# Patient Record
Sex: FEMALE | Race: WHITE | NOT HISPANIC OR LATINO | Employment: OTHER | URBAN - METROPOLITAN AREA
[De-identification: names, ages, dates, MRNs, and addresses within clinical notes are randomized per-mention and may not be internally consistent; named-entity substitution may affect disease eponyms.]

---

## 2017-03-09 ENCOUNTER — APPOINTMENT (OUTPATIENT)
Dept: LAB | Facility: CLINIC | Age: 72
End: 2017-03-09
Payer: MEDICARE

## 2017-03-09 ENCOUNTER — TRANSCRIBE ORDERS (OUTPATIENT)
Dept: ADMINISTRATIVE | Facility: HOSPITAL | Age: 72
End: 2017-03-09

## 2017-03-09 DIAGNOSIS — G50.0 TRIGEMINAL NEURALGIA: ICD-10-CM

## 2017-03-09 DIAGNOSIS — N28.9 UNSPECIFIED DISORDER OF KIDNEY AND URETER: ICD-10-CM

## 2017-03-09 DIAGNOSIS — E61.2 MAGNESIUM DEFICIENCY: ICD-10-CM

## 2017-03-09 DIAGNOSIS — E55.9 UNSPECIFIED VITAMIN D DEFICIENCY: Primary | ICD-10-CM

## 2017-03-09 DIAGNOSIS — G35 MULTIPLE SCLEROSIS (HCC): ICD-10-CM

## 2017-03-09 DIAGNOSIS — E55.9 UNSPECIFIED VITAMIN D DEFICIENCY: ICD-10-CM

## 2017-03-09 PROCEDURE — 82306 VITAMIN D 25 HYDROXY: CPT

## 2017-03-09 PROCEDURE — 36415 COLL VENOUS BLD VENIPUNCTURE: CPT

## 2017-03-09 PROCEDURE — 80048 BASIC METABOLIC PNL TOTAL CA: CPT

## 2017-03-09 PROCEDURE — 83735 ASSAY OF MAGNESIUM: CPT

## 2017-03-10 LAB
25(OH)D3 SERPL-MCNC: 31.6 NG/ML (ref 30–100)
ANION GAP SERPL CALCULATED.3IONS-SCNC: 7 MMOL/L (ref 4–13)
BUN SERPL-MCNC: 16 MG/DL (ref 5–25)
CALCIUM SERPL-MCNC: 9 MG/DL (ref 8.3–10.1)
CHLORIDE SERPL-SCNC: 105 MMOL/L (ref 100–108)
CO2 SERPL-SCNC: 26 MMOL/L (ref 21–32)
CREAT SERPL-MCNC: 0.69 MG/DL (ref 0.6–1.3)
GFR SERPL CREATININE-BSD FRML MDRD: >60 ML/MIN/1.73SQ M
GLUCOSE SERPL-MCNC: 86 MG/DL (ref 65–140)
MAGNESIUM SERPL-MCNC: 2.3 MG/DL (ref 1.6–2.6)
POTASSIUM SERPL-SCNC: 4.1 MMOL/L (ref 3.5–5.3)
SODIUM SERPL-SCNC: 138 MMOL/L (ref 136–145)

## 2017-03-23 ENCOUNTER — HOSPITAL ENCOUNTER (OUTPATIENT)
Dept: MRI IMAGING | Facility: CLINIC | Age: 72
Discharge: HOME | End: 2017-03-23
Payer: MEDICARE

## 2017-03-23 DIAGNOSIS — G50.0 TRIGEMINAL NEURALGIA: ICD-10-CM

## 2017-03-23 DIAGNOSIS — G35 MULTIPLE SCLEROSIS (HCC): ICD-10-CM

## 2017-03-23 PROCEDURE — 70553 MRI BRAIN STEM W/O & W/DYE: CPT

## 2017-03-23 PROCEDURE — A9585 GADOBUTROL INJECTION: HCPCS | Performed by: DENTIST

## 2017-03-23 RX ADMIN — GADOBUTROL 6 ML: 604.72 INJECTION INTRAVENOUS at 09:58

## 2017-05-17 ENCOUNTER — GENERIC CONVERSION - ENCOUNTER (OUTPATIENT)
Dept: OTHER | Facility: OTHER | Age: 72
End: 2017-05-17

## 2017-06-01 ENCOUNTER — GENERIC CONVERSION - ENCOUNTER (OUTPATIENT)
Dept: OTHER | Facility: OTHER | Age: 72
End: 2017-06-01

## 2017-06-01 ENCOUNTER — APPOINTMENT (OUTPATIENT)
Dept: LAB | Facility: CLINIC | Age: 72
End: 2017-06-01
Payer: MEDICARE

## 2017-06-01 DIAGNOSIS — E03.9 HYPOTHYROIDISM: ICD-10-CM

## 2017-06-01 LAB — TSH SERPL DL<=0.05 MIU/L-ACNC: 1.83 UIU/ML (ref 0.36–3.74)

## 2017-06-01 PROCEDURE — 36415 COLL VENOUS BLD VENIPUNCTURE: CPT

## 2017-06-01 PROCEDURE — 84443 ASSAY THYROID STIM HORMONE: CPT

## 2017-06-06 ENCOUNTER — ALLSCRIPTS OFFICE VISIT (OUTPATIENT)
Dept: OTHER | Facility: OTHER | Age: 72
End: 2017-06-06

## 2017-08-07 ENCOUNTER — ALLSCRIPTS OFFICE VISIT (OUTPATIENT)
Dept: OTHER | Facility: OTHER | Age: 72
End: 2017-08-07

## 2017-10-17 ENCOUNTER — TRANSCRIBE ORDERS (OUTPATIENT)
Dept: ADMINISTRATIVE | Facility: HOSPITAL | Age: 72
End: 2017-10-17

## 2017-10-17 DIAGNOSIS — M51.16 NEURITIS OR RADICULITIS DUE TO RUPTURE OF LUMBAR INTERVERTEBRAL DISC: Primary | ICD-10-CM

## 2017-10-19 ENCOUNTER — HOSPITAL ENCOUNTER (OUTPATIENT)
Dept: CT IMAGING | Facility: CLINIC | Age: 72
Discharge: HOME | End: 2017-10-19
Payer: MEDICARE

## 2017-10-19 DIAGNOSIS — M51.16 NEURITIS OR RADICULITIS DUE TO RUPTURE OF LUMBAR INTERVERTEBRAL DISC: ICD-10-CM

## 2017-10-19 PROCEDURE — 72131 CT LUMBAR SPINE W/O DYE: CPT

## 2017-11-27 ENCOUNTER — TRANSCRIBE ORDERS (OUTPATIENT)
Dept: ADMINISTRATIVE | Facility: HOSPITAL | Age: 72
End: 2017-11-27

## 2017-11-27 ENCOUNTER — ALLSCRIPTS OFFICE VISIT (OUTPATIENT)
Dept: OTHER | Facility: OTHER | Age: 72
End: 2017-11-27

## 2017-11-27 DIAGNOSIS — G47.33 OBSTRUCTIVE SLEEP APNEA: ICD-10-CM

## 2017-11-27 DIAGNOSIS — R06.83 SNORES: ICD-10-CM

## 2017-11-27 DIAGNOSIS — R06.02 SOB (SHORTNESS OF BREATH): Primary | ICD-10-CM

## 2017-11-27 DIAGNOSIS — F17.210 CIGARETTE NICOTINE DEPENDENCE, UNCOMPLICATED: ICD-10-CM

## 2017-11-27 DIAGNOSIS — F17.200 SMOKER: ICD-10-CM

## 2017-12-06 ENCOUNTER — APPOINTMENT (OUTPATIENT)
Dept: LAB | Facility: CLINIC | Age: 72
End: 2017-12-06
Payer: MEDICARE

## 2017-12-06 DIAGNOSIS — Z12.31 ENCOUNTER FOR SCREENING MAMMOGRAM FOR MALIGNANT NEOPLASM OF BREAST: ICD-10-CM

## 2017-12-06 DIAGNOSIS — E03.9 HYPOTHYROIDISM: ICD-10-CM

## 2017-12-07 ENCOUNTER — APPOINTMENT (OUTPATIENT)
Dept: LAB | Facility: CLINIC | Age: 72
End: 2017-12-07
Payer: MEDICARE

## 2017-12-07 ENCOUNTER — HOSPITAL ENCOUNTER (OUTPATIENT)
Dept: PULMONOLOGY | Facility: HOSPITAL | Age: 72
Discharge: HOME | End: 2017-12-07
Attending: INTERNAL MEDICINE
Payer: MEDICARE

## 2017-12-07 ENCOUNTER — HOSPITAL ENCOUNTER (OUTPATIENT)
Dept: CT IMAGING | Facility: HOSPITAL | Age: 72
Discharge: HOME | End: 2017-12-07
Attending: INTERNAL MEDICINE
Payer: COMMERCIAL

## 2017-12-07 ENCOUNTER — GENERIC CONVERSION - ENCOUNTER (OUTPATIENT)
Dept: SLEEP CENTER | Facility: CLINIC | Age: 72
End: 2017-12-07

## 2017-12-07 DIAGNOSIS — F17.200 SMOKER: ICD-10-CM

## 2017-12-07 DIAGNOSIS — R06.02 SOB (SHORTNESS OF BREATH): ICD-10-CM

## 2017-12-07 DIAGNOSIS — F17.210 CIGARETTE NICOTINE DEPENDENCE, UNCOMPLICATED: ICD-10-CM

## 2017-12-07 DIAGNOSIS — G47.33 OBSTRUCTIVE SLEEP APNEA: ICD-10-CM

## 2017-12-07 LAB
ALBUMIN SERPL BCP-MCNC: 3.4 G/DL (ref 3.5–5)
ALP SERPL-CCNC: 89 U/L (ref 46–116)
ALT SERPL W P-5'-P-CCNC: 28 U/L (ref 12–78)
ANION GAP SERPL CALCULATED.3IONS-SCNC: 7 MMOL/L (ref 4–13)
AST SERPL W P-5'-P-CCNC: 22 U/L (ref 5–45)
BASOPHILS # BLD AUTO: 0.04 THOUSANDS/ΜL (ref 0–0.1)
BASOPHILS NFR BLD AUTO: 1 % (ref 0–1)
BILIRUB SERPL-MCNC: 0.44 MG/DL (ref 0.2–1)
BUN SERPL-MCNC: 18 MG/DL (ref 5–25)
CALCIUM SERPL-MCNC: 8.9 MG/DL (ref 8.3–10.1)
CHLORIDE SERPL-SCNC: 104 MMOL/L (ref 100–108)
CHOLEST SERPL-MCNC: 182 MG/DL (ref 50–200)
CO2 SERPL-SCNC: 27 MMOL/L (ref 21–32)
CREAT SERPL-MCNC: 0.67 MG/DL (ref 0.6–1.3)
EOSINOPHIL # BLD AUTO: 0.6 THOUSAND/ΜL (ref 0–0.61)
EOSINOPHIL NFR BLD AUTO: 9 % (ref 0–6)
ERYTHROCYTE [DISTWIDTH] IN BLOOD BY AUTOMATED COUNT: 14.4 % (ref 11.6–15.1)
GFR SERPL CREATININE-BSD FRML MDRD: 87 ML/MIN/1.73SQ M
GLUCOSE P FAST SERPL-MCNC: 84 MG/DL (ref 65–99)
HCT VFR BLD AUTO: 38.9 % (ref 34.8–46.1)
HDLC SERPL-MCNC: 90 MG/DL (ref 40–60)
HGB BLD-MCNC: 13 G/DL (ref 11.5–15.4)
LDLC SERPL CALC-MCNC: 79 MG/DL (ref 0–100)
LYMPHOCYTES # BLD AUTO: 2.46 THOUSANDS/ΜL (ref 0.6–4.47)
LYMPHOCYTES NFR BLD AUTO: 35 % (ref 14–44)
MCH RBC QN AUTO: 31.9 PG (ref 26.8–34.3)
MCHC RBC AUTO-ENTMCNC: 33.4 G/DL (ref 31.4–37.4)
MCV RBC AUTO: 96 FL (ref 82–98)
MONOCYTES # BLD AUTO: 0.72 THOUSAND/ΜL (ref 0.17–1.22)
MONOCYTES NFR BLD AUTO: 10 % (ref 4–12)
NEUTROPHILS # BLD AUTO: 3.14 THOUSANDS/ΜL (ref 1.85–7.62)
NEUTS SEG NFR BLD AUTO: 45 % (ref 43–75)
NRBC BLD AUTO-RTO: 0 /100 WBCS
PLATELET # BLD AUTO: 329 THOUSANDS/UL (ref 149–390)
PMV BLD AUTO: 9.9 FL (ref 8.9–12.7)
POTASSIUM SERPL-SCNC: 3.9 MMOL/L (ref 3.5–5.3)
PROT SERPL-MCNC: 7.3 G/DL (ref 6.4–8.2)
RBC # BLD AUTO: 4.07 MILLION/UL (ref 3.81–5.12)
SODIUM SERPL-SCNC: 138 MMOL/L (ref 136–145)
TRIGL SERPL-MCNC: 64 MG/DL
TSH SERPL DL<=0.05 MIU/L-ACNC: 2.94 UIU/ML (ref 0.36–3.74)
WBC # BLD AUTO: 6.97 THOUSAND/UL (ref 4.31–10.16)

## 2017-12-07 PROCEDURE — 94729 DIFFUSING CAPACITY: CPT

## 2017-12-07 PROCEDURE — 80061 LIPID PANEL: CPT

## 2017-12-07 PROCEDURE — 94760 N-INVAS EAR/PLS OXIMETRY 1: CPT

## 2017-12-07 PROCEDURE — 94060 EVALUATION OF WHEEZING: CPT

## 2017-12-07 PROCEDURE — 84443 ASSAY THYROID STIM HORMONE: CPT

## 2017-12-07 PROCEDURE — 85025 COMPLETE CBC W/AUTO DIFF WBC: CPT

## 2017-12-07 PROCEDURE — 36415 COLL VENOUS BLD VENIPUNCTURE: CPT

## 2017-12-07 PROCEDURE — 94727 GAS DIL/WSHOT DETER LNG VOL: CPT

## 2017-12-07 PROCEDURE — 80053 COMPREHEN METABOLIC PANEL: CPT

## 2017-12-07 RX ORDER — ALBUTEROL SULFATE 2.5 MG/3ML
2.5 SOLUTION RESPIRATORY (INHALATION) ONCE
Status: COMPLETED | OUTPATIENT
Start: 2017-12-07 | End: 2017-12-07

## 2017-12-07 RX ADMIN — ALBUTEROL SULFATE 2.5 MG: 2.5 SOLUTION RESPIRATORY (INHALATION) at 12:22

## 2017-12-08 ENCOUNTER — TRANSCRIBE ORDERS (OUTPATIENT)
Dept: ADMINISTRATIVE | Facility: HOSPITAL | Age: 72
End: 2017-12-08

## 2017-12-08 DIAGNOSIS — R91.8 MULTIPLE LUNG NODULES: Primary | ICD-10-CM

## 2017-12-12 ENCOUNTER — GENERIC CONVERSION - ENCOUNTER (OUTPATIENT)
Dept: OTHER | Facility: OTHER | Age: 72
End: 2017-12-12

## 2018-01-09 NOTE — RESULT NOTES
Verified Results  (1) TSH [id number] 10:00AM David Doss    Order Number: [id number]     Test Name Result Flag Reference   TSH 1 830 uIU/mL  0 358-3 740   - Patient Instructions: This bloodwork is non-fasting  Please drink two glasses of water morning of bloodwork  Patients undergoing fluorescein dye angiography may retain small amounts of fluorescein in the body for 48-72 hours post procedure  Samples containing fluorescein can produce falsely depressed TSH values  If the patient had this procedure,a specimen should be resubmitted post fluorescein clearance            The recommended reference ranges for TSH during pregnancy are as follows:  First trimester 0 1 to 2 5 uIU/mL  Second trimester  0 2 to 3 0 uIU/mL  Third trimester 0 3 to 3 0 uIU/m

## 2018-01-10 ENCOUNTER — HOSPITAL ENCOUNTER (OUTPATIENT)
Dept: SLEEP CENTER | Facility: CLINIC | Age: 73
Discharge: HOME | End: 2018-01-11
Payer: MEDICARE

## 2018-01-10 DIAGNOSIS — G47.33 OSA (OBSTRUCTIVE SLEEP APNEA): ICD-10-CM

## 2018-01-10 DIAGNOSIS — F17.200 SMOKER: ICD-10-CM

## 2018-01-10 DIAGNOSIS — R06.02 SOB (SHORTNESS OF BREATH): ICD-10-CM

## 2018-01-10 DIAGNOSIS — R06.83 SNORES: ICD-10-CM

## 2018-01-10 PROCEDURE — 95810 POLYSOM 6/> YRS 4/> PARAM: CPT

## 2018-01-13 VITALS
WEIGHT: 136.25 LBS | HEART RATE: 84 BPM | DIASTOLIC BLOOD PRESSURE: 64 MMHG | SYSTOLIC BLOOD PRESSURE: 132 MMHG | HEIGHT: 64 IN | BODY MASS INDEX: 23.26 KG/M2 | OXYGEN SATURATION: 97 %

## 2018-01-22 VITALS
WEIGHT: 134.5 LBS | HEIGHT: 64 IN | SYSTOLIC BLOOD PRESSURE: 106 MMHG | DIASTOLIC BLOOD PRESSURE: 80 MMHG | OXYGEN SATURATION: 97 % | BODY MASS INDEX: 22.96 KG/M2 | HEART RATE: 80 BPM

## 2018-01-24 VITALS
SYSTOLIC BLOOD PRESSURE: 124 MMHG | WEIGHT: 134.38 LBS | HEIGHT: 64 IN | HEART RATE: 83 BPM | OXYGEN SATURATION: 93 % | BODY MASS INDEX: 22.94 KG/M2 | DIASTOLIC BLOOD PRESSURE: 78 MMHG

## 2018-02-08 ENCOUNTER — HOSPITAL ENCOUNTER (OUTPATIENT)
Dept: MAMMOGRAPHY | Facility: CLINIC | Age: 73
Discharge: HOME | End: 2018-02-08
Payer: MEDICARE

## 2018-02-08 DIAGNOSIS — Z12.31 ENCOUNTER FOR SCREENING MAMMOGRAM FOR MALIGNANT NEOPLASM OF BREAST: ICD-10-CM

## 2018-02-08 PROCEDURE — 77067 SCR MAMMO BI INCL CAD: CPT

## 2018-02-09 ENCOUNTER — TELEPHONE (OUTPATIENT)
Dept: INTERNAL MEDICINE CLINIC | Facility: CLINIC | Age: 73
End: 2018-02-09

## 2018-02-09 NOTE — TELEPHONE ENCOUNTER
----- Message from Elisa Lowry DO sent at 2/8/2018 12:02 PM EST -----  Let patient know mammogram was normal

## 2018-02-12 ENCOUNTER — TELEPHONE (OUTPATIENT)
Dept: INTERNAL MEDICINE CLINIC | Facility: CLINIC | Age: 73
End: 2018-02-12

## 2018-02-12 NOTE — TELEPHONE ENCOUNTER
----- Message from Osmin Phelan DO sent at 2/12/2018  3:26 PM EST -----  Let patient know mammogram was normal

## 2018-02-16 ENCOUNTER — OFFICE VISIT (OUTPATIENT)
Dept: PULMONOLOGY | Facility: CLINIC | Age: 73
End: 2018-02-16
Payer: MEDICARE

## 2018-02-16 VITALS
WEIGHT: 137 LBS | BODY MASS INDEX: 23.39 KG/M2 | SYSTOLIC BLOOD PRESSURE: 104 MMHG | DIASTOLIC BLOOD PRESSURE: 74 MMHG | HEART RATE: 74 BPM | HEIGHT: 64 IN | OXYGEN SATURATION: 92 %

## 2018-02-16 DIAGNOSIS — Z72.0 TOBACCO ABUSE: ICD-10-CM

## 2018-02-16 DIAGNOSIS — R91.8 LUNG NODULES: ICD-10-CM

## 2018-02-16 DIAGNOSIS — G47.33 OSA (OBSTRUCTIVE SLEEP APNEA): ICD-10-CM

## 2018-02-16 DIAGNOSIS — J41.0 SIMPLE CHRONIC BRONCHITIS (HCC): Primary | ICD-10-CM

## 2018-02-16 PROCEDURE — 99215 OFFICE O/P EST HI 40 MIN: CPT | Performed by: INTERNAL MEDICINE

## 2018-02-16 RX ORDER — UREA 10 %
100 LOTION (ML) TOPICAL DAILY
COMMUNITY
End: 2021-08-18 | Stop reason: ALTCHOICE

## 2018-02-16 RX ORDER — SODIUM, POTASSIUM,MAG SULFATES 17.5-3.13G
SOLUTION, RECONSTITUTED, ORAL ORAL
Refills: 0 | COMMUNITY
Start: 2018-01-31 | End: 2018-10-23 | Stop reason: CLARIF

## 2018-02-16 RX ORDER — VIT A/VIT C/VIT E/ZINC/COPPER 4296-226
CAPSULE ORAL 2 TIMES DAILY
COMMUNITY
End: 2019-09-23 | Stop reason: CLARIF

## 2018-02-16 RX ORDER — THIAMINE HCL 100 MG
1 TABLET ORAL DAILY
COMMUNITY
End: 2019-12-12

## 2018-02-16 RX ORDER — CHOLECALCIFEROL (VITAMIN D3) 25 MCG
1000 CAPSULE ORAL DAILY
COMMUNITY
End: 2021-08-18 | Stop reason: ALTCHOICE

## 2018-02-16 RX ORDER — DIAZEPAM 2 MG/1
TABLET ORAL
Refills: 5 | COMMUNITY
Start: 2018-01-13 | End: 2019-08-14 | Stop reason: CLARIF

## 2018-02-16 RX ORDER — LEVOTHYROXINE SODIUM 0.1 MG/1
1 TABLET ORAL DAILY
COMMUNITY
Start: 2014-03-24 | End: 2018-02-23 | Stop reason: SDUPTHER

## 2018-02-16 RX ORDER — NAPROXEN 375 MG/1
500 TABLET ORAL 2 TIMES DAILY WITH MEALS
COMMUNITY
Start: 2018-02-08 | End: 2019-08-14 | Stop reason: CLARIF

## 2018-02-16 NOTE — PROGRESS NOTES
Assessment/Plan:   Diagnoses and all orders for this visit:    Simple chronic bronchitis (HCC)    CHOCO (obstructive sleep apnea)  -     Polysomnography 4 or more parameters with CPAP; Future    Lung nodules  -     CT chest wo contrast; Future    Tobacco abuse    Other orders  -     diazepam (VALIUM) 2 mg tablet; TAKE 1 TABLET BY MOUTH EVERY DAY AT BEDTIME FOR INSOMNIA  -     levothyroxine 100 mcg tablet; Take 1 tablet by mouth daily  -     SUPREP BOWEL PREP KIT 17 5-3 13-1 6 GM/180ML SOLN; Use as directed  -     naproxen (NAPROSYN) 375 mg tablet;   -     Multiple Vitamins-Minerals (PRESERVISION AREDS) CAPS; Take by mouth  -     thiamine (VITAMIN B1) 100 mg tablet; Take 1 tablet by mouth daily  -     vitamin B-12 (CYANOCOBALAMIN) 100 MCG tablet; Take by mouth  -     Cholecalciferol (VITAMIN D-3) 1000 units CAPS; Take by mouth    Chronic bronchitis long discussion with the patient with regards to smoking cessation  She states she does not want to quit  PFTs discussed with the patient with mild obstructive lung disease  Residual volume increased consistent with air trapping and severely decreased the DLCO    CT of the chest reviewed with the patient with evidence of bilateral emphysematous changes bilateral apical pleural scarring  3 small lung nodules around 4 mm  Given active smoking history would repeat CT of the chest in 6 months and follow-up  Obstructive sleep apnea discussed the polysomnogram results with the patient diagnostic with severe obstructive sleep apnea with an AHI of 31 8  Etiology pathogenesis of obstructive sleep apnea again discussed with the patient in detail  Treatment options discussed with mandibular advancement devices, uvulopharyngoplasty and CPAP titration  Patient would like to going for the CPAP titration study    Testing procedure discussed in detail  Consequences of untreated sleep apnea discussed  Need for compliance with the CPAP machine discussed  She will get the CPAP titration study and will get the CPAP and I will see her back in 3 months with the CPAP download compliance  Follow-up in 3 months or when necessary earlier as needed  Return in about 3 months (around 5/16/2018)  All questions are answered to the patient's satisfaction and understanding  She verbalizes understanding  She is encouraged to call with any further questions or concerns  Portions of the record may have been created with voice recognition software  Occasional wrong word or "sound a like" substitutions may have occurred due to the inherent limitations of voice recognition software  Read the chart carefully and recognize, using context, where substitutions have occurred  ______________________________________________________________________    Chief Complaint:   Chief Complaint   Patient presents with    Follow-up       Patient ID: Guilherme Romero is a 74 y o  y o  female has a past medical history of COPD (chronic obstructive pulmonary disease) ([facility] [address] ) and CHOCO (obstructive sleep apnea)  2/16/2018  Patient is a very pleasant 78-year-old lady, With greater than 45 pack is smoking history still actively smoking about half a pack a day, states she has significantly cut down from before  of cough for the past 3-4 months  Mucoid sputum no hemoptysis  not complain of any shortness of breath or dyspnea on exertion  The patient is being seen for  obstructive sleep apnea  Symptoms:  witnessed apnea during sleep,-- witnessed gasping during sleep,-- unrefreshing sleep,-- sleepy when sedentary-- and-- impaired concentration, but-- no excessive daytime sleepiness,-- no memory problems,-- no irritability-- and-- no restless legs--   The patient presents with complaints of gradual onset of nightly episodes of moderate snoring, described as loud  The patient is currently experiencing symptoms  Associated symptoms:  shortness of breath-- and-- dry throat, but-- no morning headaches   The patient is not currently being treated for this problem  Pertinent medical history:  no obesity,-- no asthma,-- no chronic obstructive pulmonary disease,-- no congestive heart failure-- and-- no environmental allergies  The patient is being seen for  chronic bronchitis  Symptoms:  cough-- and-- sputum production, but-- no purulent sputum,-- no shortness of breath,-- no dyspnea on exertion,-- no wheezing,-- no hemoptysis,-- no orthopnea,-- no chest burning-- and-- no lower extremity swelling  The patient is currently experiencing symptoms  Onset was gradual  Associated symptoms:  fatigue, but-- no headache,-- no myalgias,-- no fever,-- no sore throat-- and-- no nasal congestion  The patient is not currently being treated for this problem  Since diagnosis the disease has been worsening  Pertinent medical history:  no recurrent bronchitis,-- no bronchiectasis,-- no pneumonia,-- no antiprotease deficiency,-- no lung cancer,-- no pulmonary embolism-- and-- no diabetes  Risk factors:  tobacco abuse, but-- no alcohol abuse-- and-- no obesity  Review of Systems   Constitutional: Positive for fatigue  Negative for activity change, appetite change, chills, diaphoresis, fever and unexpected weight change  HENT: Negative for congestion, dental problem, drooling, nosebleeds, postnasal drip, rhinorrhea, sinus pressure, sore throat and voice change  Eyes: Negative for discharge, itching and visual disturbance  Respiratory: Positive for cough (clear sputum, no hemoptysis), shortness of breath and wheezing  Cardiovascular: Negative for chest pain, palpitations and leg swelling  Endocrine: Negative for cold intolerance and heat intolerance  Allergic/Immunologic: Negative for food allergies and immunocompromised state  Neurological: Negative for dizziness, facial asymmetry, speech difficulty and weakness  Hematological: Negative for adenopathy  Psychiatric/Behavioral: Negative for agitation, confusion and sleep disturbance   The patient is not nervous/anxious  Smoking history: She reports that she has been smoking  She started smoking about 60 years ago  She has been smoking about 0 50 packs per day  She has never used smokeless tobacco     The following portions of the patient's history were reviewed and updated as appropriate: allergies, current medications, past family history, past medical history, past social history, past surgical history and problem list     Immunization History   Administered Date(s) Administered    Influenza Split High Dose Preservative Free IM 1945    Pneumococcal Polysaccharide PPV23 1945     Current Outpatient Prescriptions   Medication Sig Dispense Refill    Cholecalciferol (VITAMIN D-3) 1000 units CAPS Take by mouth      diazepam (VALIUM) 2 mg tablet TAKE 1 TABLET BY MOUTH EVERY DAY AT BEDTIME FOR INSOMNIA  5    levothyroxine 100 mcg tablet Take 1 tablet by mouth daily      Multiple Vitamins-Minerals (PRESERVISION AREDS) CAPS Take by mouth      naproxen (NAPROSYN) 375 mg tablet       SUPREP BOWEL PREP KIT 17 5-3 13-1 6 GM/180ML SOLN Use as directed  0    thiamine (VITAMIN B1) 100 mg tablet Take 1 tablet by mouth daily      vitamin B-12 (CYANOCOBALAMIN) 100 MCG tablet Take by mouth       No current facility-administered medications for this visit  Allergies: Patient has no known allergies  Objective:  Vitals:    02/16/18 1026   BP: 104/74   BP Location: Left arm   Patient Position: Sitting   Pulse: 74   SpO2: 92%   Weight: 62 1 kg (137 lb)   Height: 5' 4" (1 626 m)   Oxygen Therapy  SpO2: 92 %    Wt Readings from Last 3 Encounters:   02/16/18 62 1 kg (137 lb)   12/12/17 61 kg (134 lb 6 1 oz)   11/27/17 61 kg (134 lb 8 oz)     Body mass index is 23 52 kg/m²  Physical Exam   Constitutional: She is oriented to person, place, and time  She appears well-developed and well-nourished  HENT:   Head: Normocephalic and atraumatic     Crowded oropharyngeal airways, Mallampati score of 3 Eyes: Conjunctivae are normal  Pupils are equal, round, and reactive to light  Neck: Normal range of motion  Neck supple  No JVD present  No thyromegaly present  Cardiovascular: Normal rate, regular rhythm and normal heart sounds  Exam reveals no gallop and no friction rub  No murmur heard  Pulmonary/Chest: Effort normal and breath sounds normal  No respiratory distress  She has no wheezes  She has no rales  She exhibits no tenderness  Abdominal: Soft  Bowel sounds are normal    Musculoskeletal: Normal range of motion  She exhibits no edema, tenderness or deformity  Lymphadenopathy:     She has no cervical adenopathy  Neurological: She is alert and oriented to person, place, and time  Skin: Skin is warm and dry  Psychiatric: She has a normal mood and affect  Nursing note and vitals reviewed  Lab Review:   not applicable    Diagnostics:  I have personally reviewed pertinent reports      none pertinent  Office Spirometry Results:

## 2018-02-22 ENCOUNTER — ANESTHESIA EVENT (OUTPATIENT)
Dept: PERIOP | Facility: HOSPITAL | Age: 73
End: 2018-02-22
Payer: MEDICARE

## 2018-02-23 ENCOUNTER — HOSPITAL ENCOUNTER (OUTPATIENT)
Facility: HOSPITAL | Age: 73
Setting detail: OUTPATIENT SURGERY
Discharge: HOME | End: 2018-02-23
Attending: COLON & RECTAL SURGERY | Admitting: COLON & RECTAL SURGERY
Payer: MEDICARE

## 2018-02-23 ENCOUNTER — ANESTHESIA (OUTPATIENT)
Dept: PERIOP | Facility: HOSPITAL | Age: 73
End: 2018-02-23
Payer: MEDICARE

## 2018-02-23 VITALS
SYSTOLIC BLOOD PRESSURE: 127 MMHG | BODY MASS INDEX: 23.57 KG/M2 | HEIGHT: 63 IN | DIASTOLIC BLOOD PRESSURE: 61 MMHG | TEMPERATURE: 97.4 F | RESPIRATION RATE: 18 BRPM | HEART RATE: 71 BPM | OXYGEN SATURATION: 100 % | WEIGHT: 133 LBS

## 2018-02-23 DIAGNOSIS — E03.9 HYPOTHYROIDISM, UNSPECIFIED TYPE: Primary | ICD-10-CM

## 2018-02-23 RX ORDER — SODIUM CHLORIDE, SODIUM LACTATE, POTASSIUM CHLORIDE, CALCIUM CHLORIDE 600; 310; 30; 20 MG/100ML; MG/100ML; MG/100ML; MG/100ML
125 INJECTION, SOLUTION INTRAVENOUS CONTINUOUS
Status: DISCONTINUED | OUTPATIENT
Start: 2018-02-23 | End: 2018-02-23 | Stop reason: HOSPADM

## 2018-02-23 RX ORDER — PROPOFOL 10 MG/ML
INJECTION, EMULSION INTRAVENOUS AS NEEDED
Status: DISCONTINUED | OUTPATIENT
Start: 2018-02-23 | End: 2018-02-23 | Stop reason: SURG

## 2018-02-23 RX ORDER — LEVOTHYROXINE SODIUM 0.1 MG/1
TABLET ORAL
Qty: 90 TABLET | Refills: 3 | Status: SHIPPED | OUTPATIENT
Start: 2018-02-23 | End: 2018-04-23 | Stop reason: SDUPTHER

## 2018-02-23 RX ADMIN — PROPOFOL 60 MG: 10 INJECTION, EMULSION INTRAVENOUS at 07:56

## 2018-02-23 RX ADMIN — PROPOFOL 20 MG: 10 INJECTION, EMULSION INTRAVENOUS at 08:12

## 2018-02-23 RX ADMIN — PROPOFOL 30 MG: 10 INJECTION, EMULSION INTRAVENOUS at 07:59

## 2018-02-23 RX ADMIN — SODIUM CHLORIDE, SODIUM LACTATE, POTASSIUM CHLORIDE, AND CALCIUM CHLORIDE: .6; .31; .03; .02 INJECTION, SOLUTION INTRAVENOUS at 07:38

## 2018-02-23 RX ADMIN — PROPOFOL 30 MG: 10 INJECTION, EMULSION INTRAVENOUS at 08:05

## 2018-02-23 NOTE — OP NOTE
**** GI/ENDOSCOPY REPORT ****     PATIENT NAME: Wali VIEYRA ------ VISIT ID:  Patient ID:   [id number] YOB: 1945     INTRODUCTION: Colonoscopy - A 74 female patient presents for an outpatient   Colonoscopy at [facility]  PREVIOUS COLONOSCOPY: Patient's last colonoscopy was 5 years ago  INDICATIONS: Surveillance  Increased-risk screening for colon cancer and   colonic polyps  CONSENT:  The benefits, risks, and alternatives to the procedure were   discussed and informed consent was obtained from the patient  PREPARATION: EKG, pulse, pulse oximetry and blood pressure were monitored   throughout the procedure  The patient was identified by myself both   verbally and by visual inspection of ID band  Airway Assessment   Classification: Airway class 2 - Visualization of the soft palate, fauces   and uvula  ASA Classification: Class 2 - Patient has mild to moderate   systemic disturbance that may or may not be related to the disorder   requiring surgery  The patient was identified by myself both verbally and   by visual inspection of ID band  EKG, pulse, pulse oximetry, and blood   pressure were monitored throughout the procedure  The patient received   electrolyte preparation in preparation for the procedure  An intravenous   line was inserted  MEDICATIONS: Anesthesia-check records MAC anesthesia  PROCEDURE:  The pediatric colonoscope was passed with difficulty through   the anus under direct visualization and advanced to the cecum, confirmed   by appendiceal orifice and ileocecal valve  The patient required   counterpressure to aid in the passage of the scope  The scope was   withdrawn and the mucosa was carefully examined  The quality of the   preparation was   Cecal Intubation Time: 11 minutes(s) Scope Withdrawal   Time: 6 minutes(s)     RECTAL EXAM: Normal rectal exam      FINDINGS:  A diverticulum was found in the transverse colon, sigmoid   colon, and descending colon  The ileocecal valve, cecum, ascending   colon, hepatic flexure, rectosigmoid junction, rectum, and anus appeared   to be normal        Otherwise, the colon appeared to be normal      COMPLICATIONS: There were no complications  IMPRESSIONS: Diverticulum found in the transverse colon, sigmoid colon,   and descending colon  Normal ileocecal valve, cecum, ascending colon,   hepatic flexure, rectosigmoid junction, rectum, and anus  RECOMMENDATIONS: Colonoscopy recommended in 5 years  Discharge home when   standard parameters are met  Start high fiber diet  Continue current   medications  The patient to be given standard materials for the current   diagnosis  ESTIMATED BLOOD LOSS: None  PATHOLOGY SPECIMENS: No     PROCEDURE CODES:  - colonoscopy for CA screen: High risk without   biopsy     ICD-9 Codes: V76 51 Special screening for malignant neoplasms of colon     ICD-10 Codes: Z12 11 Encounter for screening for malignant neoplasm of   colon K57 Diverticular disease of intestine     PERFORMED BY: JACK Galvan  on 02/23/2018  Version 1, electronically signed by JACK Ludwig  on   02/23/2018 at 08:21

## 2018-02-23 NOTE — DISCHARGE INSTRUCTIONS
Colonoscopy   WHAT YOU NEED TO KNOW:   A colonoscopy is a procedure to examine the inside of your colon (intestine) with a scope  Polyps or tissue growths may have been removed during your colonoscopy  It is normal to feel bloated and to have some abdominal discomfort  You should be passing gas  If you have hemorrhoids or you had polyps removed, you may have a small amount of bleeding  DISCHARGE INSTRUCTIONS:   Seek care immediately if:   · You have a large amount of bright red blood in your bowel movements  · Your abdomen is hard and firm and you have severe pain  · You have sudden trouble breathing  Contact your healthcare provider if:   · You develop a rash or hives  · You have a fever within 24 hours of your procedure       · You have not had a bowel movement for 3 days after your procedure  · You have questions or concerns about your condition or care  Activity:   · Do not lift, strain, or run  for 3 days after your procedure  · Rest after your procedure  You have been given medicine to relax you  Do not  drive or make important decisions until the day after your procedure  Return to your normal activity as directed  · Relieve gas and discomfort from bloating  by lying on your right side with a heating pad on your abdomen  You may need to take short walks to help the gas move out  Eat small meals until bloating is relieved  If you had polyps removed: For 7 days after your procedure:  · Do not  take aspirin  · Do not  go on long car rides  Follow up with your healthcare provider as directed:  Write down your questions so you remember to ask them during your visits  © 2017 [employer] is for End User's use only and may not be sold, redistributed or otherwise used for commercial purposes  All illustrations and images included in CareNotes® are the copyrighted property of [employer]  or [employer]    The above information is an  only  It is not intended as medical advice for individual conditions or treatments  Talk to your doctor, nurse or pharmacist before following any medical regimen to see if it is safe and effective for you

## 2018-02-23 NOTE — ANESTHESIA POSTPROCEDURE EVALUATION
Post-Op Assessment Note      CV Status:  Stable    Mental Status:  Alert and awake    Hydration Status:  Stable    PONV Controlled:  None    Airway Patency:  Patent and adequate    Post Op Vitals Reviewed: Yes          Staff: Anesthesiologist, CRNA           BP   102/60   Temp     Pulse  64   Resp   18   SpO2   100%

## 2018-02-23 NOTE — ANESTHESIA PREPROCEDURE EVALUATION
Review of Systems/Medical History  Patient summary reviewed  Chart reviewed  No history of anesthetic complications     Cardiovascular  Negative cardio ROS Exercise tolerance: good,     Pulmonary  Smoker cigarette smoker  , Tobacco cessation counseling given , COPD (No home O2, no inhaler use, mild emphysematous changes seen on CT chest) , Sleep apnea (Diagnosed last week, has not gotten CPAP yet) ,        GI/Hepatic    Bowel prep  Comment: Confirmed NPO appropriate     Negative  ROS        Endo/Other  History of thyroid disease , hypothyroidism,      GYN  Negative gynecology ROS          Hematology  Negative hematology ROS      Musculoskeletal  Negative musculoskeletal ROS        Neurology  Negative neurology ROS      Psychology   Negative psychology ROS              Physical Exam    Airway    Mallampati score: II  TM Distance: <3 FB  Neck ROM: full     Dental   Comment: caps, No notable dental hx     Cardiovascular  Comment: Negative ROS, Rhythm: regular, Rate: normal, Cardiovascular exam normal    Pulmonary  Decreased breath sounds, No wheezes,     Other Findings        Anesthesia Plan  ASA Score- 3     Anesthesia Type- IV sedation with anesthesia with ASA Monitors  Additional Monitors:   Airway Plan:     Comment: I discussed the risks and benefits of IV sedation anesthesia including the possibility of the need to convert to general anesthesia and the potential risk of awareness  The patient was given the opportunity to ask questions, which were answered        Plan Factors-    Induction- intravenous  Postoperative Plan-     Informed Consent- Anesthetic plan and risks discussed with patient and spouse  Chest CT 12/7/2017:  IMPRESSION:  Scattered bilateral pulmonary nodules measuring up to 4 mm  Mild diffuse emphysematous lung changes

## 2018-03-22 ENCOUNTER — HOSPITAL ENCOUNTER (OUTPATIENT)
Dept: SLEEP CENTER | Facility: CLINIC | Age: 73
Discharge: HOME | End: 2018-03-22
Payer: MEDICARE

## 2018-03-22 DIAGNOSIS — G47.33 OSA (OBSTRUCTIVE SLEEP APNEA): ICD-10-CM

## 2018-03-22 PROCEDURE — 95811 POLYSOM 6/>YRS CPAP 4/> PARM: CPT

## 2018-03-23 NOTE — PROGRESS NOTES
Sleep Study Documentation    Pre-Sleep Study       Sleep testing procedure explained to patient:YES    Patient napped prior to study:NO    Caffeine:Dayshift worker after 12PM   Caffeine use:NO    Alcohol:Dayshift workers after 5PM: Alcohol use:YES-Beer 1 serving    Typical day for patient:YES       Study Documentation  Treatment   Optimal PAP pressure: 7 CM  Leak:Small  Snore:Eliminated  REM Obtained:yes  Supplemental O2: no    Minimum SaO2 86%  Baseline SaO2 99%  PAP mask tried (list all) Rival IQ full face mask medium, Mapluck 2 nasal mask medium, ResMed Airfit nasal pillows medium  PAP mask choice (final)ResMed Airfit nasal pillows medium  PAP pressure at which snoring was eliminated 7 cm  Minimum SaO2 at final PAP pressure 93%  Mode of Therapy:CPAP  ETCO2:No  CPAP changed to BiPAP:No    Mode of Therapy:CPAP    EKG abnormalities: no     EEG abnormalities: no    Study Terminated:no    Patient classification: retired       Post-Sleep Study    Medication used at bedtime or during sleep study:YES prescription sleep aid    Patient reports time it took to fall asleep:20 to 30 minutes    Patient reports waking up during study:1 to 2 times  Patient reports returning to sleep in greater than 30 minutes  Patient reports sleeping 4 to 6 hours without dreaming  Patient reports sleep during study:typical    Patient rated sleepiness: Somewhat sleepy or tired    PAP treatment:yes: Post PAP treatment patient reports feeling unsure if a change is noted and  would wear PAP mask at home

## 2018-03-26 ENCOUNTER — TELEPHONE (OUTPATIENT)
Dept: PULMONOLOGY | Facility: CLINIC | Age: 73
End: 2018-03-26

## 2018-03-26 DIAGNOSIS — G47.33 OSA (OBSTRUCTIVE SLEEP APNEA): Primary | ICD-10-CM

## 2018-04-13 ENCOUNTER — OFFICE VISIT (OUTPATIENT)
Dept: PULMONOLOGY | Facility: CLINIC | Age: 73
End: 2018-04-13
Payer: MEDICARE

## 2018-04-13 VITALS
DIASTOLIC BLOOD PRESSURE: 84 MMHG | HEART RATE: 87 BPM | HEIGHT: 63 IN | SYSTOLIC BLOOD PRESSURE: 134 MMHG | WEIGHT: 141 LBS | OXYGEN SATURATION: 97 % | BODY MASS INDEX: 24.98 KG/M2

## 2018-04-13 DIAGNOSIS — G47.33 OBSTRUCTIVE SLEEP APNEA ON CPAP: ICD-10-CM

## 2018-04-13 DIAGNOSIS — Z72.0 TOBACCO ABUSE: ICD-10-CM

## 2018-04-13 DIAGNOSIS — J41.0 SIMPLE CHRONIC BRONCHITIS (HCC): Primary | ICD-10-CM

## 2018-04-13 DIAGNOSIS — Z99.89 OBSTRUCTIVE SLEEP APNEA ON CPAP: ICD-10-CM

## 2018-04-13 PROCEDURE — 99214 OFFICE O/P EST MOD 30 MIN: CPT | Performed by: INTERNAL MEDICINE

## 2018-04-14 NOTE — PROGRESS NOTES
Assessment/Plan:   Diagnoses and all orders for this visit:    Simple chronic bronchitis ([address] )    Obstructive sleep apnea on CPAP    Tobacco abuse      chronic bronchitis, long discussion with the patient with regards to smoking cessation  She states she is has significantly cut down from last visit and is waiting on her own  PFTs with mild obstructive lung disease with no appreciable response to the bronchodilator  And increased residual volume consistent with air trapping and severely decreased DLCO  Currently she is not on any inhalers, she has no limitation of her daily current activities  CT of the chest prior 1 demonstrated bilateral emphysematous changes with bilateral pedicle pleural scarring  Also with 3 mm small lung nodule given active smoking history she would need a repeat CT of the chest in 6 months which has already been ordered and will follow be followed up  Obstructive sleep apnea on CPAP, she has severe obstructive sleep apnea with an AHI of 31 8  Again etiology pathogenesis of obstructive sleep apnea discussed with the patient  she states that on the CPAP, she has decreased nocturnal awakenings and feels much much more refreshed when she wakes up in the morning  Needs for compliance with the CPAP discussed with the patient, understands and verbalizes  Change of CPAP supplies every 6 months discussed  Vaccinations up-to-date  She will follow up in with the CT of the chest     Return in about 4 months (around 8/13/2018)  All questions are answered to the patient's satisfaction and understanding  She verbalizes understanding  She is encouraged to call with any further questions or concerns  Portions of the record may have been created with voice recognition software  Occasional wrong word or "sound a like" substitutions may have occurred due to the inherent limitations of voice recognition software    Read the chart carefully and recognize, using context, where substitutions have occurred  ______________________________________________________________________    Chief Complaint:   Chief Complaint   Patient presents with    Sleep Apnea    Follow-up       Patient ID: Warren Aase is a 74 y o  y o  female has a past medical history of Anxiety; Cataract; COPD (chronic obstructive pulmonary disease) ([facility] ); Disease of thyroid gland; Multiple sclerosis ([facility] [facility] ); CHOCO (obstructive sleep apnea); and Peroneal muscle atrophy  4/13/2018  Patient is a very pleasant 78-year-old lady with greater than 45 pack year smoking history still actively smoking about half a pack a day, states she has significantly cut down from last visit, with history of chronic bronchitis, also was diagnosed with severe obstructive sleep apnea was placed on CPAP and she is here for a 3 month follow-up after CPAP use  Review of Systems   Constitutional: Positive for fatigue  Negative for activity change, appetite change, chills, diaphoresis, fever and unexpected weight change  HENT: Negative for congestion, dental problem, drooling, nosebleeds, postnasal drip, rhinorrhea, sinus pressure, sore throat and voice change  Eyes: Negative for discharge, itching and visual disturbance  Respiratory: Positive for cough (clear sputum, no hemoptysis) and shortness of breath  Negative for wheezing  Cardiovascular: Negative for chest pain, palpitations and leg swelling  Endocrine: Negative for cold intolerance and heat intolerance  Allergic/Immunologic: Negative for food allergies and immunocompromised state  Neurological: Negative for dizziness, facial asymmetry, speech difficulty and weakness  Hematological: Negative for adenopathy  Psychiatric/Behavioral: Negative for agitation, confusion and sleep disturbance  The patient is not nervous/anxious  Smoking history: She reports that she has been smoking Cigarettes  She started smoking about 60 years ago  She has been smoking about 0 50 packs per day   She has never used smokeless tobacco     The following portions of the patient's history were reviewed and updated as appropriate: allergies, current medications, past family history, past medical history, past social history, past surgical history and problem list     Immunization History   Administered Date(s) Administered    Influenza 09/14/2016    Influenza Split High Dose Preservative Free IM 1945    Pneumococcal Polysaccharide PPV23 1945, 05/22/2008    Tuberculin Skin Test-PPD Intradermal 01/13/2017     Current Outpatient Prescriptions   Medication Sig Dispense Refill    Cholecalciferol (VITAMIN D-3) 1000 units CAPS Take by mouth      diazepam (VALIUM) 2 mg tablet TAKE 1 TABLET BY MOUTH EVERY DAY AT BEDTIME FOR INSOMNIA  5    levothyroxine 100 mcg tablet TAKE 1 TABLET ONCE DAILY 90 tablet 3    naproxen (NAPROSYN) 375 mg tablet       thiamine (VITAMIN B1) 100 mg tablet Take 1 tablet by mouth daily      vitamin B-12 (CYANOCOBALAMIN) 100 MCG tablet Take by mouth      Multiple Vitamins-Minerals (PRESERVISION AREDS) CAPS Take by mouth      SUPREP BOWEL PREP KIT 17 5-3 13-1 6 GM/180ML SOLN Use as directed  0     No current facility-administered medications for this visit  Allergies: Patient has no known allergies  Objective:  Vitals:    04/13/18 0924   BP: 134/84   Pulse: 87   SpO2: 97%   Weight: 64 kg (141 lb)   Height: 5' 3" (1 6 m)   Oxygen Therapy  SpO2: 97 %    Wt Readings from Last 3 Encounters:   04/13/18 64 kg (141 lb)   02/23/18 60 3 kg (133 lb)   02/16/18 62 1 kg (137 lb)     Body mass index is 24 98 kg/m²  Physical Exam   Constitutional: She is oriented to person, place, and time  She appears well-developed and well-nourished  HENT:   Head: Normocephalic and atraumatic  Crowded oropharyngeal airways, Mallampati score of 3   Eyes: Conjunctivae are normal  Pupils are equal, round, and reactive to light  Neck: Normal range of motion  Neck supple  No JVD present   No thyromegaly present  Cardiovascular: Normal rate, regular rhythm and normal heart sounds  Exam reveals no gallop and no friction rub  No murmur heard  Pulmonary/Chest: Effort normal and breath sounds normal  No respiratory distress  She has no wheezes  She has no rales  She exhibits no tenderness  Abdominal: Soft  Bowel sounds are normal    Musculoskeletal: Normal range of motion  She exhibits no edema, tenderness or deformity  Lymphadenopathy:     She has no cervical adenopathy  Neurological: She is alert and oriented to person, place, and time  Skin: Skin is warm and dry  Psychiatric: She has a normal mood and affect  Nursing note and vitals reviewed

## 2018-04-15 DIAGNOSIS — J44.9 CHRONIC OBSTRUCTIVE PULMONARY DISEASE (HCC): ICD-10-CM

## 2018-04-15 DIAGNOSIS — G35 MULTIPLE SCLEROSIS (HCC): ICD-10-CM

## 2018-04-15 DIAGNOSIS — Z12.31 ENCOUNTER FOR SCREENING MAMMOGRAM FOR MALIGNANT NEOPLASM OF BREAST: ICD-10-CM

## 2018-04-15 DIAGNOSIS — R91.8 OTHER NONSPECIFIC ABNORMAL FINDING OF LUNG FIELD (CODE): ICD-10-CM

## 2018-04-15 DIAGNOSIS — E03.9 HYPOTHYROIDISM: ICD-10-CM

## 2018-04-17 ENCOUNTER — APPOINTMENT (OUTPATIENT)
Dept: LAB | Facility: CLINIC | Age: 73
End: 2018-04-17
Payer: MEDICARE

## 2018-04-17 DIAGNOSIS — R91.8 OTHER NONSPECIFIC ABNORMAL FINDING OF LUNG FIELD (CODE): ICD-10-CM

## 2018-04-17 DIAGNOSIS — J44.9 CHRONIC OBSTRUCTIVE PULMONARY DISEASE (HCC): ICD-10-CM

## 2018-04-17 DIAGNOSIS — E03.9 HYPOTHYROIDISM: ICD-10-CM

## 2018-04-17 DIAGNOSIS — G35 MULTIPLE SCLEROSIS (HCC): ICD-10-CM

## 2018-04-17 DIAGNOSIS — Z12.31 ENCOUNTER FOR SCREENING MAMMOGRAM FOR MALIGNANT NEOPLASM OF BREAST: ICD-10-CM

## 2018-04-17 LAB
ALBUMIN SERPL BCP-MCNC: 3.8 G/DL (ref 3.5–5)
ALP SERPL-CCNC: 109 U/L (ref 46–116)
ALT SERPL W P-5'-P-CCNC: 31 U/L (ref 12–78)
ANION GAP SERPL CALCULATED.3IONS-SCNC: 5 MMOL/L (ref 4–13)
AST SERPL W P-5'-P-CCNC: 24 U/L (ref 5–45)
BILIRUB SERPL-MCNC: 0.43 MG/DL (ref 0.2–1)
BUN SERPL-MCNC: 21 MG/DL (ref 5–25)
CALCIUM SERPL-MCNC: 9.3 MG/DL (ref 8.3–10.1)
CHLORIDE SERPL-SCNC: 103 MMOL/L (ref 100–108)
CHOLEST SERPL-MCNC: 183 MG/DL (ref 50–200)
CO2 SERPL-SCNC: 29 MMOL/L (ref 21–32)
CREAT SERPL-MCNC: 0.7 MG/DL (ref 0.6–1.3)
ERYTHROCYTE [DISTWIDTH] IN BLOOD BY AUTOMATED COUNT: 13.9 % (ref 11.6–15.1)
GFR SERPL CREATININE-BSD FRML MDRD: 85 ML/MIN/1.73SQ M
GLUCOSE P FAST SERPL-MCNC: 94 MG/DL (ref 65–99)
HCT VFR BLD AUTO: 40.2 % (ref 34.8–46.1)
HDLC SERPL-MCNC: 88 MG/DL (ref 40–60)
HGB BLD-MCNC: 12.9 G/DL (ref 11.5–15.4)
LDLC SERPL CALC-MCNC: 79 MG/DL (ref 0–100)
MCH RBC QN AUTO: 31.7 PG (ref 26.8–34.3)
MCHC RBC AUTO-ENTMCNC: 32.1 G/DL (ref 31.4–37.4)
MCV RBC AUTO: 99 FL (ref 82–98)
NONHDLC SERPL-MCNC: 95 MG/DL
PLATELET # BLD AUTO: 296 THOUSANDS/UL (ref 149–390)
PMV BLD AUTO: 10.2 FL (ref 8.9–12.7)
POTASSIUM SERPL-SCNC: 4.1 MMOL/L (ref 3.5–5.3)
PROT SERPL-MCNC: 7.5 G/DL (ref 6.4–8.2)
RBC # BLD AUTO: 4.07 MILLION/UL (ref 3.81–5.12)
SODIUM SERPL-SCNC: 137 MMOL/L (ref 136–145)
TRIGL SERPL-MCNC: 79 MG/DL
TSH SERPL DL<=0.05 MIU/L-ACNC: 1.94 UIU/ML (ref 0.36–3.74)
WBC # BLD AUTO: 7.25 THOUSAND/UL (ref 4.31–10.16)

## 2018-04-17 PROCEDURE — 84443 ASSAY THYROID STIM HORMONE: CPT

## 2018-04-17 PROCEDURE — 80053 COMPREHEN METABOLIC PANEL: CPT

## 2018-04-17 PROCEDURE — 36415 COLL VENOUS BLD VENIPUNCTURE: CPT

## 2018-04-17 PROCEDURE — 85027 COMPLETE CBC AUTOMATED: CPT

## 2018-04-17 PROCEDURE — 80061 LIPID PANEL: CPT

## 2018-04-22 PROBLEM — R91.8 LUNG NODULE, MULTIPLE: Status: ACTIVE | Noted: 2017-12-08

## 2018-04-22 PROBLEM — J44.89 COPD WITH CHRONIC BRONCHITIS: Status: ACTIVE | Noted: 2017-12-12

## 2018-04-22 PROBLEM — J44.9 COPD WITH CHRONIC BRONCHITIS (HCC): Status: ACTIVE | Noted: 2017-12-12

## 2018-04-22 PROBLEM — H35.30 MACULAR DEGENERATION: Status: ACTIVE | Noted: 2017-06-06

## 2018-04-23 ENCOUNTER — OFFICE VISIT (OUTPATIENT)
Dept: INTERNAL MEDICINE CLINIC | Facility: CLINIC | Age: 73
End: 2018-04-23
Payer: MEDICARE

## 2018-04-23 VITALS
HEART RATE: 84 BPM | OXYGEN SATURATION: 95 % | HEIGHT: 63 IN | SYSTOLIC BLOOD PRESSURE: 116 MMHG | WEIGHT: 140.4 LBS | DIASTOLIC BLOOD PRESSURE: 80 MMHG | BODY MASS INDEX: 24.88 KG/M2

## 2018-04-23 DIAGNOSIS — J44.9 COPD WITH CHRONIC BRONCHITIS (HCC): Chronic | ICD-10-CM

## 2018-04-23 DIAGNOSIS — G35 MULTIPLE SCLEROSIS (HCC): Chronic | ICD-10-CM

## 2018-04-23 DIAGNOSIS — E03.9 HYPOTHYROIDISM, UNSPECIFIED TYPE: Primary | ICD-10-CM

## 2018-04-23 DIAGNOSIS — G47.33 OSA ON CPAP: Chronic | ICD-10-CM

## 2018-04-23 DIAGNOSIS — Z99.89 OSA ON CPAP: Chronic | ICD-10-CM

## 2018-04-23 PROBLEM — H35.30 MACULAR DEGENERATION: Chronic | Status: ACTIVE | Noted: 2017-06-06

## 2018-04-23 PROBLEM — J44.89 COPD WITH CHRONIC BRONCHITIS: Chronic | Status: ACTIVE | Noted: 2017-12-12

## 2018-04-23 PROBLEM — R91.8 LUNG NODULE, MULTIPLE: Chronic | Status: ACTIVE | Noted: 2017-12-08

## 2018-04-23 PROCEDURE — 99214 OFFICE O/P EST MOD 30 MIN: CPT | Performed by: INTERNAL MEDICINE

## 2018-04-23 RX ORDER — LEVOTHYROXINE SODIUM 0.1 MG/1
100 TABLET ORAL DAILY
Qty: 90 TABLET | Refills: 3 | Status: SHIPPED | OUTPATIENT
Start: 2018-04-23 | End: 2019-03-12 | Stop reason: SDUPTHER

## 2018-04-23 NOTE — ASSESSMENT & PLAN NOTE
Patient is stable without requiring use of inhalers  Tobacco cessation counseling and education was provided  The patient is sincerely urged to quit smoking  The numerous direct health benefits are discussed  If she decides to quit, there are a number of helpful adjunctive aids, and she can see me to discuss nicotine replacement therapy, chantix, or bupropion anytime in the future

## 2018-04-23 NOTE — PATIENT INSTRUCTIONS

## 2018-04-23 NOTE — PROGRESS NOTES
INTERNAL MEDICINE FOLLOW-UP OFFICE VISIT  [facility] [address] [place] [address]    NAME: Maryruth Pallas  AGE: 74 y o  SEX: female  : 1945     DATE: 2018     Assessment and Plan:     Problem List Items Addressed This Visit        Endocrine    Hypothyroidism - Primary (Chronic)     Continue current dose of levothyroxine  TSH is within normal limits  Will repeat labs in 6 months  Relevant Medications    levothyroxine 100 mcg tablet    Other Relevant Orders    TSH, 3rd generation    Basic metabolic panel       Respiratory    COPD with chronic bronchitis ([address] ) (Chronic)     Patient is stable without requiring use of inhalers  Tobacco cessation counseling and education was provided  The patient is sincerely urged to quit smoking  The numerous direct health benefits are discussed  If she decides to quit, there are a number of helpful adjunctive aids, and she can see me to discuss nicotine replacement therapy, chantix, or bupropion anytime in the future  Relevant Orders    Basic metabolic panel    CHOCO on CPAP (Chronic)     Patient saw Dr Dallas Jacques and was found to have severe CHOCO  She has noticed a significant difference since starting CPAP  She is sleeping much better and waking up with more energy  Nervous and Auditory    Multiple sclerosis (HCC) (Chronic)     Stable without any recent neurological concerns  She follows with Dr Roz Cross on a regular basis  Return in about 6 months (around 10/23/2018) for Follow-up, AWV  Counseling:     · Medication Side Effects - Adverse side effects of medications were reviewed with the patient/guardian today: Yes  · Counseling was given regarding: Diagnostic results, Prognosis, Risks and benefits of tx options, Intructions for management, Patient and family education, Importance of tx compliance, Risk factor reductions and Impressions    · Barriers to treatment include: Lack of motivation to quit smoking      Chief Complaint:     Chief Complaint   Patient presents with    Follow-up     4 month and labs- 04/17/2018      History of Present Illness:     Patient presents for routine follow-up and to discuss labs  Labs were normal  She saw Dr Emmanuel Torre and was found to have severe CHOCO  She was started on CPAP at 9 mm Hg and she has been having best sleep that she has had in quite some time  She denies any worsening SOB, CP, palpitations  Down to 6 cigs/day  She still does not wish to ever quit smoking  She denies any recent falls  She had a colonoscopy and mammogram in February which were reviewed with her  The following portions of the patient's history were reviewed and updated as appropriate: allergies, current medications, past family history, past medical history, past social history, past surgical history and problem list      Review of Systems:     Review of Systems   Constitutional: Negative for activity change, appetite change and fatigue  Respiratory: Negative for apnea, cough, chest tightness, shortness of breath and wheezing  Cardiovascular: Negative for chest pain, palpitations and leg swelling  Gastrointestinal: Negative for abdominal distention, abdominal pain, blood in stool, constipation, diarrhea, nausea and vomiting  Musculoskeletal: Negative for arthralgias, back pain, gait problem, joint swelling and myalgias  Skin: Negative for rash and wound  Neurological: Negative for dizziness, tremors, seizures, syncope, facial asymmetry, speech difficulty, weakness, light-headedness, numbness and headaches  Psychiatric/Behavioral: Negative for behavioral problems, confusion, hallucinations, sleep disturbance and suicidal ideas  The patient is not nervous/anxious         Problem List:     Patient Active Problem List   Diagnosis    CHOCO on CPAP    COPD with chronic bronchitis (HCC)    Hypothyroidism    Insomnia    Lung nodule, multiple    Macular degeneration    [facility]    Rosacea      Objective:     /80 (BP Location: Left arm, Patient Position: Sitting, Cuff Size: Standard)   Pulse 84   Ht 5' 3 25" (1 607 m)   Wt 63 7 kg (140 lb 6 4 oz)   SpO2 95%   BMI 24 67 kg/m²     Physical Exam   Constitutional: She is oriented to person, place, and time  She appears well-developed and well-nourished  No distress  Eyes: Conjunctivae are normal  Right eye exhibits no discharge  Left eye exhibits no discharge  No scleral icterus  Neck: Neck supple  No JVD present  No thyromegaly present  Cardiovascular: Normal rate, regular rhythm, normal heart sounds and intact distal pulses  Exam reveals no gallop and no friction rub  No murmur heard  Pulmonary/Chest: Effort normal and breath sounds normal  No respiratory distress  She has no wheezes  She has no rales  She exhibits no tenderness  Abdominal: Soft  Bowel sounds are normal  She exhibits no distension and no mass  There is no tenderness  There is no rebound and no guarding  Musculoskeletal: Normal range of motion  She exhibits no edema  Lymphadenopathy:     She has no cervical adenopathy  Neurological: She is alert and oriented to person, place, and time  Skin: Skin is warm and dry  She is not diaphoretic  Psychiatric: She has a normal mood and affect  Her behavior is normal    Vitals reviewed  Pertinent Laboratory/Diagnostic Studies:    Laboratory Results: I have personally reviewed the pertinent laboratory results/reports   Radiology/Other Diagnostic Testing Results: I have personally reviewed pertinent reports         Current Medications:     Current Outpatient Prescriptions   Medication Sig Dispense Refill    Cholecalciferol (VITAMIN D-3) 1000 units CAPS Take by mouth      diazepam (VALIUM) 2 mg tablet TAKE 1 TABLET BY MOUTH EVERY DAY AT BEDTIME FOR INSOMNIA  5    levothyroxine 100 mcg tablet Take 1 tablet (100 mcg total) by mouth daily 90 tablet 3    Multiple Vitamins-Minerals (PRESERVISION AREDS) CAPS Take by mouth      naproxen (NAPROSYN) 375 mg tablet       thiamine (VITAMIN B1) 100 mg tablet Take 1 tablet by mouth daily      vitamin B-12 (CYANOCOBALAMIN) 100 MCG tablet Take by mouth      SUPREP BOWEL PREP KIT 17 5-3 13-1 6 GM/180ML SOLN Use as directed  0     No current facility-administered medications for this visit          Papi Lemon DO  [facility]

## 2018-04-23 NOTE — ASSESSMENT & PLAN NOTE
Patient saw Dr Cristina Stevenson and was found to have severe CHOCO  She has noticed a significant difference since starting CPAP  She is sleeping much better and waking up with more energy

## 2018-06-01 DIAGNOSIS — R91.8 OTHER NONSPECIFIC ABNORMAL FINDING OF LUNG FIELD (CODE): ICD-10-CM

## 2018-06-04 ENCOUNTER — TELEPHONE (OUTPATIENT)
Dept: PULMONOLOGY | Facility: CLINIC | Age: 73
End: 2018-06-04

## 2018-06-05 ENCOUNTER — TELEPHONE (OUTPATIENT)
Dept: PULMONOLOGY | Facility: CLINIC | Age: 73
End: 2018-06-05

## 2018-06-05 DIAGNOSIS — G47.33 OSA (OBSTRUCTIVE SLEEP APNEA): Primary | ICD-10-CM

## 2018-06-05 NOTE — TELEPHONE ENCOUNTER
Let her hold off the cpap for today  Will send a rx to [facility] to decrease the pressure    They will all  call her

## 2018-06-25 ENCOUNTER — OFFICE VISIT (OUTPATIENT)
Dept: INTERNAL MEDICINE CLINIC | Facility: CLINIC | Age: 73
End: 2018-06-25
Payer: MEDICARE

## 2018-06-25 VITALS
DIASTOLIC BLOOD PRESSURE: 72 MMHG | WEIGHT: 142.4 LBS | SYSTOLIC BLOOD PRESSURE: 118 MMHG | BODY MASS INDEX: 25.23 KG/M2 | HEART RATE: 78 BPM | OXYGEN SATURATION: 95 % | HEIGHT: 63 IN

## 2018-06-25 DIAGNOSIS — M62.830 LUMBAR PARASPINAL MUSCLE SPASM: ICD-10-CM

## 2018-06-25 DIAGNOSIS — M47.816 ARTHRITIS OF LUMBAR SPINE: Primary | ICD-10-CM

## 2018-06-25 DIAGNOSIS — Z13.31 DEPRESSION SCREENING NEGATIVE: ICD-10-CM

## 2018-06-25 PROCEDURE — 99213 OFFICE O/P EST LOW 20 MIN: CPT | Performed by: INTERNAL MEDICINE

## 2018-06-25 RX ORDER — CYCLOBENZAPRINE HCL 5 MG
5 TABLET ORAL 3 TIMES DAILY PRN
Qty: 45 TABLET | Refills: 0 | Status: SHIPPED | OUTPATIENT
Start: 2018-06-25 | End: 2018-10-23 | Stop reason: SDUPTHER

## 2018-06-25 NOTE — PROGRESS NOTES
INTERNAL MEDICINE FOLLOW-UP OFFICE VISIT  [facility] [address] [place] [address]    NAME: Komal Lopez  AGE: 74 y o  SEX: female  : 1945     DATE: 2018     Assessment and Plan:     1  Arthritis of lumbar spine ([facility] )  2  Lumbar paraspinal muscle spasm    Patient has non-specific musculoskeletal lower back pain  Discussed treatment options for this  Discussed non-medication modalities that can be beneficial  Take NSAIDs prn  Discussed side effects of NSAIDs  Will add on short-term course of muscle relaxer  Advise use of heat, massage, and low back exercises  She refuses physical therapy  - cyclobenzaprine (FLEXERIL) 5 mg tablet; Take 1 tablet (5 mg total) by mouth 3 (three) times a day as needed for muscle spasms  Dispense: 45 tablet; Refill: 0     Chief Complaint:     Chief Complaint   Patient presents with    Back Pain     lower back      History of Present Illness:     Patient has chronic intermittent low back pain  Also underlying MS  Back pain has been wearing her down for weeks  Has been trying naproxen without much relief  Did physical therapy in past which didn't help much  [place]ato best after she took a hot shower for 20 minutes  No radiation of the pain  Previous CT scan with evidence of arthritis  No numbness, tingling, weakness, falls  The following portions of the patient's history were reviewed and updated as appropriate: allergies, current medications, past family history, past medical history, past social history, past surgical history and problem list      Review of Systems:     Review of Systems   Constitutional: Positive for fatigue  Negative for diaphoresis and fever  Musculoskeletal: Positive for arthralgias and back pain  Negative for gait problem  Neurological: Negative for dizziness, weakness, numbness and headaches        Problem List:     Patient Active Problem List   Diagnosis    CHOCO on CPAP    [facility] with chronic [facility]    Hypothyroidism    Insomnia    Lung nodule, multiple    Macular degeneration    Multiple sclerosis (HCC)    Rosacea      Objective:     /72 (BP Location: Left arm, Patient Position: Sitting, Cuff Size: Standard)   Pulse 78   Ht 5' 3 25" (1 607 m) Comment: w/ shoe denied off  Wt 64 6 kg (142 lb 6 4 oz) Comment: w/ shoe denied off  SpO2 95%   BMI 25 03 kg/m²     Physical Exam   Constitutional: She appears well-developed and well-nourished  No distress  Musculoskeletal:        Lumbar back: She exhibits decreased range of motion, tenderness (right sided low back) and spasm  She exhibits no bony tenderness, no swelling and no edema  Negative straight leg raise   Neurological: She has normal strength  No sensory deficit  Reflex Scores:       Patellar reflexes are 2+ on the right side  Achilles reflexes are 2+ on the right side  Skin: She is not diaphoretic  PHQ-9  Negative for depression with PHQ2 score of 0  Fall Risk  The patient does not have a history of falls  A risk assessment for falls was completed  Current Medications:     Outpatient Medications Prior to Visit   Medication Sig Dispense Refill    Cholecalciferol (VITAMIN D-3) 1000 units CAPS Take by mouth      diazepam (VALIUM) 2 mg tablet TAKE 1 TABLET BY MOUTH EVERY DAY AT BEDTIME FOR INSOMNIA  5    levothyroxine 100 mcg tablet Take 1 tablet (100 mcg total) by mouth daily 90 tablet 3    Multiple Vitamins-Minerals (PRESERVISION AREDS) CAPS Take by mouth      naproxen (NAPROSYN) 375 mg tablet       thiamine (VITAMIN B1) 100 mg tablet Take 1 tablet by mouth daily      vitamin B-12 (CYANOCOBALAMIN) 100 MCG tablet Take by mouth      SUPREP BOWEL PREP KIT 17 5-3 13-1 6 GM/180ML SOLN Use as directed  0     No facility-administered medications prior to visit          Mallory Tong DO  [facility] [address] [place] [address]

## 2018-06-25 NOTE — PATIENT INSTRUCTIONS
Lower Back Exercises   AMBULATORY CARE:   Lower back exercises  help heal and strengthen your back muscles to prevent another injury  Ask your healthcare provider if you need to see a physical therapist for more advanced exercises  Seek care immediately if:   · You have severe pain that prevents you from moving  Contact your healthcare provider if:   · Your pain becomes worse  · You have new pain  · You have questions or concerns about your condition or care  Do lower back exercises safely:   · Do the exercises on a mat or firm surface  (not on a bed) to support your spine and prevent low back pain  · Move slowly and smoothly  Avoid fast or jerky motions  · Breathe normally  Do not hold your breath  · Stop if you feel pain  It is normal to feel some discomfort at first  Regular exercise will help decrease your discomfort over time  Lower back exercises: Your healthcare provider may recommend that you do back exercises 10 to 30 minutes each day  He may also recommend that you do exercises 1 to 3 times each day  Ask your healthcare provider which exercises are best for you and how often to do them  · Ankle pumps:  Lie on your back  Move your foot up (with your toes pointing toward your head)  Then, move your foot down (with your toes pointing away from you)  Repeat this exercise 10 times on each side  · Heel slides:  Lie on your back  Slowly bend one leg and then straighten it  Next, bend the other leg and then straighten it  Repeat 10 times on each side  · Pelvic tilt:  Lie on your back with your knees bent and feet flat on the floor  Place your arms in a relaxed position beside your body  Tighten the muscles of your abdomen and flatten your back against the floor  Hold for 5 seconds  Repeat 5 times  · Back stretch:  Lie on your back with your hands behind your head  Bend your knees and turn the lower half of your body to one side   Hold this position for 10 seconds  Repeat 3 times on each side  · Straight leg raises:  Lie on your back with one leg straight  Bend the other knee  Tighten your abdomen and then slowly lift the straight leg up about 6 to 12 inches off the floor  Hold for 1 to 5 seconds  Lower your leg slowly  Repeat 10 times on each leg  · Knee-to-chest:  Lie on your back with your knees bent and feet flat on the floor  Pull one of your knees toward your chest and hold it there for 5 seconds  Return your leg to the starting position  Lift the other knee toward your chest and hold for 5 seconds  Do this 5 times on each side  · Cat and camel:  Place your hands and knees on the floor  Arch your back upward toward the ceiling and lower your head  Round out your spine as much as you can  Hold for 5 seconds  Lift your head upward and push your chest downward toward the floor  Hold for 5 seconds  Do 3 sets or as directed  · Wall squats:  Stand with your back against a wall  Tighten the muscles of your abdomen  Slowly lower your body until your knees are bent at a 45 degree angle  Hold this position for 5 seconds  Slowly move back up to a standing position  Repeat 10 times  · Curl up:  Lie on your back with your knees bent and feet flat on the floor  Place your hands, palms down, underneath the curve in your lower back  Next, with your elbows on the floor, lift your shoulders and chest 2 to 3 inches  Keep your head in line with your shoulders  Hold this position for 5 seconds  When you can do this exercise without pain for 10 to 15 seconds, you may add a rotation  While your shoulders and chest are lifted off the ground, turn slightly to the left and hold  Repeat on the other side  · Bird dog:  Place your hands and knees on the floor  Keep your wrists directly below your shoulders and your knees directly below your hips  Pull your belly button in toward your spine  Do not flatten or arch your back   Tighten your abdominal muscles  Raise one arm straight out so that it is aligned with your head  Next, raise the leg opposite your arm  Hold this position for 15 seconds  Lower your arm and leg slowly and change sides  Do 5 sets  © 2017 [employer] Information is for End User's use only and may not be sold, redistributed or otherwise used for commercial purposes  All illustrations and images included in CareNotes® are the copyrighted property of [employer]  or [employer]  The above information is an  only  It is not intended as medical advice for individual conditions or treatments  Talk to your doctor, nurse or pharmacist before following any medical regimen to see if it is safe and effective for you

## 2018-08-13 ENCOUNTER — OFFICE VISIT (OUTPATIENT)
Dept: DERMATOLOGY | Facility: CLINIC | Age: 73
End: 2018-08-13
Payer: MEDICARE

## 2018-08-13 DIAGNOSIS — L82.1 SEBORRHEIC KERATOSIS: Primary | ICD-10-CM

## 2018-08-13 DIAGNOSIS — Z13.89 SCREENING FOR SKIN CONDITION: ICD-10-CM

## 2018-08-13 PROCEDURE — 99213 OFFICE O/P EST LOW 20 MIN: CPT | Performed by: DERMATOLOGY

## 2018-08-13 NOTE — PROGRESS NOTES
[facility] DERMATOLOGY  [facility]vä[facility] [address]  [facility] [address]  [phone]  [phone]     MRN: [id number] : 1945  Encounter: [id number]  Patient Information: Elvin Zarate  Chief complaint:Yearly checkup    History of present illness:  77-year-old female with history of lots of sun exposure presents for overall checkup no specific concerns noted  Past Medical History:   Diagnosis Date    Anxiety     Cataract     last assessed 14    COPD (chronic obstructive pulmonary disease) ([facility] [address] )     Disease of thyroid gland     [facility]     CHOCO (obstructive sleep apnea)     Peroneal muscle atrophy      Past Surgical History:   Procedure Laterality Date    APPENDECTOMY      CATARACT EXTRACTION       SECTION      CHOLECYSTECTOMY      GALLBLADDER SURGERY      OH COLONOSCOPY FLX DX W/COLLJ SPEC WHEN PFRMD N/A 2018    Procedure: COLONOSCOPY;  Surgeon: Gerber Perdomo MD;  Location: [facility]; Service: Colorectal     Social History   History   Alcohol Use    4 8 oz/week    7 Glasses of wine, 1 Cans of beer per week     History   Drug Use No     History   Smoking Status    Current Some Day Smoker    Packs/day: 0 50    Types: Cigarettes    Start date: 1958   Smokeless Tobacco    Never Used     Family History   Problem Relation Age of Onset    Skin cancer Mother     Hypertension Father         benign essential    Stroke Father     Lung cancer Brother      Meds/Allergies   No Known Allergies    Meds:  Prior to Admission medications    Medication Sig Start Date End Date Taking?  Authorizing Provider   Cholecalciferol (VITAMIN D-3) 1000 units CAPS Take by mouth   Yes Historical Provider, MD   diazepam (VALIUM) 2 mg tablet TAKE 1 TABLET BY MOUTH EVERY DAY AT BEDTIME FOR INSOMNIA 18  Yes Historical Provider, MD   levothyroxine 100 mcg tablet Take 1 tablet (100 mcg total) by mouth daily 18  Yes Garcia Watson,  Multiple Vitamins-Minerals (PRESERVISION AREDS) CAPS Take by mouth   Yes Historical Provider, MD   naproxen (NAPROSYN) 375 mg tablet  2/8/18  Yes Historical Provider, MD   thiamine (VITAMIN B1) 100 mg tablet Take 1 tablet by mouth daily   Yes Historical Provider, MD   vitamin B-12 (CYANOCOBALAMIN) 100 MCG tablet Take by mouth   Yes Historical Provider, MD   cyclobenzaprine (FLEXERIL) 5 mg tablet Take 1 tablet (5 mg total) by mouth 3 (three) times a day as needed for muscle spasms 6/25/18   Garcia [facility],    SUPREP BOWEL PREP KIT 17 5-3 13-1 6 GM/180ML SOLN Use as directed 1/31/18   Historical Provider, MD       Subjective:     Review of Systems:    General: negative for - chills, fatigue, fever,  weight gain or weight loss  Psychological: negative for - anxiety, behavioral disorder, concentration difficulties, decreased libido, depression, irritability, memory difficulties, mood swings, sleep disturbances or suicidal ideation  ENT: negative for - hearing difficulties , nasal congestion, nasal discharge, oral lesions, sinus pain, sneezing, sore throat  Allergy and Immunology: negative for - hives, insect bite sensitivity,  Hematological and Lymphatic: negative for - bleeding problems, blood clots,bruising, swollen lymph nodes  Endocrine: negative for - hair pattern changes, hot flashes, malaise/lethargy, mood swings, palpitations, polydipsia/polyuria, skin changes, temperature intolerance or unexpected weight change  Respiratory: negative for - cough, hemoptysis, orthopnea, shortness of breath, or wheezing  Cardiovascular: negative for - chest pain, dyspnea on exertion, edema,  Gastrointestinal: negative for - abdominal pain, nausea/vomiting  Genito-Urinary: negative for - dysuria, incontinence, irregular/heavy menses or urinary frequency/urgency  Musculoskeletal: negative for - gait disturbance, joint pain, joint stiffness, joint swelling, muscle pain, muscular weakness  Dermatological:  As in HPI  Neurological: negative for confusion, dizziness, headaches, impaired coordination/balance, memory loss, numbness/tingling, seizures, speech problems, tremors or weakness       Objective: There were no vitals taken for this visit  Physical Exam:    General Appearance:    Alert, cooperative, no distress   Head:    Normocephalic, without obvious abnormality, atraumatic           Skin:   A full skin exam was performed including scalp, head scalp, eyes, ears, nose, lips, neck, chest, axilla, abdomen, back, buttocks, bilateral upper extremities, bilateral lower extremities, hands, feet, fingers, toes, fingernails, and toenails  Normal keratotic papules with greasy stuck on  Nothing else of concern noted on complete exam      Assessment:     1  Seborrheic keratosis     2  Screening for skin condition           Plan:   Seborrheic Keratosis  Patient reasurred these are normal growths we acquire with age no treatment needed  Screening for Dermatologic Disorders: Nothing else of concern noted on complete exam follow up in 1 year       Omega Morin MD  8/13/2018,11:00 AM    Portions of the record may have been created with voice recognition software   Occasional wrong word or "sound a like" substitutions may have occurred due to the inherent limitations of voice recognition software   Read the chart carefully and recognize, using context, where substitutions have occurred

## 2018-08-13 NOTE — PATIENT INSTRUCTIONS
Seborrheic Keratosis  Patient reasurred these are normal growths we acquire with age no treatment needed    Screening for Dermatologic Disorders: Nothing else of concern noted on complete exam follow up in 1 year

## 2018-08-24 ENCOUNTER — HOSPITAL ENCOUNTER (OUTPATIENT)
Dept: CT IMAGING | Facility: HOSPITAL | Age: 73
Discharge: HOME | End: 2018-08-24
Attending: INTERNAL MEDICINE
Payer: MEDICARE

## 2018-08-24 DIAGNOSIS — R91.8 LUNG NODULES: ICD-10-CM

## 2018-08-24 PROCEDURE — 71250 CT THORAX DX C-: CPT

## 2018-09-13 ENCOUNTER — OFFICE VISIT (OUTPATIENT)
Dept: PULMONOLOGY | Facility: CLINIC | Age: 73
End: 2018-09-13
Payer: MEDICARE

## 2018-09-13 VITALS
SYSTOLIC BLOOD PRESSURE: 110 MMHG | OXYGEN SATURATION: 97 % | HEIGHT: 63 IN | WEIGHT: 146 LBS | HEART RATE: 73 BPM | DIASTOLIC BLOOD PRESSURE: 70 MMHG | BODY MASS INDEX: 25.87 KG/M2

## 2018-09-13 DIAGNOSIS — R91.1 LUNG NODULE: ICD-10-CM

## 2018-09-13 DIAGNOSIS — Z99.89 OBSTRUCTIVE SLEEP APNEA ON CPAP: ICD-10-CM

## 2018-09-13 DIAGNOSIS — G47.33 OBSTRUCTIVE SLEEP APNEA ON CPAP: ICD-10-CM

## 2018-09-13 DIAGNOSIS — F17.200 SMOKER: ICD-10-CM

## 2018-09-13 DIAGNOSIS — J41.0 SIMPLE CHRONIC BRONCHITIS (HCC): Primary | ICD-10-CM

## 2018-09-13 PROCEDURE — 99214 OFFICE O/P EST MOD 30 MIN: CPT | Performed by: INTERNAL MEDICINE

## 2018-09-13 NOTE — PROGRESS NOTES
Assessment/Plan:   Diagnoses and all orders for this visit:    Simple chronic bronchitis (HCC)    Lung nodule  -     CT chest wo contrast; Future    Obstructive sleep apnea on CPAP    Smoker      Chronic bronchitis, long discussion with the patient with regards to smoking cessation  She states she has significantly cut down from last visit and wants to cut down on her own and quit  Does not want to use any treatment options for smoking cessation discussed regarding nicotine replacement Wellbutrin as well as Chantix  PFTs with mild obstructive lung disease with no appreciable response to the bronchodilator and increased lung volumes consistent with air trapping and severely decreased DLCO  She's currently not on any inhalers has no limitation of her daily current activities  CT of the chest reviewed, with evidence of mild emphysematous changes, bilateral apical pleural scarring, also with a 3 mm lung nodule that was described in the previous CT of the chest, recent CT of the chest demonstrates 8 mm, as per the radiologist read went back to the previous CT in 2017 December also demonstrated around 8 mm lung nodule in the same place, It has been described as stable  We will repeat CT of the chest in 6 months and follow-up given her active smoking history  Obstructive sleep apnea on CPAP currently on 8 cm of water doing well  Cleaning of CPAP supplies as well as change of supplies discussed  Vaccinations up-to-date  Follow-up in 6 months or when necessary earlier as needed  Return in about 6 months (around 3/13/2019)  All questions are answered to the patient's satisfaction and understanding  She verbalizes understanding  She is encouraged to call with any further questions or concerns  Portions of the record may have been created with voice recognition software  Occasional wrong word or "sound a like" substitutions may have occurred due to the inherent limitations of voice recognition software    Read the chart carefully and recognize, using context, where substitutions have occurred  Electronically Signed by Erika Mendoza MD    ______________________________________________________________________    Chief Complaint:   Chief Complaint   Patient presents with    COPD     tests results    Sleep Apnea       Patient ID: Serenity Maldonado is a 74 y o  y o  female has a past medical history of Anxiety; Cataract; COPD (chronic obstructive pulmonary disease) ([facility] [address] ); Disease of thyroid gland; Multiple sclerosis ([facility] [address] ); CHOCO (obstructive sleep apnea); and Peroneal muscle atrophy  9/13/2018  Patient is a very pleasant 75-year-old lady with greater than 45 pack year smoking history still actively smoking about half a pack a day, states she has significantly cut down from last visit, with history of chronic bronchitis, also was diagnosed with severe obstructive sleep apnea was placed on CPAP and she Was titrated to 9 cm of water could not tolerate the had brought her down to 8 cm and she has been doing well with the current setting at 8 cm of water, with decreased nocturnal awakenings feels well rested when she wakes up in the morning  Here for follow-up with a repeat CT of the chest for follow-up of the lung nodules  Review of Systems   Constitutional: Positive for fatigue  Negative for activity change, appetite change, chills, diaphoresis, fever and unexpected weight change  HENT: Negative for congestion, dental problem, drooling, nosebleeds, postnasal drip, rhinorrhea, sinus pressure, sore throat and voice change  Eyes: Negative for discharge, itching and visual disturbance  Respiratory: Positive for cough (clear sputum, no hemoptysis)  Negative for shortness of breath and wheezing  Cardiovascular: Negative for chest pain, palpitations and leg swelling  Endocrine: Negative for cold intolerance and heat intolerance  Allergic/Immunologic: Negative for food allergies and immunocompromised state  Neurological: Negative for dizziness, facial asymmetry, speech difficulty and weakness  Hematological: Negative for adenopathy  Psychiatric/Behavioral: Negative for agitation, confusion and sleep disturbance  The patient is not nervous/anxious  Smoking history: She reports that she has been smoking Cigarettes  She started smoking about 60 years ago  She has been smoking about 0 50 packs per day  She has never used smokeless tobacco     The following portions of the patient's history were reviewed and updated as appropriate: allergies, current medications, past family history, past medical history, past social history, past surgical history and problem list     Immunization History   Administered Date(s) Administered    Influenza 09/14/2016    Pneumococcal Polysaccharide PPV23 05/22/2008    Tuberculin Skin Test-PPD Intradermal 01/13/2017     Current Outpatient Prescriptions   Medication Sig Dispense Refill    Cholecalciferol (VITAMIN D-3) 1000 units CAPS Take by mouth      cyclobenzaprine (FLEXERIL) 5 mg tablet Take 1 tablet (5 mg total) by mouth 3 (three) times a day as needed for muscle spasms 45 tablet 0    diazepam (VALIUM) 2 mg tablet TAKE 1 TABLET BY MOUTH EVERY DAY AT BEDTIME FOR INSOMNIA  5    levothyroxine 100 mcg tablet Take 1 tablet (100 mcg total) by mouth daily 90 tablet 3    Multiple Vitamins-Minerals (PRESERVISION AREDS) CAPS Take by mouth      naproxen (NAPROSYN) 375 mg tablet       thiamine (VITAMIN B1) 100 mg tablet Take 1 tablet by mouth daily      vitamin B-12 (CYANOCOBALAMIN) 100 MCG tablet Take by mouth      SUPREP BOWEL PREP KIT 17 5-3 13-1 6 GM/180ML SOLN Use as directed  0     No current facility-administered medications for this visit  Allergies: Patient has no known allergies  Objective:  Vitals:    09/13/18 1024   BP: 110/70   Pulse: 73   SpO2: 97%   Weight: 66 2 kg (146 lb)   Height: 5' 3 25" (1 607 m)   Oxygen Therapy  SpO2: 97 %      Wt Readings from Last 3 Encounters:   09/13/18 66 2 kg (146 lb)   06/25/18 64 6 kg (142 lb 6 4 oz)   04/23/18 63 7 kg (140 lb 6 4 oz)     Body mass index is 25 66 kg/m²  Physical Exam   Constitutional: She is oriented to person, place, and time  She appears well-developed and well-nourished  HENT:   Head: Normocephalic and atraumatic  Crowded oropharyngeal airways, Mallampati score 3   Eyes: Conjunctivae and EOM are normal  Pupils are equal, round, and reactive to light  Neck: Normal range of motion  Neck supple  No JVD present  No thyromegaly present  Short and wide neck   Cardiovascular: Normal rate, regular rhythm and normal heart sounds  Exam reveals no gallop and no friction rub  No murmur heard  Pulmonary/Chest: Effort normal and breath sounds normal  No respiratory distress  She has no wheezes  She has no rales  She exhibits no tenderness  Abdominal: Soft  Bowel sounds are normal    Musculoskeletal: Normal range of motion  She exhibits no edema, tenderness or deformity  Lymphadenopathy:     She has no cervical adenopathy  Neurological: She is alert and oriented to person, place, and time  Skin: Skin is warm and dry  Psychiatric: She has a normal mood and affect  Her behavior is normal    Nursing note and vitals reviewed  Ct Chest Wo Contrast    Result Date: 8/28/2018  Narrative: CT CHEST WITHOUT IV CONTRAST INDICATION:   R91 8: Other nonspecific abnormal finding of lung field  COMPARISON:  None  TECHNIQUE: CT examination of the chest was performed without intravenous contrast   Axial, sagittal, and coronal 2D reformatted images were created from the source data and submitted for interpretation  Radiation dose length product (DLP) for this visit:  108 mGy-cm   This examination, like all CT scans performed in the [facility], was performed utilizing techniques to minimize radiation dose exposure, including the use of iterative reconstruction and automated exposure control  FINDINGS: LUNGS:  There is a an elongated pulmonary nodule identified within the right middle lobe measuring 9 mm, unchanged from prior examination  Calcified granuloma in the left upper lobe inferiorly, image 64  Within the left lower lobe there is a pulmonary nodule measuring 5 mm, unchanged  Mild to moderate centrilobular emphysema, unchanged  PLEURA:  Unremarkable  HEART/GREAT VESSELS:  Left-sided aortic arch with mild atherosclerotic calcification of the arch and 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 towards the right  No aneurysmal dilatation  MEDIASTINUM AND SAHIL:  Unremarkable  CHEST WALL AND LOWER NECK:   Unremarkable  VISUALIZED STRUCTURES IN THE UPPER ABDOMEN:  Stable examination  Calcifications within a normal sized adrenal glands  OSSEOUS STRUCTURES:  Mild dextroscoliosis of the thoracic spine  No acute osseous abnormality  Impression: Stable pulmonary nodules and calcified granulomas  The largest pulmonary nodule is an elongated nodule on series 3 image 34 measuring 8 mm  These are unchanged dating back to 12/7/2017 lung screening  Based on current Fleischner Society 2017 Guidelines on incidental pulmonary nodules, follow-up is recommended 18-24 months from the initial examination   Workstation performed: [id number]

## 2018-10-02 ENCOUNTER — APPOINTMENT (OUTPATIENT)
Dept: LAB | Facility: CLINIC | Age: 73
End: 2018-10-02
Payer: MEDICARE

## 2018-10-02 DIAGNOSIS — E03.9 HYPOTHYROIDISM, UNSPECIFIED TYPE: ICD-10-CM

## 2018-10-02 DIAGNOSIS — J44.9 COPD WITH CHRONIC BRONCHITIS (HCC): Chronic | ICD-10-CM

## 2018-10-02 LAB
ANION GAP SERPL CALCULATED.3IONS-SCNC: 8 MMOL/L (ref 4–13)
BUN SERPL-MCNC: 16 MG/DL (ref 5–25)
CALCIUM SERPL-MCNC: 9.4 MG/DL (ref 8.3–10.1)
CHLORIDE SERPL-SCNC: 102 MMOL/L (ref 100–108)
CO2 SERPL-SCNC: 27 MMOL/L (ref 21–32)
CREAT SERPL-MCNC: 0.77 MG/DL (ref 0.6–1.3)
GFR SERPL CREATININE-BSD FRML MDRD: 76 ML/MIN/1.73SQ M
GLUCOSE SERPL-MCNC: 85 MG/DL (ref 65–140)
POTASSIUM SERPL-SCNC: 4.1 MMOL/L (ref 3.5–5.3)
SODIUM SERPL-SCNC: 137 MMOL/L (ref 136–145)
TSH SERPL DL<=0.05 MIU/L-ACNC: 2.08 UIU/ML (ref 0.36–3.74)

## 2018-10-02 PROCEDURE — 84443 ASSAY THYROID STIM HORMONE: CPT

## 2018-10-02 PROCEDURE — 80048 BASIC METABOLIC PNL TOTAL CA: CPT

## 2018-10-02 PROCEDURE — 36415 COLL VENOUS BLD VENIPUNCTURE: CPT

## 2018-10-23 ENCOUNTER — OFFICE VISIT (OUTPATIENT)
Dept: INTERNAL MEDICINE CLINIC | Facility: CLINIC | Age: 73
End: 2018-10-23
Payer: MEDICARE

## 2018-10-23 VITALS
BODY MASS INDEX: 25.94 KG/M2 | HEART RATE: 81 BPM | WEIGHT: 146.4 LBS | HEIGHT: 63 IN | OXYGEN SATURATION: 94 % | SYSTOLIC BLOOD PRESSURE: 112 MMHG | DIASTOLIC BLOOD PRESSURE: 70 MMHG

## 2018-10-23 DIAGNOSIS — Z71.6 TOBACCO ABUSE COUNSELING: ICD-10-CM

## 2018-10-23 DIAGNOSIS — E55.9 VITAMIN D DEFICIENCY: ICD-10-CM

## 2018-10-23 DIAGNOSIS — G35 MULTIPLE SCLEROSIS (HCC): Chronic | ICD-10-CM

## 2018-10-23 DIAGNOSIS — Z71.3 DIETARY COUNSELING AND SURVEILLANCE: ICD-10-CM

## 2018-10-23 DIAGNOSIS — Z00.00 MEDICARE ANNUAL WELLNESS VISIT, SUBSEQUENT: Primary | ICD-10-CM

## 2018-10-23 DIAGNOSIS — E03.9 ACQUIRED HYPOTHYROIDISM: Chronic | ICD-10-CM

## 2018-10-23 DIAGNOSIS — M62.830 LUMBAR PARASPINAL MUSCLE SPASM: ICD-10-CM

## 2018-10-23 PROCEDURE — G0439 PPPS, SUBSEQ VISIT: HCPCS | Performed by: INTERNAL MEDICINE

## 2018-10-23 PROCEDURE — 99214 OFFICE O/P EST MOD 30 MIN: CPT | Performed by: INTERNAL MEDICINE

## 2018-10-23 RX ORDER — CYCLOBENZAPRINE HCL 5 MG
5 TABLET ORAL 3 TIMES DAILY PRN
Qty: 90 TABLET | Refills: 1 | Status: SHIPPED | OUTPATIENT
Start: 2018-10-23 | End: 2018-11-01 | Stop reason: SDUPTHER

## 2018-10-23 NOTE — PATIENT INSTRUCTIONS
Multiple Sclerosis   AMBULATORY CARE:   Multiple sclerosis  is a disease that leads to inflammation and damage to parts of your central nervous system (CNS)  The CNS includes your brain, spinal cord, and nerves  In MS, your immune system attacks and destroys the coating (myelin) that covers your nerves  This may cause problems with how you feel, move, and see  Signs and symptoms of MS:  The signs and symptoms of MS depend on where the damage is in the CNS  They may vary from person to person and from time to time in the same person  The most common signs and symptoms of MS include:  · Extreme tiredness even with plenty of rest (fatigue)    · Difficulty controlling your bladder or bowels    · Blurred or double vision or dizziness    · Depression, mood swings, or difficulty controlling emotions    · Muscle weakness, cramps, or spasms    · Numbness or tingling usually felt in the arms and legs    · Problems with sexual function such as difficulty having or maintaining an erection  Call 911 for any of the following:   · You have trouble breathing  · You fall and hit your head  Seek care immediately if:   · You feel like hurting yourself or others  · Your abdomen is painful and larger than usual   Contact your healthcare provider if:   · You have a fever  · You feel you cannot cope with your illness  · You have new or worsening symptoms  · You have an open sore  · You have burning when you urinate  · You do not have a bowel movement for 3 days or more  · You choke or cough during eating or drinking  · You have questions or concerns about your condition or care  Treatment:  may include rehabilitation and alternative therapies  Steroid medicines may be needed during a relapse  You may  need any of the following:  · Disease modifying medicine  helps prevent MS from getting worse  The medicine may also help prevent attacks or relapses       · Other medicines  may be used to control and decrease MS symptoms  Medicines can be used to treat symptoms such as muscle spasms, depression, or pain  There are also medicines to help treat sexual dysfunction, fatigue, and bladder or bowel problems  · Take your medicine as directed  Contact your healthcare provider if you think your medicine is not helping or if you have side effects  Tell him or her if you are allergic to any medicine  Keep a list of the medicines, vitamins, and herbs you take  Include the amounts, and when and why you take them  Bring the list or the pill bottles to follow-up visits  Carry your medicine list with you in case of an emergency  Self-care:   · Manage your stress  to decrease relapses  Do activities that help you relax  Ask your healthcare provider about counseling or therapies to help you manage stress  · Exercise  may help decrease fatigue and depression  It may also improve bowel or bladder function, mobility, and stiffness  Ask your healthcare provider about an exercise program that is right for you  · Get vaccinated  to prevent illnesses that worsen MS symptoms  Talk to your healthcare provider about what vaccines you need  · Take your medicines as directed  This will help prevent complications of MS and may reduce your number of relapses  Know the side effects of your medicines and when to report them to your healthcare provider  Bathing:   · Take warm but not hot showers or baths  Water that is too hot may make your symptoms worse  · Put grab bars on the walls beside toilets and inside showers and bathtubs  These will help you get up after you use the toilet or bathe  Grab bars will also help to keep you from falling in the shower  You may want to put a shower chair inside the shower  Bladder care:   · Try to urinate every 3 hours during the day  Avoid drinking liquids before you go to bed  Urinate before you go to bed       · You may need to learn how to use a catheter if you cannot urinate on your own  A catheter is a soft rubber tube that you put into your urethra to drain urine  Ask your healthcare provider for more information on catheters  Skin care: You may have numb areas on your body  Check these areas often to be sure your skin is healthy  This will help prevent pressure sores  Keep your skin clean and dry to help prevent infections  Check your skin often if you use heat or ice so you do not damage the skin  Rehabilitation programs for MS:   · Physical therapy  helps you manage MS symptoms  A physical therapist teaches you exercises to help improve movement and strength, and to decrease pain  · Speech therapy  helps improve speech and swallowing  A speech therapist has special training to help people learn safer ways to swallow  He will also help you learn which foods and liquids are safe to eat and drink  · Occupational therapy  may help you with self-care activities  An occupational therapist teaches you skills to help with your daily activities  These skills may include bathing, dressing, feeding, grooming, or using the bathroom  An occupational therapist may also help you find equipment for your home or work  This equipment may make activities safer or easier for you to complete  For more information:   · Multiple Sclerosis [employer] [facility] [address]  [facility]  Phone: [address] [facility]  Phone: 1- [phone] - [phone]  Web Address: [employer] is  com  · [facility] Multiple Sclerosis Society  [address] [facility] , [address] [facility]   Phone: [phone] [phone]  Web Address: [employer]  Follow up with your healthcare provider or neurologist as directed: You will need to have regular visits with your healthcare provider or neurologist to help manage your symptoms  Write down your questions so you remember to ask them during your visits    © 2017 [address] [employer] Spivey Information is for End User's use only and may not be sold, redistributed or otherwise used for commercial purposes  All illustrations and images included in CareNotes® are the copyrighted property of A D A M , Inc  or Brayan Tai  The above information is an  only  It is not intended as medical advice for individual conditions or treatments  Talk to your doctor, nurse or pharmacist before following any medical regimen to see if it is safe and effective for you  Urinary Incontinence   WHAT YOU NEED TO KNOW:   What is urinary incontinence? Urinary incontinence (UI) is when you lose control of your bladder  What causes UI? UI occurs because your bladder cannot store or empty urine properly  The following are the most common types of UI:  · Stress incontinence  is when you leak urine due to increased bladder pressure  This may happen when you cough, sneeze, or exercise  · Urge incontinence  is when you feel the need to urinate right away and leak urine accidentally  · Mixed incontinence  is when you have both stress and urge UI  What are the signs and symptoms of UI?   · You feel like your bladder does not empty completely when you urinate  · You urinate often and need to urinate immediately  · You leak urine when you sleep, or you wake up with the urge to urinate  · You leak urine when you cough, sneeze, exercise, or laugh  How is UI diagnosed? Your healthcare provider will ask how often you leak urine and whether you have stress or urge symptoms  Tell him which medicines you take, how often you urinate, and how much liquid you drink each day  You may need any of the following tests:  · Urine tests  may show infection or kidney function  · A pelvic exam  may be done to check for blockages  A pelvic exam will also show if your bladder, uterus, or other organs have moved out of place  · An x-ray, ultrasound, or CT  may show problems with parts of your urinary system   You may be given contrast liquid to help your organs show up better in the pictures  Tell the healthcare provider if you have ever had an allergic reaction to contrast liquid  Do not enter the MRI room with anything metal  Metal can cause serious injury  Tell the healthcare provider if you have any metal in or on your body  · A bladder scan  will show how much urine is left in your bladder after you urinate  You will be asked to urinate and then healthcare providers will use a small ultrasound machine to check the urine left in your bladder  · Cystometry  is used to check the function of your urinary system  Your healthcare provider checks the pressure in your bladder while filling it with fluid  Your bladder pressure may also be tested when your bladder is full and while you urinate  How is UI treated? · Medicines  can help strengthen your bladder control  · Electrical stimulation  is used to send a small amount of electrical energy to your pelvic floor muscles  This helps control your bladder function  Electrodes may be placed outside your body or in your rectum  For women, the electrodes may be placed in the vagina  · A bulking agent  may be injected into the wall of your urethra to make it thicker  This helps keep your urethra closed and decreases urine leakage  · Devices  such as a clamp, pessary, or tampon may help stop urine leaks  Ask your healthcare provider for more information about these and other devices  · Surgery  may be needed if other treatments do not work  Several types of surgery can help improve your bladder control  Ask your healthcare provider for more information about the surgery you may need  How can I manage my symptoms? · Do pelvic muscle exercises often  Your pelvic muscles help you stop urinating  Squeeze these muscles tight for 5 seconds, then relax for 5 seconds  Gradually work up to squeezing for 10 seconds  Do 3 sets of 15 repetitions a day, or as directed   This will help strengthen your pelvic muscles and improve bladder control  · A catheter  may be used to help empty your bladder  A catheter is a tiny, plastic tube that is put into your bladder to drain your urine  Your healthcare provider may tell you to use a catheter to prevent your bladder from getting too full and leaking urine  · Keep a UI record  Write down how often you leak urine and how much you leak  Make a note of what you were doing when you leaked urine  · Train your bladder  Go to the bathroom at set times, such as every 2 hours, even if you do not feel the urge to go  You can also try to hold your urine when you feel the urge to go  For example, hold your urine for 5 minutes when you feel the urge to go  As that becomes easier, hold your urine for 10 minutes  · Drink liquids as directed  Ask your healthcare provider how much liquid to drink each day and which liquids are best for you  You may need to limit the amount of liquid you drink to help control your urine leakage  Limit or do not have drinks that contain caffeine or alcohol  Do not drink any liquid right before you go to bed  · Prevent constipation  Eat a variety of high-fiber foods  Good examples are high-fiber cereals, beans, vegetables, and whole-grain breads  Prune juice may help make your bowel movement softer  Walking is the best way to trigger your intestines to have a bowel movement  · Exercise regularly and maintain a healthy weight  Ask your healthcare provider how much you should weigh and about the best exercise plan for you  Weight loss and exercise will decrease pressure on your bladder and help you control your leakage  Ask him to help you create a weight loss plan if you are overweight  When should I seek immediate care? · You have severe pain  · You are confused or cannot think clearly  When should I contact my healthcare provider? · You have a fever  · You see blood in your urine  · You have pain when you urinate      · You have new or worse pain, even after treatment  · Your mouth feels dry or you have vision changes  · Your urine is cloudy or smells bad  · You have questions or concerns about your condition or care  CARE AGREEMENT:   You have the right to help plan your care  Learn about your health condition and how it may be treated  Discuss treatment options with your caregivers to decide what care you want to receive  You always have the right to refuse treatment  The above information is an  only  It is not intended as medical advice for individual conditions or treatments  Talk to your doctor, nurse or pharmacist before following any medical regimen to see if it is safe and effective for you  © 2017 [facility] Information is for End User's use only and may not be sold, redistributed or otherwise used for commercial purposes  All illustrations and images included in CareNotes® are the copyrighted property of A D A M  Inc  or Brayan Tai  Cigarette Smoking and Your Health   AMBULATORY CARE:   Risks to your health if you smoke:  Nicotine and other chemicals found in tobacco damage every cell in your body  Even if you are a light smoker, you have an increased risk for cancer, heart disease, and lung disease  If you are pregnant or have diabetes, smoking increases your risk for complications  Benefits to your health if you stop smoking:   · You decrease respiratory symptoms such as coughing, wheezing, and shortness of breath  · You reduce your risk for cancers of the lung, mouth, throat, kidney, bladder, pancreas, stomach, and cervix  If you already have cancer, you increase the benefits of chemotherapy  You also reduce your risk for cancer returning or a second cancer from developing  · You reduce your risk for heart disease, blood clots, heart attack, and stroke  · You reduce your risk for lung infections, and diseases such as pneumonia, asthma, chronic bronchitis, and emphysema  · Your circulation improves  More oxygen can be delivered to your body  If you have diabetes, you lower your risk for complications, such as kidney, artery, and eye diseases  You also lower your risk for nerve damage  Nerve damage can lead to amputations, poor vision, and blindness  · You improve your body's ability to heal and to fight infections  Benefits to the health of others if you stop smoking:  Tobacco is harmful to nonsmokers who breathe in your secondhand smoke  The following are ways the health of others around you may improve when you stop smoking:  · You lower the risks for lung cancer and heart disease in nonsmoking adults  · If you are pregnant, you lower the risk for miscarriage, early delivery, low birth weight, and stillbirth  You also lower your baby's risk for SIDS, obesity, developmental delay, and neurobehavioral problems, such as ADHD  · If you have children, you lower their risk for ear infections, colds, pneumonia, bronchitis, and asthma  For more information and support to stop smoking:   · [employer]  Phone: 9- 094 - [phone]  Web Address: www [employer]  Follow up with your healthcare provider as directed:  Write down your questions so you remember to ask them during your visits  © 2017 [address]  Information is for End User's use only and may not be sold, redistributed or otherwise used for commercial purposes  All illustrations and images included in CareNotes® are the copyrighted property of [employer]  or [address]  The above information is an  only  It is not intended as medical advice for individual conditions or treatments  Talk to your doctor, nurse or pharmacist before following any medical regimen to see if it is safe and effective for you  Fall Prevention   AMBULATORY CARE:   Fall prevention  includes ways to make your home and other areas safer   It also includes ways you can move more carefully to prevent a fall  Health conditions that cause changes in your blood pressure, vision, or muscle strength and coordination may increase your risk for falls  Medicines may also increase your risk for falls if they make you dizzy, weak, or sleepy  Call 911 or have someone else call if:   · You have fallen and are unconscious  · You have fallen and cannot move part of your body  Contact your healthcare provider if:   · You have fallen and have pain or a headache  · You have questions or concerns about your condition or care  Fall prevention tips:   · Stand or sit up slowly  This may help you keep your balance and prevent falls  · Use assistive devices as directed  Your healthcare provider may suggest that you use a cane or walker to help you keep your balance  You may need to have grab bars put in your bathroom near the toilet or in the shower  · Wear shoes that fit well and have soles that   Wear shoes both inside and outside  Use slippers with good   Do not wear shoes with high heels  · Wear a personal alarm  This is a device that allows you to call 911 if you fall and need help  Ask your healthcare provider for more information  · Stay active  Exercise can help strengthen your muscles and improve your balance  Your healthcare provider may recommend water aerobics or walking  He or she may also recommend physical therapy to improve your coordination  Never start an exercise program without talking to your healthcare provider first      · Manage your medical conditions  Keep all appointments with your healthcare providers  Visit your eye doctor as directed  Home safety tips:   · Add items to prevent falls in the bathroom  Put nonslip strips on your bath or shower floor to prevent you from slipping  Use a bath mat if you do not have carpet in the bathroom  This will prevent you from falling when you step out of the bath or shower   Use a shower seat so you do not need to stand while you shower  Sit on the toilet or a chair in your bathroom to dry yourself and put on clothing  This will prevent you from losing your balance from drying or dressing yourself while you are standing  · Keep paths clear  Remove books, shoes, and other objects from walkways and stairs  Place cords for telephones and lamps out of the way so that you do not need to walk over them  Tape them down if you cannot move them  Remove small rugs  If you cannot remove a rug, secure it with double-sided tape  This will prevent you from tripping  · Install bright lights in your home  Use night lights to help light paths to the bathroom or kitchen  Always turn on the light before you start walking  · Keep items you use often on shelves within reach  Do not use a step stool to help you reach an item  · Paint or place reflective tape on the edges of your stairs  This will help you see the stairs better  Follow up with your healthcare provider as directed:  Write down your questions so you remember to ask them during your visits  © 2017 [address] [facility] Information is for End User's use only and may not be sold, redistributed or otherwise used for commercial purposes  All illustrations and images included in CareNotes® are the copyrighted property of A D A M , Inc  or Brayan Tai  The above information is an  only  It is not intended as medical advice for individual conditions or treatments  Talk to your doctor, nurse or pharmacist before following any medical regimen to see if it is safe and effective for you  Advance Directives   WHAT YOU NEED TO KNOW:   What are advance directives? Advance directives are legal documents that state your wishes and plans for medical care  These plans are made ahead of time in case you lose your ability to make decisions for yourself   Advance directives can apply to any medical decision, such as the treatments you want, and if you want to donate organs  What are the types of advance directives? There are many types of advance directives, and each state has rules about how to use them  You may choose a combination of any of the following:  · Living will: This is a written record of the treatment you want  You can also choose which treatments you do not want, which to limit, and which to stop at a certain time  This includes surgery, medicine, IV fluid, and tube feedings  · Durable power of  for healthcare [facility]): This is a written record that states who you want to make healthcare choices for you when you are unable to make them for yourself  This person, called a proxy, is usually a family member or a friend  You may choose more than 1 proxy  · Do not resuscitate (DNR) order:  A DNR order is used in case your heart stops beating or you stop breathing  It is a request not to have certain forms of treatment, such as CPR  A DNR order may be included in other types of advance directives  · Medical directive: This covers the care that you want if you are in a coma, near death, or unable to make decisions for yourself  You can list the treatments you want for each condition  Treatment may include pain medicine, surgery, blood transfusions, dialysis, IV or tube feedings, and a ventilator (breathing machine)  · Values history: This document has questions about your views, beliefs, and how you feel and think about life  This information can help others choose the care that you would choose  Why are advance directives important? An advance directive helps you control your care  Although spoken wishes may be used, it is better to have your wishes written down  Spoken wishes can be misunderstood, or not followed  Treatments may be given even if you do not want them  An advance directive may make it easier for your family to make difficult choices about your care  How do I decide what to put in my advance directives?    · Make informed decisions:  Make sure you fully understand treatments or care you may receive  Think about the benefits and problems your decisions could cause for you or your family  Talk to healthcare providers if you have concerns or questions before you write down your wishes  You may also want to talk with your Scientologist or , or a   Check your state laws to make sure that what you put in your advance directive is legal      · Sign all forms:  Sign and date your advance directive when you have finished  You may also need 2 witnesses to sign the forms  Witnesses cannot be your doctor or his staff, your spouse, heirs or beneficiaries, people you owe money to, or your chosen proxy  Talk to your family, proxy, and healthcare providers about your advance directive  Give each person a copy, and keep one for yourself in a place you can get to easily  Do not keep it hidden or locked away  · Review and revise your plans: You can revise your advance directive at any time, as long as you are able to make decisions  Review your plan every year, and when there are changes in your life, or your health  When you make changes, let your family, proxy, and healthcare providers know  Give each a new copy  Where can I find more information? · [employer]  [address] [place] , [address]  Phone: [phone]  Phone: [phone]  Web Address: http://www  aafp org  · [address] [facility])  [address] [facility] [place] [address]  Phone: [phone]  Phone: [phone]  Web Address: Leonardo ashley  CARE AGREEMENT:   You have the right to help plan your care  To help with this plan, you must learn about your health condition and treatment options  You must also learn about advance directives and how they are used   Work with your healthcare providers to decide what care will be used to treat you  You always have the right to refuse treatment  The above information is an  only  It is not intended as medical advice for individual conditions or treatments  Talk to your doctor, nurse or pharmacist before following any medical regimen to see if it is safe and effective for you  © 2017 [address] Information is for End User's use only and may not be sold, redistributed or otherwise used for commercial purposes  All illustrations and images included in CareNotes® are the copyrighted property of A D A M , Inc  or [facility]

## 2018-10-23 NOTE — PROGRESS NOTES
INTERNAL MEDICINE FOLLOW-UP OFFICE VISIT  [facility] - [facility] [address] [place] [address]    NAME: Curtis Aguiar  AGE: 74 y o  SEX: female  : 1945     DATE: 10/23/2018     Assessment and Plan:     Assessment  1  Lumbar paraspinal muscle spasm    2  Acquired hypothyroidism    3  Multiple sclerosis ([address] )    4  Vitamin D deficiency      Plan    Patient should continue use of naproxen and will send in refill of muscle relaxer  Use of heat is also encouraged  If pain still continues to get worse, she should call our office  Continue other medications as prescribed  Follow-up with Dr Raj Romero in 2019  Will check labs before that visit  She can follow-up with me in 6 months  She was educated on the harms of smoking  Portion control and routine exercise was also discussed due her BMI being abnormal at 25 73  Orders Placed This Encounter   Procedures    Vitamin D 25 hydroxy    Lipid panel    Basic metabolic panel    TSH, 3rd generation     Return in about 6 months (around 2019) for Follow-up  Chief Complaint:     Chief Complaint   Patient presents with    Medicare Wellness Visit     wellness    Follow-up     6 month        History of Present Illness:     Patient presents for routine follow-up  She had labs drawn before today's appointment which were normal  She is taking her medications as prescribed  Believe she is having some increased pain from her MS in her right rib cage  She has been unable to sleep due the pain  Has been waking her up at night  Denies any recent falls or trauma  The following portions of the patient's history were reviewed and updated as appropriate: allergies, current medications, past family history, past medical history, past social history, past surgical history and problem list      Review of Systems:     Review of Systems   Constitutional: Negative for activity change, appetite change and fatigue     Respiratory: Negative for apnea, cough, chest tightness, shortness of breath and wheezing  Cardiovascular: Negative for chest pain, palpitations and leg swelling  Gastrointestinal: Negative for abdominal distention, abdominal pain, blood in stool, constipation, diarrhea, nausea and vomiting  Musculoskeletal: Positive for arthralgias and back pain  Negative for gait problem, joint swelling and myalgias  Skin: Negative for rash and wound  Neurological: Negative for dizziness, tremors, seizures, syncope, facial asymmetry, speech difficulty, weakness, light-headedness, numbness and headaches  Psychiatric/Behavioral: Negative for behavioral problems, confusion, hallucinations, sleep disturbance and suicidal ideas  The patient is not nervous/anxious  Problem List:     Patient Active Problem List   Diagnosis    CHOCO on CPAP    COPD with chronic bronchitis (HCC)    Hypothyroidism    Insomnia    Lung nodule, multiple    Macular degeneration    Multiple sclerosis ([facility] [address] [facility]    Rosacea    Seborrheic keratosis    Screening for skin condition      Objective:     /70 (BP Location: Left arm, Patient Position: Sitting, Cuff Size: Standard)   Pulse 81   Ht 5' 3 25" (1 607 m) Comment: w/ shoe denied off  Wt 66 4 kg (146 lb 6 4 oz) Comment: w/ shoe denied off  SpO2 94%   BMI 25 73 kg/m²     Physical Exam   Constitutional: She is oriented to person, place, and time  She appears well-developed and well-nourished  No distress  Neck: Neck supple  No JVD present  No thyromegaly present  Cardiovascular: Normal rate, regular rhythm, normal heart sounds and intact distal pulses  Exam reveals no gallop and no friction rub  No murmur heard  Pulmonary/Chest: Effort normal and breath sounds normal  No respiratory distress  She has no wheezes  She has no rales  She exhibits no tenderness  Abdominal: Soft  Bowel sounds are normal  She exhibits no distension and no mass  There is no tenderness  There is no rebound and no guarding  Musculoskeletal: Normal range of motion  She exhibits no edema, tenderness or deformity  Lymphadenopathy:     She has no cervical adenopathy  Neurological: She is alert and oriented to person, place, and time  Skin: Skin is warm and dry  She is not diaphoretic  Psychiatric: She has a normal mood and affect  Her behavior is normal    Vitals reviewed        Pertinent Laboratory/Diagnostic Studies:    Laboratory Results: I have personally reviewed the pertinent laboratory results/reports     Chemistry Profile:   Results from last 6 Months  Lab Units 10/02/18  1131   SODIUM mmol/L 137   POTASSIUM mmol/L 4 1   CHLORIDE mmol/L 102   CO2 mmol/L 27   BUN mg/dL 16   CREATININE mg/dL 0 77   GLUCOSE RANDOM mg/dL 85   CALCIUM mg/dL 9 4   EGFR ml/min/1 73sq m 76     Endocrine Studies:   Results from last 6 Months  Lab Units 10/02/18  1131   TSH 3RD GENERATON uIU/mL 2 080         Cody Flores DO  [facility] [address]

## 2018-10-23 NOTE — PROGRESS NOTES
Medicare Annual Wellness Visit  [facility] - [facility] [address] [place] [address]  NAME: Erika Olmedo SEX: female : 1945      ASSESSMENT/PLAN:  1  Medicare annual wellness visit, subsequent    Patient is up-to-date with preventative screenings  She refuses flu and pneumonia vaccines as she was told by her neurologist she cannot get due to underlying multiple sclerosis  She was educated on the harms of smoking  Tobacco cessation counseling and education was provided  The patient is sincerely urged to quit smoking  The numerous direct health benefits are discussed  If she decides to quit, there are a number of helpful adjunctive aids, and she can see me to discuss nicotine replacement therapy, chantix, or bupropion anytime in the future  HPI:  Erika Olmedo is a 74 y o  female who presents to clinic today for subsequent AWV  Last Medicare wellness visit information was reviewed, patient was interviewed, no changes since last AWV: yes    PATIENT CARE TEAM:  Patient Care Team:  Rain Bowie DO as PCP - General  MD Deandre Sawyer MD    PAST MEDICAL HISTORY:  Past Medical History:   Diagnosis Date    Anxiety     Cataract     last assessed 14    COPD (chronic obstructive pulmonary disease) [facility] [address] [address] [facility]     Disease of thyroid gland     Multiple sclerosis ([facility]     CHOCO (obstructive sleep apnea)     Peroneal muscle atrophy      PAST SURGICAL HISTORY:  Past Surgical History:   Procedure Laterality Date    APPENDECTOMY      CATARACT EXTRACTION       SECTION      CHOLECYSTECTOMY      GALLBLADDER SURGERY      HI COLONOSCOPY FLX DX W/COLLJ SPEC WHEN PFRMD N/A 2018    Procedure: COLONOSCOPY;  Surgeon: Hasmukh Osborne MD;  Location: [facility];   Service: Colorectal     PROBLEM LIST:  Patient Active Problem List   Diagnosis    CHOCO on CPAP    COPD with chronic bronchitis ([facility]    Hypothyroidism    Insomnia    Lung nodule, multiple    Macular degeneration    Multiple sclerosis (HCC)    Rosacea    Seborrheic keratosis    Screening for skin condition     FAMILY HISTORY:  Family History   Problem Relation Age of Onset    Skin cancer Mother     Hypertension Father         benign essential    Stroke Father     Lung cancer Brother      SOCIAL HISTORY:  Kia Rushing  reports that she has been smoking Cigarettes  She started smoking about 60 years ago  She has been smoking about 0 50 packs per day  She has never used smokeless tobacco  She reports that she drinks about 4 8 oz of alcohol per week   She reports that she does not use drugs  ALLERGIES:  No Known Allergies    CURRENT MEDICATIONS:  Current Outpatient Prescriptions   Medication Sig Dispense Refill    Cholecalciferol (VITAMIN D-3) 1000 units CAPS Take by mouth      cyclobenzaprine (FLEXERIL) 5 mg tablet Take 1 tablet (5 mg total) by mouth 3 (three) times a day as needed for muscle spasms 90 tablet 1    diazepam (VALIUM) 2 mg tablet TAKE 1 TABLET BY MOUTH EVERY DAY AT BEDTIME FOR INSOMNIA  5    levothyroxine 100 mcg tablet Take 1 tablet (100 mcg total) by mouth daily 90 tablet 3    Multiple Vitamins-Minerals (PRESERVISION AREDS) CAPS Take by mouth      naproxen (NAPROSYN) 375 mg tablet       thiamine (VITAMIN B1) 100 mg tablet Take 1 tablet by mouth daily      vitamin B-12 (CYANOCOBALAMIN) 100 MCG tablet Take by mouth       No current facility-administered medications for this visit        IMMUNIZATIONS:  Immunization History   Administered Date(s) Administered    Influenza 09/14/2016    Pneumococcal Polysaccharide PPV23 05/22/2008    Tuberculin Skin Test-PPD Intradermal 01/13/2017     HEALTH MAINTENANCE:  Health Maintenance   Topic Date Due    DTaP,Tdap,and Td Vaccines (1 - Tdap) 01/02/1964    Medicare Annual Wellness Visit (AWV)  06/06/2018    INFLUENZA VACCINE  07/01/2019 (Originally 7/1/2018)    Pneumococcal PPSV23/PCV13 65+ Years / High and Highest Risk (2 of 2 - PCV13) 10/23/2038 (Originally 5/22/2009)    Urinary Incontinence Screening  04/23/2019    DXA SCAN  06/15/2019    Fall Risk  06/25/2019    Depression Screening PHQ  06/25/2019    CRC Screening: Colonoscopy  02/23/2023     VITALS:  /70 (BP Location: Left arm, Patient Position: Sitting, Cuff Size: Standard)   Pulse 81   Ht 5' 3 25" (1 607 m) Comment: w/ shoe denied off  Wt 66 4 kg (146 lb 6 4 oz) Comment: w/ shoe denied off  SpO2 94%   BMI 25 73 kg/m²     MEDICARE SCREENING TESTS AND RISK ASSESSMENTS:  Frederick Kimball is here for her Subsequent Wellness visit  Last Medicare Wellness visit information reviewed, patient interviewed, no change since last AWV  Health Risk Assessment:  Patient rates overall health as very good  Patient feels that their physical health rating is Same  Eyesight was rated as Same  Hearing was rated as Slightly worse  Patient feels that their emotional and mental health rating is Same  Pain experienced by patient in the last 7 days has been A lot  Patient's pain rating has been 8/10  Emotional/Mental Health:  Patient has been feeling nervous/anxious  PHQ-9 Depression Screening:    Frequency of the following problems over the past two weeks:      1  Little interest or pleasure in doing things: 0 - not at all      2  Feeling down, depressed, or hopeless: 0 - not at all  PHQ-2 Score: 0          Broken Bones/Falls: Fall Risk Assessment:    In the past year, patient has experienced: History of falling in past year     Number of falls: 1          Bladder/Bowel:  Patient has not leaked urine accidently in the last six months  Patient reports no loss of bowel control  Immunizations:  Patient has not had a flu vaccination within the last year  Patient has not received a pneumonia shot  Patient has not received a shingles shot  Patient has received tetanus/diphtheria shot   (Additional Comments: Doesn't know per pt)    Home Safety:  Patient does not have trouble with stairs inside or outside of their home  Patient currently reports that there are no safety hazards present in home, working smoke alarms, no working carbon monoxide detectors  Preventative Screenings:   Breast cancer screening performed, colon cancer screen completed, cholesterol screen completed, glaucoma eye exam completed,     Nutrition:  Current diet: Regular and Limited junk food with servings of the following:    Medications:  Patient is currently taking over-the-counter supplements  Patient is able to manage medications  Lifestyle Choices:  Patient reports current tobacco use  Patient reports alcohol use  Patient drives a vehicle  Patient wears seat belt  Current level of exercise of physical activity described by patient as: moderate  Activities of Daily Living:  Can get out of bed by his or her self, able to dress self, able to make own meals, able to do own shopping, able to bathe self, can do own laundry/housekeeping,     Previous Hospitalizations:  No hospitalization or ED visit in past 12 months        Advanced Directives:  Patient has decided on a power of   Patient has spoken to designated power of   Patient has completed advanced directive          Preventative Screening/Counseling:      Cardiovascular:      General: Risks and Benefits Discussed and Screening Current      Counseling: Healthy Diet, Healthy Weight and Tobacco Cessation          Diabetes:      General: Risks and Benefits Discussed and Screening Current      Counseling: Healthy Weight and Healthy Diet          Colorectal Cancer:      General: Risks and Benefits Discussed and Screening Current          Breast Cancer:      General: Screening Not Indicated          Cervical Cancer:      General: Screening Not Indicated          Osteoporosis:      General: Risks and Benefits Discussed and Screening Current      Counseling: Regular Weightbearing Exercise and Calcium and Vitamin D Intake          AAA:      General: Screening Not Indicated          Glaucoma:      General: Screening Not Indicated          HIV:      General: Screening Not Indicated          Hepatitis C:      General: Screening Not Indicated        Advanced Directives:   Patient has living will for healthcare, has durable POA for healthcare, patient has an advanced directive  5 wishes given  Immunizations:      Influenza: Patient Declines and Risks & Benefits Discussed      Pneumococcal: Patient Declines and Risks & Benefits Discussed      Shingrix: Patient Declines and Risks & Benefits Discussed      Other Preventative Counseling (Non-Medicare):   Fall Prevention, Increase physical activity, Nutrition Counseling and Weight reduction discussed      Garcia Watson DO

## 2018-10-25 ENCOUNTER — APPOINTMENT (EMERGENCY)
Dept: RADIOLOGY | Facility: HOSPITAL | Age: 73
End: 2018-10-25
Payer: MEDICARE

## 2018-10-25 ENCOUNTER — APPOINTMENT (EMERGENCY)
Dept: CT IMAGING | Facility: HOSPITAL | Age: 73
End: 2018-10-25
Payer: MEDICARE

## 2018-10-25 ENCOUNTER — HOSPITAL ENCOUNTER (EMERGENCY)
Facility: HOSPITAL | Age: 73
Discharge: HOME | End: 2018-10-25
Attending: EMERGENCY MEDICINE | Admitting: EMERGENCY MEDICINE
Payer: MEDICARE

## 2018-10-25 VITALS
TEMPERATURE: 98.4 F | DIASTOLIC BLOOD PRESSURE: 65 MMHG | OXYGEN SATURATION: 98 % | BODY MASS INDEX: 25.48 KG/M2 | HEART RATE: 70 BPM | RESPIRATION RATE: 17 BRPM | SYSTOLIC BLOOD PRESSURE: 135 MMHG | WEIGHT: 145 LBS

## 2018-10-25 DIAGNOSIS — R10.9 RIGHT FLANK PAIN: ICD-10-CM

## 2018-10-25 DIAGNOSIS — N39.0 UTI (URINARY TRACT INFECTION): ICD-10-CM

## 2018-10-25 DIAGNOSIS — R10.11 RUQ PAIN: Primary | ICD-10-CM

## 2018-10-25 DIAGNOSIS — M62.830 SPASM OF THORACIC BACK MUSCLE: ICD-10-CM

## 2018-10-25 LAB
ALBUMIN SERPL BCP-MCNC: 3.6 G/DL (ref 3.5–5)
ALP SERPL-CCNC: 104 U/L (ref 46–116)
ALT SERPL W P-5'-P-CCNC: 32 U/L (ref 12–78)
ANION GAP SERPL CALCULATED.3IONS-SCNC: 10 MMOL/L (ref 4–13)
APTT PPP: 30 SECONDS (ref 24–36)
AST SERPL W P-5'-P-CCNC: 21 U/L (ref 5–45)
ATRIAL RATE: 75 BPM
BACTERIA UR QL AUTO: ABNORMAL /HPF
BASOPHILS # BLD AUTO: 0.05 THOUSANDS/ΜL (ref 0–0.1)
BASOPHILS NFR BLD AUTO: 1 % (ref 0–1)
BILIRUB DIRECT SERPL-MCNC: 0.11 MG/DL (ref 0–0.2)
BILIRUB SERPL-MCNC: 0.3 MG/DL (ref 0.2–1)
BILIRUB UR QL STRIP: NEGATIVE
BUN SERPL-MCNC: 20 MG/DL (ref 5–25)
CALCIUM SERPL-MCNC: 9.1 MG/DL (ref 8.3–10.1)
CHLORIDE SERPL-SCNC: 106 MMOL/L (ref 100–108)
CLARITY UR: CLEAR
CO2 SERPL-SCNC: 29 MMOL/L (ref 21–32)
COLOR UR: YELLOW
CREAT SERPL-MCNC: 0.72 MG/DL (ref 0.6–1.3)
EOSINOPHIL # BLD AUTO: 0.37 THOUSAND/ΜL (ref 0–0.61)
EOSINOPHIL NFR BLD AUTO: 6 % (ref 0–6)
ERYTHROCYTE [DISTWIDTH] IN BLOOD BY AUTOMATED COUNT: 13.9 % (ref 11.6–15.1)
GFR SERPL CREATININE-BSD FRML MDRD: 82 ML/MIN/1.73SQ M
GLUCOSE SERPL-MCNC: 90 MG/DL (ref 65–140)
GLUCOSE UR STRIP-MCNC: NEGATIVE MG/DL
HCT VFR BLD AUTO: 38.6 % (ref 34.8–46.1)
HGB BLD-MCNC: 12.8 G/DL (ref 11.5–15.4)
HGB UR QL STRIP.AUTO: ABNORMAL
IMM GRANULOCYTES # BLD AUTO: 0.02 THOUSAND/UL (ref 0–0.2)
IMM GRANULOCYTES NFR BLD AUTO: 0 % (ref 0–2)
INR PPP: 1.12 (ref 0.86–1.17)
KETONES UR STRIP-MCNC: NEGATIVE MG/DL
LACTATE SERPL-SCNC: 0.6 MMOL/L (ref 0.5–2)
LEUKOCYTE ESTERASE UR QL STRIP: ABNORMAL
LYMPHOCYTES # BLD AUTO: 2.06 THOUSANDS/ΜL (ref 0.6–4.47)
LYMPHOCYTES NFR BLD AUTO: 32 % (ref 14–44)
MCH RBC QN AUTO: 32.2 PG (ref 26.8–34.3)
MCHC RBC AUTO-ENTMCNC: 33.2 G/DL (ref 31.4–37.4)
MCV RBC AUTO: 97 FL (ref 82–98)
MONOCYTES # BLD AUTO: 0.68 THOUSAND/ΜL (ref 0.17–1.22)
MONOCYTES NFR BLD AUTO: 10 % (ref 4–12)
NEUTROPHILS # BLD AUTO: 3.33 THOUSANDS/ΜL (ref 1.85–7.62)
NEUTS SEG NFR BLD AUTO: 51 % (ref 43–75)
NITRITE UR QL STRIP: POSITIVE
NON-SQ EPI CELLS URNS QL MICRO: ABNORMAL /HPF
NRBC BLD AUTO-RTO: 0 /100 WBCS
P AXIS: 75 DEGREES
PH UR STRIP.AUTO: 7 [PH] (ref 4.5–8)
PLATELET # BLD AUTO: 259 THOUSANDS/UL (ref 149–390)
PMV BLD AUTO: 9.5 FL (ref 8.9–12.7)
POTASSIUM SERPL-SCNC: 4.1 MMOL/L (ref 3.5–5.3)
PR INTERVAL: 144 MS
PROT SERPL-MCNC: 7.1 G/DL (ref 6.4–8.2)
PROT UR STRIP-MCNC: NEGATIVE MG/DL
PROTHROMBIN TIME: 14.3 SECONDS (ref 11.8–14.2)
QRS AXIS: 72 DEGREES
QRSD INTERVAL: 80 MS
QT INTERVAL: 392 MS
QTC INTERVAL: 437 MS
RBC # BLD AUTO: 3.97 MILLION/UL (ref 3.81–5.12)
RBC #/AREA URNS AUTO: ABNORMAL /HPF
SODIUM SERPL-SCNC: 145 MMOL/L (ref 136–145)
SP GR UR STRIP.AUTO: <=1.005 (ref 1–1.03)
T WAVE AXIS: 63 DEGREES
TROPONIN I SERPL-MCNC: <0.02 NG/ML
UROBILINOGEN UR QL STRIP.AUTO: 0.2 E.U./DL
VENTRICULAR RATE: 75 BPM
WBC # BLD AUTO: 6.51 THOUSAND/UL (ref 4.31–10.16)
WBC #/AREA URNS AUTO: ABNORMAL /HPF

## 2018-10-25 PROCEDURE — 36415 COLL VENOUS BLD VENIPUNCTURE: CPT | Performed by: EMERGENCY MEDICINE

## 2018-10-25 PROCEDURE — 74177 CT ABD & PELVIS W/CONTRAST: CPT

## 2018-10-25 PROCEDURE — 93005 ELECTROCARDIOGRAM TRACING: CPT

## 2018-10-25 PROCEDURE — 85610 PROTHROMBIN TIME: CPT | Performed by: EMERGENCY MEDICINE

## 2018-10-25 PROCEDURE — 96374 THER/PROPH/DIAG INJ IV PUSH: CPT

## 2018-10-25 PROCEDURE — 82306 VITAMIN D 25 HYDROXY: CPT | Performed by: EMERGENCY MEDICINE

## 2018-10-25 PROCEDURE — 81001 URINALYSIS AUTO W/SCOPE: CPT | Performed by: EMERGENCY MEDICINE

## 2018-10-25 PROCEDURE — 85730 THROMBOPLASTIN TIME PARTIAL: CPT | Performed by: EMERGENCY MEDICINE

## 2018-10-25 PROCEDURE — 84484 ASSAY OF TROPONIN QUANT: CPT | Performed by: EMERGENCY MEDICINE

## 2018-10-25 PROCEDURE — 71046 X-RAY EXAM CHEST 2 VIEWS: CPT

## 2018-10-25 PROCEDURE — 80076 HEPATIC FUNCTION PANEL: CPT | Performed by: EMERGENCY MEDICINE

## 2018-10-25 PROCEDURE — 85025 COMPLETE CBC W/AUTO DIFF WBC: CPT | Performed by: EMERGENCY MEDICINE

## 2018-10-25 PROCEDURE — 83605 ASSAY OF LACTIC ACID: CPT | Performed by: EMERGENCY MEDICINE

## 2018-10-25 PROCEDURE — 93010 ELECTROCARDIOGRAM REPORT: CPT | Performed by: INTERNAL MEDICINE

## 2018-10-25 PROCEDURE — 99284 EMERGENCY DEPT VISIT MOD MDM: CPT

## 2018-10-25 PROCEDURE — 80048 BASIC METABOLIC PNL TOTAL CA: CPT | Performed by: EMERGENCY MEDICINE

## 2018-10-25 PROCEDURE — 96376 TX/PRO/DX INJ SAME DRUG ADON: CPT

## 2018-10-25 RX ORDER — CEPHALEXIN 250 MG/1
500 CAPSULE ORAL ONCE
Status: COMPLETED | OUTPATIENT
Start: 2018-10-25 | End: 2018-10-25

## 2018-10-25 RX ORDER — CEPHALEXIN 500 MG/1
500 CAPSULE ORAL 4 TIMES DAILY
Qty: 40 CAPSULE | Refills: 0 | Status: SHIPPED | OUTPATIENT
Start: 2018-10-25 | End: 2018-11-01 | Stop reason: CLARIF

## 2018-10-25 RX ORDER — KETOROLAC TROMETHAMINE 30 MG/ML
15 INJECTION, SOLUTION INTRAMUSCULAR; INTRAVENOUS ONCE
Status: COMPLETED | OUTPATIENT
Start: 2018-10-25 | End: 2018-10-25

## 2018-10-25 RX ORDER — CEPHALEXIN 500 MG/1
500 CAPSULE ORAL 4 TIMES DAILY
Qty: 40 CAPSULE | Refills: 0 | Status: SHIPPED | OUTPATIENT
Start: 2018-10-25 | End: 2018-11-04

## 2018-10-25 RX ADMIN — CEPHALEXIN 500 MG: 250 CAPSULE ORAL at 13:46

## 2018-10-25 RX ADMIN — IOHEXOL 100 ML: 350 INJECTION, SOLUTION INTRAVENOUS at 12:24

## 2018-10-25 RX ADMIN — KETOROLAC TROMETHAMINE 15 MG: 30 INJECTION, SOLUTION INTRAMUSCULAR at 11:35

## 2018-10-25 RX ADMIN — KETOROLAC TROMETHAMINE 15 MG: 30 INJECTION, SOLUTION INTRAMUSCULAR at 13:40

## 2018-10-25 NOTE — ED PROVIDER NOTES
History  Chief Complaint   Patient presents with    Back Pain     Pt presents to ER with c/o's upper right back pain that radiates across to (R) breast area, pt reports pain started last Thursday, pt also reports chronic "muscle spasms" & takes flexeril & naprosyn, however pt reports no relief  Healthy appearing elderly female presents in no distress  HPI   59-year-old white female with a chief complaint of right upper back pain that radiates under her right breast   Patient states the pain started almost a week ago and has been intermittent in nature  Last night patient states that the pain was strong  Patient's family doctor placed her on Flexeril and Naprosyn and she states that has been helping the pain  Patient denies any chest pain, pain down her arms  Up her neck, nausea, or vomiting  Patient has a history of multiple sclerosis and states that some of these pains can be from that  Patient denies any heavy lifting  Patient is a smoker and states that is her coping mechanism for stress  Prior to Admission Medications   Prescriptions Last Dose Informant Patient Reported? Taking?    Cholecalciferol (VITAMIN D-3) 1000 units CAPS  Self Yes No   Sig: Take by mouth   Multiple Vitamins-Minerals (PRESERVISION AREDS) CAPS  Self Yes No   Sig: Take by mouth   cyclobenzaprine (FLEXERIL) 5 mg tablet   No No   Sig: Take 1 tablet (5 mg total) by mouth 3 (three) times a day as needed for muscle spasms   diazepam (VALIUM) 2 mg tablet  Self Yes No   Sig: TAKE 1 TABLET BY MOUTH EVERY DAY AT BEDTIME FOR INSOMNIA   levothyroxine 100 mcg tablet  Self No No   Sig: Take 1 tablet (100 mcg total) by mouth daily   naproxen (NAPROSYN) 375 mg tablet  Self Yes No   thiamine (VITAMIN B1) 100 mg tablet  Self Yes No   Sig: Take 1 tablet by mouth daily   vitamin B-12 (CYANOCOBALAMIN) 100 MCG tablet  Self Yes No   Sig: Take by mouth      Facility-Administered Medications: None       Past Medical History:   Diagnosis Date    Anxiety     Cataract     last assessed 14    COPD (chronic obstructive pulmonary disease) (HCC)     Disease of thyroid gland     Multiple sclerosis (HCC)     CHOCO (obstructive sleep apnea)     Peroneal muscle atrophy        Past Surgical History:   Procedure Laterality Date    APPENDECTOMY      CATARACT EXTRACTION       SECTION      CHOLECYSTECTOMY      GALLBLADDER SURGERY      OH COLONOSCOPY FLX DX W/COLLJ SPEC WHEN PFRMD N/A 2018    Procedure: COLONOSCOPY;  Surgeon: Shelli Ovalles MD;  Location: [facility]; Service: Colorectal       Family History   Problem Relation Age of Onset    Skin cancer Mother     Hypertension Father         benign essential    Stroke Father     Lung cancer Brother      I have reviewed and agree with the history as documented  Social History   Substance Use Topics    Smoking status: Current Some Day Smoker     Packs/day: 0 50     Types: Cigarettes     Start date: 1958    Smokeless tobacco: Never Used      Comment: cut way down    Alcohol use 4 8 oz/week     7 Glasses of wine, 1 Cans of beer per week      Comment: daily/ socially        Review of Systems   Constitutional: Negative for chills and fever  HENT: Negative for congestion and rhinorrhea  Eyes: Negative for discharge and visual disturbance  Respiratory: Negative for shortness of breath and wheezing  Cardiovascular: Negative for chest pain and palpitations  Gastrointestinal: Negative for abdominal pain and vomiting  Endocrine: Negative for polydipsia and polyuria  Genitourinary: Negative for dysuria and hematuria  Musculoskeletal: Positive for back pain and myalgias  Negative for arthralgias, gait problem and neck stiffness  Skin: Negative for rash and wound  Neurological: Negative for dizziness and headaches  Psychiatric/Behavioral: Negative for confusion and suicidal ideas         Physical Exam  Physical Exam   Constitutional: She is oriented to person, place, and time  She appears well-developed and well-nourished  43-year-old white female lying on the stretcher in no acute distress  HENT:   Head: Normocephalic and atraumatic  Mouth/Throat: Oropharynx is clear and moist    Eyes: Pupils are equal, round, and reactive to light  EOM are normal    Neck: Normal range of motion  Neck supple  Cardiovascular: Normal rate, regular rhythm and normal heart sounds  Pulmonary/Chest: Effort normal and breath sounds normal  No respiratory distress  She has no wheezes  She exhibits no tenderness  Abdominal: Soft  Bowel sounds are normal  There is no tenderness  There is no rebound and no guarding  Musculoskeletal: Normal range of motion  + mild tenderness to R lateral thoracic region radiating to the R breast    There is no evidence of vesicles consistent with herpes zoster  Neurological: She is alert and oriented to person, place, and time  No cranial nerve deficit  She exhibits normal muscle tone  Coordination normal    Skin: Skin is warm and dry  Psychiatric: She has a normal mood and affect  Nursing note and vitals reviewed         Vital Signs  ED Triage Vitals [10/25/18 0902]   Temperature Pulse Respirations Blood Pressure SpO2   98 4 °F (36 9 °C) 90 20 112/58 100 %      Temp Source Heart Rate Source Patient Position - Orthostatic VS BP Location FiO2 (%)   Oral Monitor Sitting Left arm --      Pain Score       5           Vitals:    10/25/18 0902 10/25/18 1137 10/25/18 1341   BP: 112/58 138/78 135/65   Pulse: 90 68 70   Patient Position - Orthostatic VS: Sitting Lying Lying       Visual Acuity      ED Medications  Medications   ketorolac (TORADOL) injection 15 mg (15 mg Intravenous Given 10/25/18 1135)   iohexol (OMNIPAQUE) 350 MG/ML injection (MULTI-DOSE) 100 mL (100 mL Intravenous Given 10/25/18 1224)   ketorolac (TORADOL) injection 15 mg (15 mg Intravenous Given 10/25/18 1340)   cephalexin (KEFLEX) capsule 500 mg (500 mg Oral Given 10/25/18 1346) Diagnostic Studies  Results Reviewed     Procedure Component Value Units Date/Time    Urine Microscopic [[id number]]  (Abnormal) Collected:  10/25/18 1259    Lab Status:  Final result Specimen:  Urine from Urine, Clean Catch Updated:  10/25/18 1313     RBC, UA 0-1 (A) /hpf      WBC, UA 4-10 (A) /hpf      Epithelial Cells Occasional /hpf      Bacteria, UA Moderate (A) /hpf     UA w Reflex to Microscopic w Reflex to Culture [[id number]]  (Abnormal) Collected:  10/25/18 1259    Lab Status:  Final result Specimen:  Urine from Urine, Clean Catch Updated:  10/25/18 1311     Color, UA Yellow     Clarity, UA Clear     Specific Gravity, UA <=1 005     pH, UA 7 0     Leukocytes, UA Small (A)     Nitrite, UA Positive (A)     Protein, UA Negative mg/dl      Glucose, UA Negative mg/dl      Ketones, UA Negative mg/dl      Urobilinogen, UA 0 2 E U /dl      Bilirubin, UA Negative     Blood, UA Trace-Intact (A)    Lactic acid, plasma [[id number]]  (Normal) Collected:  10/25/18 1133    Lab Status:  Final result Specimen:  Blood from Arm, Right Updated:  10/25/18 1213     LACTIC ACID 0 6 mmol/L     Narrative:         Result may be elevated if tourniquet was used during collection      Troponin I [[id number]]  (Normal) Collected:  10/25/18 1133    Lab Status:  Final result Specimen:  Blood from Arm, Right Updated:  10/25/18 1212     Troponin I <0 02 ng/mL     Basic metabolic panel [[id number]] Collected:  10/25/18 1133    Lab Status:  Final result Specimen:  Blood from Arm, Right Updated:  10/25/18 1209     Sodium 145 mmol/L      Potassium 4 1 mmol/L      Chloride 106 mmol/L      CO2 29 mmol/L      ANION GAP 10 mmol/L      BUN 20 mg/dL      Creatinine 0 72 mg/dL      Glucose 90 mg/dL      Calcium 9 1 mg/dL      eGFR 82 ml/min/1 73sq m     Narrative:         National Kidney Disease Education Program recommendations are as follows:  GFR calculation is accurate only with a steady state creatinine  Chronic Kidney disease less than 60 ml/min/1 73 sq  meters  Kidney failure less than 15 ml/min/1 73 sq  meters  Hepatic function panel [[id number]]  (Normal) Collected:  10/25/18 1133    Lab Status:  Final result Specimen:  Blood from Arm, Right Updated:  10/25/18 1209     Total Bilirubin 0 30 mg/dL      Bilirubin, Direct 0 11 mg/dL      Alkaline Phosphatase 104 U/L      AST 21 U/L      ALT 32 U/L      Total Protein 7 1 g/dL      Albumin 3 6 g/dL     Protime-INR [[id number]]  (Abnormal) Collected:  10/25/18 1133    Lab Status:  Final result Specimen:  Blood from Arm, Right Updated:  10/25/18 1158     Protime 14 3 (H) seconds      INR 1 12    APTT [[id number]]  (Normal) Collected:  10/25/18 1133    Lab Status:  Final result Specimen:  Blood from Arm, Right Updated:  10/25/18 1158     PTT 30 seconds     CBC and differential [[id number]] Collected:  10/25/18 1133    Lab Status:  Final result Specimen:  Blood from Arm, Right Updated:  10/25/18 1147     WBC 6 51 Thousand/uL      RBC 3 97 Million/uL      Hemoglobin 12 8 g/dL      Hematocrit 38 6 %      MCV 97 fL      MCH 32 2 pg      MCHC 33 2 g/dL      RDW 13 9 %      MPV 9 5 fL      Platelets 635 Thousands/uL      nRBC 0 /100 WBCs      Neutrophils Relative 51 %      Immat GRANS % 0 %      Lymphocytes Relative 32 %      Monocytes Relative 10 %      Eosinophils Relative 6 %      Basophils Relative 1 %      Neutrophils Absolute 3 33 Thousands/µL      Immature Grans Absolute 0 02 Thousand/uL      Lymphocytes Absolute 2 06 Thousands/µL      Monocytes Absolute 0 68 Thousand/µL      Eosinophils Absolute 0 37 Thousand/µL      Basophils Absolute 0 05 Thousands/µL     Vitamin D Panel [[id number]] Collected:  10/25/18 1133    Lab Status: In process Specimen:  Arm, Right Updated:  10/25/18 1142                 XR chest 2 views   Final Result by Chris Jaffe MD (10/25 1313)      No acute cardiopulmonary disease              Workstation performed: [id number]         CT abdomen pelvis with contrast   Final Result by Gail Leo Barbra Man MD (10/25 5761)      No acute intra-abdominal or intrapelvic process  Colonic diverticulosis  There is intra and extrahepatic bile duct distention within normal limits for patient's age and postcholecystectomy reservoir distention  No significant interval change  4 mm noncalcified subpleural nodule left lung base unchanged  Workstation performed: [id number]                    Procedures  ECG 12 Lead Documentation  Date/Time: 10/25/2018 11:11 AM  Performed by: Paige Barthel  Authorized by: Paige Barthel     ECG reviewed by me, the ED Provider: yes    Patient location:  ED  Interpretation:     Interpretation: normal    Rate:     ECG rate assessment: normal    Rhythm:     Rhythm: sinus rhythm    ST segments:     ST segments:  Normal           Phone Contacts  ED Phone Contact    ED Course           Identification of Seniors at Risk      Most Recent Value   (ISAR) Identification of Seniors at Risk   Before the illness or injury that brought you to the Emergency, did you need someone to help you on a regular basis? 0 Filed at: 10/25/2018 0904   In the last 24 hours, have you needed more help than usual?  0 Filed at: 10/25/2018 7946   Have you been hospitalized for one or more nights during the past 6 months? 0 Filed at: 10/25/2018 0904   In general, do you see well?  0 Filed at: 10/25/2018 9522   In general, do you have serious problems with your memory? 0 Filed at: 10/25/2018 3009   Do you take more than three different medications every day? 1 Filed at: 10/25/2018 0904   ISAR Score  1 Filed at: 10/25/2018 5405           Patient felt much better after the IV Toradol  Patient states that she still has a little bit of pain  I will give her her 2nd dose of the Toradol and discharge her to continue her Flexeril and Naprosyn to try quit smoking  I reviewed patient's labs and test results with her  I gave her a copy of his CT scan report                  MDM  CritCare Time Differential diagnosis includes:  1  Back pain  2  Right upper quadrant pain  3  History of multiple sclerosis  4  Rule out renal calculus  5  Rule out cardiac disease  6  + Smoker     Disposition  Final diagnoses:   RUQ pain   Right flank pain   Spasm of thoracic back muscle   UTI (urinary tract infection)     Time reflects when diagnosis was documented in both MDM as applicable and the Disposition within this note     Time User Action Codes Description Comment    10/25/2018  1:40 PM Balinda Hollins L Add [R10 11] RUQ pain     10/25/2018  1:41 PM Balinda Hollins L Add [R10 9] Right flank pain     10/25/2018  1:41 PM Artem Huxford Add [R07 89] Musculoskeletal chest pain     10/25/2018  1:41 PM Artem Huxford Remove [R07 89] Musculoskeletal chest pain     10/25/2018  1:43 PM Artem Huxford Add [E07 646] Spasm of thoracic back muscle     10/25/2018  1:44 PM Balinda Hollins L Add [N39 0] UTI (urinary tract infection)       ED Disposition     ED Disposition Condition Comment    Discharge  [place] discharge to home/self care      Condition at discharge: Good        Follow-up Information     Follow up With Specialties Details Why [address], DO Internal Medicine In 1 week  [address]  [phone]            Discharge Medication List as of 10/25/2018  1:47 PM      START taking these medications    Details   cephalexin (KEFLEX) 500 mg capsule Take 1 capsule (500 mg total) by mouth 4 (four) times a day for 10 days, Starting Thu 10/25/2018, Until Sun 11/4/2018, Normal         CONTINUE these medications which have NOT CHANGED    Details   Cholecalciferol (VITAMIN D-3) 1000 units CAPS Take by mouth, Historical Med      cyclobenzaprine (FLEXERIL) 5 mg tablet Take 1 tablet (5 mg total) by mouth 3 (three) times a day as needed for muscle spasms, Starting Tue 10/23/2018, Normal      diazepam (VALIUM) 2 mg tablet TAKE 1 TABLET BY MOUTH EVERY DAY AT BEDTIME FOR INSOMNIA, Historical Med      levothyroxine 100 mcg tablet Take 1 tablet (100 mcg total) by mouth daily, Starting Mon 4/23/2018, Normal      Multiple Vitamins-Minerals (PRESERVISION AREDS) CAPS Take by mouth, Historical Med      naproxen (NAPROSYN) 375 mg tablet Starting Thu 2/8/2018, Historical Med      thiamine (VITAMIN B1) 100 mg tablet Take 1 tablet by mouth daily, Historical Med      vitamin B-12 (CYANOCOBALAMIN) 100 MCG tablet Take by mouth, Historical Med           No discharge procedures on file      ED Provider  Electronically Signed by           Kwame Castro DO  10/25/18 7614

## 2018-10-25 NOTE — DISCHARGE INSTRUCTIONS
1  Continue your Flexeril and your Naprosyn  2  Take Keflex 4 times a day for 7-10 days  3  Drink lots of fluids  4  Return if any problems        Back Pain   WHAT YOU NEED TO KNOW:   Back pain is common  It can be caused by many conditions, such as arthritis or the breakdown of spinal discs  Your risk for back pain is increased by injuries, lack of activity, or repeated bending and twisting  You may feel sore or stiff on one or both sides of your back  The pain may spread to your buttocks or thighs  DISCHARGE INSTRUCTIONS:   Medicines:   · NSAIDs  help decrease swelling and pain  This medicine is available with or without a doctor's order  NSAIDs can cause stomach bleeding or kidney problems in certain people  If you take blood thinner medicine, always ask your healthcare provider if NSAIDs are safe for you  Always read the medicine label and follow directions  · Acetaminophen  decreases pain  It is available without a doctor's order  Ask how much to take and how often to take it  Follow directions  Acetaminophen can cause liver damage if not taken correctly  · Prescription pain medicine  may be given  Ask your healthcare provider how to take this medicine safely  · Take your medicine as directed  Contact your healthcare provider if you think your medicine is not helping or if you have side effects  Tell him or her if you are allergic to any medicine  Keep a list of the medicines, vitamins, and herbs you take  Include the amounts, and when and why you take them  Bring the list or the pill bottles to follow-up visits  Carry your medicine list with you in case of an emergency  Follow up with your healthcare provider in 2 weeks, or as directed:  Write down your questions so you remember to ask them during your visits  How to manage your back pain:   · Apply ice  on your back or affected area for 15 to 20 minutes every hour or as directed  Use an ice pack, or put crushed ice in a plastic bag   Cover it with a towel  Ice helps prevent tissue damage and decreases pain  · Apply heat  on your back or affected area for 20 to 30 minutes every 2 hours for as many days as directed  Heat helps decrease pain and muscle spasms  · Stay active  as much as you can without causing more pain  Bed rest could make your back pain worse  Avoid heavy lifting until your pain is gone  Return to the emergency department if:   · You have pain, numbness, or weakness in one or both legs  · Your pain becomes so severe that you cannot walk  · You cannot control your urine or bowel movements  · You have severe back pain with chest pain  · You have severe back pain, nausea, and vomiting  · You have severe back pain that spreads to your side or genital area  Contact your healthcare provider if:   · You have back pain that does not get better with rest and pain medicine  · You have a fever  · You have pain that worsens when you are on your back or when you rest     · You have pain that worsens when you cough or sneeze  · You lose weight without trying  · You have questions or concerns about your condition or care  © 2017 [facility] Information is for End User's use only and may not be sold, redistributed or otherwise used for commercial purposes  All illustrations and images included in CareNotes® are the copyrighted property of A [employer] M [employer]  or [employer]  The above information is an  only  It is not intended as medical advice for individual conditions or treatments  Talk to your doctor, nurse or pharmacist before following any medical regimen to see if it is safe and effective for you  Urinary Traction Infection in Older Adults   WHAT YOU NEED TO KNOW:   A urinary tract infection (UTI) is caused by bacteria that get inside your urinary tract  Your urinary tract includes your kidneys, ureters, bladder, and urethra   Urine is made in your kidneys, and it flows from the ureters to the bladder  Urine leaves the bladder through the urethra  A UTI is more common in your lower urinary tract, which includes your bladder and urethra  DISCHARGE INSTRUCTIONS:   Return to the emergency department if:   · You are urinating very little or not at all  · You are vomiting  · You have a high fever with shaking chills  · You have side or back pain that gets worse  Contact your healthcare provider if:   · You have a fever  · You are a woman and you have increased white or yellow discharge from your vagina  · You do not feel better after 2 days of taking antibiotics  · You have questions or concerns about your condition or care  Medicines:   · Medicines  help treat the bacterial infection or decrease pain and burning when you urinate  You may also need medicines to decrease the urge to urinate often  Your healthcare provider may recommend cranberry juice or cranberry supplements to help decrease your symptoms  · Take your medicine as directed  Contact your healthcare provider if you think your medicine is not helping or if you have side effects  Tell him or her if you are allergic to any medicine  Keep a list of the medicines, vitamins, and herbs you take  Include the amounts, and when and why you take them  Bring the list or the pill bottles to follow-up visits  Carry your medicine list with you in case of an emergency  Self-care:   · Urinate when you feel the urge  Do not hold your urine because bacteria can grow in the bladder if urine stays in the bladder too long  It may be helpful to urinate at least every 3 to 4 hours  · Drink liquids as directed  Liquids can help flush bacteria from your urinary tract  Ask how much liquid to drink each day and which liquids are best for you  You may need to drink more liquids than usual to help flush out the bacteria  Do not drink alcohol, caffeine, and citrus juices   These can irritate your bladder and increase your symptoms  · Apply heat  on your abdomen for 20 to 30 minutes every 2 hours for as many days as directed  Heat helps decrease discomfort and pressure in your bladder  Prevent a UTI:   · Women should wipe front to back  after urinating or having a bowel movement  This may prevent germs from getting into the urinary tract  · Urinate after you have sex  to flush away bacteria that can enter your urinary tract during sex  · Wear cotton underwear and clothes that fit loose  Tight pants and nylon underwear can trap moisture and cause bacteria to grow  Follow up with your healthcare provider as directed:  Write down your questions so you remember to ask them during your visits  © 2017 [facility] Information is for End User's use only and may not be sold, redistributed or otherwise used for commercial purposes  All illustrations and images included in CareNotes® are the copyrighted property of A D A M , Inc  or [employer]  The above information is an  only  It is not intended as medical advice for individual conditions or treatments  Talk to your doctor, nurse or pharmacist before following any medical regimen to see if it is safe and effective for you

## 2018-10-29 ENCOUNTER — TRANSCRIBE ORDERS (OUTPATIENT)
Dept: ADMINISTRATIVE | Facility: HOSPITAL | Age: 73
End: 2018-10-29

## 2018-10-29 DIAGNOSIS — M48.12: Primary | ICD-10-CM

## 2018-10-29 DIAGNOSIS — M51.9 INTERVERTEBRAL DISC DISORDER: ICD-10-CM

## 2018-10-29 LAB
25(OH)D2 SERPL-MCNC: <1 NG/ML
25(OH)D3 SERPL-MCNC: 39 NG/ML
25(OH)D3+25(OH)D2 SERPL-MCNC: 39 NG/ML

## 2018-11-01 ENCOUNTER — OFFICE VISIT (OUTPATIENT)
Dept: INTERNAL MEDICINE CLINIC | Facility: CLINIC | Age: 73
End: 2018-11-01
Payer: MEDICARE

## 2018-11-01 VITALS
HEART RATE: 87 BPM | DIASTOLIC BLOOD PRESSURE: 88 MMHG | WEIGHT: 146.6 LBS | OXYGEN SATURATION: 97 % | HEIGHT: 63 IN | BODY MASS INDEX: 25.98 KG/M2 | SYSTOLIC BLOOD PRESSURE: 120 MMHG

## 2018-11-01 DIAGNOSIS — M62.830 SPASM OF THORACIC BACK MUSCLE: ICD-10-CM

## 2018-11-01 DIAGNOSIS — M62.830 LUMBAR PARASPINAL MUSCLE SPASM: Primary | ICD-10-CM

## 2018-11-01 DIAGNOSIS — G35 MULTIPLE SCLEROSIS (HCC): Chronic | ICD-10-CM

## 2018-11-01 PROBLEM — Z13.89 SCREENING FOR SKIN CONDITION: Status: RESOLVED | Noted: 2018-08-13 | Resolved: 2018-11-01

## 2018-11-01 PROCEDURE — 99213 OFFICE O/P EST LOW 20 MIN: CPT | Performed by: INTERNAL MEDICINE

## 2018-11-01 RX ORDER — GABAPENTIN 300 MG/1
CAPSULE ORAL
Refills: 1 | COMMUNITY
Start: 2018-10-29 | End: 2018-11-13 | Stop reason: SDUPTHER

## 2018-11-01 RX ORDER — GABAPENTIN 100 MG/1
CAPSULE ORAL
Refills: 1 | COMMUNITY
Start: 2018-10-29 | End: 2018-11-13 | Stop reason: CLARIF

## 2018-11-01 RX ORDER — CYCLOBENZAPRINE HCL 5 MG
5 TABLET ORAL 3 TIMES DAILY PRN
Qty: 90 TABLET | Refills: 1 | Status: SHIPPED | OUTPATIENT
Start: 2018-11-01 | End: 2018-11-13 | Stop reason: SDUPTHER

## 2018-11-01 NOTE — PATIENT INSTRUCTIONS
Muscle Spasm   WHAT YOU NEED TO KNOW:   A muscle spasm is a sudden contraction of any muscle or group of muscles  A muscle cramp is a painful muscle spasm  Muscle cramps commonly occur after intense exercise or during pregnancy  They may also be caused by certain medications, dehydration, low calcium or magnesium levels, or another medical condition  DISCHARGE INSTRUCTIONS:   Medicines: You may need the following:  · NSAIDs  help decrease swelling and pain or fever  This medicine is available with or without a doctor's order  NSAIDs can cause stomach bleeding or kidney problems in certain people  If you take blood thinner medicine, always ask your healthcare provider if NSAIDs are safe for you  Always read the medicine label and follow directions  · Take your medicine as directed  Contact your healthcare provider if you think your medicine is not helping or if you have side effects  Tell him of her if you are allergic to any medicine  Keep a list of the medicines, vitamins, and herbs you take  Include the amounts, and when and why you take them  Bring the list or the pill bottles to follow-up visits  Carry your medicine list with you in case of an emergency  Follow up with your healthcare provider as directed: You may need other tests or treatment  You may also be referred to a physical therapist or other specialist  Write down your questions so you remember to ask them during your visits  Self-care:   · Stretch  your muscle to help relieve the cramp  It may be helpful to keep your muscle in the stretched position until the cramp is gone  · Apply heat  to help decrease pain and muscle spasms  Apply heat on the area for 20 to 30 minutes every 2 hours for as many days as directed  · Apply ice  to help decrease swelling and pain  Ice may also help prevent tissue damage  Use an ice pack, or put crushed ice in a plastic bag   Cover it with a towel and place it on your muscle for 15 to 20 minutes every hour or as directed  · Drink more liquids  to help prevent muscle cramps caused by dehydration  Sports drinks may help replace electrolytes you lose through sweat during exercise  Ask your healthcare provider how much liquid to drink each day and which liquids are best for you  · Eat healthy foods , such as fruits, vegetables, whole grains, low-fat dairy products, and lean proteins (meat, beans, and fish)  If you are pregnant, ask your healthcare provider about foods that are high in magnesium and sodium  They may help to relieve cramps during pregnancy  · Massage your muscle  to help relieve the cramp  · Take frequent deep breaths  until the cramp feels better  Lie down while you take the deep breaths so you do not get dizzy or lightheaded  Contact your healthcare provider if:   · You have signs of dehydration, such as a headache, dark yellow urine, dry eyes or mouth, or a fast heartbeat  · You have questions or concerns about your condition or care  Return to the emergency department if:   · You have warmth, swelling, or redness in the cramping muscle  · You have frequent or unrelieved muscle cramps in several different muscles  · You have muscle cramps with numbness, tingling, and burning in your hands and feet  © 2017 [address] Information is for End User's use only and may not be sold, redistributed or otherwise used for commercial purposes  All illustrations and images included in CareNotes® are the copyrighted property of [employer] , [employer]  or [employer]  The above information is an  only  It is not intended as medical advice for individual conditions or treatments  Talk to your doctor, nurse or pharmacist before following any medical regimen to see if it is safe and effective for you

## 2018-11-01 NOTE — PROGRESS NOTES
INTERNAL MEDICINE FOLLOW-UP OFFICE VISIT  [facility] [address] [place] [address]    NAME: Hannah Austin  AGE: 74 y o  SEX: female  : 1945     DATE: 2018     Assessment and Plan:     1  Lumbar paraspinal muscle spasm  2  Spasm of thoracic back muscle  3  Multiple sclerosis (HCC)    Continue Flexeril as prescribed  Will rescind refill to pharmacy  Continue gabapentin and naproxen  Follow up with Neurology as scheduled  Get CT scan of the thoracic and cervical spine as ordered by Neurology  Call with any worsening pain  - cyclobenzaprine (FLEXERIL) 5 mg tablet; Take 1 tablet (5 mg total) by mouth 3 (three) times a day as needed for muscle spasms  Dispense: 90 tablet; Refill: 1        Return in about 4 months (around 3/12/2019) for Follow-up  Chief Complaint:     Chief Complaint   Patient presents with    Follow-up     F/u from  [facility]; 10/25/2018, pain in back      History of Present Illness:     Patient ended up going to the emergency department recently because of excruciating flank/thoracic pain  Patient was incidentally diagnosed with a urinary tract infection  Patient followed up with her neurologist who started her on some gabapentin and instructed her to take naproxen and a muscle relaxer  Patient states since starting the gabapentin her pain has decreased  Patient is scheduled to get a CT scan of her thoracic and cervical spine coming up  Patient has an underlying history of multiple sclerosis  Patient denies having any symptoms of urinary tract infection such as dysuria, frequency, urgency  She was given an Rx for Keflex          The following portions of the patient's history were reviewed and updated as appropriate: allergies, current medications, past family history, past medical history, past social history, past surgical history and problem list      Review of Systems:     Review of Systems   Musculoskeletal: Positive for arthralgias and back pain       Problem List:     Patient Active Problem List   Diagnosis    CHOCO on CPAP    COPD with chronic bronchitis (HCC)    Hypothyroidism    Insomnia    Lung nodule, multiple    Macular degeneration    Multiple sclerosis ([address] )    Rosacea    Seborrheic keratosis    Screening for skin condition      Objective:     /88 (BP Location: Left arm, Patient Position: Sitting, Cuff Size: Standard)   Pulse 87   Ht 5' 3 25" (1 607 m) Comment: w/ shoe denied off  Wt 66 5 kg (146 lb 9 6 oz) Comment: w/ shoe denied off  SpO2 97%   BMI 25 76 kg/m²     Physical Exam   Constitutional: She appears well-developed and well-nourished  No distress  Abdominal: Soft  Bowel sounds are normal  She exhibits no distension and no mass  There is no tenderness  There is no guarding  Genitourinary: Rectal exam shows guaiac negative stool  Musculoskeletal: She exhibits no edema, tenderness or deformity  Skin: She is not diaphoretic       Pertinent Laboratory/Diagnostic Studies:    Laboratory Results: I have personally reviewed the pertinent laboratory results/reports     Results for orders placed or performed during the hospital encounter of [birth year]   Basic metabolic panel   Result Value Ref Range    Sodium 145 136 - 145 mmol/L    Potassium 4 1 3 5 - 5 3 mmol/L    Chloride 106 100 - 108 mmol/L    CO2 29 21 - 32 mmol/L    ANION GAP 10 4 - 13 mmol/L    BUN 20 5 - 25 mg/dL    Creatinine 0 72 0 60 - 1 30 mg/dL    Glucose 90 65 - 140 mg/dL    Calcium 9 1 8 3 - 10 1 mg/dL    eGFR 82 ml/min/1 73sq m   Hepatic function panel   Result Value Ref Range    Total Bilirubin 0 30 0 20 - 1 00 mg/dL    Bilirubin, Direct 0 11 0 00 - 0 20 mg/dL    Alkaline Phosphatase 104 46 - 116 U/L    AST 21 5 - 45 U/L    ALT 32 12 - 78 U/L    Total Protein 7 1 6 4 - 8 2 g/dL    Albumin 3 6 3 5 - 5 0 g/dL   CBC and differential   Result Value Ref Range    WBC 6 51 4 31 - 10 16 Thousand/uL    RBC 3 97 3 81 - 5 12 Million/uL    Hemoglobin 12 8 11 5 - 15 4 g/dL    Hematocrit 38 6 34 8 - 46 1 %    MCV 97 82 - 98 fL    MCH 32 2 26 8 - 34 3 pg    MCHC 33 2 31 4 - 37 4 g/dL    RDW 13 9 11 6 - 15 1 %    MPV 9 5 8 9 - 12 7 fL    Platelets 492 203 - 159 Thousands/uL    nRBC 0 /100 WBCs    Neutrophils Relative 51 43 - 75 %    Immat GRANS % 0 0 - 2 %    Lymphocytes Relative 32 14 - 44 %    Monocytes Relative 10 4 - 12 %    Eosinophils Relative 6 0 - 6 %    Basophils Relative 1 0 - 1 %    Neutrophils Absolute 3 33 1 85 - 7 62 Thousands/µL    Immature Grans Absolute 0 02 0 00 - 0 20 Thousand/uL    Lymphocytes Absolute 2 06 0 60 - 4 47 Thousands/µL    Monocytes Absolute 0 68 0 17 - 1 22 Thousand/µL    Eosinophils Absolute 0 37 0 00 - 0 61 Thousand/µL    Basophils Absolute 0 05 0 00 - 0 10 Thousands/µL   Protime-INR   Result Value Ref Range    Protime 14 3 (H) 11 8 - 14 2 seconds    INR 1 12 0 86 - 1 17   APTT   Result Value Ref Range    PTT 30 24 - 36 seconds   Troponin I   Result Value Ref Range    Troponin I <0 02 <=0 04 ng/mL   Lactic acid, plasma   Result Value Ref Range    LACTIC ACID 0 6 0 5 - 2 0 mmol/L   UA w Reflex to Microscopic w Reflex to Culture   Result Value Ref Range    Color, UA Yellow     Clarity, UA Clear     Specific Gravity, UA <=1 005 1 003 - 1 030    pH, UA 7 0 4 5 - 8 0    Leukocytes, UA Small (A) Negative    Nitrite, UA Positive (A) Negative    Protein, UA Negative Negative mg/dl    Glucose, UA Negative Negative mg/dl    Ketones, UA Negative Negative mg/dl    Urobilinogen, UA 0 2 0 2, 1 0 E U /dl E U /dl    Bilirubin, UA Negative Negative    Blood, UA Trace-Intact (A) Negative   Vitamin D Panel   Result Value Ref Range    25-HYDROXY VIT D 39 ng/mL    25-Hydroxy D2 <1 0 ng/mL    25-HYDROXY VIT D3 39 ng/mL   Urine Microscopic   Result Value Ref Range    RBC, UA 0-1 (A) None Seen, 0-5 /hpf    WBC, UA 4-10 (A) None Seen, 0-5, 5-55, 5-65 /hpf    Epithelial Cells Occasional None Seen, Occasional /hpf    Bacteria, UA Moderate (A) None Seen, Occasional /hpf ECG 12 lead   Result Value Ref Range    Ventricular Rate 75 BPM    Atrial Rate 75 BPM    OR Interval 144 ms    QRSD Interval 80 ms    QT Interval 392 ms    QTC Interval 437 ms    P Axis 75 degrees    QRS Axis 72 degrees    T Wave Axis 63 degrees       Soo Roller,   MEDICAL [id number]

## 2018-11-04 ENCOUNTER — HOSPITAL ENCOUNTER (OUTPATIENT)
Dept: CT IMAGING | Facility: HOSPITAL | Age: 73
Discharge: HOME | End: 2018-11-04
Payer: MEDICARE

## 2018-11-04 DIAGNOSIS — M51.9 INTERVERTEBRAL DISC DISORDER: ICD-10-CM

## 2018-11-04 DIAGNOSIS — M48.12: ICD-10-CM

## 2018-11-04 PROCEDURE — 72125 CT NECK SPINE W/O DYE: CPT

## 2018-11-04 PROCEDURE — 72128 CT CHEST SPINE W/O DYE: CPT

## 2018-11-13 ENCOUNTER — OFFICE VISIT (OUTPATIENT)
Dept: INTERNAL MEDICINE CLINIC | Facility: CLINIC | Age: 73
End: 2018-11-13
Payer: MEDICARE

## 2018-11-13 VITALS
SYSTOLIC BLOOD PRESSURE: 118 MMHG | BODY MASS INDEX: 26.54 KG/M2 | HEART RATE: 84 BPM | HEIGHT: 63 IN | OXYGEN SATURATION: 95 % | WEIGHT: 149.8 LBS | DIASTOLIC BLOOD PRESSURE: 80 MMHG

## 2018-11-13 DIAGNOSIS — R10.9 FLANK PAIN: Primary | ICD-10-CM

## 2018-11-13 PROCEDURE — 99213 OFFICE O/P EST LOW 20 MIN: CPT | Performed by: INTERNAL MEDICINE

## 2018-11-13 RX ORDER — GABAPENTIN 300 MG/1
CAPSULE ORAL
Qty: 150 CAPSULE | Refills: 5 | Status: SHIPPED | OUTPATIENT
Start: 2018-11-13 | End: 2018-12-20

## 2018-11-13 RX ORDER — CYCLOBENZAPRINE HCL 5 MG
5 TABLET ORAL 3 TIMES DAILY PRN
Qty: 90 TABLET | Refills: 0 | Status: SHIPPED | OUTPATIENT
Start: 2018-11-13 | End: 2019-03-12 | Stop reason: CLARIF

## 2018-11-13 NOTE — PROGRESS NOTES
INTERNAL MEDICINE FOLLOW-UP OFFICE VISIT  [facility] [address] [place] [address]    NAME: Afia Aguilar  AGE: 74 y o  SEX: female  : 1945     DATE: 2018     Assessment and Plan:     1  Flank pain    No physical exam abnormalities  Recent imaging studies were reviewed with patient  Still feel this is musculoskeletal/nerve pain  Will increase dose of gabapentin  Patient does not want to try Percocet  Follow-up with neurologist     - cyclobenzaprine (FLEXERIL) 5 mg tablet; Take 1 tablet (5 mg total) by mouth 3 (three) times a day as needed for muscle spasms  Dispense: 90 tablet; Refill: 0  - gabapentin (NEURONTIN) 300 mg capsule; Take 1 capsule in morning and 2 capsules at lunch and dinner  Dispense: 150 capsule; Refill: 5    Return if symptoms worsen or fail to improve  Chief Complaint:     Chief Complaint   Patient presents with    Pain     acute pain- upper quad      History of Present Illness:     Patient has continued to get right upper quadrant/flank intermittent sharp stabbing pains  Patient states the pain was so bad last night that she was going to go to the ER but then it eventually subsided  Patient does not know any exacerbating or relieving factors other than heat which sometimes helps it  Patient has tried Percocet without much relief  Patient has been going up on the gabapentin and was just increased by her neurologist   Patient has had multiple CT scans performed within the past 1-2 months and there has been no significant abnormalities on these imaging studies  The following portions of the patient's history were reviewed and updated as appropriate: allergies, current medications, past family history, past medical history, past social history, past surgical history and problem list      Review of Systems:     Review of Systems   Gastrointestinal: Positive for abdominal pain  Musculoskeletal: Positive for arthralgias and back pain  Problem List:     Patient Active Problem List   Diagnosis    CHOCO on CPAP    COPD with chronic bronchitis (HCC)    Hypothyroidism    Insomnia    Lung nodule, multiple    Macular degeneration    Multiple sclerosis ([address] )    Rosacea    Seborrheic keratosis      Objective:     /80 (BP Location: Left arm, Patient Position: Sitting, Cuff Size: Standard)   Pulse 84   Ht 5' 3 25" (1 607 m) Comment: w/ shoe denied off  Wt 67 9 kg (149 lb 12 8 oz) Comment: w/ shoe denied off  SpO2 95%   BMI 26 33 kg/m²     Physical Exam   Constitutional: She appears well-developed and well-nourished  No distress  Abdominal: Soft  Bowel sounds are normal  She exhibits no distension and no mass  There is no tenderness  There is no rebound and no guarding  No hernia  Skin: She is not diaphoretic       Jefe Cordova DO  [facility]

## 2018-11-13 NOTE — PATIENT INSTRUCTIONS
Musculoskeletal Pain   WHAT YOU NEED TO KNOW:   Muscle pain can be dull, achy, or sharp  You may have pain and tenderness to the touch as well  It can occur anywhere on your body and is often brought on by exercise  Muscle pain may occur from an injury, such as a sprain, tendonitis, or bone fracture  Muscle pain can also be the result of medical conditions, such as polymyositis, fibromyalgia, and connective tissue disorders  DISCHARGE INSTRUCTIONS:   Self care:   · Rest  as directed and avoid activity that causes pain  You may be able to return to normal activity when you can move without pain  Follow directions for rest and activity  You are at risk for injury for 3 weeks after your symptoms go away  · Ice  your painful muscle area to decrease pain and swelling  Use an ice pack, or put ice in a plastic bag and cover it with a towel  Always  put a cloth between the ice and your skin  Apply the ice as often as directed for the first 24 to 48 hours  · Compression  with a splint, brace, or elastic bandage helps decrease pain and swelling  This may be needed for muscle pain in arms or legs  A splint, brace, or bandage will also help protect the painful area when you move around  · Elevate  a painful arm or leg to reduce swelling and pain  Elevate your limb while you are sitting or lying down  Prop a painful leg on pillows to keep it above the level of your heart  Medicines:   · NSAIDs  help decrease swelling and pain or fever  This medicine is available with or without a doctor's order  NSAIDs can cause stomach bleeding or kidney problems in certain people  If you take blood thinner medicine, always ask your healthcare provider if NSAIDs are safe for you  Always read the medicine label and follow directions  · Acetaminophen  is used to decrease pain  It is available without a doctor's order  Ask your healthcare provider how much to take and when to take it  Follow directions   Acetaminophen can cause liver damage if not taken correctly  Do not take more than one medicine that contains acetaminophen unless directed  · Muscle relaxers  help relax your muscles to decrease pain and muscle spasms  · Steroids  may be given to decrease redness, pain, and swelling  · Take your medicine as directed  Contact your healthcare provider if you think your medicine is not helping or if you have side effects  Tell him if you are allergic to any medicine  Keep a list of the medicines, vitamins, and herbs you take  Include the amounts, and when and why you take them  Bring the list or the pill bottles to follow-up visits  Carry your medicine list with you in case of an emergency  Follow up with your healthcare provider as directed: You may need more tests to help healthcare providers find the cause of your muscle pain  You may need physical therapy to learn muscle strengthening exercises  Write down your questions so you remember to ask them during your visits  Contact your healthcare provider if:   · You have a fever  · You have trouble sleeping because of your pain  · Your painful area becomes more tender, red, and warm to the touch  · You have decreased movement of the painful area  · You have questions or concerns about your condition or care  Return to the emergency department if:   · You have increased severe pain when you move the painful muscle area  · You lose feeling in your painful muscle area  · You have new or worse swelling in the painful area  Your skin may feel tight  · You have increased muscle pain or pain that does not improve with treatment  © 2017 [address] Information is for End User's use only and may not be sold, redistributed or otherwise used for commercial purposes  All illustrations and images included in CareNotes® are the copyrighted property of [employer]  or [employer]  The above information is an  only  It is not intended as medical advice for individual conditions or treatments  Talk to your doctor, nurse or pharmacist before following any medical regimen to see if it is safe and effective for you

## 2018-11-20 ENCOUNTER — TELEPHONE (OUTPATIENT)
Dept: INTERNAL MEDICINE CLINIC | Facility: CLINIC | Age: 73
End: 2018-11-20

## 2018-11-20 DIAGNOSIS — R52 MODERATE PAIN: Primary | ICD-10-CM

## 2018-11-20 RX ORDER — IBUPROFEN 800 MG/1
800 TABLET ORAL EVERY 8 HOURS PRN
Qty: 90 TABLET | Refills: 0 | Status: SHIPPED | OUTPATIENT
Start: 2018-11-20 | End: 2019-03-12 | Stop reason: CLARIF

## 2018-11-20 NOTE — TELEPHONE ENCOUNTER
Patient is running out of Ibuprofen 800 mg and is asking if Dr would prescribe for her ???  She said it does help considerably, with the pain

## 2018-12-05 ENCOUNTER — TRANSCRIBE ORDERS (OUTPATIENT)
Dept: ADMINISTRATIVE | Facility: HOSPITAL | Age: 73
End: 2018-12-05

## 2018-12-05 DIAGNOSIS — Q31.3: Primary | ICD-10-CM

## 2018-12-17 ENCOUNTER — HOSPITAL ENCOUNTER (OUTPATIENT)
Dept: CT IMAGING | Facility: HOSPITAL | Age: 73
Discharge: HOME | End: 2018-12-17
Payer: MEDICARE

## 2018-12-17 DIAGNOSIS — Q31.3: ICD-10-CM

## 2018-12-17 PROCEDURE — 70491 CT SOFT TISSUE NECK W/DYE: CPT

## 2018-12-17 RX ADMIN — IOHEXOL 85 ML: 350 INJECTION, SOLUTION INTRAVENOUS at 15:01

## 2018-12-19 ENCOUNTER — TELEPHONE (OUTPATIENT)
Dept: INTERNAL MEDICINE CLINIC | Facility: CLINIC | Age: 73
End: 2018-12-19

## 2018-12-19 DIAGNOSIS — R10.9 FLANK PAIN: ICD-10-CM

## 2018-12-19 NOTE — TELEPHONE ENCOUNTER
Patient called- Brian Montana she was in to see Dr Delma Baldwin several times over the past two months and she's has been taking up to 8 of the gabapentin a day and her pharmacy will not refill it because they say it's to soon  She's asking if we can adjust her dosage and contact the pharmacy so she can get a refill  She says she's in extreme pain  Pharmacy: [address]      Patient's MJ#[phone]

## 2018-12-20 RX ORDER — GABAPENTIN 800 MG/1
800 TABLET ORAL 3 TIMES DAILY
Qty: 90 TABLET | Refills: 3 | Status: SHIPPED | OUTPATIENT
Start: 2018-12-20 | End: 2019-03-12 | Stop reason: SDUPTHER

## 2018-12-20 NOTE — TELEPHONE ENCOUNTER
CHIEF COMPLAINT:  Derm Problem       HISTORY OF PRESENT ILLNESS:  Camila Hampton is a 47 year old female who presented for recheck post aldara.      PAST HISTORIES:  Dermatologic History: Negative.  Personal history of skin cancer:No  Family history of skin cancer: No  History of sunburns/tanning bed use:  sunburns as a child/teenager and sunburns occasionally as an adult   Sunscreen use: always    Review of systems:  No fevers or chills  No swollen glands  No recent weight changes  No cardiac or hematological abnormality that would prevent a biopsy or procedure    Physical Exam:  General:  Well nourished, well developed, in no acute distress.  Skin:  Gritty papule chest  Head:  Normocephalic-atraumatic.  Neurologic:  Oriented times 4.  No focal deficits.    Examination of the scalp/body hair, face, neck, ears, eyelids/conjunctivae, lips/oral mucosa, chest and right lower extremity was performed and findings were within normal limits unless specified below.      Assessment/plan:  1. Actinic keratoses    2. Basal cell carcinoma of anterior chest    rule out    Actinic Keratoses:  · Pink gritty papules noted on chest  Plan:    Follow  Call if change in growth    Actinic damage  · Thin actinically damaged skin  · Photo exposed areas  Plan:  Follow  Call if change in growth noted      On 8/16/2018, Richa SILVA scribed the services personally performed by Evangelina Shah MD       I, Dr Shah, attest that the documentation recorded by the scribe accurately and completely reflects the service(s) I personally performed and the decisions made by me.              Informed patient

## 2019-01-30 ENCOUNTER — TELEPHONE (OUTPATIENT)
Dept: INTERNAL MEDICINE CLINIC | Facility: CLINIC | Age: 74
End: 2019-01-30

## 2019-01-30 RX ORDER — DULOXETIN HYDROCHLORIDE 30 MG/1
CAPSULE, DELAYED RELEASE ORAL
Refills: 12 | COMMUNITY
Start: 2019-01-19 | End: 2019-08-14

## 2019-01-30 NOTE — TELEPHONE ENCOUNTER
Valium, Duloxetine, Gabapentin are not something she can just stop and I am unclear why she is being told by ENT to stop all these medications  I would continue taking these medications except for the naproxen or anything that can increase your bleeding risk

## 2019-01-30 NOTE — TELEPHONE ENCOUNTER
Pt is having sx 2/11/19 with  Dr Roberto hwang  For the growth they found on her neck  They wanted her to stop taking all her medicasiton 1 week prio to the sx she cant do that due to the pain she will be in the two medicaiton she is concern about gabapentin & napoxin  they had told  her to take opioid  pt would like to talk to the dr and see what he thinks  Stanton Alegria

## 2019-01-30 NOTE — TELEPHONE ENCOUNTER
I wouldn't recommend that she just stop taking gabapentin  The naproxen I would stop due to bleeding

## 2019-01-30 NOTE — TELEPHONE ENCOUNTER
Spoke with patient  I gave her the message  She said she will stop taking the naproxen  She said she has enough gabapentin until Monday  She would like to know how she will wean herself off the gabapentin and what can she take in it's place after she does  She would also like to know what she should do about her Valium and Duloxetine  She needs something for the pain and if she can't take anything what should she do

## 2019-01-31 ENCOUNTER — OFFICE VISIT (OUTPATIENT)
Dept: LAB | Facility: HOSPITAL | Age: 74
End: 2019-01-31
Payer: MEDICARE

## 2019-01-31 ENCOUNTER — TRANSCRIBE ORDERS (OUTPATIENT)
Dept: ADMINISTRATIVE | Facility: HOSPITAL | Age: 74
End: 2019-01-31

## 2019-01-31 ENCOUNTER — APPOINTMENT (OUTPATIENT)
Dept: LAB | Facility: HOSPITAL | Age: 74
End: 2019-01-31
Payer: MEDICARE

## 2019-01-31 DIAGNOSIS — Q31.3 LARYNGOCELE: Primary | ICD-10-CM

## 2019-01-31 DIAGNOSIS — Q31.3 LARYNGOCELE: ICD-10-CM

## 2019-01-31 LAB
ANION GAP SERPL CALCULATED.3IONS-SCNC: 9 MMOL/L (ref 4–13)
ATRIAL RATE: 74 BPM
BUN SERPL-MCNC: 20 MG/DL (ref 5–25)
CALCIUM SERPL-MCNC: 9.4 MG/DL (ref 8.3–10.1)
CHLORIDE SERPL-SCNC: 101 MMOL/L (ref 100–108)
CO2 SERPL-SCNC: 30 MMOL/L (ref 21–32)
CREAT SERPL-MCNC: 0.87 MG/DL (ref 0.6–1.3)
GFR SERPL CREATININE-BSD FRML MDRD: 65 ML/MIN/1.73SQ M
GLUCOSE SERPL-MCNC: 151 MG/DL (ref 65–140)
P AXIS: 72 DEGREES
POTASSIUM SERPL-SCNC: 3.8 MMOL/L (ref 3.5–5.3)
PR INTERVAL: 154 MS
QRS AXIS: 72 DEGREES
QRSD INTERVAL: 80 MS
QT INTERVAL: 386 MS
QTC INTERVAL: 428 MS
SODIUM SERPL-SCNC: 140 MMOL/L (ref 136–145)
T WAVE AXIS: 61 DEGREES
VENTRICULAR RATE: 74 BPM

## 2019-01-31 PROCEDURE — 93005 ELECTROCARDIOGRAM TRACING: CPT

## 2019-01-31 PROCEDURE — 93010 ELECTROCARDIOGRAM REPORT: CPT | Performed by: INTERNAL MEDICINE

## 2019-01-31 PROCEDURE — 80048 BASIC METABOLIC PNL TOTAL CA: CPT

## 2019-01-31 PROCEDURE — 36415 COLL VENOUS BLD VENIPUNCTURE: CPT

## 2019-02-18 ENCOUNTER — TELEPHONE (OUTPATIENT)
Dept: PULMONOLOGY | Facility: CLINIC | Age: 74
End: 2019-02-18

## 2019-02-26 ENCOUNTER — HOSPITAL ENCOUNTER (OUTPATIENT)
Dept: CT IMAGING | Facility: HOSPITAL | Age: 74
Discharge: HOME | End: 2019-02-26
Attending: INTERNAL MEDICINE
Payer: MEDICARE

## 2019-02-26 DIAGNOSIS — R91.1 LUNG NODULE: ICD-10-CM

## 2019-02-26 PROCEDURE — 71250 CT THORAX DX C-: CPT

## 2019-02-27 ENCOUNTER — APPOINTMENT (OUTPATIENT)
Dept: LAB | Facility: HOSPITAL | Age: 74
End: 2019-02-27
Attending: INTERNAL MEDICINE
Payer: MEDICARE

## 2019-02-27 DIAGNOSIS — E55.9 VITAMIN D DEFICIENCY: ICD-10-CM

## 2019-02-27 DIAGNOSIS — E03.9 ACQUIRED HYPOTHYROIDISM: Chronic | ICD-10-CM

## 2019-02-27 LAB
25(OH)D3 SERPL-MCNC: 50.1 NG/ML (ref 30–100)
ANION GAP SERPL CALCULATED.3IONS-SCNC: 8 MMOL/L (ref 4–13)
BUN SERPL-MCNC: 17 MG/DL (ref 5–25)
CALCIUM SERPL-MCNC: 9.4 MG/DL (ref 8.3–10.1)
CHLORIDE SERPL-SCNC: 104 MMOL/L (ref 100–108)
CHOLEST SERPL-MCNC: 178 MG/DL (ref 50–200)
CO2 SERPL-SCNC: 28 MMOL/L (ref 21–32)
CREAT SERPL-MCNC: 0.78 MG/DL (ref 0.6–1.3)
GFR SERPL CREATININE-BSD FRML MDRD: 74 ML/MIN/1.73SQ M
GLUCOSE P FAST SERPL-MCNC: 103 MG/DL (ref 65–99)
HDLC SERPL-MCNC: 82 MG/DL (ref 40–60)
LDLC SERPL CALC-MCNC: 81 MG/DL (ref 0–100)
NONHDLC SERPL-MCNC: 96 MG/DL
POTASSIUM SERPL-SCNC: 4.2 MMOL/L (ref 3.5–5.3)
SODIUM SERPL-SCNC: 140 MMOL/L (ref 136–145)
TRIGL SERPL-MCNC: 75 MG/DL
TSH SERPL DL<=0.05 MIU/L-ACNC: 0.23 UIU/ML (ref 0.36–3.74)

## 2019-02-27 PROCEDURE — 80061 LIPID PANEL: CPT

## 2019-02-27 PROCEDURE — 82306 VITAMIN D 25 HYDROXY: CPT

## 2019-02-27 PROCEDURE — 84443 ASSAY THYROID STIM HORMONE: CPT

## 2019-02-27 PROCEDURE — 36415 COLL VENOUS BLD VENIPUNCTURE: CPT

## 2019-02-27 PROCEDURE — 80048 BASIC METABOLIC PNL TOTAL CA: CPT

## 2019-03-12 ENCOUNTER — OFFICE VISIT (OUTPATIENT)
Dept: INTERNAL MEDICINE CLINIC | Facility: CLINIC | Age: 74
End: 2019-03-12
Payer: MEDICARE

## 2019-03-12 VITALS
HEART RATE: 90 BPM | WEIGHT: 152.4 LBS | DIASTOLIC BLOOD PRESSURE: 80 MMHG | SYSTOLIC BLOOD PRESSURE: 122 MMHG | OXYGEN SATURATION: 97 % | BODY MASS INDEX: 26.78 KG/M2

## 2019-03-12 DIAGNOSIS — G89.29 CHRONIC RIGHT FLANK PAIN: ICD-10-CM

## 2019-03-12 DIAGNOSIS — M79.2 CHRONIC NEUROPATHIC PAIN: Primary | ICD-10-CM

## 2019-03-12 DIAGNOSIS — R10.9 CHRONIC RIGHT FLANK PAIN: ICD-10-CM

## 2019-03-12 DIAGNOSIS — G35 MULTIPLE SCLEROSIS (HCC): Chronic | ICD-10-CM

## 2019-03-12 DIAGNOSIS — E03.9 ACQUIRED HYPOTHYROIDISM: ICD-10-CM

## 2019-03-12 DIAGNOSIS — G89.29 CHRONIC NEUROPATHIC PAIN: Primary | ICD-10-CM

## 2019-03-12 DIAGNOSIS — Z12.31 ENCOUNTER FOR SCREENING MAMMOGRAM FOR BREAST CANCER: ICD-10-CM

## 2019-03-12 DIAGNOSIS — J44.9 COPD WITH CHRONIC BRONCHITIS (HCC): Chronic | ICD-10-CM

## 2019-03-12 PROCEDURE — 99214 OFFICE O/P EST MOD 30 MIN: CPT | Performed by: INTERNAL MEDICINE

## 2019-03-12 RX ORDER — OXYCODONE HYDROCHLORIDE AND ACETAMINOPHEN 5; 325 MG/1; MG/1
1 TABLET ORAL EVERY 8 HOURS PRN
Qty: 90 TABLET | Refills: 0 | Status: SHIPPED | OUTPATIENT
Start: 2019-03-12 | End: 2019-04-23 | Stop reason: SDUPTHER

## 2019-03-12 RX ORDER — THIAMINE MONONITRATE (VIT B1) 100 MG
100 TABLET ORAL
COMMUNITY
End: 2019-03-12 | Stop reason: CLARIF

## 2019-03-12 RX ORDER — GABAPENTIN 800 MG/1
800 TABLET ORAL 3 TIMES DAILY
Qty: 90 TABLET | Refills: 3 | Status: SHIPPED | OUTPATIENT
Start: 2019-03-12 | End: 2019-08-14

## 2019-03-12 RX ORDER — LEVOTHYROXINE SODIUM 0.1 MG/1
100 TABLET ORAL DAILY
Qty: 90 TABLET | Refills: 3 | Status: SHIPPED | OUTPATIENT
Start: 2019-03-12 | End: 2020-06-01

## 2019-03-12 NOTE — PROGRESS NOTES
INTERNAL MEDICINE FOLLOW-UP OFFICE VISIT  [facility] - [facility]    NAME: Carolann Cui  AGE: 66 y o  SEX: female  : 1945     DATE: 3/12/2019     Assessment and Plan:     1  Chronic neuropathic pain  2  Chronic right flank pain    The true etiology of her pain is unclear at this time  Patient does have underlying multiple sclerosis  Would recommend MRI of the thoracic spine along with MRI of the right chest wall  With performed with and without contrast   She has been taking gabapentin and Cymbalta for months without significant relief  At this time will add on Percocet for severe pain  Pain management agreement was reviewed and signed by patient  Discussed side effects and risk of abuse/dependence  Patient is also on diazepam and she has been on this for 40 years at a stable dose  Discussed possible combination of affects of benzodiazepines, gabapentin, and opioids  - gabapentin (NEURONTIN) 800 mg tablet; Take 1 tablet (800 mg total) by mouth 3 (three) times a day  Dispense: 90 tablet; Refill: 3  - oxyCODONE-acetaminophen (PERCOCET) 5-325 mg per tablet; Take 1 tablet by mouth every 8 (eight) hours as needed for moderate painMax Daily Amount: 3 tablets  Dispense: 90 tablet; Refill: 0    3  Acquired hypothyroidism    Continue levothyroxine 100 mcg daily  - levothyroxine 100 mcg tablet; Take 1 tablet (100 mcg total) by mouth daily  Dispense: 90 tablet; Refill: 3    4  Multiple sclerosis ([facility] )    Continue to follow up with Dr Magdalene Littlejohn  5  Encounter for screening mammogram for breast cancer  - Mammo screening bilateral w 3d & cad; Future    6  COPD with chronic bronchitis ([facility] [address] )    Copd is stable at this time  Tobacco cessation was strongly advised and she has already started to cut down  Continue inhalers as prescribed  Follow-up with Pulmonary  BMI Counseling: Body mass index is 26 78 kg/m²  Discussed the patient's BMI with her   The BMI is above average  BMI counseling and education was provided to the patient  Nutrition recommendations include reducing portion sizes, decreasing overall calorie intake, 3-5 servings of fruits/vegetables daily, increasing intake of lean protein and reducing intake of saturated fat and trans fat  There has likely been some weight gain side effect from cymbalta and gabapentin  Return in about 4 months (around 7/12/2019) for Follow-up  Chief Complaint:     Chief Complaint   Patient presents with    Follow-up     6 month and labs- 02/27/2019        History of Present Illness:     Patient presents for routine follow-up  Patient continues to have right-sided flank/right upper quadrant pain that has been lasting since October  Patient feels like the pain is getting worse  Patient does not have a diagnosis as to why this is occurring  At this point is just thought to be a chronic neuropathic pain and she has been taking gabapentin and duloxetine  Some days she feels fine and other days she is in excruciating pain  She has not had any recent MRIs performed  She has been under a great deal of stress due to the pain  She had a recent CT of the chest which showed some small pulmonary nodules  She has a follow-up with Dr Michelle Nevarez coming up  She continues to smoke but she has cut way down on her tobacco consumption  The following portions of the patient's history were reviewed and updated as appropriate: allergies, current medications, past family history, past medical history, past social history, past surgical history and problem list      Review of Systems:     Review of Systems   Constitutional: Negative for activity change, appetite change and fatigue  Respiratory: Negative for apnea, cough, chest tightness, shortness of breath and wheezing  Cardiovascular: Negative for chest pain, palpitations and leg swelling     Gastrointestinal: Negative for abdominal distention, abdominal pain, blood in stool, constipation, diarrhea, nausea and vomiting  Musculoskeletal: Positive for arthralgias and back pain  Negative for gait problem, joint swelling and myalgias  Skin: Negative for rash and wound  Neurological: Positive for numbness  Negative for dizziness, weakness, light-headedness and headaches  Psychiatric/Behavioral: Negative for behavioral problems, confusion, hallucinations, sleep disturbance and suicidal ideas  The patient is not nervous/anxious  Problem List:     Patient Active Problem List   Diagnosis    CHOCO on CPAP    COPD with chronic bronchitis (HCC)    Hypothyroidism    Insomnia    Lung nodule, multiple    Macular degeneration    Multiple sclerosis ([address] )    Rosacea    Seborrheic keratosis      Objective:     /80 (BP Location: Left arm, Patient Position: Sitting, Cuff Size: Standard)   Pulse 90   Wt 69 1 kg (152 lb 6 4 oz)   SpO2 97%   BMI 26 78 kg/m²     Physical Exam   Constitutional: She is oriented to person, place, and time  She appears well-developed and well-nourished  No distress  Eyes: Conjunctivae are normal  Right eye exhibits no discharge  Left eye exhibits no discharge  No scleral icterus  Neck: Neck supple  No JVD present  No thyromegaly present  Cardiovascular: Normal rate, regular rhythm and normal heart sounds  No murmur heard  Pulmonary/Chest: Effort normal and breath sounds normal  No respiratory distress  She has no wheezes  She has no rales  She exhibits no tenderness  Abdominal: Soft  Bowel sounds are normal  She exhibits no distension and no mass  There is no tenderness  There is no rebound and no guarding  No hernia  Musculoskeletal: She exhibits tenderness (noted right rib cage/RUQ area)  She exhibits no edema  Lymphadenopathy:     She has no cervical adenopathy  Neurological: She is alert and oriented to person, place, and time  Skin: Skin is warm and dry  She is not diaphoretic  Psychiatric: She has a normal mood and affect  Her behavior is normal    Vitals reviewed      Cody Flores DO  [facility]

## 2019-03-12 NOTE — PATIENT INSTRUCTIONS
Chronic Pain   AMBULATORY CARE:   Chronic pain  is pain that does not get better for 3 months or longer  Chronic pain may hurt all the time, or come and go  Call 911 or have someone call 911 for any of the following:   · You are breathing slower than normal, or you have trouble breathing  · You cannot be awakened  · You have a seizure  Seek care immediately if:   · Your heart is beating slower than normal     · Your heart feels like it is jumping or fluttering  · You cannot think clearly  Contact your healthcare provider if:   · You have side effects from prescription pain medicine, such as itching, nausea, or vomiting  · You have trouble sleeping  · Your pain gets worse, even after you take medicine  · You don't think the medicine is working  · You have questions or concerns about your condition or care  Treatment for chronic pain  may need any of the following:  · Acetaminophen  decreases pain  It is available without a doctor's order  Ask how much to take and how often to take it  Follow directions  Read the labels of all other medicines you are using to see if they also contain acetaminophen, or ask your doctor or pharmacist  Acetaminophen can cause liver damage if not taken correctly  Do not use more than 4 grams (4,000 milligrams) total of acetaminophen in one day  · NSAIDs , such as ibuprofen, help decrease swelling, pain, and fever  This medicine is available with or without a doctor's order  NSAIDs can cause stomach bleeding or kidney problems in certain people  If you take blood thinner medicine, always ask your healthcare provider if NSAIDs are safe for you  Always read the medicine label and follow directions  · Prescription pain medicine  called narcotics or opioids may be given  Ask your healthcare provider how to take this medicine safely  · Anesthetics  can be rubbed on your skin or injected into a nerve or muscle to numb an area      · Other medicines  may reduce pain, anxiety, muscle tension, or swelling  Manage your chronic pain:   · Apply heat  on the area in pain for 20 to 30 minutes every 2 hours for as many days as directed  Heat helps decrease pain and muscle spasms  · Apply ice  on the part of your body that hurts for 15 to 20 minutes every hour or as directed  Use an ice pack, or put crushed ice in a plastic bag  Cover it with a towel  Ice decreases pain and swelling, and helps prevent tissue damage  · Go to physical therapy as directed  A physical therapist teaches you exercises to help improve movement and strength, and to decrease pain  · Exercise for 30 minutes, 3 times a week  Regular physical activity can help decrease pain and improve your quality of life  Ask your healthcare provider about the best exercise plan for your type of pain  · Get enough sleep  Create a relaxing bedtime routine  Go to sleep and wake up at the same time every day  Avoid caffeine in the afternoon  · Talk with a counselor or therapist   A type of counseling called cognitive behavioral therapy (CBT) can help your chronic pain by changing the way you think about it  CBT can also improve your mood, sleep, and ability to move  What you must know if you take narcotic pain medicine:   · You may need to take a bowel movement softener  The most common side effect of prescription pain medicine is constipation  Bowel movement softeners are available over the counter  · Do not mix prescription pain medicines  This can cause an overdose of medicine, which can become life-threatening  Read labels  Make sure you know the ingredients in all of your medicines  · Do not drink alcohol  when you take prescription pain medicine  It is not safe to mix narcotics or opioids with alcohol or illegal drugs  · Prescription pain medicine may impair your ability to drive or work safely  They may also cause dizziness and increase your risk for falling       · Store prescription pain medicine in a safe location at home  Keep your medicine away from children and other people  Never share your medicine with anyone  Follow up with your healthcare provider as directed: You may be referred to a pain specialist  Write down your questions so you remember to ask them during your visits  © 2017 [address] [employer] Information is for End User's use only and may not be sold, redistributed or otherwise used for commercial purposes  All illustrations and images included in CareNotes® are the copyrighted property of [employer]  or [employer]  The above information is an  only  It is not intended as medical advice for individual conditions or treatments  Talk to your doctor, nurse or pharmacist before following any medical regimen to see if it is safe and effective for you

## 2019-03-15 ENCOUNTER — OFFICE VISIT (OUTPATIENT)
Dept: PULMONOLOGY | Facility: CLINIC | Age: 74
End: 2019-03-15
Payer: MEDICARE

## 2019-03-15 VITALS
HEART RATE: 74 BPM | DIASTOLIC BLOOD PRESSURE: 84 MMHG | OXYGEN SATURATION: 92 % | SYSTOLIC BLOOD PRESSURE: 110 MMHG | BODY MASS INDEX: 26.93 KG/M2 | HEIGHT: 63 IN | WEIGHT: 152 LBS

## 2019-03-15 DIAGNOSIS — G47.33 OSA ON CPAP: Chronic | ICD-10-CM

## 2019-03-15 DIAGNOSIS — J44.9 COPD WITH CHRONIC BRONCHITIS (HCC): Primary | Chronic | ICD-10-CM

## 2019-03-15 DIAGNOSIS — Z72.0 TOBACCO ABUSE: ICD-10-CM

## 2019-03-15 DIAGNOSIS — Z99.89 OSA ON CPAP: Chronic | ICD-10-CM

## 2019-03-15 DIAGNOSIS — R91.8 LUNG NODULE, MULTIPLE: Chronic | ICD-10-CM

## 2019-03-15 PROCEDURE — 99214 OFFICE O/P EST MOD 30 MIN: CPT | Performed by: INTERNAL MEDICINE

## 2019-03-15 NOTE — PROGRESS NOTES
Assessment/Plan:   Diagnoses and all orders for this visit:    COPD with chronic bronchitis (HCC)    CHOCO on CPAP    Lung nodule, multiple  -     CT chest without contrast; Future    Tobacco abuse      Chronic bronchitis, long discussion with the patient with regards to smoking cessation  She states she has significantly cut down from last visit and wants to cut down on her own and quit  Does not want to use any treatment options for smoking cessation discussed regarding nicotine replacement Wellbutrin as well as Chantix  PFTs with mild obstructive lung disease with no appreciable response to the bronchodilator and increased lung volumes consistent with air trapping and severely decreased DLCO  She's currently not on any inhalers has no limitation of her daily current activities  CT of the chest reviewed, with evidence of mild emphysematous changes, bilateral apical pleural scarring, also Small subcentimeter lung nodules have been stable from the prior CT of the chest   Obstructive sleep apnea on CPAP currently on 8 cm of for today well  Cleaning up the supplies and changes supplies discussed  Vaccinations up-to-date  She is following up with the dermatologist for the back pain, upper thoracic back pain and she will be getting an MRI of the thoracic spine by her neurologist  She states  We'll see her back in 6 months or when necessary earlier as needed  Return in about 6 months (around 9/15/2019)  All questions are answered to the patient's satisfaction and understanding  She verbalizes understanding  She is encouraged to call with any further questions or concerns  Portions of the record may have been created with voice recognition software  Occasional wrong word or "sound a like" substitutions may have occurred due to the inherent limitations of voice recognition software  Read the chart carefully and recognize, using context, where substitutions have occurred      Electronically Signed by Marina Reis Roberto Hamilton MD    ______________________________________________________________________    Chief Complaint:   Chief Complaint   Patient presents with    Follow-up       Patient ID: Jono Barrientos is a 66 y o  y o  female has a past medical history of Anxiety, Cataract, COPD (chronic obstructive pulmonary disease) ([address] ), Disease of thyroid gland, Multiple sclerosis ([address] ), CHOCO (obstructive sleep apnea), and Peroneal muscle atrophy  3/15/2019  Patient presents today for follow-up visit  Patient is a very pleasant 23-year-old lady with greater than 45 pack year smoking history still actively smoking about half a pack a day, states she has significantly cut down from last visit, with history of chronic bronchitis, also was diagnosed with severe obstructive sleep apnea was placed on CPAP and she Was titrated to 9 cm of water could not tolerate the had brought her down to 8 cm and she has been doing well with the current setting at 8 cm of water, with decreased nocturnal awakenings feels well rested when she wakes up in the morning  Here for follow-up with a repeat CT of the chest for follow-up of the lung nodules          Review of Systems   Constitutional: Negative for appetite change, chills, diaphoresis, fatigue, fever and unexpected weight change  HENT: Negative for congestion, ear discharge, ear pain, nosebleeds, postnasal drip, rhinorrhea, sinus pain, sore throat and voice change  Eyes: Negative for pain, discharge and visual disturbance  Respiratory: Positive for cough and shortness of breath  Negative for apnea, choking, chest tightness, wheezing and stridor  Cardiovascular: Negative for chest pain, palpitations and leg swelling  Gastrointestinal: Negative for abdominal pain, blood in stool, constipation, diarrhea and vomiting  Endocrine: Negative for cold intolerance, heat intolerance, polydipsia, polyphagia and polyuria  Genitourinary: Negative for difficulty urinating and dysuria     Musculoskeletal: Positive for back pain (Pain in the back of the right chest)  Negative for arthralgias and neck pain  Skin: Negative for pallor and rash  Allergic/Immunologic: Negative for environmental allergies and food allergies  Neurological: Negative for dizziness, speech difficulty, weakness and light-headedness  Hematological: Negative for adenopathy  Does not bruise/bleed easily  Psychiatric/Behavioral: Negative for agitation, confusion and sleep disturbance  The patient is not nervous/anxious  Smoking history: She reports that she has been smoking cigarettes  She started smoking about 61 years ago  She has a 25 00 pack-year smoking history   She has never used smokeless tobacco     The following portions of the patient's history were reviewed and updated as appropriate: allergies, current medications, past family history, past medical history, past social history, past surgical history and problem list     Immunization History   Administered Date(s) Administered    INFLUENZA 09/14/2016    Pneumococcal Polysaccharide PPV23 05/22/2008    Tuberculin Skin Test-PPD Intradermal 01/13/2017     Current Outpatient Medications   Medication Sig Dispense Refill    Cholecalciferol (VITAMIN D-3) 1000 units CAPS Take by mouth      diazepam (VALIUM) 2 mg tablet TAKE 1 TABLET BY MOUTH EVERY DAY AT BEDTIME FOR INSOMNIA  5    DULoxetine (CYMBALTA) 30 mg delayed release capsule TAKE 1 CAPSULE BY MOUTH EVERY DAY AT NIGHT  12    gabapentin (NEURONTIN) 800 mg tablet Take 1 tablet (800 mg total) by mouth 3 (three) times a day 90 tablet 3    levothyroxine 100 mcg tablet Take 1 tablet (100 mcg total) by mouth daily 90 tablet 3    Multiple Vitamins-Minerals (PRESERVISION AREDS) CAPS Take by mouth      naproxen (NAPROSYN) 375 mg tablet Take 500 mg by mouth 2 (two) times a day with meals       oxyCODONE-acetaminophen (PERCOCET) 5-325 mg per tablet Take 1 tablet by mouth every 8 (eight) hours as needed for moderate painMax Daily Amount: 3 tablets 90 tablet 0    thiamine (VITAMIN B1) 100 mg tablet Take 1 tablet by mouth daily      vitamin B-12 (CYANOCOBALAMIN) 100 MCG tablet Take by mouth       No current facility-administered medications for this visit  Allergies: Patient has no known allergies  Objective:  Vitals:    03/15/19 1021   BP: 110/84   Pulse: 74   SpO2: 92%   Weight: 68 9 kg (152 lb)   Height: 5' 3 25" (1 607 m)   Oxygen Therapy  SpO2: 92 %    Wt Readings from Last 3 Encounters:   03/15/19 68 9 kg (152 lb)   03/12/19 69 1 kg (152 lb 6 4 oz)   11/13/18 67 9 kg (149 lb 12 8 oz)     Body mass index is 26 71 kg/m²  Physical Exam   Constitutional: She is oriented to person, place, and time  She appears well-developed and well-nourished  HENT:   Head: Normocephalic and atraumatic  Crowded oropharyngeal airways, Mallampati score 3   Eyes: Pupils are equal, round, and reactive to light  EOM are normal    Neck: Normal range of motion  Neck supple  Short and wide neck   Cardiovascular: Normal rate, regular rhythm and normal heart sounds  Pulmonary/Chest: Effort normal and breath sounds normal    Abdominal: Soft  Bowel sounds are normal    Musculoskeletal: Normal range of motion  Neurological: She is alert and oriented to person, place, and time  Skin: Skin is warm and dry  Psychiatric: She has a normal mood and affect  Her behavior is normal           ESS:    Ct Chest Wo Contrast    Result Date: 3/2/2019  Narrative: CT CHEST WITHOUT IV CONTRAST INDICATION:   R91 1: Solitary pulmonary nodule  History of smoking with pulmonary nodule  COMPARISON:  Chest CTs from 8/24/2018 and 12/7/2017  TECHNIQUE: CT examination of the chest was performed without intravenous contrast   Axial, sagittal, and coronal 2D reformatted images were created from the source data and submitted for interpretation  Radiation dose length product (DLP) for this visit:  94 mGy-cm     This examination, like all CT scans performed in the [facility], was performed utilizing techniques to minimize radiation dose exposure, including the use of iterative reconstruction and automated exposure control  FINDINGS: LUNGS: There is mild emphysema  4 There are multiple pulmonary nodules, which will be described on series 2: Image 33, along right minor fissure, this has a triangular shape, best appreciated on sagittal images series 601 image 55, 8 mm, unchanged  Based on morphology, this is an intraparenchymal lymph node rather than a true pulmonary nodule  This particular  finding is benign and does not specifically need follow-up  Image 32, left upper lobe, densely calcified, smooth bordered, 4 mm, unchanged  Image 41, right middle lobe, solid, smooth bordered, 2 mm, unchanged  Image 44, left lower lobe, solid, smooth bordered, 5 mm, unchanged  All of these nodules have been stable since 12/7/2017  There are no new pulmonary nodules  PLEURA:  Unremarkable  HEART/GREAT VESSELS:  Unremarkable for patient's age  MEDIASTINUM AND SAHIL:  Unremarkable  CHEST WALL AND LOWER NECK:   Unremarkable  VISUALIZED STRUCTURES IN THE UPPER ABDOMEN:  Calcifications in the left adrenal gland again seen, probably sequela of prior hemorrhage  OSSEOUS STRUCTURES:  No acute fracture or destructive osseous lesion  Impression: Small pulmonary nodules, all stable compared to 12/7/2017  At this point these are all very likely benign  Based on current Fleischner Society 2017 Guidelines on incidental pulmonary nodule, patients with a known malignancy are at increased risk of metastasis and should receive followup CT at intervals appropriate for the type of cancer and its risk of pulmonary metastases   Workstation performed: [id number]

## 2019-03-19 ENCOUNTER — TRANSCRIBE ORDERS (OUTPATIENT)
Dept: ADMINISTRATIVE | Facility: HOSPITAL | Age: 74
End: 2019-03-19

## 2019-03-19 DIAGNOSIS — G58.0 INTERCOSTAL NEUROPATHY: Primary | ICD-10-CM

## 2019-04-03 ENCOUNTER — HOSPITAL ENCOUNTER (OUTPATIENT)
Dept: MAMMOGRAPHY | Facility: CLINIC | Age: 74
Discharge: HOME | End: 2019-04-03
Payer: MEDICARE

## 2019-04-03 VITALS — WEIGHT: 152 LBS | BODY MASS INDEX: 26.93 KG/M2 | HEIGHT: 63 IN

## 2019-04-03 DIAGNOSIS — Z12.31 ENCOUNTER FOR SCREENING MAMMOGRAM FOR BREAST CANCER: ICD-10-CM

## 2019-04-03 PROCEDURE — 77067 SCR MAMMO BI INCL CAD: CPT

## 2019-04-03 PROCEDURE — 77063 BREAST TOMOSYNTHESIS BI: CPT

## 2019-04-04 ENCOUNTER — TELEPHONE (OUTPATIENT)
Dept: INTERNAL MEDICINE CLINIC | Facility: CLINIC | Age: 74
End: 2019-04-04

## 2019-04-12 ENCOUNTER — HOSPITAL ENCOUNTER (OUTPATIENT)
Dept: MRI IMAGING | Facility: CLINIC | Age: 74
Discharge: HOME | End: 2019-04-12
Payer: MEDICARE

## 2019-04-12 DIAGNOSIS — G58.0 INTERCOSTAL NEUROPATHY: ICD-10-CM

## 2019-04-12 PROCEDURE — 72157 MRI CHEST SPINE W/O & W/DYE: CPT

## 2019-04-12 PROCEDURE — A9585 GADOBUTROL INJECTION: HCPCS | Performed by: RADIOLOGY

## 2019-04-12 RX ADMIN — GADOBUTROL 7 ML: 604.72 INJECTION INTRAVENOUS at 11:56

## 2019-04-23 DIAGNOSIS — G89.29 CHRONIC RIGHT FLANK PAIN: ICD-10-CM

## 2019-04-23 DIAGNOSIS — M79.2 CHRONIC NEUROPATHIC PAIN: ICD-10-CM

## 2019-04-23 DIAGNOSIS — R10.9 CHRONIC RIGHT FLANK PAIN: ICD-10-CM

## 2019-04-23 DIAGNOSIS — G89.29 CHRONIC NEUROPATHIC PAIN: ICD-10-CM

## 2019-04-24 RX ORDER — OXYCODONE HYDROCHLORIDE AND ACETAMINOPHEN 5; 325 MG/1; MG/1
1 TABLET ORAL EVERY 8 HOURS PRN
Qty: 90 TABLET | Refills: 0 | Status: SHIPPED | OUTPATIENT
Start: 2019-04-24 | End: 2019-06-10 | Stop reason: SDUPTHER

## 2019-05-30 ENCOUNTER — OFFICE VISIT (OUTPATIENT)
Dept: INTERNAL MEDICINE CLINIC | Facility: CLINIC | Age: 74
End: 2019-05-30
Payer: MEDICARE

## 2019-05-30 VITALS
TEMPERATURE: 97.8 F | HEIGHT: 64 IN | DIASTOLIC BLOOD PRESSURE: 70 MMHG | HEART RATE: 80 BPM | WEIGHT: 157 LBS | BODY MASS INDEX: 26.8 KG/M2 | SYSTOLIC BLOOD PRESSURE: 122 MMHG | OXYGEN SATURATION: 95 %

## 2019-05-30 DIAGNOSIS — T14.8XXA BRUISE: ICD-10-CM

## 2019-05-30 DIAGNOSIS — R21 RASH: Primary | ICD-10-CM

## 2019-05-30 PROCEDURE — 99213 OFFICE O/P EST LOW 20 MIN: CPT | Performed by: PHYSICIAN ASSISTANT

## 2019-06-10 DIAGNOSIS — G89.29 CHRONIC RIGHT FLANK PAIN: ICD-10-CM

## 2019-06-10 DIAGNOSIS — G89.29 CHRONIC NEUROPATHIC PAIN: ICD-10-CM

## 2019-06-10 DIAGNOSIS — M79.2 CHRONIC NEUROPATHIC PAIN: ICD-10-CM

## 2019-06-10 DIAGNOSIS — R10.9 CHRONIC RIGHT FLANK PAIN: ICD-10-CM

## 2019-06-10 RX ORDER — OXYCODONE HYDROCHLORIDE AND ACETAMINOPHEN 5; 325 MG/1; MG/1
1 TABLET ORAL EVERY 8 HOURS PRN
Qty: 90 TABLET | Refills: 0 | Status: SHIPPED | OUTPATIENT
Start: 2019-06-10 | End: 2019-08-14

## 2019-06-27 ENCOUNTER — APPOINTMENT (OUTPATIENT)
Dept: LAB | Facility: HOSPITAL | Age: 74
End: 2019-06-27
Payer: MEDICARE

## 2019-06-27 DIAGNOSIS — R21 RASH: ICD-10-CM

## 2019-06-27 PROCEDURE — 36415 COLL VENOUS BLD VENIPUNCTURE: CPT

## 2019-06-27 PROCEDURE — 86618 LYME DISEASE ANTIBODY: CPT

## 2019-06-28 ENCOUNTER — TELEPHONE (OUTPATIENT)
Dept: INTERNAL MEDICINE CLINIC | Facility: CLINIC | Age: 74
End: 2019-06-28

## 2019-06-28 LAB
B BURGDOR IGG SER IA-ACNC: 0.11
B BURGDOR IGM SER IA-ACNC: 0.15

## 2019-08-14 ENCOUNTER — OFFICE VISIT (OUTPATIENT)
Dept: INTERNAL MEDICINE CLINIC | Facility: CLINIC | Age: 74
End: 2019-08-14
Payer: MEDICARE

## 2019-08-14 VITALS
WEIGHT: 153.2 LBS | HEART RATE: 88 BPM | DIASTOLIC BLOOD PRESSURE: 80 MMHG | SYSTOLIC BLOOD PRESSURE: 118 MMHG | BODY MASS INDEX: 26.38 KG/M2 | OXYGEN SATURATION: 99 %

## 2019-08-14 DIAGNOSIS — Z79.899 MEDICAL MARIJUANA USE: ICD-10-CM

## 2019-08-14 DIAGNOSIS — G35 MULTIPLE SCLEROSIS (HCC): Chronic | ICD-10-CM

## 2019-08-14 DIAGNOSIS — G89.4 CHRONIC PAIN DISORDER: Primary | ICD-10-CM

## 2019-08-14 DIAGNOSIS — M85.89 OSTEOPENIA OF MULTIPLE SITES: ICD-10-CM

## 2019-08-14 DIAGNOSIS — E03.9 ACQUIRED HYPOTHYROIDISM: Chronic | ICD-10-CM

## 2019-08-14 PROBLEM — G89.29 CHRONIC NEUROPATHIC PAIN: Status: ACTIVE | Noted: 2019-08-14

## 2019-08-14 PROBLEM — M79.2 CHRONIC NEUROPATHIC PAIN: Status: ACTIVE | Noted: 2019-08-14

## 2019-08-14 PROCEDURE — 99214 OFFICE O/P EST MOD 30 MIN: CPT | Performed by: INTERNAL MEDICINE

## 2019-08-14 NOTE — PROGRESS NOTES
INTERNAL MEDICINE FOLLOW-UP OFFICE VISIT  [facility] - [facility] OF [address] [place] [address]    NAME: Sophia Ward  AGE: 66 y o  SEX: female  : 1945     DATE: 2019     Assessment and Plan:     1  Chronic pain disorder  2  Medical marijuana use    Patient has gotten herself off gabapentin, cymbalta, and percocet all in a short amount of time which I think is quite remarkable  It seems she is on the uphill and many of her withdrawal symptoms are improving  She will continue medical marijuana  3  Acquired hypothyroidism    Check UTD TSH  Last TSH was slightly suppressed  - TSH, 3rd generation with Free T4 reflex; Future  - Basic metabolic panel; Future    4  Multiple sclerosis ([facility] [address] )    Continue routine follow-up with Dr Poppy Waldron who is managing this condition    5  Osteopenia of multiple sites  - DXA bone density spine hip and pelvis; Future     Chief Complaint:     Chief Complaint   Patient presents with    Follow-up     4 month     Extremity Weakness    Anorexia    Diarrhea     2 days ago    Generalized Body Aches    Pain      History of Present Illness:     Patient presents for follow-up  Has chronic right sided neuropathic pain  Over the past couple weeks she has taken herself off many of her medications including percocet, gabapentin, cymbalta, and diazepam  She has now switched over to medical marijuana  She went through a rough period getting all the drugs she was taken out of her system  She feels medical marijuana is helping her more than any of the other medications were ever helping  She saw Dr Silas Rangel in [place]  The following portions of the patient's history were reviewed and updated as appropriate: allergies, current medications, past family history, past medical history, past social history, past surgical history and problem list      Review of Systems:     Review of Systems   Constitutional: Positive for appetite change   Negative for activity change and fatigue  Respiratory: Negative for apnea, cough, chest tightness, shortness of breath and wheezing  Cardiovascular: Negative for chest pain, palpitations and leg swelling  Gastrointestinal: Positive for abdominal pain and diarrhea  Negative for abdominal distention, blood in stool, constipation, nausea and vomiting  Musculoskeletal: Positive for arthralgias  Negative for back pain, gait problem, joint swelling and myalgias  Neurological: Positive for weakness  Negative for dizziness, light-headedness, numbness and headaches  Psychiatric/Behavioral: Negative for behavioral problems, confusion, hallucinations, sleep disturbance and suicidal ideas  The patient is not nervous/anxious  Problem List:     Patient Active Problem List   Diagnosis    CHOCO on CPAP    COPD with chronic bronchitis (HCC)    Hypothyroidism    Insomnia    Lung nodule, multiple    Macular degeneration    Multiple sclerosis ([address] )    Rosacea    Seborrheic keratosis      Objective:     /80 (BP Location: Left arm, Patient Position: Sitting, Cuff Size: Standard)   Pulse 88   Wt 69 5 kg (153 lb 3 2 oz) Comment: w/ shoes denied off  SpO2 99%   BMI 26 38 kg/m²     Physical Exam   Constitutional: She appears well-developed and well-nourished  No distress  Eyes: Right eye exhibits no discharge  Left eye exhibits no discharge  Neck: Neck supple  No JVD present  Cardiovascular: Normal rate and regular rhythm  Pulmonary/Chest: Effort normal and breath sounds normal    Abdominal: Soft  Bowel sounds are normal  She exhibits no distension and no mass  There is no guarding  Musculoskeletal:   Right flank tenderness to palpation superficial musculature   Lymphadenopathy:     She has no cervical adenopathy  Skin: She is not diaphoretic  Psychiatric: She has a normal mood and affect   Her behavior is normal      Jes Rojas,   MEDICAL [address]

## 2019-08-14 NOTE — PATIENT INSTRUCTIONS
Medicinal Use of Cannabis   WHAT YOU NEED TO KNOW:   What is cannabis? Cannabis, also called marijuana, is a drug that comes from the cannabis sativa (hemp plant)  It may also be called pot, weed, or hash  Cannabis may be taken in the form of a pill, capsule, or mouth spray  Cannabis can also be smoked, baked into food and eaten, or made into a tea and drunk  The effects may start right away and last for 3 to 4 hours depending on whether you smoke or eat cannabis  What is medicinal use of cannabis? Cannabis can be used to control or relieve symptoms caused by medical conditions  The following are some of the common symptoms cannabis is used for:  · Nausea, vomiting, loss of appetite, or weight loss    · Pain, tingling, and numbness from nerve damage    · Mood and sleep problems    · Muscle spasms, tremors (shaking), or tics    · Fluid pressure in the eye from glaucoma  What are the risks of cannabis use? · Cannabis can vary in quality and strength  It may work well for some people, but not for others  The amount of cannabis needed, when to use it, or if it is working may not be clear  It may interfere with your ability to drive a car or operate machinery  If you are pregnant and use cannabis, it may prevent your unborn baby from growing normally  · Cannabis can make you feel tired, drunk, dizzy, or high  Cannabis can cause anxiety, confusion, decreased memory, or difficulty learning  Cannabis increases the risk of panic disorder, depression, or seeing or hearing things that are not real  If you use cannabis for a long time and then stop, you may have withdrawal symptoms  You may feel angry, anxious, nervous, or restless  You may lose your appetite, lose weight, or have problems sleeping  · Cannabis may contain harmful substances, such as metals, fungus, and germs  It may increase your risk of a lung infection, long-term bronchitis, asthma, or other lung diseases   Smoking cannabis may increase your risk of cancer of the head, neck, and lungs  Cannabis may also increase the risk of a heart attack or stroke  When taken with other medicine, cannabis increases the risk of side effects  What else should I know about cannabis use? · Learn and follow the laws about the use of medicinal cannabis in the area where you live  · Tell your healthcare providers about all of the drugs you take  If you use cannabis, tell them when and why you use it  · See your healthcare provider regularly  · Talk to your healthcare provider about the use of cannabis pills, capsules, sprays, or vaporizers, instead of cigarettes  · Do not use cannabis if you are pregnant or breastfeeding  · Do not drive or use heavy machinery when you use cannabis  · Do not use cannabis if you have a mental health illness  · Do not drink alcohol or use other drugs or medicines while you are using cannabis  When should I contact my healthcare provider? · Your symptoms do not improve  · You feel you are becoming dependent on cannabis  · You have stopped using cannabis, and feel that you cannot cope with your withdrawal symptoms  · You have questions or concerns about your condition or care  When should I seek immediate care or call 911? · The effects of cannabis have worn off, and you have shortness of breath, a fast heart rate, or chest pain  · You want to hurt or kill yourself or others  CARE AGREEMENT:   You have the right to help plan your care  Learn about your health condition and how it may be treated  Discuss treatment options with your caregivers to decide what care you want to receive  You always have the right to refuse treatment  The above information is an  only  It is not intended as medical advice for individual conditions or treatments  Talk to your doctor, nurse or pharmacist before following any medical regimen to see if it is safe and effective for you    © 2017 [employer] Information is for End User's use only and may not be sold, redistributed or otherwise used for commercial purposes  All illustrations and images included in CareNotes® are the copyrighted property of A D A M , Inc  or [employer]

## 2019-08-14 NOTE — LETTER
August 14, 2019     Patient: Mabel Ascencio   YOB: 1945   Date of Visit: 8/14/2019       To Whom it May Concern:    Brayanjorge a Finney is under my professional care  Patient has multiple medical problems including multiple sclerosis, COPD, chronic pain, weakness, and gait disturbance  Over the past several weeks she has had multiple medication adjustments and she is suffering the effects of finding the right medication regimen to treat her multiple symptoms  I have advised her not to travel at this time as she needs to continue to slowly rest and improve at home  She is too weak to be walking long distances or to be traveling  Her  is currently acting as her caregiver  If you have any questions or concerns, please don't hesitate to call           Sincerely,          Power Guerra DO        CC: No Recipients

## 2019-08-28 ENCOUNTER — OFFICE VISIT (OUTPATIENT)
Dept: DERMATOLOGY | Facility: CLINIC | Age: 74
End: 2019-08-28
Payer: MEDICARE

## 2019-08-28 DIAGNOSIS — L82.1 SEBORRHEIC KERATOSIS: Primary | ICD-10-CM

## 2019-08-28 DIAGNOSIS — Z13.89 SCREENING FOR SKIN CONDITION: ICD-10-CM

## 2019-08-28 PROCEDURE — 99213 OFFICE O/P EST LOW 20 MIN: CPT | Performed by: DERMATOLOGY

## 2019-08-28 NOTE — PROGRESS NOTES
[facility] DERMATOLOGY  [address] [place] [id number]  [phone]  [phone]     MRN: [id number] : 1945  Encounter: [id number]  Patient Information: [place]  Chief complaint:  Yearly skin check up    History of present illness:  73-year-old female presents for overall skin check no specific concerns except a chronic pain syndrome from presumed post herpetic neuralgia but no history of shingles as far as she knows clinically  Patient also with history of MS  Past Medical History:   Diagnosis Date    Anxiety     Cataract     last assessed 14    COPD (chronic obstructive pulmonary disease) (HCC)     Disease of thyroid gland     Multiple sclerosis (HCC)     CHOCO (obstructive sleep apnea)     Peroneal muscle atrophy      Past Surgical History:   Procedure Laterality Date    APPENDECTOMY      CATARACT EXTRACTION       SECTION      CHOLECYSTECTOMY      GALLBLADDER SURGERY      [place] COLONOSCOPY FLX DX W/COLLJ SPEC WHEN PFRMD N/A 2018    Procedure: COLONOSCOPY;  Surgeon: Celina Green MD;  Location: [facility];   Service: Colorectal    THROAT SURGERY       Social History   Social History     Substance and Sexual Activity   Alcohol Use Yes    Alcohol/week: 8 0 standard drinks    Types: 7 Glasses of wine, 1 Cans of beer per week    Frequency: 4 or more times a week    Drinks per session: 1 or 2    Binge frequency: Never    Comment: daily/ socially     Social History     Substance and Sexual Activity   Drug Use Yes    Types: Marijuana    Comment: medical     Social History     Tobacco Use   Smoking Status Current Every Day Smoker    Packs/day: 1 00    Years: 60 00    Pack years: 60 00    Types: Cigarettes    Start date: 1958   Smokeless Tobacco Never Used     Family History   Problem Relation Age of Onset    Skin cancer Mother     Hypertension Father         benign essential    Stroke Father     Lung cancer Brother      Meds/Allergies   No Known Allergies    Meds:  Prior to Admission medications    Medication Sig Start Date End Date Taking?  Authorizing Provider   Cholecalciferol (VITAMIN D-3) 1000 units CAPS Take by mouth daily    Yes Historical Provider, MD   levothyroxine 100 mcg tablet Take 1 tablet (100 mcg total) by mouth daily 3/12/19  Yes Kelton Mariano [facility], DO   Multiple Vitamins-Minerals (PRESERVISION AREDS 2 PO) Take by mouth   Yes Historical Provider, MD   Multiple Vitamins-Minerals (PRESERVISION AREDS) CAPS Take by mouth 2 (two) times a day    Yes Historical Provider, MD   thiamine (VITAMIN B1) 100 mg tablet Take 1 tablet by mouth daily   Yes Historical Provider, MD   vitamin B-12 (CYANOCOBALAMIN) 100 MCG tablet Take 100 mcg by mouth daily    Yes Historical Provider, MD       Subjective:     Review of Systems:    General: negative for - chills, fatigue, fever,  weight gain or weight loss  Psychological: negative for - anxiety, behavioral disorder, concentration difficulties, decreased libido, depression, irritability, memory difficulties, mood swings, sleep disturbances or suicidal ideation  ENT: negative for - hearing difficulties , nasal congestion, nasal discharge, oral lesions, sinus pain, sneezing, sore throat  Allergy and Immunology: negative for - hives, insect bite sensitivity,  Hematological and Lymphatic: negative for - bleeding problems, blood clots,bruising, swollen lymph nodes  Endocrine: negative for - hair pattern changes, hot flashes, malaise/lethargy, mood swings, palpitations, polydipsia/polyuria, skin changes, temperature intolerance or unexpected weight change  Respiratory: negative for - cough, hemoptysis, orthopnea, shortness of breath, or wheezing  Cardiovascular: negative for - chest pain, dyspnea on exertion, edema,  Gastrointestinal: negative for - abdominal pain, nausea/vomiting  Genito-Urinary: negative for - dysuria, incontinence, irregular/heavy menses or urinary frequency/urgency  Musculoskeletal: negative for - gait disturbance, joint pain, joint stiffness, joint swelling, muscle pain, muscular weakness  Dermatological:  As in HPI  Neurological: negative for confusion, dizziness, headaches, impaired coordination/balance, memory loss, numbness/tingling, seizures, speech problems, tremors or weakness       Objective: There were no vitals taken for this visit  Physical Exam:    General Appearance:    Alert, cooperative, no distress   Head:    Normocephalic, without obvious abnormality, atraumatic           Skin:   A full skin exam was performed including scalp, head scalp, eyes, ears, nose, lips, neck, chest, axilla, abdomen, back, buttocks, bilateral upper extremities, bilateral lower extremities, hands, feet, fingers, toes, fingernails, and toenails normal keratotic papules greasy stuck on appearance nothing else remarkable noted exam no sign of any rash on the back     Assessment:     1  Seborrheic keratosis     2  Screening for skin condition           Plan:   Seborrheic Keratosis  Patient reasurred these are normal growths we acquire with age no treatment needed  Screening for Dermatologic Disorders: Nothing else of concern noted on complete exam follow up in 1 year     Zuri Gaitan MD  8/28/2019,10:25 AM    Portions of the record may have been created with voice recognition software   Occasional wrong word or "sound a like" substitutions may have occurred due to the inherent limitations of voice recognition software   Read the chart carefully and recognize, using context, where substitutions have occurred

## 2019-08-31 ENCOUNTER — TELEPHONE (OUTPATIENT)
Dept: INTERNAL MEDICINE CLINIC | Facility: CLINIC | Age: 74
End: 2019-08-31

## 2019-08-31 DIAGNOSIS — G89.4 CHRONIC PAIN DISORDER: Primary | ICD-10-CM

## 2019-08-31 NOTE — TELEPHONE ENCOUNTER
Patient states she is transitioning to pain management and is currently using Medical Marijuana which she states is not helping her  She is unable to sleep because of pain  Would like to have a script for Percocet or something to help with the pain at night

## 2019-09-02 ENCOUNTER — HOSPITAL ENCOUNTER (EMERGENCY)
Facility: HOSPITAL | Age: 74
Discharge: HOME | End: 2019-09-02
Attending: EMERGENCY MEDICINE | Admitting: EMERGENCY MEDICINE
Payer: MEDICARE

## 2019-09-02 ENCOUNTER — APPOINTMENT (EMERGENCY)
Dept: RADIOLOGY | Facility: HOSPITAL | Age: 74
End: 2019-09-02
Payer: MEDICARE

## 2019-09-02 VITALS
WEIGHT: 158.51 LBS | HEIGHT: 63 IN | SYSTOLIC BLOOD PRESSURE: 110 MMHG | TEMPERATURE: 98.6 F | DIASTOLIC BLOOD PRESSURE: 64 MMHG | OXYGEN SATURATION: 98 % | RESPIRATION RATE: 16 BRPM | HEART RATE: 69 BPM | BODY MASS INDEX: 28.09 KG/M2

## 2019-09-02 DIAGNOSIS — M25.551 ACUTE RIGHT HIP PAIN: Primary | ICD-10-CM

## 2019-09-02 DIAGNOSIS — M79.18 RIGHT BUTTOCK PAIN: ICD-10-CM

## 2019-09-02 PROCEDURE — 99283 EMERGENCY DEPT VISIT LOW MDM: CPT | Performed by: EMERGENCY MEDICINE

## 2019-09-02 PROCEDURE — 73502 X-RAY EXAM HIP UNI 2-3 VIEWS: CPT

## 2019-09-02 PROCEDURE — 99283 EMERGENCY DEPT VISIT LOW MDM: CPT

## 2019-09-02 RX ORDER — LIDOCAINE 50 MG/G
1 PATCH TOPICAL ONCE
Status: DISCONTINUED | OUTPATIENT
Start: 2019-09-02 | End: 2019-09-02 | Stop reason: HOSPADM

## 2019-09-02 RX ORDER — NAPROXEN 250 MG/1
500 TABLET ORAL ONCE
Status: COMPLETED | OUTPATIENT
Start: 2019-09-02 | End: 2019-09-02

## 2019-09-02 RX ORDER — OXYCODONE AND ACETAMINOPHEN 7.5; 325 MG/1; MG/1
1 TABLET ORAL
Qty: 30 TABLET | Refills: 0 | OUTPATIENT
Start: 2019-09-02 | End: 2019-09-10

## 2019-09-02 RX ADMIN — NAPROXEN 500 MG: 250 TABLET ORAL at 12:10

## 2019-09-02 RX ADMIN — LIDOCAINE 1 PATCH: 50 PATCH TOPICAL at 12:11

## 2019-09-02 NOTE — DISCHARGE INSTRUCTIONS
Continue to take the naproxen twice daily as prescribed  Use ice or heat, as well as topical pain medications to the area to help  Do your normal daily activities, and stretching exercises of the hip to help with the pain, but avoid strenuous activities until you begin to feel better  Follow up with the orthopedist for further evaluation and management of your pain  Return for worsening or changing symptoms, or for any other concerns

## 2019-09-02 NOTE — ED PROVIDER NOTES
History  Chief Complaint   Patient presents with    Leg Pain     pt c/o right leg pain with no known BRICE  HPI    Prior to Admission Medications   Prescriptions Last Dose Informant Patient Reported? Taking? Cholecalciferol (VITAMIN D-3) 1000 units CAPS  Self Yes No   Sig: Take by mouth daily    Multiple Vitamins-Minerals (PRESERVISION AREDS 2 PO)  Self Yes No   Sig: Take by mouth   Multiple Vitamins-Minerals (PRESERVISION AREDS) CAPS  Self Yes No   Sig: Take by mouth 2 (two) times a day    levothyroxine 100 mcg tablet  Self No No   Sig: Take 1 tablet (100 mcg total) by mouth daily   thiamine (VITAMIN B1) 100 mg tablet  Self Yes No   Sig: Take 1 tablet by mouth daily   vitamin B-12 (CYANOCOBALAMIN) 100 MCG tablet  Self Yes No   Sig: Take 100 mcg by mouth daily       Facility-Administered Medications: None       Past Medical History:   Diagnosis Date    Anxiety     Cataract     last assessed 14    COPD (chronic obstructive pulmonary disease) (HCC)     Disease of thyroid gland     Multiple sclerosis (HCC)     CHOCO (obstructive sleep apnea)     Peroneal muscle atrophy        Past Surgical History:   Procedure Laterality Date    APPENDECTOMY      CATARACT EXTRACTION       SECTION      CHOLECYSTECTOMY      GALLBLADDER SURGERY      [place] COLONOSCOPY FLX DX W/COLLJ SPEC WHEN PFRMD N/A 2018    Procedure: COLONOSCOPY;  Surgeon: Jackie Dowling MD;  Location: [facility]; Service: Colorectal    THROAT SURGERY         Family History   Problem Relation Age of Onset    Skin cancer Mother     Hypertension Father         benign essential    Stroke Father     Lung cancer Brother      I have reviewed and agree with the history as documented      Social History     Tobacco Use    Smoking status: Current Every Day Smoker     Packs/day: 1 00     Years: 60 00     Pack years: 60 00     Types: Cigarettes     Start date: 1958    Smokeless tobacco: Never Used   Substance Use Topics    Alcohol use: Yes     Alcohol/week: 8 0 standard drinks     Types: 7 Glasses of wine, 1 Cans of beer per week     Frequency: 4 or more times a week     Drinks per session: 1 or 2     Binge frequency: Never     Comment: daily/ socially    Drug use: Yes     Types: Marijuana     Comment: medical        Review of Systems    Physical Exam  Physical Exam   Constitutional: She is oriented to person, place, and time  She appears well-developed and well-nourished  No distress  HENT:   Head: Normocephalic and atraumatic  Eyes: Pupils are equal, round, and reactive to light  Conjunctivae are normal    Neck: Normal range of motion  No tracheal deviation present  Cardiovascular: Normal rate, regular rhythm and intact distal pulses  Pulses:       Radial pulses are 2+ on the right side  Pulmonary/Chest: Effort normal  No respiratory distress  Abdominal: Soft  She exhibits no distension  There is no tenderness  Musculoskeletal:        Right hip: She exhibits tenderness  She exhibits normal range of motion (Mild worsening discomfort in the gluteal region with movement of the hip), no bony tenderness, no swelling, no crepitus and no deformity  Right knee: She exhibits normal range of motion  No tenderness found  Lumbar back: She exhibits normal range of motion, no tenderness and no bony tenderness  Right upper leg: She exhibits no tenderness and no bony tenderness  Legs:  Neurological: She is alert and oriented to person, place, and time  GCS eye subscore is 4  GCS verbal subscore is 5  GCS motor subscore is 6  Skin: Skin is warm and dry  Psychiatric: She has a normal mood and affect  Her behavior is normal    Nursing note and vitals reviewed        Vital Signs  ED Triage Vitals [09/02/19 1023]   Temperature Pulse Respirations Blood Pressure SpO2   98 6 °F (37 °C) 94 20 (!) 186/81 100 %      Temp Source Heart Rate Source Patient Position - Orthostatic VS BP Location FiO2 (%)   Oral Monitor Lying Right arm --      Pain Score       5           Vitals:    09/02/19 1023 09/02/19 1224   BP: (!) 186/81 110/64   Pulse: 94 69   Patient Position - Orthostatic VS: Lying Sitting         Visual Acuity      ED Medications  Medications   lidocaine (LIDODERM) 5 % patch 1 patch (1 patch Topical Medication Applied 9/2/19 1211)   naproxen (NAPROSYN) tablet 500 mg (500 mg Oral Given 9/2/19 1210)       Diagnostic Studies  Results Reviewed     None                 XR hip/pelv 2-3 vws right    (Results Pending)              Procedures  Procedures       ED Course                               MDM  Number of Diagnoses or Management Options  Acute right hip pain: new and requires workup  Right buttock pain: new and requires workup  Diagnosis management comments: This is a 23-year-old female who presents here today with right hip pain  She states she has had problems with pain in her right chest for several months which has been improving with use of medical marijuana, along her to titrate off all of her other pain medications  She began having mild pain a couple of days ago, and took a naproxen on the evening of 8/30 with significant improvement, however pain then recurred  She states last night she woke up from sleep, and was having pain in the right buttocks radiating around the groin and down the front of her right leg  She states she took a Percocet that she had left over from when she was previously having pain mostly a computer game for several hours could extract herself  The pain did improved and she was able to get several hours worth of sleep  However when she woke up this morning she had recurrence of significant pain  She states she did have some improvement with use of her morning dose of marijuana  She has not otherwise not taken or done any thing else for her pain  She denies any falls or injuries, weakness or numbness, back pain, prior injuries to the hip or prior problems with pain in the area    She denies any abdominal pain, dysuria, changes in her urine  She states the pain in her upper back has been doing much better since starting medical marijuana  ROS: Otherwise negative, unless stated as above  She is well-appearing, in no acute distress  She does have tenderness to the right lower buttocks reproducing her pain  She has no discomfort to the anterior or lateral hip, but does have some discomfort with movement at the hip  She has normal strength and sensation distally  She has no pain or tenderness in the lumbar back  Exam is otherwise unremarkable  This is most likely primary problem the hip, possible sprain or strain, bursitis, internal derangement of the hip  I do have low suspicion for acute fracture given lack of known injury, however we will get x-rays to evaluate for any contributing bony abnormalities  I do not feel this current pain is related to the other issue she has had with her upper back over the past year  She is not having any abdominal pain or tenderness to suggest radiation of pain from intraabdominal source  She is not having any back pain or tenderness to suggest primary spinal etiology  X-rays reviewed by myself, and show no acute bony abnormalities  She has had improvement of pain while here  She was noted to be walking around the department to the bathroom with a steady gait  I discussed with the patient findings, treatment at home, follow-up with Orthopedics, and indications for return, and she expresses understanding with this plan         Amount and/or Complexity of Data Reviewed  Tests in the radiology section of CPT®: ordered and reviewed  Independent visualization of images, tracings, or specimens: yes        Disposition  Final diagnoses:   Acute right hip pain   Right buttock pain     Time reflects when diagnosis was documented in both MDM as applicable and the Disposition within this note     Time User Action Codes Description Comment    9/2/2019  1:44 PM Mariela Dunne Add [N63 657] Acute right hip pain     9/2/2019  1:45 PM Mariela Dunne Add [O88 99] Right buttock pain       ED Disposition     ED Disposition Condition Date/Time Comment    Discharge Fair Mon Sep 2, 2019  1:44 PM Mario Mainor discharge to home/self care  Follow-up Information     Follow up With Specialties Details Why Contact Info Additional [address] [facility] Schedule an appointment as soon as possible for a visit in 3 days to follow up on your leg [address]  [address] [facility] [place], [id number] [place] [address], [phone]          Patient's Medications   Discharge Prescriptions    No medications on file     No discharge procedures on file      ED Provider  Electronically Signed by           Antwon Garcia MD  09/03/19 4026

## 2019-09-03 ENCOUNTER — TELEPHONE (OUTPATIENT)
Dept: INTERNAL MEDICINE CLINIC | Facility: CLINIC | Age: 74
End: 2019-09-03

## 2019-09-03 NOTE — TELEPHONE ENCOUNTER
Drop off the form to our office  Follow-up with orthopedics recommended  Bursitis can take some time to calm down as there is inflammation going on in the hip joint

## 2019-09-04 ENCOUNTER — HOSPITAL ENCOUNTER (OUTPATIENT)
Dept: CT IMAGING | Facility: HOSPITAL | Age: 74
Discharge: HOME | End: 2019-09-04
Attending: INTERNAL MEDICINE
Payer: MEDICARE

## 2019-09-04 ENCOUNTER — OFFICE VISIT (OUTPATIENT)
Dept: OBGYN CLINIC | Facility: CLINIC | Age: 74
End: 2019-09-04
Payer: MEDICARE

## 2019-09-04 ENCOUNTER — TELEPHONE (OUTPATIENT)
Dept: INTERNAL MEDICINE CLINIC | Facility: CLINIC | Age: 74
End: 2019-09-04

## 2019-09-04 VITALS
DIASTOLIC BLOOD PRESSURE: 82 MMHG | SYSTOLIC BLOOD PRESSURE: 133 MMHG | HEIGHT: 63 IN | WEIGHT: 156 LBS | BODY MASS INDEX: 27.64 KG/M2 | HEART RATE: 76 BPM

## 2019-09-04 DIAGNOSIS — R91.8 LUNG NODULE, MULTIPLE: Chronic | ICD-10-CM

## 2019-09-04 DIAGNOSIS — M70.61 GREATER TROCHANTERIC BURSITIS OF RIGHT HIP: Primary | ICD-10-CM

## 2019-09-04 DIAGNOSIS — M54.41 CHRONIC RIGHT-SIDED LOW BACK PAIN WITH RIGHT-SIDED SCIATICA: ICD-10-CM

## 2019-09-04 DIAGNOSIS — G89.29 CHRONIC RIGHT-SIDED LOW BACK PAIN WITH RIGHT-SIDED SCIATICA: ICD-10-CM

## 2019-09-04 PROCEDURE — 99203 OFFICE O/P NEW LOW 30 MIN: CPT | Performed by: PHYSICIAN ASSISTANT

## 2019-09-04 PROCEDURE — 20610 DRAIN/INJ JOINT/BURSA W/O US: CPT | Performed by: PHYSICIAN ASSISTANT

## 2019-09-04 PROCEDURE — 71250 CT THORAX DX C-: CPT

## 2019-09-04 RX ORDER — LIDOCAINE HYDROCHLORIDE 10 MG/ML
2 INJECTION, SOLUTION INFILTRATION; PERINEURAL
Status: COMPLETED | OUTPATIENT
Start: 2019-09-04 | End: 2019-09-04

## 2019-09-04 RX ORDER — TRIAMCINOLONE ACETONIDE 40 MG/ML
80 INJECTION, SUSPENSION INTRA-ARTICULAR; INTRAMUSCULAR
Status: COMPLETED | OUTPATIENT
Start: 2019-09-04 | End: 2019-09-04

## 2019-09-04 RX ORDER — PREDNISONE 20 MG/1
TABLET ORAL
Qty: 12 TABLET | Refills: 0 | Status: SHIPPED | OUTPATIENT
Start: 2019-09-04 | End: 2019-09-10

## 2019-09-04 RX ORDER — BUPIVACAINE HYDROCHLORIDE 5 MG/ML
2 INJECTION, SOLUTION EPIDURAL; INTRACAUDAL
Status: COMPLETED | OUTPATIENT
Start: 2019-09-04 | End: 2019-09-04

## 2019-09-04 RX ADMIN — TRIAMCINOLONE ACETONIDE 80 MG: 40 INJECTION, SUSPENSION INTRA-ARTICULAR; INTRAMUSCULAR at 13:12

## 2019-09-04 RX ADMIN — LIDOCAINE HYDROCHLORIDE 2 ML: 10 INJECTION, SOLUTION INFILTRATION; PERINEURAL at 13:12

## 2019-09-04 RX ADMIN — BUPIVACAINE HYDROCHLORIDE 2 ML: 5 INJECTION, SOLUTION EPIDURAL; INTRACAUDAL at 13:12

## 2019-09-04 NOTE — PROGRESS NOTES
_____________________________________________________  CHIEF COMPLAINT:  Chief Complaint   Patient presents with    Right Hip - Pain         SUBJECTIVE:  Marisa Novak is a 66y o  year old female who presents right hip pain  Patient states it has been going on for about 2 weeks  She states that she has slowly gotten off of all of her pain medication in she has noticed a increase in right hip pain  She states she has tried to take Tylenol and anti-inflammatories with minimal relief of her pain  She has also tried pain med patient with no relief of her pain  She states she will also no radiating symptoms down her leg  She only notes this on the right side  She denies any radiating symptoms on the left side  She states that the symptoms are constant at this point  They get worse with ambulation  There is a little relief rest   She states most of her hip pain is over the lateral aspect of her hip  She denies any constitutional signs or symptoms  PAST MEDICAL HISTORY:  Past Medical History:   Diagnosis Date    Anxiety     Cataract     last assessed 14    COPD (chronic obstructive pulmonary disease) (HCC)     Disease of thyroid gland     Multiple sclerosis (HCC)     CHOCO (obstructive sleep apnea)     Peroneal muscle atrophy        PAST SURGICAL HISTORY:  Past Surgical History:   Procedure Laterality Date    APPENDECTOMY      CATARACT EXTRACTION       SECTION      CHOLECYSTECTOMY      GALLBLADDER SURGERY      [place] COLONOSCOPY FLX DX W/COLLJ SPEC WHEN PFRMD N/A 2018    Procedure: COLONOSCOPY;  Surgeon: Kaila Garduno MD;  [facility];   Service: Colorectal    THROAT SURGERY         FAMILY HISTORY:  Family History   Problem Relation Age of Onset    Skin cancer Mother     Hypertension Father         benign essential    Stroke Father     Lung cancer Brother        SOCIAL HISTORY:  Social History     Tobacco Use    Smoking status: Current Every Day Smoker Packs/day: 1 00     Years: 60 00     Pack years: 60 00     Types: Cigarettes     Start date: 2/16/1958    Smokeless tobacco: Never Used   Substance Use Topics    Alcohol use:  Yes     Alcohol/week: 8 0 standard drinks     Types: 7 Glasses of wine, 1 Cans of beer per week     Frequency: 4 or more times a week     Drinks per session: 1 or 2     Binge frequency: Never     Comment: daily/ socially    Drug use: Yes     Types: Marijuana     Comment: medical       MEDICATIONS:    Current Outpatient Medications:     Cholecalciferol (VITAMIN D-3) 1000 units CAPS, Take by mouth daily , Disp: , Rfl:     levothyroxine 100 mcg tablet, Take 1 tablet (100 mcg total) by mouth daily, Disp: 90 tablet, Rfl: 3    Multiple Vitamins-Minerals (PRESERVISION AREDS 2 PO), Take by mouth, Disp: , Rfl:     Multiple Vitamins-Minerals (PRESERVISION AREDS) CAPS, Take by mouth 2 (two) times a day , Disp: , Rfl:     oxyCODONE-acetaminophen (PERCOCET) 7 5-325 MG per tablet, Take 1 tablet by mouth daily at bedtimeMax Daily Amount: 1 tablet, Disp: 30 tablet, Rfl: 0    thiamine (VITAMIN B1) 100 mg tablet, Take 1 tablet by mouth daily, Disp: , Rfl:     vitamin B-12 (CYANOCOBALAMIN) 100 MCG tablet, Take 100 mcg by mouth daily , Disp: , Rfl:     predniSONE 20 mg tablet, Take 3 tablets by mouth once a day for 2 days, Take 2 tablets by mouth once a day for 2 days, Take 1 tablet by mouth once a day for 2 days, Disp: 12 tablet, Rfl: 0    ALLERGIES:  No Known Allergies    REVIEW OF SYSTEMS:  General: no fever, no chills  HEENT:  No loss of hearing or eyesight problems  Eyes:  No red eyes  Respiratory:  No coughing, shortness of breath or wheezing  Cardiovascular:  No chest pain, no palpitations  GI:  Abdomen soft nontender, denies nausea  Endocrine:  No muscle weakness, no frequent urination, no excessive thirst  Urinary:  No dysuria, no incontinence  Musculoskeletal: see HPI and PE  SKIN:  No skin rash, no dry skin  Neurological:  No headaches, no confusion  Psychiatric:  No suicide thoughts, no anxiety, no depression  Review of all other systems is negative    LABS:  HgA1c: No results found for: HGBA1C  BMP:   Lab Results   Component Value Date    GLUCOSE 85 11/02/2015    CALCIUM 9 4 02/27/2019     11/02/2015    K 4 2 02/27/2019    CO2 28 02/27/2019     02/27/2019    BUN 17 02/27/2019    CREATININE 0 78 02/27/2019       _____________________________________________________  PHYSICAL EXAMINATION:  Vitals:    09/04/19 1106   BP: 133/82   Pulse: 76       General: well developed and well nourished, alert, oriented times 3 and appears comfortable  Psychiatric: Normal  HEENT: Trachea Midline, No torticollis  Cardiovascular: No discernable arrhythmia  Pulmonary: No wheezing or stridor  Skin: No masses, erthema, lacerations, fluctation, ulcerations  Neurovascular:  L1-S1 motor and sensory intact, pulses were compared bilaterally and were equal, capillary refill less than 3 sec    MUSCULOSKELETAL EXAMINATION:  Right hip  No erythema edema or ecchymosis noted, skin is warm to touch  Tenderness to palpation over the greater trochanteric region  Tenderness to palpation over the lumbar spine over the paraspinal muscles  Hip range of motion is within normal limits  Strength is 5/5 for flexion, extension, abduction, adduction, internal and external rotation  Negative log roll test, negative MAURI test, negative FADIR test  Patient is neurovascularly intact    _____________________________________________________  STUDIES REVIEWED:  I personally reviewed the x-rays of the right hip from 09/02/2019  X-rays demonstrate no acute fractures or dislocations  ASSESSMENT/PLAN:    Right greater trochanteric bursitis  - A cortisone injection was offered today and the patent wished to proceed  The patient may experience increased pain at the injection site for a few days  Topical ice is recommended today, followed by topical heat    NSAIDS and Tylenol may be used, as long as they are not contraindicated  - patient was advised to take Tylenol and anti-inflammatories as needed for pain  - she was given a prednisone taper to help with sciatica issues on right  - she was instructed to follow up with [facility] for lower back pain  - she will follow up in 6 weeks with Dr Beckie Smith for re-evaluation        Follow Up:  Six weeks    PROCEDURES PERFORMED:  Large joint arthrocentesis: R greater trochanteric bursa  Date/Time: 9/4/2019 1:12 PM  Consent given by: patient  Site marked: site marked  Timeout: Immediately prior to procedure a time out was called to verify the correct patient, procedure, equipment, support staff and site/side marked as required   Supporting Documentation  Indications: pain   Procedure Details  Location: hip - R greater trochanteric bursa  Preparation: Patient was prepped and draped in the usual sterile fashion  Needle size: 22 G  Approach: lateral  Medications administered: 2 mL bupivacaine (PF) 0 5 %; 2 mL lidocaine 1 %; 80 mg triamcinolone acetonide 40 mg/mL    Patient tolerance: patient tolerated the procedure well with no immediate complications  Dressing:  Sterile dressing applied

## 2019-09-04 NOTE — TELEPHONE ENCOUNTER
Pt dropping off insurance form for travel insurance, had to cancel trip, put in dr dacosta's office, please fax [phone]    And mail back form to pt upon completion    Pt also got a lidocaine shot in her hip for bursitis

## 2019-09-06 ENCOUNTER — TELEPHONE (OUTPATIENT)
Dept: OBGYN CLINIC | Facility: HOSPITAL | Age: 74
End: 2019-09-06

## 2019-09-06 NOTE — TELEPHONE ENCOUNTER
Spoke to [facility] stated she has been taking medical marijuana as prescribed by her marijuana doctor  She took 2 percocet prescribed by her internal med doctor  Also stated she took naproxen and prednisone this morning  Advised her not to take naproxen and prednisone together because there is an increased risk of bleeding when taking both medications together, patient verbalized understanding  Advised patient that the pain can be worse a couple of days following an injection and it can take up to 2 weeks to feel relief  Patient advised to try heat on her back and ice on her knee to help with this pain, as well as tylenol (no more than 3000 mg in 24 hrs including the 325 mg per percocet)  Patient is requesting another medication at this time because tylenol does nothing for her pain  Advised her we do not prescribe narcotic medications for management of this condition, she should try above measures  Patient requested that the doctor be asked any way because her pain is 6-7/10 at this time  She cannot get in to see Pain Management until October  Please advise

## 2019-09-06 NOTE — TELEPHONE ENCOUNTER
Please advise the patient can continue the pain regiment as you described  We do not give out narcotic pain medications for this condition  Thank you

## 2019-09-06 NOTE — TELEPHONE ENCOUNTER
Patient has been informed and is not satisfied with this but verbalized understanding  Mentioned maybe she could try another pain management doc in the practice since Dr Phi Murphy is booking out further than she wishes, she stated traveling is not easy for her so she will not see anyone else  Recommended calling to see if she can get in sooner with them regularly if there are cancellations  She verbalized understanding  Thank you Johnnie Forrester

## 2019-09-06 NOTE — TELEPHONE ENCOUNTER
Arnoldmarsha Orellana  [phone]    Dr Anthony Villalba    Patient was seen by Marge Sultana and had an injection in hip which provided temporary relief  She had to take 2 Oxycodone last night and is still at a pain level 7-8  She is asking what she can do now  Has not seen Dr Mari's yet and no open appts today  Please advise

## 2019-09-10 ENCOUNTER — TELEPHONE (OUTPATIENT)
Dept: INTERNAL MEDICINE CLINIC | Facility: CLINIC | Age: 74
End: 2019-09-10

## 2019-09-10 ENCOUNTER — APPOINTMENT (EMERGENCY)
Dept: CT IMAGING | Facility: HOSPITAL | Age: 74
End: 2019-09-10
Payer: MEDICARE

## 2019-09-10 ENCOUNTER — HOSPITAL ENCOUNTER (EMERGENCY)
Facility: HOSPITAL | Age: 74
Discharge: HOME | End: 2019-09-10
Attending: EMERGENCY MEDICINE | Admitting: EMERGENCY MEDICINE
Payer: MEDICARE

## 2019-09-10 VITALS
BODY MASS INDEX: 27.66 KG/M2 | DIASTOLIC BLOOD PRESSURE: 66 MMHG | SYSTOLIC BLOOD PRESSURE: 132 MMHG | RESPIRATION RATE: 14 BRPM | WEIGHT: 156.09 LBS | HEART RATE: 69 BPM | TEMPERATURE: 98 F | HEIGHT: 63 IN | OXYGEN SATURATION: 98 %

## 2019-09-10 DIAGNOSIS — M25.551 RIGHT HIP PAIN: Primary | ICD-10-CM

## 2019-09-10 DIAGNOSIS — M16.11 PRIMARY OSTEOARTHRITIS OF RIGHT HIP: ICD-10-CM

## 2019-09-10 LAB
ALBUMIN SERPL BCP-MCNC: 3.4 G/DL (ref 3.5–5)
ALP SERPL-CCNC: 128 U/L (ref 46–116)
ALT SERPL W P-5'-P-CCNC: 121 U/L (ref 12–78)
ANION GAP SERPL CALCULATED.3IONS-SCNC: 8 MMOL/L (ref 4–13)
AST SERPL W P-5'-P-CCNC: 35 U/L (ref 5–45)
BASOPHILS # BLD AUTO: 0.02 THOUSANDS/ΜL (ref 0–0.1)
BASOPHILS NFR BLD AUTO: 0 % (ref 0–1)
BILIRUB SERPL-MCNC: 0.2 MG/DL (ref 0.2–1)
BUN SERPL-MCNC: 24 MG/DL (ref 5–25)
CALCIUM SERPL-MCNC: 8.9 MG/DL (ref 8.3–10.1)
CHLORIDE SERPL-SCNC: 98 MMOL/L (ref 100–108)
CO2 SERPL-SCNC: 25 MMOL/L (ref 21–32)
CREAT SERPL-MCNC: 0.58 MG/DL (ref 0.6–1.3)
EOSINOPHIL # BLD AUTO: 0.23 THOUSAND/ΜL (ref 0–0.61)
EOSINOPHIL NFR BLD AUTO: 2 % (ref 0–6)
ERYTHROCYTE [DISTWIDTH] IN BLOOD BY AUTOMATED COUNT: 13.7 % (ref 11.6–15.1)
GFR SERPL CREATININE-BSD FRML MDRD: 90 ML/MIN/1.73SQ M
GLUCOSE SERPL-MCNC: 96 MG/DL (ref 65–140)
HCT VFR BLD AUTO: 36.6 % (ref 34.8–46.1)
HGB BLD-MCNC: 12.4 G/DL (ref 11.5–15.4)
IMM GRANULOCYTES # BLD AUTO: 0.05 THOUSAND/UL (ref 0–0.2)
IMM GRANULOCYTES NFR BLD AUTO: 1 % (ref 0–2)
LYMPHOCYTES # BLD AUTO: 2.83 THOUSANDS/ΜL (ref 0.6–4.47)
LYMPHOCYTES NFR BLD AUTO: 29 % (ref 14–44)
MCH RBC QN AUTO: 32 PG (ref 26.8–34.3)
MCHC RBC AUTO-ENTMCNC: 33.9 G/DL (ref 31.4–37.4)
MCV RBC AUTO: 94 FL (ref 82–98)
MONOCYTES # BLD AUTO: 1.01 THOUSAND/ΜL (ref 0.17–1.22)
MONOCYTES NFR BLD AUTO: 10 % (ref 4–12)
NEUTROPHILS # BLD AUTO: 5.78 THOUSANDS/ΜL (ref 1.85–7.62)
NEUTS SEG NFR BLD AUTO: 58 % (ref 43–75)
NRBC BLD AUTO-RTO: 0 /100 WBCS
PLATELET # BLD AUTO: 268 THOUSANDS/UL (ref 149–390)
PMV BLD AUTO: 9.5 FL (ref 8.9–12.7)
POTASSIUM SERPL-SCNC: 4 MMOL/L (ref 3.5–5.3)
PROT SERPL-MCNC: 6.8 G/DL (ref 6.4–8.2)
RBC # BLD AUTO: 3.88 MILLION/UL (ref 3.81–5.12)
SODIUM SERPL-SCNC: 131 MMOL/L (ref 136–145)
WBC # BLD AUTO: 9.92 THOUSAND/UL (ref 4.31–10.16)

## 2019-09-10 PROCEDURE — 36415 COLL VENOUS BLD VENIPUNCTURE: CPT | Performed by: EMERGENCY MEDICINE

## 2019-09-10 PROCEDURE — 72193 CT PELVIS W/DYE: CPT

## 2019-09-10 PROCEDURE — 80053 COMPREHEN METABOLIC PANEL: CPT | Performed by: EMERGENCY MEDICINE

## 2019-09-10 PROCEDURE — 96374 THER/PROPH/DIAG INJ IV PUSH: CPT

## 2019-09-10 PROCEDURE — 73701 CT LOWER EXTREMITY W/DYE: CPT

## 2019-09-10 PROCEDURE — 99285 EMERGENCY DEPT VISIT HI MDM: CPT | Performed by: EMERGENCY MEDICINE

## 2019-09-10 PROCEDURE — 99284 EMERGENCY DEPT VISIT MOD MDM: CPT

## 2019-09-10 PROCEDURE — 85025 COMPLETE CBC W/AUTO DIFF WBC: CPT | Performed by: EMERGENCY MEDICINE

## 2019-09-10 RX ORDER — ACETAMINOPHEN 325 MG/1
975 TABLET ORAL ONCE
Status: COMPLETED | OUTPATIENT
Start: 2019-09-10 | End: 2019-09-10

## 2019-09-10 RX ORDER — MORPHINE SULFATE 15 MG/1
15 TABLET ORAL EVERY 4 HOURS PRN
Qty: 20 TABLET | Refills: 0 | Status: SHIPPED | OUTPATIENT
Start: 2019-09-10 | End: 2019-09-15 | Stop reason: ALTCHOICE

## 2019-09-10 RX ORDER — FENTANYL CITRATE 50 UG/ML
25 INJECTION, SOLUTION INTRAMUSCULAR; INTRAVENOUS ONCE
Status: COMPLETED | OUTPATIENT
Start: 2019-09-10 | End: 2019-09-10

## 2019-09-10 RX ORDER — MORPHINE SULFATE 15 MG/1
15 TABLET ORAL ONCE
Status: COMPLETED | OUTPATIENT
Start: 2019-09-10 | End: 2019-09-10

## 2019-09-10 RX ADMIN — ACETAMINOPHEN 975 MG: 325 TABLET, FILM COATED ORAL at 06:37

## 2019-09-10 RX ADMIN — IOHEXOL 100 ML: 350 INJECTION, SOLUTION INTRAVENOUS at 06:47

## 2019-09-10 RX ADMIN — MORPHINE SULFATE 15 MG: 15 TABLET ORAL at 07:59

## 2019-09-10 RX ADMIN — FENTANYL CITRATE 25 MCG: 50 INJECTION INTRAMUSCULAR; INTRAVENOUS at 06:38

## 2019-09-10 NOTE — TELEPHONE ENCOUNTER
PATIENT JUST CAME BACK FROM THE ER  PATIENT IN A LOT OF PAIN , WAS TOLD TO STOP TAKING THE PERCOSET BECAUSE IT WAS NOT HELPING    HAS A SCRIPT FOR THE MORPHINE THAT THE HOSPITAL GAVE HER   WILL FILL AT THE PHARMACY, BUT HOSP DID GIVE HER SOME BEFORE SHE LEFT  [facility] ADVISED HER TO HAVE AND MRI BEFORE SEEING THE ORTHO DR       PLEASE CALL HER [phone]

## 2019-09-10 NOTE — ED NOTES
Patient transported to [address] [place] [address], [address] [place] [address]  09/10/19 7268

## 2019-09-10 NOTE — ED PROVIDER NOTES
History  Chief Complaint   Patient presents with    Back Pain     seen 9/02/19 here for the same back pain  radiates into right leg  was seen at ortho and given a steriod shot; pain not getting better     66-year-old female with a history of chronic pain presents with 2 weeks of progressive worsening right hip pain  Patient states a "starts on my right but and goes down my hip to the knee "  Patient has been evaluated in the emergency room and with Orthopedics but states that "it has been more extreme tonight than it has been any night "    Patient states movement worsens the pain and nothing has improved the pain  Patient is on medical marijuana for post herpetic neuralgia that started and February  Patient was recently transitioned from multiple pain medications to medical marijuana approximately 2 months ago  Patient was seen by Orthopedics on 09/04 and received a cortisone injection which she states has not improved her pain  Patient was suggested to follow with pain management and she has had difficulty scheduling this quickly enough for her to be seen  Patient took her remaining Percocets last night with no significant relief  Patient states no relief with the multiple interventions that she has had since her initial evaluation 2 weeks ago  Patient denies any loss of bowel or bladder  Patient denies any sensory loss  ROS: patient denies fever/chills, weight loss, night sweats, confusion, focal weakness, diaphoresis, visual changes, nausea/vomiting, weakness, numbness, gait disturbance, tingling/numbness, vertigo, lightheadedness  Objective:   PHYSICAL EXAM:   Constitutional: acute distress   Back: Normal inspection with mild hyperkyphosis  ROM of spine is decreased due to pain  No back pain or tenderness    Significantly delayed stand (S1) and sit (S3) requiring assistance  Reflexes: patellar (L4) and ankle (S1) normal  Passive RoM of the knee without pain   Normal resistance dorsiflex great toes bilaterally (L5)  Sensation normal on the legs including the anterior medial thigh (L2), medial epicondyle femur (L3), medial malleolus (L4), dorsal 3rd MTP (L5), lateral calcaneus (S1), popliteal fossa (S2)  Normal SLR and cross leg raise  Right hip: normal inspection  Tenderness in the sciatic notch of the right gluteal cleft  Tenderness also on the anterior aspect of the hip, which per the patient is new  No tenderness over the trochanteric bursa  Medical Decision Making   Patient presenting with persistent right hip pain  Negative x-ray imaging on prior evaluation  No history of trauma  No red flag signs, patient denies any signs or symptoms of cauda equina and unclear if patient's symptoms are related to hip pain or referred pain from the lumbar spine  Patient with recent orthopedic evaluation with cortisone injection with out significant relief  No tenderness over the trochanteric bursa on my evaluation  Possibly referred pain from the back versus pelvic etiology  Will evaluate with CT imaging of patient's pelvis and hip  Patient has history of chronic pain is on medical marijuana so it is unclear if patient's symptoms are acute or progression of chronic pain considering recent cessation of pain medication though no indication of inappropriate usage of opiate pain medication  Possibly increased pain sensitivity secondary to the mu receptor uptake  Will monitor and re-evaluate with likely admission for PT OT evaluation and possible referral to rehabilitation as patient has significant difficulty with ambulation            Hip Pain   Location:  Right sided  Quality:  "it hurts"  Severity:  Severe  Onset quality:  Gradual  Timing:  Constant  Progression:  Worsening  Relieved by:  Nothing  Worsened by:  Ambulation  Associated symptoms: no abdominal pain, no chest pain, no congestion, no cough, no diarrhea, no ear pain, no fatigue, no fever, no headaches, no loss of consciousness, no myalgias, no nausea, no rash, no rhinorrhea, no shortness of breath, no sore throat, no vomiting and no wheezing        Prior to Admission Medications   Prescriptions Last Dose Informant Patient Reported? Taking? Cholecalciferol (VITAMIN D-3) 1000 units CAPS  Self Yes No   Sig: Take by mouth daily    Multiple Vitamins-Minerals (PRESERVISION AREDS 2 PO)  Self Yes No   Sig: Take by mouth   Multiple Vitamins-Minerals (PRESERVISION AREDS) CAPS  Self Yes No   Sig: Take by mouth 2 (two) times a day    levothyroxine 100 mcg tablet  Self No No   Sig: Take 1 tablet (100 mcg total) by mouth daily   oxyCODONE-acetaminophen (PERCOCET) 7 5-325 MG per tablet  Self No No   Sig: Take 1 tablet by mouth daily at bedtimeMax Daily Amount: 1 tablet   thiamine (VITAMIN B1) 100 mg tablet  Self Yes No   Sig: Take 1 tablet by mouth daily   vitamin B-12 (CYANOCOBALAMIN) 100 MCG tablet  Self Yes No   Sig: Take 100 mcg by mouth daily       Facility-Administered Medications: None       Past Medical History:   Diagnosis Date    Anxiety     Cataract     last assessed 14    COPD (chronic obstructive pulmonary disease) (HCC)     Disease of thyroid gland     Multiple sclerosis (HCC)     CHOCO (obstructive sleep apnea)     Peroneal muscle atrophy        Past Surgical History:   Procedure Laterality Date    APPENDECTOMY      CATARACT EXTRACTION       SECTION      CHOLECYSTECTOMY      GALLBLADDER SURGERY      [place] COLONOSCOPY FLX DX W/COLLJ SPEC WHEN PFRMD N/A 2018    Procedure: COLONOSCOPY;  Surgeon: Juan Jose Winslow MD;  Location: [facility]; Service: Colorectal    THROAT SURGERY         Family History   Problem Relation Age of Onset    Skin cancer Mother     Hypertension Father         benign essential    Stroke Father     Lung cancer Brother      I have reviewed and agree with the history as documented      Social History     Tobacco Use    Smoking status: Current Every Day Smoker     Packs/day: 1 00     Years: 60 00     Pack years: 60 00     Types: Cigarettes     Start date: 2/16/1958    Smokeless tobacco: Never Used   Substance Use Topics    Alcohol use: Yes     Alcohol/week: 8 0 standard drinks     Types: 7 Glasses of wine, 1 Cans of beer per week     Frequency: 4 or more times a week     Drinks per session: 1 or 2     Binge frequency: Never     Comment: daily/ socially    Drug use: Yes     Types: Marijuana     Comment: medical        Review of Systems   Constitutional: Negative for fatigue and fever  HENT: Negative for congestion, ear pain, rhinorrhea and sore throat  Respiratory: Negative for cough, shortness of breath and wheezing  Cardiovascular: Negative for chest pain  Gastrointestinal: Negative for abdominal pain, diarrhea, nausea and vomiting  Musculoskeletal: Negative for myalgias  Skin: Negative for rash  Neurological: Negative for loss of consciousness and headaches  All other systems reviewed and are negative  Physical Exam  Physical Exam   Constitutional: She appears well-developed and well-nourished  She appears distressed  HENT:   Head: Normocephalic and atraumatic  Eyes: Pupils are equal, round, and reactive to light  Neck: Neck supple  Cardiovascular: Normal rate and regular rhythm  Pulmonary/Chest: Effort normal and breath sounds normal    Abdominal: Soft  Bowel sounds are normal  She exhibits no distension  There is no tenderness  There is no rebound and no guarding  Normal inspection of the abdomen  No discrete abdominal tenderness, tenderness in the region of the right lower pelvis, this is inferior and medial to McBurney's point  Musculoskeletal: She exhibits no tenderness or deformity  Neurological: She is alert  Skin: Skin is warm and dry  Psychiatric: She has a normal mood and affect  Vitals reviewed        Vital Signs  ED Triage Vitals   Temperature Pulse Respirations Blood Pressure SpO2   09/10/19 0511 09/10/19 0511 09/10/19 0511 09/10/19 7089 09/10/19 0511   98 °F (36 7 °C) 77 17 142/68 98 %      Temp src Heart Rate Source Patient Position - Orthostatic VS BP Location FiO2 (%)   -- 09/10/19 0725 09/10/19 0725 09/10/19 0725 --    Monitor Lying Left arm       Pain Score       09/10/19 0511       Worst Possible Pain           Vitals:    09/10/19 0511 09/10/19 0725   BP: 142/68 132/66   Pulse: 77 69   Patient Position - Orthostatic VS:  Lying         Visual Acuity      ED Medications  Medications   acetaminophen (TYLENOL) tablet 975 mg (975 mg Oral Given 9/10/19 0637)   fentanyl citrate (PF) 100 MCG/2ML 25 mcg (25 mcg Intravenous Given 9/10/19 0638)   iohexol (OMNIPAQUE) 350 MG/ML injection (MULTI-DOSE) 100 mL (100 mL Intravenous Given 9/10/19 0647)   morphine (MSIR) IR tablet 15 mg (15 mg Oral Given 9/10/19 0759)       Diagnostic Studies  Results Reviewed     Procedure Component Value Units Date/Time    Comprehensive metabolic panel [[id number]]  (Abnormal) Collected:  09/10/19 0607    Lab Status:  Final result Specimen:  Blood from Arm, Right Updated:  09/10/19 0636     Sodium 131 mmol/L      Potassium 4 0 mmol/L      Chloride 98 mmol/L      CO2 25 mmol/L      ANION GAP 8 mmol/L      BUN 24 mg/dL      Creatinine 0 58 mg/dL      Glucose 96 mg/dL      Calcium 8 9 mg/dL      AST 35 U/L       U/L      Alkaline Phosphatase 128 U/L      Total Protein 6 8 g/dL      Albumin 3 4 g/dL      Total Bilirubin 0 20 mg/dL      eGFR 90 ml/min/1 73sq m     Narrative:       Galdino guidelines for Chronic Kidney Disease (CKD):     Stage 1 with normal or high GFR (GFR > 90 mL/min/1 73 square meters)    Stage 2 Mild CKD (GFR = 60-89 mL/min/1 73 square meters)    Stage 3A Moderate CKD (GFR = 45-59 mL/min/1 73 square meters)    Stage 3B Moderate CKD (GFR = 30-44 mL/min/1 73 square meters)    Stage 4 Severe CKD (GFR = 15-29 mL/min/1 73 square meters)    Stage 5 End Stage CKD (GFR <15 mL/min/1 73 square meters)  Note: GFR calculation is accurate only with a steady state creatinine    CBC and differential [[id number]] Collected:  09/10/19 0607    Lab Status:  Final result Specimen:  Blood from Arm, Right Updated:  09/10/19 0620     WBC 9 92 Thousand/uL      RBC 3 88 Million/uL      Hemoglobin 12 4 g/dL      Hematocrit 36 6 %      MCV 94 fL      MCH 32 0 pg      MCHC 33 9 g/dL      RDW 13 7 %      MPV 9 5 fL      Platelets 861 Thousands/uL      nRBC 0 /100 WBCs      Neutrophils Relative 58 %      Immat GRANS % 1 %      Lymphocytes Relative 29 %      Monocytes Relative 10 %      Eosinophils Relative 2 %      Basophils Relative 0 %      Neutrophils Absolute 5 78 Thousands/µL      Immature Grans Absolute 0 05 Thousand/uL      Lymphocytes Absolute 2 83 Thousands/µL      Monocytes Absolute 1 01 Thousand/µL      Eosinophils Absolute 0 23 Thousand/µL      Basophils Absolute 0 02 Thousands/µL                  CT pelvis w contrast   Final Result by Delma Naik MD (09/10 0719)      No acute intrapelvic process or significant interval change  Mild osteoarthritis bilateral hips  Workstation performed: [id number]         CT hip right w contrast   Final Result by Delma Naik MD (09/10 0725)      Mild stable osteoarthritis  No acute osseous or soft tissue abnormality  Workstation performed: [id number]                    Procedures  Procedures       ED Course                               MDM    Disposition  Final diagnoses:   Right hip pain     Time reflects when diagnosis was documented in both MDM as applicable and the Disposition within this note     Time User Action Codes Description Comment    9/10/2019  7:36 AM Kevin Grant Add [M25 551] Right hip pain       ED Disposition     ED Disposition Condition Date/Time Comment    Discharge Stable Tue Sep 10, 2019  7:36 AM Mabel Ascencio discharge to home/self care              Follow-up Information     Follow up With Specialties Details Why Contact Info Additional Information Chente Triana, DO Internal Medicine  Please follow-up with your doctor for recheck of your liver enzymes in 1 month  [address] [facility] [address], MD Orthopedic Surgery  Please follow-up as soon as possible for further management of your right hip pain  [address] [facility]  [address]  [place] [address]  [phone]       [facility]  Please follow-up with pain management as soon as possible for further management of your pain  [address] [facility] [id number] [facility], [address], [address]          Discharge Medication List as of 9/10/2019  7:40 AM      START taking these medications    Details   morphine (MSIR) 15 mg tablet Take 1 tablet (15 mg total) by mouth every 4 (four) hours as needed for severe pain for up to 5 daysMax Daily Amount: 90 mg, Starting Tue 9/10/2019, Until Sun 9/15/2019, Print         CONTINUE these medications which have NOT CHANGED    Details   Cholecalciferol (VITAMIN D-3) 1000 units CAPS Take by mouth daily , Historical Med      levothyroxine 100 mcg tablet Take 1 tablet (100 mcg total) by mouth daily, Starting Tue 3/12/2019, Normal      !! Multiple Vitamins-Minerals (PRESERVISION AREDS 2 PO) Take by mouth, Historical Med      !! Multiple Vitamins-Minerals (PRESERVISION AREDS) CAPS Take by mouth 2 (two) times a day , Historical Med      thiamine (VITAMIN B1) 100 mg tablet Take 1 tablet by mouth daily, Historical Med      vitamin B-12 (CYANOCOBALAMIN) 100 MCG tablet Take 100 mcg by mouth daily , Historical Med      predniSONE 20 mg tablet Take 3 tablets by mouth once a day for 2 days, Take 2 tablets by mouth once a day for 2 days, Take 1 tablet by mouth once a day for 2 days, Normal       !! - Potential duplicate medications found  Please discuss with provider        STOP taking these medications       oxyCODONE-acetaminophen (PERCOCET) 7 5-325 MG per tablet Comments:   Reason for Stopping:             No discharge procedures on file      ED Provider  Electronically Signed by           Alfredo Iniguez MD  09/11/19 4548

## 2019-09-10 NOTE — ED PROVIDER NOTES
Care patient assumed from Dr Frank Doran  Please see his note for full history, physical exam, and medical decision making up to this point  In brief, patient is a 47-year-old female with history of chronic herpetic neuralgia to the thorax, who presents for evaluation of progressively worsening right hip pain  Patient tells me that the pain starts in her right buttocks region, radiates down the back of her right leg and the front of her right leg to her knee  She was evaluated in the emergency department, had x-rays which were unremarkable, and followed up with Orthopedic surgery  She was diagnosed with trochanteric bursitis, and received a cortisone injection  States that despite this, her pain is been worsening  She has been taking medical marijuana and took Percocet last night without significant relief  Patient denies any sensory loss, no bowel or bladder incontinence, she does not have weakness in her leg  Denies fevers, chills, or falls  CBC unremarkable without leukocytosis  CMP with elevated ALT to 121 but normal AST and T bili  Patient's abdominal exam is benign, and she denies abdominal pain  Currently awaiting results of CT scan of the pelvis and right hip  MDM continued:  CT scan of the hip and pelvis show no acute abnormalities but osteoarthritis  Patient was offered admission for PT OT, however declines, she would rather follow up with pain management as an outpatient  She was able to ambulate to and from the bathroom without assistance but with some discomfort  Will prescribe MSIR for pain relief until she is able to follow up with Orthopedics and Pain Management  She was also counseled that she may require an MRI for further evaluation  Return precautions discussed for weakness in her leg, numbness in her leg, or bowel or bladder incontinence  Patient discharged in good condition           Ana Barajas MD  09/10/19 2730

## 2019-09-19 ENCOUNTER — OFFICE VISIT (OUTPATIENT)
Dept: PULMONOLOGY | Facility: CLINIC | Age: 74
End: 2019-09-19
Payer: MEDICARE

## 2019-09-19 ENCOUNTER — HOSPITAL ENCOUNTER (OUTPATIENT)
Dept: MRI IMAGING | Facility: CLINIC | Age: 74
Discharge: HOME | End: 2019-09-19
Payer: MEDICARE

## 2019-09-19 VITALS
WEIGHT: 150 LBS | BODY MASS INDEX: 26.58 KG/M2 | HEIGHT: 63 IN | DIASTOLIC BLOOD PRESSURE: 80 MMHG | SYSTOLIC BLOOD PRESSURE: 120 MMHG | OXYGEN SATURATION: 92 % | HEART RATE: 83 BPM

## 2019-09-19 DIAGNOSIS — M25.551 RIGHT HIP PAIN: ICD-10-CM

## 2019-09-19 DIAGNOSIS — R91.8 LUNG NODULE, MULTIPLE: ICD-10-CM

## 2019-09-19 DIAGNOSIS — Z72.0 TOBACCO ABUSE: ICD-10-CM

## 2019-09-19 DIAGNOSIS — Z99.89 OSA ON CPAP: ICD-10-CM

## 2019-09-19 DIAGNOSIS — G47.33 OSA ON CPAP: ICD-10-CM

## 2019-09-19 DIAGNOSIS — J44.9 COPD WITH CHRONIC BRONCHITIS (HCC): Primary | ICD-10-CM

## 2019-09-19 DIAGNOSIS — M16.11 PRIMARY OSTEOARTHRITIS OF RIGHT HIP: ICD-10-CM

## 2019-09-19 PROCEDURE — 99214 OFFICE O/P EST MOD 30 MIN: CPT | Performed by: INTERNAL MEDICINE

## 2019-09-19 PROCEDURE — 73721 MRI JNT OF LWR EXTRE W/O DYE: CPT

## 2019-09-20 ENCOUNTER — TELEPHONE (OUTPATIENT)
Dept: INTERNAL MEDICINE CLINIC | Facility: CLINIC | Age: 74
End: 2019-09-20

## 2019-09-20 NOTE — TELEPHONE ENCOUNTER
----- Message from Anthony Naik DO sent at 9/20/2019  7:29 AM EDT -----  No significant findings on MRI of right hip  There were mild degenerative changes noted

## 2019-09-20 NOTE — TELEPHONE ENCOUNTER
----- Message from Juan F Miller DO sent at 9/20/2019  7:29 AM EDT -----  No significant findings on MRI of right hip  There were mild degenerative changes noted

## 2019-09-23 ENCOUNTER — OFFICE VISIT (OUTPATIENT)
Dept: INTERNAL MEDICINE CLINIC | Facility: CLINIC | Age: 74
End: 2019-09-23
Payer: MEDICARE

## 2019-09-23 VITALS
OXYGEN SATURATION: 98 % | SYSTOLIC BLOOD PRESSURE: 124 MMHG | BODY MASS INDEX: 27.46 KG/M2 | HEART RATE: 98 BPM | HEIGHT: 62 IN | WEIGHT: 149.2 LBS | DIASTOLIC BLOOD PRESSURE: 82 MMHG

## 2019-09-23 DIAGNOSIS — M79.2 CHRONIC NEUROPATHIC PAIN: ICD-10-CM

## 2019-09-23 DIAGNOSIS — M25.551 RIGHT HIP PAIN: Primary | ICD-10-CM

## 2019-09-23 DIAGNOSIS — G89.29 CHRONIC NEUROPATHIC PAIN: ICD-10-CM

## 2019-09-23 DIAGNOSIS — G35 MULTIPLE SCLEROSIS (HCC): Chronic | ICD-10-CM

## 2019-09-23 PROCEDURE — 99214 OFFICE O/P EST MOD 30 MIN: CPT | Performed by: INTERNAL MEDICINE

## 2019-09-23 RX ORDER — DIAZEPAM 2 MG/1
2 TABLET ORAL
Refills: 5 | COMMUNITY
Start: 2019-09-09 | End: 2020-04-22

## 2019-09-23 RX ORDER — MORPHINE SULFATE 15 MG/1
15 TABLET ORAL 2 TIMES DAILY PRN
Qty: 45 TABLET | Refills: 0 | Status: SHIPPED | OUTPATIENT
Start: 2019-09-23 | End: 2019-10-25 | Stop reason: SDUPTHER

## 2019-09-23 NOTE — PROGRESS NOTES
INTERNAL MEDICINE FOLLOW-UP OFFICE VISIT  [facility]    NAME: Judie Barker  AGE: 66 y o  SEX: female  : 1945     DATE: 2019     Assessment and Plan:     1  Right hip pain  2  Chronic neuropathic pain  3  Multiple sclerosis (HCC)    Her right hip MRI showed mild osteoarthritis  Her level of pain has never matched imaging  Also sounds like she gets some sciatica pain and old CT scan of lumbar spine would match up to those symptoms  She has had arthritic changes around L4-L5  Recommend she sees higher level center that specializes in [address]  Given contact information to [facility]  [place] PDMP was reviewed  Will refill Morphine IR 15mg to be used along with medical marijuana  Mostly, she is just taking this at night  New Medications Ordered This Visit   Medications    morphine (MSIR) 15 mg tablet     Sig: Take 1 tablet (15 mg total) by mouth 2 (two) times a day as needed for severe painMax Daily Amount: 30 mg     Dispense:  45 tablet     Refill:  0       Chief Complaint:     Chief Complaint   Patient presents with    Hip Pain     (R)    Generalized Body Aches     (R) side      History of Present Illness: Thought pain was improving with medical marijuana and then in July it started to worsen again  Has had right sided pain since last July  Was affecting mostly right flank/upper thoracic area and now is affecting her right hip region  It is affecting her quality of life  So stressed and tired of the pain  Wants answers and nobody can give her one  Her neurologist doesn't think it is from [address], but patient thinks it is  Wants to see higher tertiary center  Was prescribed morphine IR 15mg  She is also going up on medical marijuana dose  Morphine she just uses to sleep at night  Percocet did nothing for her pain  Review of Systems:     Review of Systems   Constitutional: Positive for fatigue     Musculoskeletal: Positive for arthralgias, back pain and myalgias  Psychiatric/Behavioral: Positive for sleep disturbance  Problem List:     Patient Active Problem List   Diagnosis    CHOCO on CPAP    COPD with chronic bronchitis (HCC)    Hypothyroidism    Insomnia    Lung nodule, multiple    Macular degeneration    Multiple sclerosis (HCC)    Rosacea    Seborrheic keratosis    Chronic neuropathic pain      Objective:     /82 (BP Location: Left arm, Patient Position: Sitting, Cuff Size: Standard)   Pulse 98   Ht 5' 2 25" (1 581 m)   Wt 67 7 kg (149 lb 3 2 oz)   SpO2 98%   BMI 27 07 kg/m²     Physical Exam   Constitutional: She appears well-developed and well-nourished  No distress  Neck: Neck supple  No JVD present  Cardiovascular: Normal rate and regular rhythm  Pulmonary/Chest: Effort normal and breath sounds normal    Abdominal: Soft  Bowel sounds are normal  She exhibits no distension  Musculoskeletal: She exhibits tenderness (right sided hip and flank region)  She exhibits no deformity  Lymphadenopathy:     She has no cervical adenopathy  Skin: She is not diaphoretic  Vitals reviewed        Jes Rojas DO  [facility]

## 2019-09-23 NOTE — PATIENT INSTRUCTIONS
Chronic Pain   AMBULATORY CARE:   Chronic pain  is pain that does not get better for 3 months or longer  Chronic pain may hurt all the time, or come and go  Call 911 or have someone call 911 for any of the following:   · You are breathing slower than normal, or you have trouble breathing  · You cannot be awakened  · You have a seizure  Seek care immediately if:   · Your heart is beating slower than normal     · Your heart feels like it is jumping or fluttering  · You cannot think clearly  Contact your healthcare provider if:   · You have side effects from prescription pain medicine, such as itching, nausea, or vomiting  · You have trouble sleeping  · Your pain gets worse, even after you take medicine  · You don't think the medicine is working  · You have questions or concerns about your condition or care  Treatment for chronic pain  may need any of the following:  · Acetaminophen  decreases pain  It is available without a doctor's order  Ask how much to take and how often to take it  Follow directions  Read the labels of all other medicines you are using to see if they also contain acetaminophen, or ask your doctor or pharmacist  Acetaminophen can cause liver damage if not taken correctly  Do not use more than 4 grams (4,000 milligrams) total of acetaminophen in one day  · NSAIDs , such as ibuprofen, help decrease swelling, pain, and fever  This medicine is available with or without a doctor's order  NSAIDs can cause stomach bleeding or kidney problems in certain people  If you take blood thinner medicine, always ask your healthcare provider if NSAIDs are safe for you  Always read the medicine label and follow directions  · Prescription pain medicine  called narcotics or opioids may be given  Ask your healthcare provider how to take this medicine safely  · Anesthetics  can be rubbed on your skin or injected into a nerve or muscle to numb an area      · Other medicines  may reduce pain, anxiety, muscle tension, or swelling  Manage your chronic pain:   · Apply heat  on the area in pain for 20 to 30 minutes every 2 hours for as many days as directed  Heat helps decrease pain and muscle spasms  · Apply ice  on the part of your body that hurts for 15 to 20 minutes every hour or as directed  Use an ice pack, or put crushed ice in a plastic bag  Cover it with a towel  Ice decreases pain and swelling, and helps prevent tissue damage  · Go to physical therapy as directed  A physical therapist teaches you exercises to help improve movement and strength, and to decrease pain  · Exercise for 30 minutes, 3 times a week  Regular physical activity can help decrease pain and improve your quality of life  Ask your healthcare provider about the best exercise plan for your type of pain  · Get enough sleep  Create a relaxing bedtime routine  Go to sleep and wake up at the same time every day  Avoid caffeine in the afternoon  · Talk with a counselor or therapist   A type of counseling called cognitive behavioral therapy (CBT) can help your chronic pain by changing the way you think about it  CBT can also improve your mood, sleep, and ability to move  What you must know if you take narcotic pain medicine:   · You may need to take a bowel movement softener  The most common side effect of prescription pain medicine is constipation  Bowel movement softeners are available over the counter  · Do not mix prescription pain medicines  This can cause an overdose of medicine, which can become life-threatening  Read labels  Make sure you know the ingredients in all of your medicines  · Do not drink alcohol  when you take prescription pain medicine  It is not safe to mix narcotics or opioids with alcohol or illegal drugs  · Prescription pain medicine may impair your ability to drive or work safely  They may also cause dizziness and increase your risk for falling       · Store prescription pain medicine in a safe location at home  Keep your medicine away from children and other people  Never share your medicine with anyone  Follow up with your healthcare provider as directed: You may be referred to a pain specialist  Write down your questions so you remember to ask them during your visits  © 2017 [employer] Information is for End User's use only and may not be sold, redistributed or otherwise used for commercial purposes  All illustrations and images included in CareNotes® are the copyrighted property of [employer]  or [employer]  The above information is an  only  It is not intended as medical advice for individual conditions or treatments  Talk to your doctor, nurse or pharmacist before following any medical regimen to see if it is safe and effective for you

## 2019-09-25 ENCOUNTER — TELEPHONE (OUTPATIENT)
Dept: INTERNAL MEDICINE CLINIC | Facility: CLINIC | Age: 74
End: 2019-09-25

## 2019-09-25 DIAGNOSIS — G35 MULTIPLE SCLEROSIS (HCC): Primary | ICD-10-CM

## 2019-09-25 NOTE — TELEPHONE ENCOUNTER
Patients daughter called and wants to take mother to [facility], they are requesting that Dr Watson orders an MRI of her brain    Please call Ava Miller when ordered  Call back # [phone]

## 2019-09-30 ENCOUNTER — TELEPHONE (OUTPATIENT)
Dept: INTERNAL MEDICINE CLINIC | Facility: CLINIC | Age: 74
End: 2019-09-30

## 2019-09-30 NOTE — TELEPHONE ENCOUNTER
Patient requested medical records be sent to [facility]    They were faxed & confirmed received

## 2019-10-03 ENCOUNTER — HOSPITAL ENCOUNTER (OUTPATIENT)
Dept: MRI IMAGING | Facility: HOSPITAL | Age: 74
Discharge: HOME | End: 2019-10-03
Attending: INTERNAL MEDICINE
Payer: MEDICARE

## 2019-10-03 DIAGNOSIS — G35 MULTIPLE SCLEROSIS (HCC): ICD-10-CM

## 2019-10-03 PROCEDURE — A9585 GADOBUTROL INJECTION: HCPCS | Performed by: INTERNAL MEDICINE

## 2019-10-03 PROCEDURE — 70553 MRI BRAIN STEM W/O & W/DYE: CPT

## 2019-10-03 RX ADMIN — GADOBUTROL 7 ML: 604.72 INJECTION INTRAVENOUS at 11:30

## 2019-10-04 ENCOUNTER — TELEPHONE (OUTPATIENT)
Dept: INTERNAL MEDICINE CLINIC | Facility: CLINIC | Age: 74
End: 2019-10-04

## 2019-10-04 NOTE — TELEPHONE ENCOUNTER
----- Message from Henry Mckeon DO sent at 10/4/2019 12:25 PM EDT -----  Call patient and let her know MRI continues to show signs of multiple sclerosis with plaques seen in the white matter of the brain  The size and number of lesions have not changed  There was some new enhancement of one of the lesions seen in the right frontal lobe of the brain  She will be seeing [facility]  We can fax this MRI to them, but they would probably also be able to see the report through [facility]

## 2019-10-16 ENCOUNTER — OFFICE VISIT (OUTPATIENT)
Dept: OBGYN CLINIC | Facility: CLINIC | Age: 74
End: 2019-10-16
Payer: MEDICARE

## 2019-10-16 VITALS
BODY MASS INDEX: 26.54 KG/M2 | DIASTOLIC BLOOD PRESSURE: 70 MMHG | WEIGHT: 144.2 LBS | SYSTOLIC BLOOD PRESSURE: 108 MMHG | RESPIRATION RATE: 18 BRPM | HEIGHT: 62 IN | HEART RATE: 87 BPM

## 2019-10-16 DIAGNOSIS — M79.2 NEUROGENIC PAIN OF LEFT LOWER EXTREMITY: Primary | ICD-10-CM

## 2019-10-16 PROCEDURE — 99213 OFFICE O/P EST LOW 20 MIN: CPT | Performed by: ORTHOPAEDIC SURGERY

## 2019-10-16 NOTE — PROGRESS NOTES
HPI:  Patient is a 66y o  year old  female with MS who presents with chief complaint of right sided back and LE  painwas previously seen by MARY Madden who provided her with right trochanteric bursa injection 9/4/19  Pt states that that did provide relief for her lateral hip pain  Pt was then seen in ED on 9/10/19 where she reported pain that started in her buttock region and radiated down the back of her right leg  CT of the right hip and pelvis  was performed in the ED showing mild OA  Pt was referred to spine and pain by Hazel Madden and the ED  Pt states that she did not follow through because her PCP told her that there was nothing that they could do for her since she is using medical mariajuana    Pt state that she has pain that starts on the right posterior upper quadrant that wraps around under her right breast  Pt states that she also has right posterior pain that wraps around to her right hip as well as the posterior or anterior aspects of her right thigh  Pt states that she has pain that sometimes goes to her knee and sometimes goes past her knee  Pt states that she has had pain that goes all the way to her foot that feels like it goes to the scar on her foot where she previously had neuroma  Pt denies groin pain  Pt states that her neurologist maxed out her dosage of gabapentin and it stopped working  Pt states that she weaned off of gabapentin and began using medical [place] that makes her night time pain an 8/10 rather than the previous 10/10  Pt states that she is trying to follow up with the head of neurology at [facility] and is hoping for an apt in the next month            ROS:   General: No fever, no chills, no weight loss, no weight gain  HEENT:  No loss of hearing, no nose bleeds, no sore throat  Eyes:  No eye pain, no red eyes, no visual disturbance  Respiratory:  No cough, no shortness of breath, no wheezing  Cardiovascular:  No chest pain, no palpitations, no edema  GI: No abdominal pain, no nausea, no vomiting  Endocrine: No frequent urination, no excessive thirst  Urinary:  No dysuria, no hematuria, no incontinence  Musculoskeletal: see HPI and PE  Skin:  No rash, no wounds  Neurological:  No dizziness, no headache, no numbness  Psychiatric:  No difficulty concentrating, no depression, no suicide thoughts, no anxiety  Review of all other systems is negative    PMH:  Past Medical History:   Diagnosis Date    Anxiety     Cataract     last assessed 14    COPD (chronic obstructive pulmonary disease) ([facility] [address] )     Disease of thyroid gland     Multiple sclerosis (HCC)     CHOCO (obstructive sleep apnea)     Peroneal muscle atrophy        PSH:  Past Surgical History:   Procedure Laterality Date    APPENDECTOMY      CATARACT EXTRACTION       SECTION      CHOLECYSTECTOMY      GALLBLADDER SURGERY      TN COLONOSCOPY FLX DX W/COLLJ SPEC WHEN PFRMD N/A 2018    Procedure: COLONOSCOPY;  Surgeon: Jie Blair MD;  Location: [facility]; Service: Colorectal    THROAT SURGERY         Medications:  Current Outpatient Medications   Medication Sig Dispense Refill    Cholecalciferol (VITAMIN D-3) 1000 units CAPS Take by mouth daily       diazepam (VALIUM) 2 mg tablet Take 2 mg by mouth daily at bedtime  5    levothyroxine 100 mcg tablet Take 1 tablet (100 mcg total) by mouth daily 90 tablet 3    morphine (MSIR) 15 mg tablet Take 1 tablet (15 mg total) by mouth 2 (two) times a day as needed for severe painMax Daily Amount: 30 mg 45 tablet 0    Multiple Vitamins-Minerals (PRESERVISION AREDS 2 PO) Take by mouth daily       NON FORMULARY       thiamine (VITAMIN B1) 100 mg tablet Take 1 tablet by mouth daily      vitamin B-12 (CYANOCOBALAMIN) 100 MCG tablet Take 100 mcg by mouth daily        No current facility-administered medications for this visit          Allergies:  No Known Allergies    Family History:  Family History   Problem Relation Age of Onset    Skin cancer Mother    [place] Hypertension Father         benign essential    Stroke Father     Lung cancer Brother        Social History:  Social History     Occupational History    Occupation: retired     Comment: part time as per [employer]   Tobacco Use    Smoking status: Current Every Day Smoker     Packs/day: 1 00     Years: 60 00     Pack years: 60 00     Types: Cigarettes     Start date: 2/16/1958    Smokeless tobacco: Never Used   Substance and Sexual Activity    Alcohol use: Yes     Alcohol/week: 8 0 standard drinks     Types: 7 Glasses of wine, 1 Cans of beer per week     Frequency: 4 or more times a week     Drinks per session: 1 or 2     Binge frequency: Never     Comment: daily/ socially    Drug use: Not on file     Comment: medical-RSO    Sexual activity: Yes     Partners: Male       Physical Exam:  General :  Alert, cooperative, no distress, appears stated age  Blood pressure 108/70, pulse 87, resp  rate 18, height 5' 2 25" (1 581 m), weight 65 4 kg (144 lb 3 2 oz)  Head:  Normocephalic, without obvious abnormality, atraumatic   Eyes:  Conjunctiva/corneas clear, EOM's intact,   Ears: Both ears normal appearance, no hearing deficits  Nose: Nares normal, septum midline, no drainage    Neck: Supple,  trachea midline, no adenopathy, no tenderness, no mass   Back:   Symmetric, no curvature, ROM normal, no tenderness   Lungs:   Respirations unlabored   Chest Wall:  No tenderness or deformity   Extremities: Extremities normal, atraumatic, no cyanosis or edema      Pulses: 2+ and symmetric   Skin: Skin color, texture, turgor normal, no rashes or lesions      Neurologic: Normal           Right Hip Exam     Tenderness   The patient is experiencing tenderness in the greater trochanter (thigh and knee)  Range of Motion   The patient has normal right hip ROM  Comments:  No groin pain with motion of the hip  Pt has weakness with active flexion of hip in a supine position                Imaging Studies:          The following imaging studies were reviewed in office today  My findings are noted  Xray of the righ hip performed 9/2/19 shows mild bilateral hip OA, no acute osseous abnormalities    CT of the right hip performed 9/10/19 shows mild osteoarthritis    MRI of the right hip performed 9/19/19 shows Mild right hip degenerative osteoarthritisas well as degenerative fraying involving the anterior/anterosuperior acetabular labrum    Assessment  Encounter Diagnosis   Name Primary?  Neurogenic pain of left lower extremity Yes         Plan:    Pt was advised that the sever left sided pain that she is having is not coming from her hip joint  It is most likely secondary to her MS  Pt was advised to call Dr Venus Montano of [facility] to discuss an apt with the staff  Pt was advised that their current plan to folow up at Neurology at [facility] was a good next step in her care        Scribe Attestation    I,:   Yesenia Pete am acting as a scribe while in the presence of the attending physician :        I,:   Sue Arango MD personally performed the services described in this documentation    as scribed in my presence :

## 2019-10-25 DIAGNOSIS — M79.2 CHRONIC NEUROPATHIC PAIN: ICD-10-CM

## 2019-10-25 DIAGNOSIS — G89.29 CHRONIC NEUROPATHIC PAIN: ICD-10-CM

## 2019-10-25 RX ORDER — MORPHINE SULFATE 15 MG/1
15 TABLET ORAL 2 TIMES DAILY PRN
Qty: 45 TABLET | Refills: 0 | Status: SHIPPED | OUTPATIENT
Start: 2019-10-25 | End: 2019-12-20 | Stop reason: SDUPTHER

## 2019-11-12 ENCOUNTER — TRANSCRIBE ORDERS (OUTPATIENT)
Dept: ADMINISTRATIVE | Facility: HOSPITAL | Age: 74
End: 2019-11-12

## 2019-11-12 DIAGNOSIS — M79.604 LOWER EXTREMITY PAIN, RIGHT: Primary | ICD-10-CM

## 2019-11-25 ENCOUNTER — HOSPITAL ENCOUNTER (OUTPATIENT)
Dept: MRI IMAGING | Facility: CLINIC | Age: 74
Discharge: HOME | End: 2019-11-25
Payer: MEDICARE

## 2019-11-25 ENCOUNTER — TRANSCRIBE ORDERS (OUTPATIENT)
Dept: ADMINISTRATIVE | Facility: HOSPITAL | Age: 74
End: 2019-11-25

## 2019-11-25 DIAGNOSIS — M79.604 LOWER EXTREMITY PAIN, RIGHT: ICD-10-CM

## 2019-11-25 PROCEDURE — 72148 MRI LUMBAR SPINE W/O DYE: CPT

## 2019-12-06 ENCOUNTER — TELEPHONE (OUTPATIENT)
Dept: INTERNAL MEDICINE CLINIC | Facility: CLINIC | Age: 74
End: 2019-12-06

## 2019-12-06 ENCOUNTER — TELEPHONE (OUTPATIENT)
Dept: NEUROLOGY | Facility: CLINIC | Age: 74
End: 2019-12-06

## 2019-12-06 DIAGNOSIS — G89.29 CHRONIC NEUROPATHIC PAIN: Primary | ICD-10-CM

## 2019-12-06 DIAGNOSIS — M79.2 CHRONIC NEUROPATHIC PAIN: Primary | ICD-10-CM

## 2019-12-06 NOTE — TELEPHONE ENCOUNTER
Best contact number for patient: [phone]    Emergency Contact name and number: Breann Montez [phone]    Referring provider: Libertad Lisa    Referring provider Telephone: [phone]    Primary Care Provider Name: Libertad Lisa    Reason for Appointment/Dx:     Neurology Location patient would like to be seen: [facility] received? Yes                                                Records Received? Yes    Have you ever seen another Neurologist? Dr Raquel Ingram [phone]      Insurance Information    Insurance Name: Medicare    ID/Policy #:    Secondary Insurance:    ID/Policy#: Workman's Comp/ Accident/ School  Information      Workman's Comp/Accident/School related? No    If yes name of Insurance company:    Date of Injury:    Open Claim:     Name and Telephone Number:    Notes:                   Appointment date: 3/25/20 with Dr Lyudmila Benjamin in [place]

## 2019-12-06 NOTE — TELEPHONE ENCOUNTER
She needs Dr to put a Dr's referral for Dr Chris Calvillo into her chart    Needed before she can get an appt     If possible today    She is in terrible pain

## 2019-12-07 ENCOUNTER — HOSPITAL ENCOUNTER (EMERGENCY)
Facility: HOSPITAL | Age: 74
Discharge: HOME | End: 2019-12-07
Attending: EMERGENCY MEDICINE
Payer: MEDICARE

## 2019-12-07 VITALS
WEIGHT: 133.82 LBS | RESPIRATION RATE: 16 BRPM | SYSTOLIC BLOOD PRESSURE: 135 MMHG | BODY MASS INDEX: 23.71 KG/M2 | HEIGHT: 63 IN | HEART RATE: 76 BPM | DIASTOLIC BLOOD PRESSURE: 72 MMHG | TEMPERATURE: 98.4 F | OXYGEN SATURATION: 97 %

## 2019-12-07 DIAGNOSIS — M54.14 THORACIC RADICULOPATHY: Primary | ICD-10-CM

## 2019-12-07 DIAGNOSIS — M54.17 LUMBOSACRAL RADICULOPATHY AT L4: ICD-10-CM

## 2019-12-07 DIAGNOSIS — R11.0 NAUSEA: ICD-10-CM

## 2019-12-07 PROCEDURE — 99285 EMERGENCY DEPT VISIT HI MDM: CPT | Performed by: NURSE PRACTITIONER

## 2019-12-07 PROCEDURE — 96372 THER/PROPH/DIAG INJ SC/IM: CPT

## 2019-12-07 PROCEDURE — 99284 EMERGENCY DEPT VISIT MOD MDM: CPT

## 2019-12-07 RX ORDER — METHYLPREDNISOLONE 4 MG/1
TABLET ORAL
Qty: 21 TABLET | Refills: 0 | Status: SHIPPED | OUTPATIENT
Start: 2019-12-07 | End: 2019-12-07 | Stop reason: SDUPTHER

## 2019-12-07 RX ORDER — MORPHINE SULFATE 4 MG/ML
4 INJECTION, SOLUTION INTRAMUSCULAR; INTRAVENOUS ONCE
Status: COMPLETED | OUTPATIENT
Start: 2019-12-07 | End: 2019-12-07

## 2019-12-07 RX ORDER — ONDANSETRON 4 MG/1
4 TABLET, ORALLY DISINTEGRATING ORAL EVERY 8 HOURS PRN
Qty: 9 TABLET | Refills: 0 | Status: SHIPPED | OUTPATIENT
Start: 2019-12-07 | End: 2019-12-07 | Stop reason: SDUPTHER

## 2019-12-07 RX ORDER — ONDANSETRON 4 MG/1
4 TABLET, ORALLY DISINTEGRATING ORAL ONCE
Status: COMPLETED | OUTPATIENT
Start: 2019-12-07 | End: 2019-12-07

## 2019-12-07 RX ORDER — METHYLPREDNISOLONE 4 MG/1
TABLET ORAL
Qty: 21 TABLET | Refills: 0 | Status: SHIPPED | OUTPATIENT
Start: 2019-12-07 | End: 2020-04-22

## 2019-12-07 RX ORDER — ONDANSETRON 4 MG/1
4 TABLET, ORALLY DISINTEGRATING ORAL EVERY 8 HOURS PRN
Qty: 9 TABLET | Refills: 0 | Status: SHIPPED | OUTPATIENT
Start: 2019-12-07 | End: 2020-04-22

## 2019-12-07 RX ADMIN — ONDANSETRON 4 MG: 4 TABLET, ORALLY DISINTEGRATING ORAL at 09:25

## 2019-12-07 RX ADMIN — MORPHINE SULFATE 4 MG: 4 INJECTION INTRAVENOUS at 09:26

## 2019-12-07 NOTE — ED NOTES
Pt requested to stay in room until her daughter arrives to pick her up, agreed as I recently administered morphine it will allow for observation     Jewels Barnes RN  12/07/19 5505

## 2019-12-07 NOTE — ED PROVIDER NOTES
History  Chief Complaint   Patient presents with    Abdominal Pain     pt states thats she has stomach promblems since July , pt states she vomitted last night and states she now has dull pain in her stomach      This is a pleasant 47-year-old female who presents here with a chief complaint of upset stomach 2nd to pain  She has had various pain elements for quite some time and reports that she can no longer function as result of the discomfort  She finds it difficult to continue with her ADLs and taking care of her house  She reports she has had a history of right-sided pain for quite some time  She was told it was post herpetic neuralgia although she never had a shingles or herpes outbreak in that area  Over time she has been placed on various different types of pain control medications including gabapentin  She reports that in the recent past in attempt to get off of all of medications she tried medical marijuana which just makes her feel unpleasant sort of intoxicated but did not alleviate her discomfort  She sought medical help at [facility] because she did not know where else to go and thought perhaps they would be helpful  She was sent to a neurologist that specialized in multiple sclerosis and told her it was not her MS that was causing her pain and subsequently ordered a MRI of her lumbar spine because of the right-sided leg pain that she has been experiencing of late  Lumbar spine reveals L4 root nerve involvement suggesting the source of her discomfort  She also has some scoliosis noted on previous CTs of the thoracic spine which would also or could also suggest a right-sided thoracic radiculopathy  She has been controlling her pain with immediate release morphine at home she tries to take this as minimally as possible but reports sometimes it is the only thing that will help her function    She reports that she wants to get off all these medications and is interested in a nerve stimulation unit or perhaps even nerve blocks  It seems that she has been bounced around from specialist to specialists not really knowing where to go  She admits that her stomach upset is related to pain  When she gets the pain she gets upset stomach as result of it  Prior to Admission Medications   Prescriptions Last Dose Informant Patient Reported? Taking? Cholecalciferol (VITAMIN D-3) 1000 units CAPS  Self Yes No   Sig: Take by mouth daily    Multiple Vitamins-Minerals (PRESERVISION AREDS 2 PO)  Self Yes No   Sig: Take by mouth daily    NON FORMULARY  Self Yes No   diazepam (VALIUM) 2 mg tablet  Self Yes No   Sig: Take 2 mg by mouth daily at bedtime   levothyroxine 100 mcg tablet  Self No No   Sig: Take 1 tablet (100 mcg total) by mouth daily   morphine (MSIR) 15 mg tablet   No No   Sig: Take 1 tablet (15 mg total) by mouth 2 (two) times a day as needed for severe painMax Daily Amount: 30 mg   thiamine (VITAMIN B1) 100 mg tablet  Self Yes No   Sig: Take 1 tablet by mouth daily   vitamin B-12 (CYANOCOBALAMIN) 100 MCG tablet  Self Yes No   Sig: Take 100 mcg by mouth daily       Facility-Administered Medications: None       Past Medical History:   Diagnosis Date    Anxiety     Cataract     last assessed 14    COPD (chronic obstructive pulmonary disease) (HCC)     Disease of thyroid gland     Multiple sclerosis (HCC)     CHOCO (obstructive sleep apnea)     Peroneal muscle atrophy        Past Surgical History:   Procedure Laterality Date    APPENDECTOMY      CATARACT EXTRACTION       SECTION      CHOLECYSTECTOMY      GALLBLADDER SURGERY      FL COLONOSCOPY FLX DX W/COLLJ SPEC WHEN PFRMD N/A 2018    Procedure: COLONOSCOPY;  Surgeon: Pedro Tenorio MD;  Location: [facility];   Service: Colorectal    THROAT SURGERY         Family History   Problem Relation Age of Onset    Skin cancer Mother     Hypertension Father         benign essential    Stroke Father     Lung cancer Brother I have reviewed and agree with the history as documented  Social History     Tobacco Use    Smoking status: Current Every Day Smoker     Packs/day: 1 00     Years: 60 00     Pack years: 60 00     Types: Cigarettes     Start date: 2/16/1958    Smokeless tobacco: Never Used   Substance Use Topics    Alcohol use: Yes     Alcohol/week: 8 0 standard drinks     Types: 7 Glasses of wine, 1 Cans of beer per week     Frequency: 4 or more times a week     Drinks per session: 1 or 2     Binge frequency: Never     Comment: daily/ socially    Drug use: Not on file     Comment: medical-RSO        Review of Systems   Constitutional: Negative for activity change, fatigue and fever  HENT: Negative for congestion, ear pain, rhinorrhea and sore throat  Eyes: Negative  Respiratory: Negative for cough, shortness of breath and wheezing  Gastrointestinal: Negative for abdominal pain, diarrhea, nausea and vomiting  Endocrine: Negative  Genitourinary: Negative for difficulty urinating, dyspareunia, dysuria, flank pain, frequency, menstrual problem, pelvic pain, urgency, vaginal bleeding, vaginal discharge and vaginal pain  Musculoskeletal: Positive for back pain  Negative for arthralgias and myalgias  Skin: Negative for color change and pallor  Neurological: Negative for dizziness, speech difficulty, weakness and headaches  Hematological: Negative for adenopathy  Psychiatric/Behavioral: Negative for confusion  Physical Exam  Physical Exam   Constitutional: She is oriented to person, place, and time  She appears well-developed and well-nourished  She is cooperative  Non-toxic appearance  She does not have a sickly appearance  She does not appear ill  No distress  HENT:   Head: Normocephalic and atraumatic  Right Ear: Tympanic membrane and external ear normal    Left Ear: Tympanic membrane and external ear normal    Nose: No rhinorrhea, sinus tenderness or nasal deformity  No epistaxis   Right sinus exhibits no maxillary sinus tenderness and no frontal sinus tenderness  Left sinus exhibits no maxillary sinus tenderness and no frontal sinus tenderness  Mouth/Throat: Oropharynx is clear and moist and mucous membranes are normal  Normal dentition  Eyes: Pupils are equal, round, and reactive to light  EOM are normal    Neck: Normal range of motion  Neck supple  Cardiovascular: Normal rate, regular rhythm and normal heart sounds  No murmur heard  Pulmonary/Chest: Effort normal and breath sounds normal  No accessory muscle usage  No respiratory distress  She has no wheezes  She has no rales  She exhibits no tenderness  Abdominal: Soft  She exhibits no distension  There is no guarding  Unremarkable physical exam of the abdomen no focal tenderness or distension or guarding or rebound  Negative Benitez's negative psoas negative obturator's  No tenderness over McBurney's point  No CVA tenderness  Musculoskeletal: Normal range of motion  She exhibits no edema or tenderness  Lumbar back: She exhibits pain  She is describing right-sided radiculopathy to the level of the mid shin but not to the foot which would fit her distribution of L4 root nerve irritation  Lymphadenopathy:     She has no cervical adenopathy  Neurological: She is alert and oriented to person, place, and time  She exhibits normal muscle tone  Skin: Skin is warm and dry  No rash noted  No erythema  Psychiatric: She has a normal mood and affect  Nursing note and vitals reviewed        Vital Signs  ED Triage Vitals [12/07/19 0817]   Temperature Pulse Respirations Blood Pressure SpO2   98 4 °F (36 9 °C) 89 16 142/75 99 %      Temp Source Heart Rate Source Patient Position - Orthostatic VS BP Location FiO2 (%)   Oral Monitor Sitting Right arm --      Pain Score       5           Vitals:    12/07/19 0817 12/07/19 0932   BP: 142/75 135/72   Pulse: 89 76   Patient Position - Orthostatic VS: Sitting          Visual Acuity      ED Medications  Medications   ondansetron (ZOFRAN-ODT) dispersible tablet 4 mg (4 mg Oral Given 12/7/19 0983)   morphine (PF) 4 mg/mL injection 4 mg (4 mg Intramuscular Given 12/7/19 5072)       Diagnostic Studies  Results Reviewed     None                 No orders to display              Procedures  Procedures         ED Course                               MDM  Number of Diagnoses or Management Options  Lumbosacral radiculopathy at L4: new and requires workup  Nausea: new and requires workup  Thoracic radiculopathy: new and requires workup  Diagnosis management comments: After having a long discussion with the patient it seems that she would like to try therapies aside from medications perhaps stimulation units or perhaps nerve blocks  Ambulatory referrals placed  To Orthopedic Spine and Neurosurgery  Patient Progress  Patient progress: stable        Disposition  Final diagnoses:   Thoracic radiculopathy   Lumbosacral radiculopathy at L4   Nausea     Time reflects when diagnosis was documented in both MDM as applicable and the Disposition within this note     Time User Action Codes Description Comment    12/7/2019  9:06 AM Guero Overall Add [M54 14] Thoracic radiculopathy     12/7/2019  9:06 AM Guero Overall Add [M54 17] Lumbosacral radiculopathy at L4     12/7/2019  9:10 AM Guero Overall Add [R11 0] Nausea       ED Disposition     ED Disposition Condition Date/Time Comment    Discharge Stable Sat Dec 7, 2019  9:06 AM Mani Cardenas discharge to home/self care              Follow-up Information     Follow up With Specialties Details Why Contact Info Additional MD [facility] Call  For Continued Evaluation [address] [facility] [address] [place] [address] [phone]       [facility] Schedule an appointment as soon as possible for a visit  For Continued Evaluation [phone]  [address] [place] [address] [place] [address], [id number] [phone]          Patient's Medications   Discharge Prescriptions    METHYLPREDNISOLONE (MEDROL) 4 MG TABLET THERAPY PACK    Use as directed on package       Start Date: 12/7/2019 End Date: --       Order Dose: --       Quantity: 21 tablet    Refills: 0    ONDANSETRON (ZOFRAN-ODT) 4 MG DISINTEGRATING TABLET    Take 1 tablet (4 mg total) by mouth every 8 (eight) hours as needed for nausea or vomiting for up to 3 days       Start Date: 12/7/2019 End Date: 12/10/2019       Order Dose: 4 mg       Quantity: 9 tablet    Refills: 0         ED Provider  Electronically Signed by           Magen Mcdaniel  12/07/19 8533

## 2019-12-09 ENCOUNTER — TELEPHONE (OUTPATIENT)
Dept: PAIN MEDICINE | Facility: CLINIC | Age: 74
End: 2019-12-09

## 2019-12-09 ENCOUNTER — OFFICE VISIT (OUTPATIENT)
Dept: OBGYN CLINIC | Facility: HOSPITAL | Age: 74
End: 2019-12-09
Payer: MEDICARE

## 2019-12-09 VITALS
BODY MASS INDEX: 23.57 KG/M2 | SYSTOLIC BLOOD PRESSURE: 115 MMHG | HEART RATE: 96 BPM | WEIGHT: 133 LBS | HEIGHT: 63 IN | DIASTOLIC BLOOD PRESSURE: 71 MMHG

## 2019-12-09 DIAGNOSIS — M54.17 LUMBOSACRAL RADICULOPATHY AT L4: ICD-10-CM

## 2019-12-09 DIAGNOSIS — M54.14 THORACIC RADICULOPATHY: ICD-10-CM

## 2019-12-09 PROCEDURE — 99213 OFFICE O/P EST LOW 20 MIN: CPT | Performed by: ORTHOPAEDIC SURGERY

## 2019-12-09 NOTE — TELEPHONE ENCOUNTER
Pt was scheduled for a consult with Dr Walter Santamaria in October and I'm not sure why she was cancelled, but she wants to be seen ASAP and Dr Walter Santamaria doesn't have any appts until Dec 30th  Does Dr Chris Bennett or Dr Kera Dunlap have anything sooner? Pls advise

## 2019-12-09 NOTE — PROGRESS NOTES
Assessment/Plan:    No problem-specific Assessment & Plan notes found for this encounter  Chronic right thoracic and lumbar pain with right leg pain  · Longstanding history of MS  · Patient made aware of incidental findings on lumbar MRI; left kidney cyst and bone marrow  · Referral to pain management   · Follow up as needed        Problem List Items Addressed This Visit     None      Visit Diagnoses     Thoracic radiculopathy        Relevant Orders    Ambulatory referral to Pain Management    Lumbosacral radiculopathy at L4        Relevant Orders    Ambulatory referral to Pain Management            Subjective:      Patient ID: Tang Moran is a 66 y o  female  HPI   The patient presents initial evaluation of right thoracic and right low back pain  She has history of MS  She has had thoracic pain for over one year and low back pain about 4 months ago  Today she complains of right thoracic pain with right wrapping rib pain and right low back pain with right anterior thigh and posterior calf pain  She rates her pain at 10/10  Medications alleviate  Most activity aggravates  She does use gabapentin, naproxen, morphine with some relief  She did have one trochanteric bursa steroid injection with one day relief  She denies past spine surgery  She does work with a neurologist       The following portions of the patient's history were reviewed and updated as appropriate: allergies, current medications, past family history, past medical history, past social history, past surgical history and problem list     Review of Systems   Constitutional: Negative for chills, fever and unexpected weight change  HENT: Negative for hearing loss, nosebleeds and sore throat  Eyes: Negative for pain, redness and visual disturbance  Respiratory: Negative for cough, shortness of breath and wheezing  Cardiovascular: Negative for chest pain, palpitations and leg swelling     Gastrointestinal: Negative for abdominal pain, nausea and vomiting  Genitourinary: Negative for dyspareunia, dysuria and frequency  Skin: Negative for rash and wound  Neurological: Negative for dizziness, numbness and headaches  Psychiatric/Behavioral: Negative for decreased concentration and suicidal ideas  The patient is not nervous/anxious  Objective:      /71   Pulse 96   Ht 5' 3" (1 6 m)   Wt 60 3 kg (133 lb)   BMI 23 56 kg/m²          Physical Exam   Constitutional: She is oriented to person, place, and time  She appears well-developed and well-nourished  HENT:   Head: Normocephalic  Eyes: Conjunctivae are normal    Neck: Normal range of motion  Cardiovascular: Normal rate  Pulmonary/Chest: Effort normal    Neurological: She is alert and oriented to person, place, and time  Skin: Skin is warm and dry  Patient ambulates without assistance   Tender to palpation over right SI joint   Modified straight leg raise negative bilaterally   Strength L2-S1 5/5 bilaterally   Sensation L2-S1 intact bilaterally   Homer's test equivocal right        Imaging:  Lumbar MRI 11/25/19:   L4-L5 stenosis  Lumbar DDD      Scribe Attestation    I,:   Hailey Reed am acting as a scribe while in the presence of the attending physician :        I,:   Latanya Guerra MD personally performed the services described in this documentation    as scribed in my presence :

## 2019-12-11 ENCOUNTER — TELEPHONE (OUTPATIENT)
Dept: INTERNAL MEDICINE CLINIC | Facility: CLINIC | Age: 74
End: 2019-12-11

## 2019-12-11 ENCOUNTER — OFFICE VISIT (OUTPATIENT)
Dept: PAIN MEDICINE | Facility: CLINIC | Age: 74
End: 2019-12-11
Payer: MEDICARE

## 2019-12-11 VITALS
BODY MASS INDEX: 23.57 KG/M2 | WEIGHT: 133 LBS | RESPIRATION RATE: 16 BRPM | DIASTOLIC BLOOD PRESSURE: 74 MMHG | HEIGHT: 63 IN | HEART RATE: 76 BPM | SYSTOLIC BLOOD PRESSURE: 124 MMHG

## 2019-12-11 DIAGNOSIS — G58.8 INTERCOSTAL NEURALGIA: ICD-10-CM

## 2019-12-11 DIAGNOSIS — M79.18 MYOFASCIAL PAIN SYNDROME: ICD-10-CM

## 2019-12-11 DIAGNOSIS — M51.16 LUMBAR DISC DISEASE WITH RADICULOPATHY: Primary | ICD-10-CM

## 2019-12-11 DIAGNOSIS — M47.816 LUMBAR SPONDYLOSIS: ICD-10-CM

## 2019-12-11 PROCEDURE — 99204 OFFICE O/P NEW MOD 45 MIN: CPT | Performed by: PHYSICAL MEDICINE & REHABILITATION

## 2019-12-11 NOTE — PATIENT INSTRUCTIONS
Lumbar Radiculopathy   WHAT YOU NEED TO KNOW:   Lumbar radiculopathy is a painful condition that happens when a nerve in your lumbar spine (lower back) is pinched or irritated  Nerves control feeling and movement in your body  You may have numbness or pain that shoots down from your lower back towards your foot  DISCHARGE INSTRUCTIONS:   Medicines:   · Medicines:     ¨ NSAIDs , such as ibuprofen, help decrease swelling, pain, and fever  This medicine is available with or without a doctor's order  NSAIDs can cause stomach bleeding or kidney problems in certain people  If you take blood thinner medicine, always ask your healthcare provider if NSAIDs are safe for you  Always read the medicine label and follow directions  ¨ Muscle relaxers  help decrease pain and muscle spasms  ¨ Opioids: This is a strong medicine given to reduce severe pain  It is also called narcotic pain medicine  Take this medicine exactly as directed by your healthcare provider  ¨ Oral steroids: Steroids may also be given to reduce pain and swelling  ¨ Take your medicine as directed  Contact your healthcare provider if you think your medicine is not helping or if you have side effects  Tell him of her if you are allergic to any medicine  Keep a list of the medicines, vitamins, and herbs you take  Include the amounts, and when and why you take them  Bring the list or the pill bottles to follow-up visits  Carry your medicine list with you in case of an emergency  Follow up with your healthcare provider or spine specialist within 1 to 3 weeks:  After your first follow-up appointment, return to your healthcare provider or spine specialist every 2 weeks until you have healed  Ask for information about physical therapy for your condition  Write down your questions so you remember to ask them during your visits  Physical therapy:  You may need physical therapy to improve your condition   Your physical therapist may teach you certain exercises to improve posture (the way you stand and sit), flexibility, and strength in your lower back  Self care:   · Stay active: It is best to be active when you have lumbar radiculopathy  Your physical therapist or healthcare provider may tell you to take walks to ease yourself back into your daily routine  Avoid long periods of bed rest  Bed rest could worsen your symptoms  Do not move in ways that increase your pain  Ask for more information about the best ways to stay active  · Use ice or heat packs:  Use ice or heat packs as directed on the sore area of your body to decrease the pain and swelling  Put ice in a plastic bag covered with a towel on your low back  Cover heated items with a towel to avoid burns  Use ice and heat as directed  · Avoid heavy lifting: Your condition may worsen if you lift heavy things  Avoid lifting if possible  · Maintain a healthy weight:  Excess body weight may strain your back  Talk with your healthcare provider about ways to lose excess weight if you are overweight  Contact your healthcare provider or spine specialist if:   · Your pain does not improve within 1 to 3 weeks after treatment  · Your pain and weakness keep you from your normal activities at work, home, or school  · You lose more than 10 pounds in 6 months without trying  · You become depressed or sad because of the pain  · You have questions or concerns about your condition or care  Return to the emergency department if:   · You have a fever greater than 100 4°F for longer than 2 days  · You have new, severe back or leg pain, or your pain spreads to both legs  · You have any new signs of numbness or weakness, especially in your lower back, legs, arms, or genital area  · You have new trouble controlling your urine and bowel movements  · You do not feel like your bladder empties when you urinate    © 2017 [employer] [employer] Information is for End User's use only and may not be sold, redistributed or otherwise used for commercial purposes  All illustrations and images included in CareNotes® are the copyrighted property of [employer]  or [employer]  The above information is an  only  It is not intended as medical advice for individual conditions or treatments  Talk to your doctor, nurse or pharmacist before following any medical regimen to see if it is safe and effective for you

## 2019-12-11 NOTE — PROGRESS NOTES
Assessment  1  Lumbar disc disease with radiculopathy    2  Myofascial pain syndrome    3  Lumbar spondylosis    4  Intercostal neuralgia        Plan  Ms Malloy Ours is a pleasant 68-year-old female with a significant past medical history of MS the patient reports relapsing remitting and she has had no acute flare-ups over the last several years presents with chronic right-sided low back radiating right leg pain as well as right-sided rib pain  Patient reports greater than 1 year ago she had questionable post herpetic neuralgia versus intercostal neuralgia, on my evaluation today patient appears to be having clinical evidence of right-sided intercostal neuralgia unrelieved with conservative measures including neuropathic agents  Additionally she is demonstrating clinical and diagnostic evidence of right-sided lumbar radiculopathy likely at the L4 dermatomal distribution  I believe interventional approaches to manage this pain may be warranted  At this time we will  1  Start in physical therapy program starting with aquatherapy and transition to land-based physical therapy  2  Ultrasound-guided intercostal nerve block right-sided ribs  3  Will plan for LESI under fluoro guidance likely at the L4-L5 interlaminar space right-sided paramedian approach  4  Complete risks and benefits including bleeding, infection, tissue reaction, nerve injury and allergic reaction were discussed  The approach was demonstrated using models and literature was provided  Verbal and written consent was obtained  My impressions and treatment recommendations were discussed in detail with the patient who verbalized understanding and had no further questions  Discharge instructions were provided  I personally saw and examined the patient and I agree with the above discussed plan of care  History of Present Illness    Annika Prieto is a 66 y o  female presents to [place] [address] and Pain associates with the above complaints    She has been dealing with the right-sided rib pain as well as right-sided low back pain for approximately 13 months without significant relief from conservative measures including neuropathic agents in therapies  Presents today in moderate to severe pain rated 5 to 10/10 and interfering with her daily activities  She states pain is constant 100% of the time  Pain is worse in the evening and night time and describes the pain as cramping, shooting, sharp, throbbing, dull and aching  She also complains of intermittent lower extremity weakness  Pain is worse with standing, walking, exercise, bowel movements  She has been using heat and ice which is providing relief and remains on Valium, MS Contin, gabapentin which is providing minimal relief in her pain  She has been seen by Dr Saira Fuentes and no surgical intervention was advised  Presents today for evaluation of worsening right-sided low back pain radiating into the right lower extremity as well as right-sided radiating rib pain  I have personally reviewed and/or updated the patient's past medical history, past surgical history, family history, social history, current medications, allergies, and vital signs today  Review of Systems   Constitutional: Positive for chills and unexpected weight change  Negative for fever  HENT: Negative for trouble swallowing  Eyes: Negative for visual disturbance  Respiratory: Negative for shortness of breath and wheezing  Cardiovascular: Negative for chest pain and palpitations  Gastrointestinal: Positive for abdominal pain, nausea and vomiting  Negative for constipation and diarrhea  Endocrine: Negative for cold intolerance, heat intolerance and polydipsia  Genitourinary: Negative for difficulty urinating and frequency  Musculoskeletal: Positive for myalgias  Negative for arthralgias, gait problem and joint swelling  Joint Pain   Skin: Negative for rash     Neurological: Negative for dizziness, seizures, syncope, weakness and headaches  Hematological: Does not bruise/bleed easily  Psychiatric/Behavioral: Positive for decreased concentration  Negative for dysphoric mood  All other systems reviewed and are negative  Patient Active Problem List   Diagnosis    CHOCO on CPAP    COPD with chronic bronchitis (HCC)    Hypothyroidism    Insomnia    Lung nodule, multiple    Macular degeneration    [facility] sclerosis ([facility] )    Rosacea    Seborrheic keratosis    Chronic neuropathic pain       Past Medical History:   Diagnosis Date    Anxiety     Cataract     last assessed 14    COPD (chronic obstructive pulmonary disease) ([facility] )     Disease of thyroid gland     Multiple sclerosis ([facility] )     CHOCO (obstructive sleep apnea)     Peroneal muscle atrophy        Past Surgical History:   Procedure Laterality Date    APPENDECTOMY      CATARACT EXTRACTION       SECTION      CHOLECYSTECTOMY      GALLBLADDER SURGERY      W[place] COLONOSCOPY FLX DX W/COLLJ SPEC WHEN PFRMD N/A 2018    Procedure: COLONOSCOPY;  Surgeon: Barbra Solano MD;  Location: [facility]; Service: Colorectal    THROAT SURGERY         Family History   Problem Relation Age of Onset    Skin cancer Mother     Hypertension Father         benign essential    Stroke Father     Lung cancer Brother        Social History     Occupational History    Occupation: retired     Comment: part time as per [employer]   Tobacco Use    Smoking status: Current Every Day Smoker     Packs/day: 1 00     Years: 60 00     Pack years: 60 00     Types: Cigarettes     Start date: 1958    Smokeless tobacco: Never Used   Substance and Sexual Activity    Alcohol use:  Yes     Alcohol/week: 8 0 standard drinks     Types: 7 Glasses of wine, 1 Cans of beer per week     Frequency: 4 or more times a week     Drinks per session: 1 or 2     Binge frequency: Never     Comment: daily/ socially    Drug use: Not on file     Comment: medical-RSO    Sexual activity: Yes     Partners: Male       Current Outpatient Medications on File Prior to Visit   Medication Sig    Cholecalciferol (VITAMIN D-3) 1000 units CAPS Take by mouth daily     diazepam (VALIUM) 2 mg tablet Take 2 mg by mouth daily at bedtime    levothyroxine 100 mcg tablet Take 1 tablet (100 mcg total) by mouth daily    methylPREDNISolone (MEDROL) 4 MG tablet therapy pack Use as directed on package    morphine (MSIR) 15 mg tablet Take 1 tablet (15 mg total) by mouth 2 (two) times a day as needed for severe painMax Daily Amount: 30 mg    NON FORMULARY     thiamine (VITAMIN B1) 100 mg tablet Take 1 tablet by mouth daily    vitamin B-12 (CYANOCOBALAMIN) 100 MCG tablet Take 100 mcg by mouth daily     Multiple Vitamins-Minerals (PRESERVISION AREDS 2 PO) Take by mouth daily     ondansetron (ZOFRAN-ODT) 4 mg disintegrating tablet Take 1 tablet (4 mg total) by mouth every 8 (eight) hours as needed for nausea or vomiting for up to 3 days     No current facility-administered medications on file prior to visit  No Known Allergies    Physical Exam    /74   Pulse 76   Resp 16   Ht 5' 3" (1 6 m)   Wt 60 3 kg (133 lb)   BMI 23 56 kg/m²   General: Well-developed, well-nourished individual in no acute distress  Mental: Appropriate mood and affect  Grossly oriented with coherent speech and thought processing  Neuro:  Cranial nerves: Cranial nerve function is grossly intact bilaterally  Strength: Bilateral lower extremity strength is normal and symmetric  No atrophy or tone abnormalities noted  Reflexes: Bilateral lower extremity muscle stretch reflexes are physiologic and symmetric  No ankle clonus is noted  Sensation:  Decreased sensation to light touch interim medial right thigh and anterior shin  SLR/Foraminal Compression Maneuvers: Straight leg raising in the   supine position is positive  for radicular pain right lower extremity      Gait:  Gait/gross motor: Gait is antalgic requires wheelchair  Station is forward flexed posture  Musculoskeletal:  Palpation: Inspection and palpation of the spine and extremities are remarkable for tenderness to palpation bilateral lumbar paraspinals  Exquisite tenderness to palpation right-sided rib margins inferolateral to the inferior border of scapula with referred pain radiating around lateral rib margin to mid axillary line right-sided    Spine:  Decreased active and passive range of motion lumbar spine with flexion and extension limited by pain  No gross axial skeletal deformities  Skin: Skin inspection grossly negative for erythema, breakdown, or concerning lesions in affected area  Lymph: No lymphadenopathy is appreciated in the involved extremity  Vessels: No lower extremity edema  Lungs: Breathing is comfortable and regular  No dyspnea noted during examination  Eyes: Visual field grossly intact to confrontation  No redness appreciated  ENT: No craniofacial deformities or asymmetry  No neck masses appreciated  Imaging 11/25/19 Lumbar MRI:L1-L2:  Minor facet arthrosis     L2-L3:  Facet     L3-L4:  Mild facet cysts     L4-L5:  Endplate changes, asymmetric to the right  Far right lateral marginal osteophytes, right greater than left facet arthrosis  Disc and osteophyte in contact with and possibly displacing the L4 root  Correlate for right L4 radiculitis  Minor   narrowing of the lateral recesses      L5-S1:  Bilateral facet arthrosis  Minor bulge      IMPRESSION:     Leftward translation of L4 and asymmetric degenerative changes of the facet, disc and endplate to the right with potential impact on post foraminal right L4 root    Correlate for right L4 radiculitis

## 2019-12-12 ENCOUNTER — APPOINTMENT (EMERGENCY)
Dept: CT IMAGING | Facility: HOSPITAL | Age: 74
DRG: 552 | End: 2019-12-12
Payer: MEDICARE

## 2019-12-12 ENCOUNTER — HOSPITAL ENCOUNTER (INPATIENT)
Facility: HOSPITAL | Age: 74
LOS: 4 days | Discharge: HOME HEALTH | DRG: 552 | End: 2019-12-16
Attending: EMERGENCY MEDICINE | Admitting: INTERNAL MEDICINE
Payer: MEDICARE

## 2019-12-12 ENCOUNTER — OFFICE VISIT (OUTPATIENT)
Dept: INTERNAL MEDICINE CLINIC | Facility: CLINIC | Age: 74
End: 2019-12-12
Payer: MEDICARE

## 2019-12-12 ENCOUNTER — TELEPHONE (OUTPATIENT)
Dept: INTERNAL MEDICINE CLINIC | Facility: CLINIC | Age: 74
End: 2019-12-12

## 2019-12-12 VITALS
DIASTOLIC BLOOD PRESSURE: 80 MMHG | OXYGEN SATURATION: 90 % | BODY MASS INDEX: 24.48 KG/M2 | HEART RATE: 119 BPM | WEIGHT: 138.2 LBS | SYSTOLIC BLOOD PRESSURE: 122 MMHG

## 2019-12-12 DIAGNOSIS — Z99.89 OSA ON CPAP: Chronic | ICD-10-CM

## 2019-12-12 DIAGNOSIS — R52 INTRACTABLE PAIN: ICD-10-CM

## 2019-12-12 DIAGNOSIS — Z72.0 TOBACCO ABUSE: ICD-10-CM

## 2019-12-12 DIAGNOSIS — M54.15 THORACOLUMBAR RADICULOPATHY DUE TO INTERVERTEBRAL DISC DISORDER: ICD-10-CM

## 2019-12-12 DIAGNOSIS — R53.1 GENERALIZED WEAKNESS: Primary | ICD-10-CM

## 2019-12-12 DIAGNOSIS — M54.10 RADICULAR NEUROPATHY: ICD-10-CM

## 2019-12-12 DIAGNOSIS — G47.33 OSA ON CPAP: Chronic | ICD-10-CM

## 2019-12-12 DIAGNOSIS — K59.00 CONSTIPATION, UNSPECIFIED CONSTIPATION TYPE: ICD-10-CM

## 2019-12-12 DIAGNOSIS — R15.9 INCONTINENCE OF FECES, UNSPECIFIED FECAL INCONTINENCE TYPE: ICD-10-CM

## 2019-12-12 DIAGNOSIS — R62.7 FAILURE TO THRIVE IN ADULT: ICD-10-CM

## 2019-12-12 DIAGNOSIS — E86.0 DEHYDRATION: ICD-10-CM

## 2019-12-12 DIAGNOSIS — R26.9 GAIT DISTURBANCE: ICD-10-CM

## 2019-12-12 DIAGNOSIS — R11.2 NAUSEA AND VOMITING, INTRACTABILITY OF VOMITING NOT SPECIFIED, UNSPECIFIED VOMITING TYPE: ICD-10-CM

## 2019-12-12 DIAGNOSIS — N39.0 UTI (URINARY TRACT INFECTION): ICD-10-CM

## 2019-12-12 DIAGNOSIS — R32 URINARY INCONTINENCE, UNSPECIFIED TYPE: ICD-10-CM

## 2019-12-12 LAB
ALBUMIN SERPL BCP-MCNC: 3.6 G/DL (ref 3.5–5)
ALP SERPL-CCNC: 91 U/L (ref 46–116)
ALT SERPL W P-5'-P-CCNC: 53 U/L (ref 12–78)
ANION GAP SERPL CALCULATED.3IONS-SCNC: 11 MMOL/L (ref 4–13)
APTT PPP: 29 SECONDS (ref 23–37)
AST SERPL W P-5'-P-CCNC: 17 U/L (ref 5–45)
BACTERIA UR QL AUTO: ABNORMAL /HPF
BASOPHILS # BLD AUTO: 0.04 THOUSANDS/ΜL (ref 0–0.1)
BASOPHILS NFR BLD AUTO: 0 % (ref 0–1)
BILIRUB SERPL-MCNC: 0.2 MG/DL (ref 0.2–1)
BILIRUB UR QL STRIP: NEGATIVE
BUN SERPL-MCNC: 10 MG/DL (ref 5–25)
CALCIUM SERPL-MCNC: 9 MG/DL (ref 8.3–10.1)
CHLORIDE SERPL-SCNC: 100 MMOL/L (ref 100–108)
CLARITY UR: CLEAR
CO2 SERPL-SCNC: 27 MMOL/L (ref 21–32)
COLOR UR: YELLOW
CREAT SERPL-MCNC: 0.58 MG/DL (ref 0.6–1.3)
EOSINOPHIL # BLD AUTO: 0.1 THOUSAND/ΜL (ref 0–0.61)
EOSINOPHIL NFR BLD AUTO: 1 % (ref 0–6)
ERYTHROCYTE [DISTWIDTH] IN BLOOD BY AUTOMATED COUNT: 13.2 % (ref 11.6–15.1)
GFR SERPL CREATININE-BSD FRML MDRD: 90 ML/MIN/1.73SQ M
GLUCOSE SERPL-MCNC: 85 MG/DL (ref 65–140)
GLUCOSE UR STRIP-MCNC: NEGATIVE MG/DL
HCT VFR BLD AUTO: 40.6 % (ref 34.8–46.1)
HGB BLD-MCNC: 13.5 G/DL (ref 11.5–15.4)
HGB UR QL STRIP.AUTO: ABNORMAL
IMM GRANULOCYTES # BLD AUTO: 0.03 THOUSAND/UL (ref 0–0.2)
IMM GRANULOCYTES NFR BLD AUTO: 0 % (ref 0–2)
INR PPP: 1.15 (ref 0.84–1.19)
KETONES UR STRIP-MCNC: NEGATIVE MG/DL
LACTATE SERPL-SCNC: 1 MMOL/L (ref 0.5–2)
LEUKOCYTE ESTERASE UR QL STRIP: ABNORMAL
LIPASE SERPL-CCNC: 293 U/L (ref 73–393)
LYMPHOCYTES # BLD AUTO: 2.61 THOUSANDS/ΜL (ref 0.6–4.47)
LYMPHOCYTES NFR BLD AUTO: 28 % (ref 14–44)
MCH RBC QN AUTO: 31.8 PG (ref 26.8–34.3)
MCHC RBC AUTO-ENTMCNC: 33.3 G/DL (ref 31.4–37.4)
MCV RBC AUTO: 96 FL (ref 82–98)
MONOCYTES # BLD AUTO: 0.93 THOUSAND/ΜL (ref 0.17–1.22)
MONOCYTES NFR BLD AUTO: 10 % (ref 4–12)
NEUTROPHILS # BLD AUTO: 5.57 THOUSANDS/ΜL (ref 1.85–7.62)
NEUTS SEG NFR BLD AUTO: 61 % (ref 43–75)
NITRITE UR QL STRIP: NEGATIVE
NON-SQ EPI CELLS URNS QL MICRO: ABNORMAL /HPF
NRBC BLD AUTO-RTO: 0 /100 WBCS
PH UR STRIP.AUTO: 7.5 [PH]
PLATELET # BLD AUTO: 283 THOUSANDS/UL (ref 149–390)
PMV BLD AUTO: 9.5 FL (ref 8.9–12.7)
POTASSIUM SERPL-SCNC: 3.5 MMOL/L (ref 3.5–5.3)
PROT SERPL-MCNC: 7.3 G/DL (ref 6.4–8.2)
PROT UR STRIP-MCNC: NEGATIVE MG/DL
PROTHROMBIN TIME: 14.8 SECONDS (ref 11.6–14.5)
RBC # BLD AUTO: 4.25 MILLION/UL (ref 3.81–5.12)
RBC #/AREA URNS AUTO: ABNORMAL /HPF
SODIUM SERPL-SCNC: 138 MMOL/L (ref 136–145)
SP GR UR STRIP.AUTO: <=1.005 (ref 1–1.03)
TROPONIN I SERPL-MCNC: <0.02 NG/ML
UROBILINOGEN UR QL STRIP.AUTO: 0.2 E.U./DL
WBC # BLD AUTO: 9.28 THOUSAND/UL (ref 4.31–10.16)
WBC #/AREA URNS AUTO: ABNORMAL /HPF

## 2019-12-12 PROCEDURE — 93005 ELECTROCARDIOGRAM TRACING: CPT

## 2019-12-12 PROCEDURE — 85610 PROTHROMBIN TIME: CPT | Performed by: PHYSICIAN ASSISTANT

## 2019-12-12 PROCEDURE — 83690 ASSAY OF LIPASE: CPT

## 2019-12-12 PROCEDURE — 99285 EMERGENCY DEPT VISIT HI MDM: CPT | Performed by: PHYSICIAN ASSISTANT

## 2019-12-12 PROCEDURE — 94660 CPAP INITIATION&MGMT: CPT

## 2019-12-12 PROCEDURE — 99223 1ST HOSP IP/OBS HIGH 75: CPT | Performed by: INTERNAL MEDICINE

## 2019-12-12 PROCEDURE — 36415 COLL VENOUS BLD VENIPUNCTURE: CPT

## 2019-12-12 PROCEDURE — 80053 COMPREHEN METABOLIC PANEL: CPT

## 2019-12-12 PROCEDURE — 94760 N-INVAS EAR/PLS OXIMETRY 1: CPT

## 2019-12-12 PROCEDURE — 99285 EMERGENCY DEPT VISIT HI MDM: CPT

## 2019-12-12 PROCEDURE — 74177 CT ABD & PELVIS W/CONTRAST: CPT

## 2019-12-12 PROCEDURE — 87086 URINE CULTURE/COLONY COUNT: CPT | Performed by: INTERNAL MEDICINE

## 2019-12-12 PROCEDURE — 81001 URINALYSIS AUTO W/SCOPE: CPT | Performed by: PHYSICIAN ASSISTANT

## 2019-12-12 PROCEDURE — 85730 THROMBOPLASTIN TIME PARTIAL: CPT | Performed by: PHYSICIAN ASSISTANT

## 2019-12-12 PROCEDURE — 1124F ACP DISCUSS-NO DSCNMKR DOCD: CPT | Performed by: INTERNAL MEDICINE

## 2019-12-12 PROCEDURE — 84484 ASSAY OF TROPONIN QUANT: CPT | Performed by: PHYSICIAN ASSISTANT

## 2019-12-12 PROCEDURE — 83605 ASSAY OF LACTIC ACID: CPT | Performed by: PHYSICIAN ASSISTANT

## 2019-12-12 PROCEDURE — 99214 OFFICE O/P EST MOD 30 MIN: CPT | Performed by: INTERNAL MEDICINE

## 2019-12-12 PROCEDURE — 85025 COMPLETE CBC W/AUTO DIFF WBC: CPT

## 2019-12-12 RX ORDER — LEVOTHYROXINE SODIUM 0.1 MG/1
100 TABLET ORAL
Status: DISCONTINUED | OUTPATIENT
Start: 2019-12-13 | End: 2019-12-16 | Stop reason: HOSPADM

## 2019-12-12 RX ORDER — DIAZEPAM 2 MG/1
2 TABLET ORAL
Status: DISCONTINUED | OUTPATIENT
Start: 2019-12-12 | End: 2019-12-16 | Stop reason: HOSPADM

## 2019-12-12 RX ORDER — MORPHINE SULFATE 4 MG/ML
4 INJECTION, SOLUTION INTRAMUSCULAR; INTRAVENOUS EVERY 4 HOURS PRN
Status: DISCONTINUED | OUTPATIENT
Start: 2019-12-12 | End: 2019-12-12

## 2019-12-12 RX ORDER — MELATONIN
1000 DAILY
Status: DISCONTINUED | OUTPATIENT
Start: 2019-12-13 | End: 2019-12-16 | Stop reason: HOSPADM

## 2019-12-12 RX ORDER — ACETAMINOPHEN 325 MG/1
650 TABLET ORAL EVERY 6 HOURS PRN
Status: DISCONTINUED | OUTPATIENT
Start: 2019-12-12 | End: 2019-12-16 | Stop reason: HOSPADM

## 2019-12-12 RX ORDER — MORPHINE SULFATE 4 MG/ML
4 INJECTION, SOLUTION INTRAMUSCULAR; INTRAVENOUS EVERY 4 HOURS PRN
Status: DISCONTINUED | OUTPATIENT
Start: 2019-12-12 | End: 2019-12-16 | Stop reason: HOSPADM

## 2019-12-12 RX ORDER — MORPHINE SULFATE 15 MG/1
15 TABLET ORAL 2 TIMES DAILY PRN
Status: DISCONTINUED | OUTPATIENT
Start: 2019-12-12 | End: 2019-12-16 | Stop reason: HOSPADM

## 2019-12-12 RX ORDER — METHYLPREDNISOLONE 4 MG/1
4 TABLET ORAL ONCE
Status: COMPLETED | OUTPATIENT
Start: 2019-12-13 | End: 2019-12-13

## 2019-12-12 RX ORDER — ONDANSETRON 2 MG/ML
4 INJECTION INTRAMUSCULAR; INTRAVENOUS EVERY 4 HOURS PRN
Status: DISCONTINUED | OUTPATIENT
Start: 2019-12-12 | End: 2019-12-16 | Stop reason: HOSPADM

## 2019-12-12 RX ORDER — HEPARIN SODIUM 5000 [USP'U]/ML
5000 INJECTION, SOLUTION INTRAVENOUS; SUBCUTANEOUS EVERY 8 HOURS SCHEDULED
Status: DISCONTINUED | OUTPATIENT
Start: 2019-12-12 | End: 2019-12-16 | Stop reason: HOSPADM

## 2019-12-12 RX ORDER — ONDANSETRON 2 MG/ML
4 INJECTION INTRAMUSCULAR; INTRAVENOUS ONCE
Status: COMPLETED | OUTPATIENT
Start: 2019-12-12 | End: 2019-12-12

## 2019-12-12 RX ORDER — SODIUM CHLORIDE 9 MG/ML
84 INJECTION, SOLUTION INTRAVENOUS CONTINUOUS
Status: DISPENSED | OUTPATIENT
Start: 2019-12-12 | End: 2019-12-13

## 2019-12-12 RX ORDER — SODIUM CHLORIDE 9 MG/ML
75 INJECTION, SOLUTION INTRAVENOUS CONTINUOUS
Status: DISCONTINUED | OUTPATIENT
Start: 2019-12-12 | End: 2019-12-16

## 2019-12-12 RX ADMIN — IOHEXOL 100 ML: 350 INJECTION, SOLUTION INTRAVENOUS at 13:43

## 2019-12-12 RX ADMIN — MORPHINE SULFATE 4 MG: 4 INJECTION INTRAVENOUS at 19:43

## 2019-12-12 RX ADMIN — ONDANSETRON 4 MG: 2 INJECTION INTRAMUSCULAR; INTRAVENOUS at 13:50

## 2019-12-12 RX ADMIN — ONDANSETRON 4 MG: 2 INJECTION INTRAMUSCULAR; INTRAVENOUS at 19:43

## 2019-12-12 RX ADMIN — SODIUM CHLORIDE 75 ML/HR: 0.9 INJECTION, SOLUTION INTRAVENOUS at 18:22

## 2019-12-12 RX ADMIN — CEFTRIAXONE SODIUM 1000 MG: 10 INJECTION, POWDER, FOR SOLUTION INTRAVENOUS at 15:49

## 2019-12-12 RX ADMIN — HEPARIN SODIUM 5000 UNITS: 5000 INJECTION INTRAVENOUS; SUBCUTANEOUS at 23:34

## 2019-12-12 RX ADMIN — DIAZEPAM 2 MG: 2 TABLET ORAL at 23:34

## 2019-12-12 RX ADMIN — SODIUM CHLORIDE 1000 ML: 0.9 INJECTION, SOLUTION INTRAVENOUS at 13:31

## 2019-12-12 NOTE — ED PROVIDER NOTES
History  Chief Complaint   Patient presents with    Weakness - Generalized     Patient states she right sided neuropathy pain, weakness, dizziness, 20lb weight loss in the last month   Dizziness     66year old female with past medical history significant for COPD, MS, lumbar radiculopathy presents to ED with chief complaint of weight loss, vomiting, and right sided leg and body pain  Onset of symptoms reported as over 3 months ago  Location of symptoms reported as the right side of lower back with radiation down right leg, and the abdomen  Quality is reported as sharp pain  Severity is reported as severe  Associated symptoms: positive for vomiting, denies diarrhea, denies headache, denies rash  Denies fevers  Positive for abdominal pain  Positive for 20 lb weight loss unintentional over the past 1 month  Denies urinary retention, denies dysuria or hematuria  Modifiers: eating exacerbates vomiting and abdominal pain  Context:  Patient reports a history of multiple sclerosis on lumbar radiculopathy  She has been on morphine for treatment for this for many months  She reports over the past few months she has had increasing low back and right leg pain  She reports over the past 1 month she has had a 20 lb weight loss unintentionally associated with nausea and vomiting  She reports every time she eats she gets nauseous and vomits  She reports she was seen by her PCP today in the office who referred to the emergency department for further evaluation of her symptoms due to concern for her weight loss and severe pain symptoms  Reviewed past visits via [facility], patient last seen in ed on 12/7/2019 for evaluation of thoracic radiculopathy  History provided by:  Patient   used: No        Prior to Admission Medications   Prescriptions Last Dose Informant Patient Reported? Taking?    Cholecalciferol (VITAMIN D-3) 1000 units CAPS 12/11/2019 at Unknown time Self Yes Yes   Sig: Take by mouth daily    Multiple Vitamins-Minerals (ICAPS AREDS 2 PO)   Yes Yes   Sig: Take 1 tablet by mouth 2 (two) times a day   NON FORMULARY 2019 at Unknown time Self Yes Yes   diazepam (VALIUM) 2 mg tablet 2019 at Unknown time Self Yes Yes   Sig: Take 2 mg by mouth daily at bedtime   levothyroxine 100 mcg tablet 2019 at Unknown time Self No Yes   Sig: Take 1 tablet (100 mcg total) by mouth daily   methylPREDNISolone (MEDROL) 4 MG tablet therapy pack 2019 at Unknown time Self No Yes   Sig: Use as directed on package   morphine (MSIR) 15 mg tablet 2019 at Unknown time Self No Yes   Sig: Take 1 tablet (15 mg total) by mouth 2 (two) times a day as needed for severe painMax Daily Amount: 30 mg   ondansetron (ZOFRAN-ODT) 4 mg disintegrating tablet 2019 at Unknown time Self No Yes   Sig: Take 1 tablet (4 mg total) by mouth every 8 (eight) hours as needed for nausea or vomiting for up to 3 days   vitamin B-12 (CYANOCOBALAMIN) 100 MCG tablet 2019 at Unknown time Self Yes Yes   Sig: Take 100 mcg by mouth daily       Facility-Administered Medications: None       Past Medical History:   Diagnosis Date    Anxiety     Cataract     last assessed 14    [facility] [address] [facility]     Hypothyroidism     Multiple sclerosis [facility] [address] [facility]     CHOCO (obstructive sleep apnea)     Peroneal muscle atrophy     Thoracolumbar radiculopathy due to intervertebral disc disorder        Past Surgical History:   Procedure Laterality Date    APPENDECTOMY      CATARACT EXTRACTION       SECTION      CHOLECYSTECTOMY      GALLBLADDER SURGERY      [facility] COLONOSCOPY FLX DX W/COLLJ SPEC WHEN PFRMD N/A 2018    Procedure: COLONOSCOPY;  Surgeon: Debbie Eldridge MD;  Location: [facility];   Service: Colorectal    THROAT SURGERY         Family History   Problem Relation Age of Onset    Skin cancer Mother     Hypertension Father         benign essential    Stroke Father    Bessyguillermo Foxel Lung cancer Brother      I have reviewed and agree with the history as documented  Social History     Tobacco Use    Smoking status: Current Every Day Smoker     Packs/day: 1 00     Years: 60 00     Pack years: 60 00     Types: Cigarettes     Start date: 2/16/1958    Smokeless tobacco: Never Used   Substance Use Topics    Alcohol use: Not Currently     Alcohol/week: 8 0 standard drinks     Types: 7 Glasses of wine, 1 Cans of beer per week     Frequency: 4 or more times a week     Drinks per session: 1 or 2     Binge frequency: Never     Comment: daily/ socially    Drug use: Not Currently     Types: Marijuana     Comment: medical-RSO        Review of Systems   Constitutional: Positive for appetite change, fatigue and unexpected weight change  Negative for activity change, chills, diaphoresis and fever  HENT: Negative for congestion, dental problem, drooling, ear discharge, ear pain, facial swelling, hearing loss, mouth sores, nosebleeds, postnasal drip, rhinorrhea, sinus pressure, sinus pain, sneezing, sore throat, tinnitus, trouble swallowing and voice change  Eyes: Negative for photophobia, pain, discharge, redness, itching and visual disturbance  Respiratory: Negative for apnea, cough, choking, chest tightness, shortness of breath, wheezing and stridor  Cardiovascular: Negative for chest pain, palpitations and leg swelling  Gastrointestinal: Positive for abdominal pain, nausea and vomiting  Negative for abdominal distention, anal bleeding, blood in stool, constipation, diarrhea and rectal pain  Endocrine: Negative for cold intolerance, heat intolerance, polydipsia, polyphagia and polyuria  Genitourinary: Negative for decreased urine volume, difficulty urinating, dysuria, flank pain, hematuria and urgency  Musculoskeletal: Positive for arthralgias, back pain, gait problem and myalgias  Negative for joint swelling, neck pain and neck stiffness     Skin: Negative for color change, pallor, rash and wound  Allergic/Immunologic: Negative for environmental allergies, food allergies and immunocompromised state  Neurological: Positive for weakness  Negative for dizziness, tremors, seizures, syncope, facial asymmetry, speech difficulty, light-headedness, numbness and headaches  Hematological: Negative for adenopathy  Does not bruise/bleed easily  Psychiatric/Behavioral: Negative for agitation, behavioral problems, confusion, sleep disturbance and suicidal ideas  The patient is not nervous/anxious  All other systems reviewed and are negative  Physical Exam  Physical Exam   Constitutional: She is oriented to person, place, and time  She appears well-developed and well-nourished  No distress  /82 (BP Location: Left arm)   Pulse 78   Temp 98 1 °F (36 7 °C) (Oral)   Resp 18   Ht 5' 3" (1 6 m)   Wt 60 3 kg (133 lb)   SpO2 97%   BMI 23 56 kg/m²      HENT:   Head: Normocephalic and atraumatic  Right Ear: External ear normal    Left Ear: External ear normal    Nose: Nose normal    Mouth/Throat: Oropharynx is clear and moist  No oropharyngeal exudate  Eyes: Conjunctivae and EOM are normal  Right eye exhibits no discharge  Left eye exhibits no discharge  No scleral icterus  Neck: Normal range of motion  Neck supple  No JVD present  No tracheal deviation present  Cardiovascular: Normal rate, regular rhythm and intact distal pulses  Pulmonary/Chest: Effort normal and breath sounds normal  No stridor  No respiratory distress  She has no wheezes  She has no rales  She exhibits no tenderness  Abdominal: Soft  Bowel sounds are normal  She exhibits no distension and no mass  There is tenderness  There is no rebound and no guarding  No hernia  Musculoskeletal: Normal range of motion  She exhibits tenderness  She exhibits no edema or deformity  Lumbar back: She exhibits tenderness  Back:         Legs:  Lymphadenopathy:     She has no cervical adenopathy     Neurological: She is alert and oriented to person, place, and time  She displays normal reflexes  No cranial nerve deficit or sensory deficit  She exhibits normal muscle tone  Coordination normal    Skin: Skin is warm and dry  Capillary refill takes less than 2 seconds  No rash noted  She is not diaphoretic  No erythema  No pallor  Psychiatric: She has a normal mood and affect  Her behavior is normal  Judgment and thought content normal    Nursing note and vitals reviewed        Vital Signs  ED Triage Vitals [12/12/19 1156]   Temperature Pulse Respirations Blood Pressure SpO2   98 1 °F (36 7 °C) 95 21 139/70 97 %      Temp Source Heart Rate Source Patient Position - Orthostatic VS BP Location FiO2 (%)   Oral Monitor Sitting Left arm --      Pain Score       6           Vitals:    12/12/19 1445 12/12/19 1630 12/12/19 1730 12/12/19 1830   BP: 121/82 121/81  125/85   Pulse: 78 82 74    Patient Position - Orthostatic VS: Lying Lying           Visual Acuity      ED Medications  Medications   morphine (PF) 4 mg/mL injection 4 mg (has no administration in time range)   sodium chloride 0 9 % infusion (75 mL/hr Intravenous New Bag 12/12/19 1822)   methylprednisolone (MEDROL) tablet 4 mg (has no administration in time range)   sodium chloride 0 9 % bolus 1,000 mL (0 mL Intravenous Stopped 12/12/19 1454)   ondansetron (ZOFRAN) injection 4 mg (4 mg Intravenous Given 12/12/19 1350)   iohexol (OMNIPAQUE) 350 MG/ML injection (MULTI-DOSE) 100 mL (100 mL Intravenous Given 12/12/19 1343)   ceftriaxone (ROCEPHIN) 1 g/50 mL in dextrose IVPB (0 mg Intravenous Stopped 12/12/19 1631)       Diagnostic Studies  Results Reviewed     Procedure Component Value Units Date/Time    Urine culture [[id number]]     Lab Status:  No result Specimen:  Urine     Urine Microscopic [[id number]]  (Abnormal) Collected:  12/12/19 1407    Lab Status:  Final result Specimen:  Urine, Clean Catch Updated:  12/12/19 1428     RBC, UA 0-1 /hpf      WBC, UA 4-10 /hpf Epithelial Cells Occasional /hpf      Bacteria, UA Occasional /hpf     UA w Reflex to Microscopic w Reflex to Culture [[id number]]  (Abnormal) Collected:  12/12/19 1407    Lab Status:  Final result Specimen:  Urine, Clean Catch Updated:  12/12/19 1415     Color, UA Yellow     Clarity, UA Clear     Specific Gravity, UA <=1 005     pH, UA 7 5     Leukocytes, UA Moderate     Nitrite, UA Negative     Protein, UA Negative mg/dl      Glucose, UA Negative mg/dl      Ketones, UA Negative mg/dl      Urobilinogen, UA 0 2 E U /dl      Bilirubin, UA Negative     Blood, UA Trace-Intact    Lactic acid, plasma x2 [[id number]]  (Normal) Collected:  12/12/19 1330    Lab Status:  Final result Specimen:  Blood from Arm, Right Updated:  12/12/19 1409     LACTIC ACID 1 0 mmol/L     Narrative:       Result may be elevated if tourniquet was used during collection      Troponin I [[id number]]  (Normal) Collected:  12/12/19 1330    Lab Status:  Final result Specimen:  Blood from Arm, Right Updated:  12/12/19 1405     Troponin I <0 02 ng/mL     APTT [[id number]]  (Normal) Collected:  12/12/19 1330    Lab Status:  Final result Specimen:  Blood from Arm, Right Updated:  12/12/19 1401     PTT 29 seconds     Protime-INR [[id number]]  (Abnormal) Collected:  12/12/19 1330    Lab Status:  Final result Specimen:  Blood from Arm, Right Updated:  12/12/19 1401     Protime 14 8 seconds      INR 1 15    Comprehensive metabolic panel [[id number]]  (Abnormal) Collected:  12/12/19 1253    Lab Status:  Final result Specimen:  Blood from Hand, Right Updated:  12/12/19 1316     Sodium 138 mmol/L      Potassium 3 5 mmol/L      Chloride 100 mmol/L      CO2 27 mmol/L      ANION GAP 11 mmol/L      BUN 10 mg/dL      Creatinine 0 58 mg/dL      Glucose 85 mg/dL      Calcium 9 0 mg/dL      AST 17 U/L      ALT 53 U/L      Alkaline Phosphatase 91 U/L      Total Protein 7 3 g/dL      Albumin 3 6 g/dL      Total Bilirubin 0 20 mg/dL      eGFR 90 ml/min/1 73sq m     Narrative: National Kidney Disease Foundation guidelines for Chronic Kidney Disease (CKD):     Stage 1 with normal or high GFR (GFR > 90 mL/min/1 73 square meters)    Stage 2 Mild CKD (GFR = 60-89 mL/min/1 73 square meters)    Stage 3A Moderate CKD (GFR = 45-59 mL/min/1 73 square meters)    Stage 3B Moderate CKD (GFR = 30-44 mL/min/1 73 square meters)    Stage 4 Severe CKD (GFR = 15-29 mL/min/1 73 square meters)    Stage 5 End Stage CKD (GFR <15 mL/min/1 73 square meters)  Note: GFR calculation is accurate only with a steady state creatinine    Lipase [[id number]]  (Normal) Collected:  12/12/19 1253    Lab Status:  Final result Specimen:  Blood from Hand, Right Updated:  12/12/19 1316     Lipase 293 u/L     Magnesium [[id number]]     Lab Status:  No result Specimen:  Blood     CBC and differential [[id number]] Collected:  12/12/19 1253    Lab Status:  Final result Specimen:  Blood from Hand, Right Updated:  12/12/19 1258     WBC 9 28 Thousand/uL      RBC 4 25 Million/uL      Hemoglobin 13 5 g/dL      Hematocrit 40 6 %      MCV 96 fL      MCH 31 8 pg      MCHC 33 3 g/dL      RDW 13 2 %      MPV 9 5 fL      Platelets 448 Thousands/uL      nRBC 0 /100 WBCs      Neutrophils Relative 61 %      Immat GRANS % 0 %      Lymphocytes Relative 28 %      Monocytes Relative 10 %      Eosinophils Relative 1 %      Basophils Relative 0 %      Neutrophils Absolute 5 57 Thousands/µL      Immature Grans Absolute 0 03 Thousand/uL      Lymphocytes Absolute 2 61 Thousands/µL      Monocytes Absolute 0 93 Thousand/µL      Eosinophils Absolute 0 10 Thousand/µL      Basophils Absolute 0 04 Thousands/µL                  CT abdomen pelvis with contrast   Final Result by Yakov Rosales MD (12/12 5)      1  No acute findings in the abdomen or pelvis  2   Intrahepatic and extra hepatic biliary ductal dilatation similar to the prior exam   This may be related to the postcholecystectomy state              Workstation performed: [id number] Procedures  ECG 12 Lead Documentation Only  Date/Time: 12/12/2019 1:18 PM  Performed by: Montrell Florence PA-C  Authorized by: Montrell Florence PA-C     Indications / Diagnosis:  Weakness  ECG reviewed by me, the ED Provider: yes    Patient location:  ED  Previous ECG:     Previous ECG:  Compared to current    Comparison ECG info:  Jan 31, 2019    Similarity:  No change    Comparison to cardiac monitor: Yes    Interpretation:     Interpretation: normal    Rate:     ECG rate:  74    ECG rate assessment: normal    Rhythm:     Rhythm: sinus rhythm    Ectopy:     Ectopy: none    QRS:     QRS axis:  Normal    QRS intervals:  Normal  Conduction:     Conduction: normal    ST segments:     ST segments:  Normal  T waves:     T waves: normal               ED Course  ED Course as of Dec 12 1832   Thu Dec 12, 2019   1453 Lab results reviewed  Urinalysis demonstrates moderate leukocytes and trace blood  Troponin normal less than 0 02  Lactic acid normal 1 0  CBC demonstrates normal white blood cell count of 9 2, hemoglobin of 13 5 and hematocrit of 40 6 are normal   No anemia  INR is normal 1 1  No coagulopathy  Lipase 293  No pancreatitis  Comprehensive metabolic panel was reviewed, BUN of 10 is normal, creatinine of 0 8 is low  No renal failure    LFTs are normal             HEART Risk Score      Most Recent Value   History  0 Filed at: 12/12/2019 1832   ECG  0 Filed at: 12/12/2019 1832   Age  2 Filed at: 12/12/2019 1832   Risk Factors  2 Filed at: 12/12/2019 1832   Troponin  0 Filed at: 12/12/2019 1832   Heart Score Risk Calculator   History  0 Filed at: 12/12/2019 1832   ECG  0 Filed at: 12/12/2019 1832   Age  2 Filed at: 12/12/2019 1832   Risk Factors  2 Filed at: 12/12/2019 1832   Troponin  0 Filed at: 12/12/2019 1832   HEART Score  4 Filed at: 12/12/2019 1832   HEART Score  4 Filed at: 12/12/2019 1832                    DILLON Risk Score      Most Recent Value   Age >= 65  1 Filed at: 12/12/2019 1831 Known CAD (stenosis >= 50%)  0 Filed at: 12/12/2019 1831   Recent (<=24 hrs) Service Angina  0 Filed at: 12/12/2019 1831   ST Deviation >= 0 5 mm  0 Filed at: 12/12/2019 1831   3+ CAD Risk Factors (FHx, HTN, HLP, DM, Smoker)  1 Filed at: 12/12/2019 1831   Aspirin Use Past 7 Days  1 Filed at: 12/12/2019 1831   Elevated Cardiac Markers  0 Filed at: 12/12/2019 1831   DILLON Risk Score (Calculated)  3 Filed at: 12/12/2019 1831              MDM  Number of Diagnoses or Management Options  Dehydration: new and requires workup  Failure to thrive in adult: new and requires workup  Generalized weakness: new and requires workup  UTI (urinary tract infection): new and requires workup  Diagnosis management comments: Differential diagnosis includes but is not limited to appendicitis, diverticulitis, gastroenteritis, gastritis, cholecystitis, pancreatitis, mesenteric adenitis, kidney stone, pyelonephritis, UTI  Plan workup including labs, ct scan abd/pelvis    Ct abd/pelvis images visualized by me  Radiology report reviewed: FINDINGS:    ABDOMEN    LOWER CHEST:  Stable 4 mm nodule left lower lobe laterally image 6 series 2  LIVER/BILIARY TREE:  Internal and extrahepatic biliary ductal dilatation is similar to the prior exam   CBD measures up to 1 2 cm diameter, stable   No findings for choledocholithiasis  GALLBLADDER: Juan Francisco Bending is surgically absent  SPLEEN:  Calcified granulomata are noted in the spleen   No suspicious splenic mass  PANCREAS:  Unremarkable  ADRENAL GLANDS:  Bilateral adrenal calcifications more extensive on the left, stable  KIDNEYS/URETERS:  One or more simple renal cyst(s) is noted   Otherwise unremarkable kidneys   No hydronephrosis  STOMACH AND BOWEL:  Limited evaluation without enteric contrast   Colonic diverticulosis   No acute findings  APPENDIX: Beverley Chad are expected postoperative changes of appendectomy  ABDOMINOPELVIC CAVITY:  No ascites or free intraperitoneal air   No lymphadenopathy  VESSELS:  Atherosclerotic changes are present   No evidence of aneurysm  PELVIS    REPRODUCTIVE ORGANS:  Unremarkable for patient's age  URINARY BLADDER:  Mildly distended otherwise unremarkable  ABDOMINAL WALL/INGUINAL REGIONS:  Evidence of prior laparotomy  OSSEOUS STRUCTURES:  No acute fracture or destructive osseous lesion   Curvature of the thoracolumbar spine to the right   Degenerative spurring of the spine  I reviewed all test results with the patient at bedside  the patient reports her primary care physician sent her in here today with the intention of being admitted to the hospital   Patient does demonstrate urinary tract infection  She has chronic radicular pain down the right side  This is not new  She denies any interval fall injury or trauma  She does have a history of multiple sclerosis  Her bladder is significantly distended on CT scan  She is able to urinate but does report occasional difficulties with incontinence of urine and urinary retention  These have been going on for many months  These are not new findings  She reports she is increasingly having difficulty caring for self at home  She reports generalized muscular weakness all over in inability to support herself and perform her activities of daily living  She reports more recently over the past week she has been experiencing nausea and vomiting  Over the past month she reports 20 lb unintentional weight loss  This is likely contributing to her current symptoms  Unclear at this time if her symptoms are related to her chronic pain, depression or other etiology  Will admit for further workup of symptom complex  Case discussed with Dr Keely Schaffer, [facility] regarding admission           Amount and/or Complexity of Data Reviewed  Clinical lab tests: ordered and reviewed  Tests in the radiology section of CPT®: ordered and reviewed  Discussion of test results with the performing providers: yes  Review and summarize past medical records: yes  Discuss the patient with other providers: yes  Independent visualization of images, tracings, or specimens: yes    Patient Progress  Patient progress: stable        Disposition  Final diagnoses:   Generalized weakness   Dehydration   UTI (urinary tract infection)   Failure to thrive in adult     Time reflects when diagnosis was documented in both MDM as applicable and the Disposition within this note     Time User Action Codes Description Comment    12/12/2019  3:25 PM Deitra Sharps Add [R53 1] Generalized weakness     12/12/2019  3:26 PM Deitra Sharps Add [E86 0] Dehydration     12/12/2019  3:26 PM Deitra Sharps Add [N39 0] UTI (urinary tract infection)     12/12/2019  3:26 PM Deitra Sharps Add [R62 7] Failure to thrive in adult       ED Disposition     ED Disposition Condition Date/Time Comment    Admit Stable Thu Dec 12, 2019  3:25 PM Case was discussed with Dr Fernando Booth and the patient's admission status was agreed to be Admission Status: inpatient status to the service of Dr Fernando Booth   Follow-up Information    None         Patient's Medications   Discharge Prescriptions    No medications on file     No discharge procedures on file      ED Provider  Electronically Signed by           Lorraine Bledsoe PA-C  12/12/19 2508

## 2019-12-12 NOTE — ASSESSMENT & PLAN NOTE
Thoracolumbar radiculopathy was seen for second opinion at [facility] recently which symptoms includes right-sided breast pain as well as leg pain  She has been having urinary and fecal incontinence which is known  Is being scheduled for epidural as well as corticosteroid injection of hip  Recently prescribed methylprednisolone taper and tomorrow will be last pill  Have PT evaluate for discharge needs

## 2019-12-12 NOTE — ASSESSMENT & PLAN NOTE
Probable urinary tract infection  Continue empiric ceftriaxone  Add urine culture to available sample      Results from last 7 days   Lab Units 12/12/19  1407   COLOR UA  Yellow   CLARITY UA  Clear   SPEC GRAV UA  <=1 005   PH UA  7 5   LEUKOCYTES UA  Moderate*   NITRITE UA  Negative   GLUCOSE UA mg/dl Negative   KETONES UA mg/dl Negative   BILIRUBIN UA  Negative   BLOOD UA  Trace-Intact*      Results from last 7 days   Lab Units 12/12/19  1407   RBC UA /hpf 0-1*   WBC UA /hpf 4-10*   EPITHELIAL CELLS WET PREP /hpf Occasional   BACTERIA UA /hpf Occasional

## 2019-12-12 NOTE — PROGRESS NOTES
INTERNAL MEDICINE FOLLOW-UP OFFICE VISIT  [facility] - [facility] OF [address] [place] [address]    NAME: Nora Khan  AGE: 66 y o  SEX: female  : 1945     DATE: 2019     Assessment and Plan:     1  Generalized weakness  2  Dehydration/Weight loss  3  Gait disturbance  4  Intractable pain  5  Radicular neuropathy  6  Nausea and vomiting  7  Incontinence of feces, unspecified fecal incontinence type  8  Urinary incontinence, unspecified type    Patient with a constellation of symptoms  She continues to struggle with pain and that is likely causing many of her other symptoms  She has been losing weight, getting poor nutrition, and getting weaker  She has had difficulty caring for herself  I advise ER evaluation with admission  Her  was called to come pick her up and he will drive her to the ER  Chief Complaint:     Chief Complaint   Patient presents with    GI Problem    Vomiting     since last october    Diarrhea     on and off        History of Present Illness:     Patient appears uncomfortable and had difficulty driving herself to our office this morning  She has been dealing with intractable pain right side of her body for some time now  Saw specialists down at [facility] and deemed it was radicular in nature  Not thought to be due to MS  She has not been eating well due to pain  Being in pain causes her a lot of stress and then she has nausea with intermittent episodes of vomiting  She has lost 20 lbs  She has been having urinary and fecal incontinence  She has gotten weaker  Her breathing is slightly labored  She denies blood in stool  She is tachycardic  She went to the ER on 2019 due to abdominal pain and states that they took great care of her  IV morphine gave her 12 hours of relief  Pain management wants to do injections and outpatient physical therapy  At this point she feels too weak for OP physical therapy and transportation is difficult  Review of Systems:     8 points ROS was completed  Please see above HPI for pertinent positives/negatives  Remaining ROS completely negative  Problem List:     Patient Active Problem List   Diagnosis    CHOCO on CPAP    COPD with chronic bronchitis (HCC)    Hypothyroidism    Insomnia    Lung nodule, multiple    Macular degeneration    Multiple sclerosis ([address] )    Rosacea    Seborrheic keratosis    Chronic neuropathic pain        Objective:     /80 (BP Location: Left arm, Patient Position: Sitting, Cuff Size: Standard)   Pulse (!) 119   Wt 62 7 kg (138 lb 3 2 oz) Comment: w/ shoes denied off  SpO2 90%   BMI 24 48 kg/m²     Physical Exam   Constitutional: She appears well-developed  Appears uncomfortable and in pain   Cardiovascular: Regular rhythm  Tachycardia present  No murmur heard  Pulmonary/Chest: Effort normal  No stridor  No respiratory distress  She exhibits tenderness (right sided)  Grunting and labored breathing at times likely due to pain   Abdominal: Soft  Bowel sounds are normal  She exhibits no distension  There is tenderness (epigastric)  There is no rebound and no guarding  Musculoskeletal: She exhibits no edema       Sushila Chol, DO  [facility]

## 2019-12-12 NOTE — H&P
H&P- Thania Robertson 1945, 66 y o  female MRN: [id number]  Unit/Bed#: ED 16 Encounter: [id number]  Primary Care Provider: Roverto Parrish DO   Date and time admitted to hospital: 12/12/2019 12:05 PM        Assessment and Plan  * Thoracolumbar radiculopathy due to intervertebral disc disorder  Assessment & Plan  Thoracolumbar radiculopathy was seen for second opinion at [facility] recently which symptoms includes right-sided breast pain as well as leg pain  She has been having urinary and fecal incontinence which is known  Is being scheduled for epidural as well as corticosteroid injection of hip  Recently prescribed methylprednisolone taper and tomorrow will be last pill  Have PT evaluate for discharge needs  UTI (urinary tract infection)  Assessment & Plan  Probable urinary tract infection  Continue empiric ceftriaxone  Add urine culture to available sample  Results from last 7 days   Lab Units 12/12/19  1407   COLOR UA  Yellow   CLARITY UA  Clear   SPEC GRAV UA  <=1 005   PH UA  7 5   LEUKOCYTES UA  Moderate*   NITRITE UA  Negative   GLUCOSE UA mg/dl Negative   KETONES UA mg/dl Negative   BILIRUBIN UA  Negative   BLOOD UA  Trace-Intact*      Results from last 7 days   Lab Units 12/12/19  1407   RBC UA /hpf 0-1*   WBC UA /hpf 4-10*   EPITHELIAL CELLS WET PREP /hpf Occasional   BACTERIA UA /hpf Occasional       Multiple sclerosis (HCC)  Assessment & Plan  Relapsing remitting MS currently without exacerbation  Insomnia  Assessment & Plan  Insomnia  Continue diazepam q h s  Reviewed PDMP  Hypothyroidism  Assessment & Plan  Hypothyroidism continue levothyroxine    VTE Prophylaxis: Heparin  Code Status:  Level one full code  Anticipated Length of Stay:  Patient will be admitted on an Inpatient basis with an anticipated length of stay of  greater than 2 midnights       Justification for Hospital Stay: Thoracolumbar radiculopathy due to intervertebral disc disorder  Total Time for Visit, including Counseling / Coordination of Care: NA mins  Greater than 50% of this total time spent on direct patient counseling and coordination of care  Chief Complaint:     Chief Complaint   Patient presents with    Weakness - Generalized     Patient states she right sided neuropathy pain, weakness, dizziness, 20lb weight loss in the last month   Dizziness     History of Present Illness:    Mason Freed is a 66 y o  female who presents with worsening ambulatory dysfunction with a setting of right-sided radiculopathy  The patient does have multiple sclerosis but has been benign since diagnosis in the 1980s  She recently saw Neurology as well as second opinion at [facility] neurology for her ongoing radicular pain involving area underneath right breast as well as down right leg  She reports that over last several weeks she has had decreased oral intake with episodes of vomiting and nonbloody diarrhea not provoked by any food  She reports 20 lb weight loss during this time and now is completely debilitated with inability/difficulty with ambulation  Currently 138 lb, PCP notes last year 149 lb  In the emergency department urinalysis was concerning for UTI and she was given ceftriaxone and admission was requested      Review of Systems:  History obtained from chart review and the patient  General ROS: positive for  - fatigue and malaise  Psychological ROS: negative for - disorientation or hallucinations  Ophthalmic ROS: negative for - loss of vision  Respiratory ROS: negative for - cough or shortness of breath  Cardiovascular ROS: negative for - chest pain  Gastrointestinal ROS: positive for - appetite loss and nausea/vomiting  Genito-Urinary ROS: positive for - urinary incontinence  Musculoskeletal ROS: positive for - muscle pain  Neurological ROS: positive for - weakness  Otherwise, all other 12 point review of systems normal     Past Medical and Surgical History:   Past Medical History:   Diagnosis Date    Anxiety     Cataract     last assessed 14    COPD (chronic obstructive pulmonary disease) (HCC)     Hypothyroidism     Multiple sclerosis (HCC)     CHOCO (obstructive sleep apnea)     Peroneal muscle atrophy     Thoracolumbar radiculopathy due to intervertebral disc disorder      Past Surgical History:   Procedure Laterality Date    APPENDECTOMY      CATARACT EXTRACTION       SECTION      CHOLECYSTECTOMY      GALLBLADDER SURGERY      CT COLONOSCOPY FLX DX W/COLLJ SPEC WHEN PFRMD N/A 2018    Procedure: COLONOSCOPY;  Surgeon: Mauro Silva MD;  Location: [facility]; Service: Colorectal    THROAT SURGERY       Meds/Allergies: Allergies: No Known Allergies  Prior to Admission Medications   Prescriptions Last Dose Informant Patient Reported? Taking?    Cholecalciferol (VITAMIN D-3) 1000 units CAPS 2019 at Unknown time Self Yes Yes   Sig: Take by mouth daily    Multiple Vitamins-Minerals (ICAPS AREDS 2 PO)   Yes Yes   Sig: Take 1 tablet by mouth 2 (two) times a day   NON FORMULARY 2019 at Unknown time Self Yes Yes   diazepam (VALIUM) 2 mg tablet 2019 at Unknown time Self Yes Yes   Sig: Take 2 mg by mouth daily at bedtime   levothyroxine 100 mcg tablet 2019 at Unknown time Self No Yes   Sig: Take 1 tablet (100 mcg total) by mouth daily   methylPREDNISolone (MEDROL) 4 MG tablet therapy pack 2019 at Unknown time Self No Yes   Sig: Use as directed on package   morphine (MSIR) 15 mg tablet 2019 at Unknown time Self No Yes   Sig: Take 1 tablet (15 mg total) by mouth 2 (two) times a day as needed for severe painMax Daily Amount: 30 mg   ondansetron (ZOFRAN-ODT) 4 mg disintegrating tablet 2019 at Unknown time Self No Yes   Sig: Take 1 tablet (4 mg total) by mouth every 8 (eight) hours as needed for nausea or vomiting for up to 3 days   vitamin B-12 (CYANOCOBALAMIN) 100 MCG tablet 2019 at Unknown time Self Yes Yes   Sig: Take 100 mcg by mouth daily       Facility-Administered Medications: None     Social History:     Social History     Socioeconomic History    Marital status: /Civil Union     Spouse name: Not on file    Number of children: 2    Years of education: Not on file    Highest education level: Not on file   Occupational History    Occupation: retired     Comment: part time as per [address] resource strain: Not on file    Food insecurity:     Worry: Not on file     Inability: Not on file   Telik needs:     Medical: Not on file     Non-medical: Not on file   Tobacco Use    Smoking status: Current Every Day Smoker     Packs/day: 1 00     Years: 60 00     Pack years: 60 00     Types: Cigarettes     Start date: 2/16/1958    Smokeless tobacco: Never Used   Substance and Sexual Activity    Alcohol use: Not Currently     Alcohol/week: 8 0 standard drinks     Types: 7 Glasses of wine, 1 Cans of beer per week     Frequency: 4 or more times a week     Drinks per session: 1 or 2     Binge frequency: Never     Comment: daily/ socially    Drug use: Not Currently     Types: Marijuana     Comment: medical-RSO    Sexual activity: Yes     Partners: Male   Lifestyle    Physical activity:     Days per week: 0 days     Minutes per session: 0 min    Stress: Very much   Relationships    Social connections:     Talks on phone: Not on file     Gets together: Not on file     Attends Mandaen service: Not on file     Active member of club or organization: Not on file     Attends meetings of clubs or organizations: Not on file     Relationship status: Not on file    Intimate partner violence:     Fear of current or ex partner: Not on file     Emotionally abused: Not on file     Physically abused: Not on file     Forced sexual activity: Not on file   Other Topics Concern    Not on file   Social History Narrative    Active advance directive     Patient Pre-hospital Living Situation:   Patient Pre-hospital Level of Mobility:   Patient Pre-hospital Diet Restrictions:     Family History:  Family History   Problem Relation Age of Onset    Skin cancer Mother     Hypertension Father         benign essential    Stroke Father     Lung cancer Brother      Physical Exam:   Vitals:   Blood Pressure: 121/81 (12/12/19 1630)  Pulse: 74 (12/12/19 1730)  Temperature: 98 1 °F (36 7 °C) (12/12/19 1156)  Temp Source: Oral (12/12/19 1156)  Respirations: (!) 29 (12/12/19 1730)  Height: 5' 3" (160 cm) (12/12/19 1156)  Weight - Scale: 60 3 kg (133 lb) (12/12/19 1156)  SpO2: 98 % (12/12/19 1730)    General appearance: alert, appears stated age and cooperative  Skin: Skin color, texture, turgor normal  No rashes or lesions  Head: Normocephalic, without obvious abnormality, atraumatic  Eyes: conjunctivae/corneas clear  PERRL, EOM's intact  Lungs: clear to auscultation bilaterally  Heart: regular rate and rhythm  Abdomen: soft, non-tender; bowel sounds normal; no masses,  no organomegaly  Back: range of motion normal  Extremities: extremities normal, atraumatic, no cyanosis or edema  Neurologic: Grossly normal symmetric  Psychiatric:  Appropriate mood    Lab Results: I have personally reviewed pertinent reports      Results from last 7 days   Lab Units 12/12/19  1253   WBC Thousand/uL 9 28   HEMOGLOBIN g/dL 13 5   HEMATOCRIT % 40 6   PLATELETS Thousands/uL 283   NEUTROS PCT % 61   LYMPHS PCT % 28   MONOS PCT % 10   EOS PCT % 1     Results from last 7 days   Lab Units 12/12/19  1253   SODIUM mmol/L 138   POTASSIUM mmol/L 3 5   CHLORIDE mmol/L 100   CO2 mmol/L 27   ANION GAP mmol/L 11   BUN mg/dL 10   CREATININE mg/dL 0 58*   CALCIUM mg/dL 9 0   ALBUMIN g/dL 3 6   TOTAL BILIRUBIN mg/dL 0 20   ALK PHOS U/L 91   ALT U/L 53   AST U/L 17   EGFR ml/min/1 73sq m 90   GLUCOSE RANDOM mg/dL 85     Results from last 7 days   Lab Units 12/12/19  1330   INR  1 15     Results from last 7 days   Lab Units 12/12/19  1330   TROPONIN I ng/mL <0 02     Results from last 7 days   Lab Units 12/12/19  1330   LACTIC ACID mmol/L 1 0              Results from last 7 days   Lab Units 12/12/19  1407   COLOR UA  Yellow   CLARITY UA  Clear   SPEC GRAV UA  <=1 005   PH UA  7 5   LEUKOCYTES UA  Moderate*   NITRITE UA  Negative   GLUCOSE UA mg/dl Negative   KETONES UA mg/dl Negative   BILIRUBIN UA  Negative   BLOOD UA  Trace-Intact*      Results from last 7 days   Lab Units 12/12/19  1407   RBC UA /hpf 0-1*   WBC UA /hpf 4-10*   EPITHELIAL CELLS WET PREP /hpf Occasional   BACTERIA UA /hpf Occasional      Imaging: I have personally reviewed pertinent films in PACS  Ct Abdomen Pelvis With Contrast  Result Date: 12/12/2019  Impression: 1  No acute findings in the abdomen or pelvis  2   Intrahepatic and extra hepatic biliary ductal dilatation similar to the prior exam   This may be related to the postcholecystectomy state  Workstation performed: [id number]       EKG, Pathology, and Other Studies Reviewed on Admission:   EKG  Result Date: 12/12/19  Personally reviewed strips with impression of:  Normal sinus rhythm 74 bpm    Allscripts/ Epic Records Reviewed: Yes    ** Please Note: This note has been constructed using a voice recognition system   **

## 2019-12-13 PROBLEM — N39.0 UTI (URINARY TRACT INFECTION): Status: RESOLVED | Noted: 2019-12-12 | Resolved: 2019-12-13

## 2019-12-13 LAB
ALBUMIN SERPL BCP-MCNC: 3 G/DL (ref 3.5–5)
ALP SERPL-CCNC: 73 U/L (ref 46–116)
ALT SERPL W P-5'-P-CCNC: 41 U/L (ref 12–78)
ANION GAP SERPL CALCULATED.3IONS-SCNC: 10 MMOL/L (ref 4–13)
AST SERPL W P-5'-P-CCNC: 16 U/L (ref 5–45)
BACTERIA UR CULT: NORMAL
BILIRUB SERPL-MCNC: 0.3 MG/DL (ref 0.2–1)
BUN SERPL-MCNC: 5 MG/DL (ref 5–25)
CALCIUM SERPL-MCNC: 8.4 MG/DL (ref 8.3–10.1)
CHLORIDE SERPL-SCNC: 105 MMOL/L (ref 100–108)
CO2 SERPL-SCNC: 24 MMOL/L (ref 21–32)
CREAT SERPL-MCNC: 0.58 MG/DL (ref 0.6–1.3)
ERYTHROCYTE [DISTWIDTH] IN BLOOD BY AUTOMATED COUNT: 13.2 % (ref 11.6–15.1)
GFR SERPL CREATININE-BSD FRML MDRD: 90 ML/MIN/1.73SQ M
GLUCOSE SERPL-MCNC: 88 MG/DL (ref 65–140)
HCT VFR BLD AUTO: 39.8 % (ref 34.8–46.1)
HGB BLD-MCNC: 13 G/DL (ref 11.5–15.4)
MCH RBC QN AUTO: 31.8 PG (ref 26.8–34.3)
MCHC RBC AUTO-ENTMCNC: 32.7 G/DL (ref 31.4–37.4)
MCV RBC AUTO: 97 FL (ref 82–98)
PLATELET # BLD AUTO: 256 THOUSANDS/UL (ref 149–390)
PMV BLD AUTO: 9.6 FL (ref 8.9–12.7)
POTASSIUM SERPL-SCNC: 3.5 MMOL/L (ref 3.5–5.3)
PROT SERPL-MCNC: 6.2 G/DL (ref 6.4–8.2)
RBC # BLD AUTO: 4.09 MILLION/UL (ref 3.81–5.12)
SODIUM SERPL-SCNC: 139 MMOL/L (ref 136–145)
WBC # BLD AUTO: 7.77 THOUSAND/UL (ref 4.31–10.16)

## 2019-12-13 PROCEDURE — 97167 OT EVAL HIGH COMPLEX 60 MIN: CPT

## 2019-12-13 PROCEDURE — 80053 COMPREHEN METABOLIC PANEL: CPT | Performed by: INTERNAL MEDICINE

## 2019-12-13 PROCEDURE — 94760 N-INVAS EAR/PLS OXIMETRY 1: CPT

## 2019-12-13 PROCEDURE — G8988 SELF CARE GOAL STATUS: HCPCS

## 2019-12-13 PROCEDURE — G8978 MOBILITY CURRENT STATUS: HCPCS

## 2019-12-13 PROCEDURE — 85027 COMPLETE CBC AUTOMATED: CPT | Performed by: INTERNAL MEDICINE

## 2019-12-13 PROCEDURE — 94660 CPAP INITIATION&MGMT: CPT

## 2019-12-13 PROCEDURE — 99233 SBSQ HOSP IP/OBS HIGH 50: CPT | Performed by: PHYSICIAN ASSISTANT

## 2019-12-13 PROCEDURE — 97163 PT EVAL HIGH COMPLEX 45 MIN: CPT

## 2019-12-13 PROCEDURE — G8987 SELF CARE CURRENT STATUS: HCPCS

## 2019-12-13 PROCEDURE — 36415 COLL VENOUS BLD VENIPUNCTURE: CPT | Performed by: INTERNAL MEDICINE

## 2019-12-13 PROCEDURE — G8979 MOBILITY GOAL STATUS: HCPCS

## 2019-12-13 RX ADMIN — HEPARIN SODIUM 5000 UNITS: 5000 INJECTION INTRAVENOUS; SUBCUTANEOUS at 05:53

## 2019-12-13 RX ADMIN — LEVOTHYROXINE SODIUM 100 MCG: 100 TABLET ORAL at 05:53

## 2019-12-13 RX ADMIN — MORPHINE SULFATE 4 MG: 4 INJECTION INTRAVENOUS at 19:41

## 2019-12-13 RX ADMIN — VITAM B12 100 MCG: 100 TAB at 09:11

## 2019-12-13 RX ADMIN — MORPHINE SULFATE 15 MG: 15 TABLET ORAL at 05:16

## 2019-12-13 RX ADMIN — METHYLPREDNISOLONE 4 MG: 4 TABLET ORAL at 09:11

## 2019-12-13 RX ADMIN — VITAMIN D, TAB 1000IU (100/BT) 1000 UNITS: 25 TAB at 09:11

## 2019-12-13 RX ADMIN — HEPARIN SODIUM 5000 UNITS: 5000 INJECTION INTRAVENOUS; SUBCUTANEOUS at 15:04

## 2019-12-13 RX ADMIN — HEPARIN SODIUM 5000 UNITS: 5000 INJECTION INTRAVENOUS; SUBCUTANEOUS at 21:46

## 2019-12-13 RX ADMIN — DIAZEPAM 2 MG: 2 TABLET ORAL at 21:46

## 2019-12-13 NOTE — SOCIAL WORK
CM name and role reviewed  Discharge Checklist reviewed and CM will continue to monitor for progress toward discharge goals in nursing and provider rounds  CM met with pt at bedside  Pt alert  Pt reported that she lives in a two story house with her   There are 8 steps to enter through the garage  There are two steps to enter through the patio  Pt reported to have a lot of weakness and mainly to the ride side  Pt reported that she does not use a walker and the cane but does use a wc at times  Pt stated that she can walk about 30 to 40 ft which is short distances for her due to the weakness and pain  Pt stated that she needs assistance with ADLs  She said that her  is unable to assist to the best of his ability because he requires care himself  She said that her daughter and friends help her  Pt stated that she saw Dr Mason Freed yesterday and he recommended pt to come to hospital  Pt stated that she also met with pain management, Dr Francisca Hodges a day before that  She stated that she met with RACHID Hyatt, orthopedics a few days ago  Pt has been managing her own meds  She uses [facility] on [address] [facility]  Pt reported that she has prescription coverage  Pt reported no dx, hx or tx of MH  Pt reported that she smokes a pack of cigarettes daily  Pt stated that April Starring is her POA and a  as well  Pt drives  Her  and daughter assists with transportation also  During this time, CM discussed STR rec  CM explained what acute rehab and SNF are  Pt stated that she wants to go home and that she does not want to go into an inpatient rehab  CM stated that CM can set her up with [facility]  Pt agreeable       CM reviewed discharge planning process including the following: identifying help at home, patient preference for discharge planning needs, pharmacy preference, and availability of treatment team to discuss questions or concerns patient and/or family may have regarding understanding medications and recognizing signs and symptoms once discharged  CM also encouraged patient to follow up with all recommended appointments after discharge  Patient advised of importance for patient and family to participate in managing patients medical well being

## 2019-12-13 NOTE — PLAN OF CARE
Problem: OCCUPATIONAL THERAPY ADULT  Goal: Performs self-care activities at highest level of function for planned discharge setting  See evaluation for individualized goals  Description  Treatment Interventions: ADL retraining, Functional transfer training, Endurance training, Patient/family training, Equipment evaluation/education, Compensatory technique education, Continued evaluation, Energy conservation, Activityengagement          See flowsheet documentation for full assessment, interventions and recommendations  Note:   Limitation: Decreased ADL status, Decreased endurance, Decreased self-care trans, Decreased high-level ADLs  Prognosis: Good  Assessment: Patient is a 66 y o  female seen for OT evaluation s/p admit to [id number] [place] [address] on 12/12/2019 w/Thoracolumbar radiculopathy due to intervertebral disc disorder  Commorbidities affecting patient's functional performance at time of assessment include: UTI,  Multiple Sclerosis, Insomnia, Hypothyroidism, Dizziness, Anxiety, COPD, Peroneal muscle atrophy  Patient presented to ED with right sided neuropathy pain, weakness, dizziness, 20lb weight loss in the last month  Orders placed for OT evaluation and treatment  Performed at least two patient identifiers during session including name and wristband  Prior to admission, Patient reported indepependent with ADLs/ required assistance with IADLs  Patient lives with spouse in a 2 story house with 8 [address] thru garage  patient ambulatory with no AD, ocassionally driving  Personal factors affecting patient at time of initial evaluation include: limited caregiver support, steps to enter, decreased initiation and engagement, difficulty performing ADLs and difficulty performing IADLs   Upon evaluation, patient requires moderate assist for UB ADLs, maximal assist for LB ADLs, Occupational performance is affected by the following deficits: decreased functional use of BUEs, degenerative arthritic joint changes, impaired gross motor coordination, dynamic sit/ stand balance deficit with poor standing tolerance time for self care and functional mobility, decreased activity tolerance, increased pain and postural control and postural alignment deficit, requiring external assistance to complete transitional movements  Therapist completed  extensive additional review of medical records and additional review of physical, cognitive or psychosocial history, clinical examination identifying 5 or more performance deficits, clinical decision making of a high complexity , consistent with a high complexity level evaluation  Patient to benefit from continued Occupational Therapy treatment while in the hospital to address deficits as defined above and maximize level of functional independence with ADLs and functional mobility  Occupational Performance areas to address include: grooming , bathing/ shower, dressing, toilet hygiene, transfer to all surfaces, functional ambulation, functional mobility, emergency response, Leisure Participation and Social participation  From OT standpoint, recommendation at time of d/c would be Short Term Rehab         OT Discharge Recommendation: Short Term Rehab

## 2019-12-13 NOTE — OCCUPATIONAL THERAPY NOTE
Occupational Therapy Evaluation        Patient Name: Nora Khan  DIFLN'Q Date: 12/13/2019 12/13/19 2127   Note Type   Note type Eval only   Restrictions/Precautions   Weight Bearing Precautions Per Order No   Braces or Orthoses Other (Comment)  (none at basline)   Other Precautions Bed Alarm;Multiple lines;Telemetry; Fall Risk;Pain  (started Pain Management recently; back safety)   Pain Assessment   Pain Assessment 0-10   Pain Score 4   Pain Type Chronic pain   Pain Location Abdomen;Back   Pain Orientation Right  (Flank pain)   Pain Radiating Towards right side   Pain Frequency Constant/continuous   Pain Onset Ongoing   Hospital Pain Intervention(s) Medication (See MAR); Repositioned; Ambulation/increased activity; Emotional support   Multiple Pain Sites No   Home Living   Type of Home House   Home Layout Two level;Bed/bath upstairs;Stairs to enter with rails  (2 ABDELRAHMAN thru back patio/ 8 ABDELRAHMAN thru garage )   Bathroom Shower/Tub Tub/shower unit   Bathroom Toilet Standard   Bathroom Equipment Grab bars in shower   [address] [facility]   Additional Comments no AD used at baseline   Prior Function   Level of [place] Independent with ADLs and functional mobility   Lives With [facility] Help From Family;Personal care attendant;Friend(s)  (once a week for housekeeping help)   ADL Assistance Independent   IADLs Needs assistance   Falls in the last 6 months 0   Vocational Retired   Comments usually drives   Lifestyle   Autonomy Patient reported indepependent with ADLs/ required assistance with IADLs  Patient lives with spouse in a 2 story house with 8 ABDELRAHMAN thru garage  patient ambulatory with no AD, ocassionally driving     Reciprocal Relationships Supportive friends and family   Service to Others Retired    Psychosocial   Psychosocial ([address]) [address]  6  Modified independent   [address] 5  Supervision/Setup   UB Bathing Assistance 4  Minimal Assistance   LB Bathing Assistance 2  Maximal Assistance   UB Dressing Assistance 4  Minimal Assistance   LB Dressing Assistance 2  Maximal [address]  2  Maximal Assistance   Functional Assistance 2  Maximal Assistance   Bed Mobility   Rolling L 5  Supervision   Additional items Assist x 1;HOB elevated; Bedrails; Increased time required;Verbal cues  (log rolling technique)   Supine to Sit 5  Supervision   Additional items Assist x 1;HOB elevated; Increased time required;Verbal cues;LE management   Sit to Supine 5  Supervision   Additional items Assist x 1;HOB elevated; Increased time required;Verbal cues;LE management   Additional Comments LOG ROLL TECHNIQUE   Transfers   Sit to Stand 4  Minimal assistance   Additional items Assist x 1; Increased time required;Verbal cues   Stand to Sit 4  Minimal assistance   Additional items Assist x 1; Increased time required;Verbal cues   Additional Comments able to take a couple of lateral steps,with assist of 2, significant retropulsion, increased pain and c/o of dizziness     Functional Mobility   Functional Mobility 3  Moderate assistance   Additional items Hand hold assistance   Balance   Static Sitting Good   Dynamic Sitting Fair +   Static Standing Fair -   Dynamic Standing Poor +   Activity Tolerance   Activity Tolerance Patient limited by fatigue;Patient limited by pain   Nurse Made Aware RN verbalized patient appropriate for therapy evaluation, made aware of therapy outcomes   RUE Assessment   RUE Assessment WFL   LUE Assessment   LUE Assessment WFL   Hand Function   Gross Motor Coordination Impaired   Fine Motor Coordination Functional   Sensation   Light Touch No apparent deficits  (BUEs)   Vision-Basic Assessment   Current Vision Wears glasses all the time   Patient Visual Report Other (Comment)  (No significant changes reported)   Perception   Inattention/Neglect Appears intact   Cognition   Overall Cognitive Status [facility]   Arousal/Participation Alert; Responsive; Cooperative   Attention Within functional limits   Orientation Level Oriented X4   Memory Within functional limits   Following Commands Follows all commands and directions without difficulty   Assessment   Limitation Decreased ADL status; Decreased endurance;Decreased self-care trans;Decreased high-level ADLs   Prognosis Good   Assessment Patient is a 66 y o  female seen for OT evaluation s/p admit to [address] [facility] on 12/12/2019 w/Thoracolumbar radiculopathy due to intervertebral disc disorder  Commorbidities affecting patient's functional performance at time of assessment include: UTI,  Multiple Sclerosis, Insomnia, Hypothyroidism, Dizziness, Anxiety, COPD, Peroneal muscle atrophy  Patient presented to ED with right sided neuropathy pain, weakness, dizziness, 20lb weight loss in the last month  Orders placed for OT evaluation and treatment  Performed at least two patient identifiers during session including name and wristband  Prior to admission, Patient reported indepependent with ADLs/ required assistance with IADLs  Patient lives with spouse in a 2 story house with 8 ABDELRAHMAN thru garage  patient ambulatory with no AD, ocassionally driving  Personal factors affecting patient at time of initial evaluation include: limited caregiver support, steps to enter, decreased initiation and engagement, difficulty performing ADLs and difficulty performing IADLs   Upon evaluation, patient requires moderate assist for UB ADLs, maximal assist for LB ADLs, Occupational performance is affected by the following deficits: decreased functional use of BUEs, degenerative arthritic joint changes, impaired gross motor coordination, dynamic sit/ stand balance deficit with poor standing tolerance time for self care and functional mobility, decreased activity tolerance, increased pain and postural control and postural alignment deficit, requiring external assistance to complete transitional movements  Therapist completed  extensive additional review of medical records and additional review of physical, cognitive or psychosocial history, clinical examination identifying 5 or more performance deficits, clinical decision making of a high complexity , consistent with a high complexity level evaluation  Patient to benefit from continued Occupational Therapy treatment while in the hospital to address deficits as defined above and maximize level of functional independence with ADLs and functional mobility  Occupational Performance areas to address include: grooming , bathing/ shower, dressing, toilet hygiene, transfer to all surfaces, functional ambulation, functional mobility, emergency response, Leisure Participation and Social participation  From OT standpoint, recommendation at time of d/c would be Short Term Rehab  Goals   Patient Goals "I would like to walk with my dog in the community"   Plan   Treatment Interventions ADL retraining;Functional transfer training; Endurance training;Patient/family training;Equipment evaluation/education; Compensatory technique education;Continued evaluation; Energy conservation; Activityengagement   Goal Expiration Date 12/27/19   OT Frequency 3-5x/wk   Recommendation   OT Discharge Recommendation Short Term Rehab   Barthel Index   Feeding 10   Bathing 0   Grooming Score 5   Dressing Score 5   Bladder Score 5   Bowels Score 5   Toilet Use Score 5   Transfers (Bed/Chair) Score 10   Mobility (Level Surface) Score 0   Stairs Score 0   Barthel Index Score 45   Modified Dalton Scale   Modified Dalton Scale 4         1 - Patient will verbalize and demonstrate use of energy conservation/ deep breathing technique and work simplification skills during functional activity with no verbal cues  2 - Patient will verbalize and demonstrate good body mechanics and joint protection techniques during  ADLs/ IADLs with no verbal cues      3 - Patient will increase OOB/ sitting tolerance to 2-4 hours per day for increased participation in self care and leisure tasks with no s/s of exertion  4 - Patient will increase standing tolerance time to 5  minutes with unilateral UE support to complete sink level ADLs@ mod I level  5 - Patient will increase sitting tolerance at edge of bed to 20 minutes to complete UB ADLs @ set up assist level  6 - Patient will transfer bed to Chair / toilet at Set up assist level with AD as indicated  7 - Patient will complete UB ADLs with set up assist      8 - Patient will complete LB ADLs with min assist with the use of adaptive equipment       9 - Patient will complete toileting hygiene with set up assist/ supervision for thoroughness    10 - Patient/ Family  will demonstrate competency with UE Home Exercise Program

## 2019-12-13 NOTE — SOCIAL WORK
ELAINE communicated with Sobia daugherty [facility] regarding pt not wanting to go to STR  She stated that pt did not understand what CM was explaining to her  CM met with pt again to discuss STR  CM explained what acute rehab and SNF entails and what the difference is  CM informed her of acute rehab options and SNF options  Pt agreeable for CM to put in referrals to both acute and SNF  CM explained to pt that she is medically cleared and would have to go into a SNF as MA pending  CM explained that she will go into a SNF with the understanding that she will apply for MA  However, if accepted into acute, she will not have to apply for MA, Medicare will cover it  CM sent referrals to SNFs in 20 mile radius and acute rehabs

## 2019-12-13 NOTE — PLAN OF CARE
Problem: PHYSICAL THERAPY ADULT  Goal: Performs mobility at highest level of function for planned discharge setting  See evaluation for individualized goals  Description  Treatment/Interventions: Functional transfer training, LE strengthening/ROM, Elevations, Therapeutic exercise, Endurance training, Patient/family training, Equipment eval/education, Bed mobility, Gait training, Spoke to nursing, OT  Equipment Recommended: (TBD)       See flowsheet documentation for full assessment, interventions and recommendations  Note:   Prognosis: Good  Problem List: Decreased strength, Decreased endurance, Impaired balance, Decreased mobility, Pain  Assessment: Pt is a 77-year-old female who presented to the ED at [facility] [address] on 19 with thoracolumbar radiculopathy due to intervertebral disc disorder  PT was consulted to assess functional mobility and determine d/c needs  PT evaluation and treatment as well as up w/A orders active  Pt's past medical history is significant for multiple sclerosis, chronic neuropathic pain, insomnia, UTI, CHOCO on CPAP, and COPD with chronic bronchitis  Prior to today, the pt was living in a two-story house with her  and was independent with ADLs and ambulation but required assistance with IADLs  Personal factors include living in a multi-story home with ABDELRAHMAN, decreased caregiver support, inability to navigate stairs, ambulate, and carry out basic needs without pain, and inability to ambulate household and community distances  Pt was verbalized as appropriate for PT evaluation today by KATHY Bowen, and pt was agreeable to the evaluation  Two patient identifiers were assessed at the beginning of the session, as the pt was able to verify her full name and   Pt reported 4/10 pain at beginning of the session  Educated pt about back safety precautions and using log roll technique for bed mobility  Pt able to perform all bed mobility with supervision   Pt reported mild dizziness with positional changes that improved quickly  Pt able to perform sit<>stand and stand<>sit transfers with min Ax1  Pt able to ambulate 2' at EOB with front, back, and lateral stepping with mod Ax2 with hand-held assist  Pt denied an increase in pain with standing but could not tolerate prolonged standing due to anticipated increase in pain  Upon assessment, pt's physical limitations include: increased pain, decreased strength, decreased endurance, impaired balance, and decreased mobility, resulting in an increased need for assistance with bed mobility, transfers, and ambulation, impacting her ability to perform functional activities safely and independently  Other objective outcome measures assessed upon initial evaluation: Barthel 45/100, Modified Cherelle 4  Pt's clinical assessment is currently unstable given ongoing medical assessment  At this time, pt is recommended for STR to address the above impairments and return to her prior level of function  Pt would benefit from continued PT throughout her hospital stay with an emphasis on bed mobility, transfers, and ambulation to improve overall physical functioning  Barriers to Discharge: Inaccessible home environment, Decreased caregiver support     Recommendation: Short-term skilled PT     PT - OK to Discharge: Yes(when medically cleared; if to STR)    See flowsheet documentation for full assessment

## 2019-12-13 NOTE — ED NOTES
1  CC- Generalized weakness, Dehydration, UTI, Failure to thrive, Chronic back pain     2  Orientation status- AOx3    3  Abnormal labs/ focused assessment/vitals- See labs    4  Medications/drips- See MAR     5  Narcotic time/ pain medications    6  IV lines/drains/etc  20G RAC, 20G Right Hand     7  Isolation status- None    8  Skin- WNL     9  Ambulation status- Assist x 2, Fall risk     10   ED phone number- [phone] RN  12/13/19 3781

## 2019-12-13 NOTE — PHYSICAL THERAPY NOTE
Physical Therapy Evaluation     Patient's Name: Tara Bingham    Admitting Diagnosis  Generalized weakness [R53 1]    Problem List  Patient Active Problem List   Diagnosis    CHOCO on CPAP    COPD with chronic bronchitis ([facility] )    Hypothyroidism    Insomnia    Lung nodule, multiple    Macular degeneration    Multiple sclerosis ([facility] )    Rosacea    Seborrheic keratosis    Chronic neuropathic pain    Thoracolumbar radiculopathy due to intervertebral disc disorder    UTI (urinary tract infection)       Past Medical History  Past Medical History:   Diagnosis Date    Anxiety     Cataract     last assessed 14    COPD (chronic obstructive pulmonary disease) (HCC)     Hypothyroidism     Multiple sclerosis (HCC)     CHOCO (obstructive sleep apnea)     Peroneal muscle atrophy     Thoracolumbar radiculopathy due to intervertebral disc disorder        Past Surgical History  Past Surgical History:   Procedure Laterality Date    APPENDECTOMY      CATARACT EXTRACTION       SECTION      CHOLECYSTECTOMY      GALLBLADDER SURGERY      AL COLONOSCOPY FLX DX W/COLLJ SPEC WHEN PFRMD N/A 2018    Procedure: COLONOSCOPY;  Surgeon: Brooks Velasquez MD;  Location: [facility]; Service: Colorectal    THROAT SURGERY            19 0989   Note Type   Note type Eval only   Pain Assessment   Pain Assessment 0-10   Pain Score 4   Pain Type Chronic pain   Pain Location Abdomen;Back  (flank)   Pain Orientation Right  (flank pain)   Pain Radiating Towards right side   Pain Frequency Constant/continuous   Pain Onset Ongoing   Hospital Pain Intervention(s) Ambulation/increased activity;Repositioned; Emotional support   Home Living   Type of [address] [place] [address] Two level;Bed/bath upstairs;Stairs to enter with rails  (2 ABDELRAHMAN thru back patio/8 ABDELRA[address]MAN thru garage )   Bathroom Shower/Tub Tub/shower unit   [address] bars in [address] [place] [address] Equipment Brooks Sergeant; Other (Comment)  (does not use AD at baseline per pt)   Prior Function   Level of [place] Independent with ADLs and functional mobility   Lives With Spouse   Receives Help From Family;Friend(s); Other (Comment)  (once a week for housekeeping help)   ADL Assistance Independent   IADLs Needs assistance   Falls in the last 6 months 0   Vocational Retired   Comments (+) driving   Restrictions/Precautions   Wells Demario Bearing Precautions Per Order No   Braces or Orthoses Other (Comment)  (none at baseline per pt)   Other Precautions Bed Alarm;WBS;Multiple lines;Telemetry; Fall Risk;Pain  (back safety precautions for joint protection)   General   Family/Caregiver Present No   Cognition   Overall Cognitive Status WFL   Arousal/Participation Alert   Orientation Level Oriented X4   Memory Within functional limits   Following Commands Follows all commands and directions without difficulty   Comments Pt agreeable to PT evaluation today  Pt able to verify full name and   RUE Assessment   RUE Assessment   (defer to OT eval for comment)   LUE Assessment   LUE Assessment   (defer to OT eval for comment)   RLE Assessment   RLE Assessment WFL  (upon functional assessment; grossly 3+/5)   LLE Assessment   LLE Assessment WFL  (upon functional assessment; grossly 3+/5)   Coordination   Movements are Fluid and Coordinated 1   Sensation WFL  (pt reports hx of numbness/tingling in BLE)   Bed Mobility   Rolling L 5  Supervision   Additional items Assist x 1;HOB elevated; Bedrails; Increased time required;Verbal cues  (log roll technique; VCs for sequencing)   Supine to Sit 5  Supervision   Additional items Assist x 1;HOB elevated; Bedrails; Increased time required  (log roll technique)   Sit to Supine 5  Supervision   Additional items Assist x 1;Bedrails; Increased time required  (log roll technique)   Additional Comments Pt received lying supine in bed upon arrival  Pt returned to bed at end of session with all needs in reach    Transfers   Sit to Stand 4  Minimal assistance   Additional items Assist x 1; Increased time required   Stand to Sit 4  Minimal assistance   Additional items Assist x 1; Increased time required   Ambulation/Elevation   Gait pattern Narrow NASH;Retropulsion;Decreased foot clearance;Decreased R stance;R Knee Conrad; Improper Weight shift; Step to;Excessively slow; Short stride   Gait Assistance 3  Moderate assist   Additional items Assist x 2   Assistive Device Other (Comment)  (hand-held assist)   Distance 2' at EOB with front, back, and lateral stepping   Balance   Static Sitting Good   Dynamic Sitting Good   Static Standing Fair -   Dynamic Standing Poor +   Ambulatory Poor   Endurance Deficit   Endurance Deficit Yes   Activity Tolerance   Activity Tolerance Patient limited by fatigue;Patient limited by pain   Medical Staff Made Aware OT Neli Tafoya   Nurse Made Aware Pt verbalized as appropriate for PT evaluation today by RN Jordyn Pitt, who was made aware of session outcomes/recs   Assessment   Prognosis Good   Problem List Decreased strength;Decreased endurance; Impaired balance;Decreased mobility;Pain   Assessment Pt is a 66-year-old female who presented to the ED at [address] [place] [address] on 12/12/19 with thoracolumbar radiculopathy due to intervertebral disc disorder  PT was consulted to assess functional mobility and determine d/c needs  PT evaluation and treatment as well as up w/A orders active  Pt's past medical history is significant for multiple sclerosis, chronic neuropathic pain, insomnia, UTI, CHOCO on CPAP, and COPD with chronic bronchitis  Prior to today, the pt was living in a two-story house with her  and was independent with ADLs and ambulation but required assistance with IADLs   Personal factors include living in a multi-story home with ABDELRAHMAN, decreased caregiver support, inability to navigate stairs, ambulate, and carry out basic needs without pain, and inability to ambulate household and community distances  Pt was verbalized as appropriate for PT evaluation today by KATHY Mathew, and pt was agreeable to the evaluation  Two patient identifiers were assessed at the beginning of the session, as the pt was able to verify her full name and   Pt reported 4/10 pain at beginning of the session  Educated pt about back safety precautions and using log roll technique for bed mobility  Pt able to perform all bed mobility with supervision  Pt reported mild dizziness with positional changes that improved quickly  Pt able to perform sit<>stand and stand<>sit transfers with min Ax1  Pt able to ambulate 2' at EOB with front, back, and lateral stepping with mod Ax2 with hand-held assist  Pt denied an increase in pain with standing but could not tolerate prolonged standing due to anticipated increase in pain  Upon assessment, pt's physical limitations include: increased pain, decreased strength, decreased endurance, impaired balance, and decreased mobility, resulting in an increased need for assistance with bed mobility, transfers, and ambulation, impacting her ability to perform functional activities safely and independently  Other objective outcome measures assessed upon initial evaluation: Barthel 45/100, Modified Cherelle 4  Pt's clinical assessment is currently unstable given ongoing medical assessment  At this time, pt is recommended for STR to address the above impairments and return to her prior level of function  Pt would benefit from continued PT throughout her hospital stay with an emphasis on bed mobility, transfers, and ambulation to improve overall physical functioning     Barriers to Discharge Inaccessible home environment;Decreased caregiver support   Goals   Patient Goals "I would like to walk with my dog in the community and go on our family trip in February"   STG Expiration Date 19   Short Term Goal #1 In 7-10 days, pt will 1) ambulate 100' with least restrictive device with supervision, 2) perform all bed mobility with modified independence to decrease caregiver burden, 3) perform all transfers with modified independence to improve overall mobility, 4) increase B LE strength by 1/2 grade to improve ambulation, 5) maintain static standing balance for > 5 minutes to improve performance of functional activities, 6) improve all balance by 1 grade to improve safety and independence with functional mobility, 7) PT to see to establish stair goal   PT Treatment Day 0   Plan   Treatment/Interventions Functional transfer training;LE strengthening/ROM; Elevations; Therapeutic exercise; Endurance training;Patient/family training;Equipment eval/education; Bed mobility;Gait training;Spoke to nursing;OT   PT Frequency Other (Comment)  (3-5x/wk)   Recommendation   Recommendation Short-term skilled PT   Equipment Recommended   (TBD)   PT - OK to Discharge Yes  (when medically cleared; if to STR)   Modified [place] Scale   Modified Cherelle Scale 4   Barthel Index   Feeding 10   Bathing 0   Grooming Score 5   Dressing Score 5   Bladder Score 5   Bowels Score 5   Toilet Use Score 5   Transfers (Bed/Chair) Score 10   Mobility (Level Surface) Score 0   Stairs Score 0   Barthel Index Score 45     [email or web address], SPT

## 2019-12-13 NOTE — ASSESSMENT & PLAN NOTE
Thoracolumbar radiculopathy was seen for second opinion at [facility] recently which symptoms includes right-sided breast pain as well as leg pain  She has been having urinary and fecal incontinence, which is known  Is scheduled for epidural as well as corticosteroid injection of hip, the 1st will be on 12/17  Recently prescribed methylprednisolone, completed course 12/13  Patient reports generalized weakness, does not appear to be acutely new, but worsening over time  She has no support at home, her  is not a caretaker and freely admitted that himself at the bedside  The patient really is not looking for rehab, but is looking for something to get a through to the injection" in hopes that that will help her feel better

## 2019-12-13 NOTE — PROGRESS NOTES
Progress Note - Mari Mediate 1945, 66 y o  female MRN: [id number]    Unit/Bed#: ED 16 Encounter: [id number]    Primary Care Provider: Esperanza Arellano DO   Date and time admitted to hospital: 12/12/2019 12:05 PM    * Thoracolumbar radiculopathy due to intervertebral disc disorder  Assessment & Plan  Thoracolumbar radiculopathy was seen for second opinion at [facility] recently which symptoms includes right-sided breast pain as well as leg pain  She has been having urinary and fecal incontinence, which is known  Is scheduled for epidural as well as corticosteroid injection of hip, the 1st will be on 12/17  Recently prescribed methylprednisolone, completed course 12/13  Patient reports generalized weakness, does not appear to be acutely new, but worsening over time  She has no support at home, her  is not a caretaker and freely admitted that himself at the bedside  The patient really is not looking for rehab, but is looking for something to get a through to the injection" in hopes that that will help her feel better  Multiple sclerosis ([facility] [address] )  Assessment & Plan  Relapsing remitting MS currently without exacerbation  UTI (urinary tract infection)resolved as of 12/13/2019  Assessment & Plan  Gout urinary tract infection, urine bland  No fevers, no leukocytosis  Discontinue ceftriaxone and continue to monitor  Results from last 7 days   Lab Units 12/12/19  1407   COLOR UA  Yellow   CLARITY UA  Clear   SPEC GRAV UA  <=1 005   PH UA  7 5   LEUKOCYTES UA  Moderate*   NITRITE UA  Negative   GLUCOSE UA mg/dl Negative   KETONES UA mg/dl Negative   BILIRUBIN UA  Negative   BLOOD UA  Trace-Intact*      Results from last 7 days   Lab Units 12/12/19  1407   RBC UA /hpf 0-1*   WBC UA /hpf 4-10*   EPITHELIAL CELLS WET PREP /hpf Occasional   BACTERIA UA /hpf Occasional       Hypothyroidism  Assessment & Plan  Hypothyroidism continue levothyroxine    Insomnia  Assessment & Plan  Insomnia  Continue diazepam q h s  Reviewed PDMP  VTE Pharmacologic Prophylaxis:   Pharmacologic: Heparin  Mechanical VTE Prophylaxis in Place: Yes    Patient Centered Rounds: I have performed bedside rounds with nursing staff today  Discussions with Specialists or Other Care Team Provider: extensively with CM and therapy    Education and Discussions with Family / Patient: patient,  at bedside     Time Spent for Care: 1 hour  More than 50% of total time spent on counseling and coordination of care as described above  Current Length of Stay: 1 day(s)    Current Patient Status: Inpatient   Certification Statement: The patient will continue to require additional inpatient hospital stay due to awaiting CM to further discuss options for rehab vs private pay in home care vs MA pending    Discharge Plan: pending CM discussion with patient - if she is willing to participate in rehab, plan to discharge to rehab possibly MA pending  Otherwise patient will consider home health care with private pay for 24/7 care  I really do not think that the patient is looking for acute rehab, she really seems to have presented to the hospital because she does have good support at  Code Status: Level 1 - Full Code      Subjective:   Patient seen and examined, reports she has had progressive weakness, abdominal pain, and bowel/urinary issues on off for while  She is just trying to get through to Tuesday, at which point she will be getting an injection and hoping to feel better after that  Denies any acute change overnight  She actually reports that she does not have much support at home, her daughter lives close but has her own family and thinks managed and the patient does not think comfortable asking her to come to the home and help in the interim  Her  also is not [place] caretaker" and the patient does not feel safe with managing herself at home with him alone      I discussed with the patient regarding rehab, she does not think she really wants rehab, but does not think that she can go home  When advised that she could consider acute inpatient rehab, she does not think that she would be able to meet those needs  We also discussed private pay with a home agency to have someone the house 24/7, but does not think that that is [address]  She freely admits that she is just looking for something "to get her through until Tuesday" and is looking to do more rehab after the injection  She anticipates pain, but did not have increased pain with therapy today  Objective:     Vitals:   No data recorded  HR:  [63-82] 67  Resp:  [16-29] 16  BP: (115-126)/(60-85) 126/60  SpO2:  [96 %-98 %] 96 %  Body mass index is 23 56 kg/m²  Input and Output Summary (last 24 hours): Intake/Output Summary (Last 24 hours) at 12/13/2019 1456  Last data filed at 12/13/2019 0601  Gross per 24 hour   Intake 70 ml   Output 650 ml   Net -580 ml       Physical Exam:     Physical Exam   Constitutional: She is oriented to person, place, and time  She appears well-developed and well-nourished  No distress  HENT:   Mouth/Throat: Oropharynx is clear and moist    Cardiovascular: Normal rate, regular rhythm, S1 normal, S2 normal, normal heart sounds and intact distal pulses  No murmur heard  Pulmonary/Chest: Effort normal and breath sounds normal  No respiratory distress  She has no wheezes  She has no rales  Abdominal: Soft  Bowel sounds are normal  She exhibits no distension  There is no tenderness  Musculoskeletal: She exhibits no edema  Neurological: She is alert and oriented to person, place, and time  She displays no atrophy and no tremor  Coordination normal  Abnormal gait: deferred  Patient reports she is ambulating at home WITHOUT assistive device, using the walls to brace herself  She has no loss of sensation grossly in the bilat feet but has 3/5 strength in the LEs   Nursing note and vitals reviewed        Additional Data: Labs:    Results from last 7 days   Lab Units 12/13/19  0558 12/12/19  1253   WBC Thousand/uL 7 77 9 28   HEMOGLOBIN g/dL 13 0 13 5   HEMATOCRIT % 39 8 40 6   PLATELETS Thousands/uL 256 283   NEUTROS PCT %  --  61   LYMPHS PCT %  --  28   MONOS PCT %  --  10   EOS PCT %  --  1     Results from last 7 days   Lab Units 12/13/19  0558   SODIUM mmol/L 139   POTASSIUM mmol/L 3 5   CHLORIDE mmol/L 105   CO2 mmol/L 24   BUN mg/dL 5   CREATININE mg/dL 0 58*   ANION GAP mmol/L 10   CALCIUM mg/dL 8 4   ALBUMIN g/dL 3 0*   TOTAL BILIRUBIN mg/dL 0 30   ALK PHOS U/L 73   ALT U/L 41   AST U/L 16   GLUCOSE RANDOM mg/dL 88     Results from last 7 days   Lab Units 12/12/19  1330   INR  1 15             Results from last 7 days   Lab Units 12/12/19  1330   LACTIC ACID mmol/L 1 0           * I Have Reviewed All Lab Data Listed Above  * Additional Pertinent Lab Tests Reviewed:  Floyd Jones Admission Reviewed    Imaging:    Imaging Reports Reviewed Today Include:  CT abdomen and pelvis showing no acute findings  Imaging Personally Reviewed by Myself Includes:      Recent Cultures (last 7 days):           Last 24 Hours Medication List:     Current Facility-Administered Medications:  acetaminophen 650 mg Oral Q6H PRN [place] Moras, DO    cholecalciferol 1,000 Units Oral Daily Gregor Chevy, DO    vitamin B-12 100 mcg Oral Daily Gregor Chevy, DO    diazepam 2 mg Oral HS Gregor Chevy, DO    heparin (porcine) 5,000 Units Subcutaneous [facility] Gregor Chevy, DO    levothyroxine 100 mcg Oral Early Morning Gregor Chevy, DO    magnesium hydroxide 30 mL Oral BID PRN Maik Moras, DO    morphine 15 mg Oral BID PRN Maik Moras, DO    morphine injection 4 mg Intravenous Q4H PRN [place] Moras, DO    nicotine 1 patch Transdermal Daily Gregor Chevy, DO    ondansetron 4 mg Intravenous Q4H PRN Maik Moras, DO    sodium chloride 75 mL/hr Intravenous Continuous Gregor Chevy, DO Last Rate: Stopped (12/13/19 0000)   sodium chloride 84 mL/hr Intravenous Continuous Rosita Wall,  Last Rate: 84 mL/hr (12/12/19 1481)        Today, Patient Was Seen By: Ricky Longoria PA-C    ** Please Note: Dictation voice to text software may have been used in the creation of this document   **

## 2019-12-14 PROCEDURE — 94760 N-INVAS EAR/PLS OXIMETRY 1: CPT

## 2019-12-14 PROCEDURE — 99232 SBSQ HOSP IP/OBS MODERATE 35: CPT | Performed by: PHYSICIAN ASSISTANT

## 2019-12-14 RX ORDER — ECHINACEA PURPUREA EXTRACT 125 MG
2 TABLET ORAL
Status: DISCONTINUED | OUTPATIENT
Start: 2019-12-14 | End: 2019-12-16 | Stop reason: HOSPADM

## 2019-12-14 RX ORDER — POLYVINYL ALCOHOL 14 MG/ML
1 SOLUTION/ DROPS OPHTHALMIC
Status: DISCONTINUED | OUTPATIENT
Start: 2019-12-14 | End: 2019-12-16 | Stop reason: HOSPADM

## 2019-12-14 RX ORDER — ECHINACEA PURPUREA EXTRACT 125 MG
2 TABLET ORAL AS NEEDED
Status: DISCONTINUED | OUTPATIENT
Start: 2019-12-14 | End: 2019-12-14

## 2019-12-14 RX ORDER — LORATADINE 10 MG/1
10 TABLET ORAL DAILY
Status: DISCONTINUED | OUTPATIENT
Start: 2019-12-14 | End: 2019-12-16 | Stop reason: HOSPADM

## 2019-12-14 RX ADMIN — SALINE NASAL SPRAY 2 SPRAY: 1.5 SOLUTION NASAL at 10:39

## 2019-12-14 RX ADMIN — VITAMIN D, TAB 1000IU (100/BT) 1000 UNITS: 25 TAB at 09:26

## 2019-12-14 RX ADMIN — MORPHINE SULFATE 15 MG: 15 TABLET ORAL at 00:01

## 2019-12-14 RX ADMIN — HEPARIN SODIUM 5000 UNITS: 5000 INJECTION INTRAVENOUS; SUBCUTANEOUS at 14:44

## 2019-12-14 RX ADMIN — LEVOTHYROXINE SODIUM 100 MCG: 100 TABLET ORAL at 06:08

## 2019-12-14 RX ADMIN — DIAZEPAM 2 MG: 2 TABLET ORAL at 19:34

## 2019-12-14 RX ADMIN — POLYVINYL ALCOHOL 1 DROP: 14 SOLUTION/ DROPS OPHTHALMIC at 15:30

## 2019-12-14 RX ADMIN — LORATADINE 10 MG: 10 TABLET ORAL at 10:20

## 2019-12-14 RX ADMIN — HEPARIN SODIUM 5000 UNITS: 5000 INJECTION INTRAVENOUS; SUBCUTANEOUS at 06:08

## 2019-12-14 RX ADMIN — MORPHINE SULFATE 4 MG: 4 INJECTION INTRAVENOUS at 04:17

## 2019-12-14 RX ADMIN — MORPHINE SULFATE 15 MG: 15 TABLET ORAL at 19:34

## 2019-12-14 RX ADMIN — POLYVINYL ALCOHOL 1 DROP: 14 SOLUTION/ DROPS OPHTHALMIC at 10:39

## 2019-12-14 RX ADMIN — VITAM B12 100 MCG: 100 TAB at 09:26

## 2019-12-14 NOTE — ASSESSMENT & PLAN NOTE
CPAP at night, patient is congested this morning and is having her family bring her home CPAP machine in, she utilizes a nasal pillow  Will start Claritin, nasal saline, eye drops for symptomatic relief

## 2019-12-14 NOTE — ASSESSMENT & PLAN NOTE
Thoracolumbar radiculopathy - was seen for second opinion at [facility] recently which symptoms includes right-sided breast pain as well as leg pain  She has been having urinary and fecal incontinence, which is known  Is scheduled for epidural as well as corticosteroid injection of hip, the 1st of which will be on 12/17  Recently prescribed methylprednisolone, completed course 12/13  Patient reports generalized weakness, does not appear to be acutely new, but worsening over time  She has no support at home, her  is not a caretaker and freely admitted that himself at the bedside  The patient really is not looking for rehab, but is looking for something to get a through to the injection" in hopes that that will help her feel better  At present time, she is agreeable to rehab referrals and case management is consulted for help

## 2019-12-14 NOTE — PROGRESS NOTES
Progress Note - Miguel Angel Delacruz 1945, 66 y o  female MRN: [id number]    Unit/Bed#: -01 Encounter: [id number]    Primary Care Provider: Sushila Leslie DO   Date and time admitted to hospital: 12/12/2019 12:05 PM    * Thoracolumbar radiculopathy due to intervertebral disc disorder  Assessment & Plan  Thoracolumbar radiculopathy - was seen for second opinion at [facility] recently which symptoms includes right-sided breast pain as well as leg pain  She has been having urinary and fecal incontinence, which is known  Is scheduled for epidural as well as corticosteroid injection of hip, the 1st of which will be on 12/17  Recently prescribed methylprednisolone, completed course 12/13  Patient reports generalized weakness, does not appear to be acutely new, but worsening over time  She has no support at home, her  is not a caretaker and freely admitted that himself at the bedside  The patient really is not looking for rehab, but is looking for something to get a through to the injection" in hopes that that will help her feel better  At present time, she is agreeable to rehab referrals and case management is consulted for help  Multiple sclerosis ([facility] [address] )  Assessment & Plan  Relapsing remitting MS currently without exacerbation  Hypothyroidism  Assessment & Plan  Hypothyroidism continue levothyroxine    Insomnia  Assessment & Plan  Insomnia  Continue diazepam q h s  Reviewed PDMP  CHOCO on CPAP  Assessment & Plan  CPAP at night, patient is congested this morning and is having her family bring her home CPAP machine in, she utilizes a nasal pillow  Will start Claritin, nasal saline, eye drops for symptomatic relief  VTE Pharmacologic Prophylaxis:   Pharmacologic: Heparin  Mechanical VTE Prophylaxis in Place: Yes    Patient Centered Rounds: I have performed bedside rounds with nursing staff today      Discussions with Specialists or Other Care Team Provider: ELAINE Education and Discussions with Family / Patient: patient     Time Spent for Care: 20 minutes  More than 50% of total time spent on counseling and coordination of care as described above  Current Length of Stay: 2 day(s)    Current Patient Status: Inpatient   Certification Statement: The patient will continue to require additional inpatient hospital stay due to rehab placement    Discharge Plan: pending rehab     Code Status: Level 1 - Full Code      Subjective:   Patient seen, is congested this am from the CPAP overnight  No fevers or chills  No aches  Otherwise, her baseline  Objective:     Vitals:   Temp (24hrs), Av 2 °F (36 8 °C), Min:98 1 °F (36 7 °C), Max:98 5 °F (36 9 °C)    Temp:  [98 1 °F (36 7 °C)-98 5 °F (36 9 °C)] 98 1 °F (36 7 °C)  HR:  [63-74] 63  Resp:  [17-18] 18  BP: (122-138)/(60-66) 127/62  SpO2:  [95 %-98 %] 98 %  Body mass index is 23 56 kg/m²  Input and Output Summary (last 24 hours): Intake/Output Summary (Last 24 hours) at 2019 1058  Last data filed at 2019 0900  Gross per 24 hour   Intake 240 ml   Output    Net 240 ml       Physical Exam:     Physical Exam   Constitutional: She is oriented to person, place, and time  She appears well-developed and well-nourished  No distress  Sounds like she has nasal congestion   HENT:   Mouth/Throat: Oropharynx is clear and moist    Eyes: Conjunctivae and EOM are normal  Right eye exhibits no discharge  Left eye exhibits no discharge  Cardiovascular: Normal rate, regular rhythm, S1 normal, S2 normal and normal heart sounds  Pulmonary/Chest: Effort normal and breath sounds normal  No respiratory distress  She has no wheezes  She has no rales  Abdominal: Soft  Bowel sounds are normal  She exhibits no distension  Musculoskeletal: She exhibits no edema  Neurological: She is alert and oriented to person, place, and time  Skin: Skin is warm and dry  No pallor  Psychiatric: She has a normal mood and affect  Nursing note and vitals reviewed  Additional Data:     Labs:    Results from last 7 days   Lab Units 12/13/19  0558 12/12/19  1253   WBC Thousand/uL 7 77 9 28   HEMOGLOBIN g/dL 13 0 13 5   HEMATOCRIT % 39 8 40 6   PLATELETS Thousands/uL 256 283   NEUTROS PCT %  --  61   LYMPHS PCT %  --  28   MONOS PCT %  --  10   EOS PCT %  --  1     Results from last 7 days   Lab Units 12/13/19  0558   SODIUM mmol/L 139   POTASSIUM mmol/L 3 5   CHLORIDE mmol/L 105   CO2 mmol/L 24   BUN mg/dL 5   CREATININE mg/dL 0 58*   ANION GAP mmol/L 10   CALCIUM mg/dL 8 4   ALBUMIN g/dL 3 0*   TOTAL BILIRUBIN mg/dL 0 30   ALK PHOS U/L 73   ALT U/L 41   AST U/L 16   GLUCOSE RANDOM mg/dL 88     Results from last 7 days   Lab Units 12/12/19  1330   INR  1 15             Results from last 7 days   Lab Units 12/12/19  1330   LACTIC ACID mmol/L 1 0           * I Have Reviewed All Lab Data Listed Above    * Additional Pertinent Lab Tests Reviewed: No New Labs Available For Today    Imaging:    Imaging Reports Reviewed Today Include:   Imaging Personally Reviewed by Myself Includes:      Recent Cultures (last 7 days):     Results from last 7 days   Lab Units 12/12/19  1836   URINE CULTURE  10,000-19,000 cfu/ml        Last 24 Hours Medication List:     Current Facility-Administered Medications:  acetaminophen 650 mg Oral Q6H PRN Boom Bilis, DO    cholecalciferol 1,000 Units Oral Daily Gregor Reece, DO    vitamin B-12 100 mcg Oral Daily Gregor Reece, DO    diazepam 2 mg Oral HS Gregor Reece, DO    heparin (porcine) 5,000 Units Subcutaneous [facility] Gregor Reece, DO    levothyroxine 100 mcg Oral Early Morning Gregor Reece, DO    loratadine 10 mg Oral Daily Zayra Robledo PA-C    magnesium hydroxide 30 mL Oral BID PRN Boom Bilis, DO    morphine 15 mg Oral BID PRN Boom Bilis, DO    morphine injection 4 mg Intravenous Q4H PRN Boom Bilis, DO    nicotine 1 patch Transdermal Daily Gregor Reece, DO    ondansetron 4 mg Intravenous Q4H PRN [place] Stalls, DO    polyvinyl alcohol 1 drop Both Eyes Q3H PRN Louise Massey PA-C    sodium chloride 2 spray Each Nare Q1H PRN Louise Massey PA-C    sodium chloride 75 mL/hr Intravenous Continuous Eli Stalls, DO Last Rate: Stopped (12/13/19 0000)        Today, Patient Was Seen By: Louise Massey PA-C    ** Please Note: Dictation voice to text software may have been used in the creation of this document   **

## 2019-12-14 NOTE — PLAN OF CARE
Problem: PAIN - ADULT  Goal: Verbalizes/displays adequate comfort level or baseline comfort level  Description  Interventions:  - Encourage patient to monitor pain and request assistance  - Assess pain using appropriate pain scale  - Administer analgesics based on type and severity of pain and evaluate response  - Implement non-pharmacological measures as appropriate and evaluate response  - Consider cultural and social influences on pain and pain management  - Notify physician/advanced practitioner if interventions unsuccessful or patient reports new pain  Outcome: Progressing     Problem: INFECTION - ADULT  Goal: Absence or prevention of progression during hospitalization  Description  INTERVENTIONS:  - Assess and monitor for signs and symptoms of infection  - Monitor lab/diagnostic results  - Monitor all insertion sites, i e  indwelling lines, tubes, and drains  - Monitor endotracheal if appropriate and nasal secretions for changes in amount and color  - [place] appropriate cooling/warming therapies per order  - Administer medications as ordered  - Instruct and encourage patient and family to use good hand hygiene technique  - Identify and instruct in appropriate isolation precautions for identified infection/condition  Outcome: Progressing  Goal: Absence of fever/infection during neutropenic period  Description  INTERVENTIONS:  - Monitor WBC    Outcome: Progressing     Problem: SAFETY ADULT  Goal: Patient will remain free of falls  Description  INTERVENTIONS:  - Assess patient frequently for physical needs  -  Identify cognitive and physical deficits and behaviors that affect risk of falls    -  [place] fall precautions as indicated by assessment   - Educate patient/family on patient safety including physical limitations  - Instruct patient to call for assistance with activity based on assessment  - Modify environment to reduce risk of injury  - Consider OT/PT consult to assist with strengthening/mobility  Outcome: Progressing  Goal: Maintain or return to baseline ADL function  Description  INTERVENTIONS:  -  Assess patient's ability to carry out ADLs; assess patient's baseline for ADL function and identify physical deficits which impact ability to perform ADLs (bathing, care of mouth/teeth, toileting, grooming, dressing, etc )  - Assess/evaluate cause of self-care deficits   - Assess range of motion  - Assess patient's mobility; develop plan if impaired  - Assess patient's need for assistive devices and provide as appropriate  - Encourage maximum independence but intervene and supervise when necessary  - Involve family in performance of ADLs  - Assess for home care needs following discharge   - Consider OT consult to assist with ADL evaluation and planning for discharge  - Provide patient education as appropriate  Outcome: Progressing  Goal: Maintain or return mobility status to optimal level  Description  INTERVENTIONS:  - Assess patient's baseline mobility status (ambulation, transfers, stairs, etc )    - Identify cognitive and physical deficits and behaviors that affect mobility  - Identify mobility aids required to assist with transfers and/or ambulation (gait belt, sit-to-stand, lift, walker, cane, etc )  - [place] fall precautions as indicated by assessment  - Record patient progress and toleration of activity level on Mobility SBAR; progress patient to next Phase/Stage  - Instruct patient to call for assistance with activity based on assessment  - Consider rehabilitation consult to assist with strengthening/weightbearing, etc   Outcome: Progressing     Problem: DISCHARGE PLANNING  Goal: Discharge to home or other facility with appropriate resources  Description  INTERVENTIONS:  - Identify barriers to discharge w/patient and caregiver  - Arrange for needed discharge resources and transportation as appropriate  - Identify discharge learning needs (meds, wound care, etc )  - Arrange for interpretive services to assist at discharge as needed  - Refer to Case Management Department for coordinating discharge planning if the patient needs post-hospital services based on physician/advanced practitioner order or complex needs related to functional status, cognitive ability, or social support system  Outcome: Progressing     Problem: Knowledge Deficit  Goal: Patient/family/caregiver demonstrates understanding of disease process, treatment plan, medications, and discharge instructions  Description  Complete learning assessment and assess knowledge base  Interventions:  - Provide teaching at level of understanding  - Provide teaching via preferred learning methods  Outcome: Progressing     Problem: Nutrition/Hydration-ADULT  Goal: Nutrient/Hydration intake appropriate for improving, restoring or maintaining nutritional needs  Description  Monitor and assess patient's nutrition/hydration status for malnutrition  Collaborate with interdisciplinary team and initiate plan and interventions as ordered  Monitor patient's weight and dietary intake as ordered or per policy  Utilize nutrition screening tool and intervene as necessary  Determine patient's food preferences and provide high-protein, high-caloric foods as appropriate       INTERVENTIONS:  - Monitor oral intake, urinary output, labs, and treatment plans  - Assess nutrition and hydration status and recommend course of action  - Evaluate amount of meals eaten  - Assist patient with eating if necessary   - Allow adequate time for meals  - Recommend/ encourage appropriate diets, oral nutritional supplements, and vitamin/mineral supplements  - Order, calculate, and assess calorie counts as needed  - Recommend, monitor, and adjust tube feedings and TPN/PPN based on assessed needs  - Assess need for intravenous fluids  - Provide specific nutrition/hydration education as appropriate  - Include patient/family/caregiver in decisions related to nutrition  Outcome: Progressing     Problem: Prexisting or High Potential for Compromised Skin Integrity  Goal: Skin integrity is maintained or improved  Description  INTERVENTIONS:  - Identify patients at risk for skin breakdown  - Assess and monitor skin integrity  - Assess and monitor nutrition and hydration status  - Monitor labs   - Assess for incontinence   - Turn and reposition patient  - Assist with mobility/ambulation  - Relieve pressure over bony prominences  - Avoid friction and shearing  - Provide appropriate hygiene as needed including keeping skin clean and dry  - Evaluate need for skin moisturizer/barrier cream  - Collaborate with interdisciplinary team   - Patient/family teaching  - Consider wound care consult   Outcome: Progressing     Problem: Potential for Falls  Goal: Patient will remain free of falls  Description  INTERVENTIONS:  - Assess patient frequently for physical needs  -  Identify cognitive and physical deficits and behaviors that affect risk of falls    -  [place] fall precautions as indicated by assessment   - Educate patient/family on patient safety including physical limitations  - Instruct patient to call for assistance with activity based on assessment  - Modify environment to reduce risk of injury  - Consider OT/PT consult to assist with strengthening/mobility  Outcome: Progressing

## 2019-12-14 NOTE — SOCIAL WORK
Anne at [facility] came to [facility] today to speak with CM and then she was able to talk to pt and her daughter whom was present in the room as well  After Leandra Juárez spoke with pt and daughter, she came to inform CM that pt is agreeable to going to [facility]  Anne informed CM that pt's daughter will transport to [facility], 12/15/19 and will also transport to corticosteroid injection on 12/17/19 as well  CM made Maricruz Elizondo with [employer] aware

## 2019-12-15 ENCOUNTER — APPOINTMENT (INPATIENT)
Dept: RADIOLOGY | Facility: HOSPITAL | Age: 74
DRG: 552 | End: 2019-12-15
Payer: MEDICARE

## 2019-12-15 PROCEDURE — 74018 RADEX ABDOMEN 1 VIEW: CPT

## 2019-12-15 PROCEDURE — 99232 SBSQ HOSP IP/OBS MODERATE 35: CPT | Performed by: PHYSICIAN ASSISTANT

## 2019-12-15 RX ORDER — ECHINACEA PURPUREA EXTRACT 125 MG
2 TABLET ORAL
Refills: 0
Start: 2019-12-15 | End: 2020-04-22

## 2019-12-15 RX ORDER — LORATADINE 10 MG/1
10 TABLET ORAL DAILY PRN
Refills: 0
Start: 2019-12-15 | End: 2020-04-22

## 2019-12-15 RX ORDER — MINERAL OIL 100 G/100G
1 OIL RECTAL ONCE
Status: COMPLETED | OUTPATIENT
Start: 2019-12-15 | End: 2019-12-15

## 2019-12-15 RX ORDER — BISACODYL 10 MG
10 SUPPOSITORY, RECTAL RECTAL ONCE
Status: COMPLETED | OUTPATIENT
Start: 2019-12-15 | End: 2019-12-15

## 2019-12-15 RX ORDER — MAGNESIUM CARB/ALUMINUM HYDROX 105-160MG
148 TABLET,CHEWABLE ORAL ONCE
Status: COMPLETED | OUTPATIENT
Start: 2019-12-15 | End: 2019-12-15

## 2019-12-15 RX ORDER — POLYVINYL ALCOHOL 14 MG/ML
1 SOLUTION/ DROPS OPHTHALMIC
Qty: 15 ML | Refills: 0
Start: 2019-12-15 | End: 2020-04-22

## 2019-12-15 RX ORDER — DOCUSATE SODIUM 100 MG/1
100 CAPSULE, LIQUID FILLED ORAL 2 TIMES DAILY
Qty: 10 CAPSULE | Refills: 0
Start: 2019-12-15 | End: 2019-12-16 | Stop reason: HOSPADM

## 2019-12-15 RX ADMIN — MORPHINE SULFATE 15 MG: 15 TABLET ORAL at 15:03

## 2019-12-15 RX ADMIN — ONDANSETRON 4 MG: 2 INJECTION INTRAMUSCULAR; INTRAVENOUS at 08:32

## 2019-12-15 RX ADMIN — HEPARIN SODIUM 5000 UNITS: 5000 INJECTION INTRAVENOUS; SUBCUTANEOUS at 13:15

## 2019-12-15 RX ADMIN — MAGNESIUM CITRATE 148 ML: 1.75 LIQUID ORAL at 12:49

## 2019-12-15 RX ADMIN — LEVOTHYROXINE SODIUM 100 MCG: 100 TABLET ORAL at 06:25

## 2019-12-15 RX ADMIN — VITAMIN D, TAB 1000IU (100/BT) 1000 UNITS: 25 TAB at 08:27

## 2019-12-15 RX ADMIN — LORATADINE 10 MG: 10 TABLET ORAL at 08:27

## 2019-12-15 RX ADMIN — MORPHINE SULFATE 15 MG: 15 TABLET ORAL at 02:43

## 2019-12-15 RX ADMIN — HEPARIN SODIUM 5000 UNITS: 5000 INJECTION INTRAVENOUS; SUBCUTANEOUS at 06:25

## 2019-12-15 RX ADMIN — MINERAL OIL 1 ENEMA: 100 ENEMA RECTAL at 18:15

## 2019-12-15 RX ADMIN — BISACODYL 10 MG: 10 SUPPOSITORY RECTAL at 10:13

## 2019-12-15 RX ADMIN — VITAM B12 100 MCG: 100 TAB at 08:27

## 2019-12-15 NOTE — ASSESSMENT & PLAN NOTE
CPAP at night, patient was congested yesterday morning and did better overnight with her home CPAP machine  Continue Claritin, nasal saline, eye drops for symptomatic relief as needed

## 2019-12-15 NOTE — SOCIAL WORK
Pt has no bm yet  Cm spoke w néstor maier and pt can come later today if she has bm  RN to call livier if pt has bm and can transfer   RN to fax d c info now

## 2019-12-15 NOTE — PLAN OF CARE
Problem: PAIN - ADULT  Goal: Verbalizes/displays adequate comfort level or baseline comfort level  Description  Interventions:  - Encourage patient to monitor pain and request assistance  - Assess pain using appropriate pain scale  - Administer analgesics based on type and severity of pain and evaluate response  - Implement non-pharmacological measures as appropriate and evaluate response  - Consider cultural and social influences on pain and pain management  - Notify physician/advanced practitioner if interventions unsuccessful or patient reports new pain  Outcome: Progressing     Problem: INFECTION - ADULT  Goal: Absence or prevention of progression during hospitalization  Description  INTERVENTIONS:  - Assess and monitor for signs and symptoms of infection  - Monitor lab/diagnostic results  - Monitor all insertion sites, i e  indwelling lines, tubes, and drains  - Monitor endotracheal if appropriate and nasal secretions for changes in amount and color  - [place] appropriate cooling/warming therapies per order  - Administer medications as ordered  - Instruct and encourage patient and family to use good hand hygiene technique  - Identify and instruct in appropriate isolation precautions for identified infection/condition  Outcome: Progressing  Goal: Absence of fever/infection during neutropenic period  Description  INTERVENTIONS:  - Monitor WBC    Outcome: Progressing     Problem: SAFETY ADULT  Goal: Patient will remain free of falls  Description  INTERVENTIONS:  - Assess patient frequently for physical needs  -  Identify cognitive and physical deficits and behaviors that affect risk of falls    -  [place] fall precautions as indicated by assessment   - Educate patient/family on patient safety including physical limitations  - Instruct patient to call for assistance with activity based on assessment  - Modify environment to reduce risk of injury  - Consider OT/PT consult to assist with strengthening/mobility  Outcome: Progressing  Goal: Maintain or return to baseline ADL function  Description  INTERVENTIONS:  -  Assess patient's ability to carry out ADLs; assess patient's baseline for ADL function and identify physical deficits which impact ability to perform ADLs (bathing, care of mouth/teeth, toileting, grooming, dressing, etc )  - Assess/evaluate cause of self-care deficits   - Assess range of motion  - Assess patient's mobility; develop plan if impaired  - Assess patient's need for assistive devices and provide as appropriate  - Encourage maximum independence but intervene and supervise when necessary  - Involve family in performance of ADLs  - Assess for home care needs following discharge   - Consider OT consult to assist with ADL evaluation and planning for discharge  - Provide patient education as appropriate  Outcome: Progressing  Goal: Maintain or return mobility status to optimal level  Description  INTERVENTIONS:  - Assess patient's baseline mobility status (ambulation, transfers, stairs, etc )    - Identify cognitive and physical deficits and behaviors that affect mobility  - Identify mobility aids required to assist with transfers and/or ambulation (gait belt, sit-to-stand, lift, walker, cane, etc )  - [place] fall precautions as indicated by assessment  - Record patient progress and toleration of activity level on Mobility SBAR; progress patient to next Phase/Stage  - Instruct patient to call for assistance with activity based on assessment  - Consider rehabilitation consult to assist with strengthening/weightbearing, etc   Outcome: Progressing     Problem: DISCHARGE PLANNING  Goal: Discharge to home or other facility with appropriate resources  Description  INTERVENTIONS:  - Identify barriers to discharge w/patient and caregiver  - Arrange for needed discharge resources and transportation as appropriate  - Identify discharge learning needs (meds, wound care, etc )  - Arrange for interpretive services to assist at discharge as needed  - Refer to Case Management Department for coordinating discharge planning if the patient needs post-hospital services based on physician/advanced practitioner order or complex needs related to functional status, cognitive ability, or social support system  Outcome: Progressing     Problem: Knowledge Deficit  Goal: Patient/family/caregiver demonstrates understanding of disease process, treatment plan, medications, and discharge instructions  Description  Complete learning assessment and assess knowledge base  Interventions:  - Provide teaching at level of understanding  - Provide teaching via preferred learning methods  Outcome: Progressing     Problem: Nutrition/Hydration-ADULT  Goal: Nutrient/Hydration intake appropriate for improving, restoring or maintaining nutritional needs  Description  Monitor and assess patient's nutrition/hydration status for malnutrition  Collaborate with interdisciplinary team and initiate plan and interventions as ordered  Monitor patient's weight and dietary intake as ordered or per policy  Determine patient's food preferences and provide high-protein, high-caloric foods as appropriate       INTERVENTIONS:  - Monitor oral intake, urinary output, labs, and treatment plans  - Assess nutrition and hydration status and recommend course of action  - Evaluate amount of meals eaten  - Assist patient with eating if necessary   - Allow adequate time for meals  - Recommend/ encourage appropriate diets, oral nutritional supplements, and vitamin/mineral supplements  - Order, calculate, and assess calorie counts as needed  - Recommend, monitor, and adjust tube feedings based on assessed needs  - Assess need for intravenous fluids  - Provide  nutrition/hydration education as appropriate  - Include patient/family/caregiver in decisions related to nutrition   Outcome: Progressing     Problem: Prexisting or High Potential for Compromised Skin Integrity  Goal: Skin integrity is maintained or improved  Description  INTERVENTIONS:  - Identify patients at risk for skin breakdown  - Assess and monitor skin integrity  - Assess and monitor nutrition and hydration status  - Monitor labs   - Assess for incontinence   - Turn and reposition patient  - Assist with mobility/ambulation  - Relieve pressure over bony prominences  - Avoid friction and shearing  - Provide appropriate hygiene as needed including keeping skin clean and dry  - Evaluate need for skin moisturizer/barrier cream  - Collaborate with interdisciplinary team   - Patient/family teaching  - Consider wound care consult   Outcome: Progressing     Problem: Potential for Falls  Goal: Patient will remain free of falls  Description  INTERVENTIONS:  - Assess patient frequently for physical needs  -  Identify cognitive and physical deficits and behaviors that affect risk of falls    -  [place] fall precautions as indicated by assessment   - Educate patient/family on patient safety including physical limitations  - Instruct patient to call for assistance with activity based on assessment  - Modify environment to reduce risk of injury  - Consider OT/PT consult to assist with strengthening/mobility  Outcome: Progressing

## 2019-12-15 NOTE — QUICK NOTE
Was notified by bedside RN that [facility] refusing [facility] the patient because she has not had a bowel movement yet    Patient has received Mag citrate and a Fleet enema hopeful for bowel movements tomorrow transfer tomorrow

## 2019-12-15 NOTE — ASSESSMENT & PLAN NOTE
Thoracolumbar radiculopathy - was seen for second opinion at [facility] recently which symptoms includes right-sided breast pain as well as leg pain  She has been having urinary and fecal incontinence, which is known  Is scheduled for epidural as well as corticosteroid injection of hip, the 1st of which will be on 12/17  Recently prescribed methylprednisolone, completed course 12/13  Patient reports generalized weakness, does not appear to be acutely new, but worsening over time  She has no support at home, her  is not a caretaker and freely admitted that himself at the bedside  The patient really is not looking for rehab, but is looking for something to get a through to the injection" in hopes that that will help her feel better  At present time, she is agreeable to trial rehab and her daughter is planning to transport to rehab as well as to injection on Tuesday  Patient reports pain is worse today, she feels that is because she has not had her medical marijuana since she has been in the hospital   Her main question is if she can have marijuana while she is at the rehab facility

## 2019-12-16 ENCOUNTER — TRANSITIONAL CARE MANAGEMENT (OUTPATIENT)
Dept: INTERNAL MEDICINE CLINIC | Facility: CLINIC | Age: 74
End: 2019-12-16

## 2019-12-16 VITALS
HEIGHT: 63 IN | DIASTOLIC BLOOD PRESSURE: 60 MMHG | BODY MASS INDEX: 23.57 KG/M2 | WEIGHT: 133 LBS | SYSTOLIC BLOOD PRESSURE: 113 MMHG | HEART RATE: 73 BPM | OXYGEN SATURATION: 97 % | TEMPERATURE: 97.9 F | RESPIRATION RATE: 18 BRPM

## 2019-12-16 PROBLEM — K59.09 OTHER CONSTIPATION: Status: ACTIVE | Noted: 2019-12-16

## 2019-12-16 LAB
ATRIAL RATE: 74 BPM
P AXIS: 74 DEGREES
PR INTERVAL: 142 MS
QRS AXIS: 80 DEGREES
QRSD INTERVAL: 80 MS
QT INTERVAL: 382 MS
QTC INTERVAL: 424 MS
T WAVE AXIS: 76 DEGREES
VENTRICULAR RATE: 74 BPM

## 2019-12-16 PROCEDURE — 93010 ELECTROCARDIOGRAM REPORT: CPT | Performed by: INTERNAL MEDICINE

## 2019-12-16 PROCEDURE — 99239 HOSP IP/OBS DSCHRG MGMT >30: CPT | Performed by: PHYSICIAN ASSISTANT

## 2019-12-16 RX ORDER — BISACODYL 10 MG
10 SUPPOSITORY, RECTAL RECTAL DAILY PRN
Qty: 12 SUPPOSITORY | Refills: 0
Start: 2019-12-16 | End: 2020-04-22

## 2019-12-16 RX ORDER — POLYETHYLENE GLYCOL 3350 17 G/17G
17 POWDER, FOR SOLUTION ORAL DAILY
Qty: 14 EACH | Refills: 0 | Status: SHIPPED | OUTPATIENT
Start: 2019-12-16 | End: 2020-12-15 | Stop reason: CLARIF

## 2019-12-16 RX ORDER — SENNA AND DOCUSATE SODIUM 50; 8.6 MG/1; MG/1
2 TABLET, FILM COATED ORAL 2 TIMES DAILY
Refills: 0
Start: 2019-12-16 | End: 2020-04-22

## 2019-12-16 RX ADMIN — HEPARIN SODIUM 5000 UNITS: 5000 INJECTION INTRAVENOUS; SUBCUTANEOUS at 05:45

## 2019-12-16 RX ADMIN — VITAMIN D, TAB 1000IU (100/BT) 1000 UNITS: 25 TAB at 08:11

## 2019-12-16 RX ADMIN — LEVOTHYROXINE SODIUM 100 MCG: 100 TABLET ORAL at 05:51

## 2019-12-16 RX ADMIN — LORATADINE 10 MG: 10 TABLET ORAL at 08:11

## 2019-12-16 RX ADMIN — ONDANSETRON 4 MG: 2 INJECTION INTRAMUSCULAR; INTRAVENOUS at 06:40

## 2019-12-16 RX ADMIN — VITAM B12 100 MCG: 100 TAB at 08:11

## 2019-12-16 RX ADMIN — MORPHINE SULFATE 4 MG: 4 INJECTION INTRAVENOUS at 01:21

## 2019-12-16 NOTE — ASSESSMENT & PLAN NOTE
Likely combination of medications, decreased intake, decreased mobility and thoracolumbar radicular issues  She had a KUB which showed stool burden in colon, underwent mag citrate and enema with some success  She will need aggressive bowel regimen at home and I explained this extensively to her on 12/16

## 2019-12-16 NOTE — DISCHARGE SUMMARY
Discharge- Mayra Tijerina 1945, 66 y o  female MRN: [id number]    Unit/Bed#: -01 Encounter: [id number]    Primary Care Provider: Rayo Muniz DO   Date and time admitted to hospital: 12/12/2019 12:05 PM      * Thoracolumbar radiculopathy due to intervertebral disc disorder  Assessment & Plan  Thoracolumbar radiculopathy - was seen for second opinion at [facility] recently which symptoms includes right-sided breast pain as well as leg pain  She has been having urinary and fecal incontinence, which is known - suspect these issues are at least partly from overflow due to constipation and likely urinary retention  Is scheduled for epidural as well as corticosteroid injection of hip, the 1st of which will be on 12/17  Recently prescribed methylprednisolone, completed course 12/13  Patient reports generalized weakness, does not appear to be acutely new, but worsening over time  She has no support at home, her  is not a caretaker and freely admitted that himself at the bedside  The patient reported that her daughter "has her own things to take care of" and can't be there to support her  The patient really was not looking for rehab and admitted this during the hospitalization, but was looking for respite and care - something to get a through to the injection" in hopes that that will help her feel better  Patient was however agreeable to trial rehab and was scheduled to d/c to [place] last night but had not had a BM and was denied until she moved her bowels  Multiple sclerosis ([facility] [address] )  Assessment & Plan  Relapsing remitting MS currently without exacerbation  Hypothyroidism  Assessment & Plan  Hypothyroidism continue levothyroxine  Insomnia  Assessment & Plan  Insomnia  Continue diazepam q h s  Reviewed PDMP  Other constipation  Assessment & Plan  Likely combination of medications, decreased intake, decreased mobility and thoracolumbar radicular issues    She had a KUB which showed stool burden in colon, underwent mag citrate and enema with some success  She will need aggressive bowel regimen at home and I explained this extensively to her on 12/16  CHOCO on CPAP  Assessment & Plan  CPAP at night  Discharging Physician / Practitioner: Eduardo Herbert PA-C  PCP: Jennifer Boyd DO  Admission Date:   Admission Orders (From admission, onward)     Ordered        12/12/19 1527  Inpatient Admission  Once                   Discharge Date: 12/16/19    Resolved Problems  Date Reviewed: 12/16/2019          Resolved    UTI (urinary tract infection) 12/13/2019     Resolved by  Eduardo Herbert PA-C          Consultations During Hospital Stay:  · PT/OT      Procedures Performed:   · CT a/p - no acute abnormalities   · KUB - constipation      Significant Findings / Test Results:   · Labs unremarkable      Incidental Findings:   ·       Test Results Pending at Discharge (will require follow up):   ·       Outpatient Tests Requested:  · PCP  · Neurosurgery  · Pain management      Complications:       Reason for Admission: acute on chronic pain      Hospital Course:      Garrett Ballard is a 66 y o  female patient who originally presented to the hospital on 12/12/2019 due to generalized weakness and pain  Patient has right-sided radicular symptoms such as been going on for quite a long time, she has even had a 2nd opinion at [facility]  Patient is scheduled for injections upcoming with the 1st on Tuesday  She presented to the hospital with acute on chronic symptoms, and decrease caregiver support at home  Patient initially was looking for some sort of respite until her injection on Tuesday, but after evaluation with physical therapy and occupational therapy was agreeable to rehab  Patient initially accepted to [address] [place] [address], however was unable to be transported on 12/15 due to lack of bowel movement    Patient has had a bowel movement overnight, and today has now changed her mind that she would like to go home with home services  Face-to-face completed to certify need for PT and nursing  This will be followed by primary care provider      Due to the constipation and findings on the KUB, I recommended to the patient that she pursue an aggressive bowel regimen  She has been known to have bowel and bladder issues, likely secondary to thoracolumbar issues, but also likely secondary to the constipation      There was initial concern that the patient had urinary tract infection at time of presentation, urinalysis bland and urine culture showed no growth      Please see above list of diagnoses and related plan for additional information  Condition at Discharge: stable     Discharge Day Visit / Exam:     Subjective:  Patient seen and examined this morning, now would like to go home  She reports her daughter is able to pick her up today  Patient expresses frustration over the bowel regimen that she received yesterday, it caused her a lot of gas GI distress but she does feel better after having a small bowel movement  Patient reports that no one has listen to her about her GI issues for some months now  Vitals: Blood Pressure: 113/60 (12/16/19 0647)  Pulse: 73 (12/16/19 0647)  Temperature: 97 9 °F (36 6 °C) (12/16/19 0647)  Temp Source: Oral (12/16/19 0647)  Respirations: 18 (12/16/19 0647)  Height: 5' 3" (160 cm) (12/12/19 1156)  Weight - Scale: 60 3 kg (133 lb) (12/12/19 1156)  SpO2: 97 % (12/16/19 0647)  Exam:   Physical Exam   Constitutional: She is oriented to person, place, and time  She appears well-developed and well-nourished  No distress  HENT:   Mouth/Throat: Oropharynx is clear and moist    Cardiovascular: Normal rate, regular rhythm, S1 normal, S2 normal and normal heart sounds  Pulmonary/Chest: Effort normal and breath sounds normal  No respiratory distress  She has no wheezes  She has no rales  Abdominal: Soft   Bowel sounds are normal  She exhibits no distension  There is no tenderness  There is no guarding  Musculoskeletal: She exhibits no edema  Neurological: She is alert and oriented to person, place, and time  Psychiatric: She has a normal mood and affect  Nursing note and vitals reviewed  Discussion with Family: none present     Discharge instructions/Information to patient and family:   See after visit summary for information provided to patient and family  Provisions for Follow-Up Care:  See after visit summary for information related to follow-up care and any pertinent home health orders  Disposition:     Home with VNA Services (Reminder: Complete face to face encounter)    Planned Readmission: none     Discharge Statement:  I spent approx 34 minutes discharging the patient  This time was spent on the day of discharge  I had direct contact with the patient on the day of discharge  Greater than 50% of the total time was spent examining patient, answering all patient questions, arranging and discussing plan of care with patient as well as directly providing post-discharge instructions  Additional time then spent on discharge activities  Discharge Medications:  See after visit summary for reconciled discharge medications provided to patient and family        ** Please Note: This note has been constructed using a voice recognition system **

## 2019-12-16 NOTE — ASSESSMENT & PLAN NOTE
Thoracolumbar radiculopathy - was seen for second opinion at [facility] recently which symptoms includes right-sided breast pain as well as leg pain  She has been having urinary and fecal incontinence, which is known - suspect these issues are at least partly from overflow due to constipation and likely urinary retention  Is scheduled for epidural as well as corticosteroid injection of hip, the 1st of which will be on 12/17  Recently prescribed methylprednisolone, completed course 12/13  Patient reports generalized weakness, does not appear to be acutely new, but worsening over time  She has no support at home, her  is not a caretaker and freely admitted that himself at the bedside  The patient reported that her daughter "has her own things to take care of" and can't be there to support her  The patient really was not looking for rehab and admitted this during the hospitalization, but was looking for respite and care - something to get a through to the injection" in hopes that that will help her feel better  Patient was however agreeable to trial rehab and was scheduled to d/c to [place] last night but had not had a BM and was denied until she moved her bowels

## 2019-12-16 NOTE — DISCHARGE INSTRUCTIONS
Constipation   WHAT YOU NEED TO KNOW:   Constipation is when you have hard, dry bowel movements, or you go longer than usual between bowel movements  DISCHARGE INSTRUCTIONS:   Seek care immediately if:   · You have blood in your bowel movements  · You have a fever and abdominal pain with the constipation  Contact your healthcare provider if:   · Your constipation gets worse  · You start to vomit  · You have questions or concerns about your condition or care  Medicines:   · Medicine or a fiber supplement  may help make your bowel movement softer  A laxative may help relax and loosen your intestines to help you have a bowel movement  You may also be given medicine to increase fluid in your intestines  The fluid may help move bowel movements through your intestines  · Take your medicine as directed  Contact your healthcare provider if you think your medicine is not helping or if you have side effects  Tell him of her if you are allergic to any medicine  Keep a list of the medicines, vitamins, and herbs you take  Include the amounts, and when and why you take them  Bring the list or the pill bottles to follow-up visits  Carry your medicine list with you in case of an emergency  Manage your constipation:   · Drink liquids as directed  You may need to drink extra liquids to help soften and move your bowels  Ask how much liquid to drink each day and which liquids are best for you  · Eat high-fiber foods  This may help decrease constipation by adding bulk to your bowel movements  High-fiber foods include fruit, vegetables, whole-grain breads and cereals, and beans  Your healthcare provider or dietitian can help you create a high-fiber meal plan  · Exercise regularly  Regular physical activity can help stimulate your intestines  Ask which exercises are best for you  · Schedule a time each day to have a bowel movement  This may help train your body to have regular bowel movements  Bend forward while you are on the toilet to help move the bowel movement out  Sit on the toilet for at least 10 minutes, even if you do not have a bowel movement  Follow up with your healthcare provider as directed:  Write down your questions so you remember to ask them during your visits  © 2017 [employer] Information is for End User's use only and may not be sold, redistributed or otherwise used for commercial purposes  All illustrations and images included in CareNotes® are the copyrighted property of [employer]  or [employer]  The above information is an  only  It is not intended as medical advice for individual conditions or treatments  Talk to your doctor, nurse or pharmacist before following any medical regimen to see if it is safe and effective for you  High Fiber Diet   WHAT YOU NEED TO KNOW:   A high-fiber diet includes foods that have a high amount of fiber  Fiber is the part of fruits, vegetables, and grains that is not broken down by your body  Fiber keeps your bowel movements regular  Fiber can also help lower your cholesterol level, control blood sugar in people with diabetes, and relieve constipation  Fiber can also help you control your weight because it helps you feel full faster  Most adults should eat 25 to 35 grams of fiber each day  Talk to your dietitian or healthcare provider about the amount of fiber you need    DISCHARGE INSTRUCTIONS:   Good sources of fiber:   · Foods with at least 4 grams of fiber per serving:      ¨ ? to ½ cup of high-fiber cereal (check the nutrition label on the box)    ¨ ½ cup of blackberries or raspberries    ¨ 4 dried prunes    ¨ 1 cooked artichoke    ¨ ½ cup of cooked legumes, such as lentils, or red, kidney, and bowie beans    · Foods with 1 to 3 grams of fiber per serving:      ¨ 1 slice of whole-wheat, pumpernickel, or rye bread    ¨ ½ cup of cooked brown rice    ¨ 4 whole-wheat crackers    ¨ 1 cup of oatmeal    ¨ ½ cup of cereal with 1 to 3 grams of fiber per serving (check the nutrition label on the box)    ¨ 1 small piece of fruit, such as an apple, banana, pear, kiwi, or orange    ¨ 3 dates    ¨ ½ cup of canned apricots, fruit cocktail, peaches, or pears    ¨ ½ cup of raw or cooked vegetables, such as carrots, cauliflower, cabbage, spinach, squash, or corn  Ways that you can increase fiber in your diet:   · Choose brown or wild rice instead of white rice  · Use whole wheat flour in recipes instead of white or all-purpose flour  · Add beans and peas to casseroles or soups  · Choose fresh fruit and vegetables with peels or skins on instead of juices  Other diet guidelines to follow:   · Add fiber to your diet slowly  You may have abdominal discomfort, bloating, and gas if you add fiber to your diet too quickly  · Drink plenty of liquids as you add fiber to your diet  You may have nausea or develop constipation if you do not drink enough water  Ask how much liquid to drink each day and which liquids are best for you  © 2017 [employer]  Information is for End User's use only and may not be sold, redistributed or otherwise used for commercial purposes  All illustrations and images included in CareNotes® are the copyrighted property of [employer] M [employer] Inc  or [employer]  The above information is an  only  It is not intended as medical advice for individual conditions or treatments  Talk to your doctor, nurse or pharmacist before following any medical regimen to see if it is safe and effective for you

## 2019-12-17 ENCOUNTER — PROCEDURE VISIT (OUTPATIENT)
Dept: PAIN MEDICINE | Facility: CLINIC | Age: 74
End: 2019-12-17
Payer: MEDICARE

## 2019-12-17 DIAGNOSIS — M54.17 LUMBOSACRAL RADICULOPATHY AT L4: ICD-10-CM

## 2019-12-17 DIAGNOSIS — G58.8 INTERCOSTAL NEURALGIA: Primary | ICD-10-CM

## 2019-12-17 DIAGNOSIS — M54.14 THORACIC RADICULOPATHY: ICD-10-CM

## 2019-12-17 PROCEDURE — 76942 ECHO GUIDE FOR BIOPSY: CPT | Performed by: PHYSICAL MEDICINE & REHABILITATION

## 2019-12-17 PROCEDURE — 64420 NJX AA&/STRD NTRCOST NRV 1: CPT | Performed by: PHYSICAL MEDICINE & REHABILITATION

## 2019-12-17 RX ORDER — METHYLPREDNISOLONE ACETATE 40 MG/ML
40 INJECTION, SUSPENSION INTRA-ARTICULAR; INTRALESIONAL; INTRAMUSCULAR; SOFT TISSUE ONCE
Status: COMPLETED | OUTPATIENT
Start: 2019-12-17 | End: 2019-12-17

## 2019-12-17 RX ORDER — LIDOCAINE HYDROCHLORIDE 10 MG/ML
5 INJECTION, SOLUTION EPIDURAL; INFILTRATION; INTRACAUDAL; PERINEURAL ONCE
Status: COMPLETED | OUTPATIENT
Start: 2019-12-17 | End: 2019-12-17

## 2019-12-17 RX ADMIN — LIDOCAINE HYDROCHLORIDE 5 ML: 10 INJECTION, SOLUTION EPIDURAL; INFILTRATION; INTRACAUDAL; PERINEURAL at 10:15

## 2019-12-17 RX ADMIN — METHYLPREDNISOLONE ACETATE 40 MG: 40 INJECTION, SUSPENSION INTRA-ARTICULAR; INTRALESIONAL; INTRAMUSCULAR; SOFT TISSUE at 10:15

## 2019-12-19 ENCOUNTER — TELEPHONE (OUTPATIENT)
Dept: PAIN MEDICINE | Facility: MEDICAL CENTER | Age: 74
End: 2019-12-19

## 2019-12-19 NOTE — TELEPHONE ENCOUNTER
SW patient, states she is having pain in her neck and right shoulder along with muscle spasm and intense pressure  Patient states she is having intense pain  Patient states pain is 8/10  Patient is unable to use ice, encouraged to use heat  Patient states she cannot take anything over the counter for her pain because she is opioid resistant  Advised patient to try using heat, 20 minutes on and 20 minutes off  Reviewed with patient that it can take up to 2 full weeks to see the full effects from the medication  Patient verbalizes understanding  Advised will c/b with any recommendations from [facility]

## 2019-12-19 NOTE — TELEPHONE ENCOUNTER
Spoke to patient on the phone at 4:30 p m  Performed right-sided intercostal nerve block under ultrasound guidance at this time patient is reporting significant relief at the area of the injection  She is complaining of pain traveling up in down her mid to low back that she is relating to muscle spasms that she started to experience before and after the procedure  She is continuing with heat 20 minutes on 20 minutes off and states that that is also improving over time    She states that she is optimistic and believes that this is going to continue to improve  Will continue to follow and see her in follow-up in the next 2-4 weeks

## 2019-12-19 NOTE — TELEPHONE ENCOUNTER
Pt left a voicemail today stating she is experiencing a lot of symptoms from the injection and wants to know if that is normal    Pt can be reached at [phone]

## 2019-12-20 ENCOUNTER — OFFICE VISIT (OUTPATIENT)
Dept: INTERNAL MEDICINE CLINIC | Facility: CLINIC | Age: 74
End: 2019-12-20
Payer: MEDICARE

## 2019-12-20 VITALS
WEIGHT: 133.8 LBS | SYSTOLIC BLOOD PRESSURE: 126 MMHG | DIASTOLIC BLOOD PRESSURE: 82 MMHG | BODY MASS INDEX: 23.7 KG/M2 | OXYGEN SATURATION: 97 % | HEART RATE: 108 BPM

## 2019-12-20 DIAGNOSIS — E03.9 ACQUIRED HYPOTHYROIDISM: Chronic | ICD-10-CM

## 2019-12-20 DIAGNOSIS — J44.9 COPD WITH CHRONIC BRONCHITIS (HCC): Chronic | ICD-10-CM

## 2019-12-20 DIAGNOSIS — G35 MULTIPLE SCLEROSIS (HCC): Chronic | ICD-10-CM

## 2019-12-20 DIAGNOSIS — G58.8 INTERCOSTAL NEURALGIA: ICD-10-CM

## 2019-12-20 DIAGNOSIS — M54.15 THORACOLUMBAR RADICULOPATHY DUE TO INTERVERTEBRAL DISC DISORDER: Primary | ICD-10-CM

## 2019-12-20 DIAGNOSIS — M79.2 CHRONIC NEUROPATHIC PAIN: ICD-10-CM

## 2019-12-20 DIAGNOSIS — G89.29 CHRONIC NEUROPATHIC PAIN: ICD-10-CM

## 2019-12-20 PROCEDURE — 99495 TRANSJ CARE MGMT MOD F2F 14D: CPT | Performed by: INTERNAL MEDICINE

## 2019-12-20 RX ORDER — MORPHINE SULFATE 15 MG/1
15 TABLET ORAL 2 TIMES DAILY PRN
Qty: 45 TABLET | Refills: 0 | Status: SHIPPED | OUTPATIENT
Start: 2019-12-20 | End: 2019-12-23 | Stop reason: SDUPTHER

## 2019-12-20 NOTE — PROGRESS NOTES
INTERNAL MEDICINE TRANSITION OF CARE OFFICE VISIT  [facility]    NAME: Osborn Boast  AGE: 66 y o  SEX: female  : 1945     DATE: 2019     Assessment and Plan:     1  Thoracolumbar radiculopathy due to intervertebral disc disorder  2  Intercostal neuralgia  3  COPD with chronic bronchitis ([facility] [address] )  4  Chronic neuropathic pain  5  Multiple sclerosis ([facility] [address] )  6  Acquired hypothyroidism    Patient's hospital records were reviewed  She was discharged with  [facility]NA services after declining rehab  She is feeling much better after supportive management in the hospital  She had her intercostal injection and is feeling hopeful and her energy level has improved  She will continue morphine as needed for severe pain for now  [place] PDMP was [place]  She will continue to follow with pain management  She also is going to look to transition her neurology care over to [place] [id number] [facility] (Dr Isamar Bennett)  New Medications Ordered This Visit   Medications    morphine (MSIR) 15 mg tablet     Sig: Take 1 tablet (15 mg total) by mouth 2 (two) times a day as needed for severe painMax Daily Amount: 30 mg     Dispense:  45 tablet     Refill:  0     [place]MP or [place]  reviewed: No red flags were identified; safe to proceed with prescription         Transitional Care Management Review:     Osborn Boast is a 66 y o  female here for TCM follow-up    During the TCM phone call patient stated:    TCM Call (since 2019)     Date and time call was made  2019 10:27 AM    Hospital care reviewed  Records reviewed    Patient was hospitialized at  [facility]    Date of Admission  19    Date of discharge  19    Diagnosis  Thoracolumbar radiculopathy due to intervertebral disc disorder    Disposition  Rehabilitation center    Were the patients medications reviewed and updated  No <img src='C:FILES (X86)    Current Symptoms  Leg pain - right side;  Constipation; Vomitting <img src='C:FILES (X86)    Right side leg pain severity  Severe    Leg pain, right side, onset  Ongoing    Vomitting severity  Mild      TCM Call (since 11/19/2019)     Post hospital issues  None    Scheduled for follow up? Yes    Patients specialists  Other (comment); Neurologist    Neurologist name  Tristen Salamanca MD    Neurologist contact #  [phone]    Other specialists names  [facility] Danica Overton MD    Other specialists contcat #  [phone]    Do you need help managing your prescriptions or medications  No    Is transportation to your appointment needed  No    I have advised the patient to call PCP with any new or worsening symptoms  Radha Parra LPN    Living Arrangements  Spouse or Significiant other    Are you recieving any outpatient services  Yes    What type of services   [address]    Interperter language line needed  No    Counseling  Family    Counseling topics  Importance of RX compliance           HPI:     Patient presents for hospital follow-up  She was admitted for 12/12-12/16  Recently hospitalized due to uncontrolled pain, abdominal pain/nausea/vomiting/constipation, and generalized weakness  She was treated supportively in hospital and is so thankful and feels much better  Allowed her to transition to get intercostal injection on 12/17/2019  Intercostal area is feeling better  She has a different type of pain now but she is hopeful and optimistic  She is very happy with Dr Sajan Mathews  The following portions of the patient's history were reviewed and updated as appropriate: allergies, current medications, past family history, past medical history, past social history, past surgical history and problem list      Review of Systems:     Review of Systems   Constitutional: Negative for activity change, appetite change and fatigue  Respiratory: Negative for apnea, cough, chest tightness, shortness of breath and wheezing      Cardiovascular: Negative for chest pain, palpitations and leg swelling  Gastrointestinal: Positive for constipation  Negative for abdominal distention, abdominal pain, blood in stool, diarrhea, nausea and vomiting  Musculoskeletal: Positive for arthralgias and back pain  Negative for gait problem  Skin: Negative for rash and wound  Neurological: Positive for weakness and numbness  Negative for dizziness, light-headedness and headaches  Psychiatric/Behavioral: Negative for behavioral problems, confusion, hallucinations, sleep disturbance and suicidal ideas  The patient is not nervous/anxious  Problem List:     Patient Active Problem List   Diagnosis    CHOCO on CPAP    COPD with chronic bronchitis (HCC)    Hypothyroidism    Insomnia    Lung nodule, multiple    Macular degeneration    [facility] sclerosis [facility]    Rosacea    Seborrheic keratosis    Chronic neuropathic pain    Thoracolumbar radiculopathy due to intervertebral disc disorder    Other constipation    Intercostal neuralgia      Objective:     /82 (BP Location: Left arm, Patient Position: Sitting, Cuff Size: Standard)   Pulse (!) 108   Wt 60 7 kg (133 lb 12 8 oz) Comment: w/ shoes denied off  SpO2 97%   BMI 23 70 kg/m²     Physical Exam   Constitutional: She appears well-developed and well-nourished  No distress  HENT:   Mouth/Throat: Oropharynx is clear and moist  No oropharyngeal exudate  Cardiovascular: Normal rate and regular rhythm  Pulmonary/Chest: Effort normal and breath sounds normal    Abdominal: Soft  Bowel sounds are normal    Lymphadenopathy:     She has no cervical adenopathy  Skin: Skin is warm and dry  She is not diaphoretic  No erythema  Psychiatric: She has a normal mood and affect  Her behavior is normal      Laboratory Results: I have personally reviewed the pertinent laboratory results/reports     Radiology/Other Diagnostic Testing Results: I have personally reviewed pertinent reports        Ct Abdomen Pelvis With Contrast    Result Date: 12/12/2019  CT ABDOMEN AND PELVIS WITH IV CONTRAST INDICATION:   Abdominal pain/vomiting  COMPARISON:  CT abdomen pelvis 10/25/2018 TECHNIQUE:  CT examination of the abdomen and pelvis was performed  Axial, sagittal, and coronal 2D reformatted images were created from the source data and submitted for interpretation  Radiation dose length product (DLP) for this visit:  418 mGy-cm   This examination, like all CT scans performed in the [facility], was performed utilizing techniques to minimize radiation dose exposure, including the use of iterative reconstruction and automated exposure control  IV Contrast:  100 mL of iohexol (OMNIPAQUE) Enteric Contrast:  Enteric contrast was not administered  FINDINGS: ABDOMEN LOWER CHEST:  Stable 4 mm nodule left lower lobe laterally image 6 series 2  LIVER/BILIARY TREE:  Internal and extrahepatic biliary ductal dilatation is similar to the prior exam   CBD measures up to 1 2 cm diameter, stable  No findings for choledocholithiasis  GALLBLADDER:  Gallbladder is surgically absent  SPLEEN:  Calcified granulomata are noted in the spleen  No suspicious splenic mass  PANCREAS:  Unremarkable  ADRENAL GLANDS:  Bilateral adrenal calcifications more extensive on the left, stable  KIDNEYS/URETERS:  One or more simple renal cyst(s) is noted  Otherwise unremarkable kidneys  No hydronephrosis  STOMACH AND BOWEL:  Limited evaluation without enteric contrast   Colonic diverticulosis  No acute findings  APPENDIX:  There are expected postoperative changes of appendectomy  ABDOMINOPELVIC CAVITY:  No ascites or free intraperitoneal air  No lymphadenopathy  VESSELS:  Atherosclerotic changes are present  No evidence of aneurysm  PELVIS REPRODUCTIVE ORGANS:  Unremarkable for patient's age  URINARY BLADDER:  Mildly distended otherwise unremarkable  ABDOMINAL WALL/INGUINAL REGIONS:  Evidence of prior laparotomy   OSSEOUS STRUCTURES:  No acute fracture or destructive osseous lesion  Curvature of the thoracolumbar spine to the right  Degenerative spurring of the spine  1   No acute findings in the abdomen or pelvis  2   Intrahepatic and extra hepatic biliary ductal dilatation similar to the prior exam   This may be related to the postcholecystectomy state   Workstation performed: [id number]        Current Medications:     Outpatient Medications Prior to Visit   Medication Sig Dispense Refill    Cholecalciferol (VITAMIN D-3) 1000 units CAPS Take by mouth daily       diazepam (VALIUM) 2 mg tablet Take 2 mg by mouth daily at bedtime  5    levothyroxine 100 mcg tablet Take 1 tablet (100 mcg total) by mouth daily 90 tablet 3    morphine (MSIR) 15 mg tablet Take 1 tablet (15 mg total) by mouth 2 (two) times a day as needed for severe painMax Daily Amount: 30 mg 45 tablet 0    NON FORMULARY 2 (two) times a day       polyethylene glycol (MIRALAX) 17 g packet Take 17 g by mouth daily 14 each 0    senna-docusate sodium (SENOKOT-S) 8 6-50 mg per tablet Take 2 tablets by mouth 2 (two) times a day (Patient taking differently: Take 2 tablets by mouth as needed )  0    vitamin B-12 (CYANOCOBALAMIN) 100 MCG tablet Take 100 mcg by mouth daily       bisacodyl (DULCOLAX) 10 mg suppository Insert 1 suppository (10 mg total) into the rectum daily as needed for constipation (Patient not taking: Reported on 12/20/2019) 12 suppository 0    loratadine (CLARITIN) 10 mg tablet Take 1 tablet (10 mg total) by mouth daily as needed for allergies (Patient not taking: Reported on 12/20/2019)  0    methylPREDNISolone (MEDROL) 4 MG tablet therapy pack Use as directed on package (Patient not taking: Reported on 12/20/2019) 21 tablet 0    Multiple Vitamins-Minerals (ICAPS AREDS 2 PO) Take 1 tablet by mouth 2 (two) times a day      nicotine (NICODERM CQ) 7 mg/24hr TD 24 hr patch Place 1 patch on the skin daily (Patient not taking: Reported on 12/20/2019) 28 patch 0    ondansetron (ZOFRAN-ODT) 4 mg disintegrating tablet Take 1 tablet (4 mg total) by mouth every 8 (eight) hours as needed for nausea or vomiting for up to 3 days (Patient not taking: Reported on 12/20/2019) 9 tablet 0    polyvinyl alcohol (LIQUIFILM TEARS) 1 4 % ophthalmic solution Administer 1 drop to both eyes every 3 (three) hours as needed for dry eyes (Patient not taking: Reported on 12/20/2019) 15 mL 0    sodium chloride (OCEAN) 0 65 % nasal spray 2 sprays into each nostril every hour as needed for congestion (Patient not taking: Reported on 12/20/2019)  0     No facility-administered medications prior to visit          Sushila Leslie DO  [facility]

## 2019-12-20 NOTE — PROGRESS NOTES
Assessment and Plan:     Problem List Items Addressed This Visit     None           Preventive health issues were discussed with patient, and age appropriate screening tests were ordered as noted in patient's After Visit Summary  Personalized health advice and appropriate referrals for health education or preventive services given if needed, as noted in patient's After Visit Summary  History of Present Illness:     Patient presents for Medicare Annual Wellness visit    Patient Care Team:  Jatin Rojas DO as PCP - MD Ligia Chavez MD Larraine Mechanic, MD as Endoscopist     Problem List:     Patient Active Problem List   Diagnosis    CHOCO on CPAP    [facility]    Hypothyroidism    Insomnia    Lung nodule, multiple    Macular degeneration    Multiple sclerosis ([facility] )    Rosacea    Seborrheic keratosis    Chronic neuropathic pain    Thoracolumbar radiculopathy due to intervertebral disc disorder    Other constipation    Intercostal neuralgia      Past Medical and Surgical History:     Past Medical History:   Diagnosis Date    Anxiety     Cataract     last assessed 14    COPD (chronic obstructive pulmonary disease) ([facility] )     Hypothyroidism     Multiple sclerosis ([facility] )     CHOCO (obstructive sleep apnea)     Peroneal muscle atrophy     Thoracolumbar radiculopathy due to intervertebral disc disorder      Past Surgical History:   Procedure Laterality Date    APPENDECTOMY      CATARACT EXTRACTION       SECTION      CHOLECYSTECTOMY      GALLBLADDER SURGERY      [place] COLONOSCOPY FLX DX W/COLLJ SPEC WHEN PFRMD N/A 2018    Procedure: COLONOSCOPY;  Surgeon: Tree Kaminski MD;  Location: [facility];   Service: Colorectal    THROAT SURGERY        Family History:     Family History   Problem Relation Age of Onset    Skin cancer Mother     Hypertension Father         benign essential    Stroke Father     Lung cancer Brother Social History:     Social History     Socioeconomic History    Marital status: /Civil Union     Spouse name: None    Number of children: 2    Years of education: None    Highest education level: None   Occupational History    Occupation: retired     Comment: part time as per [employer]   Social Needs    Financial resource strain: None    Food insecurity:     Worry: None     Inability: None    Transportation needs:     Medical: None     Non-medical: None   Tobacco Use    Smoking status: Current Every Day Smoker     Packs/day: 1 00     Years: 60 00     Pack years: 60 00     Types: Cigarettes     Start date: 2/16/1958    Smokeless tobacco: Never Used   Substance and Sexual Activity    Alcohol use: Not Currently     Alcohol/week: 8 0 standard drinks     Types: 7 Glasses of wine, 1 Cans of beer per week     Frequency: 4 or more times a week     Drinks per session: 1 or 2     Binge frequency: Never     Comment: daily/ socially    Drug use: Not Currently     Types: Marijuana     Comment: medical-RSO    Sexual activity: Yes     Partners: Male   Lifestyle    Physical activity:     Days per week: 0 days     Minutes per session: 0 min    Stress: Very much   Relationships    Social connections:     Talks on phone: None     Gets together: None     Attends [employer] service: None     Active member of club or organization: None     Attends meetings of clubs or organizations: None     Relationship status: None    Intimate partner violence:     Fear of current or ex partner: None     Emotionally abused: None     Physically abused: None     Forced sexual activity: None   Other Topics Concern    None   Social History Narrative    Active advance directive       Medications and Allergies:     Current Outpatient Medications   Medication Sig Dispense Refill    Cholecalciferol (VITAMIN D-3) 1000 units CAPS Take by mouth daily       diazepam (VALIUM) 2 mg tablet Take 2 mg by mouth daily at bedtime  5    levothyroxine 100 mcg tablet Take 1 tablet (100 mcg total) by mouth daily 90 tablet 3    morphine (MSIR) 15 mg tablet Take 1 tablet (15 mg total) by mouth 2 (two) times a day as needed for severe painMax Daily Amount: 30 mg 45 tablet 0    NON FORMULARY 2 (two) times a day       polyethylene glycol (MIRALAX) 17 g packet Take 17 g by mouth daily 14 each 0    senna-docusate sodium (SENOKOT-S) 8 6-50 mg per tablet Take 2 tablets by mouth 2 (two) times a day (Patient taking differently: Take 2 tablets by mouth as needed )  0    vitamin B-12 (CYANOCOBALAMIN) 100 MCG tablet Take 100 mcg by mouth daily       bisacodyl (DULCOLAX) 10 mg suppository Insert 1 suppository (10 mg total) into the rectum daily as needed for constipation (Patient not taking: Reported on 12/20/2019) 12 suppository 0    loratadine (CLARITIN) 10 mg tablet Take 1 tablet (10 mg total) by mouth daily as needed for allergies (Patient not taking: Reported on 12/20/2019)  0    methylPREDNISolone (MEDROL) 4 MG tablet therapy pack Use as directed on package (Patient not taking: Reported on 12/20/2019) 21 tablet 0    Multiple Vitamins-Minerals (ICAPS AREDS 2 PO) Take 1 tablet by mouth 2 (two) times a day      nicotine (NICODERM CQ) 7 mg/24hr TD 24 hr patch Place 1 patch on the skin daily (Patient not taking: Reported on 12/20/2019) 28 patch 0    ondansetron (ZOFRAN-ODT) 4 mg disintegrating tablet Take 1 tablet (4 mg total) by mouth every 8 (eight) hours as needed for nausea or vomiting for up to 3 days (Patient not taking: Reported on 12/20/2019) 9 tablet 0    polyvinyl alcohol (LIQUIFILM TEARS) 1 4 % ophthalmic solution Administer 1 drop to both eyes every 3 (three) hours as needed for dry eyes (Patient not taking: Reported on 12/20/2019) 15 mL 0    sodium chloride (OCEAN) 0 65 % nasal spray 2 sprays into each nostril every hour as needed for congestion (Patient not taking: Reported on 12/20/2019)  0     No current facility-administered medications for this visit  No Known Allergies   Immunizations:     Immunization History   Administered Date(s) Administered    INFLUENZA 1945, 09/14/2016    Pneumococcal Polysaccharide PPV23 1945, 05/22/2008    Tuberculin Skin Test-PPD Intradermal 01/13/2017      Health Maintenance:         Topic Date Due    DXA SCAN  06/15/2018    CRC Screening: Colonoscopy  02/23/2023         Topic Date Due    DTaP,Tdap,and Td Vaccines (1 - Tdap) 01/02/1956    Pneumococcal Vaccine: 65+ Years (2 of 2 - PCV13) 05/22/2009    Influenza Vaccine  07/01/2019      Medicare Health Risk Assessment:     /82 (BP Location: Left arm, Patient Position: Sitting, Cuff Size: Standard)   Pulse (!) 108   Wt 60 7 kg (133 lb 12 8 oz) Comment: w/ shoes denied off  SpO2 97%   BMI 23 70 kg/m²      Nicole Grant is here for her Subsequent Wellness visit  Health Risk Assessment:   Patient rates overall health as good  Patient feels that their physical health rating is much worse  Eyesight was rated as same  Hearing was rated as same  Patient feels that their emotional and mental health rating is same  Pain experienced in the last 7 days has been a lot  Patient's pain rating has been 5/10  Patient states that she has experienced weight loss or gain in last 6 months  Depression Screening:   PHQ-2 Score: 0      Fall Risk Screening: In the past year, patient has experienced: no history of falling in past year      Urinary Incontinence Screening:   Patient has leaked urine accidently in the last six months  Home Safety:  Patient does not have trouble with stairs inside or outside of their home  Patient has working smoke alarms and has no working carbon monoxide detector  Home safety hazards include: none  Nutrition:   Current diet is Other (please comment) and Limited junk food  Eat; small meals  -milk and banana  -milk and cake    Medications:   Patient is currently taking over-the-counter supplements   OTC medications include: see medication list  Patient is able to manage medications  Activities of Daily Living (ADLs)/Instrumental Activities of Daily Living (IADLs):   Walk and transfer into and out of bed and chair?: Yes  Dress and groom yourself?: Yes    Bathe or shower yourself?: Yes    Feed yourself? Yes  Do your laundry/housekeeping?: Yes  Manage your money, pay your bills and track your expenses?: Yes  Make your own meals?: No    Do your own shopping?: No    Durable Medical Equipment Suppliers  [employer]    Previous Hospitalizations:   Any hospitalizations or ED visits within the last 12 months?: Yes    How many hospitalizations have you had in the last year?: more than 4    Advance Care Planning:   Living will: Yes    Durable POA for healthcare:  Yes    Advanced directive: Yes    Five wishes given: Yes      PREVENTIVE SCREENINGS      Cardiovascular Screening:    General: Screening Current      Diabetes Screening:     General: Screening Current      Colorectal Cancer Screening:     General: Screening Current      Breast Cancer Screening:     General: Screening Current      Cervical Cancer Screening:    General: Screening Not Indicated      Lung Cancer Screening:     General: Screening Current      Garcia Watson DO

## 2019-12-20 NOTE — PATIENT INSTRUCTIONS
Multiple Sclerosis   AMBULATORY CARE:   Multiple sclerosis  is a disease that leads to inflammation and damage to parts of your central nervous system (CNS)  The CNS includes your brain, spinal cord, and nerves  In MS, your immune system attacks and destroys the coating (myelin) that covers your nerves  This may cause problems with how you feel, move, and see  Signs and symptoms of MS:  The signs and symptoms of MS depend on where the damage is in the CNS  They may vary from person to person and from time to time in the same person  The most common signs and symptoms of MS include:  · Extreme tiredness even with plenty of rest (fatigue)    · Difficulty controlling your bladder or bowels    · Blurred or double vision or dizziness    · Depression, mood swings, or difficulty controlling emotions    · Muscle weakness, cramps, or spasms    · Numbness or tingling usually felt in the arms and legs    · Problems with sexual function such as difficulty having or maintaining an erection  Call 911 for any of the following:   · You have trouble breathing  · You fall and hit your head  Seek care immediately if:   · You feel like hurting yourself or others  · Your abdomen is painful and larger than usual   Contact your healthcare provider if:   · You have a fever  · You feel you cannot cope with your illness  · You have new or worsening symptoms  · You have an open sore  · You have burning when you urinate  · You do not have a bowel movement for 3 days or more  · You choke or cough during eating or drinking  · You have questions or concerns about your condition or care  Treatment:  may include rehabilitation and alternative therapies  Steroid medicines may be needed during a relapse  You may  need any of the following:  · Disease modifying medicine  helps prevent MS from getting worse  The medicine may also help prevent attacks or relapses       · Other medicines  may be used to control and decrease MS symptoms  Medicines can be used to treat symptoms such as muscle spasms, depression, or pain  There are also medicines to help treat sexual dysfunction, fatigue, and bladder or bowel problems  · Take your medicine as directed  Contact your healthcare provider if you think your medicine is not helping or if you have side effects  Tell him or her if you are allergic to any medicine  Keep a list of the medicines, vitamins, and herbs you take  Include the amounts, and when and why you take them  Bring the list or the pill bottles to follow-up visits  Carry your medicine list with you in case of an emergency  Self-care:   · Manage your stress  to decrease relapses  Do activities that help you relax  Ask your healthcare provider about counseling or therapies to help you manage stress  · Exercise  may help decrease fatigue and depression  It may also improve bowel or bladder function, mobility, and stiffness  Ask your healthcare provider about an exercise program that is right for you  · Get vaccinated  to prevent illnesses that worsen MS symptoms  Talk to your healthcare provider about what vaccines you need  · Take your medicines as directed  This will help prevent complications of MS and may reduce your number of relapses  Know the side effects of your medicines and when to report them to your healthcare provider  Bathing:   · Take warm but not hot showers or baths  Water that is too hot may make your symptoms worse  · Put grab bars on the walls beside toilets and inside showers and bathtubs  These will help you get up after you use the toilet or bathe  Grab bars will also help to keep you from falling in the shower  You may want to put a shower chair inside the shower  Bladder care:   · Try to urinate every 3 hours during the day  Avoid drinking liquids before you go to bed  Urinate before you go to bed       · You may need to learn how to use a catheter if you cannot urinate on your own  A catheter is a soft rubber tube that you put into your urethra to drain urine  Ask your healthcare provider for more information on catheters  Skin care: You may have numb areas on your body  Check these areas often to be sure your skin is healthy  This will help prevent pressure sores  Keep your skin clean and dry to help prevent infections  Check your skin often if you use heat or ice so you do not damage the skin  Rehabilitation programs for MS:   · Physical therapy  helps you manage MS symptoms  A physical therapist teaches you exercises to help improve movement and strength, and to decrease pain  · Speech therapy  helps improve speech and swallowing  A speech therapist has special training to help people learn safer ways to swallow  He will also help you learn which foods and liquids are safe to eat and drink  · Occupational therapy  may help you with self-care activities  An occupational therapist teaches you skills to help with your daily activities  These skills may include bathing, dressing, feeding, grooming, or using the bathroom  An occupational therapist may also help you find equipment for your home or work  This equipment may make activities safer or easier for you to complete  For more information:   · [facility] [employer] [facility] [address]  [facility]  Phone: [address] [facility]  Phone: [phone]  Web Address: [employer] is  com  · [facility]  [address] [facility] , [address] [facility]   Phone: [phone]  Web Address: DEUS  Follow up with your healthcare provider or neurologist as directed: You will need to have regular visits with your healthcare provider or neurologist to help manage your symptoms  Write down your questions so you remember to ask them during your visits    © 2017 [employer]  Information is for End User's use only and may not be sold, redistributed or otherwise used for commercial purposes  All illustrations and images included in CareNotes® are the copyrighted property of A D A M , Inc  or [employer]  The above information is an  only  It is not intended as medical advice for individual conditions or treatments  Talk to your doctor, nurse or pharmacist before following any medical regimen to see if it is safe and effective for you

## 2019-12-23 DIAGNOSIS — M79.2 CHRONIC NEUROPATHIC PAIN: ICD-10-CM

## 2019-12-23 DIAGNOSIS — G89.29 CHRONIC NEUROPATHIC PAIN: ICD-10-CM

## 2019-12-23 RX ORDER — MORPHINE SULFATE 15 MG/1
15 TABLET ORAL 2 TIMES DAILY PRN
Qty: 45 TABLET | Refills: 0 | Status: SHIPPED | OUTPATIENT
Start: 2019-12-23 | End: 2020-01-28 | Stop reason: SDUPTHER

## 2019-12-23 NOTE — TELEPHONE ENCOUNTER
Patient called to say that [facility] does not have the morphine (MSIR) 15 mg tablet available  Will not be available until Friday  Patient would like to know if you can find a pharmacy that carries it because she will be out of the medication tonight      Please advise patient so she can have her daughter pick it up  [phone]

## 2019-12-23 NOTE — TELEPHONE ENCOUNTER
Pt scheduled for L4-L5 LESI on 1/15, no office visit scheduled at this time  Would you like to see her in the office as well?

## 2019-12-30 ENCOUNTER — OFFICE VISIT (OUTPATIENT)
Dept: NEUROSURGERY | Facility: CLINIC | Age: 74
End: 2019-12-30
Payer: MEDICARE

## 2019-12-30 VITALS
WEIGHT: 132 LBS | SYSTOLIC BLOOD PRESSURE: 135 MMHG | BODY MASS INDEX: 23.39 KG/M2 | DIASTOLIC BLOOD PRESSURE: 78 MMHG | HEART RATE: 95 BPM | TEMPERATURE: 97.2 F | RESPIRATION RATE: 16 BRPM | HEIGHT: 63 IN

## 2019-12-30 DIAGNOSIS — M54.14 THORACIC RADICULOPATHY: ICD-10-CM

## 2019-12-30 DIAGNOSIS — M54.17 LUMBOSACRAL RADICULOPATHY AT L4: ICD-10-CM

## 2019-12-30 PROCEDURE — 99203 OFFICE O/P NEW LOW 30 MIN: CPT | Performed by: NEUROLOGICAL SURGERY

## 2019-12-30 NOTE — PROGRESS NOTES
Assessment/Plan:    No problem-specific Assessment & Plan notes found for this encounter  Problem List Items Addressed This Visit     None      Visit Diagnoses     Thoracic radiculopathy        Lumbosacral radiculopathy at L4                Subjective:      Patient ID: Akin Tiwari is a 66 y o  female  HPI    The following portions of the patient's history were reviewed and updated as appropriate:   She  has a past medical history of Anxiety, Cataract, COPD (chronic obstructive pulmonary disease) ([facility] ), Hypothyroidism, Multiple sclerosis ([facility] ), CHOCO (obstructive sleep apnea), Peroneal muscle atrophy, and Thoracolumbar radiculopathy due to intervertebral disc disorder  She   Patient Active Problem List    Diagnosis Date Noted    Intercostal neuralgia     Other constipation 2019    Thoracolumbar radiculopathy due to intervertebral disc disorder     Chronic neuropathic pain 2019    Seborrheic keratosis 2018    CHOCO on CPAP     COPD with chronic bronchitis ([facility] ) 2017    Lung nodule, multiple 2017    Macular degeneration 2017    Insomnia 2015    Rosacea 2014    Multiple sclerosis ([facility] ) 2014    Hypothyroidism 2014     She  has a past surgical history that includes  section; Appendectomy; Cholecystectomy; Cataract extraction; pr colonoscopy flx dx w/collj spec when pfrmd (N/A, 2018); Gallbladder surgery; and Throat surgery  Her family history includes Hypertension in her father; Lung cancer in her brother; Skin cancer in her mother; Stroke in her father  She  reports that she has been smoking cigarettes  She started smoking about 61 years ago  She has a 60 00 pack-year smoking history  She has never used smokeless tobacco  She reports that she drank about 8 0 standard drinks of alcohol per week  She reports that she has current or past drug history  Drug: Marijuana    Current Outpatient Medications   Medication Sig Dispense Refill    Cholecalciferol (VITAMIN D-3) 1000 units CAPS Take by mouth daily       diazepam (VALIUM) 2 mg tablet Take 2 mg by mouth daily at bedtime  5    levothyroxine 100 mcg tablet Take 1 tablet (100 mcg total) by mouth daily 90 tablet 3    morphine (MSIR) 15 mg tablet Take 1 tablet (15 mg total) by mouth 2 (two) times a day as needed for severe painMax Daily Amount: 30 mg 45 tablet 0    Multiple Vitamins-Minerals (ICAPS AREDS 2 PO) Take 1 tablet by mouth 2 (two) times a day      NON FORMULARY 2 (two) times a day       polyethylene glycol (MIRALAX) 17 g packet Take 17 g by mouth daily 14 each 0    vitamin B-12 (CYANOCOBALAMIN) 100 MCG tablet Take 100 mcg by mouth daily       bisacodyl (DULCOLAX) 10 mg suppository Insert 1 suppository (10 mg total) into the rectum daily as needed for constipation (Patient not taking: Reported on 12/20/2019) 12 suppository 0    loratadine (CLARITIN) 10 mg tablet Take 1 tablet (10 mg total) by mouth daily as needed for allergies (Patient not taking: Reported on 12/20/2019)  0    methylPREDNISolone (MEDROL) 4 MG tablet therapy pack Use as directed on package (Patient not taking: Reported on 12/20/2019) 21 tablet 0    nicotine (NICODERM CQ) 7 mg/24hr TD 24 hr patch Place 1 patch on the skin daily (Patient not taking: Reported on 12/20/2019) 28 patch 0    ondansetron (ZOFRAN-ODT) 4 mg disintegrating tablet Take 1 tablet (4 mg total) by mouth every 8 (eight) hours as needed for nausea or vomiting for up to 3 days (Patient not taking: Reported on 12/20/2019) 9 tablet 0    polyvinyl alcohol (LIQUIFILM TEARS) 1 4 % ophthalmic solution Administer 1 drop to both eyes every 3 (three) hours as needed for dry eyes (Patient not taking: Reported on 12/20/2019) 15 mL 0    senna-docusate sodium (SENOKOT-S) 8 6-50 mg per tablet Take 2 tablets by mouth 2 (two) times a day (Patient not taking: Reported on 12/30/2019)  0    sodium chloride (OCEAN) 0 65 % nasal spray 2 sprays into each nostril every hour as needed for congestion (Patient not taking: Reported on 12/20/2019)  0     No current facility-administered medications for this visit        Current Outpatient Medications on File Prior to Visit   Medication Sig    Cholecalciferol (VITAMIN D-3) 1000 units CAPS Take by mouth daily     diazepam (VALIUM) 2 mg tablet Take 2 mg by mouth daily at bedtime    levothyroxine 100 mcg tablet Take 1 tablet (100 mcg total) by mouth daily    morphine (MSIR) 15 mg tablet Take 1 tablet (15 mg total) by mouth 2 (two) times a day as needed for severe painMax Daily Amount: 30 mg    Multiple Vitamins-Minerals (ICAPS AREDS 2 PO) Take 1 tablet by mouth 2 (two) times a day    NON FORMULARY 2 (two) times a day     polyethylene glycol (MIRALAX) 17 g packet Take 17 g by mouth daily    vitamin B-12 (CYANOCOBALAMIN) 100 MCG tablet Take 100 mcg by mouth daily     bisacodyl (DULCOLAX) 10 mg suppository Insert 1 suppository (10 mg total) into the rectum daily as needed for constipation (Patient not taking: Reported on 12/20/2019)    loratadine (CLARITIN) 10 mg tablet Take 1 tablet (10 mg total) by mouth daily as needed for allergies (Patient not taking: Reported on 12/20/2019)    methylPREDNISolone (MEDROL) 4 MG tablet therapy pack Use as directed on package (Patient not taking: Reported on 12/20/2019)    nicotine (NICODERM CQ) 7 mg/24hr TD 24 hr patch Place 1 patch on the skin daily (Patient not taking: Reported on 12/20/2019)    ondansetron (ZOFRAN-ODT) 4 mg disintegrating tablet Take 1 tablet (4 mg total) by mouth every 8 (eight) hours as needed for nausea or vomiting for up to 3 days (Patient not taking: Reported on 12/20/2019)    polyvinyl alcohol (LIQUIFILM TEARS) 1 4 % ophthalmic solution Administer 1 drop to both eyes every 3 (three) hours as needed for dry eyes (Patient not taking: Reported on 12/20/2019)    senna-docusate sodium (SENOKOT-S) 8 6-50 mg per tablet Take 2 tablets by mouth 2 (two) times a day (Patient not taking: Reported on 12/30/2019)    sodium chloride (OCEAN) 0 65 % nasal spray 2 sprays into each nostril every hour as needed for congestion (Patient not taking: Reported on 12/20/2019)     No current facility-administered medications on file prior to visit  She has No Known Allergies       Review of Systems   Constitutional: Positive for chills (hot flashes) and diaphoresis  Negative for fever  HENT: Negative  Eyes: Negative  Respiratory: Negative  Cardiovascular: Positive for chest pain (not heat related)  Gastrointestinal: Positive for constipation and nausea  Genitourinary: Positive for difficulty urinating  Musculoskeletal: Positive for arthralgias, back pain, gait problem (Right hip and groin down right leg) and myalgias  Skin: Negative  Allergic/Immunologic: Negative  Neurological: Positive for weakness (right leg)  Negative for numbness  Psychiatric/Behavioral: Positive for sleep disturbance (due to pain)  Objective:      /78 (BP Location: Left arm, Patient Position: Sitting, Cuff Size: Standard)   Pulse 95   Temp (!) 97 2 °F (36 2 °C) (Tympanic)   Resp 16   Ht 5' 3" (1 6 m)   Wt 59 9 kg (132 lb)   BMI 23 38 kg/m²          Physical Exam      I have personally obtain history and examined patient  I have personally reviewed case including all pertinent investigations/studies  Time spent 60 minutes  More than 50% of total time spent on counseling and coordination of care as described above including patient education, discussion of risks and rationale of conservative vs surgical treatment options  HPI    Chronic thoracic and lumbar back pain/discomfort stemming from severe spinal injury 60 yrs ago requiring body cast  GEOVANNA with good relief  On morphine and medical marijuana  Denies weakness, numbness, loss of bowel and bladder, gait disturbance      Exam    TL spine scoliosis with mild shoulder height discrepency  Moderate paraspinal spasm  Moderate restriction in ROM  Full strength bilateral LE  Intact sensation  Intact DTR  Intact fine motor and coordination  Able to toe and heel walk                    Neck:   Supple, symmetrical, trachea midline, no adenopathy;        thyroid:  No enlargement/tenderness/nodules; no carotid    bruit or JVD           Chest wall:    No tenderness, no deformity                   Extremities:   Extremities normal, atraumatic, no cyanosis or edema       Skin:   Skin color, texture, turgor normal, no rashes or lesions       Radiology    MRI    TL spine scoliosis mild-moderate  Multilevel lumbar spondylotic degeneration worse at L4/5 with severe disc space collapse, central canal widely patent    Summary and Plan    Ms Claudene Durham has chronic TL spine pain that is improving with GEOVANNA  Today we reviewed the conservative medical options including PT, GEOVANNA and medications  I encouraged her to fu with Dr Mary Lou Jasmine and to perform PT/ROM exercises as prescribed  I explained to her that surgery would offer her no guarantee of improvement with elevated risk of complication  I will see her on a PRN basis

## 2020-01-07 ENCOUNTER — OFFICE VISIT (OUTPATIENT)
Dept: PAIN MEDICINE | Facility: CLINIC | Age: 75
End: 2020-01-07
Payer: MEDICARE

## 2020-01-07 VITALS
RESPIRATION RATE: 16 BRPM | SYSTOLIC BLOOD PRESSURE: 132 MMHG | HEART RATE: 72 BPM | BODY MASS INDEX: 23.57 KG/M2 | DIASTOLIC BLOOD PRESSURE: 86 MMHG | HEIGHT: 63 IN | WEIGHT: 133 LBS

## 2020-01-07 DIAGNOSIS — K59.00 CONSTIPATION, UNSPECIFIED CONSTIPATION TYPE: ICD-10-CM

## 2020-01-07 DIAGNOSIS — R63.4 ABNORMAL WEIGHT LOSS: Primary | ICD-10-CM

## 2020-01-07 PROCEDURE — 99214 OFFICE O/P EST MOD 30 MIN: CPT | Performed by: PHYSICAL MEDICINE & REHABILITATION

## 2020-01-07 NOTE — PROGRESS NOTES
Assessment:  1  Abnormal weight loss    2  Constipation, unspecified constipation type        Plan:  Ms Fredy Jorge is a pleasant 57-year-old female who we are following for thoracic radiculopathy, intercostal neuralgia, lumbar radiculopathy who has had a ultrasound-guided intercostal nerve block right-sided with significant relief in her pain  She continues to report radiating low back pain into the right lower extremity and is already scheduled for a LESI next week January 15, 2020  She presents today as follow-up regarding the rib pain which she reports is significantly improved and she is able to participate in her ADLs much better  However her primary complaint today is significant weight loss, constipation, decreased appetite  She has lost approximately 30 lb in the last 3 months and would like to see gastroenterology regarding her concerns  At this time I have provided her with a referral to Dr Fadia Decker for evaluation of abnormal weight loss and decreased appetite  I am pleased to see that her pain is more controlled I will try to assist her further regarding the lumbar radiculopathy  For now she will reach out to gastroenterology to seek the expertise on this unexplained weight loss  We will see her next week for the procedure  I have spent >20 minutes with Patient and family today in which greater than 50% of this time was spent in counseling/coordination of care regarding Diagnostic results, Prognosis, Risks and benefits of tx options and Intructions for management  My impressions and treatment recommendations were discussed in detail with the patient who verbalized understanding and had no further questions  Discharge instructions were provided  I personally saw and examined the patient and I agree with the above discussed plan of care      Orders Placed This Encounter   Procedures    Ambulatory referral to Gastroenterology     Standing Status:   Future     Standing Expiration Date:   1/7/2021 Referral Priority:   Routine     Referral Type:   Consult - AMB     Referral Reason:   Specialty Services Required     Referred to Provider:   Meño Domingo MD     Requested Specialty:   Gastroenterology     Number of Visits Requested:   1     Expiration Date:   1/7/2021     No orders of the defined types were placed in this encounter  History of Present Illness:  Roma Yen is a 66 y o  female who presents for a follow up office visit in regards to Back Pain; Leg Pain; and Arm Pain (Right)  The patients current symptoms include right-sided rib pain, right-sided low back pain radiating into the right lower extremity  She reports the pain is 5/10 that has improved after the right-sided intercostal nerve block  Pain is currently primarily at night that is intermittent and described as a dull ache  Since the intercostal nerve block she has been able to participate in ADLs, improved sleep, and overall improved pain at the localized area  She continues to report pain in the right-sided low back radiating into her thigh and knee and is already scheduled for an LESI next week  Presents today for re-evaluation  I have personally reviewed and/or updated the patient's past medical history, past surgical history, family history, social history, current medications, allergies, and vital signs today  Review of Systems   Respiratory: Negative for shortness of breath  Cardiovascular: Negative for chest pain  Gastrointestinal: Positive for constipation and nausea  Negative for diarrhea and vomiting  Musculoskeletal: Positive for myalgias  Negative for arthralgias, gait problem and joint swelling  DROM  Joint Stiffness   Skin: Negative for rash  Neurological: Negative for dizziness, seizures and weakness  All other systems reviewed and are negative        Patient Active Problem List   Diagnosis    CHOCO on CPAP    COPD with chronic bronchitis ([facility] [address] )    Hypothyroidism    Insomnia  Lung nodule, multiple    Macular degeneration    Multiple sclerosis (HCC)    Rosacea    Seborrheic keratosis    Chronic neuropathic pain    Thoracolumbar radiculopathy due to intervertebral disc disorder    Other constipation    Intercostal neuralgia       Past Medical History:   Diagnosis Date    Anxiety     Cataract     last assessed 14    COPD (chronic obstructive pulmonary disease) (HCC)     Hypothyroidism     Multiple sclerosis (HCC)     CHOCO (obstructive sleep apnea)     Peroneal muscle atrophy     Thoracolumbar radiculopathy due to intervertebral disc disorder        Past Surgical History:   Procedure Laterality Date    APPENDECTOMY      CATARACT EXTRACTION       SECTION      CHOLECYSTECTOMY      GALLBLADDER SURGERY      IN COLONOSCOPY FLX DX W/COLLJ SPEC WHEN PFRMD N/A 2018    Procedure: COLONOSCOPY;  Surgeon: Ella Torres MD;  Location: [facility];   Service: Colorectal    THROAT SURGERY         Family History   Problem Relation Age of Onset    Skin cancer Mother     Hypertension Father         benign essential    Stroke Father     Lung cancer Brother        Social History     Occupational History    Occupation: retired     Comment: part time as per [employer]   Tobacco Use    Smoking status: Current Every Day Smoker     Packs/day: 1 00     Years: 60 00     Pack years: 60 00     Types: Cigarettes     Start date: 1958    Smokeless tobacco: Never Used   Substance and Sexual Activity    Alcohol use: Not Currently     Alcohol/week: 8 0 standard drinks     Types: 7 Glasses of wine, 1 Cans of beer per week     Frequency: 4 or more times a week     Drinks per session: 1 or 2     Binge frequency: Never     Comment: daily/ socially    Drug use: Not Currently     Types: Marijuana     Comment: medical-RSO    Sexual activity: Yes     Partners: Male       Current Outpatient Medications on File Prior to Visit   Medication Sig    Cholecalciferol (VITAMIN D-3) 1000 units CAPS Take by mouth daily     diazepam (VALIUM) 2 mg tablet Take 2 mg by mouth daily at bedtime    levothyroxine 100 mcg tablet Take 1 tablet (100 mcg total) by mouth daily    morphine (MSIR) 15 mg tablet Take 1 tablet (15 mg total) by mouth 2 (two) times a day as needed for severe painMax Daily Amount: 30 mg    Multiple Vitamins-Minerals (ICAPS AREDS 2 PO) Take 1 tablet by mouth 2 (two) times a day    NON FORMULARY 2 (two) times a day     vitamin B-12 (CYANOCOBALAMIN) 100 MCG tablet Take 100 mcg by mouth daily     bisacodyl (DULCOLAX) 10 mg suppository Insert 1 suppository (10 mg total) into the rectum daily as needed for constipation (Patient not taking: Reported on 12/20/2019)    loratadine (CLARITIN) 10 mg tablet Take 1 tablet (10 mg total) by mouth daily as needed for allergies (Patient not taking: Reported on 12/20/2019)    methylPREDNISolone (MEDROL) 4 MG tablet therapy pack Use as directed on package (Patient not taking: Reported on 12/20/2019)    nicotine (NICODERM CQ) 7 mg/24hr TD 24 hr patch Place 1 patch on the skin daily (Patient not taking: Reported on 12/20/2019)    ondansetron (ZOFRAN-ODT) 4 mg disintegrating tablet Take 1 tablet (4 mg total) by mouth every 8 (eight) hours as needed for nausea or vomiting for up to 3 days (Patient not taking: Reported on 12/20/2019)    polyethylene glycol (MIRALAX) 17 g packet Take 17 g by mouth daily    polyvinyl alcohol (LIQUIFILM TEARS) 1 4 % ophthalmic solution Administer 1 drop to both eyes every 3 (three) hours as needed for dry eyes (Patient not taking: Reported on 12/20/2019)    senna-docusate sodium (SENOKOT-S) 8 6-50 mg per tablet Take 2 tablets by mouth 2 (two) times a day (Patient not taking: Reported on 12/30/2019)    sodium chloride (OCEAN) 0 65 % nasal spray 2 sprays into each nostril every hour as needed for congestion (Patient not taking: Reported on 12/20/2019)     No current facility-administered medications on file prior to visit  No Known Allergies    Physical Exam:    /86   Pulse 72   Resp 16   Ht 5' 3" (1 6 m)   Wt 60 3 kg (133 lb)   BMI 23 56 kg/m²     Constitutional:normal, well developed, well nourished, alert, in no distress and non-toxic and no overt pain behavior    Eyes:anicteric  HEENT:grossly intact  Neck:supple, symmetric, trachea midline and no masses   Pulmonary:even and unlabored  Cardiovascular:No edema or pitting edema present  Skin:Normal without rashes or lesions and well hydrated  Psychiatric:Mood and affect appropriate  Neurologic:Cranial Nerves II-XII grossly intact  Musculoskeletal:in wheelchair, tenderness to palpation right-sided lumbar paraspinals, decreased active and passive range of motion right lower extremity limited by pain in the low back, decreased sensation in patchy distribution right anterolateral and posterolateral thigh, MMT remains unchanged from previous evaluation    Imaging

## 2020-01-08 ENCOUNTER — OFFICE VISIT (OUTPATIENT)
Dept: GASTROENTEROLOGY | Facility: CLINIC | Age: 75
End: 2020-01-08
Payer: MEDICARE

## 2020-01-08 VITALS
WEIGHT: 129 LBS | DIASTOLIC BLOOD PRESSURE: 82 MMHG | HEART RATE: 101 BPM | BODY MASS INDEX: 22.85 KG/M2 | SYSTOLIC BLOOD PRESSURE: 127 MMHG

## 2020-01-08 DIAGNOSIS — R63.4 ABNORMAL WEIGHT LOSS: ICD-10-CM

## 2020-01-08 DIAGNOSIS — R11.0 NAUSEA: Primary | ICD-10-CM

## 2020-01-08 DIAGNOSIS — K59.00 CONSTIPATION, UNSPECIFIED CONSTIPATION TYPE: ICD-10-CM

## 2020-01-08 PROCEDURE — 99213 OFFICE O/P EST LOW 20 MIN: CPT | Performed by: PHYSICIAN ASSISTANT

## 2020-01-08 RX ORDER — PANTOPRAZOLE SODIUM 40 MG/1
40 TABLET, DELAYED RELEASE ORAL DAILY
Qty: 30 TABLET | Refills: 1 | Status: SHIPPED | OUTPATIENT
Start: 2020-01-08 | End: 2020-01-29 | Stop reason: SDUPTHER

## 2020-01-08 NOTE — H&P (VIEW-ONLY)
[facility] - Outpatient Consultation  Tara Bingham 66 y o  female MRN: [id number]  Encounter: [id number]          ASSESSMENT AND PLAN:      1  Abnormal weight loss  2  Nausea  3  Constipation, unspecified constipation type    Patient presents for an evaluation of poor appetite, nausea, and constipation  She also reports a profound weight loss of over 30 lbs  She had a CT A/P with contrast which showed no acute intraabdominal process and stable biliary dilation likely on the basis of post-cholecystectomy and unchanged from prior exam   LFTs normal     EGD and colonoscopy to investigate  Will begin a PPI trial with Pantoprazole 40mg po daily  Will begin a Miralax trial - 17 grams po daily  Note: she is on Morphine at present for her radicular pain which could be contributing to her symptoms as it slows the emptying of the stomach/has side effects of nausea, constipation, etc     Follow up in 3 weeks  ___________________________________________________________    HPI:  Patient is a 13-year-old female who presents for an evaluation of gastrointestinal symptoms on weight loss  Patient has been struggling with chronic back pain for quite some time and has been diagnosed with a thoracic and lumbar radiculopathy and she is currently receiving nerve blocks with some improvement in her symptoms  She is presenting here due to complaints of poor appetite, nausea, abdominal pain, and constipation  She reports that her appetite has been quite terrible on and that she often has nausea but no vomiting  She also reports of right-sided abdominal discomfort and gas  She also reports she is struggling with constipation and can go several days without a bowel movement  She then will take multiple laxatives which then leads to diarrhea  She denies any melena or rectal bleeding    She does not think that she ever had a EGD before but reports she has had a colonoscopy within the last several years   Furthermore, she reports that she has lost over 30 lb unintentionally  She did have a CT scan of the abdomen and pelvis with contrast which showed no acute intra-abdominal abnormality  She is currently on morphine daily and she also has used medical marijuana for her pain  REVIEW OF SYSTEMS:    CONSTITUTIONAL: Denies any fever, chills, rigors, and weight loss  HEENT: No earache or tinnitus  Denies hearing loss or visual disturbances  CARDIOVASCULAR: No chest pain or palpitations  RESPIRATORY: Denies any cough, hemoptysis, shortness of breath or dyspnea on exertion  GASTROINTESTINAL: As noted in the History of Present Illness  GENITOURINARY: No problems with urination  Denies any hematuria or dysuria  NEUROLOGIC: No dizziness or vertigo, denies headaches  MUSCULOSKELETAL: Denies any muscle or joint pain  SKIN: Denies skin rashes or itching  ENDOCRINE: Denies excessive thirst  Denies intolerance to heat or cold  PSYCHOSOCIAL: Denies depression or anxiety  Denies any recent memory loss  Historical Information   Past Medical History:   Diagnosis Date    Anxiety     Cataract     last assessed 14    COPD (chronic obstructive pulmonary disease) (HCC)     Hypothyroidism     Multiple sclerosis (HCC)     CHOCO (obstructive sleep apnea)     Peroneal muscle atrophy     Thoracolumbar radiculopathy due to intervertebral disc disorder      Past Surgical History:   Procedure Laterality Date    APPENDECTOMY      CATARACT EXTRACTION       SECTION      CHOLECYSTECTOMY      GALLBLADDER SURGERY      CO COLONOSCOPY FLX DX W/COLLJ SPEC WHEN PFRMD N/A 2018    Procedure: COLONOSCOPY;  Surgeon: Patt Hernandez MD;  Location: [facility];   Service: Colorectal    THROAT SURGERY       Social History   Social History     Substance and Sexual Activity   Alcohol Use Not Currently    Alcohol/week: 8 0 standard drinks    Types: 7 Glasses of wine, 1 Cans of beer per week    Frequency: 4 or more times a week    Drinks per session: 1 or 2    Binge frequency: Never    Comment: daily/ socially     Social History     Substance and Sexual Activity   Drug Use Not Currently    Types: Marijuana    Comment: medical-RSO     Social History     Tobacco Use   Smoking Status Current Every Day Smoker    Packs/day: 1 00    Years: 60 00    Pack years: 60 00    Types: Cigarettes    Start date: 2/16/1960   Smokeless Tobacco Never Used     Family History   Problem Relation Age of Onset    Skin cancer Mother     Hypertension Father         benign essential    Stroke Father     Lung cancer Brother        Meds/Allergies       Current Outpatient Medications:     bisacodyl (DULCOLAX) 10 mg suppository    Cholecalciferol (VITAMIN D-3) 1000 units CAPS    diazepam (VALIUM) 2 mg tablet    levothyroxine 100 mcg tablet    loratadine (CLARITIN) 10 mg tablet    methylPREDNISolone (MEDROL) 4 MG tablet therapy pack    morphine (MSIR) 15 mg tablet    Multiple Vitamins-Minerals (ICAPS AREDS 2 PO)    nicotine (NICODERM CQ) 7 mg/24hr TD 24 hr patch    NON FORMULARY    polyethylene glycol (MIRALAX) 17 g packet    polyvinyl alcohol (LIQUIFILM TEARS) 1 4 % ophthalmic solution    senna-docusate sodium (SENOKOT-S) 8 6-50 mg per tablet    sodium chloride (OCEAN) 0 65 % nasal spray    vitamin B-12 (CYANOCOBALAMIN) 100 MCG tablet    ondansetron (ZOFRAN-ODT) 4 mg disintegrating tablet    pantoprazole (PROTONIX) 40 mg tablet    No Known Allergies        Objective     Blood pressure 127/82, pulse 101, weight 58 5 kg (129 lb)  Body mass index is 22 85 kg/m²  PHYSICAL EXAM:      General Appearance:   Alert, cooperative, no distress   HEENT:   Normocephalic, atraumatic, anicteric     Neck:  Supple, symmetrical, trachea midline   Lungs:   Clear to auscultation bilaterally; no rales, rhonchi or wheezing; respirations unlabored    Heart[de-identified]   Regular rate and rhythm; no murmur, rub, or gallop     Abdomen:   Soft, non-tender, non-distended; normal bowel sounds; no masses, no organomegaly    Genitalia:   Deferred    Rectal:   Deferred    Extremities:  No cyanosis, clubbing or edema    Pulses:  2+ and symmetric    Skin:  No jaundice, rashes, or lesions    Lymph nodes:  No palpable cervical lymphadenopathy        Lab Results:   No visits with results within 1 Day(s) from this visit     Latest known visit with results is:   Admission on 12/12/2019, Discharged on 12/16/2019   Component Date Value    WBC 12/12/2019 9 28     RBC 12/12/2019 4 25     Hemoglobin 12/12/2019 13 5     Hematocrit 12/12/2019 40 6     MCV 12/12/2019 96     MCH 12/12/2019 31 8     MCHC 12/12/2019 33 3     RDW 12/12/2019 13 2     MPV 12/12/2019 9 5     Platelets 75/32/5092 283     nRBC 12/12/2019 0     Neutrophils Relative 12/12/2019 61     Immat GRANS % 12/12/2019 0     Lymphocytes Relative 12/12/2019 28     Monocytes Relative 12/12/2019 10     Eosinophils Relative 12/12/2019 1     Basophils Relative 12/12/2019 0     Neutrophils Absolute 12/12/2019 5 57     Immature Grans Absolute 12/12/2019 0 03     Lymphocytes Absolute 12/12/2019 2 61     Monocytes Absolute 12/12/2019 0 93     Eosinophils Absolute 12/12/2019 0 10     Basophils Absolute 12/12/2019 0 04     Sodium 12/12/2019 138     Potassium 12/12/2019 3 5     Chloride 12/12/2019 100     CO2 12/12/2019 27     ANION GAP 12/12/2019 11     BUN 12/12/2019 10     Creatinine 12/12/2019 0 58*    Glucose 12/12/2019 85     Calcium 12/12/2019 9 0     AST 12/12/2019 17     ALT 12/12/2019 53     Alkaline Phosphatase 12/12/2019 91     Total Protein 12/12/2019 7 3     Albumin 12/12/2019 3 6     Total Bilirubin 12/12/2019 0 20     eGFR 12/12/2019 90     Lipase 12/12/2019 293     Protime 12/12/2019 14 8*    INR 12/12/2019 1 15     PTT 12/12/2019 29     LACTIC ACID 12/12/2019 1 0     Troponin I 12/12/2019 <0 02     Color, UA 12/12/2019 Yellow     Clarity, UA 12/12/2019 Clear     Specific Gravity, UA 12/12/2019 <=1 005     pH, UA 12/12/2019 7 5     Leukocytes, UA 12/12/2019 Moderate*    Nitrite, UA 12/12/2019 Negative     Protein, UA 12/12/2019 Negative     Glucose, UA 12/12/2019 Negative     Ketones, UA 12/12/2019 Negative     Urobilinogen, UA 12/12/2019 0 2     Bilirubin, UA 12/12/2019 Negative     Blood, UA 12/12/2019 Trace-Intact*    RBC, UA 12/12/2019 0-1*    WBC, UA 12/12/2019 4-10*    Epithelial Cells 12/12/2019 Occasional     Bacteria, UA 12/12/2019 Occasional     Urine Culture 12/12/2019 10,000-19,000 cfu/ml      Sodium 12/13/2019 139     Potassium 12/13/2019 3 5     Chloride 12/13/2019 105     CO2 12/13/2019 24     ANION GAP 12/13/2019 10     BUN 12/13/2019 5     Creatinine 12/13/2019 0 58*    Glucose 12/13/2019 88     Calcium 12/13/2019 8 4     AST 12/13/2019 16     ALT 12/13/2019 41     Alkaline Phosphatase 12/13/2019 73     Total Protein 12/13/2019 6 2*    Albumin 12/13/2019 3 0*    Total Bilirubin 12/13/2019 0 30     eGFR 12/13/2019 90     WBC 12/13/2019 7 77     RBC 12/13/2019 4 09     Hemoglobin 12/13/2019 13 0     Hematocrit 12/13/2019 39 8     MCV 12/13/2019 97     MCH 12/13/2019 31 8     MCHC 12/13/2019 32 7     RDW 12/13/2019 13 2     Platelets 12/29/4590 256     MPV 12/13/2019 9 6     Ventricular Rate 12/12/2019 74     Atrial Rate 12/12/2019 74     MN Interval 12/12/2019 142     QRSD Interval 12/12/2019 80     QT Interval 12/12/2019 382     QTC Interval 12/12/2019 424     P Axis 12/12/2019 74     QRS Axis 12/12/2019 80     T Wave Axis 12/12/2019 76          Radiology Results:   Xr Abdomen 1 View Kub    Result Date: 12/16/2019  Narrative: ABDOMEN INDICATION:   Consripation  COMPARISON:  None VIEWS:  AP supine Images: 1 FINDINGS: There is a large volume of stool in the colon suggesting constipation  Bowel gas pattern is otherwise unremarkable  No discernible free air on this supine study    Upright or left lateral decubitus imaging is more sensitive to detect subtle free air in the appropriate setting  No pathologic calcifications or soft tissue masses  Visualized lung bases are clear  Degenerative changes lumbar spine  Impression: Constipation  No bowel obstruction  Workstation performed: [id number]     Ct Abdomen Pelvis With Contrast    Result Date: 12/12/2019  Narrative: CT ABDOMEN AND PELVIS WITH IV CONTRAST INDICATION:   Abdominal pain/vomiting  COMPARISON:  CT abdomen pelvis 10/25/2018 TECHNIQUE:  CT examination of the abdomen and pelvis was performed  Axial, sagittal, and coronal 2D reformatted images were created from the source data and submitted for interpretation  Radiation dose length product (DLP) for this visit:  418 mGy-cm   This examination, like all CT scans performed in the [facility], was performed utilizing techniques to minimize radiation dose exposure, including the use of iterative reconstruction and automated exposure control  IV Contrast:  100 mL of iohexol (OMNIPAQUE) Enteric Contrast:  Enteric contrast was not administered  FINDINGS: ABDOMEN LOWER CHEST:  Stable 4 mm nodule left lower lobe laterally image 6 series 2  LIVER/BILIARY TREE:  Internal and extrahepatic biliary ductal dilatation is similar to the prior exam   CBD measures up to 1 2 cm diameter, stable  No findings for choledocholithiasis  GALLBLADDER:  Gallbladder is surgically absent  SPLEEN:  Calcified granulomata are noted in the spleen  No suspicious splenic mass  PANCREAS:  Unremarkable  ADRENAL GLANDS:  Bilateral adrenal calcifications more extensive on the left, stable  KIDNEYS/URETERS:  One or more simple renal cyst(s) is noted  Otherwise unremarkable kidneys  No hydronephrosis  STOMACH AND BOWEL:  Limited evaluation without enteric contrast   Colonic diverticulosis  No acute findings  APPENDIX:  There are expected postoperative changes of appendectomy   ABDOMINOPELVIC CAVITY:  No ascites or free intraperitoneal air  No lymphadenopathy  VESSELS:  Atherosclerotic changes are present  No evidence of aneurysm  PELVIS REPRODUCTIVE ORGANS:  Unremarkable for patient's age  URINARY BLADDER:  Mildly distended otherwise unremarkable  ABDOMINAL WALL/INGUINAL REGIONS:  Evidence of prior laparotomy  OSSEOUS STRUCTURES:  No acute fracture or destructive osseous lesion  Curvature of the thoracolumbar spine to the right  Degenerative spurring of the spine  Impression: 1  No acute findings in the abdomen or pelvis  2   Intrahepatic and extra hepatic biliary ductal dilatation similar to the prior exam   This may be related to the postcholecystectomy state   Workstation performed: [id number]

## 2020-01-08 NOTE — PROGRESS NOTES
[facility] - Outpatient Consultation  Mary Ybarra 66 y o  female MRN: [id number]  Encounter: [id number]          ASSESSMENT AND PLAN:      1  Abnormal weight loss  2  Nausea  3  Constipation, unspecified constipation type    Patient presents for an evaluation of poor appetite, nausea, and constipation  She also reports a profound weight loss of over 30 lbs  She had a CT A/P with contrast which showed no acute intraabdominal process and stable biliary dilation likely on the basis of post-cholecystectomy and unchanged from prior exam   LFTs normal     EGD and colonoscopy to investigate  Will begin a PPI trial with Pantoprazole 40mg po daily  Will begin a Miralax trial - 17 grams po daily  Note: she is on Morphine at present for her radicular pain which could be contributing to her symptoms as it slows the emptying of the stomach/has side effects of nausea, constipation, etc     Follow up in 3 weeks  ___________________________________________________________    HPI:  Patient is a 56-year-old female who presents for an evaluation of gastrointestinal symptoms on weight loss  Patient has been struggling with chronic back pain for quite some time and has been diagnosed with a thoracic and lumbar radiculopathy and she is currently receiving nerve blocks with some improvement in her symptoms  She is presenting here due to complaints of poor appetite, nausea, abdominal pain, and constipation  She reports that her appetite has been quite terrible on and that she often has nausea but no vomiting  She also reports of right-sided abdominal discomfort and gas  She also reports she is struggling with constipation and can go several days without a bowel movement  She then will take multiple laxatives which then leads to diarrhea  She denies any melena or rectal bleeding    She does not think that she ever had a EGD before but reports she has had a colonoscopy within the last several years   Furthermore, she reports that she has lost over 30 lb unintentionally  She did have a CT scan of the abdomen and pelvis with contrast which showed no acute intra-abdominal abnormality  She is currently on morphine daily and she also has used medical marijuana for her pain  REVIEW OF SYSTEMS:    CONSTITUTIONAL: Denies any fever, chills, rigors, and weight loss  HEENT: No earache or tinnitus  Denies hearing loss or visual disturbances  CARDIOVASCULAR: No chest pain or palpitations  RESPIRATORY: Denies any cough, hemoptysis, shortness of breath or dyspnea on exertion  GASTROINTESTINAL: As noted in the History of Present Illness  GENITOURINARY: No problems with urination  Denies any hematuria or dysuria  NEUROLOGIC: No dizziness or vertigo, denies headaches  MUSCULOSKELETAL: Denies any muscle or joint pain  SKIN: Denies skin rashes or itching  ENDOCRINE: Denies excessive thirst  Denies intolerance to heat or cold  PSYCHOSOCIAL: Denies depression or anxiety  Denies any recent memory loss  Historical Information   Past Medical History:   Diagnosis Date    Anxiety     Cataract     last assessed 14    COPD (chronic obstructive pulmonary disease) (HCC)     Hypothyroidism     Multiple sclerosis (HCC)     CHOCO (obstructive sleep apnea)     Peroneal muscle atrophy     Thoracolumbar radiculopathy due to intervertebral disc disorder      Past Surgical History:   Procedure Laterality Date    APPENDECTOMY      CATARACT EXTRACTION       SECTION      CHOLECYSTECTOMY      GALLBLADDER SURGERY      MT COLONOSCOPY FLX DX W/COLLJ SPEC WHEN PFRMD N/A 2018    Procedure: COLONOSCOPY;  Surgeon: Dick Brock MD;  Location: [facility];   Service: Colorectal    THROAT SURGERY       Social History   Social History     Substance and Sexual Activity   Alcohol Use Not Currently    Alcohol/week: 8 0 standard drinks    Types: 7 Glasses of wine, 1 Cans of beer per week    Frequency: 4 or more times a week    Drinks per session: 1 or 2    Binge frequency: Never    Comment: daily/ socially     Social History     Substance and Sexual Activity   Drug Use Not Currently    Types: Marijuana    Comment: medical-RSO     Social History     Tobacco Use   Smoking Status Current Every Day Smoker    Packs/day: 1 00    Years: 60 00    Pack years: 60 00    Types: Cigarettes    Start date: 2/16/1960   Smokeless Tobacco Never Used     Family History   Problem Relation Age of Onset    Skin cancer Mother     Hypertension Father         benign essential    Stroke Father     Lung cancer Brother        Meds/Allergies       Current Outpatient Medications:     bisacodyl (DULCOLAX) 10 mg suppository    Cholecalciferol (VITAMIN D-3) 1000 units CAPS    diazepam (VALIUM) 2 mg tablet    levothyroxine 100 mcg tablet    loratadine (CLARITIN) 10 mg tablet    methylPREDNISolone (MEDROL) 4 MG tablet therapy pack    morphine (MSIR) 15 mg tablet    Multiple Vitamins-Minerals (ICAPS AREDS 2 PO)    nicotine (NICODERM CQ) 7 mg/24hr TD 24 hr patch    NON FORMULARY    polyethylene glycol (MIRALAX) 17 g packet    polyvinyl alcohol (LIQUIFILM TEARS) 1 4 % ophthalmic solution    senna-docusate sodium (SENOKOT-S) 8 6-50 mg per tablet    sodium chloride (OCEAN) 0 65 % nasal spray    vitamin B-12 (CYANOCOBALAMIN) 100 MCG tablet    ondansetron (ZOFRAN-ODT) 4 mg disintegrating tablet    pantoprazole (PROTONIX) 40 mg tablet    No Known Allergies        Objective     Blood pressure 127/82, pulse 101, weight 58 5 kg (129 lb)  Body mass index is 22 85 kg/m²  PHYSICAL EXAM:      General Appearance:   Alert, cooperative, no distress   HEENT:   Normocephalic, atraumatic, anicteric     Neck:  Supple, symmetrical, trachea midline   Lungs:   Clear to auscultation bilaterally; no rales, rhonchi or wheezing; respirations unlabored    Heart[de-identified]   Regular rate and rhythm; no murmur, rub, or gallop     Abdomen:   Soft, non-tender, non-distended; normal bowel sounds; no masses, no organomegaly    Genitalia:   Deferred    Rectal:   Deferred    Extremities:  No cyanosis, clubbing or edema    Pulses:  2+ and symmetric    Skin:  No jaundice, rashes, or lesions    Lymph nodes:  No palpable cervical lymphadenopathy        Lab Results:   No visits with results within 1 Day(s) from this visit     Latest known visit with results is:   Admission on 12/12/2019, Discharged on 12/16/2019   Component Date Value    WBC 12/12/2019 9 28     RBC 12/12/2019 4 25     Hemoglobin 12/12/2019 13 5     Hematocrit 12/12/2019 40 6     MCV 12/12/2019 96     MCH 12/12/2019 31 8     MCHC 12/12/2019 33 3     RDW 12/12/2019 13 2     MPV 12/12/2019 9 5     Platelets 52/25/9661 283     nRBC 12/12/2019 0     Neutrophils Relative 12/12/2019 61     Immat GRANS % 12/12/2019 0     Lymphocytes Relative 12/12/2019 28     Monocytes Relative 12/12/2019 10     Eosinophils Relative 12/12/2019 1     Basophils Relative 12/12/2019 0     Neutrophils Absolute 12/12/2019 5 57     Immature Grans Absolute 12/12/2019 0 03     Lymphocytes Absolute 12/12/2019 2 61     Monocytes Absolute 12/12/2019 0 93     Eosinophils Absolute 12/12/2019 0 10     Basophils Absolute 12/12/2019 0 04     Sodium 12/12/2019 138     Potassium 12/12/2019 3 5     Chloride 12/12/2019 100     CO2 12/12/2019 27     ANION GAP 12/12/2019 11     BUN 12/12/2019 10     Creatinine 12/12/2019 0 58*    Glucose 12/12/2019 85     Calcium 12/12/2019 9 0     AST 12/12/2019 17     ALT 12/12/2019 53     Alkaline Phosphatase 12/12/2019 91     Total Protein 12/12/2019 7 3     Albumin 12/12/2019 3 6     Total Bilirubin 12/12/2019 0 20     eGFR 12/12/2019 90     Lipase 12/12/2019 293     Protime 12/12/2019 14 8*    INR 12/12/2019 1 15     PTT 12/12/2019 29     LACTIC ACID 12/12/2019 1 0     Troponin I 12/12/2019 <0 02     Color, UA 12/12/2019 Yellow     Clarity, UA 12/12/2019 Clear     Specific Gravity, UA 12/12/2019 <=1 005     pH, UA 12/12/2019 7 5     Leukocytes, UA 12/12/2019 Moderate*    Nitrite, UA 12/12/2019 Negative     Protein, UA 12/12/2019 Negative     Glucose, UA 12/12/2019 Negative     Ketones, UA 12/12/2019 Negative     Urobilinogen, UA 12/12/2019 0 2     Bilirubin, UA 12/12/2019 Negative     Blood, UA 12/12/2019 Trace-Intact*    RBC, UA 12/12/2019 0-1*    WBC, UA 12/12/2019 4-10*    Epithelial Cells 12/12/2019 Occasional     Bacteria, UA 12/12/2019 Occasional     Urine Culture 12/12/2019 10,000-19,000 cfu/ml      Sodium 12/13/2019 139     Potassium 12/13/2019 3 5     Chloride 12/13/2019 105     CO2 12/13/2019 24     ANION GAP 12/13/2019 10     BUN 12/13/2019 5     Creatinine 12/13/2019 0 58*    Glucose 12/13/2019 88     Calcium 12/13/2019 8 4     AST 12/13/2019 16     ALT 12/13/2019 41     Alkaline Phosphatase 12/13/2019 73     Total Protein 12/13/2019 6 2*    Albumin 12/13/2019 3 0*    Total Bilirubin 12/13/2019 0 30     eGFR 12/13/2019 90     WBC 12/13/2019 7 77     RBC 12/13/2019 4 09     Hemoglobin 12/13/2019 13 0     Hematocrit 12/13/2019 39 8     MCV 12/13/2019 97     MCH 12/13/2019 31 8     MCHC 12/13/2019 32 7     RDW 12/13/2019 13 2     Platelets 62/93/6308 256     MPV 12/13/2019 9 6     Ventricular Rate 12/12/2019 74     Atrial Rate 12/12/2019 74     IN Interval 12/12/2019 142     QRSD Interval 12/12/2019 80     QT Interval 12/12/2019 382     QTC Interval 12/12/2019 424     P Axis 12/12/2019 74     QRS Axis 12/12/2019 80     T Wave Axis 12/12/2019 76          Radiology Results:   Xr Abdomen 1 View Kub    Result Date: 12/16/2019  Narrative: ABDOMEN INDICATION:   Consripation  COMPARISON:  None VIEWS:  AP supine Images: 1 FINDINGS: There is a large volume of stool in the colon suggesting constipation  Bowel gas pattern is otherwise unremarkable  No discernible free air on this supine study    Upright or left lateral decubitus imaging is more sensitive to detect subtle free air in the appropriate setting  No pathologic calcifications or soft tissue masses  Visualized lung bases are clear  Degenerative changes lumbar spine  Impression: Constipation  No bowel obstruction  Workstation performed: [id number]     Ct Abdomen Pelvis With Contrast    Result Date: 12/12/2019  Narrative: CT ABDOMEN AND PELVIS WITH IV CONTRAST INDICATION:   Abdominal pain/vomiting  COMPARISON:  CT abdomen pelvis 10/25/2018 TECHNIQUE:  CT examination of the abdomen and pelvis was performed  Axial, sagittal, and coronal 2D reformatted images were created from the source data and submitted for interpretation  Radiation dose length product (DLP) for this visit:  418 mGy-cm   This examination, like all CT scans performed in the [facility], was performed utilizing techniques to minimize radiation dose exposure, including the use of iterative reconstruction and automated exposure control  IV Contrast:  100 mL of iohexol (OMNIPAQUE) Enteric Contrast:  Enteric contrast was not administered  FINDINGS: ABDOMEN LOWER CHEST:  Stable 4 mm nodule left lower lobe laterally image 6 series 2  LIVER/BILIARY TREE:  Internal and extrahepatic biliary ductal dilatation is similar to the prior exam   CBD measures up to 1 2 cm diameter, stable  No findings for choledocholithiasis  GALLBLADDER:  Gallbladder is surgically absent  SPLEEN:  Calcified granulomata are noted in the spleen  No suspicious splenic mass  PANCREAS:  Unremarkable  ADRENAL GLANDS:  Bilateral adrenal calcifications more extensive on the left, stable  KIDNEYS/URETERS:  One or more simple renal cyst(s) is noted  Otherwise unremarkable kidneys  No hydronephrosis  STOMACH AND BOWEL:  Limited evaluation without enteric contrast   Colonic diverticulosis  No acute findings  APPENDIX:  There are expected postoperative changes of appendectomy   ABDOMINOPELVIC CAVITY:  No ascites or free intraperitoneal air  No lymphadenopathy  VESSELS:  Atherosclerotic changes are present  No evidence of aneurysm  PELVIS REPRODUCTIVE ORGANS:  Unremarkable for patient's age  URINARY BLADDER:  Mildly distended otherwise unremarkable  ABDOMINAL WALL/INGUINAL REGIONS:  Evidence of prior laparotomy  OSSEOUS STRUCTURES:  No acute fracture or destructive osseous lesion  Curvature of the thoracolumbar spine to the right  Degenerative spurring of the spine  Impression: 1  No acute findings in the abdomen or pelvis  2   Intrahepatic and extra hepatic biliary ductal dilatation similar to the prior exam   This may be related to the postcholecystectomy state   Workstation performed: [id number]

## 2020-01-15 ENCOUNTER — HOSPITAL ENCOUNTER (OUTPATIENT)
Dept: RADIOLOGY | Facility: CLINIC | Age: 75
Discharge: HOME | End: 2020-01-15
Attending: PHYSICAL MEDICINE & REHABILITATION
Payer: MEDICARE

## 2020-01-15 VITALS
HEART RATE: 97 BPM | DIASTOLIC BLOOD PRESSURE: 72 MMHG | RESPIRATION RATE: 18 BRPM | TEMPERATURE: 97.7 F | SYSTOLIC BLOOD PRESSURE: 113 MMHG | OXYGEN SATURATION: 97 %

## 2020-01-15 DIAGNOSIS — M51.16 LUMBAR DISC DISEASE WITH RADICULOPATHY: ICD-10-CM

## 2020-01-15 PROCEDURE — 62323 NJX INTERLAMINAR LMBR/SAC: CPT | Performed by: PHYSICAL MEDICINE & REHABILITATION

## 2020-01-15 RX ORDER — LIDOCAINE HYDROCHLORIDE 10 MG/ML
5 INJECTION, SOLUTION EPIDURAL; INFILTRATION; INTRACAUDAL; PERINEURAL ONCE
Status: COMPLETED | OUTPATIENT
Start: 2020-01-15 | End: 2020-01-15

## 2020-01-15 RX ORDER — 0.9 % SODIUM CHLORIDE 0.9 %
10 VIAL (ML) INJECTION ONCE
Status: COMPLETED | OUTPATIENT
Start: 2020-01-15 | End: 2020-01-15

## 2020-01-15 RX ORDER — PAPAVERINE HCL 150 MG
20 CAPSULE, EXTENDED RELEASE ORAL ONCE
Status: COMPLETED | OUTPATIENT
Start: 2020-01-15 | End: 2020-01-15

## 2020-01-15 RX ADMIN — LIDOCAINE HYDROCHLORIDE 4 ML: 10 INJECTION, SOLUTION EPIDURAL; INFILTRATION; INTRACAUDAL; PERINEURAL at 14:09

## 2020-01-15 RX ADMIN — DEXAMETHASONE SODIUM PHOSPHATE 20 MG: 10 INJECTION, SOLUTION INTRAMUSCULAR; INTRAVENOUS at 14:14

## 2020-01-15 RX ADMIN — IOHEXOL 1 ML: 300 INJECTION, SOLUTION INTRAVENOUS at 14:14

## 2020-01-15 RX ADMIN — SODIUM CHLORIDE 1 ML: 9 INJECTION, SOLUTION INTRAMUSCULAR; INTRAVENOUS; SUBCUTANEOUS at 14:14

## 2020-01-15 NOTE — H&P
History of Present Illness: The patient is a 66 y o  female who presents with complaints of low back pain    Patient Active Problem List   Diagnosis    CHOCO on CPAP    COPD with chronic bronchitis ([facility] [address] [facility]    Hypothyroidism    Insomnia    Lung nodule, multiple    Macular degeneration    Multiple sclerosis ([facility] [address] [facility]    Rosacea    Seborrheic keratosis    Chronic neuropathic pain    Thoracolumbar radiculopathy due to intervertebral disc disorder    Other constipation    Intercostal neuralgia       Past Medical History:   Diagnosis Date    Anxiety     Cataract     last assessed 14    COPD (chronic obstructive pulmonary disease) (HCC)     Hypothyroidism     Multiple sclerosis (HCC)     CHOCO (obstructive sleep apnea)     Peroneal muscle atrophy     Thoracolumbar radiculopathy due to intervertebral disc disorder        Past Surgical History:   Procedure Laterality Date    APPENDECTOMY      CATARACT EXTRACTION       SECTION      CHOLECYSTECTOMY      GALLBLADDER SURGERY      CO COLONOSCOPY FLX DX W/COLLJ SPEC WHEN PFRMD N/A 2018    Procedure: COLONOSCOPY;  Surgeon: Davis Duane, MD;  Location: [facility];   Service: Colorectal    THROAT SURGERY           Current Outpatient Medications:     bisacodyl (DULCOLAX) 10 mg suppository, Insert 1 suppository (10 mg total) into the rectum daily as needed for constipation, Disp: 12 suppository, Rfl: 0    Cholecalciferol (VITAMIN D-3) 1000 units CAPS, Take by mouth daily , Disp: , Rfl:     diazepam (VALIUM) 2 mg tablet, Take 2 mg by mouth daily at bedtime, Disp: , Rfl: 5    levothyroxine 100 mcg tablet, Take 1 tablet (100 mcg total) by mouth daily, Disp: 90 tablet, Rfl: 3    loratadine (CLARITIN) 10 mg tablet, Take 1 tablet (10 mg total) by mouth daily as needed for allergies, Disp: , Rfl: 0    methylPREDNISolone (MEDROL) 4 MG tablet therapy pack, Use as directed on package, Disp: 21 tablet, Rfl: 0    morphine (MSIR) 15 mg tablet, Take 1 tablet (15 mg total) by mouth 2 (two) times a day as needed for severe painMax Daily Amount: 30 mg, Disp: 45 tablet, Rfl: 0    Multiple Vitamins-Minerals (ICAPS AREDS 2 PO), Take 1 tablet by mouth 2 (two) times a day, Disp: , Rfl:     nicotine (NICODERM CQ) 7 mg/24hr TD 24 hr patch, Place 1 patch on the skin daily, Disp: 28 patch, Rfl: 0    NON FORMULARY, 2 (two) times a day , Disp: , Rfl:     ondansetron (ZOFRAN-ODT) 4 mg disintegrating tablet, Take 1 tablet (4 mg total) by mouth every 8 (eight) hours as needed for nausea or vomiting for up to 3 days (Patient not taking: Reported on 12/20/2019), Disp: 9 tablet, Rfl: 0    pantoprazole (PROTONIX) 40 mg tablet, Take 1 tablet (40 mg total) by mouth daily, Disp: 30 tablet, Rfl: 1    polyethylene glycol (MIRALAX) 17 g packet, Take 17 g by mouth daily, Disp: 14 each, Rfl: 0    polyvinyl alcohol (LIQUIFILM TEARS) 1 4 % ophthalmic solution, Administer 1 drop to both eyes every 3 (three) hours as needed for dry eyes, Disp: 15 mL, Rfl: 0    senna-docusate sodium (SENOKOT-S) 8 6-50 mg per tablet, Take 2 tablets by mouth 2 (two) times a day, Disp: , Rfl: 0    sodium chloride (OCEAN) 0 65 % nasal spray, 2 sprays into each nostril every hour as needed for congestion, Disp: , Rfl: 0    vitamin B-12 (CYANOCOBALAMIN) 100 MCG tablet, Take 100 mcg by mouth daily , Disp: , Rfl:     Current Facility-Administered Medications:     dexamethasone (PF) (DECADRON) injection 20 mg, 20 mg, Epidural, Once, Nicole Clemente DO    iohexol (OMNIPAQUE) 300 mg/mL injection 50 mL, 50 mL, Epidural, Once, Davion Trevino DO    lidocaine (PF) (XYLOCAINE-MPF) 1 % injection 5 mL, 5 mL, Infiltration, Once, Davion Trevino,     sodium chloride (PF) 0 9 % injection 10 mL, 10 mL, Epidural, Once, Davion Trevino, DO    No Known Allergies    Physical Exam:   Vitals:    01/15/20 1345   BP: 113/73   Pulse: 104   Resp: 18   Temp: 97 7 °F (36 5 °C)   SpO2: 98%     General: Awake, Alert, Oriented x 3, Mood and affect appropriate  Respiratory: Respirations even and unlabored  Cardiovascular: Peripheral pulses intact; no edema  Musculoskeletal Exam:  Bilateral lumbar paraspinal tenderness palpation    ASA Score: 2    Patient/Chart Verification  Patient ID Verified: Verbal  ID Band Applied: No  Consents Confirmed: Procedural, To be obtained in the Pre-Procedure area  H&P( within 30 days) Verified: To be obtained in the Pre-Procedure area  Allergies Reviewed: Yes  Anticoag/NSAID held?: No  Currently on antibiotics?: No    Assessment:   1   Lumbar disc disease with radiculopathy        Plan: LESI likely L4-L5 right-sided paramedian approach

## 2020-01-15 NOTE — DISCHARGE INSTR - LAB
Epidural Steroid Injection   WHAT YOU NEED TO KNOW:   An epidural steroid injection (GEOVANNA) is a procedure to inject steroid medicine into the epidural space  The epidural space is between your spinal cord and vertebrae  Steroids reduce inflammation and fluid buildup in your spine that may be causing pain  You may be given pain medicine along with the steroids  ACTIVITY  · Do not drive or operate machinery today  · No strenuous activity today - bending, lifting, etc   · You may resume normal activites starting tomorrow - start slowly and as tolerated  · You may shower today, but no tub baths or hot tubs  · You may have numbness for several hours from the local anesthetic  Please use caution and common sense, especially with weight-bearing activities  CARE OF THE INJECTION SITE  · If you have soreness or pain, apply ice to the area today (20 minutes on/20 minutes off)  · Starting tomorrow, you may use warm, moist heat or ice if needed  · You may have an increase or change in your discomfort for 36-48 hours after your treatment  · Apply ice and continue with any pain medication you have been prescribed  · Notify the [facility] if you have any of the following: redness, drainage, swelling, headache, stiff neck or fever above 100°F     SPECIAL INSTRUCTIONS  · Our office will contact you in approximately 7 days for a progress report  MEDICATIONS  · Continue to take all routine medications  · Our office may have instructed you to hold some medications  If you have a problem specifically related to your procedure, please call our office at [phone]  Problems not related to your procedure should be directed to your primary care physician

## 2020-01-20 ENCOUNTER — ANESTHESIA EVENT (OUTPATIENT)
Dept: GASTROENTEROLOGY | Facility: HOSPITAL | Age: 75
End: 2020-01-20

## 2020-01-21 ENCOUNTER — ANESTHESIA (OUTPATIENT)
Dept: GASTROENTEROLOGY | Facility: HOSPITAL | Age: 75
End: 2020-01-21

## 2020-01-21 ENCOUNTER — HOSPITAL ENCOUNTER (OUTPATIENT)
Dept: GASTROENTEROLOGY | Facility: HOSPITAL | Age: 75
Setting detail: OUTPATIENT SURGERY
Discharge: HOME | End: 2020-01-21
Attending: INTERNAL MEDICINE | Admitting: INTERNAL MEDICINE
Payer: MEDICARE

## 2020-01-21 VITALS
HEART RATE: 83 BPM | WEIGHT: 124.78 LBS | TEMPERATURE: 96.5 F | HEIGHT: 63 IN | SYSTOLIC BLOOD PRESSURE: 92 MMHG | RESPIRATION RATE: 21 BRPM | DIASTOLIC BLOOD PRESSURE: 60 MMHG | BODY MASS INDEX: 22.11 KG/M2 | OXYGEN SATURATION: 97 %

## 2020-01-21 DIAGNOSIS — R63.4 ABNORMAL WEIGHT LOSS: ICD-10-CM

## 2020-01-21 DIAGNOSIS — R11.0 NAUSEA: ICD-10-CM

## 2020-01-21 DIAGNOSIS — K59.00 CONSTIPATION, UNSPECIFIED CONSTIPATION TYPE: ICD-10-CM

## 2020-01-21 PROCEDURE — 43239 EGD BIOPSY SINGLE/MULTIPLE: CPT | Performed by: INTERNAL MEDICINE

## 2020-01-21 PROCEDURE — 88305 TISSUE EXAM BY PATHOLOGIST: CPT | Performed by: PATHOLOGY

## 2020-01-21 PROCEDURE — 45385 COLONOSCOPY W/LESION REMOVAL: CPT | Performed by: INTERNAL MEDICINE

## 2020-01-21 PROCEDURE — NC001 PR NO CHARGE: Performed by: INTERNAL MEDICINE

## 2020-01-21 RX ORDER — SODIUM CHLORIDE, SODIUM LACTATE, POTASSIUM CHLORIDE, CALCIUM CHLORIDE 600; 310; 30; 20 MG/100ML; MG/100ML; MG/100ML; MG/100ML
125 INJECTION, SOLUTION INTRAVENOUS CONTINUOUS
Status: DISCONTINUED | OUTPATIENT
Start: 2020-01-21 | End: 2020-01-25 | Stop reason: HOSPADM

## 2020-01-21 RX ORDER — LIDOCAINE HYDROCHLORIDE 10 MG/ML
INJECTION, SOLUTION EPIDURAL; INFILTRATION; INTRACAUDAL; PERINEURAL AS NEEDED
Status: DISCONTINUED | OUTPATIENT
Start: 2020-01-21 | End: 2020-01-21 | Stop reason: SURG

## 2020-01-21 RX ORDER — LIDOCAINE HYDROCHLORIDE 10 MG/ML
0.5 INJECTION, SOLUTION EPIDURAL; INFILTRATION; INTRACAUDAL; PERINEURAL ONCE AS NEEDED
Status: DISCONTINUED | OUTPATIENT
Start: 2020-01-21 | End: 2020-01-25 | Stop reason: HOSPADM

## 2020-01-21 RX ORDER — PROPOFOL 10 MG/ML
INJECTION, EMULSION INTRAVENOUS AS NEEDED
Status: DISCONTINUED | OUTPATIENT
Start: 2020-01-21 | End: 2020-01-21 | Stop reason: SURG

## 2020-01-21 RX ORDER — SODIUM CHLORIDE, SODIUM LACTATE, POTASSIUM CHLORIDE, CALCIUM CHLORIDE 600; 310; 30; 20 MG/100ML; MG/100ML; MG/100ML; MG/100ML
INJECTION, SOLUTION INTRAVENOUS CONTINUOUS PRN
Status: DISCONTINUED | OUTPATIENT
Start: 2020-01-21 | End: 2020-01-21 | Stop reason: SURG

## 2020-01-21 RX ADMIN — PROPOFOL 100 MG: 10 INJECTION, EMULSION INTRAVENOUS at 09:05

## 2020-01-21 RX ADMIN — PROPOFOL 50 MG: 10 INJECTION, EMULSION INTRAVENOUS at 09:10

## 2020-01-21 RX ADMIN — SODIUM CHLORIDE, SODIUM LACTATE, POTASSIUM CHLORIDE, AND CALCIUM CHLORIDE: .6; .31; .03; .02 INJECTION, SOLUTION INTRAVENOUS at 08:56

## 2020-01-21 RX ADMIN — LIDOCAINE HYDROCHLORIDE 50 MG: 10 INJECTION, SOLUTION EPIDURAL; INFILTRATION; INTRACAUDAL; PERINEURAL at 09:04

## 2020-01-21 RX ADMIN — PHENYLEPHRINE HYDROCHLORIDE 100 MCG: 10 INJECTION INTRAVENOUS at 09:12

## 2020-01-21 RX ADMIN — PHENYLEPHRINE HYDROCHLORIDE 100 MCG: 10 INJECTION INTRAVENOUS at 09:08

## 2020-01-21 RX ADMIN — PROPOFOL 50 MG: 10 INJECTION, EMULSION INTRAVENOUS at 09:25

## 2020-01-21 RX ADMIN — PROPOFOL 50 MG: 10 INJECTION, EMULSION INTRAVENOUS at 09:18

## 2020-01-21 NOTE — ANESTHESIA POSTPROCEDURE EVALUATION
Post-Op Assessment Note    CV Status:  Stable    Pain management: adequate     Mental Status:  Alert   Hydration Status:  Stable   PONV Controlled:  None   Airway Patency:  Patent   Post Op Vitals Reviewed: Yes      Staff: Anesthesiologist           BP      Temp     Pulse     Resp      SpO2

## 2020-01-21 NOTE — INTERVAL H&P NOTE
H&P reviewed  After examining the patient I find no changes in the patients condition since the H&P had been written      Vitals:    01/21/20 0820   BP: 132/74   Pulse: 102   Resp: 19   Temp: (!) 97 4 °F (36 3 °C)   SpO2: 97%

## 2020-01-21 NOTE — ANESTHESIA PREPROCEDURE EVALUATION
Review of Systems/Medical History          Cardiovascular   Pulmonary  Smoker cigarette smoker  , COPD , Sleep apnea ,        GI/Hepatic    Bowel prep            Endo/Other  History of thyroid disease ,      GYN       Hematology   Musculoskeletal       Neurology   Psychology   Anxiety,              Physical Exam    Airway    Mallampati score: II         Dental   No notable dental hx     Cardiovascular      Pulmonary      Other Findings        Anesthesia Plan  ASA Score- 3     Anesthesia Type- IV sedation with anesthesia with ASA Monitors  Additional Monitors:   Airway Plan:     Comment: I, Dr Benito Franco, the attending physician, have personally seen and evaluated the patient prior to anesthetic care  I have reviewed the pre-anesthetic record, and other medical records if appropriate to the anesthetic care  If a CRNA is involved in the case, I have reviewed the CRNA assessment, if present, and agree  The patient is in a suitable condition to proceed with my formulated anesthetic plan        Plan Factors-    Induction- intravenous  Postoperative Plan-     Informed Consent- Anesthetic plan and risks discussed with patient  I personally reviewed this patient with the CRNA  Discussed and agreed on the Anesthesia Plan with the CRNA  Mishel Yang

## 2020-01-21 NOTE — DISCHARGE INSTRUCTIONS

## 2020-01-22 ENCOUNTER — TELEPHONE (OUTPATIENT)
Dept: PAIN MEDICINE | Facility: CLINIC | Age: 75
End: 2020-01-22

## 2020-01-28 DIAGNOSIS — G89.29 CHRONIC NEUROPATHIC PAIN: ICD-10-CM

## 2020-01-28 DIAGNOSIS — M79.2 CHRONIC NEUROPATHIC PAIN: ICD-10-CM

## 2020-01-28 RX ORDER — MORPHINE SULFATE 15 MG/1
15 TABLET ORAL 2 TIMES DAILY PRN
Qty: 60 TABLET | Refills: 0 | Status: SHIPPED | OUTPATIENT
Start: 2020-01-28 | End: 2020-03-04 | Stop reason: SDUPTHER

## 2020-01-28 NOTE — TELEPHONE ENCOUNTER
Refill request     Morphine 15mg   1 tablet 2 times daily PRN     Send to [facility]

## 2020-01-29 ENCOUNTER — OFFICE VISIT (OUTPATIENT)
Dept: GASTROENTEROLOGY | Facility: CLINIC | Age: 75
End: 2020-01-29
Payer: MEDICARE

## 2020-01-29 ENCOUNTER — TELEPHONE (OUTPATIENT)
Dept: INTERNAL MEDICINE CLINIC | Facility: CLINIC | Age: 75
End: 2020-01-29

## 2020-01-29 VITALS
SYSTOLIC BLOOD PRESSURE: 120 MMHG | HEART RATE: 101 BPM | WEIGHT: 129 LBS | BODY MASS INDEX: 22.85 KG/M2 | DIASTOLIC BLOOD PRESSURE: 72 MMHG

## 2020-01-29 DIAGNOSIS — Z86.010 HISTORY OF COLON POLYPS: ICD-10-CM

## 2020-01-29 DIAGNOSIS — K59.00 CONSTIPATION, UNSPECIFIED CONSTIPATION TYPE: Primary | ICD-10-CM

## 2020-01-29 DIAGNOSIS — R11.0 NAUSEA: ICD-10-CM

## 2020-01-29 DIAGNOSIS — K29.70 GASTRITIS WITHOUT BLEEDING, UNSPECIFIED CHRONICITY, UNSPECIFIED GASTRITIS TYPE: ICD-10-CM

## 2020-01-29 PROCEDURE — 99213 OFFICE O/P EST LOW 20 MIN: CPT | Performed by: PHYSICIAN ASSISTANT

## 2020-01-29 RX ORDER — PANTOPRAZOLE SODIUM 40 MG/1
40 TABLET, DELAYED RELEASE ORAL DAILY
Qty: 60 TABLET | Refills: 1 | Status: SHIPPED | OUTPATIENT
Start: 2020-01-29 | End: 2020-01-31 | Stop reason: SDUPTHER

## 2020-01-29 NOTE — PROGRESS NOTES
[facility] - Outpatient Follow-up Note  Alis Lomax 66 y o  female MRN: [id number]  Encounter: [id number]          ASSESSMENT AND PLAN:      1  Gastritis without bleeding  2  Nausea  3  Constipation  4  History of colon polyps    Patient presents for follow up  She is improving - her weight has stabilized! EGD showed gastritis - path is pending to rule out h pylori  Colonoscopy showed a cecal polyp that was removed and diverticulosis - path is pending  She also previously had a CT A/P with contrast which showed no acute intraabdominal process and stable biliary dilation likely on the basis of post-cholecystectomy and unchanged from prior exam   LFTs normal     She is reporting improvement on the Pantoprazole 40mg po daily but reports nighttime nausea and abdominal discomfort still at times  Will increase Pantoprazole to BID for the next month  Follow up path to rule out h pylori  She is eating small, frequent meals  She is avoiding NSAIDs  She reports improvement in her constipation with the Miralax - will continue this regimen  Note: she is on Morphine for her radicular pain which contributes to her constipation and nausea (slows the emptying of the stomach)  She also reports a significant improvement in her back pain with the 2 nerve blocks she has had thus far  Follow up in 1 month     ______________________________________________________________________    SUBJECTIVE:  Patient is a 58-year-old female who presents to the office for follow-up  She has overall seen a great improvement in her gastrointestinal symptoms and her chronic back pain/radicular symptoms  She reports she feels as though she is getting her life back and is able to spend time with friends and family again  She had an EGD which showed gastritis and a colonoscopy revealed a polyp and diverticulosis  Pathology is currently pending    She is taking the pantoprazole with benefit in her nausea and discomfort - however still reports some nighttime symptoms  She reports her weight has stabilized and she has not lost any further weight since her last office visit  She is eating small amounts more frequently throughout the day  She also is taking the MiraLax with benefit for her constipation  She is still maintained on morphine for her back pain and has received to nerve blocks thus far with great improvement  She is excited to travel on vacation next week to [facility] for her 's 78th birthday and her daughter's 50th birthday  She reports she was on significant amounts of NSAIDs at one point for her back pain which she stopped and reports she has not taken any additional NSAIDs the discovery of the gastritis  REVIEW OF SYSTEMS IS OTHERWISE NEGATIVE  Historical Information   Past Medical History:   Diagnosis Date    Anxiety     Cataract     last assessed 14    Chronic pain disorder     COPD (chronic obstructive pulmonary disease) (HCC)     Hypothyroidism     Multiple sclerosis (HCC)     CHOCO (obstructive sleep apnea)     uses cpap    Peroneal muscle atrophy     Thoracolumbar radiculopathy due to intervertebral disc disorder      Past Surgical History:   Procedure Laterality Date    APPENDECTOMY      CATARACT EXTRACTION       SECTION      CHOLECYSTECTOMY      GALLBLADDER SURGERY      OR COLONOSCOPY FLX DX W/COLLJ SPEC WHEN PFRMD N/A 2018    Procedure: COLONOSCOPY;  Surgeon: Jie Blair MD;  Location: [facility];   Service: Colorectal    THROAT SURGERY       Social History   Social History     Substance and Sexual Activity   Alcohol Use Not Currently    Alcohol/week: 8 0 standard drinks    Types: 7 Glasses of wine, 1 Cans of beer per week    Frequency: 4 or more times a week    Drinks per session: 1 or 2    Binge frequency: Never    Comment: daily/ socially     Social History     Substance and Sexual Activity   Drug Use Not Currently    Types: Morphine Comment: medical-RSO  last dose 11 days ago     Social History     Tobacco Use   Smoking Status Current Every Day Smoker    Packs/day: 1 00    Years: 60 00    Pack years: 60 00    Types: Cigarettes    Start date: 2/16/1958   Smokeless Tobacco Never Used     Family History   Problem Relation Age of Onset    Skin cancer Mother     Hypertension Father         benign essential    Stroke Father     Lung cancer Brother        Meds/Allergies       Current Outpatient Medications:     bisacodyl (DULCOLAX) 10 mg suppository    Cholecalciferol (VITAMIN D-3) 1000 units CAPS    diazepam (VALIUM) 2 mg tablet    levothyroxine 100 mcg tablet    loratadine (CLARITIN) 10 mg tablet    morphine (MSIR) 15 mg tablet    Multiple Vitamins-Minerals (ICAPS AREDS 2 PO)    NON FORMULARY    pantoprazole (PROTONIX) 40 mg tablet    polyethylene glycol (MIRALAX) 17 g packet    polyvinyl alcohol (LIQUIFILM TEARS) 1 4 % ophthalmic solution    senna-docusate sodium (SENOKOT-S) 8 6-50 mg per tablet    vitamin B-12 (CYANOCOBALAMIN) 100 MCG tablet    methylPREDNISolone (MEDROL) 4 MG tablet therapy pack    nicotine (NICODERM CQ) 7 mg/24hr TD 24 hr patch    ondansetron (ZOFRAN-ODT) 4 mg disintegrating tablet    sodium chloride (OCEAN) 0 65 % nasal spray    No Known Allergies        Objective     Blood pressure 120/72, pulse 101, weight 58 5 kg (129 lb)  Body mass index is 22 85 kg/m²  PHYSICAL EXAM:      General Appearance:   Alert, cooperative, no distress   HEENT:   Normocephalic, atraumatic, anicteric     Neck:  Supple, symmetrical, trachea midline   Lungs:   Clear to auscultation bilaterally; no rales, rhonchi or wheezing; respirations unlabored    Heart[de-identified]   Regular rate and rhythm; no murmur, rub, or gallop     Abdomen:   Soft, non-tender, non-distended; normal bowel sounds; no masses, no organomegaly    Genitalia:   Deferred    Rectal:   Deferred    Extremities:  No cyanosis, clubbing or edema    Pulses:  2+ and symmetric    Skin:  No jaundice, rashes, or lesions    Lymph nodes:  No palpable cervical lymphadenopathy        Lab Results:   No visits with results within 1 Day(s) from this visit     Latest known visit with results is:   Admission on 12/12/2019, Discharged on 12/16/2019   Component Date Value    WBC 12/12/2019 9 28     RBC 12/12/2019 4 25     Hemoglobin 12/12/2019 13 5     Hematocrit 12/12/2019 40 6     MCV 12/12/2019 96     MCH 12/12/2019 31 8     MCHC 12/12/2019 33 3     RDW 12/12/2019 13 2     MPV 12/12/2019 9 5     Platelets 18/09/9175 283     nRBC 12/12/2019 0     Neutrophils Relative 12/12/2019 61     Immat GRANS % 12/12/2019 0     Lymphocytes Relative 12/12/2019 28     Monocytes Relative 12/12/2019 10     Eosinophils Relative 12/12/2019 1     Basophils Relative 12/12/2019 0     Neutrophils Absolute 12/12/2019 5 57     Immature Grans Absolute 12/12/2019 0 03     Lymphocytes Absolute 12/12/2019 2 61     Monocytes Absolute 12/12/2019 0 93     Eosinophils Absolute 12/12/2019 0 10     Basophils Absolute 12/12/2019 0 04     Sodium 12/12/2019 138     Potassium 12/12/2019 3 5     Chloride 12/12/2019 100     CO2 12/12/2019 27     ANION GAP 12/12/2019 11     BUN 12/12/2019 10     Creatinine 12/12/2019 0 58*    Glucose 12/12/2019 85     Calcium 12/12/2019 9 0     AST 12/12/2019 17     ALT 12/12/2019 53     Alkaline Phosphatase 12/12/2019 91     Total Protein 12/12/2019 7 3     Albumin 12/12/2019 3 6     Total Bilirubin 12/12/2019 0 20     eGFR 12/12/2019 90     Lipase 12/12/2019 293     Protime 12/12/2019 14 8*    INR 12/12/2019 1 15     PTT 12/12/2019 29     LACTIC ACID 12/12/2019 1 0     Troponin I 12/12/2019 <0 02     Color, UA 12/12/2019 Yellow     Clarity, UA 12/12/2019 Clear     Specific Gravity, UA 12/12/2019 <=1 005     pH, UA 12/12/2019 7 5     Leukocytes, UA 12/12/2019 Moderate*    Nitrite, UA 12/12/2019 Negative     Protein, UA 12/12/2019 Negative     Glucose, UA 12/12/2019 Negative     Ketones, UA 12/12/2019 Negative     Urobilinogen, UA 12/12/2019 0 2     Bilirubin, UA 12/12/2019 Negative     Blood, UA 12/12/2019 Trace-Intact*    RBC, UA 12/12/2019 0-1*    WBC, UA 12/12/2019 4-10*    Epithelial Cells 12/12/2019 Occasional     Bacteria, UA 12/12/2019 Occasional     Urine Culture 12/12/2019 10,000-19,000 cfu/ml      Sodium 12/13/2019 139     Potassium 12/13/2019 3 5     Chloride 12/13/2019 105     CO2 12/13/2019 24     ANION GAP 12/13/2019 10     BUN 12/13/2019 5     Creatinine 12/13/2019 0 58*    Glucose 12/13/2019 88     Calcium 12/13/2019 8 4     AST 12/13/2019 16     ALT 12/13/2019 41     Alkaline Phosphatase 12/13/2019 73     Total Protein 12/13/2019 6 2*    Albumin 12/13/2019 3 0*    Total Bilirubin 12/13/2019 0 30     eGFR 12/13/2019 90     WBC 12/13/2019 7 77     RBC 12/13/2019 4 09     Hemoglobin 12/13/2019 13 0     Hematocrit 12/13/2019 39 8     MCV 12/13/2019 97     MCH 12/13/2019 31 8     MCHC 12/13/2019 32 7     RDW 12/13/2019 13 2     Platelets 63/27/0922 256     MPV 12/13/2019 9 6     Ventricular Rate 12/12/2019 74     Atrial Rate 12/12/2019 74     NC Interval 12/12/2019 142     QRSD Interval 12/12/2019 80     QT Interval 12/12/2019 382     QTC Interval 12/12/2019 424     P Axis 12/12/2019 74     QRS Axis 12/12/2019 80     T Wave Axis 12/12/2019 76          Radiology Results:   [facility]    Result Date: 1/15/2020  Narrative: Indication:  Back and radiating leg pain Preoperative diagnosis:  Lumbar radiculitis Postoperative diagnosis:  Lumbar radiculitis Procedure: Fluoroscopically-guided right paramedian approach at L5-S1 interlaminar epidural steroid injection under fluoroscopy EBL:  none Specimens:  not applicable After discussing the risks, benefits, and alternatives to the procedure, the patient expressed understanding and wished to proceed    The patient was brought to the fluoroscopy suite and placed in the prone position  A procedural pause was conducted to verify:  correct patient identity, procedure to be performed and as applicable, correct side and site, correct patient position, and availability of implants, special equipment and special requirements  After identifying the L5-S1 space fluoroscopically, the skin was sterilely prepped and draped in the usual fashion using Chloraprep skin prep  The skin and subcutaneous tissues were anesthetized with 0 5% lidocaine  Utilizing a loss of resistance technique and intermittent fluoroscopic guidance, a 3 5 inch 20 gauge Tuohy needle was advanced into the epidural space  Proper needle positioning was confirmed using multiple fluoroscopic views  After negative aspiration, Omnipaque 300 contrast was injected confirming epidural spread without evidence of intravascular or intrathecal spread  A 4 ml solution consisting of 20 milligrams of dexamethasone in sterile saline was injected slowly and incrementally into the epidural space  Following the injection the needle was withdrawn slightly and flushed with 0 5% buffered lidocaine as it was fully extracted  The patient tolerated the procedure well and there were no apparent complications  After appropriate observation, the patient was dismissed from the clinic in good condition under their own power

## 2020-01-30 NOTE — TELEPHONE ENCOUNTER
PT  IS  GOING  OUT  OF  TOWN  FOR  2 WKS  WOULD  LIKE   HER  HANDICAP  FORM   MAIL TO  HER  HOME  IF  POSSIBLE

## 2020-01-31 DIAGNOSIS — R11.0 NAUSEA: ICD-10-CM

## 2020-01-31 RX ORDER — PANTOPRAZOLE SODIUM 40 MG/1
TABLET, DELAYED RELEASE ORAL
Qty: 30 TABLET | Refills: 0 | Status: SHIPPED | OUTPATIENT
Start: 2020-01-31 | End: 2020-02-18 | Stop reason: SDUPTHER

## 2020-02-05 NOTE — TELEPHONE ENCOUNTER
Spoke with Dr Yakov Clifton  He said the form was filled out  Do not see a copy of it in the patient's chart

## 2020-02-11 ENCOUNTER — TELEPHONE (OUTPATIENT)
Dept: PAIN MEDICINE | Facility: MEDICAL CENTER | Age: 75
End: 2020-02-11

## 2020-02-11 DIAGNOSIS — M51.16 LUMBAR DISC DISEASE WITH RADICULOPATHY: ICD-10-CM

## 2020-02-11 DIAGNOSIS — G58.8 INTERCOSTAL NEURALGIA: Primary | ICD-10-CM

## 2020-02-11 DIAGNOSIS — M54.15 THORACOLUMBAR RADICULOPATHY DUE TO INTERVERTEBRAL DISC DISORDER: ICD-10-CM

## 2020-02-11 RX ORDER — DULOXETIN HYDROCHLORIDE 30 MG/1
30 CAPSULE, DELAYED RELEASE ORAL DAILY
Qty: 30 CAPSULE | Refills: 0 | Status: SHIPPED | OUTPATIENT
Start: 2020-02-11 | End: 2020-02-25 | Stop reason: SDUPTHER

## 2020-02-11 NOTE — TELEPHONE ENCOUNTER
Patient called returning nurses call;   please call back at [phone] she is home now waiting for your call  Thank you
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
## 2020-02-11 NOTE — TELEPHONE ENCOUNTER
Try to call the patient today at 12:00 p m   And left voice mail  Please call patient back regarding suggestions  We can repeat the intercostal nerve block as that provided some relief in her rib pain before  Can also start the patient on Cymbalta 30 mg daily for neuropathic pain  If she is interested in either of the suggestions I can place the orders  Please advise  Thank you

## 2020-02-11 NOTE — TELEPHONE ENCOUNTER
RN s/w pt  Per pt she is willing to try KW's recs of Cymbalta and the Intercostal nerve block      Please advise-

## 2020-02-11 NOTE — TELEPHONE ENCOUNTER
Please schedule ultrasound-guided right-sided intercostal nerve block  Order placed today at 4:30 p m    Thank you

## 2020-02-11 NOTE — TELEPHONE ENCOUNTER
Pt called stating that she is in so much pain the morphine is not helping with her pain     Pt can be reached at [phone]

## 2020-02-13 ENCOUNTER — TELEPHONE (OUTPATIENT)
Dept: RADIOLOGY | Facility: CLINIC | Age: 75
End: 2020-02-13

## 2020-02-13 NOTE — TELEPHONE ENCOUNTER
We just started Cymbalta the February 11th 2020  Not sure if patient picked up medication yet, however, she needs to give the medication time to take effect  Thank You Kira Del Castillo

## 2020-02-13 NOTE — TELEPHONE ENCOUNTER
Spoke with patient yesterday and she is scheduled for 2/25 for an Intercostal Nerve Block under [facility], but she wanted to let you know that her morphine is not even touching the pain any longer and is there something that he can do for her until her injection  Please advise   Thank you

## 2020-02-18 ENCOUNTER — TELEPHONE (OUTPATIENT)
Dept: GASTROENTEROLOGY | Facility: CLINIC | Age: 75
End: 2020-02-18

## 2020-02-18 DIAGNOSIS — R11.0 NAUSEA: ICD-10-CM

## 2020-02-18 RX ORDER — PANTOPRAZOLE SODIUM 40 MG/1
40 TABLET, DELAYED RELEASE ORAL 2 TIMES DAILY
Qty: 60 TABLET | Refills: 1 | Status: SHIPPED | OUTPATIENT
Start: 2020-02-18 | End: 2020-02-24 | Stop reason: SDUPTHER

## 2020-02-18 NOTE — TELEPHONE ENCOUNTER
Spoke to patient  She complains of nausea and gas pains  She is taking Pantoprazole daily and Miralax daily  She is having better BMs now  She also took a Gas-x with benefit and wants to know if she can continue it  She can utilize Gas-X up to QID prn  Will increase Pantoprazole to BID for the next month  Continue Miralax  Continue the probiotic  She has a follow up on Monday

## 2020-02-24 ENCOUNTER — OFFICE VISIT (OUTPATIENT)
Dept: GASTROENTEROLOGY | Facility: CLINIC | Age: 75
End: 2020-02-24
Payer: MEDICARE

## 2020-02-24 VITALS
DIASTOLIC BLOOD PRESSURE: 68 MMHG | BODY MASS INDEX: 22.14 KG/M2 | WEIGHT: 125 LBS | SYSTOLIC BLOOD PRESSURE: 110 MMHG | HEART RATE: 96 BPM

## 2020-02-24 DIAGNOSIS — R11.0 NAUSEA: ICD-10-CM

## 2020-02-24 DIAGNOSIS — K29.70 GASTRITIS WITHOUT BLEEDING, UNSPECIFIED CHRONICITY, UNSPECIFIED GASTRITIS TYPE: Primary | ICD-10-CM

## 2020-02-24 DIAGNOSIS — Z86.010 HISTORY OF COLON POLYPS: ICD-10-CM

## 2020-02-24 DIAGNOSIS — K59.00 CONSTIPATION, UNSPECIFIED CONSTIPATION TYPE: ICD-10-CM

## 2020-02-24 PROCEDURE — 99213 OFFICE O/P EST LOW 20 MIN: CPT | Performed by: PHYSICIAN ASSISTANT

## 2020-02-24 PROCEDURE — 4040F PNEUMOC VAC/ADMIN/RCVD: CPT | Performed by: PHYSICIAN ASSISTANT

## 2020-02-24 PROCEDURE — 1160F RVW MEDS BY RX/DR IN RCRD: CPT | Performed by: PHYSICIAN ASSISTANT

## 2020-02-24 PROCEDURE — 4004F PT TOBACCO SCREEN RCVD TLK: CPT | Performed by: PHYSICIAN ASSISTANT

## 2020-02-24 RX ORDER — PANTOPRAZOLE SODIUM 40 MG/1
40 TABLET, DELAYED RELEASE ORAL 2 TIMES DAILY
Qty: 60 TABLET | Refills: 1 | Status: SHIPPED | OUTPATIENT
Start: 2020-02-24 | End: 2020-12-15 | Stop reason: CLARIF

## 2020-02-24 NOTE — PROGRESS NOTES
[facility] - Outpatient Follow-up Note  Raisa Adorno 66 y o  female MRN: [id number]  Encounter: [id number]          ASSESSMENT AND PLAN:      1  Gastritis without bleeding  2  Constipation  3  Nausea  4  History of colon polyps    Patient presents for follow up  She is improving but is still struggling with some symptoms  EGD showed gastritis - bx negative for h pylori  Colonoscopy showed a cecal polyp that was removed and diverticulosis - polyp consistent with an adenoma  She also previously had a CT A/P with contrast which showed no acute intraabdominal process and stable biliary dilation likely on the basis of post-cholecystectomy and unchanged from prior exam   LFTs normal      She reported improvement on the Pantoprazole 40mg po daily but reports some nausea  Will increase Pantoprazole to BID for the next month  She is eating small, frequent meals  She is avoiding NSAIDs  She reports some improvement in her constipation with the Miralax but can still intermittent go a few days without a BM- will increase Miralax to BID  She is also on a probiotic and utilizing Gas-X prn      Note: she is on Morphine for her radicular pain which contributes to her constipation and nausea (slows the emptying of the stomach)  She also reports a significant improvement in her back pain with the 2 nerve blocks she has had thus far  She is having another one tomorrow  If insufficient relief on Miralax BID, will consider Linzess versus Relistor, or Movantik if she is requiring to be maintained on Morphine      Follow up in 1 month     ______________________________________________________________________    SUBJECTIVE:  Patient is a 75-year-old female who presents to the office for follow-up  She has overall seen partial improvement in her gastrointestinal symptoms and her chronic back pain/radicular symptoms    She had an EGD which showed gastritis and a colonoscopy revealed a polyp and diverticulosis  Pathology was negative for h pylori and the polyp was adenomatous  She is still having some issues with bloating and constipation  She reports on the Miralax daily, some days she will have a daily BM but then can go several days without a BM and feel very bloated  She will still have right sided abdominal pain on and off but it is less  She also has some nausea  She has not started the Pantoprazole BID yet  She has had 2 nerve blocks so far with some improvement in her radicular symptoms and is scheduled for another one tomorrow  She is still using Morphine on a daily basis  REVIEW OF SYSTEMS IS OTHERWISE NEGATIVE  Historical Information   Past Medical History:   Diagnosis Date    Anxiety     Cataract     last assessed 14    Chronic pain disorder     COPD (chronic obstructive pulmonary disease) (HCC)     Hypothyroidism     Multiple sclerosis (HCC)     CHOCO (obstructive sleep apnea)     uses cpap    Peroneal muscle atrophy     Thoracolumbar radiculopathy due to intervertebral disc disorder      Past Surgical History:   Procedure Laterality Date    APPENDECTOMY      CATARACT EXTRACTION       SECTION      CHOLECYSTECTOMY      GALLBLADDER SURGERY      FL COLONOSCOPY FLX DX W/COLLJ SPEC WHEN PFRMD N/A 2018    Procedure: COLONOSCOPY;  Surgeon: Varghese Smith MD;  Location: [facility];   Service: Colorectal    THROAT SURGERY       Social History   Social History     Substance and Sexual Activity   Alcohol Use Not Currently    Alcohol/week: 8 0 standard drinks    Types: 7 Glasses of wine, 1 Cans of beer per week    Frequency: 4 or more times a week    Drinks per session: 1 or 2    Binge frequency: Never    Comment: daily/ socially     Social History     Substance and Sexual Activity   Drug Use Not Currently    Types: Morphine    Comment: medical-RSO  last dose 11 days ago     Social History     Tobacco Use   Smoking Status Current Every Day Smoker  Packs/day: 1 00    Years: 60 00    Pack years: 60 00    Types: Cigarettes    Start date: 2/16/1958   Smokeless Tobacco Never Used     Family History   Problem Relation Age of Onset    Skin cancer Mother     Hypertension Father         benign essential    Stroke Father     Lung cancer Brother        Meds/Allergies       Current Outpatient Medications:     bisacodyl (DULCOLAX) 10 mg suppository    Cholecalciferol (VITAMIN D-3) 1000 units CAPS    diazepam (VALIUM) 2 mg tablet    DULoxetine (CYMBALTA) 30 mg delayed release capsule    levothyroxine 100 mcg tablet    loratadine (CLARITIN) 10 mg tablet    methylPREDNISolone (MEDROL) 4 MG tablet therapy pack    morphine (MSIR) 15 mg tablet    Multiple Vitamins-Minerals (ICAPS AREDS 2 PO)    nicotine (NICODERM CQ) 7 mg/24hr TD 24 hr patch    NON FORMULARY    pantoprazole (PROTONIX) 40 mg tablet    polyethylene glycol (MIRALAX) 17 g packet    polyvinyl alcohol (LIQUIFILM TEARS) 1 4 % ophthalmic solution    senna-docusate sodium (SENOKOT-S) 8 6-50 mg per tablet    sodium chloride (OCEAN) 0 65 % nasal spray    vitamin B-12 (CYANOCOBALAMIN) 100 MCG tablet    ondansetron (ZOFRAN-ODT) 4 mg disintegrating tablet    No Known Allergies        Objective     Blood pressure 110/68, pulse 96, weight 56 7 kg (125 lb)  Body mass index is 22 14 kg/m²  PHYSICAL EXAM:      General Appearance:   Alert, cooperative, no distress   HEENT:   Normocephalic, atraumatic, anicteric    Neck:  Supple, symmetrical, trachea midline   Lungs:   Clear to auscultation bilaterally; no rales, rhonchi or wheezing; respirations unlabored    Heart[de-identified]   Regular rate and rhythm; no murmur, rub, or gallop     Abdomen:   Soft, non-tender, non-distended; normal bowel sounds; no masses, no organomegaly    Genitalia:   Deferred    Rectal:   Deferred    Extremities:  No cyanosis, clubbing or edema    Pulses:  2+ and symmetric    Skin:  No jaundice, rashes, or lesions    Lymph nodes:  No palpable cervical lymphadenopathy        Lab Results:   No visits with results within 1 Day(s) from this visit  Latest known visit with results is:   Hospital Outpatient Visit on 01/21/2020   Component Date Value    Case Report 01/21/2020                      Value:Surgical Pathology Report                         Case: [id number]                                   Authorizing Provider:  Abhilash Bennett MD      Collected:           01/21/2020 0908              Ordering Location:      [facility]       Received:            01/21/2020 [address] [place] [address] Endoscopy                                                             Pathologist:           Quan Martinez MD                                                          Specimens:   A) - Stomach, antrum                                                                                B) - Stomach, body                                                                                  C) - Stomach, fundus                                                                                D) - Polyp, Colorectal, cecum                                                              Final Diagnosis 01/21/2020                      Value: This result contains rich text formatting which cannot be displayed here   Additional Information 01/21/2020                      Value: This result contains rich text formatting which cannot be displayed here   Synoptic Checklist 01/21/2020                      Value:  (COLON/RECTUM POLYP FORM-GI - All Specimens)                                                                                                                 : Adenoma(s)     Steveviv Carter Description 01/21/2020                      Value: This result contains rich text formatting which cannot be displayed here   Clinical Information 01/21/2020                      Value:Cold bx r/o h pylori         Radiology Results:   No results found

## 2020-02-25 ENCOUNTER — PROCEDURE VISIT (OUTPATIENT)
Dept: PAIN MEDICINE | Facility: CLINIC | Age: 75
End: 2020-02-25
Payer: MEDICARE

## 2020-02-25 DIAGNOSIS — M51.16 LUMBAR DISC DISEASE WITH RADICULOPATHY: ICD-10-CM

## 2020-02-25 DIAGNOSIS — G58.8 INTERCOSTAL NEURALGIA: Primary | ICD-10-CM

## 2020-02-25 DIAGNOSIS — M54.15 THORACOLUMBAR RADICULOPATHY DUE TO INTERVERTEBRAL DISC DISORDER: ICD-10-CM

## 2020-02-25 PROCEDURE — 64420 NJX AA&/STRD NTRCOST NRV 1: CPT | Performed by: PHYSICAL MEDICINE & REHABILITATION

## 2020-02-25 PROCEDURE — 76942 ECHO GUIDE FOR BIOPSY: CPT | Performed by: PHYSICAL MEDICINE & REHABILITATION

## 2020-02-25 RX ORDER — DULOXETIN HYDROCHLORIDE 30 MG/1
30 CAPSULE, DELAYED RELEASE ORAL 2 TIMES DAILY
Qty: 60 CAPSULE | Refills: 0 | Status: SHIPPED | OUTPATIENT
Start: 2020-02-25 | End: 2020-04-22 | Stop reason: SDUPTHER

## 2020-02-25 RX ORDER — BUPIVACAINE HYDROCHLORIDE 2.5 MG/ML
10 INJECTION, SOLUTION EPIDURAL; INFILTRATION; INTRACAUDAL ONCE
Status: COMPLETED | OUTPATIENT
Start: 2020-02-25 | End: 2020-02-25

## 2020-02-25 RX ORDER — METHYLPREDNISOLONE ACETATE 40 MG/ML
40 INJECTION, SUSPENSION INTRA-ARTICULAR; INTRALESIONAL; INTRAMUSCULAR; SOFT TISSUE ONCE
Status: COMPLETED | OUTPATIENT
Start: 2020-02-25 | End: 2020-02-25

## 2020-02-25 RX ADMIN — METHYLPREDNISOLONE ACETATE 40 MG: 40 INJECTION, SUSPENSION INTRA-ARTICULAR; INTRALESIONAL; INTRAMUSCULAR; SOFT TISSUE at 10:41

## 2020-02-25 RX ADMIN — BUPIVACAINE HYDROCHLORIDE 10 ML: 2.5 INJECTION, SOLUTION EPIDURAL; INFILTRATION; INTRACAUDAL at 10:40

## 2020-02-25 NOTE — PROGRESS NOTES
Indication:  Right rib pain    Preoperative diagnosis: Intercostal nerualgia    Posteoperative diagnosis: Intercostal neuralgia    Procedure: Ultrasound guided right intercostal nerve blocks      After discussing the risks, benefits, and alternatives to the procedure, the patient expressed understanding and wished to proceed  The patient was brought to the procedure suite and placed in the prone position  A procedural pausewas conducted to verify: Correct patient identity, procedure to be performed and as applicable, correct side and site, correct patient position, and availability of implants, special equipment or special requirements  At approximately 2 cm lateral to the costovertebral junction, the ribs were identified and marked  A 12-4MHz linear ultrasound probe was used to identify the ribs and pleura  A 2 5 inch 25-gauge needle was advanced down to each riband then walked inferiorly  After negative aspiration, 40 mg of Depo-Medrol in 3 mL of 0 25% bupivacaine was injected in the region of the intercostal nerves  The needle was then extracted  Postprocedure, the patient denied any abrupt worsening of any cough,pleuritic pain, or shortness of breath and respiratory status was unchanged pre-/post procedure  The patient tolerated the procedure well and there were no apparent complications  The patient was dismissed from the recovery area in stable condition  The patientwas advised not to travel in an airplane within the next 24 hours  COMMENTS: The patient received a total steroid dose of 40 Depo-Merol

## 2020-03-03 ENCOUNTER — TELEPHONE (OUTPATIENT)
Dept: PAIN MEDICINE | Facility: CLINIC | Age: 75
End: 2020-03-03

## 2020-03-04 DIAGNOSIS — M79.2 CHRONIC NEUROPATHIC PAIN: ICD-10-CM

## 2020-03-04 DIAGNOSIS — G89.29 CHRONIC NEUROPATHIC PAIN: ICD-10-CM

## 2020-03-04 RX ORDER — MORPHINE SULFATE 15 MG/1
15 TABLET ORAL 2 TIMES DAILY PRN
Qty: 60 TABLET | Refills: 0 | Status: SHIPPED | OUTPATIENT
Start: 2020-03-04 | End: 2020-04-13 | Stop reason: SDUPTHER

## 2020-03-04 NOTE — TELEPHONE ENCOUNTER
Patient switched her pharmacy to [facility] in [facility]   Phone# [phone]    morphine (MSIR) 15 mg tablet

## 2020-03-04 NOTE — TELEPHONE ENCOUNTER
Controlled Substance Review    [place] PDMP or [place]  reviewed: No red flags were identified; safe to proceed with prescription      PDMP Review       Value Time User    PDMP Reviewed  Yes 3/4/2020  9:56 AM Valdo Amaral DO

## 2020-03-10 ENCOUNTER — HOSPITAL ENCOUNTER (OUTPATIENT)
Dept: CT IMAGING | Facility: HOSPITAL | Age: 75
Discharge: HOME | End: 2020-03-10
Attending: INTERNAL MEDICINE
Payer: MEDICARE

## 2020-03-10 DIAGNOSIS — R91.8 LUNG NODULE, MULTIPLE: ICD-10-CM

## 2020-03-10 PROCEDURE — 71250 CT THORAX DX C-: CPT

## 2020-03-17 ENCOUNTER — CONSULT (OUTPATIENT)
Dept: NEUROLOGY | Facility: CLINIC | Age: 75
End: 2020-03-17
Payer: MEDICARE

## 2020-03-17 VITALS
BODY MASS INDEX: 21.83 KG/M2 | DIASTOLIC BLOOD PRESSURE: 62 MMHG | SYSTOLIC BLOOD PRESSURE: 136 MMHG | WEIGHT: 123.2 LBS | HEART RATE: 100 BPM | HEIGHT: 63 IN

## 2020-03-17 DIAGNOSIS — G35 MULTIPLE SCLEROSIS (HCC): Chronic | ICD-10-CM

## 2020-03-17 DIAGNOSIS — M79.604 RIGHT LEG PAIN: ICD-10-CM

## 2020-03-17 DIAGNOSIS — M54.14 THORACIC RADICULITIS: Primary | ICD-10-CM

## 2020-03-17 PROCEDURE — 99214 OFFICE O/P EST MOD 30 MIN: CPT | Performed by: PSYCHIATRY & NEUROLOGY
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
## 2020-03-19 ENCOUNTER — TELEPHONE (OUTPATIENT)
Dept: PULMONOLOGY | Facility: CLINIC | Age: 75
End: 2020-03-19

## 2020-03-19 ENCOUNTER — TELEMEDICINE (OUTPATIENT)
Dept: PULMONOLOGY | Facility: CLINIC | Age: 75
End: 2020-03-19
Payer: MEDICARE

## 2020-03-19 DIAGNOSIS — R91.8 LUNG NODULES: Primary | ICD-10-CM

## 2020-03-19 DIAGNOSIS — Z72.0 TOBACCO ABUSE: ICD-10-CM

## 2020-03-19 DIAGNOSIS — R91.8 LUNG NODULES: ICD-10-CM

## 2020-03-19 DIAGNOSIS — J44.9 COPD WITH CHRONIC BRONCHITIS (HCC): Primary | ICD-10-CM

## 2020-03-19 DIAGNOSIS — G47.33 OSA (OBSTRUCTIVE SLEEP APNEA): ICD-10-CM

## 2020-03-19 PROCEDURE — 99442 PR PHYS/QHP TELEPHONE EVALUATION 11-20 MIN: CPT | Performed by: INTERNAL MEDICINE

## 2020-03-19 NOTE — PROGRESS NOTES
Virtual Brief Visit    Reason for visit is followup    This virtual check-in was done via telephone  Encounter provider Erika Page MD    Provider located at [place] [address]      Recent Visits  No visits were found meeting these conditions  Showing recent visits within past 7 days and meeting all other requirements     Future Appointments  No visits were found meeting these conditions  Showing future appointments within next 150 days and meeting all other requirements        Patient agrees to participate in a virtual check in via telephone or video visit instead of presenting to the office to address urgent/immediate medical needs  Patient is aware this is a billable service  After connecting through telephone, the patient was identified by name and date of birth  Maryjo Bhardwaj was informed that this was a telemedicine visit and that the exam was being conducted confidentially over secure lines  My office door was closed  No one else was in the room  She acknowledged consent and understanding of privacy and security of the virtual check-in visit  I informed the patient that I have reviewed her record in Epic and presented the opportunity for her to ask any questions regarding the visit today  The patient initiated communication and agreed to participate      Beverly Cortes is a 66 y o  female  with greater than 45 pack year smoking history still actively smoking about half a pack a day, states she has significantly cut down from last visit, with history of chronic bronchitis, also was diagnosed with severe obstructive sleep apnea was placed on CPAP and she Was titrated to 9 cm of water could not tolerate the had brought her down to 8 cm and she has been doing well with the current setting at 8 cm of water, with decreased nocturnal awakenings feels well rested when she wakes up in the morning  Here for follow-up with a repeat CT of the chest for follow-up of the lung nodules      Past Medical History:   Diagnosis Date    Anxiety     Cataract     last assessed 14    Chronic pain disorder     COPD (chronic obstructive pulmonary disease) (HCC)     Hypothyroidism     Multiple sclerosis (HCC)     CHOCO (obstructive sleep apnea)     uses cpap    Peroneal muscle atrophy     Thoracolumbar radiculopathy due to intervertebral disc disorder        Past Surgical History:   Procedure Laterality Date    APPENDECTOMY      CATARACT EXTRACTION       SECTION      CHOLECYSTECTOMY      GALLBLADDER SURGERY      OK COLONOSCOPY FLX DX W/COLLJ SPEC WHEN PFRMD N/A 2018    Procedure: COLONOSCOPY;  Surgeon: Lori Nash MD;  Location: [facility];   Service: Colorectal    THROAT SURGERY         Current Outpatient Medications   Medication Sig Dispense Refill    bisacodyl (DULCOLAX) 10 mg suppository Insert 1 suppository (10 mg total) into the rectum daily as needed for constipation (Patient not taking: Reported on 3/17/2020) 12 suppository 0    Cholecalciferol (VITAMIN D-3) 1000 units CAPS Take 1,000 Units by mouth daily       diazepam (VALIUM) 2 mg tablet Take 2 mg by mouth daily at bedtime  5    DULoxetine (CYMBALTA) 30 mg delayed release capsule Take 1 capsule (30 mg total) by mouth 2 (two) times a day 60 capsule 0    levothyroxine 100 mcg tablet Take 1 tablet (100 mcg total) by mouth daily 90 tablet 3    loratadine (CLARITIN) 10 mg tablet Take 1 tablet (10 mg total) by mouth daily as needed for allergies (Patient not taking: Reported on 3/17/2020)  0    methylPREDNISolone (MEDROL) 4 MG tablet therapy pack Use as directed on package (Patient not taking: Reported on 3/17/2020) 21 tablet 0    morphine (MSIR) 15 mg tablet Take 1 tablet (15 mg total) by mouth 2 (two) times a day as needed for severe painMax Daily Amount: 30 mg 60 tablet 0    Multiple Vitamins-Minerals (ICAPS AREDS 2 PO) Take 1 tablet by mouth 2 (two) times a day      nicotine (NICODERM CQ) 7 mg/24hr TD 24 hr patch Place 1 patch on the skin daily (Patient not taking: Reported on 3/17/2020) 28 patch 0    NON FORMULARY 2 (two) times a day       ondansetron (ZOFRAN-ODT) 4 mg disintegrating tablet Take 1 tablet (4 mg total) by mouth every 8 (eight) hours as needed for nausea or vomiting for up to 3 days (Patient not taking: Reported on 12/20/2019) 9 tablet 0    pantoprazole (PROTONIX) 40 mg tablet Take 1 tablet (40 mg total) by mouth 2 (two) times a day (Patient taking differently: Take 40 mg by mouth daily ) 60 tablet 1    polyethylene glycol (MIRALAX) 17 g packet Take 17 g by mouth daily (Patient taking differently: Take 17 g by mouth daily as needed ) 14 each 0    polyvinyl alcohol (LIQUIFILM TEARS) 1 4 % ophthalmic solution Administer 1 drop to both eyes every 3 (three) hours as needed for dry eyes (Patient not taking: Reported on 3/17/2020) 15 mL 0    senna-docusate sodium (SENOKOT-S) 8 6-50 mg per tablet Take 2 tablets by mouth 2 (two) times a day (Patient not taking: Reported on 3/17/2020)  0    sodium chloride (OCEAN) 0 65 % nasal spray 2 sprays into each nostril every hour as needed for congestion (Patient not taking: Reported on 3/17/2020)  0    vitamin B-12 (CYANOCOBALAMIN) 100 MCG tablet Take 100 mcg by mouth daily        No current facility-administered medications for this visit  No Known Allergies    Assessment    Crissy telephone assessment is Stable pulmonary standpoint   Chronic bronchitis she will cut down the smoking on her own and trying to quit on her own    PFTs with mild obstructive lung disease no appreciable response to the bronchodilator and severely decreased DLCO  CT of the chest stable lung nodules will repeat CT of the chest in 1 year  Obstructive sleep apnea continue with current settings  Recommend weight loss    Disposition:    Follow-up 1 year    I spent  15  minutes with the patient during this virtual check-in visit

## 2020-03-24 ENCOUNTER — TELEMEDICINE (OUTPATIENT)
Dept: PAIN MEDICINE | Facility: CLINIC | Age: 75
End: 2020-03-24
Payer: MEDICARE

## 2020-03-24 DIAGNOSIS — M51.16 LUMBAR DISC DISEASE WITH RADICULOPATHY: ICD-10-CM

## 2020-03-24 DIAGNOSIS — M62.830 MUSCLE SPASM OF BACK: ICD-10-CM

## 2020-03-24 DIAGNOSIS — G89.29 CHRONIC NEUROPATHIC PAIN: Primary | ICD-10-CM

## 2020-03-24 DIAGNOSIS — M79.2 CHRONIC NEUROPATHIC PAIN: Primary | ICD-10-CM

## 2020-03-24 PROCEDURE — 99442 PR PHYS/QHP TELEPHONE EVALUATION 11-20 MIN: CPT | Performed by: PHYSICAL MEDICINE & REHABILITATION

## 2020-03-24 RX ORDER — TIZANIDINE HYDROCHLORIDE 4 MG/1
4 CAPSULE, GELATIN COATED ORAL 3 TIMES DAILY PRN
Qty: 90 CAPSULE | Refills: 0 | Status: SHIPPED | OUTPATIENT
Start: 2020-03-24 | End: 2020-03-25 | Stop reason: DRUGHIGH

## 2020-03-24 NOTE — PATIENT INSTRUCTIONS
Telephone assessment is:  Ms Claudene Durham is a pleasant 41-year-old female whom we have performed to right-sided intercostal nerve blocks under ultrasound guidance as well as a right-sided LESI L4-L5 right paramedian approach  She reports this is the best she has felt and last year, however, still continues to have significant periods of pain, spasms, cramps in the right-sided ribs and the right low back radiating pain into lower extremity  While I do believe she would benefit from a repeat lumbar epidural steroid injection under fluoro guidance we are limited at this time due to the COVID 19 Pandemic and steroids would further expose her risk  For now we will add tizanidine 4 mg t i d  As needed to assist with muscle spasms and pain control risks, benefits and side effects of medication were discussed in detail with patient and she wishes to proceed  Patient is agreeable with plan  All questions have been answered  We will follow up in 4 weeks or sooner if needed    Hopefully at which time there will be improvements in the Pandemic with minimal risk and exposure and we can re-evaluate in office for possible interventional approaches to manage her

## 2020-03-24 NOTE — PROGRESS NOTES
Virtual Regular Visit    Problem List Items Addressed This Visit        Nervous and Auditory    Lumbar disc disease with radiculopathy    Relevant Medications    TiZANidine (ZANAFLEX) 4 MG capsule       Other    Chronic neuropathic pain - Primary    Relevant Medications    TiZANidine (ZANAFLEX) 4 MG capsule      Other Visit Diagnoses     Muscle spasm of back        Relevant Medications    TiZANidine (ZANAFLEX) 4 MG capsule               Reason for visit is right-sided rib pain and low back pain radiating to the road lower extremity    Encounter provider Ryanne Pan DO    Provider located at [address] [place] [id number]      Recent Visits  No visits were found meeting these conditions  Showing recent visits within past 7 days and meeting all other requirements     Future Appointments  No visits were found meeting these conditions  Showing future appointments within next 150 days and meeting all other requirements        After connecting through Wallit, the patient was identified by name and date of birth  Osborn Boast was informed that this is a telemedicine visit and that the visit is being conducted through telephone which may not be secure and therefore, might not be HIPAA-compliant  My office door was closed  No one else was in the room  She acknowledged consent and understanding of privacy and security of the video platform  The patient has agreed to participate and understands they can discontinue the visit at any time  Subjective  Osborn Boast is a 66 y o  female presents to [facility] via telephone visit due to the current crisis and concerns with COVID 19 exposure and risk to both patient and staff    During today's follow-up visit patient reports worsening pain in the right-sided ribs radiating around to her chest as well as the right-sided low back pain radiating down the right lower extremity  We have previously performed a ultrasound-guided intercostal nerve block #2 with some improvements in her pain  Overall she reports "better pain control now then in the last year" and reports being able to sometimes prepare dinner, shower, perform activities of daily living she was not able to do over the last year  However, she is still concerned with the amount of pain she has progressively worse at night unrelieved with current Cymbalta and long-acting morphine  Currently pain is 8/10, worse with bending, standing and has had mild relief with current medication management  Presents today via telephone visit for follow-up      Past Medical History:   Diagnosis Date    Anxiety     Cataract     last assessed 14    Chronic pain disorder     [facility] [address] [facility]     Hypothyroidism     Multiple sclerosis ([facility] [address] [facility]     CHOCO (obstructive sleep apnea)     uses cpap    Peroneal muscle atrophy     Thoracolumbar radiculopathy due to intervertebral disc disorder        Past Surgical History:   Procedure Laterality Date    APPENDECTOMY      CATARACT EXTRACTION       SECTION      CHOLECYSTECTOMY      GALLBLADDER SURGERY      ME COLONOSCOPY FLX DX W/COLLJ SPEC WHEN PFRMD N/A 2018    Procedure: COLONOSCOPY;  Surgeon: Love Davis MD;  Location: [facility];   Service: Colorectal    THROAT SURGERY         Current Outpatient Medications   Medication Sig Dispense Refill    bisacodyl (DULCOLAX) 10 mg suppository Insert 1 suppository (10 mg total) into the rectum daily as needed for constipation (Patient not taking: Reported on 3/17/2020) 12 suppository 0    Cholecalciferol (VITAMIN D-3) 1000 units CAPS Take 1,000 Units by mouth daily       diazepam (VALIUM) 2 mg tablet Take 2 mg by mouth daily at bedtime  5    DULoxetine (CYMBALTA) 30 mg delayed release capsule Take 1 capsule (30 mg total) by mouth 2 (two) times a day 60 capsule 0    levothyroxine 100 mcg tablet Take 1 tablet (100 mcg total) by mouth daily 90 tablet 3    loratadine (CLARITIN) 10 mg tablet Take 1 tablet (10 mg total) by mouth daily as needed for allergies (Patient not taking: Reported on 3/17/2020)  0    methylPREDNISolone (MEDROL) 4 MG tablet therapy pack Use as directed on package (Patient not taking: Reported on 3/17/2020) 21 tablet 0    morphine (MSIR) 15 mg tablet Take 1 tablet (15 mg total) by mouth 2 (two) times a day as needed for severe painMax Daily Amount: 30 mg 60 tablet 0    Multiple Vitamins-Minerals (ICAPS AREDS 2 PO) Take 1 tablet by mouth 2 (two) times a day      nicotine (NICODERM CQ) 7 mg/24hr TD 24 hr patch Place 1 patch on the skin daily (Patient not taking: Reported on 3/17/2020) 28 patch 0    NON FORMULARY 2 (two) times a day       ondansetron (ZOFRAN-ODT) 4 mg disintegrating tablet Take 1 tablet (4 mg total) by mouth every 8 (eight) hours as needed for nausea or vomiting for up to 3 days (Patient not taking: Reported on 12/20/2019) 9 tablet 0    pantoprazole (PROTONIX) 40 mg tablet Take 1 tablet (40 mg total) by mouth 2 (two) times a day (Patient taking differently: Take 40 mg by mouth daily ) 60 tablet 1    polyethylene glycol (MIRALAX) 17 g packet Take 17 g by mouth daily (Patient taking differently: Take 17 g by mouth daily as needed ) 14 each 0    polyvinyl alcohol (LIQUIFILM TEARS) 1 4 % ophthalmic solution Administer 1 drop to both eyes every 3 (three) hours as needed for dry eyes (Patient not taking: Reported on 3/17/2020) 15 mL 0    senna-docusate sodium (SENOKOT-S) 8 6-50 mg per tablet Take 2 tablets by mouth 2 (two) times a day (Patient not taking: Reported on 3/17/2020)  0    sodium chloride (OCEAN) 0 65 % nasal spray 2 sprays into each nostril every hour as needed for congestion (Patient not taking: Reported on 3/17/2020)  0    TiZANidine (ZANAFLEX) 4 MG capsule Take 1 capsule (4 mg total) by mouth 3 (three) times a day as needed for muscle spasms 90 capsule 0    vitamin B-12 (CYANOCOBALAMIN) 100 MCG tablet Take 100 mcg by mouth daily        No current facility-administered medications for this visit  No Known Allergies    Review of Systems   Denies chest pain, shortness of breath, nausea, vomiting, fever chills, bowel or bladder incontinence, blurry vision, double vision, rashes  Admits to weakness, significant pain right-sided ribs, right-sided low back pain radiating down right lower extremity    Telephone assessment is:  Ms Suma Oliver is a pleasant 26-year-old female whom we have performed to right-sided intercostal nerve blocks under ultrasound guidance as well as a right-sided LESI L4-L5 right paramedian approach  She reports this is the best she has felt and last year, however, still continues to have significant periods of pain, spasms, cramps in the right-sided ribs and the right low back radiating pain into lower extremity  While I do believe she would benefit from a repeat lumbar epidural steroid injection under fluoro guidance we are limited at this time due to the COVID 19 Pandemic and steroids would further expose her risk  For now we will add tizanidine 4 mg t i d  As needed to assist with muscle spasms and pain control risks, benefits and side effects of medication were discussed in detail with patient and she wishes to proceed  Patient is agreeable with plan  All questions have been answered  We will follow up in 4 weeks or sooner if needed  Hopefully at which time there will be improvements in the Pandemic with minimal risk and exposure and we can re-evaluate in office for possible interventional approaches to manage her      I spent >20 minutes with the patient during this visit

## 2020-03-25 ENCOUNTER — TELEPHONE (OUTPATIENT)
Dept: PAIN MEDICINE | Facility: CLINIC | Age: 75
End: 2020-03-25

## 2020-03-25 DIAGNOSIS — M62.838 MUSCLE SPASM: ICD-10-CM

## 2020-03-25 DIAGNOSIS — M51.16 LUMBAR DISC DISEASE WITH RADICULOPATHY: Primary | ICD-10-CM

## 2020-03-25 DIAGNOSIS — M54.15 THORACOLUMBAR RADICULOPATHY DUE TO INTERVERTEBRAL DISC DISORDER: ICD-10-CM

## 2020-03-25 DIAGNOSIS — G58.8 INTERCOSTAL NEURALGIA: ICD-10-CM

## 2020-03-25 RX ORDER — TIZANIDINE 4 MG/1
2 TABLET ORAL 3 TIMES DAILY
Qty: 90 TABLET | Refills: 1 | Status: SHIPPED | OUTPATIENT
Start: 2020-03-25 | End: 2021-01-12

## 2020-03-25 NOTE — TELEPHONE ENCOUNTER
[facility] called stating the patients insurance will not cover the tizanidine capsules, they will cover the tablets  [facility] wants to know if you can switch the medication to tablets  Please advise  Thank you

## 2020-04-13 ENCOUNTER — TELEPHONE (OUTPATIENT)
Dept: INTERNAL MEDICINE CLINIC | Facility: CLINIC | Age: 75
End: 2020-04-13

## 2020-04-13 DIAGNOSIS — M79.2 CHRONIC NEUROPATHIC PAIN: ICD-10-CM

## 2020-04-13 DIAGNOSIS — G89.29 CHRONIC NEUROPATHIC PAIN: ICD-10-CM

## 2020-04-13 RX ORDER — MORPHINE SULFATE 15 MG/1
15 TABLET ORAL 2 TIMES DAILY PRN
Qty: 60 TABLET | Refills: 0 | Status: SHIPPED | OUTPATIENT
Start: 2020-04-13 | End: 2020-05-20 | Stop reason: SDUPTHER

## 2020-04-14 ENCOUNTER — PROCEDURE VISIT (OUTPATIENT)
Dept: NEUROLOGY | Facility: CLINIC | Age: 75
End: 2020-04-14
Payer: MEDICARE

## 2020-04-14 DIAGNOSIS — M79.604 RIGHT LEG PAIN: ICD-10-CM

## 2020-04-14 PROCEDURE — 95909 NRV CNDJ TST 5-6 STUDIES: CPT | Performed by: PSYCHIATRY & NEUROLOGY

## 2020-04-14 PROCEDURE — 95886 MUSC TEST DONE W/N TEST COMP: CPT | Performed by: PSYCHIATRY & NEUROLOGY

## 2020-04-22 ENCOUNTER — TELEPHONE (OUTPATIENT)
Dept: INTERNAL MEDICINE CLINIC | Facility: CLINIC | Age: 75
End: 2020-04-22

## 2020-04-22 ENCOUNTER — OFFICE VISIT (OUTPATIENT)
Dept: PAIN MEDICINE | Facility: CLINIC | Age: 75
End: 2020-04-22
Payer: MEDICARE

## 2020-04-22 VITALS
BODY MASS INDEX: 21.79 KG/M2 | WEIGHT: 123 LBS | DIASTOLIC BLOOD PRESSURE: 63 MMHG | SYSTOLIC BLOOD PRESSURE: 102 MMHG | HEART RATE: 87 BPM | TEMPERATURE: 98.8 F

## 2020-04-22 DIAGNOSIS — M54.15 THORACOLUMBAR RADICULOPATHY DUE TO INTERVERTEBRAL DISC DISORDER: ICD-10-CM

## 2020-04-22 DIAGNOSIS — G58.8 INTERCOSTAL NEURALGIA: ICD-10-CM

## 2020-04-22 DIAGNOSIS — M51.16 LUMBAR DISC DISEASE WITH RADICULOPATHY: Primary | ICD-10-CM

## 2020-04-22 PROCEDURE — 4040F PNEUMOC VAC/ADMIN/RCVD: CPT | Performed by: PHYSICAL MEDICINE & REHABILITATION

## 2020-04-22 PROCEDURE — 4004F PT TOBACCO SCREEN RCVD TLK: CPT | Performed by: PHYSICAL MEDICINE & REHABILITATION

## 2020-04-22 PROCEDURE — 1160F RVW MEDS BY RX/DR IN RCRD: CPT | Performed by: PHYSICAL MEDICINE & REHABILITATION

## 2020-04-22 PROCEDURE — 99214 OFFICE O/P EST MOD 30 MIN: CPT | Performed by: PHYSICAL MEDICINE & REHABILITATION

## 2020-04-22 RX ORDER — DULOXETIN HYDROCHLORIDE 30 MG/1
30 CAPSULE, DELAYED RELEASE ORAL 2 TIMES DAILY
Qty: 60 CAPSULE | Refills: 0 | Status: SHIPPED | OUTPATIENT
Start: 2020-04-22 | End: 2020-05-20 | Stop reason: SDUPTHER

## 2020-04-23 ENCOUNTER — TELEMEDICINE (OUTPATIENT)
Dept: INTERNAL MEDICINE CLINIC | Facility: CLINIC | Age: 75
End: 2020-04-23
Payer: MEDICARE

## 2020-04-23 DIAGNOSIS — F11.20 CONTINUOUS OPIOID DEPENDENCE (HCC): ICD-10-CM

## 2020-04-23 DIAGNOSIS — G35 MULTIPLE SCLEROSIS (HCC): Primary | Chronic | ICD-10-CM

## 2020-04-23 DIAGNOSIS — M51.16 LUMBAR DISC DISEASE WITH RADICULOPATHY: ICD-10-CM

## 2020-04-23 DIAGNOSIS — M54.15 THORACOLUMBAR RADICULOPATHY DUE TO INTERVERTEBRAL DISC DISORDER: ICD-10-CM

## 2020-04-23 PROCEDURE — 99441 PR PHYS/QHP TELEPHONE EVALUATION 5-10 MIN: CPT | Performed by: INTERNAL MEDICINE

## 2020-05-20 ENCOUNTER — TELEPHONE (OUTPATIENT)
Dept: PAIN MEDICINE | Facility: MEDICAL CENTER | Age: 75
End: 2020-05-20

## 2020-05-20 DIAGNOSIS — M79.2 CHRONIC NEUROPATHIC PAIN: ICD-10-CM

## 2020-05-20 DIAGNOSIS — M54.15 THORACOLUMBAR RADICULOPATHY DUE TO INTERVERTEBRAL DISC DISORDER: ICD-10-CM

## 2020-05-20 DIAGNOSIS — G58.8 INTERCOSTAL NEURALGIA: ICD-10-CM

## 2020-05-20 DIAGNOSIS — M51.16 LUMBAR DISC DISEASE WITH RADICULOPATHY: ICD-10-CM

## 2020-05-20 DIAGNOSIS — G89.29 CHRONIC NEUROPATHIC PAIN: ICD-10-CM

## 2020-05-20 RX ORDER — MORPHINE SULFATE 15 MG/1
15 TABLET ORAL 2 TIMES DAILY PRN
Qty: 60 TABLET | Refills: 0 | Status: SHIPPED | OUTPATIENT
Start: 2020-05-20 | End: 2020-05-21 | Stop reason: SDUPTHER

## 2020-05-20 RX ORDER — DULOXETIN HYDROCHLORIDE 30 MG/1
30 CAPSULE, DELAYED RELEASE ORAL 2 TIMES DAILY
Qty: 60 CAPSULE | Refills: 1 | Status: SHIPPED | OUTPATIENT
Start: 2020-05-20 | End: 2020-07-01 | Stop reason: SDUPTHER

## 2020-05-21 DIAGNOSIS — G89.29 CHRONIC NEUROPATHIC PAIN: ICD-10-CM

## 2020-05-21 DIAGNOSIS — M79.2 CHRONIC NEUROPATHIC PAIN: ICD-10-CM

## 2020-05-21 RX ORDER — MORPHINE SULFATE 15 MG/1
15 TABLET ORAL 2 TIMES DAILY PRN
Qty: 60 TABLET | Refills: 0 | Status: SHIPPED | OUTPATIENT
Start: 2020-05-21 | End: 2020-05-21

## 2020-05-21 RX ORDER — MORPHINE SULFATE 15 MG/1
15 TABLET, FILM COATED, EXTENDED RELEASE ORAL 2 TIMES DAILY
Qty: 60 TABLET | Refills: 0 | Status: SHIPPED | OUTPATIENT
Start: 2020-05-21 | End: 2020-05-28 | Stop reason: ALTCHOICE

## 2020-05-28 RX ORDER — MORPHINE SULFATE 15 MG/1
15 TABLET ORAL 2 TIMES DAILY PRN
Qty: 30 TABLET | Refills: 0 | Status: SHIPPED | OUTPATIENT
Start: 2020-05-28 | End: 2020-06-16 | Stop reason: SDUPTHER

## 2020-06-01 DIAGNOSIS — E03.9 ACQUIRED HYPOTHYROIDISM: ICD-10-CM

## 2020-06-01 RX ORDER — LEVOTHYROXINE SODIUM 0.1 MG/1
TABLET ORAL
Qty: 90 TABLET | Refills: 3 | Status: SHIPPED | OUTPATIENT
Start: 2020-06-01 | End: 2020-08-25 | Stop reason: SDUPTHER

## 2020-06-03 ENCOUNTER — HOSPITAL ENCOUNTER (OUTPATIENT)
Dept: RADIOLOGY | Facility: CLINIC | Age: 75
Discharge: HOME | End: 2020-06-03
Attending: PHYSICAL MEDICINE & REHABILITATION | Admitting: PHYSICAL MEDICINE & REHABILITATION
Payer: MEDICARE

## 2020-06-03 VITALS
TEMPERATURE: 98.5 F | RESPIRATION RATE: 18 BRPM | HEART RATE: 81 BPM | OXYGEN SATURATION: 100 % | SYSTOLIC BLOOD PRESSURE: 140 MMHG | DIASTOLIC BLOOD PRESSURE: 80 MMHG

## 2020-06-03 DIAGNOSIS — M51.16 LUMBAR DISC DISEASE WITH RADICULOPATHY: ICD-10-CM

## 2020-06-03 PROCEDURE — 64484 NJX AA&/STRD TFRM EPI L/S EA: CPT | Performed by: PHYSICAL MEDICINE & REHABILITATION

## 2020-06-03 PROCEDURE — 64483 NJX AA&/STRD TFRM EPI L/S 1: CPT | Performed by: PHYSICAL MEDICINE & REHABILITATION

## 2020-06-03 RX ORDER — PAPAVERINE HCL 150 MG
20 CAPSULE, EXTENDED RELEASE ORAL ONCE
Status: COMPLETED | OUTPATIENT
Start: 2020-06-03 | End: 2020-06-03

## 2020-06-03 RX ORDER — BUPIVACAINE HCL/PF 2.5 MG/ML
10 VIAL (ML) INJECTION ONCE
Status: COMPLETED | OUTPATIENT
Start: 2020-06-03 | End: 2020-06-03

## 2020-06-03 RX ORDER — 0.9 % SODIUM CHLORIDE 0.9 %
10 VIAL (ML) INJECTION ONCE
Status: COMPLETED | OUTPATIENT
Start: 2020-06-03 | End: 2020-06-03

## 2020-06-03 RX ADMIN — DEXAMETHASONE SODIUM PHOSPHATE 20 MG: 10 INJECTION, SOLUTION INTRAMUSCULAR; INTRAVENOUS at 10:55

## 2020-06-03 RX ADMIN — IOHEXOL 2 ML: 300 INJECTION, SOLUTION INTRAVENOUS at 10:54

## 2020-06-03 RX ADMIN — BUPIVACAINE HYDROCHLORIDE 2 ML: 2.5 INJECTION, SOLUTION EPIDURAL; INFILTRATION; INTRACAUDAL at 10:56

## 2020-06-03 RX ADMIN — Medication 2 ML: at 10:55

## 2020-06-03 RX ADMIN — SODIUM CHLORIDE 2 ML: 9 INJECTION, SOLUTION INTRAMUSCULAR; INTRAVENOUS; SUBCUTANEOUS at 10:56

## 2020-06-10 ENCOUNTER — TELEPHONE (OUTPATIENT)
Dept: RHEUMATOLOGY | Facility: CLINIC | Age: 75
End: 2020-06-10

## 2020-06-10 ENCOUNTER — TELEPHONE (OUTPATIENT)
Dept: PAIN MEDICINE | Facility: CLINIC | Age: 75
End: 2020-06-10

## 2020-06-10 NOTE — TELEPHONE ENCOUNTER
Pt reports 75% improvement post inj  Pain level 2/10  She said she is walking with almost no pain   She said at the end of the day the pain starts to get bad but the pain is a lot better   Patient aware it takes up to 2wks for full effect

## 2020-06-16 ENCOUNTER — TELEPHONE (OUTPATIENT)
Dept: INTERNAL MEDICINE CLINIC | Facility: CLINIC | Age: 75
End: 2020-06-16

## 2020-06-16 DIAGNOSIS — M79.2 CHRONIC NEUROPATHIC PAIN: ICD-10-CM

## 2020-06-16 DIAGNOSIS — G89.29 CHRONIC NEUROPATHIC PAIN: ICD-10-CM

## 2020-06-16 RX ORDER — MORPHINE SULFATE 15 MG/1
15 TABLET ORAL 2 TIMES DAILY PRN
Qty: 60 TABLET | Refills: 0 | Status: SHIPPED | OUTPATIENT
Start: 2020-06-16 | End: 2020-08-10 | Stop reason: SDUPTHER

## 2020-06-16 NOTE — TELEPHONE ENCOUNTER
Requesting refill for morphine; 15 mg immediate release    60 for a month    Don't see this on her med list    [facility] [address] in [place]    Do NOT send to: [facility] [address]

## 2020-06-16 NOTE — TELEPHONE ENCOUNTER
Controlled Sub[place] Review    [place] or [place]  reviewed: No red flags were identified; safe to proceed with prescription      PDMP Review       Value Time User    PDMP Reviewed  Yes 6/16/2020  4:37 PM Yuliet Mckinney DO

## 2020-06-18 ENCOUNTER — TELEPHONE (OUTPATIENT)
Dept: INTERNAL MEDICINE CLINIC | Facility: CLINIC | Age: 75
End: 2020-06-18

## 2020-07-01 ENCOUNTER — OFFICE VISIT (OUTPATIENT)
Dept: PAIN MEDICINE | Facility: CLINIC | Age: 75
End: 2020-07-01
Payer: MEDICARE

## 2020-07-01 VITALS
BODY MASS INDEX: 23.56 KG/M2 | HEART RATE: 90 BPM | TEMPERATURE: 98.1 F | SYSTOLIC BLOOD PRESSURE: 136 MMHG | DIASTOLIC BLOOD PRESSURE: 80 MMHG | WEIGHT: 133 LBS

## 2020-07-01 DIAGNOSIS — G58.8 INTERCOSTAL NEURALGIA: Primary | ICD-10-CM

## 2020-07-01 DIAGNOSIS — M51.16 LUMBAR DISC DISEASE WITH RADICULOPATHY: ICD-10-CM

## 2020-07-01 DIAGNOSIS — K59.00 CONSTIPATION, UNSPECIFIED CONSTIPATION TYPE: ICD-10-CM

## 2020-07-01 DIAGNOSIS — M54.15 THORACOLUMBAR RADICULOPATHY DUE TO INTERVERTEBRAL DISC DISORDER: ICD-10-CM

## 2020-07-01 PROCEDURE — 99214 OFFICE O/P EST MOD 30 MIN: CPT | Performed by: PHYSICAL MEDICINE & REHABILITATION

## 2020-07-01 PROCEDURE — 4040F PNEUMOC VAC/ADMIN/RCVD: CPT | Performed by: PHYSICAL MEDICINE & REHABILITATION

## 2020-07-01 PROCEDURE — 1160F RVW MEDS BY RX/DR IN RCRD: CPT | Performed by: PHYSICAL MEDICINE & REHABILITATION

## 2020-07-01 PROCEDURE — 4004F PT TOBACCO SCREEN RCVD TLK: CPT | Performed by: PHYSICAL MEDICINE & REHABILITATION

## 2020-07-01 RX ORDER — SENNOSIDES 8.6 MG
1 TABLET ORAL 2 TIMES DAILY
Qty: 60 EACH | Refills: 0 | Status: SHIPPED | OUTPATIENT
Start: 2020-07-01 | End: 2020-08-25 | Stop reason: CLARIF

## 2020-07-01 RX ORDER — DULOXETIN HYDROCHLORIDE 30 MG/1
30 CAPSULE, DELAYED RELEASE ORAL 2 TIMES DAILY
Qty: 60 CAPSULE | Refills: 1 | Status: SHIPPED | OUTPATIENT
Start: 2020-07-01 | End: 2020-07-28

## 2020-07-01 RX ORDER — DOCUSATE SODIUM 100 MG/1
100 CAPSULE, LIQUID FILLED ORAL 2 TIMES DAILY
Qty: 60 CAPSULE | Refills: 0 | Status: SHIPPED | OUTPATIENT
Start: 2020-07-01 | End: 2020-11-12

## 2020-07-01 RX ORDER — CAPSAICIN 0.025 %
1 CREAM (GRAM) TOPICAL 2 TIMES DAILY
Qty: 60 G | Refills: 0 | Status: SHIPPED | OUTPATIENT
Start: 2020-07-01 | End: 2020-08-25 | Stop reason: CLARIF

## 2020-07-01 NOTE — PROGRESS NOTES
Assessment:  1  Intercostal neuralgia    2  Thoracolumbar radiculopathy due to intervertebral disc disorder    3  Lumbar disc disease with radiculopathy    4  Constipation, unspecified constipation type        Plan:  Ms Antonio Blanchard is a pleasant 78-year-old female who presents for follow-up and re-evaluation regarding right-sided rib and low back pain with radiating symptoms into the right lower extremity  We previously performed a right-sided L4-L5 and L5-S1 TFESI on Charlette 3, 2020 which she reports significant 90% relief in her pain  She is extremely relieved in appreciative of the current interventional and medication management plan  She states she is performing aquatherapy 3 times per week in going on walks daily  States she is more active now than she has been in years  Main complaint at this time is intermittent constipation that she states aggravates her right-sided low back and rib pain  We previously performed right-sided intercostal nerve blocks on December 17 and repeat on February 25th 2020 which she is now reporting provided significant greater than 50% relief for several months and the pain is progressively returning on the right-sided rib margin  At this time she reports that pain to be tolerable but is starting to impact her activities of daily living  For now we will  1  Provide capsaicin 0 025% twice a day to affected area  2  Will continue with Cymbalta 30 mg twice a day  3  Can consider repeat intercostal nerve block in the future if the pain gets progressively worse  4  Will start on Colace 100 mg twice a day and senna twice a day to improve constipation    My impressions and treatment recommendations were discussed in detail with the patient who verbalized understanding and had no further questions  Discharge instructions were provided  I personally saw and examined the patient and I agree with the above discussed plan of care      No orders of the defined types were placed in this encounter  New Medications Ordered This Visit   Medications    capsaicin (ZOSTRIX) 0 025 % cream     Sig: Apply 1 application topically 2 (two) times a day     Dispense:  60 g     Refill:  0    DULoxetine (CYMBALTA) 30 mg delayed release capsule     Sig: Take 1 capsule (30 mg total) by mouth 2 (two) times a day     Dispense:  60 capsule     Refill:  1    senna (SENOKOT) 8 6 mg     Sig: Take 1 tablet (8 6 mg total) by mouth 2 (two) times a day     Dispense:  60 each     Refill:  0    docusate sodium (COLACE) 100 mg capsule     Sig: Take 1 capsule (100 mg total) by mouth 2 (two) times a day     Dispense:  60 capsule     Refill:  0       History of Present Illness:  Toya Martinez is a 66 y o  female who presents for a follow up office visit in regards to Back Pain (re-eval)  The patients current symptoms include right-sided low back and rib pain  Patient was last seen Charlette 3, 2020 for a right-sided L4-L5 and L5-S1 TFESI  Since that time patient reports nearly 90% relief in low back and radiating right leg pain  Her current complaint is constipation and right-sided rib pain rated 4 to 6/10  He describes the pain as throbbing, radiating and intermittent shooting  Presents for follow-up    I have personally reviewed and/or updated the patient's past medical history, past surgical history, family history, social history, current medications, allergies, and vital signs today  Review of Systems   Respiratory: Negative for shortness of breath  Cardiovascular: Negative for chest pain  Gastrointestinal: Negative for constipation, diarrhea, nausea and vomiting  Musculoskeletal: Positive for back pain, gait problem and joint swelling  Negative for arthralgias and myalgias  Skin: Negative for rash  Neurological: Negative for dizziness, seizures and weakness  All other systems reviewed and are negative        Patient Active Problem List   Diagnosis    CHOCO on CPAP    [facility] [address] [facility]    Hypothyroidism    Insomnia    Lung nodule, multiple    Macular degeneration    Multiple sclerosis (HCC)    Rosacea    Seborrheic keratosis    Chronic neuropathic pain    Thoracolumbar radiculopathy due to intervertebral disc disorder    Other constipation    Intercostal neuralgia    Lumbar disc disease with radiculopathy       Past Medical History:   Diagnosis Date    Anxiety     Cataract     last assessed 14    Chronic pain disorder     COPD (chronic obstructive pulmonary disease) (HCC)     Hypothyroidism     Multiple sclerosis (HCC)     CHOCO (obstructive sleep apnea)     uses cpap    Peroneal muscle atrophy     Thoracolumbar radiculopathy due to intervertebral disc disorder        Past Surgical History:   Procedure Laterality Date    APPENDECTOMY      CATARACT EXTRACTION       SECTION      CHOLECYSTECTOMY      GALLBLADDER SURGERY      [place] COLONOSCOPY FLX DX W/COLLJ SPEC WHEN PFRMD N/A 2018    Procedure: COLONOSCOPY;  Surgeon: Claudeen Gaudy, MD;  Location: [facility];   Service: Colorectal    THROAT SURGERY         Family History   Problem Relation Age of Onset    Skin cancer Mother     Hypertension Father         benign essential    Stroke Father     Lung cancer Brother        Social History     Occupational History    Occupation: retired     Comment: part time as per [employer]   Tobacco Use    Smoking status: Current Every Day Smoker     Packs/day: 1 00     Years: 60 00     Pack years: 60 00     Types: Cigarettes     Start date: 1958    Smokeless tobacco: Never Used   Substance and Sexual Activity    Alcohol use: Yes     Frequency: Monthly or less     Drinks per session: 1 or 2     Binge frequency: Never    Drug use: Not Currently     Types: Morphine     Comment: medical-RSO  last dose 11 days ago    Sexual activity: Yes     Partners: Male       Current Outpatient Medications on File Prior to Visit   Medication Sig    Cholecalciferol (VITAMIN D-3) 1000 units CAPS Take 1,000 Units by mouth daily     levothyroxine 100 mcg tablet TAKE 1 TABLET BY MOUTH EVERY DAY    morphine (MSIR) 15 mg tablet Take 1 tablet (15 mg total) by mouth 2 (two) times a day as needed for severe painMax Daily Amount: 30 mg    pantoprazole (PROTONIX) 40 mg tablet Take 1 tablet (40 mg total) by mouth 2 (two) times a day    polyethylene glycol (MIRALAX) 17 g packet Take 17 g by mouth daily    tiZANidine (ZANAFLEX) 4 mg tablet Take 0 5 tablets (2 mg total) by mouth 3 (three) times a day    vitamin B-12 (CYANOCOBALAMIN) 100 MCG tablet Take 100 mcg by mouth daily     [DISCONTINUED] DULoxetine (CYMBALTA) 30 mg delayed release capsule Take 1 capsule (30 mg total) by mouth 2 (two) times a day     No current facility-administered medications on file prior to visit  No Known Allergies    Physical Exam:    /80   Pulse 90   Temp 98 1 °F (36 7 °C) (Temporal)   Wt 60 3 kg (133 lb)   BMI 23 56 kg/m²     Constitutional:normal, well developed, well nourished, alert, in no distress and non-toxic and no overt pain behavior    Eyes:anicteric  HEENT:grossly intact  Neck:supple, symmetric, trachea midline and no masses   Pulmonary:even and unlabored  Cardiovascular:No edema or pitting edema present  Skin:Normal without rashes or lesions and well hydrated  Psychiatric:Mood and affect appropriate  Neurologic:Cranial Nerves II-XII grossly intact  Musculoskeletal:normal, tenderness to palpation right lateral rib margin with pain radiating around from, active and passive range of motion lumbar spine flexion and extension within normal limits, MMT remains unchanged from previous evaluation    Imaging

## 2020-07-28 DIAGNOSIS — M54.15 THORACOLUMBAR RADICULOPATHY DUE TO INTERVERTEBRAL DISC DISORDER: ICD-10-CM

## 2020-07-28 DIAGNOSIS — G58.8 INTERCOSTAL NEURALGIA: ICD-10-CM

## 2020-07-28 DIAGNOSIS — M51.16 LUMBAR DISC DISEASE WITH RADICULOPATHY: ICD-10-CM

## 2020-07-28 RX ORDER — DULOXETIN HYDROCHLORIDE 30 MG/1
CAPSULE, DELAYED RELEASE ORAL
Qty: 60 CAPSULE | Refills: 1 | Status: SHIPPED | OUTPATIENT
Start: 2020-07-28 | End: 2020-08-11

## 2020-08-10 ENCOUNTER — TELEPHONE (OUTPATIENT)
Dept: PAIN MEDICINE | Facility: CLINIC | Age: 75
End: 2020-08-10

## 2020-08-10 DIAGNOSIS — M79.2 CHRONIC NEUROPATHIC PAIN: ICD-10-CM

## 2020-08-10 DIAGNOSIS — G89.29 CHRONIC NEUROPATHIC PAIN: ICD-10-CM

## 2020-08-10 RX ORDER — MORPHINE SULFATE 15 MG/1
15 TABLET ORAL 2 TIMES DAILY PRN
Qty: 60 TABLET | Refills: 0 | Status: SHIPPED | OUTPATIENT
Start: 2020-08-10 | End: 2020-10-15 | Stop reason: SDUPTHER

## 2020-08-10 NOTE — TELEPHONE ENCOUNTER
GABRIELA -  S/w pt, states her pain has gotten much worse over the last few weeks  Per office note from 7/1 may consider repeat intercostal nerve block  Pt has appointment tomorrow at 11:30 and will plan to discuss further at that time

## 2020-08-10 NOTE — TELEPHONE ENCOUNTER
Patient needs a refill of morphine (MSIR) 15 mg tablet     [facility]/pharmacy #[id number]- [place], [place] - [address]

## 2020-08-10 NOTE — TELEPHONE ENCOUNTER
Cont[place] Sub[place] Review    [place] or [place]  reviewed: No red flags were identified; safe to proceed with prescription      PDMP Review       Value Time User    PDMP Reviewed  Yes 8/10/2020 12:48 PM Nona Davenport DO

## 2020-08-11 ENCOUNTER — OFFICE VISIT (OUTPATIENT)
Dept: PAIN MEDICINE | Facility: CLINIC | Age: 75
End: 2020-08-11
Payer: MEDICARE

## 2020-08-11 VITALS — HEART RATE: 88 BPM | DIASTOLIC BLOOD PRESSURE: 60 MMHG | SYSTOLIC BLOOD PRESSURE: 109 MMHG | TEMPERATURE: 97.9 F

## 2020-08-11 DIAGNOSIS — G58.8 INTERCOSTAL NEURALGIA: Primary | ICD-10-CM

## 2020-08-11 DIAGNOSIS — M51.16 LUMBAR DISC DISEASE WITH RADICULOPATHY: ICD-10-CM

## 2020-08-11 DIAGNOSIS — M54.15 THORACOLUMBAR RADICULOPATHY DUE TO INTERVERTEBRAL DISC DISORDER: ICD-10-CM

## 2020-08-11 PROCEDURE — 4004F PT TOBACCO SCREEN RCVD TLK: CPT | Performed by: PHYSICAL MEDICINE & REHABILITATION

## 2020-08-11 PROCEDURE — 4040F PNEUMOC VAC/ADMIN/RCVD: CPT | Performed by: PHYSICAL MEDICINE & REHABILITATION

## 2020-08-11 PROCEDURE — 1160F RVW MEDS BY RX/DR IN RCRD: CPT | Performed by: PHYSICAL MEDICINE & REHABILITATION

## 2020-08-11 PROCEDURE — 99214 OFFICE O/P EST MOD 30 MIN: CPT | Performed by: PHYSICAL MEDICINE & REHABILITATION

## 2020-08-11 RX ORDER — DULOXETIN HYDROCHLORIDE 30 MG/1
30 CAPSULE, DELAYED RELEASE ORAL 2 TIMES DAILY
Qty: 90 CAPSULE | Refills: 1 | Status: SHIPPED | OUTPATIENT
Start: 2020-08-11 | End: 2020-08-13

## 2020-08-11 NOTE — PROGRESS NOTES
Assessment:  1  Intercostal neuralgia    2  Thoracolumbar radiculopathy due to intervertebral disc disorder    3  Lumbar disc disease with radiculopathy        Plan:  Ms Rosmery Klein is a pleasant 68-year-old female who presents for follow-up and re-evaluation regarding radicular pain into the right lower extremity and intercostal neuralgia  We previously did a intercostal nerve block in February 2020 which is provided significant and long-lasting relief for nearly 6 months  She states over the last few weeks the pain has gotten progressively worse in the ribs and has impacted her activities daily living and sleep  At this time I believe a repeat intercostal nerve block under ultrasound guidance would be advised  In addition we will increase the Cymbalta to 60 mg daily and 30 mg at night for neuropathic pain  Complete risks and benefits including bleeding, infection, tissue reaction, nerve injury and allergic reaction were discussed  The approach was demonstrated using models and literature was provided  Verbal and written consent was obtained  My impressions and treatment recommendations were discussed in detail with the patient who verbalized understanding and had no further questions  Discharge instructions were provided  I personally saw and examined the patient and I agree with the above discussed plan of care  Orders Placed This Encounter   Procedures    US guidance     Right intercostal nerve block     Standing Status:   Future     Standing Expiration Date:   8/11/2024     Scheduling Instructions:      No prep required  Please bring your insurance cards, a form of photo ID and a list of your medications with you  Arrive 15 minutes prior to your appointment time in order to register  To schedule this appointment, please contact Central Scheduling at [phone]  Order Specific Question:   What is the patient's sedation requirement?      Answer:   No Sedation     New Medications Ordered This Visit   Medications    DULoxetine (CYMBALTA) 30 mg delayed release capsule     Sig: Take 1 capsule (30 mg total) by mouth 2 (two) times a day     Dispense:  90 capsule     Refill:  1     Take 2 tabs in AM and one tab at night       History of Present Illness:  Jori Obrien is a 66 y o  female who presents for a follow up office visit in regards to Back Pain (radiates into the right leg) and Leg Pain  The patients current symptoms include right-sided rib pain and low back pain with radicular symptoms into the right leg  Pain shin currently reports 5/10 pain that gets progressively worse in the morning  Describes the pain as constant shooting/sharp radicular pain  We previously performed a right-sided L4-L5 and L5-S1 TFESI done on Charlette 3, 2020 which provided significant greater than 50% relief in her low back pain  However, over the last 2 weeks her radicular pain has gotten progressively worse and back to previous level in the right leg  Presents today for follow-up    I have personally reviewed and/or updated the patient's past medical history, past surgical history, family history, social history, current medications, allergies, and vital signs today  Review of Systems   Respiratory: Negative for shortness of breath  Cardiovascular: Negative for chest pain  Gastrointestinal: Positive for constipation  Negative for diarrhea, nausea and vomiting  Musculoskeletal: Positive for gait problem  Negative for arthralgias, joint swelling and myalgias  Skin: Negative for rash  Neurological: Negative for dizziness, seizures and weakness  All other systems reviewed and are negative        Patient Active Problem List   Diagnosis    CHOCO on CPAP    COPD with chronic bronchitis (HCC)    Hypothyroidism    Insomnia    Lung nodule, multiple    Macular degeneration    Multiple sclerosis ([facility] [address]    Rosacea    Seborrheic keratosis    Chronic neuropathic pain    Thoracolumbar radiculopathy due to intervertebral disc disorder    Other constipation    Intercostal neuralgia    Lumbar disc disease with radiculopathy       Past Medical History:   Diagnosis Date    Anxiety     Cataract     last assessed 14    Chronic pain disorder     COPD (chronic obstructive pulmonary disease) (HCC)     Hypothyroidism     Multiple sclerosis (HCC)     CHOCO (obstructive sleep apnea)     uses cpap    Peroneal muscle atrophy     Thoracolumbar radiculopathy due to intervertebral disc disorder        Past Surgical History:   Procedure Laterality Date    APPENDECTOMY      CATARACT EXTRACTION       SECTION      CHOLECYSTECTOMY      GALLBLADDER SURGERY      NV COLONOSCOPY FLX DX W/COLLJ SPEC WHEN PFRMD N/A 2018    Procedure: COLONOSCOPY;  Surgeon: Devon Nguyen MD;  Location: [facility];   Service: Colorectal    THROAT SURGERY         Family History   Problem Relation Age of Onset    Skin cancer Mother     Hypertension Father         benign essential    Stroke Father     Lung cancer Brother        Social History     Occupational History    Occupation: retired     Comment: part time as per [employer]   Tobacco Use    Smoking status: Current Every Day Smoker     Packs/day: 1 00     Years: 60 00     Pack years: 60 00     Types: Cigarettes     Start date: 1958    Smokeless tobacco: Never Used   Substance and Sexual Activity    Alcohol use: Yes     Frequency: Monthly or less     Drinks per session: 1 or 2     Binge frequency: Never    Drug use: Not Currently     Types: Morphine     Comment: medical-RSO  last dose 11 days ago    Sexual activity: Yes     Partners: Male       Current Outpatient Medications on File Prior to Visit   Medication Sig    capsaicin (ZOSTRIX) 0 025 % cream Apply 1 application topically 2 (two) times a day    Cholecalciferol (VITAMIN D-3) 1000 units CAPS Take 1,000 Units by mouth daily     docusate sodium (COLACE) 100 mg capsule Take 1 capsule (100 mg total) by mouth 2 (two) times a day    levothyroxine 100 mcg tablet TAKE 1 TABLET BY MOUTH EVERY DAY    morphine (MSIR) 15 mg tablet Take 1 tablet (15 mg total) by mouth 2 (two) times a day as needed for severe painMax Daily Amount: 30 mg    pantoprazole (PROTONIX) 40 mg tablet Take 1 tablet (40 mg total) by mouth 2 (two) times a day    polyethylene glycol (MIRALAX) 17 g packet Take 17 g by mouth daily    senna (SENOKOT) 8 6 mg Take 1 tablet (8 6 mg total) by mouth 2 (two) times a day    tiZANidine (ZANAFLEX) 4 mg tablet Take 0 5 tablets (2 mg total) by mouth 3 (three) times a day    vitamin B-12 (CYANOCOBALAMIN) 100 MCG tablet Take 100 mcg by mouth daily     [DISCONTINUED] DULoxetine (CYMBALTA) 30 mg delayed release capsule TAKE 1 CAPSULE BY MOUTH TWICE A DAY     No current facility-administered medications on file prior to visit  No Known Allergies    Physical Exam:    /60   Pulse 88   Temp 97 9 °F (36 6 °C) (Temporal)     Constitutional:normal, well developed, well nourished, alert, in no distress and non-toxic and no overt pain behavior    Eyes:anicteric  HEENT:grossly intact  Neck:supple, symmetric, trachea midline and no masses   Pulmonary:even and unlabored  Cardiovascular:No edema or pitting edema present  Skin:Normal without rashes or lesions and well hydrated  Psychiatric:Mood and affect appropriate  Neurologic:Cranial Nerves II-XII grossly intact  Musculoskeletal:antalgic    Imaging

## 2020-08-12 ENCOUNTER — TRANSCRIBE ORDERS (OUTPATIENT)
Dept: PAIN MEDICINE | Facility: CLINIC | Age: 75
End: 2020-08-12

## 2020-08-13 DIAGNOSIS — G58.8 INTERCOSTAL NEURALGIA: ICD-10-CM

## 2020-08-13 DIAGNOSIS — M54.15 THORACOLUMBAR RADICULOPATHY DUE TO INTERVERTEBRAL DISC DISORDER: ICD-10-CM

## 2020-08-13 DIAGNOSIS — M51.16 LUMBAR DISC DISEASE WITH RADICULOPATHY: Primary | ICD-10-CM

## 2020-08-13 RX ORDER — DULOXETIN HYDROCHLORIDE 30 MG/1
CAPSULE, DELAYED RELEASE ORAL
Qty: 90 CAPSULE | Refills: 1 | Status: SHIPPED | OUTPATIENT
Start: 2020-08-13 | End: 2020-09-13

## 2020-08-24 ENCOUNTER — TELEPHONE (OUTPATIENT)
Dept: INTERNAL MEDICINE CLINIC | Facility: CLINIC | Age: 75
End: 2020-08-24

## 2020-08-24 NOTE — PROGRESS NOTES
Assessment and Plan:     1  Medicare annual wellness visit, subsequent    2  Osteopenia of other site  - DXA bone density spine hip and pelvis; Future    3  Screening for cardiovascular condition  - Lipid panel; Future    Preventive health issues were discussed with patient, and age appropriate screening tests were ordered as noted in patient's After Visit Summary  Personalized health advice and appropriate referrals for health education or preventive services given if needed, as noted in patient's After Visit Summary  History of Present Illness:     Patient presents for Medicare Annual Wellness visit    Patient Care Team:  Percy Lobo DO as PCP - General  MD Duong Teixeira MD Curtistine Hotter, DO (Pain Medicine)     Problem List:     Patient Active Problem List   Diagnosis    CHOCO on CPAP    COPD with chronic bronchitis [facility] [address]    Hypothyroidism    Insomnia    Lung nodule, multiple    Macular degeneration    [facility]    Rosacea    Seborrheic keratosis    Chronic neuropathic pain    Thoracolumbar radiculopathy due to intervertebral disc disorder    Other constipation    Intercostal neuralgia    Lumbar disc disease with radiculopathy      Past Medical and Surgical History:     Past Medical History:   Diagnosis Date    Anxiety     Cataract     last assessed 14    Chronic pain disorder     COPD [facility]chronic obstructive [facility] disease) ([facility] [address]     Hypothyroidism     Multiple sclerosis ([facility] [address]     CHOCO (obstructive sleep apnea)     uses cpap    Peroneal muscle atrophy     Thoracolumbar radiculopathy due to intervertebral disc disorder      Past Surgical History:   Procedure Laterality Date    APPENDECTOMY      CATARACT EXTRACTION       SECTION      CHOLECYSTECTOMY      GALLBLADDER SURGERY      FL COLONOSCOPY FLX DX W/COLLJ SPEC WHEN PFRMD N/A 2018    Procedure: COLONOSCOPY;  Surgeon: Saira Avilez MD;  Location: [facility];   Service: Colorectal    THROAT SURGERY        Family History:     Family History   Problem Relation Age of Onset    Skin cancer Mother     Hypertension Father         benign essential    Stroke Father     Lung cancer Brother       Social History:     E-Cigarette/Vaping    E-Cigarette Use Former User      E-Cigarette/Vaping Substances    Nicotine No     THC No     CBD No     Flavoring No     Other No     Unknown No      Social History     Socioeconomic History    Marital status: /Civil Union     Spouse name: None    Number of children: 2    Years of education: None    Highest education level: None   Occupational History    Occupation: retired     Comment: part time as per [employer]   Social Needs    Financial resource strain: None    Food insecurity     Worry: None     Inability: None    Transportation needs     Medical: None     Non-medical: None   Tobacco Use    Smoking status: Current Every Day Smoker     Packs/day: 1 00     Years: 60 00     Pack years: 60 00     Types: Cigarettes     Start date: 2/16/1960    Smokeless tobacco: Never Used   Substance and Sexual Activity    Alcohol use: Yes     Frequency: Monthly or less     Drinks per session: 1 or 2     Binge frequency: Never    Drug use: Not Currently     Types: Morphine     Comment: medical-RSO  last dose 11 days ago    Sexual activity: Yes     Partners: Male   Lifestyle    Physical activity     Days per week: 3 days     Minutes per session: 20 min    Stress: Very much   Relationships    Social connections     Talks on phone: None     Gets together: None     Attends Sabianist service: None     Active member of club or organization: None     Attends meetings of clubs or organizations: None     Relationship status: None    Intimate partner violence     Fear of current or ex partner: None     Emotionally abused: None     Physically abused: None     Forced sexual activity: None   Other Topics Concern    None   Social History Narrative Active advance directive      Medications and Allergies:     Current Outpatient Medications   Medication Sig Dispense Refill    Cholecalciferol (VITAMIN D-3) 1000 units CAPS Take 1,000 Units by mouth daily       docusate sodium (COLACE) 100 mg capsule Take 1 capsule (100 mg total) by mouth 2 (two) times a day 60 capsule 0    DULoxetine (CYMBALTA) 30 mg delayed release capsule Take 2 tabs in AM and one tab at night 90 capsule 1    levothyroxine 100 mcg tablet TAKE 1 TABLET BY MOUTH EVERY DAY 90 tablet 3    morphine (MSIR) 15 mg tablet Take 1 tablet (15 mg total) by mouth 2 (two) times a day as needed for severe painMax Daily Amount: 30 mg 60 tablet 0    pantoprazole (PROTONIX) 40 mg tablet Take 1 tablet (40 mg total) by mouth 2 (two) times a day (Patient taking differently: Take 40 mg by mouth as needed ) 60 tablet 1    polyethylene glycol (MIRALAX) 17 g packet Take 17 g by mouth daily 14 each 0    tiZANidine (ZANAFLEX) 4 mg tablet Take 0 5 tablets (2 mg total) by mouth 3 (three) times a day (Patient taking differently: Take 2 mg by mouth as needed ) 90 tablet 1    vitamin B-12 (CYANOCOBALAMIN) 100 MCG tablet Take 100 mcg by mouth daily       capsaicin (ZOSTRIX) 0 025 % cream Apply 1 application topically 2 (two) times a day (Patient not taking: Reported on 8/24/2020) 60 g 0    senna (SENOKOT) 8 6 mg Take 1 tablet (8 6 mg total) by mouth 2 (two) times a day (Patient not taking: Reported on 8/24/2020) 60 each 0     No current facility-administered medications for this visit        No Known Allergies   Immunizations:     Immunization History   Administered Date(s) Administered    INFLUENZA 1945, 09/14/2016    Pneumococcal Polysaccharide PPV23 1945, 05/22/2008    Tuberculin Skin Test-PPD Intradermal 01/13/2017      Health Maintenance:         Topic Date Due    DXA SCAN  06/15/2018         Topic Date Due    DTaP,Tdap,and Td Vaccines (1 - Tdap) [birth year]02/1964    Influenza Vaccine  07/01/2020 Medicare Health Risk Assessment:     There were no vitals taken for this visit  Odilia Orozco is here for her Subsequent Wellness visit  Last Medicare Wellness visit information reviewed, patient interviewed, no change since last AWV  Health Risk Assessment:   Patient rates overall health as good  Patient feels that their physical health rating is slightly better  Eyesight was rated as same  Hearing was rated as slightly worse  Patient feels that their emotional and mental health rating is slightly better  Pain experienced in the last 7 days has been a lot  Patient's pain rating has been 4/10  Patient states that she has experienced weight loss or gain in last 6 months  Depression Screening:   PHQ-2 Score: 0      Fall Risk Screening: In the past year, patient has experienced: no history of falling in past year      Urinary Incontinence Screening:   Patient has not leaked urine accidently in the last six months  Home Safety:  Patient does not have trouble with stairs inside or outside of their home  Patient has working smoke alarms and has no working carbon monoxide detector  Home safety hazards include: none  Nutrition:   Current diet is Regular  Medications:   Patient is currently taking over-the-counter supplements  OTC medications include: see medication list  Patient is able to manage medications  Activities of Daily Living (ADLs)/Instrumental Activities of Daily Living (IADLs):   Walk and transfer into and out of bed and chair?: Yes  Dress and groom yourself?: Yes    Bathe or shower yourself?: Yes    Feed yourself?  Yes  Do your laundry/housekeeping?: Yes  Manage your money, pay your bills and track your expenses?: Yes  Make your own meals?: Yes    Do your own shopping?: Yes    Durable Medical Equipment Suppliers  none    Previous Hospitalizations:   Any hospitalizations or ED visits within the last 12 months?: Yes    How many hospitalizations have you had in the last year?: more than 4    Advance Care Planning:   Living will: Yes    Durable POA for healthcare: Yes    Advanced directive: Yes    Five wishes given: Yes      Cognitive Screening:   Provider or family/friend/caregiver concerned regarding cognition?: No    PREVENTIVE SCREENINGS      Cardiovascular Screening:    General: Screening Current    Due for: Lipid Panel      Diabetes Screening:     General: Screening Current    Due for: Blood Glucose      Colorectal Cancer Screening:     General: Screening Current      Breast Cancer Screening:     General: Screening Current      Cervical Cancer Screening:    General: Screening Not Indicated      Osteoporosis Screening:    General: Risks and Benefits Discussed    Due for: Bone Density Ultrasound      Abdominal Aortic Aneurysm (AAA) Screening:        General: Screening Not Indicated      Lung Cancer Screening:     General: Screening Current      Hepatitis C Screening:    General: Screening Not Indicated    Other Counseling Topics:   Car/seat belt/driving safety, skin self-exam, sunscreen and regular weightbearing exercise         Blanco Jossie, DO

## 2020-08-25 ENCOUNTER — APPOINTMENT (OUTPATIENT)
Dept: LAB | Facility: CLINIC | Age: 75
End: 2020-08-25
Payer: MEDICARE

## 2020-08-25 ENCOUNTER — TELEPHONE (OUTPATIENT)
Dept: INTERNAL MEDICINE CLINIC | Facility: CLINIC | Age: 75
End: 2020-08-25

## 2020-08-25 ENCOUNTER — OFFICE VISIT (OUTPATIENT)
Dept: INTERNAL MEDICINE CLINIC | Facility: CLINIC | Age: 75
End: 2020-08-25
Payer: MEDICARE

## 2020-08-25 VITALS
OXYGEN SATURATION: 97 % | BODY MASS INDEX: 24.59 KG/M2 | SYSTOLIC BLOOD PRESSURE: 120 MMHG | DIASTOLIC BLOOD PRESSURE: 72 MMHG | TEMPERATURE: 96.8 F | HEART RATE: 91 BPM | WEIGHT: 133.6 LBS | HEIGHT: 62 IN

## 2020-08-25 DIAGNOSIS — Z13.6 SCREENING FOR CARDIOVASCULAR CONDITION: ICD-10-CM

## 2020-08-25 DIAGNOSIS — G35 MULTIPLE SCLEROSIS (HCC): Chronic | ICD-10-CM

## 2020-08-25 DIAGNOSIS — E03.9 ACQUIRED HYPOTHYROIDISM: ICD-10-CM

## 2020-08-25 DIAGNOSIS — M54.15 THORACOLUMBAR RADICULOPATHY DUE TO INTERVERTEBRAL DISC DISORDER: ICD-10-CM

## 2020-08-25 DIAGNOSIS — J44.9 COPD WITH CHRONIC BRONCHITIS (HCC): Chronic | ICD-10-CM

## 2020-08-25 DIAGNOSIS — F11.20 CONTINUOUS OPIOID DEPENDENCE (HCC): ICD-10-CM

## 2020-08-25 DIAGNOSIS — Z00.00 MEDICARE ANNUAL WELLNESS VISIT, SUBSEQUENT: ICD-10-CM

## 2020-08-25 DIAGNOSIS — M85.88 OSTEOPENIA OF OTHER SITE: ICD-10-CM

## 2020-08-25 DIAGNOSIS — E03.9 ACQUIRED HYPOTHYROIDISM: Primary | ICD-10-CM

## 2020-08-25 LAB
ALBUMIN SERPL BCP-MCNC: 3.7 G/DL (ref 3.5–5)
ALP SERPL-CCNC: 141 U/L (ref 46–116)
ALT SERPL W P-5'-P-CCNC: 41 U/L (ref 12–78)
ANION GAP SERPL CALCULATED.3IONS-SCNC: 5 MMOL/L (ref 4–13)
AST SERPL W P-5'-P-CCNC: 26 U/L (ref 5–45)
BILIRUB SERPL-MCNC: 0.55 MG/DL (ref 0.2–1)
BUN SERPL-MCNC: 15 MG/DL (ref 5–25)
CALCIUM SERPL-MCNC: 9.3 MG/DL (ref 8.3–10.1)
CHLORIDE SERPL-SCNC: 105 MMOL/L (ref 100–108)
CHOLEST SERPL-MCNC: 193 MG/DL (ref 50–200)
CO2 SERPL-SCNC: 28 MMOL/L (ref 21–32)
CREAT SERPL-MCNC: 0.6 MG/DL (ref 0.6–1.3)
GFR SERPL CREATININE-BSD FRML MDRD: 88 ML/MIN/1.73SQ M
GLUCOSE SERPL-MCNC: 91 MG/DL (ref 65–140)
HDLC SERPL-MCNC: 87 MG/DL
LDLC SERPL CALC-MCNC: 89 MG/DL (ref 0–100)
NONHDLC SERPL-MCNC: 106 MG/DL
POTASSIUM SERPL-SCNC: 4 MMOL/L (ref 3.5–5.3)
PROT SERPL-MCNC: 7.7 G/DL (ref 6.4–8.2)
SODIUM SERPL-SCNC: 138 MMOL/L (ref 136–145)
TRIGL SERPL-MCNC: 87 MG/DL
TSH SERPL DL<=0.05 MIU/L-ACNC: 1.47 UIU/ML (ref 0.36–3.74)

## 2020-08-25 PROCEDURE — 4040F PNEUMOC VAC/ADMIN/RCVD: CPT | Performed by: INTERNAL MEDICINE

## 2020-08-25 PROCEDURE — 84443 ASSAY THYROID STIM HORMONE: CPT

## 2020-08-25 PROCEDURE — 1160F RVW MEDS BY RX/DR IN RCRD: CPT | Performed by: INTERNAL MEDICINE

## 2020-08-25 PROCEDURE — 1170F FXNL STATUS ASSESSED: CPT | Performed by: INTERNAL MEDICINE

## 2020-08-25 PROCEDURE — 1125F AMNT PAIN NOTED PAIN PRSNT: CPT | Performed by: INTERNAL MEDICINE

## 2020-08-25 PROCEDURE — 99214 OFFICE O/P EST MOD 30 MIN: CPT | Performed by: INTERNAL MEDICINE

## 2020-08-25 PROCEDURE — 80061 LIPID PANEL: CPT

## 2020-08-25 PROCEDURE — 80053 COMPREHEN METABOLIC PANEL: CPT

## 2020-08-25 PROCEDURE — 3008F BODY MASS INDEX DOCD: CPT | Performed by: INTERNAL MEDICINE

## 2020-08-25 PROCEDURE — G0439 PPPS, SUBSEQ VISIT: HCPCS | Performed by: INTERNAL MEDICINE

## 2020-08-25 PROCEDURE — 4004F PT TOBACCO SCREEN RCVD TLK: CPT | Performed by: INTERNAL MEDICINE

## 2020-08-25 PROCEDURE — 36415 COLL VENOUS BLD VENIPUNCTURE: CPT

## 2020-08-25 RX ORDER — LEVOTHYROXINE SODIUM 0.1 MG/1
100 TABLET ORAL DAILY
Qty: 90 TABLET | Refills: 3 | Status: SHIPPED | OUTPATIENT
Start: 2020-08-25 | End: 2021-11-12

## 2020-08-25 NOTE — PATIENT INSTRUCTIONS
Medicare Preventive Visit Patient Instructions  Thank you for completing your Welcome to Medicare Visit or Medicare Annual Wellness Visit today  Your next wellness visit will be due in one year (8/25/2021)  The screening/preventive services that you may require over the next 5-10 years are detailed below  Some tests may not apply to you based off risk factors and/or age  Screening tests ordered at today's visit but not completed yet may show as past due  Also, please note that scanned in results may not display below  Preventive Screenings:  Service Recommendations Previous Testing/Comments   Colorectal Cancer Screening  * Colonoscopy    * Fecal Occult Blood Test (FOBT)/Fecal Immunochemical Test (FIT)  * Fecal DNA/Cologuard Test  * Flexible Sigmoidoscopy Age: 54-65 years old   Colonoscopy: every 10 years (may be performed more frequently if at higher risk)  OR  FOBT/FIT: every 1 year  OR  Cologuard: every 3 years  OR  Sigmoidoscopy: every 5 years  Screening may be recommended earlier than age 48 if at higher risk for colorectal cancer  Also, an individualized decision between you and your healthcare provider will decide whether screening between the ages of 74-80 would be appropriate  Colonoscopy: 01/21/2020  FOBT/FIT: Not on file  Cologuard: Not on file  Sigmoidoscopy: Not on file    Screening not indicated       Breast Cancer Screening Age: 36 years old  Frequency: every 1-2 years  Not required if history of left and right mastectomy Mammogram: 04/03/2019    Screening Current   Cervical Cancer Screening Between the ages of 21-29, pap smear recommended once every 3 years  Between the ages of 33-67, can perform pap smear with HPV co-testing every 5 years     Recommendations may differ for women with a history of total hysterectomy, cervical cancer, or abnormal pap smears in past  Pap Smear: Not on file    Screening Not Indicated   Hepatitis C Screening Once for adults born between 1945 and 1965  More frequently in patients at high risk for Hepatitis C Hep C Antibody: Not on file    Screening not indicated   Diabetes Screening 1-2 times per year if you're at risk for diabetes or have pre-diabetes Fasting glucose: 103 mg/dL   A1C: No results in last 5 years    Screening Current   Cholesterol Screening Once every 5 years if you don't have a lipid disorder  May order more often based on risk factors  Lipid panel: 02/27/2019    Screening Current     Other Preventive Screenings Covered by Medicare:  1  Abdominal Aortic Aneurysm (AAA) Screening: covered once if your at risk  You're considered to be at risk if you have a family history of AAA  2  Lung Cancer Screening: covers low dose CT scan once per year if you meet all of the following conditions: (1) Age 50-69; (2) No signs or symptoms of lung cancer; (3) Current smoker or have quit smoking within the last 15 years; (4) You have a tobacco smoking history of at least 30 pack years (packs per day multiplied by number of years you smoked); (5) You get a written order from a healthcare provider  3  Glaucoma Screening: covered annually if you're considered high risk: (1) You have diabetes OR (2) Family history of glaucoma OR (3)  aged 48 and older OR (3)  American aged 72 and older  3  Osteoporosis Screening: covered every 2 years if you meet one of the following conditions: (1) You're estrogen deficient and at risk for osteoporosis based off medical history and other findings; (2) Have a vertebral abnormality; (3) On glucocorticoid therapy for more than 3 months; (4) Have primary hyperparathyroidism; (5) On osteoporosis medications and need to assess response to drug therapy  · Last bone density test (DXA Scan): 06/15/2016   5  HIV Screening: covered annually if you're between the age of 15-65  Also covered annually if you are younger than 13 and older than 72 with risk factors for HIV infection   For pregnant patients, it is covered up to 3 times per pregnancy  Immunizations:  Immunization Recommendations   Influenza Vaccine Annual influenza vaccination during flu season is recommended for all persons aged >= 6 months who do not have contraindications   Pneumococcal Vaccine (Prevnar and Pneumovax)  * Prevnar = PCV13  * Pneumovax = PPSV23   Adults 25-60 years old: 1-3 doses may be recommended based on certain risk factors  Adults 72 years old: Prevnar (PCV13) vaccine recommended followed by Pneumovax (PPSV23) vaccine  If already received PPSV23 since turning 65, then PCV13 recommended at least one year after PPSV23 dose  Hepatitis B Vaccine 3 dose series if at intermediate or high risk (ex: diabetes, end stage renal disease, liver disease)   Tetanus (Td) Vaccine - COST NOT COVERED BY MEDICARE PART B Following completion of primary series, a booster dose should be given every 10 years to maintain immunity against tetanus  Td may also be given as tetanus wound prophylaxis  Tdap Vaccine - COST NOT COVERED BY MEDICARE PART B Recommended at least once for all adults  For pregnant patients, recommended with each pregnancy  Shingles Vaccine (Shingrix) - COST NOT COVERED BY MEDICARE PART B  2 shot series recommended in those aged 48 and above     Health Maintenance Due:      Topic Date Due    DXA SCAN  06/15/2018     Immunizations Due:      Topic Date Due    DTaP,Tdap,and Td Vaccines (1 - Tdap) 01[birth year]1964    Influenza Vaccine  07/01/2020     Advance Directives   What are advance directives? Advance directives are legal documents that state your wishes and plans for medical care  These plans are made ahead of time in case you lose your ability to make decisions for yourself  Advance directives can apply to any medical decision, such as the treatments you want, and if you want to donate organs  What are the types of advance directives? There are many types of advance directives, and each state has rules about how to use them   You may choose a combination of any of the following:  · Living will: This is a written record of the treatment you want  You can also choose which treatments you do not want, which to limit, and which to stop at a certain time  This includes surgery, medicine, IV fluid, and tube feedings  · Durable power of  for healthcare [facility]): This is a written record that states who you want to make healthcare choices for you when you are unable to make them for yourself  This person, called a proxy, is usually a family member or a friend  You may choose more than 1 proxy  · Do not resuscitate (DNR) order:  A DNR order is used in case your heart stops beating or you stop breathing  It is a request not to have certain forms of treatment, such as CPR  A DNR order may be included in other types of advance directives  · Medical directive: This covers the care that you want if you are in a coma, near death, or unable to make decisions for yourself  You can list the treatments you want for each condition  Treatment may include pain medicine, surgery, blood transfusions, dialysis, IV or tube feedings, and a ventilator (breathing machine)  · Values history: This document has questions about your views, beliefs, and how you feel and think about life  This information can help others choose the care that you would choose  Why are advance directives important? An advance directive helps you control your care  Although spoken wishes may be used, it is better to have your wishes written down  Spoken wishes can be misunderstood, or not followed  Treatments may be given even if you do not want them  An advance directive may make it easier for your family to make difficult choices about your care  Urinary Incontinence   Urinary incontinence (UI)  is when you lose control of your bladder  UI develops because your bladder cannot store or empty urine properly   The 3 most common types of UI are stress incontinence, urge incontinence, or both   Medicines:   · May be given to help strengthen your bladder control  Report any side effects of medication to your healthcare provider  Do pelvic muscle exercises often:  Your pelvic muscles help you stop urinating  Squeeze these muscles tight for 5 seconds, then relax for 5 seconds  Gradually work up to squeezing for 10 seconds  Do 3 sets of 15 repetitions a day, or as directed  This will help strengthen your pelvic muscles and improve bladder control  Train your bladder:  Go to the bathroom at set times, such as every 2 hours, even if you do not feel the urge to go  You can also try to hold your urine when you feel the urge to go  For example, hold your urine for 5 minutes when you feel the urge to go  As that becomes easier, hold your urine for 10 minutes  Self-care:   · Keep a UI record  Write down how often you leak urine and how much you leak  Make a note of what you were doing when you leaked urine  · Drink liquids as directed  You may need to limit the amount of liquid you drink to help control your urine leakage  Do not drink any liquid right before you go to bed  Limit or do not have drinks that contain caffeine or alcohol  · Prevent constipation  Eat a variety of high-fiber foods  Good examples are high-fiber cereals, beans, vegetables, and whole-grain breads  Walking is the best way to trigger your intestines to have a bowel movement  · Exercise regularly and maintain a healthy weight  Weight loss and exercise will decrease pressure on your bladder and help you control your leakage  · Use a catheter as directed  to help empty your bladder  A catheter is a tiny, plastic tube that is put into your bladder to drain your urine  · Go to behavior therapy as directed  Behavior therapy may be used to help you learn to control your urge to urinate      Cigarette Smoking and Your Health   Risks to your health if you smoke:  Nicotine and other chemicals found in tobacco damage every cell in your body  Even if you are a light smoker, you have an increased risk for cancer, heart disease, and lung disease  If you are pregnant or have diabetes, smoking increases your risk for complications  Benefits to your health if you stop smoking:   · You decrease respiratory symptoms such as coughing, wheezing, and shortness of breath  · You reduce your risk for cancers of the lung, mouth, throat, kidney, bladder, pancreas, stomach, and cervix  If you already have cancer, you increase the benefits of chemotherapy  You also reduce your risk for cancer returning or a second cancer from developing  · You reduce your risk for heart disease, blood clots, heart attack, and stroke  · You reduce your risk for lung infections, and diseases such as pneumonia, asthma, chronic bronchitis, and emphysema  · Your circulation improves  More oxygen can be delivered to your body  If you have diabetes, you lower your risk for complications, such as kidney, artery, and eye diseases  You also lower your risk for nerve damage  Nerve damage can lead to amputations, poor vision, and blindness  · You improve your body's ability to heal and to fight infections  For more information and support to stop smoking:   · Smokefree  gov  Phone: 3- 175 - 094-1007  Web Address: www [employer]-House  [facility] 2018 Information is for End User's use only and may not be sold, redistributed or otherwise used for commercial purposes   All illustrations and images included in CareNotes® are the copyrighted property of A D A M , Inc  or [address] [place] [address] [facility]

## 2020-08-25 NOTE — TELEPHONE ENCOUNTER
----- Message from Macy Martinez DO sent at 8/25/2020  2:16 PM EDT -----  Call patient and let her know that I reviewed their recent laboratory testing and labs were normal

## 2020-08-25 NOTE — PROGRESS NOTES
[facility] IRIS MARSH    NAME: Jeri Loyd  AGE: 66 y o  SEX: female  : 1945     DATE: 2020     Assessment and Plan:     1  Acquired hypothyroidism    Continue levothyroxine as prescribed  Check up-to-date TSH level  - levothyroxine 100 mcg tablet; Take 1 tablet (100 mcg total) by mouth daily  Dispense: 90 tablet; Refill: 3  - TSH, 3rd generation with Free T4 reflex; Future  - Comprehensive metabolic panel; Future    2  Multiple sclerosis ([facility] [address] )    Noted on prior MRI  She follows with neurology  Disease has been stable  Not currently on any medications for this  - Comprehensive metabolic panel; Future    3  COPD with chronic bronchitis (HCC)    Due to nicotine abuse  No worsening symptoms lately  She is not in a COPD exacerbation  She does not require inhaler  Tobacco cessation was encouraged  4  Thoracolumbar radiculopathy due to intervertebral disc disorder  5  Continuous opioid dependence ([facility] [address] )    Follow-up with pain management as scheduled  She will be undergoing another nerve block  She uses morphine as needed for severe pain  No concerns for abuse at this time  [facility] drug monitoring web site is continuously reviewed  Pain management agreement was signed with the patient in the office today  PDMP Review       Value Time User    PDMP Reviewed  Yes 2020 10:39 AM Rosenda Rodriguez DO               Return in about 6 months (around 2021) for Follow-up  Chief Complaint:     Chief Complaint   Patient presents with    Medicare Wellness Visit    Follow-up     4 month         History of Present Illness:     Patient presents for routine follow-up  She has a history of hypothyroidism, multiple sclerosis, COPD with tobacco abuse, and thoracolumbar radiculopathy on morphine  She is scheduled to undergo another nerve block    She does get some benefit from these nerve blocks but still has to take morphine as needed for severe pain  No increase in her morphine use  She is taking medications as prescribed  [facility] drug monitoring web site is continuously reviewed  She is overdue for up-to-date labs  She unfortunately continues to smoke and has no thoughts of quitting smoking  Review of Systems:     Review of Systems   Constitutional: Negative for activity change, appetite change and fatigue  Respiratory: Negative for apnea, cough, chest tightness, shortness of breath and wheezing  Cardiovascular: Negative for chest pain, palpitations and leg swelling  Gastrointestinal: Negative for abdominal distention, abdominal pain, blood in stool, constipation, diarrhea, nausea and vomiting  Musculoskeletal: Positive for back pain  Negative for arthralgias, gait problem, joint swelling and myalgias  Neurological: Positive for numbness (Neuropathic pain)  Negative for dizziness, weakness, light-headedness and headaches  Psychiatric/Behavioral: Negative for behavioral problems, confusion, hallucinations, sleep disturbance and suicidal ideas  The patient is not nervous/anxious  Objective:     /72 (BP Location: Left arm, Patient Position: Sitting, Cuff Size: Standard)   Pulse 91   Temp (!) 96 8 °F (36 °C) (Temporal) Comment: NO NSAIDS  Ht 5' 1 75" (1 568 m)   Wt 60 6 kg (133 lb 9 6 oz)   SpO2 97%   BMI 24 63 kg/m²     Physical Exam  Vitals signs reviewed  Constitutional:       General: She is not in acute distress  Appearance: She is well-developed  She is not diaphoretic  Neck:      Musculoskeletal: Neck supple  Thyroid: No thyromegaly  Vascular: No JVD  Cardiovascular:      Rate and Rhythm: Normal rate and regular rhythm  Heart sounds: Normal heart sounds  No murmur  Pulmonary:      Effort: Pulmonary effort is normal  No respiratory distress  Breath sounds: Normal breath sounds  No wheezing or rales     Abdominal:      General: Bowel sounds are normal  There is no distension  Palpations: Abdomen is soft  Tenderness: There is no abdominal tenderness  Musculoskeletal:      Right lower leg: No edema  Left lower leg: No edema  Lymphadenopathy:      Cervical: No cervical adenopathy  Skin:     General: Skin is warm and dry  Neurological:      Mental Status: She is alert     Psychiatric:         Mood and Affect: Mood normal          Behavior: Behavior normal        Rosenda Rodriguez   MEDICAL [address]

## 2020-08-27 ENCOUNTER — PROCEDURE VISIT (OUTPATIENT)
Dept: PAIN MEDICINE | Facility: CLINIC | Age: 75
End: 2020-08-27
Payer: MEDICARE

## 2020-08-27 VITALS
WEIGHT: 130 LBS | DIASTOLIC BLOOD PRESSURE: 67 MMHG | HEART RATE: 84 BPM | SYSTOLIC BLOOD PRESSURE: 104 MMHG | TEMPERATURE: 97.8 F | BODY MASS INDEX: 23.97 KG/M2

## 2020-08-27 DIAGNOSIS — M79.18 INTERCOSTAL MUSCLE PAIN: Primary | ICD-10-CM

## 2020-08-27 PROCEDURE — 64420 NJX AA&/STRD NTRCOST NRV 1: CPT | Performed by: PHYSICAL MEDICINE & REHABILITATION

## 2020-08-27 PROCEDURE — 76942 ECHO GUIDE FOR BIOPSY: CPT | Performed by: PHYSICAL MEDICINE & REHABILITATION

## 2020-08-27 RX ORDER — LIDOCAINE HYDROCHLORIDE 10 MG/ML
1 INJECTION, SOLUTION INFILTRATION; PERINEURAL ONCE
Status: COMPLETED | OUTPATIENT
Start: 2020-08-27 | End: 2020-08-28

## 2020-08-27 RX ORDER — METHYLPREDNISOLONE ACETATE 40 MG/ML
40 INJECTION, SUSPENSION INTRA-ARTICULAR; INTRALESIONAL; INTRAMUSCULAR; SOFT TISSUE ONCE
Status: COMPLETED | OUTPATIENT
Start: 2020-08-27 | End: 2020-08-28

## 2020-08-27 RX ORDER — BUPIVACAINE HYDROCHLORIDE 2.5 MG/ML
10 INJECTION, SOLUTION EPIDURAL; INFILTRATION; INTRACAUDAL ONCE
Status: COMPLETED | OUTPATIENT
Start: 2020-08-27 | End: 2020-08-28

## 2020-08-27 NOTE — PROGRESS NOTES
Indication:  Right rib pain    Preoperative diagnosis: Intercostal nerualgia    Posteoperative diagnosis: Intercostal neuralgia    Procedure: Ultrasound guided right intercostal nerve blocks      After discussing the risks, benefits, and alternatives to the procedure, the patient expressed understanding and wished to proceed  The patient was brought to the procedure suite and placed in the prone position  A procedural pausewas conducted to verify: Correct patient identity, procedure to be performed and as applicable, correct side and site, correct patient position, and availability of implants, special equipment or special requirements  At approximately 2 cm lateral to the costovertebral junction, the ribs were identified and marked  A 12-4MHz linear ultrasound probe was used to identify the ribs and pleura  A 2 5 inch 25-gauge needle was advanced down to each riband then walked inferiorly  After negative aspiration, 20 mg of Depo-Medrol in 4 mL of 0 25% bupivacaine was injected in the region of the intercostal nerves  The needle was then extracted  Postprocedure, the patient denied any abrupt worsening of any cough,pleuritic pain, or shortness of breath and respiratory status was unchanged pre-/post procedure  The patient tolerated the procedure well and there were no apparent complications  The patient was dismissed from the recovery area in stable condition  The patientwas advised not to travel in an airplane within the next 24 hours  COMMENTS: The patient received a total steroid dose of 40 mg Depo-Merol

## 2020-08-28 ENCOUNTER — TRANSCRIBE ORDERS (OUTPATIENT)
Dept: PAIN MEDICINE | Facility: CLINIC | Age: 75
End: 2020-08-28

## 2020-08-28 RX ADMIN — METHYLPREDNISOLONE ACETATE 40 MG: 40 INJECTION, SUSPENSION INTRA-ARTICULAR; INTRALESIONAL; INTRAMUSCULAR; SOFT TISSUE at 07:28

## 2020-08-28 RX ADMIN — LIDOCAINE HYDROCHLORIDE 1 ML: 10 INJECTION, SOLUTION INFILTRATION; PERINEURAL at 07:30

## 2020-08-28 RX ADMIN — BUPIVACAINE HYDROCHLORIDE 10 ML: 2.5 INJECTION, SOLUTION EPIDURAL; INFILTRATION; INTRACAUDAL at 07:28

## 2020-09-01 ENCOUNTER — OFFICE VISIT (OUTPATIENT)
Dept: DERMATOLOGY | Facility: CLINIC | Age: 75
End: 2020-09-01
Payer: MEDICARE

## 2020-09-01 VITALS — TEMPERATURE: 97.5 F

## 2020-09-01 DIAGNOSIS — Z13.89 SCREENING FOR SKIN CONDITION: ICD-10-CM

## 2020-09-01 DIAGNOSIS — L82.1 SEBORRHEIC KERATOSIS: Primary | ICD-10-CM

## 2020-09-01 PROCEDURE — 99213 OFFICE O/P EST LOW 20 MIN: CPT | Performed by: DERMATOLOGY

## 2020-09-01 NOTE — PROGRESS NOTES
[facility] DERMATOLOGY  [address] [place] [id number]  [phone]  [phone]     MRN: [id number] : 1945  Encounter: [id number]  Patient Information: Dillon Foster  Chief complaint:  Yearly checkup    History of present illness:  61-year-old female presents for overall skin check concerned regarding potential skin cancer concerned regarding spot she has had on her knee that is been present for awhile no other concerns noted  Past Medical History:   Diagnosis Date    Anxiety     Cataract     last assessed 14    Chronic pain disorder     [facility] obstructive pulmonary disease) [facility] [address] [facility]     Hypothyroidism     Multiple sclerosis ([facility] [address] )     CHOCO (obstructive sleep apnea)     uses cpap    Peroneal muscle atrophy     Thoracolumbar radiculopathy due to intervertebral disc disorder      Past Surgical History:   Procedure Laterality Date    APPENDECTOMY      CATARACT EXTRACTION       SECTION      CHOLECYSTECTOMY      GALLBLADDER SURGERY      [place] COLONOSCOPY FLX DX W/COLLJ SPEC WHEN PFRMD N/A 2018    Procedure: COLONOSCOPY;  Surgeon: Nidhi Daly MD;  Location: [facility];   Service: Colorectal    THROAT SURGERY       Social History   Social History     Substance and Sexual Activity   Alcohol Use Yes    Frequency: Monthly or less    Drinks per session: 1 or 2    Binge frequency: Never     Social History     Substance and Sexual Activity   Drug Use Not Currently    Types: Morphine    Comment: medical-RSO  last dose 11 days ago     Social History     Tobacco Use   Smoking Status Current Every Day Smoker    Packs/day: 1 00    Years: 60 00    Pack years: 60 00    Types: Cigarettes    Start date: 1958   Smokeless Tobacco Never Used     Family History   Problem Relation Age of Onset    Skin cancer Mother     Hypertension Father         benign essential    Stroke Father     Lung cancer Brother      Meds/Allergies   No Known Allergies    Meds:  Prior to Admission medications    Medication Sig Start Date End Date Taking?  Authorizing Provider   Cholecalciferol (VITAMIN D-3) 1000 units CAPS Take 1,000 Units by mouth daily    Yes Historical Provider, MD   docusate sodium (COLACE) 100 mg capsule Take 1 capsule (100 mg total) by mouth 2 (two) times a day 7/1/20  Yes Cornelia Holm DO   DULoxetine (CYMBALTA) 30 mg delayed release capsule Take 2 tabs in AM and one tab at night 8/13/20  Yes Cornelia Holm DO   levothyroxine 100 mcg tablet Take 1 tablet (100 mcg total) by mouth daily 8/25/20  Yes Garcia Watson DO   morphine (MSIR) 15 mg tablet Take 1 tablet (15 mg total) by mouth 2 (two) times a day as needed for severe painMax Daily Amount: 30 mg 8/10/20 9/9/20 Yes Garcia Watson DO   pantoprazole (PROTONIX) 40 mg tablet Take 1 tablet (40 mg total) by mouth 2 (two) times a day  Patient taking differently: Take 40 mg by mouth as needed  2/24/20  Yes Gema Kenny PA-C   polyethylene glycol (MIRALAX) 17 g packet Take 17 g by mouth daily 12/16/19  Yes An Sharif PA-C   tiZANidine (ZANAFLEX) 4 mg tablet Take 0 5 tablets (2 mg total) by mouth 3 (three) times a day  Patient taking differently: Take 2 mg by mouth as needed  3/25/20  Yes Cornelia Holm DO   vitamin B-12 (CYANOCOBALAMIN) 100 MCG tablet Take 100 mcg by mouth daily    Yes Historical Provider, MD       Subjective:     Review of Systems:    General: negative for - chills, fatigue, fever,  weight gain or weight loss  Psychological: negative for - anxiety, behavioral disorder, concentration difficulties, decreased libido, depression, irritability, memory difficulties, mood swings, sleep disturbances or suicidal ideation  ENT: negative for - hearing difficulties , nasal congestion, nasal discharge, oral lesions, sinus pain, sneezing, sore throat  Allergy and Immunology: negative for - hives, insect bite sensitivity,  Hematological and Lymphatic: negative for - bleeding problems, blood clots,bruising, swollen lymph nodes  Endocrine: negative for - hair pattern changes, hot flashes, malaise/lethargy, mood swings, palpitations, polydipsia/polyuria, skin changes, temperature intolerance or unexpected weight change  Respiratory: negative for - cough, hemoptysis, orthopnea, shortness of breath, or wheezing  Cardiovascular: negative for - chest pain, dyspnea on exertion, edema,  Gastrointestinal: negative for - abdominal pain, nausea/vomiting  Genito-Urinary: negative for - dysuria, incontinence, irregular/heavy menses or urinary frequency/urgency  Musculoskeletal: negative for - gait disturbance, joint pain, joint stiffness, joint swelling, muscle pain, muscular weakness  Dermatological:  As in HPI  Neurological: negative for confusion, dizziness, headaches, impaired coordination/balance, memory loss, numbness/tingling, seizures, speech problems, tremors or weakness       Objective:   Temp 97 5 °F (36 4 °C)     Physical Exam:    General Appearance:    Alert, cooperative, no distress   Head:    Normocephalic, without obvious abnormality, atraumatic           Skin:   A full skin exam was performed including scalp, head scalp, eyes, ears, nose, lips, neck, chest, axilla, abdomen, back, buttocks, bilateral upper extremities, bilateral lower extremities, hands, feet, fingers, toes, fingernails, and toenails dome-shaped 3 mm papule noted on the right knee normal keratotic papules greasy stuck normal pigmented lesions regular shape and color nothing markedly atypical noted exam     Assessment:     1  Seborrheic keratosis     2  Screening for skin condition           Plan:   Seborrheic Keratosis  Patient reasurred these are normal growths we acquire with age no treatment needed    Lesion on the knee appears to be probably a benign adnexal growth no treatment needed  Screening for Dermatologic Disorders: Nothing else of concern noted on complete exam follow up in 1 year     Kristy Colorado MD  9/9/7221,2:62 AM    Portions of the record may have been created with voice recognition software   Occasional wrong word or "sound a like" substitutions may have occurred due to the inherent limitations of voice recognition software   Read the chart carefully and recognize, using context, where substitutions have occurred

## 2020-09-01 NOTE — PATIENT INSTRUCTIONS
Seborrheic Keratosis  Patient reasurred these are normal growths we acquire with age no treatment needed    Lesion on the knee appears to be probably a benign adnexal growth no treatment needed  Screening for Dermatologic Disorders: Nothing else of concern noted on complete exam follow up in 1 year

## 2020-09-03 ENCOUNTER — TELEPHONE (OUTPATIENT)
Dept: PAIN MEDICINE | Facility: CLINIC | Age: 75
End: 2020-09-03

## 2020-09-03 NOTE — TELEPHONE ENCOUNTER
S/w pt, she reports 70-80% relief, very pleased, current pain level 2-3/10  Pt asked to set up a follow up appt, scheduled for 9/29

## 2020-09-05 ENCOUNTER — HOSPITAL ENCOUNTER (OUTPATIENT)
Dept: CT IMAGING | Facility: HOSPITAL | Age: 75
Discharge: HOME | End: 2020-09-05
Attending: INTERNAL MEDICINE
Payer: MEDICARE

## 2020-09-05 DIAGNOSIS — R91.8 LUNG NODULES: ICD-10-CM

## 2020-09-05 PROCEDURE — 71250 CT THORAX DX C-: CPT

## 2020-09-06 ENCOUNTER — TELEPHONE (OUTPATIENT)
Dept: PAIN MEDICINE | Facility: CLINIC | Age: 75
End: 2020-09-06

## 2020-09-06 DIAGNOSIS — G58.8 INTERCOSTAL NEURALGIA: ICD-10-CM

## 2020-09-06 DIAGNOSIS — M54.15 THORACOLUMBAR RADICULOPATHY DUE TO INTERVERTEBRAL DISC DISORDER: ICD-10-CM

## 2020-09-06 DIAGNOSIS — M51.16 LUMBAR DISC DISEASE WITH RADICULOPATHY: ICD-10-CM

## 2020-09-09 ENCOUNTER — TELEPHONE (OUTPATIENT)
Dept: PULMONOLOGY | Facility: CLINIC | Age: 75
End: 2020-09-09

## 2020-09-10 ENCOUNTER — OFFICE VISIT (OUTPATIENT)
Dept: PULMONOLOGY | Facility: CLINIC | Age: 75
End: 2020-09-10
Payer: MEDICARE

## 2020-09-10 VITALS
WEIGHT: 135 LBS | HEIGHT: 62 IN | TEMPERATURE: 97 F | BODY MASS INDEX: 24.84 KG/M2 | SYSTOLIC BLOOD PRESSURE: 104 MMHG | HEART RATE: 87 BPM | DIASTOLIC BLOOD PRESSURE: 80 MMHG | OXYGEN SATURATION: 93 %

## 2020-09-10 DIAGNOSIS — G47.33 OSA (OBSTRUCTIVE SLEEP APNEA): ICD-10-CM

## 2020-09-10 DIAGNOSIS — J44.9 COPD WITH CHRONIC BRONCHITIS (HCC): Primary | ICD-10-CM

## 2020-09-10 DIAGNOSIS — Z72.0 TOBACCO ABUSE: ICD-10-CM

## 2020-09-10 DIAGNOSIS — R91.8 LUNG NODULES: ICD-10-CM

## 2020-09-10 PROCEDURE — 99214 OFFICE O/P EST MOD 30 MIN: CPT | Performed by: INTERNAL MEDICINE

## 2020-09-10 NOTE — PROGRESS NOTES
Assessment/Plan:   Diagnoses and all orders for this visit:    COPD with chronic bronchitis (HCC)    Lung nodules  -     CT chest without contrast; Future    Tobacco abuse    CHOCO (obstructive sleep apnea)        Chronic bronchitis likely secondary to extensive smoking history still actively smoking a few cigarettes a day  Again long discussion with the patient patient regarding smoking cessation, she wants to do it on her own  PFTs with mild obstructive lung disease with no appreciable response to the bronchodilator and severely decreased DLCO  She is currently not on any bronchodilators  Recent CT of the chest report and images reviewed with emphysematous changes bilaterally and pleural calcifications in the episodes  The lung nodules have remained stable  Given active smoking history Will repeat CT of the chest in 1 year from now and follow-up  Recommend flu shot fall 2020  Will follow-up in 6 months or p r n  Earlier as needed  Return in about 6 months (around 3/10/2021)  All questions are answered to the patient's satisfaction and understanding  She verbalizes understanding  She is encouraged to call with any further questions or concerns  Portions of the record may have been created with voice recognition software  Occasional wrong word or "sound a like" substitutions may have occurred due to the inherent limitations of voice recognition software  Read the chart carefully and recognize, using context, where substitutions have occurred      Electronically Signed by Silvano Laguerre MD    ______________________________________________________________________    Chief Complaint:   Chief Complaint   Patient presents with    Follow-up       Patient ID: Paula Ross is a 66 y o  y o  female has a past medical history of Anxiety, Cataract, Chronic pain disorder, COPD (chronic obstructive pulmonary disease) ([address] ), Hypothyroidism, Multiple sclerosis ([address] ), CHOCO (obstructive sleep apnea), Peroneal muscle atrophy, and Thoracolumbar radiculopathy due to intervertebral disc disorder  9/10/2020  Patient presents today for follow-up visit  Donna Cadena is a 66 y o  female  with greater than 45 pack year smoking history still actively smoking about half a pack a day, states she has significantly cut down from last visit, with history of chronic bronchitis, also was diagnosed with severe obstructive sleep apnea was placed on CPAP and she Was titrated to 9 cm of water could not tolerate the had brought her down to 8 cm and she has been doing well with the current setting at 8 cm of water, with decreased nocturnal awakenings feels well rested when she wakes up in the morning  Here for follow-up with a repeat CT of the chest for follow-up of the lung nodules      Review of Systems   Constitutional: Positive for fatigue  HENT: Positive for postnasal drip  Eyes: Negative  Respiratory: Positive for cough and wheezing ( occasional)  Cardiovascular: Negative  Gastrointestinal: Negative  Endocrine: Negative  Genitourinary: Negative  Musculoskeletal: Negative  Allergic/Immunologic: Negative  Neurological: Negative  Psychiatric/Behavioral: Positive for sleep disturbance  The patient is nervous/anxious  Smoking history: She reports that she has been smoking cigarettes  She started smoking about 62 years ago  She has a 60 00 pack-year smoking history   She has never used smokeless tobacco     The following portions of the patient's history were reviewed and updated as appropriate: allergies, current medications, past family history, past medical history, past social history, past surgical history and problem list     Immunization History   Administered Date(s) Administered    INFLUENZA 1945, 09/14/2016    Pneumococcal Polysaccharide PPV23 1945, 05/22/2008    Tuberculin Skin Test-PPD Intradermal 01/13/2017     Current Outpatient Medications   Medication Sig Dispense Refill    Cholecalciferol (VITAMIN D-3) 1000 units CAPS Take 1,000 Units by mouth daily       docusate sodium (COLACE) 100 mg capsule Take 1 capsule (100 mg total) by mouth 2 (two) times a day 60 capsule 0    levothyroxine 100 mcg tablet Take 1 tablet (100 mcg total) by mouth daily 90 tablet 3    pantoprazole (PROTONIX) 40 mg tablet Take 1 tablet (40 mg total) by mouth 2 (two) times a day (Patient taking differently: Take 40 mg by mouth as needed ) 60 tablet 1    polyethylene glycol (MIRALAX) 17 g packet Take 17 g by mouth daily 14 each 0    tiZANidine (ZANAFLEX) 4 mg tablet Take 0 5 tablets (2 mg total) by mouth 3 (three) times a day (Patient taking differently: Take 2 mg by mouth as needed ) 90 tablet 1    vitamin B-12 (CYANOCOBALAMIN) 100 MCG tablet Take 100 mcg by mouth daily       DULoxetine (CYMBALTA) 30 mg delayed release capsule TAKE 2 TABS IN AM AND ONE TAB AT NIGHT 90 capsule 1    morphine (MSIR) 15 mg tablet Take 1 tablet (15 mg total) by mouth 2 (two) times a day as needed for severe painMax Daily Amount: 30 mg 60 tablet 0     No current facility-administered medications for this visit  Allergies: Patient has no known allergies  Objective:  Vitals:    09/10/20 1112   BP: 104/80   Pulse: 87   Temp: (!) 97 °F (36 1 °C)   SpO2: 93%   Weight: 61 2 kg (135 lb)   Height: 5' 1 75" (1 568 m)   Oxygen Therapy  SpO2: 93 %    Wt Readings from Last 3 Encounters:   09/10/20 61 2 kg (135 lb)   08/27/20 59 kg (130 lb)   08/25/20 60 6 kg (133 lb 9 6 oz)     Body mass index is 24 89 kg/m²  Physical Exam  Vitals signs and nursing note reviewed  Constitutional:       Appearance: She is well-developed  HENT:      Head: Normocephalic and atraumatic  Eyes:      Conjunctiva/sclera: Conjunctivae normal       Pupils: Pupils are equal, round, and reactive to light  Neck:      Musculoskeletal: Normal range of motion and neck supple  Thyroid: No thyromegaly  Vascular: No JVD     Cardiovascular:      Rate and Rhythm: Normal rate and regular rhythm  Heart sounds: Normal heart sounds  No murmur  No friction rub  No gallop  Pulmonary:      Effort: Pulmonary effort is normal  No respiratory distress  Breath sounds: Normal breath sounds  No wheezing or rales  Chest:      Chest wall: No tenderness  Musculoskeletal: Normal range of motion  General: No tenderness or deformity  Lymphadenopathy:      Cervical: No cervical adenopathy  Skin:     General: Skin is warm and dry  Neurological:      Mental Status: She is alert and oriented to person, place, and time  Diagnostics:  I have personally reviewed pertinent films in PACS  Ct Chest Without Contrast    Result Date: 9/10/2020  Narrative: CT CHEST WITHOUT IV CONTRAST INDICATION:   R91 8: Other nonspecific abnormal finding of lung field  COMPARISON:  Multiple prior studies, most recently 3/10/2020 TECHNIQUE: CT examination of the chest was performed without intravenous contrast   Axial, sagittal, and coronal 2D reformatted images were created from the source data and submitted for interpretation  Radiation dose length product (DLP) for this visit:  95 mGy-cm   This examination, like all CT scans performed in the [facility], was performed utilizing techniques to minimize radiation dose exposure, including the use of iterative reconstruction and automated exposure control  FINDINGS: LUNGS:  Fibrobullous changes with pleural calcifications are noted at the lung apices  Edematous changes are noted diffusely throughout the lungs  There is a calcified granuloma in the lingula  Again seen is an ovoid 0 7 cm smoothly marginated nodule in the right middle lobe, adjacent to the minor fissure (series 3, image 56), unchanged, favored to represent an intrapulmonary lymph node  0 3 cm solid nodule in the right middle lobe (series 3, image 72), unchanged   0 3 cm nodular densities in the right lower lobe (series 603, images 36 and 43) only seen on MIP images are unchanged  PLEURA:  There is mild pleural nodularity  There is no pleural effusion or pneumothorax  HEART/GREAT VESSELS:  Atherosclerotic changes are noted in thoracic aorta and coronary arteries  MEDIASTINUM AND SAHIL:  Unremarkable  CHEST WALL AND LOWER NECK:   Unremarkable  VISUALIZED STRUCTURES IN THE UPPER ABDOMEN:  Mild biliary ductal dilation is again noted  There is calcification of the adrenal glands bilaterally  OSSEOUS STRUCTURES:  Spinal degenerative changes are noted  No acute fracture or destructive osseous lesion  There is a thoracic dextroscoliosis  Impression: Small pulmonary nodules, unchanged since study of 12/7/2017, compatible with a benign etiology  No new pulmonary nodule identified   Workstation performed: [id number]

## 2020-09-13 RX ORDER — DULOXETIN HYDROCHLORIDE 30 MG/1
CAPSULE, DELAYED RELEASE ORAL
Qty: 90 CAPSULE | Refills: 1 | Status: SHIPPED | OUTPATIENT
Start: 2020-09-13 | End: 2021-01-12

## 2020-09-16 NOTE — TELEPHONE ENCOUNTER
Pt contacted Call Center requested refill of their medication  Medication Name:  Duloxetine    Dosage of Med:  30 mg    Frequency of Med:  2 in am 1 at night    Remaining Medication:  1 day supply    Pharmacy and Location:    [address] [place] [address]    Pt  Preferred Callback Phone Number:    [phone]  Thank you

## 2020-09-29 ENCOUNTER — TRANSCRIBE ORDERS (OUTPATIENT)
Dept: PAIN MEDICINE | Facility: CLINIC | Age: 75
End: 2020-09-29

## 2020-09-29 ENCOUNTER — OFFICE VISIT (OUTPATIENT)
Dept: PAIN MEDICINE | Facility: CLINIC | Age: 75
End: 2020-09-29
Payer: MEDICARE

## 2020-09-29 VITALS
SYSTOLIC BLOOD PRESSURE: 132 MMHG | TEMPERATURE: 97.9 F | WEIGHT: 135 LBS | DIASTOLIC BLOOD PRESSURE: 76 MMHG | HEART RATE: 94 BPM | BODY MASS INDEX: 24.89 KG/M2

## 2020-09-29 DIAGNOSIS — G89.4 CHRONIC PAIN SYNDROME: Primary | ICD-10-CM

## 2020-09-29 DIAGNOSIS — G58.8 INTERCOSTAL NEURALGIA: ICD-10-CM

## 2020-09-29 DIAGNOSIS — M54.16 CHRONIC LUMBAR RADICULOPATHY: ICD-10-CM

## 2020-09-29 DIAGNOSIS — Z79.891 LONG-TERM CURRENT USE OF OPIATE ANALGESIC: ICD-10-CM

## 2020-09-29 PROCEDURE — 99214 OFFICE O/P EST MOD 30 MIN: CPT | Performed by: PHYSICAL MEDICINE & REHABILITATION

## 2020-09-29 NOTE — PATIENT INSTRUCTIONS
Chronic Pain   WHAT YOU NEED TO KNOW:   Chronic pain is pain that does not get better for 3 months or longer  Chronic pain may hurt all the time, or come and go  DISCHARGE INSTRUCTIONS:   Call 911 or have someone call 911 for any of the following:   · You are breathing slower than normal, or you have trouble breathing  · You cannot be awakened  · You have a seizure  Return to the emergency department if:   · Your heart is beating slower than normal     · Your heart feels like it is jumping or fluttering  · You cannot think clearly  Contact your healthcare provider if:   · You have side effects from prescription pain medicine, such as itching, nausea, or vomiting  · You have trouble sleeping  · Your pain gets worse, even after you take medicine  · You don't think the medicine is working  · You have questions or concerns about your condition or care  Medicines: You may need any of the following:  · Acetaminophen  decreases pain  It is available without a doctor's order  Ask how much to take and how often to take it  Follow directions  Read the labels of all other medicines you are using to see if they also contain acetaminophen, or ask your doctor or pharmacist  Acetaminophen can cause liver damage if not taken correctly  Do not use more than 4 grams (4,000 milligrams) total of acetaminophen in one day  · NSAIDs , such as ibuprofen, help decrease swelling, pain, and fever  This medicine is available with or without a doctor's order  NSAIDs can cause stomach bleeding or kidney problems in certain people  If you take blood thinner medicine, always ask your healthcare provider if NSAIDs are safe for you  Always read the medicine label and follow directions  · Prescription pain medicine  called narcotics or opioids may be given  Ask your healthcare provider how to take this medicine safely       · Anesthetics  can be rubbed on your skin or injected into a nerve or muscle to numb an area     · Other medicines  may reduce pain, anxiety, muscle tension, or swelling  · Take your medicine as directed  Contact your healthcare provider if you think your medicine is not helping or if you have side effects  Tell him of her if you are allergic to any medicine  Keep a list of the medicines, vitamins, and herbs you take  Include the amounts, and when and why you take them  Bring the list or the pill bottles to follow-up visits  Carry your medicine list with you in case of an emergency  Manage your chronic pain:   · Apply heat  on the area in pain for 20 to 30 minutes every 2 hours for as many days as directed  Heat helps decrease pain and muscle spasms  · Apply ice  on the part of your body that hurts for 15 to 20 minutes every hour or as directed  Use an ice pack, or put crushed ice in a plastic bag  Cover it with a towel  Ice decreases pain and swelling, and helps prevent tissue damage  · Go to physical therapy as directed  A physical therapist teaches you exercises to help improve movement and strength, and to decrease pain  · Exercise for 30 minutes, 3 times a week  Regular physical activity can help decrease pain and improve your quality of life  Ask your healthcare provider about the best exercise plan for your type of pain  · Get enough sleep  Create a relaxing bedtime routine  Go to sleep and wake up at the same time every day  Avoid caffeine in the afternoon  · Talk with a counselor or therapist   A type of counseling called cognitive behavioral therapy (CBT) can help your chronic pain by changing the way you think about it  CBT can also improve your mood, sleep, and ability to move  What you must know if you take narcotic pain medicine:   · You may need to take a bowel movement softener  The most common side effect of prescription pain medicine is constipation  Bowel movement softeners are available over the counter  · Do not mix prescription pain medicines    This can cause an overdose of medicine, which can become life-threatening  Read labels  Make sure you know the ingredients in all of your medicines  · Do not drink alcohol  when you take prescription pain medicine  It is not safe to mix narcotics or opioids with alcohol or illegal drugs  · Prescription pain medicine may impair your ability to drive or work safely  They may also cause dizziness and increase your risk for falling  · Store prescription pain medicine in a safe location at home  Keep your medicine away from children and other people  Never share your medicine with anyone  Follow up with your healthcare provider as directed: You may be referred to a pain specialist  Write down your questions so you remember to ask them during your visits  © 2017 [facility] Information is for End User's use only and may not be sold, redistributed or otherwise used for commercial purposes  All illustrations and images included in CareNotes® are the copyrighted property of [employer]  or [employer]  The above information is an  only  It is not intended as medical advice for individual conditions or treatments  Talk to your doctor, nurse or pharmacist before following any medical regimen to see if it is safe and effective for you

## 2020-09-29 NOTE — PROGRESS NOTES
Assessment:  1  Chronic pain syndrome    2  Long-term current use of opiate analgesic    3  Chronic lumbar radiculopathy    4  Intercostal neuralgia        Plan:  Ms Kaiser Knox is a pleasant 66-year-old female who presents for follow-up and re-evaluation regarding chronic lumbar radiculopathy, intercostal neuralgia, chronic pain syndrome and long-term current use of opiate medications whom we have performed multiple interventional approaches to manage her pain including a right-sided L4-L5 TFESI, right-sided paramedian approach LESI as well as to intercostal nerve blocks under ultrasound guidance all of which have provided short-term relief in her chronic pain  During today's evaluation extensive conversation was had regarding long-term prognosis and other approaches to manage her chronic pain  At this time I believe a spinal cord stimulator would be advised and recommended to treat the previously aforementioned pathology  She was offered this while in [facility] in [facility] but stated the travel was too much for her at the time  She is very interested in amenable with this approach  As such we will  1  Order psych eval prior to spinal cord stim trial  2  We already have MRI thoracic spine which does not demonstrate any cord compromise or hindrance to spinal cord stimulator trial  3  Will provide patient with [employer] information  4  I will have [employer] representative personally reach out to patient to help answer any further questions patient may have  5  Complete risks and benefits including bleeding, infection, tissue reaction, nerve injury and allergic reaction were discussed  The approach was demonstrated using models and literature was provided  Verbal and written consent was obtained  My impressions and treatment recommendations were discussed in detail with the patient who verbalized understanding and had no further questions  Discharge instructions were provided   I personally saw and examined the patient and I agree with the above discussed plan of care  Orders Placed This Encounter   Procedures    Ambulatory referral to Psychiatry     Standing Status:   Future     Standing Expiration Date:   9/29/2021     Referral Priority:   Routine     Referral Type:   Consult - AMB     Referral Reason:   Specialty Services Required     Requested Specialty:   Psychiatry     Number of Visits Requested:   1     Expiration Date:   9/29/2021     No orders of the defined types were placed in this encounter  History of Present Illness:  Dillon Foster is a 66 y o  female who presents for a follow up office visit in regards to Back Pain and Hip Pain  The patients current symptoms include right-sided low back and hip pain as well as right-sided intercostal and midthoracic pain  We previously performed a right-sided intercostal nerve block on August 27, 2020  After the injection patient reported approximately 70-80% relief in her pain  However, patient is reporting worsening right-sided low back and radicular leg pain currently reporting 7/10 pain unrelieved with current medication regimen  Describes it as a constant throbbing, aching, shooting pain that is worse in the morning in the evening  Presents for follow-up    I have personally reviewed and/or updated the patient's past medical history, past surgical history, family history, social history, current medications, allergies, and vital signs today  Review of Systems   Respiratory: Negative for shortness of breath  Cardiovascular: Negative for chest pain  Gastrointestinal: Negative for constipation, diarrhea, nausea and vomiting  Musculoskeletal: Positive for back pain, gait problem and joint swelling  Negative for arthralgias and myalgias  Skin: Negative for rash  Neurological: Negative for dizziness, seizures and weakness  All other systems reviewed and are negative        Patient Active Problem List   Diagnosis    CHOCO on CPAP  COPD with chronic bronchitis (HCC)    Hypothyroidism    Insomnia    Lung nodule, multiple    Macular degeneration    Multiple sclerosis (HCC)    Rosacea    Seborrheic keratosis    Chronic neuropathic pain    Thoracolumbar radiculopathy due to intervertebral disc disorder    Other constipation    Intercostal neuralgia    Lumbar disc disease with radiculopathy    Intercostal muscle pain       Past Medical History:   Diagnosis Date    Anxiety     Cataract     last assessed 14    Chronic pain disorder     COPD (chronic obstructive pulmonary disease) (HCC)     Hypothyroidism     Multiple sclerosis (HCC)     CHOCO (obstructive sleep apnea)     uses cpap    Peroneal muscle atrophy     Thoracolumbar radiculopathy due to intervertebral disc disorder        Past Surgical History:   Procedure Laterality Date    APPENDECTOMY      CATARACT EXTRACTION       SECTION      CHOLECYSTECTOMY      GALLBLADDER SURGERY      [place] COLONOSCOPY FLX DX W/COLLJ SPEC WHEN PFRMD N/A 2018    Procedure: COLONOSCOPY;  Surgeon: Nikky Valenzuela MD;  Location: [facility];   Service: Colorectal    THROAT SURGERY         Family History   Problem Relation Age of Onset    Skin cancer Mother     Hypertension Father         benign essential    Stroke Father     Lung cancer Brother        Social History     Occupational History    Occupation: retired     Comment: part time as per [employer]   Tobacco Use    Smoking status: Current Every Day Smoker     Packs/day: 1 00     Years: 60 00     Pack years: 60 00     Types: Cigarettes     Start date: 1958    Smokeless tobacco: Never Used   Substance and Sexual Activity    Alcohol use: Yes     Frequency: Monthly or less     Drinks per session: 1 or 2     Binge frequency: Never    Drug use: Not Currently     Types: Morphine     Comment: medical-RSO  last dose 11 days ago    Sexual activity: Yes     Partners: Male       Current Outpatient Medications on File Prior to Visit   Medication Sig    Cholecalciferol (VITAMIN D-3) 1000 units CAPS Take 1,000 Units by mouth daily     docusate sodium (COLACE) 100 mg capsule Take 1 capsule (100 mg total) by mouth 2 (two) times a day    DULoxetine (CYMBALTA) 30 mg delayed release capsule TAKE 2 TABS IN AM AND ONE TAB AT NIGHT    levothyroxine 100 mcg tablet Take 1 tablet (100 mcg total) by mouth daily    pantoprazole (PROTONIX) 40 mg tablet Take 1 tablet (40 mg total) by mouth 2 (two) times a day (Patient taking differently: Take 40 mg by mouth as needed )    polyethylene glycol (MIRALAX) 17 g packet Take 17 g by mouth daily    tiZANidine (ZANAFLEX) 4 mg tablet Take 0 5 tablets (2 mg total) by mouth 3 (three) times a day (Patient taking differently: Take 2 mg by mouth as needed )    vitamin B-12 (CYANOCOBALAMIN) 100 MCG tablet Take 100 mcg by mouth daily     morphine (MSIR) 15 mg tablet Take 1 tablet (15 mg total) by mouth 2 (two) times a day as needed for severe painMax Daily Amount: 30 mg     No current facility-administered medications on file prior to visit  No Known Allergies    Physical Exam:    /76   Pulse 94   Temp 97 9 °F (36 6 °C) (Oral)   Wt 61 2 kg (135 lb)   BMI 24 89 kg/m²     Constitutional:normal, well developed, well nourished, alert, in no distress and non-toxic and no overt pain behavior    Eyes:anicteric  HEENT:grossly intact  Neck:supple, symmetric, trachea midline and no masses   Pulmonary:even and unlabored  Cardiovascular:No edema or pitting edema present  Skin:Normal without rashes or lesions and well hydrated  Psychiatric:Mood and affect appropriate  Neurologic:Cranial Nerves II-XII grossly intact  Musculoskeletal:antalgic    Imaging

## 2020-10-01 ENCOUNTER — TELEPHONE (OUTPATIENT)
Dept: RADIOLOGY | Facility: CLINIC | Age: 75
End: 2020-10-01

## 2020-10-07 ENCOUNTER — HOSPITAL ENCOUNTER (OUTPATIENT)
Dept: MAMMOGRAPHY | Facility: CLINIC | Age: 75
Discharge: HOME | End: 2020-10-07
Payer: MEDICARE

## 2020-10-07 DIAGNOSIS — M85.88 OSTEOPENIA OF OTHER SITE: ICD-10-CM

## 2020-10-07 PROCEDURE — 77080 DXA BONE DENSITY AXIAL: CPT

## 2020-10-08 ENCOUNTER — TELEPHONE (OUTPATIENT)
Dept: INTERNAL MEDICINE CLINIC | Facility: CLINIC | Age: 75
End: 2020-10-08

## 2020-10-09 ENCOUNTER — TELEPHONE (OUTPATIENT)
Dept: INTERNAL MEDICINE CLINIC | Facility: CLINIC | Age: 75
End: 2020-10-09

## 2020-10-15 DIAGNOSIS — G89.29 CHRONIC NEUROPATHIC PAIN: ICD-10-CM

## 2020-10-15 DIAGNOSIS — M79.2 CHRONIC NEUROPATHIC PAIN: ICD-10-CM

## 2020-10-15 RX ORDER — MORPHINE SULFATE 15 MG/1
15 TABLET ORAL 2 TIMES DAILY PRN
Qty: 60 TABLET | Refills: 0 | Status: SHIPPED | OUTPATIENT
Start: 2020-10-15 | End: 2020-12-11 | Stop reason: SDUPTHER

## 2020-10-20 ENCOUNTER — TELEPHONE (OUTPATIENT)
Dept: PAIN MEDICINE | Facility: CLINIC | Age: 75
End: 2020-10-20

## 2020-11-11 DIAGNOSIS — K59.00 CONSTIPATION, UNSPECIFIED CONSTIPATION TYPE: ICD-10-CM

## 2020-11-12 RX ORDER — DOCUSATE SODIUM 100 MG/1
CAPSULE, LIQUID FILLED ORAL
Qty: 60 CAPSULE | Refills: 0 | Status: SHIPPED | OUTPATIENT
Start: 2020-11-12 | End: 2021-01-12

## 2020-12-11 DIAGNOSIS — M79.2 CHRONIC NEUROPATHIC PAIN: ICD-10-CM

## 2020-12-11 DIAGNOSIS — G89.29 CHRONIC NEUROPATHIC PAIN: ICD-10-CM

## 2020-12-11 RX ORDER — MORPHINE SULFATE 15 MG/1
15 TABLET ORAL 2 TIMES DAILY PRN
Qty: 60 TABLET | Refills: 0 | Status: SHIPPED | OUTPATIENT
Start: 2020-12-11 | End: 2021-02-15 | Stop reason: SDUPTHER

## 2020-12-15 ENCOUNTER — OFFICE VISIT (OUTPATIENT)
Dept: INTERNAL MEDICINE CLINIC | Facility: CLINIC | Age: 75
End: 2020-12-15
Payer: MEDICARE

## 2020-12-15 VITALS
SYSTOLIC BLOOD PRESSURE: 120 MMHG | HEART RATE: 73 BPM | DIASTOLIC BLOOD PRESSURE: 82 MMHG | TEMPERATURE: 97.5 F | BODY MASS INDEX: 26.11 KG/M2 | WEIGHT: 141.6 LBS | OXYGEN SATURATION: 98 %

## 2020-12-15 DIAGNOSIS — M54.15 THORACOLUMBAR RADICULOPATHY DUE TO INTERVERTEBRAL DISC DISORDER: Primary | ICD-10-CM

## 2020-12-15 DIAGNOSIS — J44.9 COPD WITH CHRONIC BRONCHITIS (HCC): Chronic | ICD-10-CM

## 2020-12-15 DIAGNOSIS — E03.9 ACQUIRED HYPOTHYROIDISM: Chronic | ICD-10-CM

## 2020-12-15 PROCEDURE — 99214 OFFICE O/P EST MOD 30 MIN: CPT | Performed by: INTERNAL MEDICINE

## 2021-01-12 ENCOUNTER — TRANSCRIBE ORDERS (OUTPATIENT)
Dept: PAIN MEDICINE | Facility: CLINIC | Age: 76
End: 2021-01-12

## 2021-01-12 ENCOUNTER — OFFICE VISIT (OUTPATIENT)
Dept: PAIN MEDICINE | Facility: CLINIC | Age: 76
End: 2021-01-12
Payer: MEDICARE

## 2021-01-12 ENCOUNTER — LAB (OUTPATIENT)
Dept: LAB | Facility: HOSPITAL | Age: 76
End: 2021-01-12
Attending: PHYSICAL MEDICINE & REHABILITATION
Payer: MEDICARE

## 2021-01-12 VITALS — WEIGHT: 141 LBS | BODY MASS INDEX: 26 KG/M2

## 2021-01-12 DIAGNOSIS — M54.16 LUMBAR RADICULOPATHY: ICD-10-CM

## 2021-01-12 DIAGNOSIS — G58.8 INTERCOSTAL NEURALGIA: ICD-10-CM

## 2021-01-12 DIAGNOSIS — M51.16 LUMBAR DISC DISEASE WITH RADICULOPATHY: Primary | ICD-10-CM

## 2021-01-12 DIAGNOSIS — Z79.01 CHRONIC ANTICOAGULATION: ICD-10-CM

## 2021-01-12 DIAGNOSIS — G89.4 CHRONIC PAIN SYNDROME: ICD-10-CM

## 2021-01-12 DIAGNOSIS — Z79.899 ENCOUNTER FOR LONG-TERM (CURRENT) USE OF OTHER MEDICATIONS: ICD-10-CM

## 2021-01-12 DIAGNOSIS — M79.18 INTERCOSTAL MUSCLE PAIN: ICD-10-CM

## 2021-01-12 DIAGNOSIS — G89.4 CHRONIC PAIN SYNDROME: Primary | ICD-10-CM

## 2021-01-12 DIAGNOSIS — G35 MULTIPLE SCLEROSIS (HCC): Chronic | ICD-10-CM

## 2021-01-12 DIAGNOSIS — M54.15 THORACOLUMBAR RADICULOPATHY DUE TO INTERVERTEBRAL DISC DISORDER: ICD-10-CM

## 2021-01-12 DIAGNOSIS — M54.16 CHRONIC LUMBAR RADICULOPATHY: ICD-10-CM

## 2021-01-12 LAB
ALBUMIN SERPL BCP-MCNC: 3.8 G/DL (ref 3.5–5)
ALP SERPL-CCNC: 141 U/L (ref 46–116)
ALT SERPL W P-5'-P-CCNC: 40 U/L (ref 12–78)
ANION GAP SERPL CALCULATED.3IONS-SCNC: 8 MMOL/L (ref 4–13)
APTT PPP: 35 SECONDS (ref 23–37)
AST SERPL W P-5'-P-CCNC: 22 U/L (ref 5–45)
BILIRUB SERPL-MCNC: 0.4 MG/DL (ref 0.2–1)
BUN SERPL-MCNC: 17 MG/DL (ref 5–25)
CALCIUM SERPL-MCNC: 9.6 MG/DL (ref 8.3–10.1)
CHLORIDE SERPL-SCNC: 102 MMOL/L (ref 100–108)
CO2 SERPL-SCNC: 29 MMOL/L (ref 21–32)
CREAT SERPL-MCNC: 0.81 MG/DL (ref 0.6–1.3)
ERYTHROCYTE [DISTWIDTH] IN BLOOD BY AUTOMATED COUNT: 14 % (ref 11.6–15.1)
EST. AVERAGE GLUCOSE BLD GHB EST-MCNC: 111 MG/DL
GFR SERPL CREATININE-BSD FRML MDRD: 70 ML/MIN/1.73SQ M
GLUCOSE SERPL-MCNC: 104 MG/DL (ref 65–140)
HBA1C MFR BLD: 5.5 %
HCT VFR BLD AUTO: 45 % (ref 34.8–46.1)
HGB BLD-MCNC: 14.6 G/DL (ref 11.5–15.4)
INR PPP: 1.07 (ref 0.84–1.19)
MCH RBC QN AUTO: 31.4 PG (ref 26.8–34.3)
MCHC RBC AUTO-ENTMCNC: 32.4 G/DL (ref 31.4–37.4)
MCV RBC AUTO: 97 FL (ref 82–98)
PLATELET # BLD AUTO: 278 THOUSANDS/UL (ref 149–390)
PMV BLD AUTO: 9.6 FL (ref 8.9–12.7)
POTASSIUM SERPL-SCNC: 4.6 MMOL/L (ref 3.5–5.3)
PROT SERPL-MCNC: 8.2 G/DL (ref 6.4–8.2)
PROTHROMBIN TIME: 13.4 SECONDS (ref 11.6–14.5)
RBC # BLD AUTO: 4.65 MILLION/UL (ref 3.81–5.12)
SODIUM SERPL-SCNC: 139 MMOL/L (ref 136–145)
WBC # BLD AUTO: 8.15 THOUSAND/UL (ref 4.31–10.16)

## 2021-01-12 PROCEDURE — 85027 COMPLETE CBC AUTOMATED: CPT

## 2021-01-12 PROCEDURE — 36415 COLL VENOUS BLD VENIPUNCTURE: CPT

## 2021-01-12 PROCEDURE — 80053 COMPREHEN METABOLIC PANEL: CPT

## 2021-01-12 PROCEDURE — 85730 THROMBOPLASTIN TIME PARTIAL: CPT

## 2021-01-12 PROCEDURE — 85610 PROTHROMBIN TIME: CPT

## 2021-01-12 PROCEDURE — 99214 OFFICE O/P EST MOD 30 MIN: CPT | Performed by: PHYSICAL MEDICINE & REHABILITATION

## 2021-01-12 PROCEDURE — 83036 HEMOGLOBIN GLYCOSYLATED A1C: CPT

## 2021-01-12 RX ORDER — CEFAZOLIN SODIUM 1 G/50ML
1000 SOLUTION INTRAVENOUS ONCE
Status: CANCELLED | OUTPATIENT
Start: 2021-01-12 | End: 2021-01-12

## 2021-01-12 RX ORDER — NALOXEGOL OXALATE 12.5 MG/1
12.5 TABLET, FILM COATED ORAL DAILY
COMMUNITY
Start: 2020-12-18 | End: 2021-01-20 | Stop reason: ALTCHOICE

## 2021-01-12 NOTE — PATIENT INSTRUCTIONS
Chronic Pain   WHAT YOU NEED TO KNOW:   Chronic pain is pain that does not get better for 3 months or longer  Chronic pain may hurt all the time, or come and go  DISCHARGE INSTRUCTIONS:   Call your local emergency number or have someone else call (911 in the [address]) if:   · You are breathing slower than normal, or you have trouble breathing  · You cannot be awakened  · You have a seizure  Call your doctor if:   · Your heart feels like it is jumping or fluttering  · You cannot think clearly  · You have side effects from prescription pain medicine, such as itching, nausea, or vomiting  · You have trouble sleeping  · Your pain gets worse, even after you take medicine  · You don't think the medicine is working  · You have questions or concerns about your condition or care  Medicines: You may need any of the following:  · Acetaminophen  decreases pain and fever  It is available without a doctor's order  Ask how much to take and how often to take it  Follow directions  Read the labels of all other medicines you are using to see if they also contain acetaminophen, or ask your doctor or pharmacist  Acetaminophen can cause liver damage if not taken correctly  Do not use more than 4 grams (4,000 milligrams) total of acetaminophen in one day  · NSAIDs , such as ibuprofen, help decrease swelling, pain, and fever  This medicine is available with or without a doctor's order  NSAIDs can cause stomach bleeding or kidney problems in certain people  If you take blood thinner medicine, always ask your healthcare provider if NSAIDs are safe for you  Always read the medicine label and follow directions  · Prescription pain medicine  called narcotics or opioids may be given for certain types of chronic pain  Ask your healthcare provider how to take this medicine safely  · Anesthetics  can be rubbed on your skin or injected into a nerve or muscle to numb an area      · Other medicines  may reduce pain, anxiety, muscle tension, or swelling  · Take your medicine as directed  Contact your healthcare provider if you think your medicine is not helping or if you have side effects  Tell him of her if you are allergic to any medicine  Keep a list of the medicines, vitamins, and herbs you take  Include the amounts, and when and why you take them  Bring the list or the pill bottles to follow-up visits  Carry your medicine list with you in case of an emergency  Manage your chronic pain:   · Apply heat  on the area in pain for 20 to 30 minutes every 2 hours for as many days as directed  Heat helps decrease pain and muscle spasms  · Apply ice  on the part of your body that hurts for 15 to 20 minutes every hour or as directed  Use an ice pack, or put crushed ice in a plastic bag  Cover it with a towel  Ice decreases pain and swelling, and helps prevent tissue damage  · Go to physical therapy as directed  A physical therapist teaches you exercises to help improve movement and strength, and to decrease pain  · Exercise for 30 minutes, 3 times a week  Regular physical activity can help decrease pain and improve your quality of life  Ask your healthcare provider about the best exercise plan for your type of pain  · Get enough sleep  Create a relaxing bedtime routine  Go to sleep and wake up at the same time every day  Avoid caffeine in the afternoon  · Talk with a counselor or therapist   A type of counseling called cognitive behavioral therapy (CBT) can help your chronic pain by changing the way you think about it  CBT can also improve your mood, sleep, and ability to move  What you must know if you take narcotic pain medicine:   · You may need to take a bowel movement softener  The most common side effect of prescription pain medicine is constipation  Bowel movement softeners are available over the counter  · Do not mix prescription pain medicines    This can cause an overdose of medicine, which can become life-threatening  Read labels  Make sure you know the ingredients in all of your medicines  · Do not drink alcohol  when you take prescription pain medicine  It is not safe to mix narcotics or opioids with alcohol or illegal drugs  · Prescription pain medicine may impair your ability to drive or work safely  They may also cause dizziness and increase your risk for falling  · Store prescription pain medicine in a safe location at home  Keep your medicine away from children and other people  Never share your medicine with anyone  Follow up with your healthcare provider as directed: You may be referred to a pain specialist  Write down your questions so you remember to ask them during your visits  © Copyright [employer] Information is for End User's use only and may not be sold, redistributed or otherwise used for commercial purposes  All illustrations and images included in CareNotes® are the copyrighted property of [employer]  or [facility] Delfina Childress   The above information is an  only  It is not intended as medical advice for individual conditions or treatments  Talk to your doctor, nurse or pharmacist before following any medical regimen to see if it is safe and effective for you

## 2021-01-12 NOTE — PROGRESS NOTES
Assessment    1  Lumbar disc disease with radiculopathy     2  Multiple sclerosis ([address] )     3  Thoracolumbar radiculopathy due to intervertebral disc disorder     4  Intercostal neuralgia     5  Intercostal muscle pain     6  Chronic pain syndrome     7  Chronic lumbar radiculopathy         Plan  The spinal cord stimulator trial was discussed in depth with the patient  The patient was advised that a stimulator lead will be placed percutaneously through an epidural needle, with intra-op stimulation done to confirm appropriate lead placement  The lead will then be connected to an external generator and secured to the skin  The patient was advised that an industry clinical specialist will be present for the trial, program the stimulator and explain how to use it  During the trial, the patient was instructed to perform their activities of daily living, but limit twisting and bending motions, and no lifting greater than 10 pounds  The patient was advised to refrain from tub baths, showers, swimming, and hot tubs during the trial  The patient will return to the office for trial evaluation and lead removal  At that time, if the trial is successful, the patient will be referred to Dr Lexi Boyd for permanent spinal cord stimulator placement  Pre-procedure instructions were reviewed with the patient  The patient was given a prescription for blood work to be done  Complete risks and benefits including bleeding, infection, headache, tissue reaction, nerve injury and allergic reaction were discussed  The approach was demonstrated using models and literature was provided  The patient was advised that they would receive pre-procedure antibiotics and a prescription to take during the week of the trial       No orders of the defined types were placed in this encounter  History of Present Illness  The patient is a 64 y o  female scheduled for an Medtronic spinal cord stimulator trial to treat chronic pain    The current pain pattern includes low back pain with radiating symptoms into the right lower extremity and intercostal nerve pain with radiating symptoms right-sided ribs  The patient's goals for the trial include improvement in pain, performance in activities of daily living, increased quality of life and improvements with sleep  I have personally reviewed and/or updated the patient's past medical history, past surgical history, family history, social history, current medications, allergies, and vital signs today  Review of Systems   Respiratory: Negative for shortness of breath  Cardiovascular: Negative for chest pain  Gastrointestinal: Negative for constipation, diarrhea, nausea and vomiting  Musculoskeletal: Positive for back pain and joint swelling  Negative for arthralgias, gait problem and myalgias  Skin: Negative for rash  Neurological: Negative for dizziness, seizures and weakness  All other systems reviewed and are negative  Past Medical History:   Diagnosis Date    Anxiety     Cataract     last assessed 14    Chronic pain disorder     COPD (chronic obstructive pulmonary disease) (HCC)     Hypothyroidism     Multiple sclerosis (HCC)     CHOCO (obstructive sleep apnea)     uses cpap    Peroneal muscle atrophy     Thoracolumbar radiculopathy due to intervertebral disc disorder        Past Surgical History:   Procedure Laterality Date    APPENDECTOMY      CATARACT EXTRACTION       SECTION      CHOLECYSTECTOMY      GALLBLADDER SURGERY      IN COLONOSCOPY FLX DX W/COLLJ SPEC WHEN PFRMD N/A 2018    Procedure: COLONOSCOPY;  Surgeon: Shu Connelly MD;  Location: [facility];   Service: Colorectal    THROAT SURGERY         Family History   Problem Relation Age of Onset    Skin cancer Mother     Hypertension Father         benign essential    Stroke Father     Lung cancer Brother        Social History     Occupational History    Occupation: retired Comment: part time as per [employer]   Tobacco Use    Smoking status: Current Every Day Smoker     Packs/day: 1 00     Years: 60 00     Pack years: 60 00     Types: Cigarettes     Start date: 2/16/1958    Smokeless tobacco: Never Used   Substance and Sexual Activity    Alcohol use: Yes     Frequency: Monthly or less     Drinks per session: 1 or 2     Binge frequency: Never    Drug use: Not Currently     Types: Morphine     Comment: medical-RSO  last dose 11 days ago    Sexual activity: Yes     Partners: Male         Current Outpatient Medications:     Cholecalciferol (VITAMIN D-3) 1000 units CAPS, Take 1,000 Units by mouth daily , Disp: , Rfl:     levothyroxine 100 mcg tablet, Take 1 tablet (100 mcg total) by mouth daily, Disp: 90 tablet, Rfl: 3    Movantik 12 5 MG TABS, Take 12 5 mg by mouth daily, Disp: , Rfl:     vitamin B-12 (CYANOCOBALAMIN) 100 MCG tablet, Take 100 mcg by mouth daily , Disp: , Rfl:     morphine (MSIR) 15 mg tablet, Take 1 tablet (15 mg total) by mouth 2 (two) times a day as needed for severe painMax Daily Amount: 30 mg, Disp: 60 tablet, Rfl: 0    No Known Allergies    Physical Exam    Wt 64 kg (141 lb)   BMI 26 00 kg/m²     Constitutional:normal, well developed, well nourished, alert, in no distress and non-toxic and no overt pain behavior    Eyes:anicteric  HEENT:grossly intact  Neck:supple, symmetric, trachea midline and no masses   Pulmonary:even and unlabored  Cardiovascular:No edema or pitting edema present  Skin:Normal without rashes or lesions and well hydrated  Psychiatric:Mood and affect appropriate  Neurologic:Cranial Nerves II-XII grossly intact  Musculoskeletal:antalgic    Imaging

## 2021-01-20 ENCOUNTER — HOSPITAL ENCOUNTER (OUTPATIENT)
Dept: RADIOLOGY | Facility: CLINIC | Age: 76
Discharge: HOME | End: 2021-01-20
Payer: MEDICARE

## 2021-01-20 ENCOUNTER — HOSPITAL ENCOUNTER (OUTPATIENT)
Dept: RADIOLOGY | Facility: HOSPITAL | Age: 76
Discharge: HOME | End: 2021-01-20
Attending: PHYSICAL MEDICINE & REHABILITATION
Payer: MEDICARE

## 2021-01-20 ENCOUNTER — TELEPHONE (OUTPATIENT)
Dept: RADIOLOGY | Facility: CLINIC | Age: 76
End: 2021-01-20

## 2021-01-20 VITALS
RESPIRATION RATE: 18 BRPM | TEMPERATURE: 98.3 F | HEART RATE: 79 BPM | OXYGEN SATURATION: 98 % | DIASTOLIC BLOOD PRESSURE: 75 MMHG | SYSTOLIC BLOOD PRESSURE: 114 MMHG

## 2021-01-20 DIAGNOSIS — Z96.89 S/P INSERTION OF SPINAL CORD STIMULATOR: ICD-10-CM

## 2021-01-20 DIAGNOSIS — G58.8 INTERCOSTAL NEURALGIA: ICD-10-CM

## 2021-01-20 DIAGNOSIS — G89.4 CHRONIC PAIN SYNDROME: ICD-10-CM

## 2021-01-20 DIAGNOSIS — Z96.89 S/P INSERTION OF SPINAL CORD STIMULATOR: Primary | ICD-10-CM

## 2021-01-20 DIAGNOSIS — M54.16 LUMBAR RADICULOPATHY: ICD-10-CM

## 2021-01-20 PROCEDURE — 72070 X-RAY EXAM THORAC SPINE 2VWS: CPT

## 2021-01-20 PROCEDURE — C1897 LEAD, NEUROSTIM TEST KIT: HCPCS

## 2021-01-20 PROCEDURE — C1787 PATIENT PROGR, NEUROSTIM: HCPCS

## 2021-01-20 PROCEDURE — 95972 ALYS CPLX SP/PN NPGT W/PRGRM: CPT | Performed by: PHYSICAL MEDICINE & REHABILITATION

## 2021-01-20 PROCEDURE — 63650 IMPLANT NEUROELECTRODES: CPT | Performed by: PHYSICAL MEDICINE & REHABILITATION

## 2021-01-20 RX ORDER — CEFAZOLIN SODIUM 1 G/50ML
1000 SOLUTION INTRAVENOUS ONCE
Status: COMPLETED | OUTPATIENT
Start: 2021-01-20 | End: 2021-01-20

## 2021-01-20 RX ORDER — METHYLNALTREXONE BROMIDE 150 MG/1
TABLET ORAL AS NEEDED
COMMUNITY
End: 2022-01-21 | Stop reason: CLARIF

## 2021-01-20 RX ORDER — CEPHALEXIN 500 MG/1
500 CAPSULE ORAL EVERY 12 HOURS SCHEDULED
Qty: 14 CAPSULE | Refills: 0 | Status: SHIPPED | OUTPATIENT
Start: 2021-01-20 | End: 2021-01-27

## 2021-01-20 RX ADMIN — CEFAZOLIN SODIUM 1000 MG: 1 SOLUTION INTRAVENOUS at 10:01

## 2021-01-20 NOTE — H&P
History of Present Illness: The patient is a 64 y o  female who presents with complaints of low back pain    Patient Active Problem List   Diagnosis    CHOCO on CPAP    COPD with chronic [facility]itis [address] [address]    Hypothyroidism    Insomnia    Lung nodule, multiple    Macular degeneration    Multiple sclerosis ([facility]    Rosacea    Seborrheic keratosis    Chronic neuropathic pain    Thoracolumbar radiculopathy due to intervertebral disc disorder    Other constipation    Intercostal neuralgia    Lumbar disc disease with radiculopathy    Intercostal muscle pain       Past Medical History:   Diagnosis Date    Anxiety     Cataract     last assessed 14    Chronic pain disorder     COPD (chronic obstructive pulmonary disease) (HCC)     Hypothyroidism     Multiple sclerosis (HCC)     CHOCO (obstructive sleep apnea)     uses cpap    Peroneal muscle atrophy     Thoracolumbar radiculopathy due to intervertebral disc disorder        Past Surgical History:   Procedure Laterality Date    APPENDECTOMY      CATARACT EXTRACTION       SECTION      CHOLECYSTECTOMY      GALLBLADDER SURGERY      CA COLONOSCOPY FLX DX W/COLLJ SPEC WHEN PFRMD N/A 2018    Procedure: COLONOSCOPY;  Surgeon: Shereen Abdullahi MD;  Location: [facility];   Service: Colorectal    THROAT SURGERY           Current Outpatient Medications:     Methylnaltrexone Bromide (Relistor) 150 MG TABS, Take by mouth daily, Disp: , Rfl:     Multiple Vitamins-Minerals (ICAPS AREDS 2 PO), Take by mouth 2 (two) times a day, Disp: , Rfl:     Cholecalciferol (VITAMIN D-3) 1000 units CAPS, Take 1,000 Units by mouth daily , Disp: , Rfl:     levothyroxine 100 mcg tablet, Take 1 tablet (100 mcg total) by mouth daily, Disp: 90 tablet, Rfl: 3    morphine (MSIR) 15 mg tablet, Take 1 tablet (15 mg total) by mouth 2 (two) times a day as needed for severe painMax Daily Amount: 30 mg, Disp: 60 tablet, Rfl: 0    vitamin B-12 (CYANOCOBALAMIN) 100 MCG tablet, Take 100 mcg by mouth daily , Disp: , Rfl:     Current Facility-Administered Medications:     ceFAZolin (ANCEF) IVPB (premix in dextrose) 1,000 mg 50 mL, 1,000 mg, Intravenous, Once, Lee Moran DO    lidocaine-epinephrine (XYLOCAINE-MPF/EPINEPHRINE) 1%-1:200,000 injection 5 mL, 5 mL, Infiltration, Once, Lee Moran DO    Allergies   Allergen Reactions    Latex Rash       Physical Exam:   Vitals:    01/20/21 0946   BP: 114/68   Pulse: 90   Resp: 20   Temp: 98 3 °F (36 8 °C)   SpO2: 98%     General: Awake, Alert, Oriented x 3, Mood and affect appropriate  Respiratory: Respirations even and unlabored  Cardiovascular: Peripheral pulses intact; no edema  Musculoskeletal Exam:  Tenderness to palpation bilateral lumbar paraspinals    ASA Score: 2      Patient/Chart Verification  Patient ID Verified: Verbal  Consents Confirmed: To be obtained in the Pre-Procedure area  H&P( within 30 days) Verified: To be obtained in the Pre-Procedure area  Allergies Reviewed: Yes  Anticoag/NSAID held?: NA  Currently on antibiotics?: No    Assessment:   1  Chronic pain syndrome    2  Lumbar radiculopathy    3   Intercostal neuralgia        Plan: [employer] SCS TRIAL

## 2021-01-20 NOTE — DISCHARGE INSTR - LAB
SPINE AND PAIN: SPINAL CORD STIMULATION/PERIPHERAL NERVE STIMULATION TRIAL/ PERMANENT IMPLANT DISCHARGE INSTRUCTIONS    ACTIVITY RESTRICTIONS DURING TRIAL PERIOD  · Do not drive with the SCS "on"  · Do not bend or twist at the waist   · Do not lift anything that weighs over 5 pounds  · When you lie down, "log roll" (keep spine aligned) to change positions  Do not sleep on your stomach  · Limit stair climbing and strenuous activity  CARE OF THE INJECTION SITE  · CHECK THE DRESSING DAILY  It should intact  · Notify [facility] if you have any of the following: redness, drainage, swelling, chills or fever over 100°F   · Do not change dressing, only reinforce edges if necessary  · Keep the dressing dry  Do not shower, (sponge baths only) until evaluated in office  SPECIAL INSTRUCTIONS  · Take your temperature daily  · Follow-up for lead removal scheduled for ***  · The  box is expensive  DO NOT drop or get wet  · Do not pull on the cable or leads  Do not disconnect the cable and lead  · Do activities that normally cause you to have pain and try different  settings  · Obtain post procedure x-ray shortly after procedure if ordered by physician  MEDICATIONS  · Continue to take all routine medications  · Our office may have instructed you to hold some medications  · You may resume _______________________________________________  · Take antiobiotic as prescribed:_____________________________________  If you have any problems specifically related to your procedure, please call our office at [phone]  Problems not related to your procedure should be directed at your primary care physician  Contact the company representative with any questions regarding settings or operation of the external  box

## 2021-01-20 NOTE — TELEPHONE ENCOUNTER
Call 1/21 s/p SCS trial with KW at [facility]  Lead removal 1/26 at 11:30 with Romelia Arnold

## 2021-01-21 NOTE — TELEPHONE ENCOUNTER
--GABRIELA--    RN s/w pt regarding previous  Per pt she had a very rough night  Pt unable to sleep more than an hour from sheer exhaustion  Pain described as all over and her back hurt like "never before "  This AM pain runs along entire left side of spinal column to buttock  Per pt  The pain she came in with is all right sided thoracic and runs around rib cage and low back to leg  Per pt she already has been in touch with Whit Potter the rep from [employer] and he is helping her with reprogramming  Pt did try her MSO4 without relief and stated that tylenol does nothing for her  Per pt she is going to try her flexeril to see if it helps with the muscle spasms  RN also suggested that for this discomfort, skin,and from positioning that the tylenol may take the edge off especially with her flexeril  Pt aware that the first 24-48 hours may be hard until the rep helps her get the right program and she feels better from the insertional pain  Pt will call if any drainage noted on dressing will avoid getting the site wet and will reinforce dressing  Will avoid bending lifting twisting  Will take temp daily and call if elevated and is taking the antibiotic until finished  Aware to not take any NSAIDS while lead indwelling  Confirmed lead removal date

## 2021-01-26 ENCOUNTER — TRANSCRIBE ORDERS (OUTPATIENT)
Dept: PAIN MEDICINE | Facility: CLINIC | Age: 76
End: 2021-01-26

## 2021-01-26 ENCOUNTER — OFFICE VISIT (OUTPATIENT)
Dept: PAIN MEDICINE | Facility: CLINIC | Age: 76
End: 2021-01-26

## 2021-01-26 VITALS
BODY MASS INDEX: 26 KG/M2 | HEART RATE: 88 BPM | WEIGHT: 141 LBS | SYSTOLIC BLOOD PRESSURE: 118 MMHG | DIASTOLIC BLOOD PRESSURE: 67 MMHG

## 2021-01-26 DIAGNOSIS — G35 MULTIPLE SCLEROSIS (HCC): Chronic | ICD-10-CM

## 2021-01-26 DIAGNOSIS — M62.838 MUSCLE SPASM: Primary | ICD-10-CM

## 2021-01-26 DIAGNOSIS — Z96.89 SPINAL CORD STIMULATOR STATUS: ICD-10-CM

## 2021-01-26 DIAGNOSIS — M54.16 LUMBAR RADICULOPATHY: ICD-10-CM

## 2021-01-26 DIAGNOSIS — G89.4 CHRONIC PAIN SYNDROME: ICD-10-CM

## 2021-01-26 DIAGNOSIS — M54.15 THORACOLUMBAR RADICULOPATHY DUE TO INTERVERTEBRAL DISC DISORDER: ICD-10-CM

## 2021-01-26 PROCEDURE — 99024 POSTOP FOLLOW-UP VISIT: CPT | Performed by: PHYSICAL MEDICINE & REHABILITATION

## 2021-01-26 RX ORDER — CYCLOBENZAPRINE HCL 5 MG
10 TABLET ORAL 3 TIMES DAILY PRN
Qty: 120 TABLET | Refills: 1 | Status: SHIPPED | OUTPATIENT
Start: 2021-01-26 | End: 2021-03-11

## 2021-01-26 NOTE — PROGRESS NOTES
Assessment  1  Muscle spasm  cyclobenzaprine (FLEXERIL) 5 mg tablet    Ambulatory referral to Neurosurgery   2  Lumbar radiculopathy  cyclobenzaprine (FLEXERIL) 5 mg tablet    Ambulatory referral to Neurosurgery   3  Thoracolumbar radiculopathy due to intervertebral disc disorder  cyclobenzaprine (FLEXERIL) 5 mg tablet    Ambulatory referral to Neurosurgery   4  Multiple sclerosis (HCC)  cyclobenzaprine (FLEXERIL) 5 mg tablet    Ambulatory referral to Neurosurgery   5  Spinal cord stimulator status  Ambulatory referral to Neurosurgery   6  Chronic pain syndrome  Ambulatory referral to Neurosurgery       Plan  The patient had a successful Medtronic spinal cord stimulator trial   The patient will be referred to Dr Ayesha Vega for permanent implantation  The patient will then follow back in our office 6 weeks after permanent implantation for re-evaluation  Orders Placed This Encounter   Procedures    Ambulatory referral to Neurosurgery     Standing Status:   Future     Standing Expiration Date:   1/26/2022     Referral Priority:   Routine     Referral Type:   Consult - AMB     Referral Reason:   Specialty Services Required     Referred to Provider:   Casandra Bynum MD     Requested Specialty:   Neurosurgery     Number of Visits Requested:   1     Expiration Date:   1/26/2022         History of Present Illness    The patient is a 64 y o  female status post Medtronic spinal cord stimulator percutaneous lead placement  The patient's reported an overall reduction in pain of >70% during the trial     Pain Assessment Measures   Numeric Rating Scale 3   Oswestry Disability Index Score/Neck Disability Index Score 10   PROMIS-29   - Physical Function 14   - Anxiety 11   - Depression 7   - Fatigue 12   - Sleep Disturbance 10   - Ability to Participate in Social Roles And Activities 9   - Pain Interference 9     Review of Systems   Respiratory: Negative for shortness of breath  Cardiovascular: Negative for chest pain  Gastrointestinal: Negative for constipation, diarrhea, nausea and vomiting  Musculoskeletal: Negative for arthralgias, gait problem, joint swelling and myalgias  Skin: Negative for rash  Neurological: Negative for dizziness, seizures and weakness  All other systems reviewed and are negative  Patient Active Problem List   Diagnosis    CHOCO on CPAP    COPD with chronic bronchitis (HCC)    Hypothyroidism    Insomnia    Lung nodule, multiple    Macular degeneration    Multiple sclerosis ([facility] )    Rosacea    Seborrheic keratosis    Chronic neuropathic pain    Thoracolumbar radiculopathy due to intervertebral disc disorder    Other constipation    Intercostal neuralgia    Lumbar radiculopathy    Intercostal muscle pain    Chronic pain syndrome        Past Medical History:   Diagnosis Date    Anxiety     Cataract     last assessed 14    Chronic pain disorder     COPD (chronic obstructive pulmonary disease) ([facility] )     Hypothyroidism     Multiple sclerosis (HCC)     CHOCO (obstructive sleep apnea)     uses cpap    Peroneal muscle atrophy     Thoracolumbar radiculopathy due to intervertebral disc disorder        Past Surgical History:   Procedure Laterality Date    APPENDECTOMY      CATARACT EXTRACTION       SECTION      CHOLECYSTECTOMY      GALLBLADDER SURGERY      RI COLONOSCOPY FLX DX W/COLLJ SPEC WHEN PFRMD N/A 2018    Procedure: COLONOSCOPY;  Surgeon: Varghese Smith MD;  Location: [facility];   Service: Colorectal    THROAT SURGERY         Family History   Problem Relation Age of Onset    Skin cancer Mother     Hypertension Father         benign essential    Stroke Father     Lung cancer Brother        Social History     Occupational History    Occupation: retired     Comment: part time as per [employer]   Tobacco Use    Smoking status: Current Every Day Smoker     Packs/day: 1 00     Years: 60 00     Pack years: 60 00     Types: Cigarettes     Start date: 2/16/1958    Smokeless tobacco: Never Used   Substance and Sexual Activity    Alcohol use: Yes     Frequency: Monthly or less     Drinks per session: 1 or 2     Binge frequency: Never    Drug use: Not Currently     Types: Morphine     Comment: medical-RSO  last dose 11 days ago    Sexual activity: Yes     Partners: Male         Current Outpatient Medications:     cephalexin (KEFLEX) 500 mg capsule, Take 1 capsule (500 mg total) by mouth every 12 (twelve) hours for 7 days, Disp: 14 capsule, Rfl: 0    Cholecalciferol (VITAMIN D-3) 1000 units CAPS, Take 1,000 Units by mouth daily , Disp: , Rfl:     cyclobenzaprine (FLEXERIL) 5 mg tablet, Take 2 tablets (10 mg total) by mouth 3 (three) times a day as needed for muscle spasms, Disp: 120 tablet, Rfl: 1    levothyroxine 100 mcg tablet, Take 1 tablet (100 mcg total) by mouth daily, Disp: 90 tablet, Rfl: 3    Methylnaltrexone Bromide (Relistor) 150 MG TABS, Take by mouth daily, Disp: , Rfl:     morphine (MSIR) 15 mg tablet, Take 1 tablet (15 mg total) by mouth 2 (two) times a day as needed for severe painMax Daily Amount: 30 mg, Disp: 60 tablet, Rfl: 0    Multiple Vitamins-Minerals (ICAPS AREDS 2 PO), Take by mouth 2 (two) times a day, Disp: , Rfl:     vitamin B-12 (CYANOCOBALAMIN) 100 MCG tablet, Take 100 mcg by mouth daily , Disp: , Rfl:     Allergies   Allergen Reactions    Latex Rash         Physical Exam    /67   Pulse 88   Wt 64 kg (141 lb)   BMI 26 00 kg/m²     Thoracic Spine:  Spinal cord stimulator lead removed with tip intact; no erythema, tenderness or signs of infection; area cleansed with alcohol and bandage placed

## 2021-01-26 NOTE — PATIENT INSTRUCTIONS
Spinal Cord Stimulator Placement   WHAT YOU NEED TO KNOW:   A spinal cord stimulator (SCS) is a device used to control pain after other treatments have not worked  The SCS delivers a small amount of electrical current to your spinal cord to block pain  SCS placement is surgery that is done in 2 stages  In the first stage, a temporary SCS is placed and left in for about a week  In the second stage, a permanent SCS is placed if the temporary device reduced your pain  You will get a remote control to turn the pulse generator on and off and adjust the pulses  DISCHARGE INSTRUCTIONS:   Medicines: You may  need any of the following:  · Pain medicine  takes away or decreases your pain  Do not wait until the pain is severe before you take your medicine  · Antibiotics  are given to prevent or treat an infection caused by bacteria  · Take your medicine as directed  Contact your healthcare provider if you think your medicine is not helping or if you have side effects  Tell him or her if you are allergic to any medicine  Keep a list of the medicines, vitamins, and herbs you take  Include the amounts, and when and why you take them  Bring the list or the pill bottles to follow-up visits  Carry your medicine list with you in case of an emergency  Follow up with your healthcare provider or surgeon as directed: You will need to have the temporary SCS removed or replaced with a permanent SCS  After the permanent SCS is placed, you will need to return to have your stitches removed and the device checked  Write down your questions so you remember to ask them during your visits  Safety:  Follow these safety instructions for 6 to 8 weeks  or as directed by your healthcare provider or surgeon:  · Do not bend at the waist, twist, stretch  · Do not sleep on your stomach  · Do not lift anything heavier than 5 pounds  · Do not drive until your healthcare provider says it is okay      What you need to know about your SCS:   · Your healthcare provider will give you an identification card with information about your device  Keep this with you at all times  Show the card to all your healthcare providers and tell them you have an implanted SCS  Some tests and procedures may damage the SNS  · Turn off the SCS before you walk through a security system, including in stores  The SCS can set off security systems  Metal detectors and security systems can also increase the stimulation from the SCS  Show your identification card and ask to get through security points without going through security systems  · Turn off the SCS before you drive or operate machinery or power tools  Microwave ovens are safe to use  · The device can damage or erase credit cards and computer disks  Keep them at least 2 inches away from your SNS  · Ask your healthcare provider for instructions on bathing and swimming once your wound has healed  · Ask your healthcare provider before you scuba dive or enter a hyperbaric chamber  These activities can cause the leads to move and increase pain  Wound care:  Ask your healthcare provider or surgeon how to care for your surgery wound  Look for signs of infection each day, such as swelling, redness, pain, or pus  You will need to know how to clean the wound and when to change your bandages  Ask if you need to keep the wound covered when you bathe  Ice:  Ice helps decrease swelling and pain  Ice may also help prevent tissue damage  Use an ice pack, or put crushed ice in a plastic bag  Cover it with a towel and place it on your wound for 15 to 20 minutes every hour or as directed  Exercise:  Ask your healthcare provider or surgeon about exercises you can do safely while you have the SCS  Light exercise can help you get stronger and may help decrease pain  Contact your healthcare provider or surgeon if:   · Your incisions become swollen, red, more painful, or have pus coming from them  · You have questions or concerns about your condition or care  Seek care immediately or call 911 if:   · You have a fever  · Blood soaks through your bandage  · You have back pain, numbness or tingling in your arms or legs, or muscle weakness  · You have difficulty urinating or having a bowel movement  · You have a stiff or sore neck  · You have nausea or are vomiting  · You have a severe headache or a seizure  · You become confused or feel faint  · You are unable to move part of your body  © Copyright [employer] 2018 Information is for End User's use only and may not be sold, redistributed or otherwise used for commercial purposes  All illustrations and images included in CareNotes® are the copyrighted property of [employer]  or Carlos Spivey  The above information is an  only  It is not intended as medical advice for individual conditions or treatments  Talk to your doctor, nurse or pharmacist before following any medical regimen to see if it is safe and effective for you

## 2021-02-15 ENCOUNTER — TELEPHONE (OUTPATIENT)
Dept: INTERNAL MEDICINE CLINIC | Facility: CLINIC | Age: 76
End: 2021-02-15

## 2021-02-15 DIAGNOSIS — M79.2 CHRONIC NEUROPATHIC PAIN: ICD-10-CM

## 2021-02-15 DIAGNOSIS — G89.29 CHRONIC NEUROPATHIC PAIN: ICD-10-CM

## 2021-02-15 RX ORDER — MORPHINE SULFATE 15 MG/1
15 TABLET ORAL 2 TIMES DAILY PRN
Qty: 60 TABLET | Refills: 0 | Status: SHIPPED | OUTPATIENT
Start: 2021-02-15 | End: 2021-04-06 | Stop reason: SDUPTHER

## 2021-02-15 NOTE — TELEPHONE ENCOUNTER
RX REFILL  morphine (MSIR) 15 mg tablet   Hopefully she will be getting off of the morphine in the next couple months  The trial was a huge success  She is having the implants the spinal cord implants  Meeting the neuro  Surgeon on Thursday

## 2021-02-15 NOTE — TELEPHONE ENCOUNTER
Controlled Substance Review    [place] PDMP or [place]  reviewed: No red flags were identified; safe to proceed with prescription      PDMP Review       Value Time User    PDMP Reviewed  Yes 2/15/2021 10:58 AM Valdo Amaral DO

## 2021-02-18 ENCOUNTER — CONSULT (OUTPATIENT)
Dept: NEUROSURGERY | Facility: CLINIC | Age: 76
End: 2021-02-18
Payer: MEDICARE

## 2021-02-18 VITALS
DIASTOLIC BLOOD PRESSURE: 72 MMHG | BODY MASS INDEX: 25.95 KG/M2 | SYSTOLIC BLOOD PRESSURE: 140 MMHG | WEIGHT: 141 LBS | HEIGHT: 62 IN | TEMPERATURE: 98.5 F | RESPIRATION RATE: 16 BRPM | HEART RATE: 90 BPM

## 2021-02-18 DIAGNOSIS — Z96.89 SPINAL CORD STIMULATOR STATUS: ICD-10-CM

## 2021-02-18 DIAGNOSIS — M62.838 MUSCLE SPASM: ICD-10-CM

## 2021-02-18 DIAGNOSIS — G89.4 CHRONIC PAIN SYNDROME: ICD-10-CM

## 2021-02-18 DIAGNOSIS — G35 MULTIPLE SCLEROSIS (HCC): Chronic | ICD-10-CM

## 2021-02-18 DIAGNOSIS — M54.15 THORACOLUMBAR RADICULOPATHY DUE TO INTERVERTEBRAL DISC DISORDER: ICD-10-CM

## 2021-02-18 DIAGNOSIS — M54.16 LUMBAR RADICULOPATHY: Primary | ICD-10-CM

## 2021-02-18 PROCEDURE — 99215 OFFICE O/P EST HI 40 MIN: CPT | Performed by: NEUROLOGICAL SURGERY

## 2021-02-18 RX ORDER — CHLORHEXIDINE GLUCONATE 0.12 MG/ML
15 RINSE ORAL ONCE
Status: CANCELLED | OUTPATIENT
Start: 2021-02-18 | End: 2021-02-18

## 2021-02-18 RX ORDER — CEFAZOLIN SODIUM 2 G/50ML
2000 SOLUTION INTRAVENOUS ONCE
Status: CANCELLED | OUTPATIENT
Start: 2021-02-18 | End: 2021-02-18

## 2021-02-18 NOTE — PROGRESS NOTES
Assessment/Plan:    No problem-specific Assessment & Plan notes found for this encounter  Patient is gradually worsening  Symptoms, as detailed in HPI, continue to significantly impact of patient's quality of life in daily activities  After carefully considering presentation, investigations, functional status and co-morbidities, the risk/benefit profile of surgical intervention is favorable  History, physical examination and diagnostic tests were reviewed and questions answered  Diagnosis, care plan and treatment options were discussed  The patient understand instructions and will follow up as directed  Patient with right low back and right leg pain with an L4-5 far lateral disc herniation  She had a good stimulator trial with >90% efficacy with [employer]  I did discuss my concern is that long term success might be hindered if her disc herniation worsens  I would address the far lateral disc herniation surgically and if she has neuropathic pain afterwards we can proceed with the spinal cord stimulator but I feel that the benefit of decompression of the L4 root does outweigh the risk  I discussed this extensively with the patient and she agreed to proceed with decompression first as this might be a permanent solution without having a device implantation  Expected postoperative course, including activity restrictions, expected pain and postoperative medication were reviewed  Patient provided verbal consent to surgical procedure and signed consent form: Yes    We also discussed the risks and benefits of the procedure the risks being including but not limited too: infection (~2%), neurologic injury (<1%), new pain, revisions surgery, failure to relieve pain, CSF leak, numbness  The benefits including relief of pain  The patient stated understanding of the risks and benefits and agreed to proceed           IMAGING REVIEWED: Mri lumbar shows far lateral disc herniation at L4-5 on the right       Diagnoses and all orders for this visit:    Lumbar radiculopathy  -     Ambulatory referral to Neurosurgery  -     Case request operating room: MINIMALLY INVASIVE LUMBAR FAR LATERAL DISCECTOMY L4-5, RIGHT; Standing  -     Case request operating room: [address] [place] [address] [place] DISCECTOMY L4-5, RIGHT    Muscle spasm  -     Ambulatory referral to Neurosurgery    Thoracolumbar radiculopathy due to intervertebral disc disorder  -     Ambulatory referral to Neurosurgery    Multiple sclerosis [facility])  -     Ambulatory referral to Neurosurgery    Spinal cord stimulator status  -     Ambulatory referral to Neurosurgery    Chronic pain syndrome  -     Ambulatory referral to Neurosurgery    Other orders  -     Diet NPO; Sips with meds; Standing  -     Nursing Communication [facility] Interventions Implemented; Standing  -     Nursing Communication CHG bath, have staff wash entire body (neck down) per pre-op bathing protocol  Routine, evening prior to, and day of surgery ; Standing  -     Nursing Communication Swab both nares with Povidone-Iodine solution, EXCLUDE if patient has shellfish/Iodine allergy  Routine, day of surgery, on call to OR; Standing  -     chlorhexidine (PERIDEX) 0 12 % oral rinse 15 mL  -     Void on call to OR; Standing  -     Insert peripheral IV; Standing  -     ceFAZolin (ANCEF) IVPB (premix in dextrose) 2,000 mg 50 mL        I have spent 40 minutes with Patient  today in which greater than 50% of this time was spent in counseling/coordination of care regarding Diagnostic results, Prognosis, Risks and benefits of tx options, Intructions for management, Patient and family education, Importance of tx compliance, Risk factor reductions and Impressions  Subjective:      Patient ID: Miguel Angel Delacruz is a 64 y o  female  Patient is a 64year old female with symptoms of right low back and leg pain  Pain is severe and radiating in quality   Pain distribution is right low back wraping around and down the lateral thigh and medial ankle  Pain is worse with activity  Pain is improved by rest  The pain has been present for a few years mostly as low back pain  Pain has associated with distress  The patient underwent the following conservative treatments injections PT and a stimulator trial  The patient reported symptoms worsened significantly when performing a twisting motion a several months back  Of note she did have a successful stimulator trial with almost complete relief of symptoms  The following portions of the patient's history were reviewed and updated as appropriate:   She  has a past medical history of Anxiety, Cataract, Chronic pain disorder, COPD (chronic obstructive pulmonary disease) ([facility] ), Hypothyroidism, Multiple sclerosis ([facility] ), CHOCO (obstructive sleep apnea), Peroneal muscle atrophy, and Thoracolumbar radiculopathy due to intervertebral disc disorder  She   Patient Active Problem List    Diagnosis Date Noted    Chronic pain syndrome     Intercostal muscle pain     Lumbar radiculopathy     Intercostal neuralgia     Other constipation 2019    Thoracolumbar radiculopathy due to intervertebral disc disorder     Chronic neuropathic pain 2019    Seborrheic keratosis 2018    CHOCO on CPAP     COPD with chronic bronchitis ([facility] ) 2017    Lung nodule, multiple 2017    Macular degeneration 2017    Insomnia 2015    Rosacea 2014    Multiple sclerosis ([place] [address] ) 2014    Hypothyroidism 2014     She  has a past surgical history that includes  section; Appendectomy; Cholecystectomy; Cataract extraction; pr colonoscopy flx dx w/collj spec when pfrmd (N/A, 2018); Gallbladder surgery; and Throat surgery  Her family history includes Hypertension in her father; Lung cancer in her brother; Skin cancer in her mother; Stroke in her father  She  reports that she has been smoking cigarettes  She started smoking about 63 years ago   She has a 60 00 pack-year smoking history  She has never used smokeless tobacco  She reports current alcohol use  She reports previous drug use  Drug: Morphine  Current Outpatient Medications   Medication Sig Dispense Refill    Cholecalciferol (VITAMIN D-3) 1000 units CAPS Take 1,000 Units by mouth daily       levothyroxine 100 mcg tablet Take 1 tablet (100 mcg total) by mouth daily 90 tablet 3    Methylnaltrexone Bromide (Relistor) 150 MG TABS Take by mouth daily      morphine (MSIR) 15 mg tablet Take 1 tablet (15 mg total) by mouth 2 (two) times a day as needed for severe painMax Daily Amount: 30 mg 60 tablet 0    Multiple Vitamins-Minerals (ICAPS AREDS 2 PO) Take by mouth 2 (two) times a day      vitamin B-12 (CYANOCOBALAMIN) 100 MCG tablet Take 100 mcg by mouth daily       cyclobenzaprine (FLEXERIL) 5 mg tablet Take 2 tablets (10 mg total) by mouth 3 (three) times a day as needed for muscle spasms (Patient not taking: Reported on 2/18/2021) 120 tablet 1     No current facility-administered medications for this visit  Current Outpatient Medications on File Prior to Visit   Medication Sig    Cholecalciferol (VITAMIN D-3) 1000 units CAPS Take 1,000 Units by mouth daily     levothyroxine 100 mcg tablet Take 1 tablet (100 mcg total) by mouth daily    Methylnaltrexone Bromide (Relistor) 150 MG TABS Take by mouth daily    morphine (MSIR) 15 mg tablet Take 1 tablet (15 mg total) by mouth 2 (two) times a day as needed for severe painMax Daily Amount: 30 mg    Multiple Vitamins-Minerals (ICAPS AREDS 2 PO) Take by mouth 2 (two) times a day    vitamin B-12 (CYANOCOBALAMIN) 100 MCG tablet Take 100 mcg by mouth daily     cyclobenzaprine (FLEXERIL) 5 mg tablet Take 2 tablets (10 mg total) by mouth 3 (three) times a day as needed for muscle spasms (Patient not taking: Reported on 2/18/2021)     No current facility-administered medications on file prior to visit        She is allergic to adhesive [medical tape] and latex     Review of Systems   Constitutional: Negative  HENT: Negative  Eyes: Negative  Respiratory: Positive for apnea (CPAP)  COPD   Cardiovascular: Negative  Gastrointestinal:        GI issues   Endocrine: Negative  Musculoskeletal: Positive for back pain (right thoracic pain wrapping to front,, right lbp radiatng into front of leg down to knee at times to foot)  Skin: Negative  Allergic/Immunologic: Negative  Neurological: Positive for weakness (right leg)  Psychiatric/Behavioral: Negative  I have personally reviewed all aspects of the review of systems as documented    Objective:      /72 (BP Location: Left arm)   Pulse 90   Temp 98 5 °F (36 9 °C) (Tympanic)   Resp 16   Ht 5' 1 75" (1 568 m)   Wt 64 kg (141 lb)   BMI 26 00 kg/m²          Physical Exam  Constitutional:       Appearance: Normal appearance  She is normal weight  HENT:      Head: Normocephalic and atraumatic  Eyes:      Extraocular Movements: Extraocular movements intact  Conjunctiva/sclera: Conjunctivae normal       Pupils: Pupils are equal, round, and reactive to light  Cardiovascular:      Rate and Rhythm: Normal rate and regular rhythm  Pulmonary:      Effort: Pulmonary effort is normal  No respiratory distress  Skin:     General: Skin is warm and dry  Neurological:      General: No focal deficit present  Mental Status: She is alert and oriented to person, place, and time  Mental status is at baseline  Cranial Nerves: Cranial nerves are intact  Sensory: Sensation is intact  Motor: Motor function is intact  Psychiatric:         Mood and Affect: Mood normal          Thought Content:  Thought content normal

## 2021-02-26 ENCOUNTER — OFFICE VISIT (OUTPATIENT)
Dept: LAB | Facility: HOSPITAL | Age: 76
End: 2021-02-26
Attending: NEUROLOGICAL SURGERY
Payer: MEDICARE

## 2021-02-26 DIAGNOSIS — M54.16 LUMBAR RADICULOPATHY: ICD-10-CM

## 2021-02-26 DIAGNOSIS — M62.838 MUSCLE SPASM: ICD-10-CM

## 2021-02-26 DIAGNOSIS — M54.15 THORACOLUMBAR RADICULOPATHY DUE TO INTERVERTEBRAL DISC DISORDER: ICD-10-CM

## 2021-02-26 DIAGNOSIS — G89.4 CHRONIC PAIN SYNDROME: ICD-10-CM

## 2021-02-26 DIAGNOSIS — Z96.89 SPINAL CORD STIMULATOR STATUS: ICD-10-CM

## 2021-02-26 DIAGNOSIS — G35 MULTIPLE SCLEROSIS (HCC): Chronic | ICD-10-CM

## 2021-02-26 LAB
ALBUMIN SERPL BCP-MCNC: 3.5 G/DL (ref 3.5–5)
ALP SERPL-CCNC: 128 U/L (ref 46–116)
ALT SERPL W P-5'-P-CCNC: 46 U/L (ref 12–78)
ANION GAP SERPL CALCULATED.3IONS-SCNC: 12 MMOL/L (ref 4–13)
APTT PPP: 31 SECONDS (ref 23–37)
AST SERPL W P-5'-P-CCNC: 32 U/L (ref 5–45)
BASOPHILS # BLD AUTO: 0.04 THOUSANDS/ΜL (ref 0–0.1)
BASOPHILS NFR BLD AUTO: 1 % (ref 0–1)
BILIRUB SERPL-MCNC: 0.4 MG/DL (ref 0.2–1)
BILIRUB UR QL STRIP: NEGATIVE
BUN SERPL-MCNC: 17 MG/DL (ref 5–25)
CALCIUM SERPL-MCNC: 9.2 MG/DL (ref 8.3–10.1)
CHLORIDE SERPL-SCNC: 104 MMOL/L (ref 100–108)
CLARITY UR: CLEAR
CO2 SERPL-SCNC: 26 MMOL/L (ref 21–32)
COLOR UR: YELLOW
CREAT SERPL-MCNC: 0.76 MG/DL (ref 0.6–1.3)
EOSINOPHIL # BLD AUTO: 0.27 THOUSAND/ΜL (ref 0–0.61)
EOSINOPHIL NFR BLD AUTO: 3 % (ref 0–6)
ERYTHROCYTE [DISTWIDTH] IN BLOOD BY AUTOMATED COUNT: 14.2 % (ref 11.6–15.1)
EST. AVERAGE GLUCOSE BLD GHB EST-MCNC: 114 MG/DL
GFR SERPL CREATININE-BSD FRML MDRD: 75 ML/MIN/1.73SQ M
GLUCOSE P FAST SERPL-MCNC: 90 MG/DL (ref 65–99)
GLUCOSE UR STRIP-MCNC: NEGATIVE MG/DL
HBA1C MFR BLD: 5.6 %
HCT VFR BLD AUTO: 41.9 % (ref 34.8–46.1)
HGB BLD-MCNC: 13.4 G/DL (ref 11.5–15.4)
HGB UR QL STRIP.AUTO: NEGATIVE
IMM GRANULOCYTES # BLD AUTO: 0.02 THOUSAND/UL (ref 0–0.2)
IMM GRANULOCYTES NFR BLD AUTO: 0 % (ref 0–2)
INR PPP: 1.16 (ref 0.84–1.19)
KETONES UR STRIP-MCNC: NEGATIVE MG/DL
LEUKOCYTE ESTERASE UR QL STRIP: NEGATIVE
LYMPHOCYTES # BLD AUTO: 1.76 THOUSANDS/ΜL (ref 0.6–4.47)
LYMPHOCYTES NFR BLD AUTO: 22 % (ref 14–44)
MCH RBC QN AUTO: 31.2 PG (ref 26.8–34.3)
MCHC RBC AUTO-ENTMCNC: 32 G/DL (ref 31.4–37.4)
MCV RBC AUTO: 97 FL (ref 82–98)
MONOCYTES # BLD AUTO: 0.7 THOUSAND/ΜL (ref 0.17–1.22)
MONOCYTES NFR BLD AUTO: 9 % (ref 4–12)
NEUTROPHILS # BLD AUTO: 5.21 THOUSANDS/ΜL (ref 1.85–7.62)
NEUTS SEG NFR BLD AUTO: 65 % (ref 43–75)
NITRITE UR QL STRIP: NEGATIVE
NRBC BLD AUTO-RTO: 0 /100 WBCS
PH UR STRIP.AUTO: 6 [PH]
PLATELET # BLD AUTO: 272 THOUSANDS/UL (ref 149–390)
PMV BLD AUTO: 10.3 FL (ref 8.9–12.7)
POTASSIUM SERPL-SCNC: 4.1 MMOL/L (ref 3.5–5.3)
PROT SERPL-MCNC: 7.6 G/DL (ref 6.4–8.2)
PROT UR STRIP-MCNC: NEGATIVE MG/DL
PROTHROMBIN TIME: 14.3 SECONDS (ref 11.6–14.5)
RBC # BLD AUTO: 4.3 MILLION/UL (ref 3.81–5.12)
SODIUM SERPL-SCNC: 142 MMOL/L (ref 136–145)
SP GR UR STRIP.AUTO: <=1.005 (ref 1–1.03)
UROBILINOGEN UR QL STRIP.AUTO: 0.2 E.U./DL
WBC # BLD AUTO: 8 THOUSAND/UL (ref 4.31–10.16)

## 2021-02-26 PROCEDURE — 85025 COMPLETE CBC W/AUTO DIFF WBC: CPT

## 2021-02-26 PROCEDURE — 80053 COMPREHEN METABOLIC PANEL: CPT

## 2021-02-26 PROCEDURE — 93005 ELECTROCARDIOGRAM TRACING: CPT

## 2021-02-26 PROCEDURE — 85730 THROMBOPLASTIN TIME PARTIAL: CPT

## 2021-02-26 PROCEDURE — 83036 HEMOGLOBIN GLYCOSYLATED A1C: CPT

## 2021-02-26 PROCEDURE — 36415 COLL VENOUS BLD VENIPUNCTURE: CPT

## 2021-02-26 PROCEDURE — 81003 URINALYSIS AUTO W/O SCOPE: CPT | Performed by: NEUROLOGICAL SURGERY

## 2021-02-26 PROCEDURE — 85610 PROTHROMBIN TIME: CPT

## 2021-02-28 LAB
ATRIAL RATE: 75 BPM
P AXIS: 80 DEGREES
PR INTERVAL: 142 MS
QRS AXIS: 78 DEGREES
QRSD INTERVAL: 80 MS
QT INTERVAL: 390 MS
QTC INTERVAL: 435 MS
T WAVE AXIS: 64 DEGREES
VENTRICULAR RATE: 75 BPM

## 2021-02-28 PROCEDURE — 93010 ELECTROCARDIOGRAM REPORT: CPT | Performed by: INTERNAL MEDICINE

## 2021-03-11 ENCOUNTER — CONSULT (OUTPATIENT)
Dept: INTERNAL MEDICINE CLINIC | Facility: CLINIC | Age: 76
End: 2021-03-11
Payer: MEDICARE

## 2021-03-11 VITALS
TEMPERATURE: 98.5 F | HEIGHT: 62 IN | OXYGEN SATURATION: 98 % | HEART RATE: 91 BPM | DIASTOLIC BLOOD PRESSURE: 62 MMHG | SYSTOLIC BLOOD PRESSURE: 118 MMHG | WEIGHT: 143.8 LBS | BODY MASS INDEX: 26.46 KG/M2

## 2021-03-11 DIAGNOSIS — E03.9 ACQUIRED HYPOTHYROIDISM: Chronic | ICD-10-CM

## 2021-03-11 DIAGNOSIS — M54.16 LUMBAR RADICULOPATHY: ICD-10-CM

## 2021-03-11 DIAGNOSIS — M79.2 CHRONIC NEUROPATHIC PAIN: ICD-10-CM

## 2021-03-11 DIAGNOSIS — Z23 ENCOUNTER FOR IMMUNIZATION: Primary | ICD-10-CM

## 2021-03-11 DIAGNOSIS — G89.29 CHRONIC NEUROPATHIC PAIN: ICD-10-CM

## 2021-03-11 DIAGNOSIS — G89.4 CHRONIC PAIN SYNDROME: ICD-10-CM

## 2021-03-11 DIAGNOSIS — Z12.2 ENCOUNTER FOR SCREENING FOR LUNG CANCER: ICD-10-CM

## 2021-03-11 DIAGNOSIS — J44.9 COPD WITH CHRONIC BRONCHITIS (HCC): Chronic | ICD-10-CM

## 2021-03-11 DIAGNOSIS — Z01.818 PREOP EXAMINATION: ICD-10-CM

## 2021-03-11 PROCEDURE — 99214 OFFICE O/P EST MOD 30 MIN: CPT | Performed by: PHYSICIAN ASSISTANT

## 2021-03-11 NOTE — PROGRESS NOTES
Assessment/Plan:  I reviewed her preop labs and EKG  Physical exam unremarkable she is clear for her upcoming spinal surgery  Diagnoses and all orders for this visit:    Encounter for immunization  -     TDAP VACCINE GREATER THAN OR EQUAL TO 8YO IM    Encounter for screening for lung cancer  -     CT lung screening program; Future    Chronic pain syndrome    Chronic neuropathic pain    Lumbar radiculopathy    COPD with chronic bronchitis ([facility] )    Acquired hypothyroidism    Preop examination        No problem-specific Assessment & Plan notes found for this encounter  Subjective:      Patient ID: Roma Yen is a 64 y o  female  For surgical clearance  Longstanding lower back pain and radicular pain affecting her right leg  She has had injections spinal cord stimulator trial on chronic narcotics now for definitive surgery lumbar diskectomy  Besides her back and leg pain she feels fine  She is ambulatory no shortness of breath chest pain palpitations dizziness nausea vomiting fever or chills  Appetite good weight stable  History of COPD stable on medication  Hypothyroid stable on replacement  History of multiple sclerosis followed at [facility] previously treated years ago with ACTH and some other medication has not required any specific treatment in the past 10 years or so      The following portions of the patient's history were reviewed and updated as appropriate:   She has a past medical history of Anxiety, Cataract, Chronic pain disorder, COPD (chronic obstructive pulmonary disease) ([facility] ), Hypothyroidism, Multiple sclerosis ([facility] ), CHOCO (obstructive sleep apnea), Peroneal muscle atrophy, and Thoracolumbar radiculopathy due to intervertebral disc disorder  ,  does not have any pertinent problems on file  ,   has a past surgical history that includes  section; Appendectomy; Cholecystectomy;  Cataract extraction; pr colonoscopy flx dx w/collj spec when pfrmd (N/A, 2018); Gallbladder surgery; and Throat surgery  ,  family history includes Hypertension in her father; Lung cancer in her brother; Skin cancer in her mother; Stroke in her father  ,   reports that she has been smoking cigarettes  She started smoking about 63 years ago  She has a 60 00 pack-year smoking history  She has never used smokeless tobacco  She reports current alcohol use  She reports previous drug use  Drug: Morphine  ,  is allergic to adhesive [medical tape] and latex     Current Outpatient Medications   Medication Sig Dispense Refill    levothyroxine 100 mcg tablet Take 1 tablet (100 mcg total) by mouth daily 90 tablet 3    morphine (MSIR) 15 mg tablet Take 1 tablet (15 mg total) by mouth 2 (two) times a day as needed for severe painMax Daily Amount: 30 mg 60 tablet 0    Cholecalciferol (VITAMIN D-3) 1000 units CAPS Take 1,000 Units by mouth daily       Methylnaltrexone Bromide (Relistor) 150 MG TABS Take by mouth daily      Multiple Vitamins-Minerals (ICAPS AREDS 2 PO) Take by mouth 2 (two) times a day      vitamin B-12 (CYANOCOBALAMIN) 100 MCG tablet Take 100 mcg by mouth daily        No current facility-administered medications for this visit  Review of Systems   Constitutional: Negative for chills and fever  HENT: Negative for ear pain and sore throat  Eyes: Negative for pain and visual disturbance  Respiratory: Negative for cough and shortness of breath  Cardiovascular: Negative for chest pain and palpitations  Gastrointestinal: Negative for abdominal pain and vomiting  Genitourinary: Negative for dysuria and hematuria  Musculoskeletal: Positive for arthralgias, back pain and gait problem  Skin: Negative for color change and rash  Neurological: Negative for seizures and syncope  All other systems reviewed and are negative          Objective:  Vitals:    03/11/21 0959   BP: 118/62   BP Location: Left arm   Patient Position: Sitting   Cuff Size: Standard   Pulse: 91   Temp: 98 5 °F (36 9 °C)   TempSrc: Temporal   SpO2: 98%   Weight: 65 2 kg (143 lb 12 8 oz)   Height: 5' 1 75" (1 568 m)     Body mass index is 26 51 kg/m²  Physical Exam  Vitals signs reviewed  Constitutional:       Appearance: She is well-developed  HENT:      Head: Normocephalic  Right Ear: Tympanic membrane and external ear normal       Left Ear: Tympanic membrane and external ear normal       Nose: Nose normal       Mouth/Throat:      Mouth: Mucous membranes are moist    Eyes:      Extraocular Movements: Extraocular movements intact  Conjunctiva/sclera: Conjunctivae normal       Pupils: Pupils are equal, round, and reactive to light  Neck:      Musculoskeletal: Normal range of motion and neck supple  Thyroid: No thyromegaly  Cardiovascular:      Rate and Rhythm: Normal rate and regular rhythm  Pulses: Normal pulses  Heart sounds: Normal heart sounds  No murmur  Pulmonary:      Effort: Pulmonary effort is normal  No respiratory distress  Breath sounds: Normal breath sounds  No wheezing, rhonchi or rales  Abdominal:      General: Abdomen is flat  Bowel sounds are normal       Palpations: Abdomen is soft  Musculoskeletal: Normal range of motion  General: No swelling  Skin:     General: Skin is warm and dry  Neurological:      General: No focal deficit present  Mental Status: She is alert and oriented to person, place, and time  Gait: Gait abnormal ( slight limp)  Deep Tendon Reflexes: Reflexes abnormal ( symmetrically diminished in the lower extremities)  Psychiatric:         Thought Content:  Thought content normal          Judgment: Judgment normal

## 2021-03-15 ENCOUNTER — OFFICE VISIT (OUTPATIENT)
Dept: PULMONOLOGY | Facility: CLINIC | Age: 76
End: 2021-03-15
Payer: MEDICARE

## 2021-03-15 VITALS
TEMPERATURE: 97.5 F | WEIGHT: 145 LBS | OXYGEN SATURATION: 98 % | DIASTOLIC BLOOD PRESSURE: 84 MMHG | SYSTOLIC BLOOD PRESSURE: 124 MMHG | HEART RATE: 88 BPM | HEIGHT: 61 IN | BODY MASS INDEX: 27.38 KG/M2

## 2021-03-15 DIAGNOSIS — J44.9 COPD WITH CHRONIC BRONCHITIS (HCC): Primary | Chronic | ICD-10-CM

## 2021-03-15 DIAGNOSIS — M54.16 LUMBAR RADICULOPATHY: ICD-10-CM

## 2021-03-15 DIAGNOSIS — Z99.89 OSA ON CPAP: Chronic | ICD-10-CM

## 2021-03-15 DIAGNOSIS — G47.33 OSA ON CPAP: Chronic | ICD-10-CM

## 2021-03-15 PROCEDURE — 99213 OFFICE O/P EST LOW 20 MIN: CPT | Performed by: PHYSICIAN ASSISTANT

## 2021-03-15 NOTE — PROGRESS NOTES
Assessment/Plan:   Diagnoses and all orders for this visit:    COPD with chronic bronchitis ([address] )    CHOCO on CPAP    Lumbar radiculopathy      Patient is here today for surgical clearance for upcoming spine surgery  She is currently doing well from a respiratory standpoint, not on any bronchodilators  No recent exacerbations of her COPD or episodes of bronchitis  Her last PFT done  In 2017 showed mild obstructive limitation  She had a recent CT scan of the chest done in September 2020 which showed stable pulmonary nodules unchanged since 12/20/2017  She will be due for a screening CT scan September 2021  She continues with her CPAP for CHOCO, residual AHI is 0 7  Doing well with the CPAP, sleeps well and feels well rested during the day  She will bring the CPAP with her to the hospital      She is cleared from a pulmonary standpoint for upcoming spinal surgery without any further testing  She is low-moderate risk for pulmonary complications  Discussed early mobilization, use of IS  She can follow up with us in 1 year or sooner if necessary  Return in about 1 year (around 3/15/2022)  All questions are answered to the patient's satisfaction and understanding  She verbalizes understanding  She is encouraged to call with any further questions or concerns  Portions of the record may have been created with voice recognition software  Occasional wrong word or "sound a like" substitutions may have occurred due to the inherent limitations of voice recognition software  Read the chart carefully and recognize, using context, where substitutions have occurred      Electronically Signed by Tu Obando PA-C    ______________________________________________________________________    Chief Complaint:   Chief Complaint   Patient presents with    Pre-op Exam       Patient ID: Meena Fontenot is a 64 y o  y o  female has a past medical history of Anxiety, Cataract, Chronic pain disorder, COPD (chronic obstructive pulmonary disease) ([facility] [address] ), Hypothyroidism, Multiple sclerosis ([facility] [address] ), CHCOO (obstructive sleep apnea), Peroneal muscle atrophy, and Thoracolumbar radiculopathy due to intervertebral disc disorder  3/15/2021  Patient presents today for follow-up visit  Patient is a 58-year-old female with greater than 45 pack year smoking history still smoking few cigarettes per day with past medical history of COPD/chronic bronchitis, CHOCO on CPAP, MS, hypothyroid, radiculopathy  She is here today for surgical clearance for spinal surgery  She is doing well with her breathing, not requiring any bronchodilators  She does have a mild chronic cough at baseline without change  Denies any shortness of breath  She continues with her CPAP at night, she is sleeping well and feels well rested during the day  Review of Systems   Constitutional: Negative  HENT: Negative  Respiratory: Negative  Cardiovascular: Negative  Gastrointestinal: Negative  Genitourinary: Negative  Musculoskeletal: Positive for back pain  Skin: Negative  Allergic/Immunologic: Negative  Neurological: Negative  Psychiatric/Behavioral: Negative  Smoking history: She reports that she has been smoking cigarettes  She started smoking about 63 years ago  She has a 60 00 pack-year smoking history   She has never used smokeless tobacco     The following portions of the patient's history were reviewed and updated as appropriate: allergies, current medications, past family history, past medical history, past social history, past surgical history and problem list     Immunization History   Administered Date(s) Administered    INFLUENZA 1945, 09/14/2016    Pneumococcal Polysaccharide PPV23 1943, 05/22/2008    SARS-CoV-2 / COVID-19 mRNA IM (Moderna) 01/13/2021, 02/11/2021    Tuberculin Skin Test-PPD Intradermal 01/13/2017     Current Outpatient Medications   Medication Sig Dispense Refill    Cholecalciferol (VITAMIN D-3) 1000 units CAPS Take 1,000 Units by mouth daily       levothyroxine 100 mcg tablet Take 1 tablet (100 mcg total) by mouth daily 90 tablet 3    Methylnaltrexone Bromide (Relistor) 150 MG TABS Take by mouth daily      morphine (MSIR) 15 mg tablet Take 1 tablet (15 mg total) by mouth 2 (two) times a day as needed for severe painMax Daily Amount: 30 mg 60 tablet 0    Multiple Vitamins-Minerals (ICAPS AREDS 2 PO) Take by mouth 2 (two) times a day      vitamin B-12 (CYANOCOBALAMIN) 100 MCG tablet Take 100 mcg by mouth daily        No current facility-administered medications for this visit  Allergies: Adhesive [medical tape] and Latex    Objective:  Vitals:    03/15/21 1324   BP: 124/84   Pulse: 88   Temp: 97 5 °F (36 4 °C)   SpO2: 98%   Weight: 65 8 kg (145 lb)   Height: 5' 1" (1 549 m)   Oxygen Therapy  SpO2: 98 %    Wt Readings from Last 3 Encounters:   03/15/21 65 8 kg (145 lb)   03/11/21 65 2 kg (143 lb 12 8 oz)   02/18/21 64 kg (141 lb)     Body mass index is 27 4 kg/m²  Physical Exam  Constitutional:       General: She is not in acute distress  Appearance: Normal appearance  She is well-developed  HENT:      Head: Normocephalic and atraumatic  Eyes:      Pupils: Pupils are equal, round, and reactive to light  Neck:      Musculoskeletal: Normal range of motion  Cardiovascular:      Rate and Rhythm: Normal rate and regular rhythm  Heart sounds: Normal heart sounds  No murmur  Pulmonary:      Effort: Pulmonary effort is normal  No accessory muscle usage or respiratory distress  Breath sounds: Normal breath sounds  No decreased breath sounds, wheezing, rhonchi or rales  Abdominal:      Palpations: Abdomen is soft  Tenderness: There is no abdominal tenderness  Musculoskeletal: Normal range of motion  Right lower leg: No edema  Left lower leg: No edema  Skin:     General: Skin is warm and dry  Findings: No rash     Neurological:      Mental Status: She is alert and oriented to person, place, and time  Psychiatric:         Mood and Affect: Mood normal          Behavior: Behavior normal          Lab Review:   Lab Results   Component Value Date     11/02/2015    K 4 1 02/26/2021    K 4 1 11/02/2015     02/26/2021     11/02/2015    CO2 26 02/26/2021    CO2 27 11/02/2015    BUN 17 02/26/2021    BUN 21 11/02/2015    CREATININE 0 76 02/26/2021    CREATININE 0 77 11/02/2015    GLUCOSE 85 11/02/2015    CALCIUM 9 2 02/26/2021    CALCIUM 9 0 11/02/2015     Lab Results   Component Value Date    WBC 8 00 02/26/2021    WBC 6 95 11/02/2015    HGB 13 4 02/26/2021    HGB 13 6 11/02/2015    HCT 41 9 02/26/2021    HCT 40 2 11/02/2015    MCV 97 02/26/2021    MCV 95 11/02/2015     02/26/2021     11/02/2015       Diagnostics:  I have personally reviewed pertinent reports  Reviewed CT scan and prior PFT  Office Spirometry Results:     ESS:    No results found

## 2021-03-15 NOTE — H&P (VIEW-ONLY)
Assessment/Plan:   Diagnoses and all orders for this visit:    COPD with chronic bronchitis ([address] )    CHOCO on CPAP    Lumbar radiculopathy      Patient is here today for surgical clearance for upcoming spine surgery  She is currently doing well from a respiratory standpoint, not on any bronchodilators  No recent exacerbations of her COPD or episodes of bronchitis  Her last PFT done  In 2017 showed mild obstructive limitation  She had a recent CT scan of the chest done in September 2020 which showed stable pulmonary nodules unchanged since 12/20/2017  She will be due for a screening CT scan September 2021  She continues with her CPAP for CHOCO, residual AHI is 0 7  Doing well with the CPAP, sleeps well and feels well rested during the day  She will bring the CPAP with her to the hospital      She is cleared from a pulmonary standpoint for upcoming spinal surgery without any further testing  She is low-moderate risk for pulmonary complications  Discussed early mobilization, use of IS  She can follow up with us in 1 year or sooner if necessary  Return in about 1 year (around 3/15/2022)  All questions are answered to the patient's satisfaction and understanding  She verbalizes understanding  She is encouraged to call with any further questions or concerns  Portions of the record may have been created with voice recognition software  Occasional wrong word or "sound a like" substitutions may have occurred due to the inherent limitations of voice recognition software  Read the chart carefully and recognize, using context, where substitutions have occurred      Electronically Signed by Porsha Barros PA-C    ______________________________________________________________________    Chief Complaint:   Chief Complaint   Patient presents with    Pre-op Exam       Patient ID: João Sampson is a 64 y o  y o  female has a past medical history of Anxiety, Cataract, Chronic pain disorder, COPD (chronic obstructive pulmonary disease) ([facility] [address] ), Hypothyroidism, Multiple sclerosis ([facility] [address] ), CHOCO (obstructive sleep apnea), Peroneal muscle atrophy, and Thoracolumbar radiculopathy due to intervertebral disc disorder  3/15/2021  Patient presents today for follow-up visit  Patient is a 70-year-old female with greater than 45 pack year smoking history still smoking few cigarettes per day with past medical history of COPD/chronic bronchitis, CHOCO on CPAP, MS, hypothyroid, radiculopathy  She is here today for surgical clearance for spinal surgery  She is doing well with her breathing, not requiring any bronchodilators  She does have a mild chronic cough at baseline without change  Denies any shortness of breath  She continues with her CPAP at night, she is sleeping well and feels well rested during the day  Review of Systems   Constitutional: Negative  HENT: Negative  Respiratory: Negative  Cardiovascular: Negative  Gastrointestinal: Negative  Genitourinary: Negative  Musculoskeletal: Positive for back pain  Skin: Negative  Allergic/Immunologic: Negative  Neurological: Negative  Psychiatric/Behavioral: Negative  Smoking history: She reports that she has been smoking cigarettes  She started smoking about 63 years ago  She has a 60 00 pack-year smoking history   She has never used smokeless tobacco     The following portions of the patient's history were reviewed and updated as appropriate: allergies, current medications, past family history, past medical history, past social history, past surgical history and problem list     Immunization History   Administered Date(s) Administered    INFLUENZA 1945, 09/14/2016    Pneumococcal Polysaccharide PPV23 1943, 05/22/2008    SARS-CoV-2 / COVID-19 mRNA IM (Moderna) 01/13/2021, 02/11/2021    Tuberculin Skin Test-PPD Intradermal 01/13/2017     Current Outpatient Medications   Medication Sig Dispense Refill    Cholecalciferol (VITAMIN D-3) 1000 units CAPS Take 1,000 Units by mouth daily       levothyroxine 100 mcg tablet Take 1 tablet (100 mcg total) by mouth daily 90 tablet 3    Methylnaltrexone Bromide (Relistor) 150 MG TABS Take by mouth daily      morphine (MSIR) 15 mg tablet Take 1 tablet (15 mg total) by mouth 2 (two) times a day as needed for severe painMax Daily Amount: 30 mg 60 tablet 0    Multiple Vitamins-Minerals (ICAPS AREDS 2 PO) Take by mouth 2 (two) times a day      vitamin B-12 (CYANOCOBALAMIN) 100 MCG tablet Take 100 mcg by mouth daily        No current facility-administered medications for this visit  Allergies: Adhesive [medical tape] and Latex    Objective:  Vitals:    03/15/21 1324   BP: 124/84   Pulse: 88   Temp: 97 5 °F (36 4 °C)   SpO2: 98%   Weight: 65 8 kg (145 lb)   Height: 5' 1" (1 549 m)   Oxygen Therapy  SpO2: 98 %    Wt Readings from Last 3 Encounters:   03/15/21 65 8 kg (145 lb)   03/11/21 65 2 kg (143 lb 12 8 oz)   02/18/21 64 kg (141 lb)     Body mass index is 27 4 kg/m²  Physical Exam  Constitutional:       General: She is not in acute distress  Appearance: Normal appearance  She is well-developed  HENT:      Head: Normocephalic and atraumatic  Eyes:      Pupils: Pupils are equal, round, and reactive to light  Neck:      Musculoskeletal: Normal range of motion  Cardiovascular:      Rate and Rhythm: Normal rate and regular rhythm  Heart sounds: Normal heart sounds  No murmur  Pulmonary:      Effort: Pulmonary effort is normal  No accessory muscle usage or respiratory distress  Breath sounds: Normal breath sounds  No decreased breath sounds, wheezing, rhonchi or rales  Abdominal:      Palpations: Abdomen is soft  Tenderness: There is no abdominal tenderness  Musculoskeletal: Normal range of motion  Right lower leg: No edema  Left lower leg: No edema  Skin:     General: Skin is warm and dry  Findings: No rash     Neurological:      Mental Status: She is alert and oriented to person, place, and time  Psychiatric:         Mood and Affect: Mood normal          Behavior: Behavior normal          Lab Review:   Lab Results   Component Value Date     11/02/2015    K 4 1 02/26/2021    K 4 1 11/02/2015     02/26/2021     11/02/2015    CO2 26 02/26/2021    CO2 27 11/02/2015    BUN 17 02/26/2021    BUN 21 11/02/2015    CREATININE 0 76 02/26/2021    CREATININE 0 77 11/02/2015    GLUCOSE 85 11/02/2015    CALCIUM 9 2 02/26/2021    CALCIUM 9 0 11/02/2015     Lab Results   Component Value Date    WBC 8 00 02/26/2021    WBC 6 95 11/02/2015    HGB 13 4 02/26/2021    HGB 13 6 11/02/2015    HCT 41 9 02/26/2021    HCT 40 2 11/02/2015    MCV 97 02/26/2021    MCV 95 11/02/2015     02/26/2021     11/02/2015       Diagnostics:  I have personally reviewed pertinent reports  Reviewed CT scan and prior PFT  Office Spirometry Results:     ESS:    No results found

## 2021-03-19 NOTE — PRE-PROCEDURE INSTRUCTIONS
Pre-Surgery Instructions:   Medication Instructions    Cholecalciferol (VITAMIN D-3) 1000 units CAPS Per pt - stopped this medication 2/19/21, independent decision-yet -discussed with Dr Dwyer   levothyroxine 100 mcg tablet Take DOS with sip of water    Methylnaltrexone Bromide (Relistor) 150 MG TABS Pt stopped this 2/25/21 independent decision-yet -discussed with Dr Maddy Gregory   morphine (MSIR) 15 mg tablet Take night before surgery    Multiple Vitamins-Minerals (ICAPS AREDS 2 PO) HOLD 7days prior to surgery    vitamin B-12 (CYANOCOBALAMIN) 100 MCG tablet Pt stopped this vitamin 2/19/21 independent decision-yet discussed with Dr Huynh Person  Reviewed all medications and instructions with pt for day of surgery  Reviewed all instructions specific from surgeons office for DOS and COVID visitation policy  Instructed to use only Tylenol within 7 days prior to surgery, NO NSAID products 7 days prior to surgery  Pt is aware that INPT status will be after surgery, instructed from office  Pt informed that pre op nurse will be calling from [facility] 3/22/21 to inform of specific DOS instructions  Pt instructed to bring photo ID and insurance cards DOS- remove all jewelry and valuables for DOS  Pt also informed to bring minimal bag of belongings for INPT stay  Pt verbalized understanding of all linstructions given  My Surgical Experience    The following information was developed to assist you to prepare for your operation  What do I need to do before coming to the hospital?   Arrange for a responsible person to drive you to and from the hospital    Arrange care for your children at home  Children are not allowed in the recovery areas of the hospital   Plan to wear clothing that is easy to put on and take off  If you are having shoulder surgery, wear a shirt that buttons or zippers in the front  Bathing  o Shower the evening before and the morning of your surgery with an antibacterial soap   Please refer to the Pre Op Showering Instructions for Surgery Patients Sheet   o Remove nail polish and all body piercing jewelry  o Do not shave any body part for at least 24 hours before surgery-this includes face, arms, legs and upper body  Food  o Nothing to eat or drink after midnight the night before your surgery  This includes candy and chewing gum  o Exception: If your surgery is after 12:00pm (noon), you may have clear liquids such as 7-Up®, ginger ale, apple or cranberry juice, Jell-O®, water, or clear broth until 8:00 am  o Do not drink milk or juice with pulp on the morning before surgery  o Do not drink alcohol 24 hours before surgery  Medicine  o Follow instructions you received from your surgeon about which medicines you may take on the day of surgery  o If instructed to take medicine on the morning of surgery, take pills with just a small sip of water  Call your prescribing doctor for specific infroamtion on what to do if you take insulin    What should I bring to the hospital?    Bring:  Aurelio Cardoza or a walker, if you have them, for foot or knee surgery   A list of the daily medicines, vitamins, minerals, herbals and nutritional supplements you take  Include the dosages of medicines and the time you take them each day   Glasses, dentures or hearing aids   Minimal clothing; you will be wearing hospital sleepwear   Photo ID; required to verify your identity   If you have a Living Will or Power of , bring a copy of the documents   If you have an ostomy, bring an extra pouch and any supplies you use    Do not bring   Medicines or inhalers   Money, valuables or jewelry    What other information should I know about the day of surgery?  Notify your surgeons if you develop a cold, sore throat, cough, fever, rash or any other illness     Report to the Ambulatory Surgical/Same Day Surgery Unit   You will be instructed to stop at Registration only if you have not been pre-registered   Inform your  fi they do not stay that they will be asked by the staff to leave a phone number where they can be reached   Be available to be reached before surgery  In the event the operating room schedule changes, you may be asked to come in earlier or later than expected    *It is important to tell your doctor and others involved in your health care if you are taking or have been taking any non-prescription drugs, vitamins, minerals, herbals or other nutritional supplements   Any of these may interact with some food or medicines and cause a reaction

## 2021-03-21 ENCOUNTER — ANESTHESIA EVENT (OUTPATIENT)
Dept: PERIOP | Facility: HOSPITAL | Age: 76
End: 2021-03-21
Payer: MEDICARE

## 2021-03-22 ENCOUNTER — TELEPHONE (OUTPATIENT)
Dept: NEUROSURGERY | Facility: CLINIC | Age: 76
End: 2021-03-22

## 2021-03-22 ENCOUNTER — DOCUMENTATION (OUTPATIENT)
Dept: NEUROSURGERY | Facility: CLINIC | Age: 76
End: 2021-03-22

## 2021-03-22 DIAGNOSIS — Z98.890 POSTOPERATIVE STATE: ICD-10-CM

## 2021-03-22 DIAGNOSIS — Z79.2 PROPHYLACTIC ANTIBIOTIC: Primary | ICD-10-CM

## 2021-03-22 RX ORDER — CEPHALEXIN 500 MG/1
500 CAPSULE ORAL EVERY 6 HOURS SCHEDULED
Qty: 12 CAPSULE | Refills: 0 | Status: SHIPPED | OUTPATIENT
Start: 2021-03-22 | End: 2021-03-25

## 2021-03-22 NOTE — TELEPHONE ENCOUNTER
Pre operative call day prior surgery scheduled in the AM w/ Dr Lizette Fontenot DISCECTOMY L4-5, RIGHT (Right Spine Lumbar)--    Respirtory --will bring CPAP     Thought she was gping to be hospitalized 1-2 days after sx  Discussion/Review    Allergies ---Reviewed   Hold medications --- Reviewed reviewed   NPO after MN, night prior surgery ---Reviewed as instructed by ASU nurse  Medication (s) instructed by healthcare provider to take the morning of surgery w/ sip of water 4 OZ discussed: as instructed by ASU nurse  Post operative antibiotic electronic transmission to pharmacy: cephalexin     PDMP site reviewed accessed and reviewed scheduled drug list printed and scanned into record    Pain management script:MSIR prescribed by PCP , has contract   ---- reviewed PDMP has supply 15 tablets remaining  will use current supply for postoperative pain management one tab 15 mg po q 6 hours add acetaminophen 500 mg po every 4- 6 hours not to exceed 6 tablets per day  She will rudi on Thursday with update on postoperative pain  For likely refill of MSIR     Pre- operative shower protocol reviewed; Clarify instructions as per protocol, third chlorhexidine shower tonight before surgery, then use VIDYA wipes as per packaging instructions, Use a clean towel and wash cloth starting tonight and continue nightly until seen 2 weeks post operative visit for incision check removal  Change bed linens tonight and continue at least 1-2 times weekly  --reinforced     Informed will receive a telephone call tonight from a hospital representative with time to report on surgery day: pending call    Informed will receive a f/u call within in 24 -48 hours post-op to assess recovery reinforce instructions, and to answer any questions     Wednesday       Follow-up appointments reviewed: documented in discharge paper work 2 week      6 week    4/6/2021 10:00 AM (Arrive by 9:45 AM) Yany Sanabria, [address] [facility]     5/6/2021 10:00 AM (Arrive by 9:45 AM) MD Dayanna Mccullough [address]           Patient verbalized understanding information provided /discussed

## 2021-03-23 ENCOUNTER — APPOINTMENT (OUTPATIENT)
Dept: RADIOLOGY | Facility: HOSPITAL | Age: 76
End: 2021-03-23
Payer: MEDICARE

## 2021-03-23 ENCOUNTER — HOSPITAL ENCOUNTER (OUTPATIENT)
Facility: HOSPITAL | Age: 76
Setting detail: OUTPATIENT SURGERY
Discharge: HOME | End: 2021-03-23
Attending: NEUROLOGICAL SURGERY | Admitting: NEUROLOGICAL SURGERY
Payer: MEDICARE

## 2021-03-23 ENCOUNTER — ANESTHESIA (OUTPATIENT)
Dept: PERIOP | Facility: HOSPITAL | Age: 76
End: 2021-03-23
Payer: MEDICARE

## 2021-03-23 ENCOUNTER — TELEPHONE (OUTPATIENT)
Dept: NEUROSURGERY | Facility: CLINIC | Age: 76
End: 2021-03-23

## 2021-03-23 VITALS
OXYGEN SATURATION: 98 % | DIASTOLIC BLOOD PRESSURE: 62 MMHG | TEMPERATURE: 97.8 F | RESPIRATION RATE: 18 BRPM | SYSTOLIC BLOOD PRESSURE: 129 MMHG | HEIGHT: 62 IN | BODY MASS INDEX: 26.2 KG/M2 | WEIGHT: 142.38 LBS | HEART RATE: 77 BPM

## 2021-03-23 DIAGNOSIS — Z98.890 POSTOPERATIVE STATE: Primary | ICD-10-CM

## 2021-03-23 DIAGNOSIS — J02.9 SORE THROAT: ICD-10-CM

## 2021-03-23 DIAGNOSIS — M62.830 SPASM OF MUSCLE OF LOWER BACK: ICD-10-CM

## 2021-03-23 DIAGNOSIS — M62.838 MUSCLE SPASM: ICD-10-CM

## 2021-03-23 PROCEDURE — 72100 X-RAY EXAM L-S SPINE 2/3 VWS: CPT

## 2021-03-23 PROCEDURE — 63030 LAMOT DCMPRN NRV RT 1 LMBR: CPT | Performed by: PHYSICIAN ASSISTANT

## 2021-03-23 PROCEDURE — 63030 LAMOT DCMPRN NRV RT 1 LMBR: CPT | Performed by: NEUROLOGICAL SURGERY

## 2021-03-23 RX ORDER — PROPOFOL 10 MG/ML
INJECTION, EMULSION INTRAVENOUS AS NEEDED
Status: DISCONTINUED | OUTPATIENT
Start: 2021-03-23 | End: 2021-03-23

## 2021-03-23 RX ORDER — DEXAMETHASONE SODIUM PHOSPHATE 10 MG/ML
INJECTION, SOLUTION INTRAMUSCULAR; INTRAVENOUS AS NEEDED
Status: DISCONTINUED | OUTPATIENT
Start: 2021-03-23 | End: 2021-03-23

## 2021-03-23 RX ORDER — OXYCODONE HYDROCHLORIDE 5 MG/1
5 TABLET ORAL EVERY 4 HOURS PRN
Status: DISCONTINUED | OUTPATIENT
Start: 2021-03-23 | End: 2021-03-23 | Stop reason: HOSPADM

## 2021-03-23 RX ORDER — BUPIVACAINE HYDROCHLORIDE 2.5 MG/ML
INJECTION, SOLUTION EPIDURAL; INFILTRATION; INTRACAUDAL AS NEEDED
Status: DISCONTINUED | OUTPATIENT
Start: 2021-03-23 | End: 2021-03-23 | Stop reason: HOSPADM

## 2021-03-23 RX ORDER — ONDANSETRON 2 MG/ML
4 INJECTION INTRAMUSCULAR; INTRAVENOUS ONCE
Status: DISCONTINUED | OUTPATIENT
Start: 2021-03-23 | End: 2021-03-23 | Stop reason: HOSPADM

## 2021-03-23 RX ORDER — CYCLOBENZAPRINE HCL 5 MG
5 TABLET ORAL ONCE
Status: COMPLETED | OUTPATIENT
Start: 2021-03-23 | End: 2021-03-23

## 2021-03-23 RX ORDER — SODIUM CHLORIDE, SODIUM LACTATE, POTASSIUM CHLORIDE, CALCIUM CHLORIDE 600; 310; 30; 20 MG/100ML; MG/100ML; MG/100ML; MG/100ML
INJECTION, SOLUTION INTRAVENOUS CONTINUOUS PRN
Status: DISCONTINUED | OUTPATIENT
Start: 2021-03-23 | End: 2021-03-23

## 2021-03-23 RX ORDER — ONDANSETRON 2 MG/ML
INJECTION INTRAMUSCULAR; INTRAVENOUS AS NEEDED
Status: DISCONTINUED | OUTPATIENT
Start: 2021-03-23 | End: 2021-03-23

## 2021-03-23 RX ORDER — GLYCOPYRROLATE 0.2 MG/ML
INJECTION INTRAMUSCULAR; INTRAVENOUS AS NEEDED
Status: DISCONTINUED | OUTPATIENT
Start: 2021-03-23 | End: 2021-03-23

## 2021-03-23 RX ORDER — FENTANYL CITRATE 50 UG/ML
INJECTION, SOLUTION INTRAMUSCULAR; INTRAVENOUS AS NEEDED
Status: DISCONTINUED | OUTPATIENT
Start: 2021-03-23 | End: 2021-03-23

## 2021-03-23 RX ORDER — NEOSTIGMINE METHYLSULFATE 1 MG/ML
INJECTION INTRAVENOUS AS NEEDED
Status: DISCONTINUED | OUTPATIENT
Start: 2021-03-23 | End: 2021-03-23

## 2021-03-23 RX ORDER — CEFAZOLIN SODIUM 2 G/50ML
2000 SOLUTION INTRAVENOUS ONCE
Status: COMPLETED | OUTPATIENT
Start: 2021-03-23 | End: 2021-03-23

## 2021-03-23 RX ORDER — CYCLOBENZAPRINE HCL 5 MG
5 TABLET ORAL EVERY 8 HOURS PRN
Qty: 40 TABLET | Refills: 1 | Status: SHIPPED | OUTPATIENT
Start: 2021-03-23 | End: 2021-04-15 | Stop reason: SDUPTHER

## 2021-03-23 RX ORDER — HYDROMORPHONE HCL/PF 1 MG/ML
0.5 SYRINGE (ML) INJECTION
Status: DISCONTINUED | OUTPATIENT
Start: 2021-03-23 | End: 2021-03-23 | Stop reason: HOSPADM

## 2021-03-23 RX ORDER — ROCURONIUM BROMIDE 10 MG/ML
INJECTION, SOLUTION INTRAVENOUS AS NEEDED
Status: DISCONTINUED | OUTPATIENT
Start: 2021-03-23 | End: 2021-03-23

## 2021-03-23 RX ORDER — CHLORHEXIDINE GLUCONATE 0.12 MG/ML
15 RINSE ORAL ONCE
Status: COMPLETED | OUTPATIENT
Start: 2021-03-23 | End: 2021-03-23

## 2021-03-23 RX ORDER — METHYLPREDNISOLONE ACETATE 40 MG/ML
INJECTION, SUSPENSION INTRA-ARTICULAR; INTRALESIONAL; INTRAMUSCULAR; SOFT TISSUE AS NEEDED
Status: DISCONTINUED | OUTPATIENT
Start: 2021-03-23 | End: 2021-03-23 | Stop reason: HOSPADM

## 2021-03-23 RX ORDER — ALBUTEROL SULFATE 2.5 MG/3ML
2.5 SOLUTION RESPIRATORY (INHALATION) ONCE
Status: DISCONTINUED | OUTPATIENT
Start: 2021-03-23 | End: 2021-03-23 | Stop reason: HOSPADM

## 2021-03-23 RX ORDER — LIDOCAINE HYDROCHLORIDE 10 MG/ML
INJECTION, SOLUTION EPIDURAL; INFILTRATION; INTRACAUDAL; PERINEURAL AS NEEDED
Status: DISCONTINUED | OUTPATIENT
Start: 2021-03-23 | End: 2021-03-23

## 2021-03-23 RX ORDER — FENTANYL CITRATE/PF 50 MCG/ML
25 SYRINGE (ML) INJECTION
Status: DISCONTINUED | OUTPATIENT
Start: 2021-03-23 | End: 2021-03-23 | Stop reason: HOSPADM

## 2021-03-23 RX ADMIN — CYCLOBENZAPRINE HYDROCHLORIDE 5 MG: 5 TABLET, FILM COATED ORAL at 13:39

## 2021-03-23 RX ADMIN — GLYCOPYRROLATE 0.15 MG: 0.2 INJECTION, SOLUTION INTRAMUSCULAR; INTRAVENOUS at 08:32

## 2021-03-23 RX ADMIN — FENTANYL CITRATE 50 MCG: 50 INJECTION, SOLUTION INTRAMUSCULAR; INTRAVENOUS at 09:38

## 2021-03-23 RX ADMIN — PROPOFOL 50 MG: 10 INJECTION, EMULSION INTRAVENOUS at 09:01

## 2021-03-23 RX ADMIN — FENTANYL CITRATE 50 MCG: 50 INJECTION, SOLUTION INTRAMUSCULAR; INTRAVENOUS at 08:50

## 2021-03-23 RX ADMIN — ROCURONIUM BROMIDE 30 MG: 10 INJECTION, SOLUTION INTRAVENOUS at 08:35

## 2021-03-23 RX ADMIN — ONDANSETRON 4 MG: 2 INJECTION INTRAMUSCULAR; INTRAVENOUS at 09:51

## 2021-03-23 RX ADMIN — ROCURONIUM BROMIDE 10 MG: 10 INJECTION, SOLUTION INTRAVENOUS at 09:15

## 2021-03-23 RX ADMIN — FENTANYL CITRATE 25 MCG: 50 INJECTION, SOLUTION INTRAMUSCULAR; INTRAVENOUS at 10:05

## 2021-03-23 RX ADMIN — FENTANYL CITRATE 25 MCG: 50 INJECTION, SOLUTION INTRAMUSCULAR; INTRAVENOUS at 08:30

## 2021-03-23 RX ADMIN — LIDOCAINE HYDROCHLORIDE 25 MG: 10 INJECTION, SOLUTION EPIDURAL; INFILTRATION; INTRACAUDAL; PERINEURAL at 08:33

## 2021-03-23 RX ADMIN — SODIUM CHLORIDE, SODIUM LACTATE, POTASSIUM CHLORIDE, AND CALCIUM CHLORIDE: .6; .31; .03; .02 INJECTION, SOLUTION INTRAVENOUS at 08:22

## 2021-03-23 RX ADMIN — CEFAZOLIN SODIUM 2000 MG: 2 SOLUTION INTRAVENOUS at 08:28

## 2021-03-23 RX ADMIN — DEXAMETHASONE SODIUM PHOSPHATE 4 MG: 10 INJECTION, SOLUTION INTRAMUSCULAR; INTRAVENOUS at 08:43

## 2021-03-23 RX ADMIN — DEXAMETHASONE SODIUM PHOSPHATE 4 MG: 10 INJECTION, SOLUTION INTRAMUSCULAR; INTRAVENOUS at 09:51

## 2021-03-23 RX ADMIN — FENTANYL CITRATE 25 MCG: 50 INJECTION, SOLUTION INTRAMUSCULAR; INTRAVENOUS at 10:00

## 2021-03-23 RX ADMIN — NEOSTIGMINE METHYLSULFATE 3 MG: 1 INJECTION, SOLUTION INTRAVENOUS at 09:59

## 2021-03-23 RX ADMIN — CHLORHEXIDINE GLUCONATE 0.12% ORAL RINSE 15 ML: 1.2 LIQUID ORAL at 08:06

## 2021-03-23 RX ADMIN — PROPOFOL 150 MG: 10 INJECTION, EMULSION INTRAVENOUS at 08:34

## 2021-03-23 RX ADMIN — FENTANYL CITRATE 25 MCG: 50 INJECTION, SOLUTION INTRAMUSCULAR; INTRAVENOUS at 09:51

## 2021-03-23 RX ADMIN — GLYCOPYRROLATE 0.5 MG: 0.2 INJECTION, SOLUTION INTRAMUSCULAR; INTRAVENOUS at 09:59

## 2021-03-23 NOTE — DISCHARGE INSTRUCTIONS
Discharge Instructions  Lumbar decompression  (laminectomy/laminotomy/foraminotomy/discectomy)      Surgical incisional care:   Maintain dressing in place for 3 days  On postoperative day 3, dressing may be removed and incision may be left open to air   Keep incision clean and dry  Avoid applying creams, lotion or antiseptic to incision area   Check the wound daily  If the incision becomes red, swollen, tender, warm, or has increased drainage please notify physician immediately   If steri-strips are in place, allow them to fall off  If still in place at two week postoperative visit, we will remove them   May shower 3 days after surgery, but do not soak in a tub and no swimming   o Incision may be cleaned with water and a mild antimicrobial soap using a clean washcloth  Incision is to be gently patted dry with a clean towel   Continue to change bed linens and pajamas more frequently  Wear clean clothes daily  Activity Restrictions:   No heavy lifting greater than 10lbs and no strenuous activities until cleared   No bending or twisting  No BLTs (bending, lifting, twisting)  Avoid pushing/pulling movements   May walk as tolerated  Encourage at least 4 short walks per day   No driving for at least 2 weeks or until cleared by physician  Postoperative medication:   Take pain medications to relieve incision pain, and muscle relaxants to prevent spasms as directed  Please see after visit summary (AVS) for details   Do not operate heavy machinery or vehicles while taking sedating medications   Use a bowel regimen while on opioids as they induce constipation  Ie  Senokot-S, Miralax, Colace, etc  Increase fiber and water intake   Do not take ibuprofen, Naproxen/Aleve or any NSAID until cleared by surgeon  May take Tylenol instead   If taking Coumadin, Aspirin, or Plavix, you may resume these medications when cleared by Neurosurgery  Follow-up as scheduled for a 2 week incision check  Follow-up as scheduled for 6 week postoperative visit       **Please notify MD if you experience a fever 101F, chills or have increased pain, numbness, tingling, or weakness in your legs and/or bowel/bladder

## 2021-03-23 NOTE — OP NOTE
OPERATIVE REPORT  PATIENT NAME: Michael Green    :  1945  MRN: [id number]  Pt Location: [facility] OR ROOM 03    SURGERY DATE: 3/23/2021    Surgeon(s) and Role:     * Brooke Tellez MD - Primary     * Socorro Cabezas PA-C - Assisting    Preop Diagnosis:  Lumbar radiculopathy [M54 16]    Post-Op Diagnosis Codes:     * Lumbar radiculopathy [M54 16]    Procedure(s) (LRB):  MINIMALLY INVASIVE LUMBAR FAR LATERAL DISCECTOMY L4-5, RIGHT (Right)    Specimen(s):  * No specimens in log *    Estimated Blood Loss:   50 mL    Drains:  * No LDAs found *    Anesthesia Type:   General    Operative Indications:  Lumbar radiculopathy [M54 16]    Operative Findings:  See dictated note    Complications:   None    Procedure and Technique:  The patient was taken out the operative theatre and induced under general anesthesia intubated she was positioned prone on rosi frame   We used fluoroscopy to localize the midline lumbar incision over the L4-5 disc space on the right  She has prepped and draped in sterile fashion  We made an incision with a 10  Blade, dissected down using the sequential dilators so that were were lateral just above the disc space at L4-5 on the right  This was confirmed on fluoro   We brought the microscope in at that point  We then performed a laminotomy using the drill and Kerrisons a the lateral edge of the lamina just superior to the disc space on the right  I was able to skeletonize the L4 nerve root and I palpated out medially and laterally until I felt that the nerve root was well decompressed  I did remove a small amount of disc first by performing a small annulotomy at L4-5 on the right  I felt that the nerve was decompressed well at that point I noted that it was erythematous but there was no ongoing compression       I was satisfied with decompression we obtained hemostasis and left some Depo-Medrol were the nerve root was We then proceeded to close in layers with 0 Vicryl for the fascia   2-0 Vicryl for the subcutaneous tissues and 3-0 Monocryl for the skin   A dressing was applied   The Patient was repositioned supine on a hospital bed and taken to the postop recovery area stable condition     All needle and sponge counts were correct at the end of the procedure     I was present for the entire procedure, A qualified resident physician was not available and A physician assistant was required during the procedure for retraction tissue handling,dissection and suturing    Patient Disposition:  PACU     SIGNATURE: Tiera Yin MD  DATE: March 23, 2021  TIME: 10:02 AM

## 2021-03-23 NOTE — ANESTHESIA POSTPROCEDURE EVALUATION
Post-Op Assessment Note    CV Status:  Stable  Pain Score: 3    Pain management: adequate     Mental Status:  Sleepy   Hydration Status:  Euvolemic   PONV Controlled:  Controlled   Airway Patency:  Patent      Post Op Vitals Reviewed: Yes      Staff: Anesthesiologist, CRNA         No complications documented      BP (P) 151/81 (03/23/21 1010)    Temp 97 8 °F (36 6 °C) (03/23/21 1010)    Pulse (P) 76 (03/23/21 1010)   Resp (P) 12 (03/23/21 1010)    SpO2 (P) 100 % (03/23/21 1010)

## 2021-03-23 NOTE — TELEPHONE ENCOUNTER
Received tT form [facility] nurse  Karen Valenzuela ---patient with postoperative muscle spasm and she wanted  To verify postoperative MSIR ---referred to my preop note for dosing postop  I spoke with patient she reports right sided low back muscle spasm with distribution into right buttock and right  thigh  Has treated with cyclobenzaprine in the past was efficacious , and tizanidine which was not efficacious  She also complains of sore throat     Order entered for cyclobenzaprine and cepacol nelida  Patient verbalized and understanding      Nurse will discuss w/ Keaton Began to order antispasmodic now while in [facility]

## 2021-03-23 NOTE — ANESTHESIA PREPROCEDURE EVALUATION
Procedure:  MINIMALLY INVASIVE LUMBAR FAR LATERAL DISCECTOMY L4-5, RIGHT (Right Spine Lumbar)    Relevant Problems   CARDIO   (+) Intercostal muscle pain   (+) Intercostal neuralgia      ENDO   (+) Hypothyroidism      NEURO/PSYCH   (+) Anxiety   (+) Chronic neuropathic pain   (+) Chronic pain syndrome      PULMONARY   (+) COPD (chronic obstructive pulmonary disease) (HCC)   (+) CHOCO on CPAP   (+) Smoker      Other   (+) Multiple sclerosis (HCC)        Physical Exam    Airway    Mallampati score: I  TM Distance: >3 FB  Neck ROM: full     Dental   No notable dental hx     Cardiovascular  Cardiovascular exam normal    Pulmonary  Pulmonary exam normal     Other Findings        Anesthesia Plan  ASA Score- 3     Anesthesia Type- general with ASA Monitors  Additional Monitors:   Airway Plan: ETT  Plan Factors-    Chart reviewed  EKG reviewed  Patient summary reviewed  Patient is a current smoker  Patient smoked on day of surgery  Induction-     Postoperative Plan- Plan for postoperative opioid use  Informed Consent- Anesthetic plan and risks discussed with patient  I personally reviewed this patient with the CRNA  Discussed and agreed on the Anesthesia Plan with the CRNA  Bob Prince

## 2021-03-24 ENCOUNTER — TELEMEDICINE (OUTPATIENT)
Dept: NEUROSURGERY | Facility: CLINIC | Age: 76
End: 2021-03-24

## 2021-03-24 DIAGNOSIS — Z98.890 POST-OPERATIVE STATE: Primary | ICD-10-CM

## 2021-03-24 PROCEDURE — 99024 POSTOP FOLLOW-UP VISIT: CPT

## 2021-03-24 PROCEDURE — 1124F ACP DISCUSS-NO DSCNMKR DOCD: CPT

## 2021-03-24 NOTE — PROGRESS NOTES
Virtual Brief Visit    Assessment/Plan:    Problem List Items Addressed This Visit     None                Reason for visit is postop call  Chief Complaint   Patient presents with    Post-op    Virtual Brief Visit        Encounter provider Neurosurgery Nurse [facility]    Provider located at [address] [place]  [address] [facility] [address] [phone]    Recent Visits  Date Type Provider Dept   03/23/21 Telephone [facility]   03/22/21 Telephone [facility]   Showing recent visits within past 7 days and meeting all other requirements     Today's Visits  Date Type Provider Dept   03/24/21 Telemedicine NEUROS[facility]GERY NURSE [address] today's visits and meeting all other requirements     Future Appointments  No visits were found meeting these conditions  Showing future appointments within next 150 days and meeting all other requirements        After connecting through telephone, the patient was identified by name and date of birth  Leola Dean was informed that this is a telemedicine visit and that the visit is being conducted through telephone  My office door was closed  No one else was in the room  She acknowledged consent and understanding of privacy and security of the platform  The patient has agreed to participate and understands she can discontinue the visit at any time  Subjective    Leola Dean is a 64 y o  female [address] [facility] DISCECTOMY L4-5, RIGHT (Right Spine Lumbar)        Past Medical History:   Diagnosis Date    Anxiety     Cataract     last assessed 4/25/14    Chronic bronchitis ([facility] [address] )     Sees pulmonary specialist twice a year    Chronic pain disorder     COPD (chronic obstructive pulmonary disease) (HCC)     Hypothyroidism     Multiple sclerosis ([facility] [address] )     CHOCO (obstructive sleep apnea) uses cpap, 3/19/21 -pt states will bring CPAP for DOS 3/23/21    Peroneal muscle atrophy     Shortness of breath     Smoker     Thoracolumbar radiculopathy due to intervertebral disc disorder        Past Surgical History:   Procedure Laterality Date    APPENDECTOMY      CATARACT EXTRACTION       SECTION      CHOLECYSTECTOMY      GALLBLADDER SURGERY      AL COLONOSCOPY FLX DX W/COLLJ SPEC WHEN PFRMD N/A 2018    Procedure: COLONOSCOPY;  Surgeon: Davis Duane, MD;  [facility]: [facility];   Service: Colorectal    AL LAMNOTMY INCL W/DCMPRSN NRV ROOT 1 INTRSPC LUMBR Right 3/23/2021    Procedure: MINIMALLY INVASIVE LUMBAR FAR LATERAL DISCECTOMY L4-5, RIGHT;  Surgeon: Alex Irene MD;  Location:  MAIN OR;  Service: Neurosurgery    THROAT SURGERY         Current Outpatient Medications   Medication Sig Dispense Refill    cephalexin (KEFLEX) 500 mg capsule Take 1 capsule (500 mg total) by mouth every 6 (six) hours for 3 days Start 3/23/21 after surgery 12 capsule 0    Cholecalciferol (VITAMIN D-3) 1000 units CAPS Take 1,000 Units by mouth daily       cyclobenzaprine (FLEXERIL) 5 mg tablet Take 1 tablet (5 mg total) by mouth every 8 (eight) hours as needed for muscle spasms 40 tablet 1    levothyroxine 100 mcg tablet Take 1 tablet (100 mcg total) by mouth daily (Patient taking differently: Take 100 mcg by mouth daily Takes in the am) 90 tablet 3    menthol-cetylpyridinium (CEPACOL) 3 MG lozenge Take 1 lozenge (3 mg total) by mouth as needed for sore throat 30 lozenge 2    Methylnaltrexone Bromide (Relistor) 150 MG TABS Take by mouth daily      morphine (MSIR) 15 mg tablet Take 1 tablet (15 mg total) by mouth 2 (two) times a day as needed for severe painMax Daily Amount: 30 mg 60 tablet 0    Multiple Vitamins-Minerals (ICAPS AREDS 2 PO) Take by mouth      vitamin B-12 (CYANOCOBALAMIN) 100 MCG tablet Take 100 mcg by mouth daily        No current facility-administered medications for this visit  Allergies   Allergen Reactions    Adhesive [Medical Tape] Rash    Latex Rash       Spoke with patient to see how she is doing after surgery  Patient reports she is doing well overall and denies any incisional issues or fevers  Patient is able to ambulate around the house and complete ADLs  Educated the patient about the importance of preventing blood clots and provided measures how to prevent them  Patient has not moved her bowels since the surgery  Patient states she has been on morphine for over a year and is following with a physician who prescribes her bowel regimen medication  Encouraged fiber intake and fluids  Reviewed incision care with the patient  Advised that after three days she may take a shower and gently wash the surgical site with soap and water  Use clean wash cloth, towels, and clothing  Do not submerge in water until cleared by the surgeon  Do not apply any creams, ointments, or lotions to the site  Patient is aware to call the office if any redness, swelling, drainage, dehiscence of incision, or fever >100 F occurs  Patient is aware to call the office if any concerns or questions may arise  Reminded patient of her upcoming appointments with the date/time/location  Patient was appreciative for the call  VIRTUAL VISIT DISCLAIMER    Mary Ybarra acknowledges that she has consented to an online visit or consultation  She understands that the online visit is based solely on information provided by her, and that, in the absence of a face-to-face physical evaluation by the physician, the diagnosis she receives is both limited and provisional in terms of accuracy and completeness  This is not intended to replace a full medical face-to-face evaluation by the physician  Mary Ybarra understands and accepts these terms

## 2021-03-25 ENCOUNTER — TELEPHONE (OUTPATIENT)
Dept: NEUROSURGERY | Facility: CLINIC | Age: 76
End: 2021-03-25

## 2021-03-25 NOTE — TELEPHONE ENCOUNTER
Telephoned patient she is very difficult to explain care to   She perseverates on her bowel  She is not amendable to complying with bowel hygiene protocol   Reports she has taken senna and colace in the past does not help  Takes Miralax daily and relistor, this med is hard on her stomach  Took relistor last night had a BM today   MSIR is the only medication that helps her pain but it affects her stomach, causes bowel problems, avoiding taking MSIR today until stomach is better  Muscle spasm new onset since surgery right sided low back muscle spasm with distribution into right buttock and right  thigh  --addressed with patient yesterday while in [facility] ordered cyclobenzaprine --patient reports taken in the past and helped  She has not taken med as prescribed 5 mg po q 8 hours , finally today took one pil after calling office and is now starting to help  Advised to take cyclobenzaprine as ordered, as we discussed   Acetaminophin  She request a script ---she was to obtain medication on Tuesday for postoperative use , did not  Informed is OTC does not require a script  Advised to take acetaminophen 500 mg po  2 tabs every 8 hours  Advised to call with additional questions or concerns

## 2021-03-25 NOTE — TELEPHONE ENCOUNTER
Patient called nurseline stating she is having a lot of pain compared to yesterday  Returned call to patient who states she is experiencing a lot of muscle pain in her R buttock down her leg  Denies incisional issues, numbness/tingling, weakness or bowel/bladder incontinence  Patient states [place] CRNP suggested she take tylenol, but states she only has ibuprofen at home  Patient requesting a tylenol prescription be sent to her [facility] pharmacy in [facility]  Patient states she has not taken her flexeril since last night  Advised that medication is used for muscle pain/spasms and suggested she take that medication  Will route to [place] CRNP for further recommendation and tylenol script

## 2021-04-06 ENCOUNTER — OFFICE VISIT (OUTPATIENT)
Dept: NEUROSURGERY | Facility: CLINIC | Age: 76
End: 2021-04-06

## 2021-04-06 VITALS
SYSTOLIC BLOOD PRESSURE: 119 MMHG | RESPIRATION RATE: 16 BRPM | HEART RATE: 79 BPM | DIASTOLIC BLOOD PRESSURE: 75 MMHG | WEIGHT: 144 LBS | HEIGHT: 62 IN | BODY MASS INDEX: 26.5 KG/M2 | TEMPERATURE: 98.1 F

## 2021-04-06 DIAGNOSIS — G89.18 POSTOPERATIVE PAIN AFTER SPINAL SURGERY: ICD-10-CM

## 2021-04-06 DIAGNOSIS — F11.90 OPIATE USE: ICD-10-CM

## 2021-04-06 DIAGNOSIS — G89.29 CHRONIC NEUROPATHIC PAIN: ICD-10-CM

## 2021-04-06 DIAGNOSIS — M79.2 CHRONIC NEUROPATHIC PAIN: ICD-10-CM

## 2021-04-06 DIAGNOSIS — M54.10 NERVE ROOT INFLAMMATION: ICD-10-CM

## 2021-04-06 DIAGNOSIS — Z98.890 STATUS POST LUMBAR DISCECTOMY: ICD-10-CM

## 2021-04-06 DIAGNOSIS — G89.4 CHRONIC PAIN SYNDROME: Primary | ICD-10-CM

## 2021-04-06 PROCEDURE — 99024 POSTOP FOLLOW-UP VISIT: CPT | Performed by: NURSE PRACTITIONER

## 2021-04-06 RX ORDER — MORPHINE SULFATE 15 MG/1
15 TABLET ORAL EVERY 8 HOURS PRN
Qty: 21 TABLET | Refills: 0 | Status: SHIPPED | OUTPATIENT
Start: 2021-04-06 | End: 2021-04-16 | Stop reason: SDUPTHER

## 2021-04-06 RX ORDER — DEXAMETHASONE 2 MG/1
TABLET ORAL
Qty: 28 TABLET | Refills: 0 | Status: SHIPPED | OUTPATIENT
Start: 2021-04-06 | End: 2021-08-18 | Stop reason: ALTCHOICE

## 2021-04-06 RX ORDER — NALOXONE HYDROCHLORIDE 4 MG/.1ML
SPRAY NASAL
Qty: 1 EACH | Refills: 1 | Status: SHIPPED | OUTPATIENT
Start: 2021-04-06

## 2021-04-06 NOTE — PROGRESS NOTES
Assessment/Plan:    Chronic pain syndrome  · As addressed in HPI  · As addressed in status post lumbar diskectomy        PLAN  · As addressed in lumbar laminectomy    Status post lumbar discectomy  · As addressed in hPI  · 2 weeks post op presents for after care  · Lumbar incision  incision is clean, dry and intact, without erythema, dehiscence, drainage or s/s of infection, healing well approximated wound edges  Surgical glue is adherent to incision line covering scabbing and surrounding skin  Refer to photo attachment,  · Intensity of pain improved since surgery , no longer experiencing pain across mis  abd low back , can wear a bra now  · Pain severity now 4/10 on numeric scale  · Reports new pain complaints right lower buttock ischial  , right lateral and medial lower leg into calf tight muscle cramping, intermittent sharp sharp pain, aggravated with weight bearing to right leg  Hd trouble climbing stairs last evening  Pain can worsen to 8-9/10   · Pain characterization changes last week, onset day after a shopping trip to grocery store walking shopping and carrying groceries  · Muscle cramping disturbing sleep  · Increased Morphine dosing took on 15 mg tablet then about 1 hour later took another  Taking Morphine nearly every 6 hours but not consistently, need refill  · Taking cyclobenzaprine as ordered with minimal thrapeutic effect , helps with sleep at night , supply nearly depleted , has refills  · Of note received call from [facility] on surgery day patient with new onset right sided muscle spasm since surgery , cyclobenzaprine added   She alsos c/o sore throat Cepacol lozenges ordered  PLAN;  · Add scheduled acetaminophen 1000 mg po every 8 hours  · Prescribed dexamathasone taper, at completion call office with condition update  · Narcan triggered secondary to opiate dosing  · Strict adherence to activity restriction thru 6 weeks postop     · Instructions fo rcontinued care documented in AVS reviewed in detail  ,         Subjective: 2 week postop visit     Patient ID: Wanda Cee is a 64 y o  female     HPI   Longstanding history of chronic pain affecting low back bilateral abdomen both lower extremities , right greater than left   Left low back with distribution into buttocks,  Bilateral Groin, then anterior left thigh, right low back with distribution into right buttock , thigh and down right lower leg, at times has into foot and right foot  neuropathic pain , status post neuroma removal right foot  Failed conservative treatment injections and therapy  Consulted with Dr Pricilla Rachel  Pain symptoms correlated with dermatoidal distribution L4 L5 pathology on imagining  MRI lumbar spine with disc herniation L4- L5 although she has a successful [employer] spinal cord stimulator trial with 90 5 efficacy, the anatomical cause of her pain needed to be addressed   SCS success may be hindered by disc herniation  Recommendation too proceed with surgery for disc decompression,  if neuropathic pain persist can proceed with SCS implant  Patient agreed to proceed with surgery   Surgery was scheduled  With Dr Pricilla Rachel for [address] DISCECTOMY L4-5, RIGHT (Right Spine Lumbar)       Impressions and treatment recommendations were discussed in detail with the patient who verbalized understanding, all questions were answered and contact information was given in the event future questions arise  REVIEW OF SYSTEMS  Review of Systems   Constitutional: Positive for fatigue (2/2 pain only)  HENT: Negative  Eyes: Negative  Respiratory: Positive for apnea (CPAP)  COPD   Cardiovascular: Negative  Gastrointestinal: Positive for constipation  GI issues   Endocrine: Negative  Genitourinary: Positive for difficulty urinating     Musculoskeletal: Positive for back pain (right buttock radiating down outside of leg and wrapping around to front at about the knee and can go to top of foot), gait problem and myalgias (RLE)  Skin: Positive for wound (surgical incision)  Allergic/Immunologic: Negative  Neurological: Negative for weakness and numbness  Hematological: Bruises/bleeds easily  Psychiatric/Behavioral: Positive for sleep disturbance  Meds/Allergies     Current Outpatient Medications   Medication Sig Dispense Refill    Acetaminophen (TYLENOL EXTRA STRENGTH PO) Take 2 tablets by mouth as needed      Cholecalciferol (VITAMIN D-3) 1000 units CAPS Take 1,000 Units by mouth daily       cyclobenzaprine (FLEXERIL) 5 mg tablet Take 1 tablet (5 mg total) by mouth every 8 (eight) hours as needed for muscle spasms 40 tablet 1    levothyroxine 100 mcg tablet Take 1 tablet (100 mcg total) by mouth daily (Patient taking differently: Take 100 mcg by mouth daily Takes in the am) 90 tablet 3    morphine (MSIR) 15 mg tablet Take 1 tablet (15 mg total) by mouth every 8 (eight) hours as needed for severe pain for up to 7 daysMax Daily Amount: 45 mg 21 tablet 0    Multiple Vitamins-Minerals (ICAPS AREDS 2 PO) Take by mouth daily       vitamin B-12 (CYANOCOBALAMIN) 100 MCG tablet Take 100 mcg by mouth daily       dexamethasone (DECADRON) 2 mg tablet 2 mg tab by mouth, 4 tabs 2 x per day for 2 days,4 tabs daily for 2 days, 1 tab  2 x per day for 1 day, one tab daily for 2 days 28 tablet 0    menthol-cetylpyridinium (CEPACOL) 3 MG lozenge Take 1 lozenge (3 mg total) by mouth as needed for sore throat (Patient not taking: Reported on 4/6/2021) 30 lozenge 2    Methylnaltrexone Bromide (Relistor) 150 MG TABS Take by mouth daily      naloxone (NARCAN) 4 mg/0 1 mL nasal spray Administer 1 spray into a nostril  If no response after 2-3 minutes, give another dose in the other nostril using a new spray  1 each 1     No current facility-administered medications for this visit          Allergies   Allergen Reactions    Adhesive [Medical Tape] Rash    Latex - Food Allergy Rash PAST HISTORY    Past Medical History:   Diagnosis Date    Anxiety     Cataract     last assessed 14    Chronic bronchitis ([facility] [address] )     Sees pulmonary specialist twice a year    Chronic pain disorder     [facility] disease) ([facility] )     Hypothyroidism     Multiple sclerosis ([facility] )     CHOCO (obstructive sleep apnea)     uses cpap, 3/19/21 -pt states will bring CPAP for DOS 3/23/21    Peroneal muscle atrophy     Shortness of breath     Smoker     Thoracolumbar radiculopathy due to intervertebral disc disorder        Past Surgical History:   Procedure Laterality Date    APPENDECTOMY      CATARACT EXTRACTION       SECTION      CHOLECYSTECTOMY      GALLBLADDER SURGERY      NM COLONOSCOPY FLX DX W/COLLJ SPEC WHEN PFRMD N/A 2018    Procedure: COLONOSCOPY;  Surgeon: Dashawn Ross MD;  Location: [facility]; Service: Colorectal    NM LAMNOTMY INCL W/DCMPRSN NRV ROOT 1 INTRSPC LUMBR Right 3/23/2021    Procedure: MINIMALLY INVASIVE LUMBAR FAR LATERAL DISCECTOMY L4-5, RIGHT;  Surgeon: Khushbu Triplett MD;  Location:  MAIN OR;  Service: Neurosurgery    THROAT SURGERY         Social History     Tobacco Use    Smoking status: Current Every Day Smoker     Packs/day: 1 00     Years: 60 00     Pack years: 60 00     Types: Cigarettes     Start date: 1958    Smokeless tobacco: Never Used   Substance Use Topics    Alcohol use:  Yes     Alcohol/week: 1 0 - 2 0 standard drinks     Types: 1 - 2 Glasses of wine per week     Frequency: 4 or more times a week     Drinks per session: 1 or 2     Binge frequency: Never     Comment: 1-2 drinks of wine daily     Drug use: Not Currently     Types: Morphine, Marijuana     Comment: medical-RSO  last dose 11 days ago, past hx o f medical marijuana use - NOT CURRENT       Family History   Problem Relation Age of Onset    Skin cancer Mother     Hypertension Father         benign essential    Stroke Father     Lung cancer Brother The following portions of the patient's history were reviewed and updated as appropriate: allergies, current medications, past family history, past medical history, past social history, past surgical history and problem list       EXAM    Vitals:Blood pressure 119/75, pulse 79, temperature 98 1 °F (36 7 °C), temperature source Tympanic, resp  rate 16, height 5' 2" (1 575 m), weight 65 3 kg (144 lb)  ,Body mass index is 26 34 kg/m²  Physical Exam  Vitals signs and nursing note reviewed  Exam conducted with a chaperone present (daughter)  Constitutional:       General: She is not in acute distress  Appearance: She is normal weight  She is not ill-appearing, toxic-appearing or diaphoretic  Comments: Well groomed plesant   HENT:      Mouth/Throat:      Mouth: Mucous membranes are moist    Eyes:      General: No scleral icterus  Right eye: No discharge  Left eye: No discharge  Extraocular Movements: Extraocular movements intact  Conjunctiva/sclera: Conjunctivae normal    Cardiovascular:      Pulses: Normal pulses  Pulmonary:      Effort: Pulmonary effort is normal  No respiratory distress  Musculoskeletal:      Right lower leg: Edema (trace ankle and pedal ) present  Left lower leg: Edema (trace ankle and pedal ) present  Skin:     General: Skin is warm and dry  Neurological:      General: No focal deficit present  Mental Status: She is oriented to person, place, and time  Psychiatric:         Mood and Affect: Mood normal          Behavior: Behavior normal          Neurologic Exam     Mental Status   Oriented to person, place, and time     Level of consciousness: alert    Motor Exam     Strength   Right quadriceps: 5/5  Left quadriceps: 5/5  Right glutei: 5/5  Right anterior tibial: 5/5  Left anterior tibial: 5/5  Right gastroc: 5/5  Left gastroc: 5/5    Gait, Coordination, and Reflexes     Gait  Gait: (antalgic avoiding weight bearing right leg)      Imaging Studies  Xr Spine Lumbar 2 Or 3 Views Injury    Result Date: 3/23/2021  Narrative: C-[facility] - LUMBAR SPINE INDICATION: Spinal surgery  Fluoroscopic procedure guidance  Fluoroscopic spinal level verification  COMPARISON:  November 25, 2019 TECHNIQUE: FLUOROSCOPY TIME:   10 8 seconds 3 FLUOROSCOPIC IMAGES     Impression: FINDINGS AND IMPRESSION: Images depict fluoroscopic guidance for spinal surgery  Fluoroscopy confirmed spinal level posterior L4-L5 on initial image Please refer to the separate procedure notes for additional details  Workstation performed: [id number]       I have personally reviewed pertinent reports     and I have personally reviewed pertinent films in PACS

## 2021-04-06 NOTE — ASSESSMENT & PLAN NOTE
· As addressed in hPI  · 2 weeks post op presents for after care  · Lumbar incision  incision is clean, dry and intact, without erythema, dehiscence, drainage or s/s of infection, healing well approximated wound edges  Surgical glue is adherent to incision line covering scabbing and surrounding skin  Refer to photo attachment,  · Intensity of pain improved since surgery , no longer experiencing pain across mis  abd low back , can wear a bra now  · Pain severity now 4/10 on numeric scale  · Reports new pain complaints right lower buttock ischial  , right lateral and medial lower leg into calf tight muscle cramping, intermittent sharp sharp pain, aggravated with weight bearing to right leg  Hd trouble climbing stairs last evening  Pain can worsen to 8-9/10   · Pain characterization changes last week, onset day after a shopping trip to grocery store walking shopping and carrying groceries  · Muscle cramping disturbing sleep  · Increased Morphine dosing took on 15 mg tablet then about 1 hour later took another  Taking Morphine nearly every 6 hours but not consistently, need refill  · Taking cyclobenzaprine as ordered with minimal thrapeutic effect , helps with sleep at night , supply nearly depleted , has refills  · Of note received call from ASU on surgery day patient with new onset right sided muscle spasm since surgery , cyclobenzaprine added   She alsos c/o sore throat Cepacol lozenges ordered  PLAN;  · Add scheduled acetaminophen 1000 mg po every 8 hours  · Prescribed dexamathasone taper, at completion call office with condition update  · Narcan triggered secondary to opiate dosing  · Strict adherence to activity restriction thru 6 weeks postop     · Instructions fo rcontinued care documented in AVS reviewed in detail  ,

## 2021-04-06 NOTE — ASSESSMENT & PLAN NOTE
· As addressed in HPI  · As addressed in status post lumbar diskectomy        PLAN  · As addressed in lumbar laminectomy

## 2021-04-06 NOTE — PATIENT INSTRUCTIONS
Instructions for continued postoperative care thru 6 week postoperatively    · At 2 weeks postop can resume restricted medications such as ASA, products containing ASA, NSAID, fish oils, and OTC products or as previously directed  · Resume driving 2 week postoperatively  · Continue to observe incisions for redness, drainage, swelling, dehiscence, increased pain, fever >/= 101, warmth to touch incision or skin surrounding, if occur call or report to office immediately for reassessment  · Explained monocryl absorbable sutures can take up to 90 days to dissolve    · Do not remove glue from incisions; adherent to incision line covering scabs will eventually disappear  Allow water to briefly run over incisions, do not rub incisions  · Continue showers using clean towel and wash cloth with OTC antibacterial/liquid body wash, pat dry after showering continue protocol for an additional 2 weeks  · Do not apply lotions, creams, powder, or ointments to surgical incisions  · Activity as tolerated, no bending, lifting greater than 10 lbs, turning, stretching, no pulling or , pushing repetitive movements of upper extremities or lower trunk, ambulation as tolerated  · Refrain from strenuous activity, bending, and twisting back  · No Immersion in water such as swimming, hot tub, or tub bath  · Informed of follow-up appointment at 4 weeks postoperatively  · If there is any significant change in condition , call and/or return to the office immediately for reassessment   · Will discuss during 6 week postoperative  visit physical therapy need and consult as indicated  · Call at completion Dexamethasone therapy

## 2021-04-12 ENCOUNTER — TELEPHONE (OUTPATIENT)
Dept: NEUROSURGERY | Facility: CLINIC | Age: 76
End: 2021-04-12

## 2021-04-12 DIAGNOSIS — G89.18 POSTOPERATIVE PAIN AFTER SPINAL SURGERY: ICD-10-CM

## 2021-04-12 DIAGNOSIS — Z98.890 STATUS POST LUMBAR DISCECTOMY: ICD-10-CM

## 2021-04-12 NOTE — TELEPHONE ENCOUNTER
Telephoned patient reports the dexamethasone for efficacious for first 2 days or so  After which pain was sharp , severe muscle cramping in right calf,  Pain in tail bone , right hip down posterior aspect right leg into calf intermittently down to right foot  Remarked she has one dexamethasone remaining and is holding    Reports current medications provide marginal relief , cyclobenzaprine , acetaminophen   Reports stopped taking morphine was not helping at all then remarked she took 2 tabs (15mg each ordered = 30 mg)  She reported tapering herself off gabapentin about 1 year ago  I explained she should complete dexamethasone as ordered, explaining rational,   she angrily replies I have ha on steroids a  lot I know how to take them  I cautioned her about taking 2 morphine 15 mg  she angrily replied, there is a 30 mg tablet,  "I am not trying to kill myself , if I wanted to I could"  She was yelling through the phone my, I removed the phone form my ear, a  co-worker across the room could hear her  She tearfully explained I just want to get some relief I have not had a good nights sleep in days  She angrily informed me 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  with dementia, and there is no one to take care of her, she has the manage the household and is  the head of the house     She remarked I do not go to the emergency room they do not know me or how to treat me  I informed her I would not reorder another steroid taper dexamethasone or any other additional medications ,as she is not reliable to take as prescribed,    I notified daughter who attended 2 week postop visit, daughter offered the following:  She visited mother (patient) yesterday she was up and about the house cooking and performing household tasks didinot appear to be having a problem , is concerned about patient's morphine use  Questions if there is a point where pain medications no longer help     Daughter reports tried to calll patient today several times, she does not answer,    I questioned if she discussed this with PCP , she remarked not for years , he feeds her addition  Her mother goes in and manipulates him, he orders  her meds  Informed daughter I will not order additional medication at this time and discussed case with Dr Robe Augustine he recommend scheduling office appointment with Robe Augustine and I   Daughter in agreement suggest I not call her mother,  she will call and try to convince her to attend appointment on Thursday  Informed daughter I discussed case w/ Robe Augustine , he suggest explaining to patient symptoms will improve , he suggested ordering diazepam I will not patient is a risk, he suggest office appointment, scheduled appointment for Thursday 4/15/2021

## 2021-04-12 NOTE — TELEPHONE ENCOUNTER
Call transferred to me directly  Patient reporting that she has been having significant pain since the surgery  Noted multiple calls to the office regarding pain as well as a change to her medications at her 2 week post op visit with MACHO ISSA  She describes the pain as "crampy" in nature and originating in her right buttock and leg radiating down her leg  She feels presurgical symptoms have resolved  She said this was present since the surgery and has had no exacerbating incidents  She feels the pain is worse when laying  Has discontinued MSIR due to it not being affective  The muscle relaxer also does not seem to be helping  Previously was on a high dose of gabapentin but she felt this was not working and so weaned herself off  Does not get relief with any pain regimen currently and states the only thing that has helped is the steroid which she will take the last dose of today  Denies weakness, bowel or bladder incontinence  Advised patient that her pain may be related to nerve irritation and this can take time to calm down as nerve regeneration is a slow process  Explained that she may not have complete relief of pain in this post op time period and unfortunately may have to give it time  She was nervous about the possibility of the pain worsening after finishing the steroid  Will route to MACHO ISSA for direction

## 2021-04-14 ENCOUNTER — TELEPHONE (OUTPATIENT)
Dept: OTHER | Facility: OTHER | Age: 76
End: 2021-04-14

## 2021-04-14 ENCOUNTER — TELEPHONE (OUTPATIENT)
Dept: NEUROSURGERY | Facility: CLINIC | Age: 76
End: 2021-04-14

## 2021-04-14 NOTE — TELEPHONE ENCOUNTER
3 weeks post op, c/o muscle spasms traveling down right leg which incapacitates her due to the pain

## 2021-04-14 NOTE — TELEPHONE ENCOUNTER
Patient calls tonight due to uncontrolled post op pain  S/p MINIMALLY INVASIVE LUMBAR FAR LATERAL DISCECTOMY L4-5, RIGHT (Right) with Dr Abimael Cueva on 3/23/2021  She has called several times since her surgery regarding uncontrolled post operative pain  States she woke up from surgery with a severe cramping pain in her right leg mostly in the buttock and thigh that was different from her preoperative pain  She states currently that over the past week she has had worsening pain in the mornings, settling out during the day and then acting up again at nighttime  She had a 2 hour window this morning after a shower where she was pain free and felt she was finally coming around and then around noon she began with the worst pain she has ever been in  She was given a decadron taper which she just finished yesterday and felt she was doing okay with this  She has taken morphine 15mg which at first was very helpful for her pain but now is not effective and she no longer takes this  She has taken tylenol and cyclobenzaprine without relief, most recently at 5pm today  She states she has tried topical agents in the past and they haven't worked  She has used heat and that made pain worse  She states she cannot take the pain anymore and wonders if there is anything else she can do  She reports no red flag signs such as bowel / bladder issues or saddle anesthesia  She admits to having a long history of pain and having trouble with pain control with opioids, the morphine was "godsent" but now does not work for her  Per documentation there have been some issues with pain control, trouble with med compliance, etc   I informed the patient that sometimes nerves can become irritated after surgery, each patient is different and it can vary person to person on when they will start to feel better, especially with someone who has struggled with pain herself  She is only 3 weeks out and there is room to improve    I offered patient a new muscle relaxer however given the time of day she called it is unlikely that the pharmacy will be open  She states regardless she is in no state to drive and would likely not make it to a pharmacy and does not want to bother her daughter  I offer for her to go to the ED to be evaluated for post operative pain and she states she does not want to go to the ED to wait around for hours and would rather stay home  I encourage her to use the medications she has as directed for pain relief including attempting the morphine again, and taking the muscle relaxer and tylenol again when it is time  She is encouraged to use heat and ice as she sees fit  Told her to try another shower to help with muscle relaxation from the heat / steam as this may be why she had pain relief earlier  She is to go to the ED if she has red flag signs  She is to follow up as scheduled tomorrow with [facility] in the office - I will send a staff message to alert her of our conversation  Patient seems appreciative of the call and knows to call back if something changes

## 2021-04-15 ENCOUNTER — OFFICE VISIT (OUTPATIENT)
Dept: NEUROSURGERY | Facility: CLINIC | Age: 76
End: 2021-04-15

## 2021-04-15 VITALS
DIASTOLIC BLOOD PRESSURE: 71 MMHG | SYSTOLIC BLOOD PRESSURE: 125 MMHG | HEART RATE: 75 BPM | HEIGHT: 62 IN | RESPIRATION RATE: 16 BRPM | TEMPERATURE: 98.3 F | WEIGHT: 143 LBS | BODY MASS INDEX: 26.31 KG/M2

## 2021-04-15 DIAGNOSIS — G89.4 CHRONIC PAIN SYNDROME: ICD-10-CM

## 2021-04-15 DIAGNOSIS — M62.830 SPASM OF MUSCLE OF LOWER BACK: ICD-10-CM

## 2021-04-15 DIAGNOSIS — M62.838 MUSCLE SPASM: ICD-10-CM

## 2021-04-15 DIAGNOSIS — G89.18 POSTOPERATIVE PAIN AFTER SPINAL SURGERY: ICD-10-CM

## 2021-04-15 DIAGNOSIS — Z98.890 STATUS POST LUMBAR DISCECTOMY: Primary | ICD-10-CM

## 2021-04-15 DIAGNOSIS — Z98.890 POSTOPERATIVE STATE: ICD-10-CM

## 2021-04-15 DIAGNOSIS — M79.2 NEUROPATHIC PAIN OF LOWER EXTREMITY, RIGHT: ICD-10-CM

## 2021-04-15 PROCEDURE — 99024 POSTOP FOLLOW-UP VISIT: CPT | Performed by: NURSE PRACTITIONER

## 2021-04-15 RX ORDER — DULOXETIN HYDROCHLORIDE 30 MG/1
30 CAPSULE, DELAYED RELEASE ORAL DAILY
Qty: 15 CAPSULE | Refills: 0 | Status: SHIPPED | OUTPATIENT
Start: 2021-04-15 | End: 2021-04-26 | Stop reason: SDUPTHER

## 2021-04-15 RX ORDER — CYCLOBENZAPRINE HCL 5 MG
10 TABLET ORAL EVERY 8 HOURS PRN
Qty: 40 TABLET | Refills: 1 | Status: SHIPPED | OUTPATIENT
Start: 2021-04-15 | End: 2021-05-03 | Stop reason: SDUPTHER

## 2021-04-15 RX ORDER — GABAPENTIN 300 MG/1
300 CAPSULE ORAL DAILY
Qty: 15 CAPSULE | Refills: 0 | Status: SHIPPED | OUTPATIENT
Start: 2021-04-15 | End: 2021-04-27

## 2021-04-15 NOTE — PROGRESS NOTES
Assessment/Plan:    Neuropathic pain of lower extremity, right  · As addressed in hPI  · Neuropathic pain since surgery   · Completed course of Dexamethasone taper with initial improvement in neuropathic pain in right leg first 2-3 days after start then declined with return to baseline after completion  · Pain located right buttock (ischeal area)  down into thigh and right lower leg to calf  Pain is pulsating, gripping , cramping type pain that progresses to sharp shooting pain  · Pain occurs mostly at night interrupts sleep , is sleep deprived  · Opiates not efficacious --reported taking 30 mg of opiate without relief  · Cyclobenzaprine for cramping muscular type pain 5 mg po q hs , reports 5 mg does nothing for her she increased dose to 10 mg 3 x per day, will adjust script to reflect and send to pharmacy  · Daughter questioned about ordering Ambien as was prescribed in the past       Plan  · Dr Shaila Palm  Met with patient discussed the path-pohysiology of her pain  -nerve pain that starts at night, worsens when cold  He revisited prior discussion of spinal cord stimulator  · Recommend heating blanked around legs at night   · Explained Opiates do not relieve neuropathic pain , and dangers of overdosing with opiate  · Reinforced need to comply with medication dosing  · Explained phenomena of hyperalgesia      · Gabapentin prescribed 300 mg po in the afternoon   · Cymbalta prescribed 30 mg po Q hs   · We nayeli not prescribe hypnotics  · Complete MRI lumbar spin assess for new herniation since surgery in light of new neuropathic pain   · RTO after MRI scheduled for 4/15/2021  · 6 week PO visit w/ Shaila Palm 5/6/2021         Chronic pain syndrome  · As discussed in HPI  · As discussed in neuropathic pain       Plan  As discussed in neuropathic pain        Subjective:  Interim office visit with daughter present     Patient ID: Joy Sosa is a 64 y o  female     HPI   Longstanding history of chronic pain affecting low back bilateral abdomen both lower extremities , right greater than left   Left low back with distribution into buttocks,  Bilateral Groin, then anterior left thigh, right low back with distribution into right buttock , thigh and down right lower leg, at times has into foot and right foot  neuropathic pain , status post neuroma removal right foot  Failed conservative treatment injections and therapy  She has a successful [employer] SCS trial 90% efficacy but MRI with surgical pathology need to address anatomical  Cause fr pain  MRI lumbar spine with disc herniation L4- L5     Underwent surgery with Dr Purnima Pittman 3/23/3021MINIMALLY INVASIVE LUMBAR FAR LATERAL DISCECTOMY L4-5, RIGHT (Right Spine Lumbar)      On surgery day ASU called office patient with new right lower extremity muscular pain , cyclobenzaprine prescribed  At 2 week postop visit continued with complaint of right lower extremity muscular pain and new complaints of right lower extremity pain characteristic of neuropathic pain right lower buttock ischial  , right lateral and medial lower leg into calf tight muscle cramping, intermittent sharp pain, aggravated with weight bearing to right leg  Pain and muscular cramping mostly occurs at night , keeps her awake  Interim office visit about 3 weeks after surgery  Secondary to  Uncontrolled pain complaints   Current medication regimen not efficacious for neuropathic pain management , during telephone conversation reports of medication usage indicative of misuse  REVIEW OF SYSTEMS  Review of Systems   Constitutional: Positive for fatigue (2/2 pain only)  HENT: Negative  Eyes: Negative  Respiratory: Positive for apnea (CPAP)  COPD   Cardiovascular: Negative  Gastrointestinal: Negative for constipation  GI issues   Endocrine: Negative  Genitourinary: Negative for difficulty urinating     Musculoskeletal: Positive for back pain (Right buttock, right leg to calf), gait problem and myalgias (RLE)  Skin: Negative  Negative for wound  Allergic/Immunologic: Negative  Neurological: Negative for weakness and numbness  Hematological: Bruises/bleeds easily  Psychiatric/Behavioral: Positive for sleep disturbance  Meds/Allergies     Current Outpatient Medications   Medication Sig Dispense Refill    Acetaminophen (TYLENOL EXTRA STRENGTH PO) Take 2 tablets by mouth as needed      Cholecalciferol (VITAMIN D-3) 1000 units CAPS Take 1,000 Units by mouth daily       cyclobenzaprine (FLEXERIL) 5 mg tablet Take 2 tablets (10 mg total) by mouth every 8 (eight) hours as needed for muscle spasms 40 tablet 1    levothyroxine 100 mcg tablet Take 1 tablet (100 mcg total) by mouth daily (Patient taking differently: Take 100 mcg by mouth daily Takes in the am) 90 tablet 3    Multiple Vitamins-Minerals (ICAPS AREDS 2 PO) Take by mouth daily       vitamin B-12 (CYANOCOBALAMIN) 100 MCG tablet Take 100 mcg by mouth daily       dexamethasone (DECADRON) 2 mg tablet 2 mg tab by mouth, 4 tabs 2 x per day for 2 days,4 tabs daily for 2 days, 1 tab  2 x per day for 1 day, one tab daily for 2 days (Patient not taking: Reported on 4/15/2021) 28 tablet 0    DULoxetine (CYMBALTA) 30 mg delayed release capsule Take 1 capsule (30 mg total) by mouth daily Take daily at bedtime before sleep 15 capsule 0    gabapentin (NEURONTIN) 300 mg capsule Take 1 capsule (300 mg total) by mouth daily In the afternoon 15 capsule 0    menthol-cetylpyridinium (CEPACOL) 3 MG lozenge Take 1 lozenge (3 mg total) by mouth as needed for sore throat (Patient not taking: Reported on 4/6/2021) 30 lozenge 2    Methylnaltrexone Bromide (Relistor) 150 MG TABS Take by mouth daily      morphine (MSIR) 15 mg tablet Take 1 tablet (15 mg total) by mouth every 8 (eight) hours as needed for severe painMax Daily Amount: 45 mg 90 tablet 0    naloxone (NARCAN) 4 mg/0 1 mL nasal spray Administer 1 spray into a nostril   If no response after 2-3 minutes, give another dose in the other nostril using a new spray  (Patient not taking: Reported on 4/15/2021) 1 each 1     No current facility-administered medications for this visit  Allergies   Allergen Reactions    Adhesive [Medical Tape] Rash    Latex Rash       PAST HISTORY    Past Medical History:   Diagnosis Date    Anxiety     Cataract     last assessed 14    Chronic bronchitis ([facility] [address] )     Sees pulmonary specialist twice a year    Chronic pain disorder     [facility]     Hypothyroidism     Multiple sclerosis ([facility] )     CHOCO (obstructive sleep apnea)     uses cpap, 3/19/21 -pt states will bring CPAP for DOS 3/23/21    Peroneal muscle atrophy     Shortness of breath     Smoker     Thoracolumbar radiculopathy due to intervertebral disc disorder        Past Surgical History:   Procedure Laterality Date    APPENDECTOMY      CATARACT EXTRACTION       SECTION      CHOLECYSTECTOMY      GALLBLADDER SURGERY      VA COLONOSCOPY FLX DX W/COLLJ SPEC WHEN PFRMD N/A 2018    Procedure: COLONOSCOPY;  Surgeon: Aurelia Hodges MD;  Location: [facility]; Service: Colorectal    VA LAMNOTMY INCL W/DCMPRSN NRV ROOT 1 INTRSPC LUMBR Right 3/23/2021    Procedure: MINIMALLY INVASIVE LUMBAR FAR LATERAL DISCECTOMY L4-5, RIGHT;  Surgeon: Yael Traylor MD;  Location:  MAIN OR;  Service: Neurosurgery    THROAT SURGERY         Social History     Tobacco Use    Smoking status: Current Every Day Smoker     Packs/day: 1 00     Years: 60 00     Pack years: 60 00     Types: Cigarettes     Start date: 1958    Smokeless tobacco: Never Used   Substance Use Topics    Alcohol use:  Yes     Alcohol/week: 1 0 - 2 0 standard drinks     Types: 1 - 2 Glasses of wine per week     Frequency: 4 or more times a week     Drinks per session: 1 or 2     Binge frequency: Never     Comment: 1-2 drinks of wine daily     Drug use: Not Currently     Types: Morphine, Marijuana     Comment: medical-RSO  last dose 11 days ago, past hx o f medical marijuana use - NOT CURRENT       Family History   Problem Relation Age of Onset    Skin cancer Mother     Hypertension Father         benign essential    Stroke Father     Lung cancer Brother        The following portions of the patient's history were reviewed and updated as appropriate: allergies, current medications, past family history, past medical history, past social history, past surgical history and problem list       EXAM    Vitals:Blood pressure 125/71, pulse 75, temperature 98 3 °F (36 8 °C), temperature source Tympanic, resp  rate 16, height 5' 2" (1 575 m), weight 64 9 kg (143 lb)  ,Body mass index is 26 16 kg/m²  Physical Exam  Vitals signs and nursing note reviewed  Exam conducted with a chaperone present (daughter)  Constitutional:       Appearance: Normal appearance  HENT:      Head: Normocephalic and atraumatic  Eyes:      General: No scleral icterus  Right eye: No discharge  Left eye: No discharge  Extraocular Movements: Extraocular movements intact  Conjunctiva/sclera: Conjunctivae normal    Cardiovascular:      Rate and Rhythm: Normal rate  Pulmonary:      Effort: Pulmonary effort is normal  No respiratory distress  Musculoskeletal:      Right lower leg: Edema (pedal and ankle non pitting ) present  Left lower leg: Edema (pedal and ankle non pitting ) present  Skin:     General: Skin is warm and dry  Neurological:      General: No focal deficit present  Mental Status: She is alert and oriented to person, place, and time  Gait: Gait is intact  Psychiatric:         Behavior: Behavior normal       Comments: Angry under tones , loud voice, frustrated         Neurologic Exam     Mental Status   Oriented to person, place, and time     Level of consciousness: alert    Motor Exam     Strength   Right quadriceps: 5/5  Right hamstrin/5  Left hamstring: 5/5  Right anterior tibial: 5/5  Left anterior tibial: 5/5  Right gastroc: 5/5  Left gastroc: 5/5    Gait, Coordination, and Reflexes     Gait  Gait: normal      Imaging StudiesI have personally reviewed pertinent reports      NONE

## 2021-04-16 ENCOUNTER — OFFICE VISIT (OUTPATIENT)
Dept: INTERNAL MEDICINE CLINIC | Facility: CLINIC | Age: 76
End: 2021-04-16
Payer: MEDICARE

## 2021-04-16 VITALS
SYSTOLIC BLOOD PRESSURE: 110 MMHG | WEIGHT: 143.4 LBS | BODY MASS INDEX: 26.39 KG/M2 | OXYGEN SATURATION: 97 % | HEIGHT: 62 IN | DIASTOLIC BLOOD PRESSURE: 70 MMHG | HEART RATE: 81 BPM | TEMPERATURE: 98.3 F

## 2021-04-16 DIAGNOSIS — M54.16 LUMBAR RADICULOPATHY: Primary | ICD-10-CM

## 2021-04-16 DIAGNOSIS — Z98.890 STATUS POST LUMBAR DISCECTOMY: ICD-10-CM

## 2021-04-16 DIAGNOSIS — E03.9 ACQUIRED HYPOTHYROIDISM: Chronic | ICD-10-CM

## 2021-04-16 DIAGNOSIS — F11.20 CONTINUOUS OPIOID DEPENDENCE (HCC): ICD-10-CM

## 2021-04-16 PROBLEM — M79.2 NEUROPATHIC PAIN OF LOWER EXTREMITY, RIGHT: Status: ACTIVE | Noted: 2021-04-16

## 2021-04-16 PROCEDURE — 99214 OFFICE O/P EST MOD 30 MIN: CPT | Performed by: INTERNAL MEDICINE

## 2021-04-16 RX ORDER — MORPHINE SULFATE 15 MG/1
15 TABLET ORAL EVERY 8 HOURS PRN
Qty: 90 TABLET | Refills: 0 | Status: SHIPPED | OUTPATIENT
Start: 2021-04-16 | End: 2021-06-01 | Stop reason: SDUPTHER

## 2021-04-16 NOTE — PROGRESS NOTES
[facility] - [facility]    NAME: Nella Gaucher  AGE: 64 y o  SEX: female  : 1945     DATE: 2021     Assessment and Plan:     1  Lumbar radiculopathy  2  Status post lumbar discectomy  3  Continuous opioid dependence ([address] )    Follow-up with spine specialist as scheduled  I think more time will hopefully get this current pain she is experiencing to dissipate  Will continue her on as needed morphine  [place] PDMP was reviewed  No red flags  No abuse concerns  - morphine (MSIR) 15 mg tablet; Take 1 tablet (15 mg total) by mouth every 8 (eight) hours as needed for severe painMax Daily Amount: 45 mg  Dispense: 90 tablet; Refill: 0    4  Acquired hypothyroidism    Due for updated lab work  Will ask that she gets it done at her convenience  - TSH, 3rd generation with Free T4 reflex; Future  - Lipid panel; Future    BMI Counseling: Body mass index is 26 23 kg/m²  The BMI is above normal  Nutrition recommendations include moderation in carbohydrate intake and increasing intake of lean protein  Tobacco Cessation Counseling: Tobacco cessation counseling was provided  The patient is sincerely urged to quit consumption of tobacco  She is not ready to quit tobacco       Return in about 4 months (around 2021) for Follow-up  Chief Complaint:     Chief Complaint   Patient presents with    Medication Refill     insurance form      History of Present Illness:     Patient needs form filled out for auto insurance  Since I last saw her she underwent lumbar discectomy  Pain that she was feeling before is gone and she is really pleased  She now feels a different kind of neuropathic pain  Giving it some time to see if it gets better  It's in a different location lower down her spine around right hip  Review of Systems:     Review of Systems   Constitutional: Negative for diaphoresis, fatigue and fever  Respiratory: Negative  Cardiovascular: Negative  Gastrointestinal: Negative  Musculoskeletal: Positive for back pain  Neurological: Positive for numbness (neuropathic pain)  Objective:     /70 (BP Location: Right arm, Patient Position: Sitting, Cuff Size: Standard)   Pulse 81   Temp 98 3 °F (36 8 °C)   Ht 5' 2" (1 575 m)   Wt 65 kg (143 lb 6 4 oz)   SpO2 97%   BMI 26 23 kg/m²     Physical Exam  Constitutional:       General: She is not in acute distress  Appearance: She is not ill-appearing  Cardiovascular:      Rate and Rhythm: Normal rate and regular rhythm  Heart sounds: No murmur  Pulmonary:      Effort: Pulmonary effort is normal  No respiratory distress  Breath sounds: No wheezing  Neurological:      Mental Status: She is alert           Mo Ontiveros DO  [facility]

## 2021-04-16 NOTE — ASSESSMENT & PLAN NOTE
· As discussed in HPI  · As discussed in neuropathic pain       Plan  As discussed in neuropathic pain

## 2021-04-16 NOTE — ASSESSMENT & PLAN NOTE
· As addressed in hPI  · Neuropathic pain since surgery   · Completed course of Dexamethasone taper with initial improvement in neuropathic pain in right leg first 2-3 days after start then declined with return to baseline after completion  · Pain located right buttock (ischeal area)  down into thigh and right lower leg to calf  Pain is pulsating, gripping , cramping type pain that progresses to sharp shooting pain  · Pain occurs mostly at night interrupts sleep , is sleep deprived  · Opiates not efficacious --reported taking 30 mg of opiate without relief  · Cyclobenzaprine for cramping muscular type pain 5 mg po q hs , reports 5 mg does nothing for her she increased dose to 10 mg 3 x per day, will adjust script to reflect and send to pharmacy  · Daughter questioned about ordering Ambien as was prescribed in the past       Plan  · Dr Arjun Marie  Met with patient discussed the path-pohysiology of her pain  -nerve pain that starts at night, worsens when cold  He revisited prior discussion of spinal cord stimulator  · Recommend heating blanked around legs at night   · Explained Opiates do not relieve neuropathic pain , and dangers of overdosing with opiate  · Reinforced need to comply with medication dosing  · Explained phenomena of hyperalgesia      · Gabapentin prescribed 300 mg po in the afternoon   · Cymbalta prescribed 30 mg po Q hs   · We nayeli not prescribe hypnotics    · Complete MRI lumbar spin assess for new herniation since surgery in light of new neuropathic pain   · RTO after MRI scheduled for 4/15/2021  · 6 week PO visit w/ Arjun Marie 5/6/2021

## 2021-04-23 DIAGNOSIS — M79.2 NEUROPATHIC PAIN OF LOWER EXTREMITY, RIGHT: ICD-10-CM

## 2021-04-23 DIAGNOSIS — G89.18 POSTOPERATIVE PAIN AFTER SPINAL SURGERY: ICD-10-CM

## 2021-04-23 DIAGNOSIS — Z98.890 STATUS POST LUMBAR DISCECTOMY: ICD-10-CM

## 2021-04-23 RX ORDER — DULOXETIN HYDROCHLORIDE 30 MG/1
30 CAPSULE, DELAYED RELEASE ORAL DAILY
Qty: 15 CAPSULE | Refills: 0 | OUTPATIENT
Start: 2021-04-23

## 2021-04-23 NOTE — TELEPHONE ENCOUNTER
Patient does not want filled at this time , taking daily ,initial order for 15 tabs has adequate supply    She nayeli rudi me on Monday with update on how the weekend goes

## 2021-04-23 NOTE — TELEPHONE ENCOUNTER
Telephoned patient for status check 1 week after starting Cymbalta ---  She reports improved sleep pattern starting Friday of last week   Taking Cymbalta nightly   Muscle spasm right calf improving   Continues treating with Tizanidine 4 mg po every 8 hours   Pain warranting surgery completely resolved   Started with right calf muscle spasm after surgery  She does not want me to send refil to pharmacy yet has a week supply  Reports would like to see how the weekend go , will call Monday with and update     Does not want med increased at this time to 30 mg

## 2021-04-26 ENCOUNTER — TELEPHONE (OUTPATIENT)
Dept: NEUROSURGERY | Facility: CLINIC | Age: 76
End: 2021-04-26

## 2021-04-26 DIAGNOSIS — M79.2 NEUROPATHIC PAIN OF LOWER EXTREMITY, RIGHT: Primary | ICD-10-CM

## 2021-04-26 DIAGNOSIS — G89.18 POSTOPERATIVE PAIN AFTER SPINAL SURGERY: ICD-10-CM

## 2021-04-26 DIAGNOSIS — Z98.890 STATUS POST LUMBAR DISCECTOMY: ICD-10-CM

## 2021-04-26 RX ORDER — DULOXETIN HYDROCHLORIDE 30 MG/1
30 CAPSULE, DELAYED RELEASE ORAL DAILY
Qty: 30 CAPSULE | Refills: 0 | Status: SHIPPED | OUTPATIENT
Start: 2021-04-26 | End: 2021-06-02

## 2021-04-26 NOTE — TELEPHONE ENCOUNTER
Returned call reports Cymbalta is helping neuropathic pain at night , does not want to increase dose, is sleeping better  Wishes to remain on Gabapentin 300 mg po daily  Request not to make any additional changes at this time, she is doind well on current regimen  She has a f/u appointment 6 week PO w/ Dr Carlos Enrique Daigle 5/6/2021    Encouraged to call with any questions  or concerns       Plan;  Reorder Cymbalta x 30 days

## 2021-04-26 NOTE — TELEPHONE ENCOUNTER
Received a call from Jas Love to the med refill line requesting refill of her gabapentin and Cymbalta 30 mg  Review of chart shows patient was to call back this morning to provide update regarding her pain  She states her nerve pain is "noticeably better" and would like to continue on the current regimen of Cymbalta 30 mg daily and gabapentin 300mg daily  Will route this request to MACHO ISSA for assistance

## 2021-04-27 DIAGNOSIS — M79.2 NEUROPATHIC PAIN OF LOWER EXTREMITY, RIGHT: ICD-10-CM

## 2021-04-27 DIAGNOSIS — G89.18 POSTOPERATIVE PAIN AFTER SPINAL SURGERY: ICD-10-CM

## 2021-04-27 DIAGNOSIS — Z98.890 STATUS POST LUMBAR DISCECTOMY: ICD-10-CM

## 2021-04-27 RX ORDER — GABAPENTIN 300 MG/1
300 CAPSULE ORAL DAILY
Qty: 30 CAPSULE | Refills: 0 | Status: SHIPPED | OUTPATIENT
Start: 2021-04-27 | End: 2021-09-17 | Stop reason: CLARIF

## 2021-04-28 ENCOUNTER — TELEPHONE (OUTPATIENT)
Dept: NEUROSURGERY | Facility: CLINIC | Age: 76
End: 2021-04-28

## 2021-04-28 NOTE — TELEPHONE ENCOUNTER
Patient called nurseline questioning if she needs a new appt d/t her MRI appt today being canceled d/t machine being broken  Returned call to patient who states her appt is 5/6 with NSX but the facility does not have another opening until 5/10  Contacted central scheduling and rescheduled patient's MRI to 5/5 at 1130 at [facility]  Reached out to patient to update her of this change  Patient appreciative of call

## 2021-05-03 ENCOUNTER — TELEPHONE (OUTPATIENT)
Dept: NEUROSURGERY | Facility: CLINIC | Age: 76
End: 2021-05-03

## 2021-05-03 DIAGNOSIS — Z98.890 STATUS POST LUMBAR DISCECTOMY: ICD-10-CM

## 2021-05-03 DIAGNOSIS — M79.2 NEUROPATHIC PAIN OF LOWER EXTREMITY, RIGHT: ICD-10-CM

## 2021-05-03 DIAGNOSIS — M62.838 MUSCLE SPASM OF RIGHT LEG: Primary | ICD-10-CM

## 2021-05-03 DIAGNOSIS — M62.830 SPASM OF MUSCLE OF LOWER BACK: ICD-10-CM

## 2021-05-03 DIAGNOSIS — Z98.890 POSTOPERATIVE STATE: ICD-10-CM

## 2021-05-03 RX ORDER — CYCLOBENZAPRINE HCL 5 MG
10 TABLET ORAL EVERY 8 HOURS PRN
Qty: 60 TABLET | Refills: 3 | Status: SHIPPED | OUTPATIENT
Start: 2021-05-03 | End: 2021-07-14 | Stop reason: SDUPTHER

## 2021-05-03 NOTE — TELEPHONE ENCOUNTER
Telephoned patient reports has muscle spasm in right thigh and calve , taking cyclobenzaprine as prescribed, need refill   Sent to pharmacy   Has MRI Lumbar on Wednesday and f/u visit with [facility] Thursday    MRI date was changed by SL secondary to problem with machine

## 2021-05-03 NOTE — TELEPHONE ENCOUNTER
Patient called the nurse line requesting a refill of her muscle relaxer, cyclobenzaprine  She takes 2 tabs TID prn for muscle spasms and she informed me that she only has 1 dose left  She wanted to know if Alisa was willing to refill this for her and her pharmacy is [facility] in [facility]? Patient scheduled to see Dr Ashley Dillon for her 6 week POV on 5/6/21

## 2021-05-05 ENCOUNTER — HOSPITAL ENCOUNTER (OUTPATIENT)
Dept: RADIOLOGY | Facility: HOSPITAL | Age: 76
Discharge: HOME | End: 2021-05-05

## 2021-05-05 ENCOUNTER — DOCUMENTATION (OUTPATIENT)
Dept: NEUROSURGERY | Facility: CLINIC | Age: 76
End: 2021-05-05

## 2021-05-05 ENCOUNTER — HOSPITAL ENCOUNTER (OUTPATIENT)
Dept: MRI IMAGING | Facility: HOSPITAL | Age: 76
Discharge: HOME | End: 2021-05-05
Payer: MEDICARE

## 2021-05-05 DIAGNOSIS — G89.18 POSTOPERATIVE PAIN AFTER SPINAL SURGERY: ICD-10-CM

## 2021-05-05 DIAGNOSIS — Z98.890 STATUS POST LUMBAR DISCECTOMY: ICD-10-CM

## 2021-05-05 DIAGNOSIS — M79.5 FOREIGN BODY (FB) IN SOFT TISSUE: ICD-10-CM

## 2021-05-05 PROCEDURE — G1004 CDSM NDSC: HCPCS

## 2021-05-05 PROCEDURE — 72158 MRI LUMBAR SPINE W/O & W/DYE: CPT

## 2021-05-05 PROCEDURE — A9585 GADOBUTROL INJECTION: HCPCS | Performed by: NURSE PRACTITIONER

## 2021-05-05 RX ADMIN — GADOBUTROL 6.5 ML: 604.72 INJECTION INTRAVENOUS at 12:52

## 2021-05-05 NOTE — PROGRESS NOTES
Radiology department at [facility] called --would like to change MRI order to include with contrast as per radiologist request    When patient is status post recent sx contrast is necessary to distinguish between scar tissue and new  infection  Gudelia Comer had already changed the order before calling me      Ok to proceed as per request  infection

## 2021-05-06 ENCOUNTER — OFFICE VISIT (OUTPATIENT)
Dept: NEUROSURGERY | Facility: CLINIC | Age: 76
End: 2021-05-06

## 2021-05-06 VITALS
DIASTOLIC BLOOD PRESSURE: 60 MMHG | SYSTOLIC BLOOD PRESSURE: 110 MMHG | HEART RATE: 95 BPM | WEIGHT: 143 LBS | RESPIRATION RATE: 18 BRPM | HEIGHT: 62 IN | BODY MASS INDEX: 26.31 KG/M2 | TEMPERATURE: 97.2 F

## 2021-05-06 DIAGNOSIS — M54.16 LUMBAR RADICULOPATHY: Primary | ICD-10-CM

## 2021-05-06 PROCEDURE — 99024 POSTOP FOLLOW-UP VISIT: CPT | Performed by: NEUROLOGICAL SURGERY

## 2021-05-06 NOTE — PROGRESS NOTES
Assessment/Plan:    No problem-specific Assessment & Plan notes found for this encounter  6 weeks postop from right far lateral disc  Not unexpected neuropathic pain  Does get worse at night  I reassured that does tend to improved with PT and time  Will f/u in mid June if sx do not slowly get better we will proceed with scs implantation     Diagnoses and all orders for this visit:    Lumbar radiculopathy  -     Ambulatory referral to Physical Therapy; Future          Subjective:      Patient ID: Tom Beltre is a 64 y o  female  HPI    6 weeks postop from L4-5 far lateral disc  Different type of pain at this time worse at nighttime  The following portions of the patient's history were reviewed and updated as appropriate: allergies, current medications, past family history, past medical history, past social history, past surgical history and problem list     Review of Systems   Constitutional: Positive for fatigue (2/2 pain only)  HENT: Negative  Eyes: Negative  Respiratory: Positive for apnea (CPAP)  COPD   Cardiovascular: Negative  Gastrointestinal: Negative for constipation  GI issues   Endocrine: Negative  Genitourinary: Negative for difficulty urinating  Musculoskeletal: Positive for back pain (Right buttock, right leg to calf  now in right foot ), gait problem and myalgias (RLE, mostly in calf)  For about 2 wks now, pain is more intense in right knee and traveling to foot   Skin: Negative  Negative for wound  Allergic/Immunologic: Negative  Hematological: Bruises/bleeds easily  Psychiatric/Behavioral: Positive for sleep disturbance         I have personally reviewed all aspects of the review of systems as documented    Objective:      /60 (BP Location: Right arm)   Pulse 95   Temp (!) 97 2 °F (36 2 °C) (Tympanic)   Resp 18   Ht 5' 2" (1 575 m)   Wt 64 9 kg (143 lb)   BMI 26 16 kg/m²          Physical Exam  Constitutional:       Appearance: Normal appearance  She is normal weight  Cardiovascular:      Rate and Rhythm: Normal rate  Neurological:      Mental Status: She is alert and oriented to person, place, and time  Mental status is at baseline

## 2021-05-14 ENCOUNTER — EVALUATION (OUTPATIENT)
Dept: PHYSICAL THERAPY | Facility: CLINIC | Age: 76
End: 2021-05-14
Payer: MEDICARE

## 2021-05-14 DIAGNOSIS — Z98.890 S/P LUMBAR DISCECTOMY: ICD-10-CM

## 2021-05-14 DIAGNOSIS — M54.16 LUMBAR RADICULOPATHY: Primary | ICD-10-CM

## 2021-05-14 PROCEDURE — 97162 PT EVAL MOD COMPLEX 30 MIN: CPT | Performed by: PHYSICAL THERAPIST

## 2021-05-14 PROCEDURE — 97140 MANUAL THERAPY 1/> REGIONS: CPT | Performed by: PHYSICAL THERAPIST

## 2021-05-14 NOTE — PROGRESS NOTES
PT Evaluation     Today's date: 2021  Patient name: Efren Montana  : 1945  MRN: [id number]  Referring provider: Emma Sun MD  Dx:   Encounter Diagnosis     ICD-10-CM    1  Lumbar radiculopathy  M54 16 Ambulatory referral to Physical Therapy   2  S/P lumbar discectomy  Z98 890        Start Time: 0845  Stop Time: 930  Total time in clinic (min): 45 minutes    Assessment  Assessment details: Pt is a 65 y/o female presenting to physical therapy s/p R L4/5 discectomy on 3/23/21  Pt presents with significant pain and functional limitations  Pt expressed frustrations with continued pain that is debilitating and her QOL has decreased  Pt presents with decreased willingness to move the lumbar spine, decreased strength in BLE, no TTP in the lumbar spine, and nerve tension in the RLE in sitting  Soft tissue adhesions noted on the R lower lumbar paraspinals with IASTM, and pain with walking was improved following IASTM  Pt would benefit from physical therapy in order to improve pain and QOL to reach maximal potential    Impairments: abnormal gait, abnormal or restricted ROM, activity intolerance, impaired balance, impaired physical strength, lacks appropriate home exercise program, pain with function and poor posture   Functional limitations: ADLs, standing, walking, sitting  Symptom irritability: highBarriers to therapy: Chronic pain  Understanding of Dx/Px/POC: good   Prognosis: good    Goals  STG: 3 weeks  1  Pt will demonstrate independence with HEP  2  Pt will improve lumbar AROM by 10%  3  Pt will improve BLE strength by at least 1/2 grade  4  Pt will report pain no more than 5/10  5  Pt will generate proper TA contraction without cuing to show improved NM control    LT weeks  1  Pt will improve lumbar AROM to at least 80% in all directions  2  Pt will report pain no more than 2/10  3  Pt will be able to stand/sit for at least 30 minutes without pain to return to PLOF    4  Pt will be able to lift at least 10lbs from floor to waist without pain  Plan  Patient would benefit from: skilled physical therapy  Planned modality interventions: cryotherapy and thermotherapy: hydrocollator packs  Planned therapy interventions: therapeutic exercise, therapeutic activities, stretching, strengthening, patient education, neuromuscular re-education, massage, manual therapy, balance, gait training and home exercise program  Frequency: 2x week  Duration in weeks: 6  Treatment plan discussed with: patient        Subjective Evaluation    History of Present Illness  Date of surgery: 3/23/2021  Mechanism of injury: surgery  Mechanism of injury: Pt had a R sided lumbar discectomy on 3/23/21, which stopped the original pain she was having but now she is having a new pain  Her pain right after surgery was in her tailbone and her leg feels like it is going to cramp  Now she has pain in her glute, down into her thigh and ending in her foot  She reports she is "trying to maintain some sort of life" but it is hard because of the pain  Pt has been on morphine for 1 5 years, which does take the edge off  She reports her pain is always worse in the evening, and some days she has low pain where she can do more throughout the day             Recurrent probem    Quality of life: poor    Pain  Current pain ratin  At best pain ratin  At worst pain rating: 10  Quality: sharp  Relieving factors: medications  Aggravating factors: standing, walking, stair climbing, lifting and running  Progression: no change    Treatments  Previous treatment: injection treatment and medication  Current treatment: medication  Patient Goals  Patient goals for therapy: decreased pain, independence with ADLs/IADLs, increased motion and increased strength          Objective     Active Range of Motion     Lumbar   Flexion:  with pain  Extension:  with pain  Left lateral flexion:  with pain  Right lateral flexion:  with pain    Additional Active Range of Motion Details  Pain in all directions of lumbar AROM, limiting her ability to perform movements    Strength/Myotome Testing     Left Hip   Planes of Motion   Flexion: 4+  External rotation: 4  Internal rotation: 4    Right Hip   Planes of Motion   Flexion: 4  External rotation: 4-  Internal rotation: 4-    Left Knee   Flexion: 4+  Extension: 4+    Right Knee   Flexion: 4  Extension: 4    Left Ankle/Foot   Dorsiflexion: 4+    Right Ankle/Foot   Dorsiflexion: 4-    Tests     Lumbar     Left   Negative passive SLR and slump test      Right   Positive slump test    Negative passive SLR       General Comments:      Lumbar Comments  Improvement in discomfort with walking following IASTM to R lower lumbar paraspinals             Precautions: s/p L4/5 discectomy (3/23), MS, chronic pain      Manuals 5/14            IASTM R lumbar paraspinals 8'                                                   Neuro Re-Ed             Seated DLS pillow press HEP            Slump sliders HEP            TA cont in supine                                                                 Ther Ex             Bike c lumbar roll                                                                                                        Ther Activity                                       Gait Training                                       Modalities

## 2021-05-18 ENCOUNTER — APPOINTMENT (OUTPATIENT)
Dept: PHYSICAL THERAPY | Facility: CLINIC | Age: 76
End: 2021-05-18
Payer: MEDICARE

## 2021-05-19 ENCOUNTER — OFFICE VISIT (OUTPATIENT)
Dept: PHYSICAL THERAPY | Facility: CLINIC | Age: 76
End: 2021-05-19
Payer: MEDICARE

## 2021-05-19 DIAGNOSIS — Z98.890 S/P LUMBAR DISCECTOMY: ICD-10-CM

## 2021-05-19 DIAGNOSIS — M54.16 LUMBAR RADICULOPATHY: Primary | ICD-10-CM

## 2021-05-19 PROCEDURE — 97140 MANUAL THERAPY 1/> REGIONS: CPT

## 2021-05-19 PROCEDURE — 97110 THERAPEUTIC EXERCISES: CPT

## 2021-05-19 NOTE — PROGRESS NOTES
Daily Note     Today's date: 2021  Patient name: Lanny Leon  : 1945  MRN: [id number]  Referring provider: Jaimie Frazier MD  Dx:   Encounter Diagnosis     ICD-10-CM    1  Lumbar radiculopathy  M54 16    2  S/P lumbar discectomy  Z98 890                   Subjective: Pt reports she feels current exercise program is making her more painful and she had to take increased morphine on  to make pain go away  Pt states today she is experiencing pain in R glut, R proximal calf, and R arch of foot  Objective: See treatment diary below      Assessment: Decreased low back discomfort with improved ambulation following IASTM to R lumbar paraspinals  There is tissue restriction in this region  Light stretching and sx modulation performed today with reports of decreased calf and foot pain following session  Patient was provided education on proper log roll technique for sit<>supine transfers, as she was unaware of this  Plan: Continue per plan of care        Precautions: s/p L4/5 discectomy (3/23), MS, chronic pain      Manuals            IASTM R lumbar paraspinals 8' 15'                                                  Neuro Re-Ed             Seated DLS pillow press HEP            Slump sliders HEP SAQ supine 2x10           TA cont in supine                                                                 Ther Ex             Bike c lumbar roll             HSS on pball  30"x3           Supine calf stretch  30"x3                                                                            Ther Activity                                       Gait Training                                       Modalities

## 2021-05-21 ENCOUNTER — OFFICE VISIT (OUTPATIENT)
Dept: PHYSICAL THERAPY | Facility: CLINIC | Age: 76
End: 2021-05-21
Payer: MEDICARE

## 2021-05-21 DIAGNOSIS — M54.16 LUMBAR RADICULOPATHY: Primary | ICD-10-CM

## 2021-05-21 DIAGNOSIS — Z98.890 S/P LUMBAR DISCECTOMY: ICD-10-CM

## 2021-05-21 PROCEDURE — 97112 NEUROMUSCULAR REEDUCATION: CPT | Performed by: PHYSICAL THERAPIST

## 2021-05-21 PROCEDURE — 97110 THERAPEUTIC EXERCISES: CPT | Performed by: PHYSICAL THERAPIST

## 2021-05-21 PROCEDURE — 97140 MANUAL THERAPY 1/> REGIONS: CPT | Performed by: PHYSICAL THERAPIST

## 2021-05-21 NOTE — PROGRESS NOTES
Daily Note     Today's date: 2021  Patient name: John Maloney  : 1945  MRN: [id number]  Referring provider: Mónica Jean MD  Dx:   Encounter Diagnosis     ICD-10-CM    1  Lumbar radiculopathy  M54 16    2  S/P lumbar discectomy  Z98 890        Start Time: 1013  Stop Time: 1052  Total time in clinic (min): 39 minutes    Subjective: Pt reports she had some relief following last visit, but this morning she woke up in a lot of pain and had to take morphine before coming here  Objective: See treatment diary below      Assessment: Improved steadiness of gait following standing lumbar extension  Pt able to tolerate standing lumbar extension and standing L SG without an increase in pain  R lateral shift noted in standing  Pt given only standing lumbar extension through first part of range to add to HEP  Plan: Progress as tolerated       Precautions: s/p L4/5 discectomy (3/23), MS, chronic pain      Manuals           IASTM R lumbar paraspinals 8' 15' 15'                                                 Neuro Re-Ed             Seated DLS pillow press HEP            Slump sliders HEP SAQ supine 2x10 SAQ supine 2x10          TA cont in supine   3"x10          DLS ball press   0"J58                                                 Ther Ex             Bike c lumbar roll             HSS on pball  30"x3 30"x3          Supine calf stretch  30"x3           Standing lumbar ext   2x10          Standing L SG   10x                                                 Ther Activity                                       Gait Training                                       Modalities

## 2021-05-25 ENCOUNTER — OFFICE VISIT (OUTPATIENT)
Dept: PHYSICAL THERAPY | Facility: CLINIC | Age: 76
End: 2021-05-25
Payer: MEDICARE

## 2021-05-25 DIAGNOSIS — M54.16 LUMBAR RADICULOPATHY: Primary | ICD-10-CM

## 2021-05-25 DIAGNOSIS — Z98.890 S/P LUMBAR DISCECTOMY: ICD-10-CM

## 2021-05-25 PROCEDURE — 97110 THERAPEUTIC EXERCISES: CPT

## 2021-05-25 PROCEDURE — 97140 MANUAL THERAPY 1/> REGIONS: CPT

## 2021-05-25 PROCEDURE — 97112 NEUROMUSCULAR REEDUCATION: CPT

## 2021-05-25 NOTE — PROGRESS NOTES
Daily Note     Today's date: 2021  Patient name: Toya Martinez  : 1945  MRN: [id number]  Referring provider: Brittney Seo MD  Dx:   Encounter Diagnosis     ICD-10-CM    1  Lumbar radiculopathy  M54 16    2  S/P lumbar discectomy  Z98 890                   Subjective: Pt reports she was sore following last session but then felt progressively better until today  Patient reports she is still stiff and painful in the morning but is able to       Objective: See treatment diary below      Assessment: Tissue restriction present along lumbar surgical scar  There is tenderness to palpation R of surgical scar which improved with manual therapy  Lateral trunk lean compensation present during standing hip abduction b/l  Poor TA contraction is present, as pt requires tactile cuing from therapist and self in order to complete  Patient is able to complete charted exercises without c/o pain or discomfort today  Plan: Continue per plan of care        Precautions: s/p L4/5 discectomy (3/23), MS, chronic pain      Manuals          IASTM R lumbar paraspinals 8' 15' 15' 16'                                                Neuro Re-Ed             Seated DLS pillow press HEP            Slump sliders HEP SAQ supine 2x10 SAQ supine 2x10 SAQ supine 2x10         TA cont in supine   3"x10 3"x10         DLS ball press   7"K43 3"x20                                                Ther Ex             Bike c lumbar roll             HSS on pball  30"x3 30"x3 30"x3         Supine calf stretch  30"x3           Standing lumbar ext   2x10 2x10         Standing L SG   10x x10         Standing hip abd    x10 ea                                   Ther Activity                                       Gait Training                                       Modalities

## 2021-05-28 ENCOUNTER — OFFICE VISIT (OUTPATIENT)
Dept: PHYSICAL THERAPY | Facility: CLINIC | Age: 76
End: 2021-05-28
Payer: MEDICARE

## 2021-05-28 DIAGNOSIS — Z98.890 S/P LUMBAR DISCECTOMY: ICD-10-CM

## 2021-05-28 DIAGNOSIS — M54.16 LUMBAR RADICULOPATHY: Primary | ICD-10-CM

## 2021-05-28 PROCEDURE — 97140 MANUAL THERAPY 1/> REGIONS: CPT | Performed by: PHYSICAL THERAPIST

## 2021-05-28 PROCEDURE — 97112 NEUROMUSCULAR REEDUCATION: CPT | Performed by: PHYSICAL THERAPIST

## 2021-05-28 NOTE — PROGRESS NOTES
Daily Note     Today's date: 2021  Patient name: John Maloney  : 1945  MRN: [id number]  Referring provider: Mónica Jean MD  Dx:   Encounter Diagnosis     ICD-10-CM    1  Lumbar radiculopathy  M54 16    2  S/P lumbar discectomy  Z98 890        Start Time: 935  Stop Time: 1005  Total time in clinic (min): 30 minutes    Subjective: Pt reports she started having increased leg pain and back pain   Objective: See treatment diary below      Assessment: Unsteady gait upon arrival to session secondary to pain, although this was significantly improved at end of session  Pt continues to have area of scar tissue to the L of the upper lumbar spine, treated and improved with IASTM  Slump sliders added to HEP, as her radicular symptoms following the sciatic nerve distribution  Plan: Progress as tolerated       Precautions: s/p L4/5 discectomy (3/23), MS, chronic pain      Manuals         IASTM R lumbar paraspinals 8' 15' 15' 16' 15'                                               Neuro Re-Ed             Seated DLS pillow press HEP            Slump sliders HEP SAQ supine 2x10 SAQ supine 2x10 SAQ supine 2x10 10x        TA cont in supine   3"x10 3"x10 3"x20        DLS ball press   2"F65 3"x20 5"x20        Supine sciatic NG     10x                                  Ther Ex             Bike c lumbar roll             HSS on pball  30"x3 30"x3 30"x3         Supine calf stretch  30"x3           Standing lumbar ext   2x10 2x10 2x10        Standing L SG   10x x10         Standing hip abd    x10 ea         Wall sags     10x                     Ther Activity                                       Gait Training                                       Modalities

## 2021-05-31 DIAGNOSIS — G89.18 POSTOPERATIVE PAIN AFTER SPINAL SURGERY: ICD-10-CM

## 2021-05-31 DIAGNOSIS — Z98.890 STATUS POST LUMBAR DISCECTOMY: ICD-10-CM

## 2021-05-31 DIAGNOSIS — M79.2 NEUROPATHIC PAIN OF LOWER EXTREMITY, RIGHT: ICD-10-CM

## 2021-06-01 ENCOUNTER — OFFICE VISIT (OUTPATIENT)
Dept: PHYSICAL THERAPY | Facility: CLINIC | Age: 76
End: 2021-06-01
Payer: MEDICARE

## 2021-06-01 DIAGNOSIS — M54.16 LUMBAR RADICULOPATHY: Primary | ICD-10-CM

## 2021-06-01 DIAGNOSIS — F11.20 CONTINUOUS OPIOID DEPENDENCE (HCC): ICD-10-CM

## 2021-06-01 DIAGNOSIS — Z98.890 S/P LUMBAR DISCECTOMY: ICD-10-CM

## 2021-06-01 PROCEDURE — 97140 MANUAL THERAPY 1/> REGIONS: CPT

## 2021-06-01 PROCEDURE — 97112 NEUROMUSCULAR REEDUCATION: CPT

## 2021-06-01 PROCEDURE — 97110 THERAPEUTIC EXERCISES: CPT

## 2021-06-01 RX ORDER — MORPHINE SULFATE 15 MG/1
15 TABLET ORAL EVERY 8 HOURS PRN
Qty: 90 TABLET | Refills: 0 | Status: SHIPPED | OUTPATIENT
Start: 2021-06-01 | End: 2021-07-14 | Stop reason: SDUPTHER

## 2021-06-01 NOTE — TELEPHONE ENCOUNTER
Controlled Substance Review    [place] PDMP or [place]  reviewed: No red flags were identified; safe to proceed with prescription      PDMP Review       Value Time User    PDMP Reviewed  Yes 6/1/2021 12:34 PM Siddhartha Or, DO

## 2021-06-01 NOTE — TELEPHONE ENCOUNTER
morphine (MSIR) 15 mg tablet    Will run out by 6/2-in tremendous pain and needs this refilled     # [phone]    [facility]ayne # [phone]

## 2021-06-01 NOTE — PROGRESS NOTES
Daily Note     Today's date: 2021  Patient name: Afia Aguilar  : 1945  MRN: [id number]  Referring provider: Krista Silveira MD  Dx:   Encounter Diagnosis     ICD-10-CM    1  Lumbar radiculopathy  M54 16    2  S/P lumbar discectomy  [id number]                   Subjective: Patient states she felt better for approximately 1 day following last PT session  Patient reports her sx are into R glut, R knee and lateral R foot upon arrival to session today  Objective: See treatment diary below      Assessment: Able to complete core stab without exacerbation of sx  Continues to present with scar tissue restriction at L/s surgical scar, with increased tone on both L and R paraspinal musculature  Patient with decreased RLE stance time and antalgic gain upon arrival which significantly improved post session  Plan: Continue per plan of care        Precautions: s/p L4/5 discectomy (3/23), MS, chronic pain      Manuals        IASTM R lumbar paraspinals 8' 15' 15' 16' 15' 15'                                              Neuro Re-Ed             Seated DLS pillow press HEP            Slump sliders HEP SAQ supine 2x10 SAQ supine 2x10 SAQ supine 2x10 10x x10       TA cont in supine   3"x10 3"x10 3"x20 3"x20       DLS ball press   3"N18 3"x20 5"x20 5"x20       Supine sciatic NG     10x x10       TA SKFO      YTB x10 ea                    Ther Ex             Bike c lumbar roll             HSS on pball  30"x3 30"x3 30"x3         Supine calf stretch  30"x3           Standing lumbar ext   2x10 2x10 2x10 2x10       Standing L SG   10x x10         Standing hip abd    x10 ea         Wall sags     10x x10                    Ther Activity                                       Gait Training                                       Modalities

## 2021-06-02 ENCOUNTER — TELEPHONE (OUTPATIENT)
Dept: NEUROSURGERY | Facility: CLINIC | Age: 76
End: 2021-06-02

## 2021-06-02 RX ORDER — DULOXETIN HYDROCHLORIDE 30 MG/1
CAPSULE, DELAYED RELEASE ORAL
Qty: 30 CAPSULE | Refills: 1 | Status: SHIPPED | OUTPATIENT
Start: 2021-06-02 | End: 2021-09-17 | Stop reason: CLARIF

## 2021-06-02 NOTE — TELEPHONE ENCOUNTER
Telephone pt as f/uy to interface message to refill Duloxetine --  Reports continue treating with medication   Gradual titration off Gabapentin  Continue treating with cyclobenzaprin e 10 mg po every 8 hours for muscle spasm , intermittent episodes , worse in the AM low back and right calf tightness  Continues treating with Morphine prescribed by PCP  Has stated physical therapy, pain is variable depending on therapy session activity day before , yesterday and a robust workout, muscles stressed , having and pain day today  Constant aching pain underneath tail bone  with distribution down right posterior thigh and intermittent into right calf and right foot  Called  pharmacist today for duloxetine refill, interface message sent   Submitted refill for duloxetine

## 2021-06-04 ENCOUNTER — OFFICE VISIT (OUTPATIENT)
Dept: PHYSICAL THERAPY | Facility: CLINIC | Age: 76
End: 2021-06-04
Payer: MEDICARE

## 2021-06-04 DIAGNOSIS — M54.16 LUMBAR RADICULOPATHY: Primary | ICD-10-CM

## 2021-06-04 DIAGNOSIS — Z98.890 S/P LUMBAR DISCECTOMY: ICD-10-CM

## 2021-06-04 PROCEDURE — 97140 MANUAL THERAPY 1/> REGIONS: CPT

## 2021-06-04 NOTE — PROGRESS NOTES
Daily Note     Today's date: 2021  Patient name: Tom Beltre  : 1945  MRN: [id number]  Referring provider: Afshan Mills MD  Dx:   Encounter Diagnosis     ICD-10-CM    1  Lumbar radiculopathy  M54 16    2  S/P lumbar discectomy  Z98 [id number]                   Subjective: Upon arrival, patient states that she started to experience R calf cramping following last session and has returned to experiencing pain in low back  Patient states she spoke with neurosurgery who recommended she take things slower  Objective: See treatment diary below      Assessment: Arrives to clinic with antalgic gait again today with decreased RLE stance time, fw flexed trunk posture  Significant tissue restriction remains along b/l lumbar paraspinals  Patient did have LBP relief from this upon leaving session  No exercises performed today secondary to increased soreness last visit  Plan: Continue per plan of care        Precautions: s/p L4/5 discectomy (3/23), MS, chronic pain      Manuals       IASTM R lumbar paraspinals 8' 15' 15' 16' 15' 15' 24'                                             Neuro Re-Ed             Seated DLS pillow press HEP            Slump sliders HEP SAQ supine 2x10 SAQ supine 2x10 SAQ supine 2x10 10x x10       TA cont in supine   3"x10 3"x10 3"x20 3"x20       DLS ball press   7"T55 3"x20 5"x20 5"x20       Supine sciatic NG     10x x10       TA SKFO      YTB x10 ea                    Ther Ex             Bike c lumbar roll             HSS on pball  30"x3 30"x3 30"x3         Supine calf stretch  30"x3           Standing lumbar ext   2x10 2x10 2x10 2x10       Standing L SG   10x x10         Standing hip abd    x10 ea         Wall sags     10x x10                    Ther Activity                                       Gait Training                                       Modalities

## 2021-06-08 ENCOUNTER — OFFICE VISIT (OUTPATIENT)
Dept: PHYSICAL THERAPY | Facility: CLINIC | Age: 76
End: 2021-06-08
Payer: MEDICARE

## 2021-06-08 DIAGNOSIS — M54.16 LUMBAR RADICULOPATHY: Primary | ICD-10-CM

## 2021-06-08 DIAGNOSIS — Z98.890 S/P LUMBAR DISCECTOMY: ICD-10-CM

## 2021-06-08 PROCEDURE — 97112 NEUROMUSCULAR REEDUCATION: CPT

## 2021-06-08 PROCEDURE — 97140 MANUAL THERAPY 1/> REGIONS: CPT

## 2021-06-08 NOTE — PROGRESS NOTES
Daily Note     Today's date: 2021  Patient name: Charlette Toney  : 1945  MRN: [id number]  Referring provider: Hero Isaac MD  Dx:   Encounter Diagnosis     ICD-10-CM    1  Lumbar radiculopathy  M54 16    2  S/P lumbar discectomy  Z98 890                   Subjective: Patient reports she did not have to take morphine this morning  Pt reports she was able to walk in a pool over the weekend and did experience muscle soreness afterward but feels she was able to take less medication overall for sx  Objective: See treatment diary below      Assessment: L lumbar paraspinal tissue restriction still remains but is showing signs of improvement  Patient was able to add some exercises in today without c/o discomfort or pain  Patient appears to be ambulating with improvement upon arrival to session, however decreased RLE stance time remains  Plan: Continue per plan of care        Precautions: s/p L4/5 discectomy (3/23), MS, chronic pain      Manuals      IASTM R lumbar paraspinals 8' 15' 15' 16' 15' 15' 24' 20'                                            Neuro Re-Ed             Seated DLS pillow press HEP            Slump sliders HEP SAQ supine 2x10 SAQ supine 2x10 SAQ supine 2x10 10x x10       TA cont in supine   3"x10 3"x10 3"x20 3"x20  3"x10     DLS ball press   7"K71 3"x20 5"x20 5"x20  3"x10     Supine sciatic NG     10x x10       TA SKFO      YTB x10 ea                    Ther Ex             Bike c lumbar roll             HSS on pball  30"x3 30"x3 30"x3         Supine calf stretch  30"x3           Standing lumbar ext   2x10 2x10 2x10 2x10  x10     Standing L SG   10x x10         Standing hip abd    x10 ea         Wall sags     10x x10                    Ther Activity                                       Gait Training                                       Modalities

## 2021-06-11 ENCOUNTER — OFFICE VISIT (OUTPATIENT)
Dept: PHYSICAL THERAPY | Facility: CLINIC | Age: 76
End: 2021-06-11
Payer: MEDICARE

## 2021-06-11 DIAGNOSIS — M54.16 LUMBAR RADICULOPATHY: Primary | ICD-10-CM

## 2021-06-11 DIAGNOSIS — Z98.890 S/P LUMBAR DISCECTOMY: ICD-10-CM

## 2021-06-11 PROCEDURE — 97140 MANUAL THERAPY 1/> REGIONS: CPT

## 2021-06-11 NOTE — PROGRESS NOTES
Daily Note     Today's date: 2021  Patient name: Clay Hughes  : 1945  MRN: [id number]  Referring provider: Jaswinder Luna MD  Dx:   Encounter Diagnosis     ICD-10-CM    1  Lumbar radiculopathy  M54 16    2  S/P lumbar discectomy  Z98 890                   Subjective:  Pt states she does not feel PT is helping at this time and continues to experience the same radicular sx/pain into RLE that she experienced prior to surgery  Pt states today she is experiencing R glut and posterior thigh sx  Objective: See treatment diary below      Assessment: Exercise held today secondary to lack of progress and patient's sx today  L lumbar paraspinal soft tissue restriction has improved and only moderately restricted b/l  Patient does present with increased scar tissue restriction along lumbar surgical scar which did have some improvement following manual therapy  No change in patient's pain or gait post manual therapy today  Plan: Continue per plan of care        Precautions: s/p L4/5 discectomy (3/23), MS, chronic pain      Manuals     IASTM R lumbar paraspinals 8' 15' 15' 16' 15' 15' 24' 20' 25'                                           Neuro Re-Ed             Seated DLS pillow press HEP            Slump sliders HEP SAQ supine 2x10 SAQ supine 2x10 SAQ supine 2x10 10x x10       TA cont in supine   3"x10 3"x10 3"x20 3"x20  3"x10     DLS ball press   8"Z12 3"x20 5"x20 5"x20  3"x10     Supine sciatic NG     10x x10       TA SKFO      YTB x10 ea                    Ther Ex             Bike c lumbar roll             HSS on pball  30"x3 30"x3 30"x3         Supine calf stretch  30"x3           Standing lumbar ext   2x10 2x10 2x10 2x10  x10     Standing L SG   10x x10         Standing hip abd    x10 ea         Wall sags     10x x10                    Ther Activity                                       Gait Training                                       Modalities

## 2021-06-14 ENCOUNTER — OFFICE VISIT (OUTPATIENT)
Dept: NEUROSURGERY | Facility: CLINIC | Age: 76
End: 2021-06-14

## 2021-06-14 VITALS
DIASTOLIC BLOOD PRESSURE: 62 MMHG | SYSTOLIC BLOOD PRESSURE: 120 MMHG | WEIGHT: 141 LBS | BODY MASS INDEX: 25.95 KG/M2 | TEMPERATURE: 98 F | RESPIRATION RATE: 16 BRPM | HEIGHT: 62 IN | HEART RATE: 80 BPM

## 2021-06-14 DIAGNOSIS — M54.16 LUMBAR RADICULOPATHY: ICD-10-CM

## 2021-06-14 DIAGNOSIS — G89.4 CHRONIC PAIN SYNDROME: Primary | ICD-10-CM

## 2021-06-14 PROCEDURE — 99024 POSTOP FOLLOW-UP VISIT: CPT | Performed by: NEUROLOGICAL SURGERY

## 2021-06-14 NOTE — PROGRESS NOTES
Assessment/Plan:    No problem-specific Assessment & Plan notes found for this encounter  3 months postop from far lateral disc  Some improvement in low back pain and flank pain  Does have new L4 radicular pain  I do feel that she has some neuropathic L4 nerve pain given high risk from a far lateral decompression  I recommended patience and a time frame of 6 months  As some patients have significant improvement with time  At this time I am okay with her receiving pain injections as this may also help with the likely inflammatory process ongoing at the L4 nerve root (GEOVANNA or transforaminal injection)  If she has no significant improvement in pain I recommend proceeding with SCS  I did entertain fusion as another options but she does not appear interested in pursing that option  Diagnoses and all orders for this visit:    Chronic pain syndrome  -     Ambulatory referral to Pain Management; Future    Lumbar radiculopathy  -     Ambulatory referral to Pain Management; Future              Subjective:      Patient ID: Jeri Loyd is a 64 y o  female  HPI  3 months postop from far lateral decompression  Still has preop radicular pain this is new  She reports improvement in prior chronic pain  The following portions of the patient's history were reviewed and updated as appropriate: allergies, current medications, past family history, past medical history, past social history, past surgical history and problem list     Review of Systems   Constitutional: Positive for fatigue (2/2 pain only)  HENT: Negative  Eyes: Negative  Respiratory: Positive for apnea (CPAP)  COPD   Cardiovascular: Negative  Gastrointestinal: Negative for constipation  GI issues   Endocrine: Negative  Genitourinary: Negative for difficulty urinating  Musculoskeletal: Positive for back pain (Right buttock, right leg to calf    now in right foot ), gait problem and myalgias (RLE, mostly in calf, improving)  Pain is more intense in right knee and traveling to foot no changes since last OV   Skin: Negative  Negative for wound  Allergic/Immunologic: Negative  Hematological: Bruises/bleeds easily  Psychiatric/Behavioral: Positive for sleep disturbance  I have personally reviewed all aspects of the review of systems as documented    Objective:      /62 (BP Location: Right arm)   Pulse 80   Temp 98 °F (36 7 °C) (Tympanic)   Resp 16   Ht 5' 2" (1 575 m)   Wt 64 kg (141 lb)   BMI 25 79 kg/m²          Physical Exam  Constitutional:       Appearance: She is normal weight  Cardiovascular:      Rate and Rhythm: Normal rate  Pulmonary:      Effort: Pulmonary effort is normal    Neurological:      Mental Status: She is alert and oriented to person, place, and time  Mental status is at baseline  Psychiatric:         Mood and Affect: Mood normal          Thought Content:  Thought content normal

## 2021-06-15 ENCOUNTER — APPOINTMENT (OUTPATIENT)
Dept: PHYSICAL THERAPY | Facility: CLINIC | Age: 76
End: 2021-06-15
Payer: MEDICARE

## 2021-06-18 ENCOUNTER — APPOINTMENT (OUTPATIENT)
Dept: PHYSICAL THERAPY | Facility: CLINIC | Age: 76
End: 2021-06-18
Payer: MEDICARE

## 2021-06-22 ENCOUNTER — OFFICE VISIT (OUTPATIENT)
Dept: PAIN MEDICINE | Facility: CLINIC | Age: 76
End: 2021-06-22
Payer: MEDICARE

## 2021-06-22 VITALS
WEIGHT: 144 LBS | DIASTOLIC BLOOD PRESSURE: 80 MMHG | SYSTOLIC BLOOD PRESSURE: 131 MMHG | HEART RATE: 90 BPM | BODY MASS INDEX: 26.34 KG/M2

## 2021-06-22 DIAGNOSIS — M51.16 LUMBAR DISC DISEASE WITH RADICULOPATHY: ICD-10-CM

## 2021-06-22 DIAGNOSIS — G89.4 CHRONIC PAIN SYNDROME: Primary | ICD-10-CM

## 2021-06-22 DIAGNOSIS — Z98.890 STATUS POST LUMBAR DISCECTOMY: ICD-10-CM

## 2021-06-22 DIAGNOSIS — M54.16 LUMBAR RADICULOPATHY: ICD-10-CM

## 2021-06-22 PROCEDURE — 99214 OFFICE O/P EST MOD 30 MIN: CPT | Performed by: PHYSICAL MEDICINE & REHABILITATION

## 2021-06-22 NOTE — PROGRESS NOTES
Assessment:  1  Chronic pain syndrome    2  Lumbar radiculopathy    3  Status post lumbar discectomy    4  Lumbar disc disease with radiculopathy        Plan:  Ms Florentino Little is a pleasant 10-year-old female who presents for follow-up and re-evaluation regarding low back pain with radiating symptoms into the right lower extremity  She recently underwent a lumbar surgery for far lateral lumbar diskectomy with Dr Carlos Enrique Daigle March 23rd 2021 which did provide some relief in her low back pain, however, she continues to report 6 to 7/10 low back pain with radiating symptoms into the right leg that has impacted her quality of life and activities of daily living significantly  As per Dr Carlos Enrique Daigle he is okay with interventional approaches to manage her pain in the interim prior to consideration for spinal cord stimulator placement  At this time we will   1  Plan for right paramedian approach L5-S1 LESI  2  Complete risks and benefits including bleeding, infection, tissue reaction, nerve injury and allergic reaction were discussed  The approach was demonstrated using models and literature was provided  Verbal and written consent was obtained  My impressions and treatment recommendations were discussed in detail with the patient who verbalized understanding and had no further questions  Discharge instructions were provided  I personally saw and examined the patient and I agree with the above discussed plan of care  Orders Placed This Encounter   Procedures    FL spine and pain procedure     Standing Status:   Future     Standing Expiration Date:   6/22/2025     Order Specific Question:   Reason for Exam:     Answer:   LESI (L5-S1)     Order Specific Question:   Anticoagulant hold needed? Answer:   No     No orders of the defined types were placed in this encounter  History of Present Illness:  Tom Beltre is a 64 y o  female who presents for a follow up office visit in regards to Hip Pain (RIGHT SIDE) and Leg Pain  The patients current symptoms include right-sided low back pain with radiating symptoms into the right lower extremity  Currently reporting 6/10 pain that is described as a constant dull ache, sharp, throbbing, shooting pain radiating to the anterolateral right leg  Presents for follow-up and re-evaluation  I have personally reviewed and/or updated the patient's past medical history, past surgical history, family history, social history, current medications, allergies, and vital signs today  Review of Systems   Respiratory: Negative for shortness of breath  Cardiovascular: Negative for chest pain  Gastrointestinal: Negative for constipation, diarrhea, nausea and vomiting  Musculoskeletal: Positive for back pain, gait problem and joint swelling  Negative for arthralgias and myalgias  Skin: Negative for rash  Neurological: Negative for dizziness, seizures and weakness  All other systems reviewed and are negative        Patient Active Problem List   Diagnosis    CHOCO on CPAP    COPD with chronic bronchitis (HCC)    Hypothyroidism    Insomnia    Lung nodule, multiple    Macular degeneration    Multiple sclerosis ([facility] [address] )    Rosacea    Seborrheic keratosis    Chronic neuropathic pain    Thoracolumbar radiculopathy due to intervertebral disc disorder    Other constipation    Intercostal neuralgia    Lumbar radiculopathy    Intercostal muscle pain    Chronic pain syndrome    Anxiety    Smoker    COPD (chronic obstructive pulmonary disease) (HCC)    Status post lumbar discectomy    Continuous opioid dependence ([address] )    Neuropathic pain of lower extremity, right       Past Medical History:   Diagnosis Date    Anxiety     Cataract     last assessed 4/25/14    Chronic bronchitis ([address] )     Sees pulmonary specialist twice a year    Chronic pain disorder     COPD (chronic obstructive pulmonary disease) ([address] )     Hypothyroidism     Multiple sclerosis ([address] )     CHOCO (obstructive sleep apnea)     uses cpap, 3/19/21 -pt states will bring CPAP for DOS 3/23/21    Peroneal muscle atrophy     Shortness of breath     Smoker     Thoracolumbar radiculopathy due to intervertebral disc disorder        Past Surgical History:   Procedure Laterality Date    APPENDECTOMY      CATARACT EXTRACTION       SECTION      CHOLECYSTECTOMY      GALLBLADDER SURGERY      AZ COLONOSCOPY FLX DX W/COLLJ SPEC WHEN PFRMD N/A 2018    Procedure: COLONOSCOPY;  Surgeon: Nivia Michaels MD;  Location: [facility]; Service: Colorectal    AZ LAMNOTMY INCL W/DCMPRSN NRV ROOT 1 INTRSPC LUMBR Right 3/23/2021    Procedure: MINIMALLY INVASIVE LUMBAR FAR LATERAL DISCECTOMY L4-5, RIGHT;  Surgeon: Tommy Barker MD;  Location:  MAIN OR;  Service: Neurosurgery    THROAT SURGERY         Family History   Problem Relation Age of Onset    Skin cancer Mother     Hypertension Father         benign essential    Stroke Father     Lung cancer Brother        Social History     Occupational History    Occupation: retired     Comment: part time as per [employer]   Tobacco Use    Smoking status: Current Every Day Smoker     Packs/day: 1 00     Years: 60 00     Pack years: 60 00     Types: Cigarettes     Start date: 1958    Smokeless tobacco: Never Used   Vaping Use    Vaping Use: Former    Substances: Nicotine   Substance and Sexual Activity    Alcohol use:  Yes     Alcohol/week: 1 0 - 2 0 standard drinks     Types: 1 - 2 Glasses of wine per week     Comment: 1-2 drinks of wine daily     Drug use: Not Currently     Types: Morphine, Marijuana     Comment: medical-RSO  last dose 11 days ago, past hx o f medical marijuana use - NOT CURRENT    Sexual activity: Yes     Partners: Male       Current Outpatient Medications on File Prior to Visit   Medication Sig    Acetaminophen (TYLENOL EXTRA STRENGTH PO) Take 2 tablets by mouth as needed    Cholecalciferol (VITAMIN D-3) 1000 units CAPS Take 1,000 Units by mouth daily     cyclobenzaprine (FLEXERIL) 5 mg tablet Take 2 tablets (10 mg total) by mouth every 8 (eight) hours as needed for muscle spasms    DULoxetine (CYMBALTA) 30 mg delayed release capsule TAKE 1 CAPSULE BY MOUTH DAILY TAKE DAILY AT BEDTIME BEFORE SLEEP    gabapentin (NEURONTIN) 300 mg capsule TAKE 1 CAPSULE (300 MG TOTAL) BY MOUTH DAILY IN THE AFTERNOON    levothyroxine 100 mcg tablet Take 1 tablet (100 mcg total) by mouth daily (Patient taking differently: Take 100 mcg by mouth daily Takes in the am)    morphine (MSIR) 15 mg tablet Take 1 tablet (15 mg total) by mouth every 8 (eight) hours as needed for severe painMax Daily Amount: 45 mg    Multiple Vitamins-Minerals (ICAPS AREDS 2 PO) Take by mouth daily     vitamin B-12 (CYANOCOBALAMIN) 100 MCG tablet Take 100 mcg by mouth daily     dexamethasone (DECADRON) 2 mg tablet 2 mg tab by mouth, 4 tabs 2 x per day for 2 days,4 tabs daily for 2 days, 1 tab  2 x per day for 1 day, one tab daily for 2 days (Patient not taking: Reported on 4/15/2021)    menthol-cetylpyridinium (CEPACOL) 3 MG lozenge Take 1 lozenge (3 mg total) by mouth as needed for sore throat (Patient not taking: Reported on 4/6/2021)    Methylnaltrexone Bromide (Relistor) 150 MG TABS Take by mouth daily    naloxone (NARCAN) 4 mg/0 1 mL nasal spray Administer 1 spray into a nostril  If no response after 2-3 minutes, give another dose in the other nostril using a new spray  (Patient not taking: Reported on 4/15/2021)     No current facility-administered medications on file prior to visit  Allergies   Allergen Reactions    Adhesive [Medical Tape] Rash    Latex Rash       Physical Exam:    /80   Pulse 90   Wt 65 3 kg (144 lb)   BMI 26 34 kg/m²     Constitutional:normal, well developed, well nourished, alert, in no distress and non-toxic and no overt pain behavior    Eyes:anicteric  HEENT:grossly intact  Neck:supple, symmetric, trachea midline and no masses   Pulmonary:even and unlabored  Cardiovascular:No edema or pitting edema present  Skin:Normal without rashes or lesions and well hydrated  Psychiatric:Mood and affect appropriate  Neurologic:Cranial Nerves II-XII grossly intact  Musculoskeletal:antalgic    Imaging

## 2021-06-23 ENCOUNTER — TELEPHONE (OUTPATIENT)
Dept: PAIN MEDICINE | Facility: CLINIC | Age: 76
End: 2021-06-23

## 2021-06-23 NOTE — TELEPHONE ENCOUNTER
Scheduled pt for L5 S1 LESI for 7/7/21  Pt denies rx blood thinners  Went over pre-procedure instructions below:  Nothing to eat or drink 1 hr prior to procedure  Need to arrange transportation  Proper clothing for procedure  If ill or placed on antibiotics please call to reschedule  Covid/travel/ and vaccine instructions

## 2021-06-29 NOTE — PROGRESS NOTES
PT Discharge    Today's date: 2021  Patient name: Joy Sosa  : 1945  MRN: [id number]  Referring provider: Nia Olvera MD  Dx:   Encounter Diagnosis     ICD-10-CM    1  Lumbar radiculopathy  M54 16    2  S/P lumbar discectomy  [id number]      Assessment  Assessment details: Pt called to cancel future appointments  Subjective and objective information and goals unable to be updated at this time  Pt DC from skilled therapy  Impairments: abnormal gait, abnormal or restricted ROM, activity intolerance, impaired balance, impaired physical strength, lacks appropriate home exercise program, pain with function and poor posture   Functional limitations: ADLs, standing, walking, sitting  Symptom irritability: highBarriers to therapy: Chronic pain  Understanding of Dx/Px/POC: good   Prognosis: good    Goals  STG: 3 weeks - unable to assess  1  Pt will demonstrate independence with HEP  2  Pt will improve lumbar AROM by 10%  3  Pt will improve BLE strength by at least 1/2 grade  4  Pt will report pain no more than 5/10  5  Pt will generate proper TA contraction without cuing to show improved NM control    LT weeks - unable to assess  1  Pt will improve lumbar AROM to at least 80% in all directions  2  Pt will report pain no more than 2/10  3  Pt will be able to stand/sit for at least 30 minutes without pain to return to PLOF  4  Pt will be able to lift at least 10lbs from floor to waist without pain        Plan  Patient would benefit from: skilled physical therapy  Planned modality interventions: cryotherapy and thermotherapy: hydrocollator packs  Planned therapy interventions: therapeutic exercise, therapeutic activities, stretching, strengthening, patient education, neuromuscular re-education, massage, manual therapy, balance, gait training and home exercise program  Treatment plan discussed with: patient        Subjective Evaluation    History of Present Illness  Date of surgery: 3/23/2021  Mechanism of injury: surgery  Mechanism of injury: Pt had a R sided lumbar discectomy on 3/23/21, which stopped the original pain she was having but now she is having a new pain  Her pain right after surgery was in her tailbone and her leg feels like it is going to cramp  Now she has pain in her glute, down into her thigh and ending in her foot  She reports she is "trying to maintain some sort of life" but it is hard because of the pain  Pt has been on morphine for 1 5 years, which does take the edge off  She reports her pain is always worse in the evening, and some days she has low pain where she can do more throughout the day  Recurrent probem    Quality of life: poor    Pain  Current pain ratin  At best pain ratin  At worst pain rating: 10  Quality: sharp  Relieving factors: medications  Aggravating factors: standing, walking, stair climbing, lifting and running  Progression: no change    Treatments  Previous treatment: injection treatment and medication  Current treatment: medication  Patient Goals  Patient goals for therapy: decreased pain, independence with ADLs/IADLs, increased motion and increased strength          Objective     Active Range of Motion     Lumbar   Flexion:  with pain  Extension:  with pain  Left lateral flexion:  with pain  Right lateral flexion:  with pain    Additional Active Range of Motion Details  Pain in all directions of lumbar AROM, limiting her ability to perform movements    Strength/Myotome Testing     Left Hip   Planes of Motion   Flexion: 4+  External rotation: 4  Internal rotation: 4    Right Hip   Planes of Motion   Flexion: 4  External rotation: 4-  Internal rotation: 4-    Left Knee   Flexion: 4+  Extension: 4+    Right Knee   Flexion: 4  Extension: 4    Left Ankle/Foot   Dorsiflexion: 4+    Right Ankle/Foot   Dorsiflexion: 4-    Tests     Lumbar     Left   Negative passive SLR and slump test      Right   Positive slump test    Negative passive SLR       General Comments:      Lumbar Comments  Improvement in discomfort with walking following IASTM to R lower lumbar paraspinals      Flowsheet Rows      Most Recent Value   PT/OT G-Codes   Current Score  53   Projected Score  54

## 2021-07-07 ENCOUNTER — HOSPITAL ENCOUNTER (OUTPATIENT)
Dept: RADIOLOGY | Facility: CLINIC | Age: 76
Discharge: HOME | End: 2021-07-07
Attending: PHYSICAL MEDICINE & REHABILITATION | Admitting: PHYSICAL MEDICINE & REHABILITATION
Payer: MEDICARE

## 2021-07-07 VITALS
DIASTOLIC BLOOD PRESSURE: 70 MMHG | RESPIRATION RATE: 18 BRPM | OXYGEN SATURATION: 94 % | SYSTOLIC BLOOD PRESSURE: 116 MMHG | TEMPERATURE: 98.2 F | HEART RATE: 80 BPM

## 2021-07-07 DIAGNOSIS — G89.4 CHRONIC PAIN SYNDROME: ICD-10-CM

## 2021-07-07 DIAGNOSIS — M54.16 LUMBAR RADICULOPATHY: ICD-10-CM

## 2021-07-07 DIAGNOSIS — Z98.890 STATUS POST LUMBAR DISCECTOMY: ICD-10-CM

## 2021-07-07 DIAGNOSIS — M51.16 LUMBAR DISC DISEASE WITH RADICULOPATHY: ICD-10-CM

## 2021-07-07 PROCEDURE — 62323 NJX INTERLAMINAR LMBR/SAC: CPT | Performed by: PHYSICAL MEDICINE & REHABILITATION

## 2021-07-07 RX ORDER — METHYLPREDNISOLONE ACETATE 80 MG/ML
80 INJECTION, SUSPENSION INTRA-ARTICULAR; INTRALESIONAL; INTRAMUSCULAR; PARENTERAL; SOFT TISSUE ONCE
Status: COMPLETED | OUTPATIENT
Start: 2021-07-07 | End: 2021-07-07

## 2021-07-07 RX ADMIN — METHYLPREDNISOLONE ACETATE 80 MG: 80 INJECTION, SUSPENSION INTRA-ARTICULAR; INTRALESIONAL; INTRAMUSCULAR; PARENTERAL; SOFT TISSUE at 11:39

## 2021-07-07 RX ADMIN — IOHEXOL 1 ML: 300 INJECTION, SOLUTION INTRAVENOUS at 11:38

## 2021-07-07 NOTE — DISCHARGE INSTR - LAB
Epidural Steroid Injection   WHAT YOU NEED TO KNOW:   An epidural steroid injection (GEOVANNA) is a procedure to inject steroid medicine into the epidural space  The epidural space is between your spinal cord and vertebrae  Steroids reduce inflammation and fluid buildup in your spine that may be causing pain  You may be given pain medicine along with the steroids  ACTIVITY  · Do not drive or operate machinery today  · No strenuous activity today - bending, lifting, etc   · You may resume normal activites starting tomorrow - start slowly and as tolerated  · You may shower today, but no tub baths or hot tubs  · You may have numbness for several hours from the local anesthetic  Please use caution and common sense, especially with weight-bearing activities  CARE OF THE INJECTION SITE  · If you have soreness or pain, apply ice to the area today (20 minutes on/20 minutes off)  · Starting tomorrow, you may use warm, moist heat or ice if needed  · You may have an increase or change in your discomfort for 36-48 hours after your treatment  · Apply ice and continue with any pain medication you have been prescribed  · Notify [facility] if you have any of the following: redness, drainage, swelling, headache, stiff neck or fever above 100°F     SPECIAL INSTRUCTIONS  · Our office will contact you in approximately 7 days for a progress report  MEDICATIONS  · Continue to take all routine medications  · Our office may have instructed you to hold some medications  As no general anesthesia was used in today's procedure, you should not experience any side effects related to anesthesia  If you have a problem specifically related to your procedure, please call our office at [phone]  Problems not related to your procedure should be directed to your primary care physician

## 2021-07-07 NOTE — H&P
History of Present Illness: The patient is a 64 y o  female who presents with complaints of low back pain    Patient Active Problem List   Diagnosis    CHOCO on CPAP    [facility]    Hypothyroidism    Insomnia    Lung nodule, multiple    Macular degeneration    Multiple sclerosis ([facility] )    Rosacea    Seborrheic keratosis    Chronic neuropathic pain    Thoracolumbar radiculopathy due to intervertebral disc disorder    Other constipation    Intercostal neuralgia    Lumbar radiculopathy    Intercostal muscle pain    Chronic pain syndrome    Anxiety    Smoker    COPD (chronic obstructive pulmonary disease) (HCC)    Status post lumbar discectomy    Continuous opioid dependence (HCC)    Neuropathic pain of lower extremity, right       Past Medical History:   Diagnosis Date    Anxiety     Cataract     last assessed 14    Chronic bronchitis ([facility] )     Sees pulmonary specialist twice a year    Chronic pain disorder     COPD (chronic obstructive pulmonary disease) ([facility] )     Hypothyroidism     Multiple sclerosis ([facility] )     CHOCO (obstructive sleep apnea)     uses cpap, 3/19/21 -pt states will bring CPAP for DOS 3/23/21    Peroneal muscle atrophy     Shortness of breath     Smoker     Thoracolumbar radiculopathy due to intervertebral disc disorder        Past Surgical History:   Procedure Laterality Date    APPENDECTOMY      CATARACT EXTRACTION       SECTION      CHOLECYSTECTOMY      GALLBLADDER SURGERY      OH COLONOSCOPY FLX DX W/COLLJ SPEC WHEN PFRMD N/A 2018    Procedure: COLONOSCOPY;  Surgeon: Aj Tolbert MD;  Location: [facility];   Service: Colorectal    OH LAMNOTMY INCL W/DCMPRSN NRV ROOT 1 INTRSPC LUMBR Right 3/23/2021    Procedure: MINIMALLY INVASIVE LUMBAR FAR LATERAL DISCECTOMY L4-5, RIGHT;  Surgeon: Afia Vu MD;  Location:  MAIN OR;  Service: Neurosurgery    THROAT SURGERY           Current Outpatient Medications:    Acetaminophen (TYLENOL EXTRA STRENGTH PO), Take 2 tablets by mouth as needed, Disp: , Rfl:     Cholecalciferol (VITAMIN D-3) 1000 units CAPS, Take 1,000 Units by mouth daily , Disp: , Rfl:     cyclobenzaprine (FLEXERIL) 5 mg tablet, Take 2 tablets (10 mg total) by mouth every 8 (eight) hours as needed for muscle spasms, Disp: 60 tablet, Rfl: 3    dexamethasone (DECADRON) 2 mg tablet, 2 mg tab by mouth, 4 tabs 2 x per day for 2 days,4 tabs daily for 2 days, 1 tab  2 x per day for 1 day, one tab daily for 2 days (Patient not taking: Reported on 4/15/2021), Disp: 28 tablet, Rfl: 0    DULoxetine (CYMBALTA) 30 mg delayed release capsule, TAKE 1 CAPSULE BY MOUTH DAILY TAKE DAILY AT BEDTIME BEFORE SLEEP, Disp: 30 capsule, Rfl: 1    gabapentin (NEURONTIN) 300 mg capsule, TAKE 1 CAPSULE (300 MG TOTAL) BY MOUTH DAILY IN THE AFTERNOON, Disp: 30 capsule, Rfl: 0    levothyroxine 100 mcg tablet, Take 1 tablet (100 mcg total) by mouth daily (Patient taking differently: Take 100 mcg by mouth daily Takes in the am), Disp: 90 tablet, Rfl: 3    menthol-cetylpyridinium (CEPACOL) 3 MG lozenge, Take 1 lozenge (3 mg total) by mouth as needed for sore throat (Patient not taking: Reported on 4/6/2021), Disp: 30 lozenge, Rfl: 2    Methylnaltrexone Bromide (Relistor) 150 MG TABS, Take by mouth daily, Disp: , Rfl:     morphine (MSIR) 15 mg tablet, Take 1 tablet (15 mg total) by mouth every 8 (eight) hours as needed for severe painMax Daily Amount: 45 mg, Disp: 90 tablet, Rfl: 0    Multiple Vitamins-Minerals (ICAPS AREDS 2 PO), Take by mouth daily , Disp: , Rfl:     naloxone (NARCAN) 4 mg/0 1 mL nasal spray, Administer 1 spray into a nostril  If no response after 2-3 minutes, give another dose in the other nostril using a new spray   (Patient not taking: Reported on 4/15/2021), Disp: 1 each, Rfl: 1    vitamin B-12 (CYANOCOBALAMIN) 100 MCG tablet, Take 100 mcg by mouth daily , Disp: , Rfl:     Current Facility-Administered Medications:     iohexol (OMNIPAQUE) 300 mg/mL injection 50 mL, 50 mL, Epidural, Once, Marley Ware DO    methylPREDNISolone acetate (DEPO-MEDROL) injection 80 mg, 80 mg, Epidural, Once, Marley Ware,     Allergies   Allergen Reactions    Adhesive [Medical Tape] Rash    Latex Rash       Physical Exam: There were no vitals filed for this visit  General: Awake, Alert, Oriented x 3, Mood and affect appropriate  Respiratory: Respirations even and unlabored  Cardiovascular: Peripheral pulses intact; no edema  Musculoskeletal Exam:  Tenderness to palpation bilateral lumbar paraspinals    ASA Score: 2  Patient has been made explicitly aware that the injection will consist of steroid which may cause a temporary suppression in immune system activity  This, in turn, may increase the patient's risk of kelly corona virus  He was advised to continue with social distancing and self quarantine  Patient wishes to proceed with the injection         Assessment:   1  Lumbar radiculopathy    2  Status post lumbar discectomy    3  Chronic pain syndrome    4   Lumbar disc disease with radiculopathy        Plan: KATT (L5-S1)

## 2021-07-13 NOTE — PROGRESS NOTES
Assessment/Plan:   Diagnoses and all orders for this visit:    COPD with chronic bronchitis ([address] )  -     Complete pulmonary function test; Future    Lung nodule, multiple  -     CT chest without contrast; Future    CHOCO on CPAP  -     PAP DME Pressure Change    Tobacco abuse      Chronic bronchitis, long discussion with the patient with regards to smoking cessation  She states she has significantly cut down from last visit and wants to cut down on her own and quit  Does not want to use any treatment options for smoking cessation discussed regarding nicotine replacement Wellbutrin as well as Chantix  PFTs with mild obstructive lung disease with no appreciable response to the bronchodilator and increased lung volumes consistent with air trapping and severely decreased DLCO  Repeat CT of the chest before next visit Given still active smoking history  She's currently not on any inhalers has no limitation of her daily current activities  CT of the chest reviewed, 09/04/2019, with evidence of mild emphysematous changes, bilateral apical pleural scarring, also Small subcentimeter lung nodules have been stable from the prior CT of the chest   Follow-up with CT of the chest in 6 months given its her lung nodule is around 7 mm, given still with active smoking history  Obstructive sleep apnea on CPAP currently on 8 cm, she was initially titrated to a 9 given the tolerance had decreased it, will bump it up to 9 cm currently  Tolerating the pressure well  Cleaning up the supplies and changes supplies discussed  Vaccinations up-to-date    Return in about 6 months (around 3/19/2020)  All questions are answered to the patient's satisfaction and understanding  She verbalizes understanding  She is encouraged to call with any further questions or concerns  Portions of the record may have been created with voice recognition software    Occasional wrong word or "sound a like" substitutions may have occurred due to the inherent limitations of voice recognition software  Read the chart carefully and recognize, using context, where substitutions have occurred  Electronically Signed by Alessandro Peñaloza MD    ______________________________________________________________________    Chief Complaint:   Chief Complaint   Patient presents with    Follow-up       Patient ID: Shankar Pollack is a 66 y o  y o  female has a past medical history of Anxiety, Cataract, COPD (chronic obstructive pulmonary disease) ([address] ), Disease of thyroid gland, Multiple sclerosis ([address] ), CHOCO (obstructive sleep apnea), and Peroneal muscle atrophy  9/19/2019  Patient presents today for follow-up visit  Patient is a very pleasant 52-year-old lady with greater than 45 pack year smoking history still actively smoking about half a pack a day, states she has significantly cut down from last visit, with history of chronic bronchitis, also was diagnosed with severe obstructive sleep apnea was placed on CPAP and she Was titrated to 9 cm of water could not tolerate the had brought her down to 8 cm and she has been doing well with the current setting at 8 cm of water, with decreased nocturnal awakenings feels well rested when she wakes up in the morning  Here for follow-up with a repeat CT of the chest for follow-up of the lung nodules      Review of Systems   Constitutional: Positive for fatigue  Negative for appetite change, chills, diaphoresis, fever and unexpected weight change  HENT: Positive for postnasal drip  Negative for congestion, ear discharge, ear pain, nosebleeds, rhinorrhea, sinus pain, sore throat and voice change  Eyes: Negative for pain, discharge and visual disturbance  Respiratory: Positive for cough and shortness of breath  Negative for apnea, choking, chest tightness, wheezing and stridor  Cardiovascular: Negative for chest pain, palpitations and leg swelling     Gastrointestinal: Negative for abdominal pain, blood in stool, constipation, diarrhea and vomiting  Endocrine: Negative for cold intolerance, heat intolerance, polydipsia, polyphagia and polyuria  Genitourinary: Negative for difficulty urinating and dysuria  Musculoskeletal: Positive for back pain and neck pain  Negative for arthralgias  Skin: Negative for pallor and rash  Allergic/Immunologic: Negative for environmental allergies and food allergies  Neurological: Negative for dizziness, speech difficulty, weakness and light-headedness  Hematological: Negative for adenopathy  Does not bruise/bleed easily  Psychiatric/Behavioral: Negative for agitation, confusion and sleep disturbance  The patient is not nervous/anxious  Smoking history: She reports that she has been smoking cigarettes  She started smoking about 61 years ago  She has a 60 00 pack-year smoking history   She has never used smokeless tobacco     The following portions of the patient's history were reviewed and updated as appropriate: allergies, current medications, past family history, past medical history, past social history, past surgical history and problem list     Immunization History   Administered Date(s) Administered    INFLUENZA 1945, 09/14/2016    Pneumococcal Polysaccharide PPV23 1945, 05/22/2008    Tuberculin Skin Test-PPD Intradermal 01/13/2017     Current Outpatient Medications   Medication Sig Dispense Refill    Cholecalciferol (VITAMIN D-3) 1000 units CAPS Take by mouth daily       levothyroxine 100 mcg tablet Take 1 tablet (100 mcg total) by mouth daily 90 tablet 3    Multiple Vitamins-Minerals (PRESERVISION AREDS 2 PO) Take by mouth daily       thiamine (VITAMIN B1) 100 mg tablet Take 1 tablet by mouth daily      vitamin B-12 (CYANOCOBALAMIN) 100 MCG tablet Take 100 mcg by mouth daily       diazepam (VALIUM) 2 mg tablet Take 2 mg by mouth daily at bedtime  5    morphine (MSIR) 15 mg tablet Take 1 tablet (15 mg total) by mouth 2 (two) times a day as needed for severe painMax Daily Amount: 30 mg 45 tablet 0    NON FORMULARY        No current facility-administered medications for this visit  Allergies: Patient has no known allergies  Objective:  Vitals:    09/19/19 0954   BP: 120/80   Pulse: 83   SpO2: 92%   Weight: 68 kg (150 lb)   Height: 5' 3" (1 6 m)   Oxygen Therapy  SpO2: 92 %    Wt Readings from Last 3 Encounters:   10/16/19 65 4 kg (144 lb 3 2 oz)   09/23/19 67 7 kg (149 lb 3 2 oz)   09/19/19 68 kg (150 lb)     Body mass index is 26 57 kg/m²  Physical Exam   Constitutional: She is oriented to person, place, and time  She appears well-developed and well-nourished  HENT:   Head: Normocephalic and atraumatic  Crowded oropharyngeal airways, Mallampati score 2   Eyes: Pupils are equal, round, and reactive to light  Conjunctivae and EOM are normal    Neck: Normal range of motion  Neck supple  No JVD present  No thyromegaly present  Short and wide neck   Cardiovascular: Normal rate, regular rhythm and normal heart sounds  Exam reveals no gallop and no friction rub  No murmur heard  Pulmonary/Chest: Effort normal and breath sounds normal  No respiratory distress  She has no wheezes  She has no rales  She exhibits no tenderness  Abdominal: Soft  Bowel sounds are normal    Musculoskeletal: Normal range of motion  She exhibits no edema, tenderness or deformity  Lymphadenopathy:     She has no cervical adenopathy  Neurological: She is alert and oriented to person, place, and time  Skin: Skin is warm and dry  Psychiatric: She has a normal mood and affect  Her behavior is normal    Nursing note and vitals reviewed  Diagnostics:  I have personally reviewed pertinent reports  none pertinent  Office Spirometry Results:     ESS:    Xr Hip/pelv 2-3 Vws Right    Result Date: 9/2/2019  Narrative: RIGHT HIP INDICATION:   R buttocks/hip pain   COMPARISON:  CT 10/25/2018 VIEWS:  XR HIP/PELV 2-3 VWS RIGHT W PELVIS IF PERFORMED FINDINGS: There is no acute fracture or dislocation  Mild bilateral hip osteoarthritis is present  No lytic or blastic osseous lesions  Soft tissues are unremarkable  The visualized lumbar spine is unremarkable  Impression: No acute osseous abnormality  Workstation performed: [id number]     Ct Chest Without Contrast    Result Date: 9/11/2019  Narrative: CT CHEST WITHOUT IV CONTRAST INDICATION:   R91 8: Other nonspecific abnormal finding of lung field  COMPARISON:  CT from 2/26/2019 TECHNIQUE: CT examination of the chest was performed without intravenous contrast   Axial, sagittal, and coronal 2D reformatted images were created from the source data and submitted for interpretation  Radiation dose length product (DLP) for this visit:  111 mGy-cm   This examination, like all CT scans performed in the [facility], was performed utilizing techniques to minimize radiation dose exposure, including the use of iterative reconstruction and automated exposure control  FINDINGS: LUNGS:  There is biapical pleural-parenchymal thickening  There are emphysematous changes of the lung apices  There is redemonstration of scattered pulmonary nodules  These are described below: - Stable 7 mm nodule along the right minor fissure (series 3, image 65)  - 2 mm right middle lobe pulmonary nodule (image 80) and stable  - Stable 5 mm left lower lobe pulmonary nodule (image 91)  PLEURA:  Unremarkable  HEART/GREAT VESSELS:  There are atherosclerotic changes of the aorta  There is coronary artery calcification  MEDIASTINUM AND SAHIL:  Unremarkable  CHEST WALL AND LOWER NECK:   There is calcification seen within the left greater than right adrenal gland, which may be sequelae of prior hemorrhage/insult  VISUALIZED STRUCTURES IN THE UPPER ABDOMEN:  Unremarkable  OSSEOUS STRUCTURES:  No acute fracture or destructive osseous lesion  Impression: No significant interval change since prior examination   Stable pulmonary nodules, as described above since 12/7/2017  No new suspicious pulmonary nodules are identified  Workstation performed: [id number]     Ct Pelvis W Contrast    Result Date: 9/10/2019  Narrative: CT PELVIS WITH IV CONTRAST INDICATION:   R pelvic pain / hip pain  COMPARISON:  Right hip radiographs 9/2/2019 and CT pelvis 10/25/2018 TECHNIQUE: CT examination of the pelvis was performed  Axial, sagittal, and coronal 2D reformatted images were created from the source data and submitted for interpretation  Radiation dose length product (DLP) for this visit:  447 mGy-cm   This examination, like all CT scans performed in the [facility], was performed utilizing techniques to minimize radiation dose exposure, including the use of iterative reconstruction and automated exposure control  IV Contrast:  100 mL of iohexol (OMNIPAQUE)  350 Enteric Contrast:  Enteric contrast was administered  FINDINGS: VISUALIZED KIDNEYS/URETERS:  No significant abnormality identified in the partially imaged kidneys and ureters  REPRODUCTIVE ORGANS:  Unremarkable for patient's age  URINARY BLADDER:  Unremarkable  APPENDIX:  No findings to suggest appendicitis  VISUALIZED BOWEL:  Colonic diverticulosis  Otherwise unremarkable  ABDOMINOPELVIC CAVITY:  No ascites or free intraperitoneal air  No lymphadenopathy  VISUALIZED VESSELS:  Aortoiliac calcification  No aneurysm  ABDOMINOPELVIC WALL/INGUINAL REGIONS: Postsurgical changes infraumbilical abdominal wall  Small bilateral fat-containing inguinal hernias  OSSEOUS STRUCTURES:  No acute fracture or osseous destructive lesion identified  Degenerative changes of the spine, pubic symphysis, and multiple joints  Stable minimal levoconvex lumbar scoliosis  Degenerative sclerosis bilateral femoral heads  Subcentimeter calcification adjacent to left greater trochanter attributed to dystrophic calcification or calcific tendinitis in the appropriate clinical scenario       Impression: No acute intrapelvic process or significant interval change  Mild osteoarthritis bilateral hips  Workstation performed: [id number]     Ct Hip Right W Contrast    Result Date: 9/10/2019  Narrative: CT right hip with IV contrast INDICATION: R hip pain  COMPARISON: CT pelvis 10/25/2018 and right hip radiographs 9/2/2019 TECHNIQUE: CT examination of the right hip was performed  This examination, like all CT scans performed in the [facility], was performed utilizing techniques to minimize radiation dose exposure, including the use of iterative reconstruction and automated exposure control software  Sagittal and coronal two dimensional reconstructed images were also submitted for interpretation  IV Contrast: 100 mL of iohexol (OMNIPAQUE)  350 Rad dose  637 mGy-cm FINDINGS: OSSEOUS STRUCTURES:  No fracture, dislocation or destructive osseous lesion  Mild stable osteoarthritis with minimal degenerative sclerosis in the femoral head, mild joint space narrowing, tiny peripheral spurs, and subchondral cystic change in the acetabulum  VISUALIZED MUSCULATURE:  Stable fatty atrophy gluteus medius  Otherwise unremarkable  SOFT TISSUES:  Trace iliofemoral vascular calcification  Otherwise unremarkable  OTHER PERTINENT FINDINGS:  None  Impression: Mild stable osteoarthritis  No acute osseous or soft tissue abnormality   Workstation performed: [id number] No pertinent family history in first degree relatives

## 2021-07-14 ENCOUNTER — TELEPHONE (OUTPATIENT)
Dept: PAIN MEDICINE | Facility: CLINIC | Age: 76
End: 2021-07-14

## 2021-07-14 DIAGNOSIS — F11.20 CONTINUOUS OPIOID DEPENDENCE (HCC): ICD-10-CM

## 2021-07-14 RX ORDER — MORPHINE SULFATE 15 MG/1
15 TABLET ORAL EVERY 8 HOURS PRN
Qty: 90 TABLET | Refills: 0 | Status: SHIPPED | OUTPATIENT
Start: 2021-07-14 | End: 2021-09-17 | Stop reason: SDUPTHER

## 2021-07-14 NOTE — TELEPHONE ENCOUNTER
Patient states Dr Tk Mcduffie is moving to [facility] the end of this month & has discharged patient back to us for care & med management  Pt contacted Call Center requested refill of their medication  Medication Name: Cyclobenzaprine       Dosage of Med: 5 mg      Frequency of Med: Take 2 tablets (10 mg total) by mouth every 8 (eight) hours as needed for muscle spasms      Remaining Medication: 6      Pharmacy and Location: [facility] pharmacy on file         Pt  Preferred Callback Phone Number: [phone]      Thank you

## 2021-07-14 NOTE — TELEPHONE ENCOUNTER
Controlled Substance Review    [place]MP or [place]  reviewed: No red flags were identified; safe to proceed with prescription      PDMP Review       Value Time User    PDMP Reviewed  Yes 7/14/2021 11:54 AM Papi Lemon DO

## 2021-07-16 NOTE — TELEPHONE ENCOUNTER
--GABRIELA--    S/W pt  Advised pt of the same  Pt wants to tell KW that his treatment is working  Pt verbalized understanding

## 2021-07-26 ENCOUNTER — TELEPHONE (OUTPATIENT)
Dept: PAIN MEDICINE | Facility: CLINIC | Age: 76
End: 2021-07-26

## 2021-08-18 ENCOUNTER — OFFICE VISIT (OUTPATIENT)
Dept: INTERNAL MEDICINE CLINIC | Facility: CLINIC | Age: 76
End: 2021-08-18
Payer: MEDICARE

## 2021-08-18 VITALS
HEART RATE: 104 BPM | OXYGEN SATURATION: 97 % | DIASTOLIC BLOOD PRESSURE: 64 MMHG | BODY MASS INDEX: 25.21 KG/M2 | TEMPERATURE: 97.6 F | SYSTOLIC BLOOD PRESSURE: 122 MMHG | WEIGHT: 137 LBS | HEIGHT: 62 IN

## 2021-08-18 DIAGNOSIS — R10.9 STOMACH PAIN: Primary | ICD-10-CM

## 2021-08-18 DIAGNOSIS — M51.16 LUMBAR DISC DISEASE WITH RADICULOPATHY: ICD-10-CM

## 2021-08-18 PROCEDURE — 99213 OFFICE O/P EST LOW 20 MIN: CPT | Performed by: NURSE PRACTITIONER

## 2021-08-18 RX ORDER — DICYCLOMINE HYDROCHLORIDE 10 MG/1
10 CAPSULE ORAL
Qty: 60 CAPSULE | Refills: 1 | Status: SHIPPED | OUTPATIENT
Start: 2021-08-18 | End: 2021-08-27

## 2021-08-18 NOTE — PROGRESS NOTES
INTERNAL MEDICINE FOLLOW-UP OFFICE VISIT    [facility] - [facility]    NAME: Chuy Ramos  AGE: 64 y o  SEX: female    DATE OF ENCOUNTER: 8/18/2021   Assessment and Plan:   Patient advised to start taking bentyl bid, can increase to qid if needed  Will follow up with orthopedics for evaluation of back pains that radiate into the leg  She will continue with pain management for epidural injections  Problem List Items Addressed This Visit        Nervous and Auditory    Lumbar disc disease with radiculopathy    Relevant Orders    Ambulatory referral to Orthopedic Surgery      Other Visit Diagnoses     Stomach pain    -  Primary    Relevant Medications    dicyclomine (BENTYL) 10 mg capsule          Return if symptoms worsen or fail to improve, for Next scheduled follow up  Counseling:     · Medication Side Effects - Adverse side effects of medications were reviewed with the patient/guardian today: Yes  · Counseling was given regarding: Prognosis, Risks and benefits of tx options, Intructions for management, Patient and family education, Importance of tx compliance, Risk factor reductions and Impressions  · Barriers to treatment include: No identified barriers      Chief Complaint:     Chief Complaint   Patient presents with    Well Check     patient has multiple concerns related with pain mgt     Nausea     pt complains of waking up in morning feeling sick to stomach         History of Present Illness:     Had back surgery earlier this year, states neurosurgeon no longer in this area, moved to [place]  Dr Skylar Davis is pain mgmt doctor, gave epidural injection  She continues with daily back pains and takes morphine for this  She has pain and weakness that travels down the right leg into the foot  States "I walk like a drunkard with a limp due to pain " Also states the pain in her foot is sometimes worse than the pains in her back       See's Dr Leander Hines for stomach issues  States "he likes to give the most expensive medications " Struggling with stomach pains, wakes up in the mornings with nausea  Has been taking an old prescription of pantoprazole but this is not helping  Nausea  Associated symptoms include abdominal pain, arthralgias, fatigue, myalgias, nausea (in the morning) and weakness (right leg)  Pertinent negatives include no chills or fever  The following portions of the patient's history were reviewed and updated as appropriate: allergies, current medications, past family history, past medical history, past social history, past surgical history and problem list      Review of Systems:     Review of Systems   Constitutional: Positive for appetite change and fatigue  Negative for chills and fever  Gastrointestinal: Positive for abdominal pain and nausea (in the morning)  Musculoskeletal: Positive for arthralgias, back pain, gait problem and myalgias  Neurological: Positive for weakness (right leg)  Problem List:     Patient Active Problem List   Diagnosis    CHOCO on CPAP    COPD with chronic bronchitis (HCC)    Hypothyroidism    Insomnia    Lung nodule, multiple    Macular degeneration    Multiple sclerosis ([address] )    Rosacea    Seborrheic keratosis    Chronic neuropathic pain    Thoracolumbar radiculopathy due to intervertebral disc disorder    Other constipation    Intercostal neuralgia    Lumbar disc disease with radiculopathy    Intercostal muscle pain    Chronic pain syndrome    Anxiety    Smoker    [facility])    Status post lumbar discectomy    Continuous opioid dependence ([address] )    Neuropathic pain of lower extremity, right        Objective:     /64   Pulse 104   Temp 97 6 °F (36 4 °C)   Ht 5' 2" (1 575 m)   Wt 62 1 kg (137 lb)   SpO2 97%   BMI 25 06 kg/m²     Physical Exam  Vitals reviewed  Constitutional:       General: She is not in acute distress       Appearance: Normal appearance  She is not ill-appearing  HENT:      Head: Normocephalic and atraumatic  Cardiovascular:      Rate and Rhythm: Normal rate and regular rhythm  Heart sounds: Normal heart sounds, S1 normal and S2 normal    Pulmonary:      Effort: Pulmonary effort is normal  No accessory muscle usage  Breath sounds: Normal breath sounds  No wheezing  Abdominal:      General: Abdomen is flat  Bowel sounds are normal       Palpations: Abdomen is soft  Tenderness: There is no abdominal tenderness  Skin:     General: Skin is warm and dry  Capillary Refill: Capillary refill takes less than 2 seconds  Findings: No rash  Neurological:      General: No focal deficit present  Mental Status: She is alert and oriented to person, place, and time  Motor: Motor function is intact  Psychiatric:         Attention and Perception: Attention and perception normal          Mood and Affect: Mood and affect normal          Speech: Speech normal          Behavior: Behavior normal  Behavior is cooperative  Thought Content: Thought content normal          Pertinent Laboratory/Diagnostic Studies:    Laboratory Results: I have personally reviewed the pertinent laboratory results/reports   Radiology/Other Diagnostic Testing Results: I have personally reviewed pertinent reports         Current Medications:     Current Outpatient Medications   Medication Sig Dispense Refill    cyclobenzaprine (FLEXERIL) 5 mg tablet Take 2 tablets (10 mg total) by mouth every 8 (eight) hours as needed for muscle spasms 60 tablet 3    levothyroxine 100 mcg tablet Take 1 tablet (100 mcg total) by mouth daily (Patient taking differently: Take 100 mcg by mouth daily Takes in the am) 90 tablet 3    Methylnaltrexone Bromide (Relistor) 150 MG TABS Take by mouth as needed       morphine (MSIR) 15 mg tablet Take 1 tablet (15 mg total) by mouth every 8 (eight) hours as needed for severe painMax Daily Amount: 45 mg 90 tablet 0    Multiple Vitamins-Minerals (ICAPS AREDS 2 PO) Take by mouth daily       dicyclomine (BENTYL) 10 mg capsule Take 1 capsule (10 mg total) by mouth 4 (four) times a day (before meals and at bedtime) 60 capsule 1    DULoxetine (CYMBALTA) 30 mg delayed release capsule TAKE 1 CAPSULE BY MOUTH DAILY TAKE DAILY AT BEDTIME BEFORE SLEEP (Patient not taking: Reported on 8/18/2021) 30 capsule 1    gabapentin (NEURONTIN) 300 mg capsule TAKE 1 CAPSULE (300 MG TOTAL) BY MOUTH DAILY IN THE AFTERNOON (Patient not taking: Reported on 8/18/2021) 30 capsule 0    naloxone (NARCAN) 4 mg/0 1 mL nasal spray Administer 1 spray into a nostril  If no response after 2-3 minutes, give another dose in the other nostril using a new spray  (Patient not taking: Reported on 4/15/2021) 1 each 1     No current facility-administered medications for this visit  There are no Patient Instructions on file for this visit      MACHO Farias  [facility]

## 2021-08-20 ENCOUNTER — TELEPHONE (OUTPATIENT)
Dept: INTERNAL MEDICINE CLINIC | Facility: CLINIC | Age: 76
End: 2021-08-20

## 2021-08-20 NOTE — TELEPHONE ENCOUNTER
PRITESHI:   To Jonny Williamson   medication she recommend is working wonderful , thank you for all your help

## 2021-08-27 DIAGNOSIS — R10.9 STOMACH PAIN: ICD-10-CM

## 2021-08-27 RX ORDER — DICYCLOMINE HYDROCHLORIDE 10 MG/1
10 CAPSULE ORAL
Qty: 60 CAPSULE | Refills: 1 | Status: SHIPPED | OUTPATIENT
Start: 2021-08-27 | End: 2021-09-06

## 2021-09-06 DIAGNOSIS — R10.9 STOMACH PAIN: ICD-10-CM

## 2021-09-06 RX ORDER — DICYCLOMINE HYDROCHLORIDE 10 MG/1
10 CAPSULE ORAL
Qty: 360 CAPSULE | Refills: 1 | Status: SHIPPED | OUTPATIENT
Start: 2021-09-06 | End: 2022-02-11 | Stop reason: SDUPTHER

## 2021-09-07 ENCOUNTER — HOSPITAL ENCOUNTER (OUTPATIENT)
Dept: CT IMAGING | Facility: HOSPITAL | Age: 76
Discharge: HOME | End: 2021-09-07
Payer: MEDICARE

## 2021-09-07 DIAGNOSIS — Z12.2 ENCOUNTER FOR SCREENING FOR LUNG CANCER: ICD-10-CM

## 2021-09-07 PROCEDURE — 71271 CT THORAX LUNG CANCER SCR C-: CPT

## 2021-09-14 ENCOUNTER — TELEPHONE (OUTPATIENT)
Dept: NEUROSURGERY | Facility: CLINIC | Age: 76
End: 2021-09-14

## 2021-09-14 ENCOUNTER — OFFICE VISIT (OUTPATIENT)
Dept: PAIN MEDICINE | Facility: CLINIC | Age: 76
End: 2021-09-14
Payer: MEDICARE

## 2021-09-14 ENCOUNTER — TELEPHONE (OUTPATIENT)
Dept: INTERNAL MEDICINE CLINIC | Facility: CLINIC | Age: 76
End: 2021-09-14

## 2021-09-14 VITALS
DIASTOLIC BLOOD PRESSURE: 63 MMHG | HEART RATE: 98 BPM | SYSTOLIC BLOOD PRESSURE: 113 MMHG | WEIGHT: 137 LBS | BODY MASS INDEX: 25.06 KG/M2

## 2021-09-14 DIAGNOSIS — G89.4 CHRONIC PAIN SYNDROME: ICD-10-CM

## 2021-09-14 DIAGNOSIS — G89.29 CHRONIC NEUROPATHIC PAIN: ICD-10-CM

## 2021-09-14 DIAGNOSIS — M54.15 THORACOLUMBAR RADICULOPATHY DUE TO INTERVERTEBRAL DISC DISORDER: ICD-10-CM

## 2021-09-14 DIAGNOSIS — F11.20 CONTINUOUS OPIOID DEPENDENCE (HCC): ICD-10-CM

## 2021-09-14 DIAGNOSIS — M51.16 LUMBAR DISC DISEASE WITH RADICULOPATHY: ICD-10-CM

## 2021-09-14 DIAGNOSIS — M21.371 FOOT DROP, RIGHT: Primary | ICD-10-CM

## 2021-09-14 DIAGNOSIS — M79.2 CHRONIC NEUROPATHIC PAIN: ICD-10-CM

## 2021-09-14 PROCEDURE — 99214 OFFICE O/P EST MOD 30 MIN: CPT | Performed by: PHYSICAL MEDICINE & REHABILITATION

## 2021-09-14 NOTE — TELEPHONE ENCOUNTER
----- Message from Butch Rogers DO sent at 9/14/2021  9:00 AM EDT -----  Stable small lung nodules noted on lung cancer screening  No concerning characteristics   Repeat in 1 year is recommended

## 2021-09-14 NOTE — TELEPHONE ENCOUNTER
9/14/21 PT CALLED AND STATES DR Kishore Fontaine IS REFERRING HER BACK TO [facility] TO SEE DR SHAH  PT LAST SEEN 6/2021 BY DR PRITCHARD [facility] AND HAD PREVIOUS LSPINE SX 3/23/21 WITH AGUILERA  NO NEW IMAGING SINCE LAST VISIT  PT STATES SHE IS HAVING RIGHT FOOT DROP AND IS FALLING  PT HAD GEOVANAN BY DR Kishore Fontaine 7/2021  PLEASE ADVISE FOR RETURN OFFICE VISIT   TY

## 2021-09-14 NOTE — PATIENT INSTRUCTIONS
Lumbar Radiculopathy   WHAT YOU NEED TO KNOW:   Lumbar radiculopathy is a painful condition that happens when a nerve in your lumbar spine (lower back) is pinched or irritated  Nerves control feeling and movement in your body  You may have numbness or pain that shoots down from your lower back towards your foot  DISCHARGE INSTRUCTIONS:   Medicines:   · Medicines:     ? NSAIDs , such as ibuprofen, help decrease swelling, pain, and fever  This medicine is available with or without a doctor's order  NSAIDs can cause stomach bleeding or kidney problems in certain people  If you take blood thinner medicine, always ask your healthcare provider if NSAIDs are safe for you  Always read the medicine label and follow directions  ? Muscle relaxers  help decrease pain and muscle spasms  ? Opioids: This is a strong medicine given to reduce severe pain  It is also called narcotic pain medicine  Take this medicine exactly as directed by your healthcare provider  ? Oral steroids: Steroids may also be given to reduce pain and swelling  ? Take your medicine as directed  Contact your healthcare provider if you think your medicine is not helping or if you have side effects  Tell him of her if you are allergic to any medicine  Keep a list of the medicines, vitamins, and herbs you take  Include the amounts, and when and why you take them  Bring the list or the pill bottles to follow-up visits  Carry your medicine list with you in case of an emergency  Follow up with your healthcare provider or spine specialist within 1 to 3 weeks:  After your first follow-up appointment, return to your healthcare provider or spine specialist every 2 weeks until you have healed  Ask for information about physical therapy for your condition  Write down your questions so you remember to ask them during your visits  Physical therapy:  You may need physical therapy to improve your condition   Your physical therapist may teach you certain exercises to improve posture (the way you stand and sit), flexibility, and strength in your lower back  Self care:   · Stay active: It is best to be active when you have lumbar radiculopathy  Your physical therapist or healthcare provider may tell you to take walks to ease yourself back into your daily routine  Avoid long periods of bed rest  Bed rest could worsen your symptoms  Do not move in ways that increase your pain  Ask for more information about the best ways to stay active  · Use ice or heat packs:  Use ice or heat packs as directed on the sore area of your body to decrease the pain and swelling  Put ice in a plastic bag covered with a towel on your low back  Cover heated items with a towel to avoid burns  Use ice and heat as directed  · Avoid heavy lifting: Your condition may worsen if you lift heavy things  Avoid lifting if possible  · Maintain a healthy weight:  Excess body weight may strain your back  Talk with your healthcare provider about ways to lose excess weight if you are overweight  Contact your healthcare provider or spine specialist if:   · Your pain does not improve within 1 to 3 weeks after treatment  · Your pain and weakness keep you from your normal activities at work, home, or school  · You lose more than 10 pounds in 6 months without trying  · You become depressed or sad because of the pain  · You have questions or concerns about your condition or care  Return to the emergency department if:   · You have a fever greater than 100 4°F for longer than 2 days  · You have new, severe back or leg pain, or your pain spreads to both legs  · You have any new signs of numbness or weakness, especially in your lower back, legs, arms, or genital area  · You have new trouble controlling your urine and bowel movements  · You do not feel like your bladder empties when you urinate      © Copyright [address] 2021 Information is for End User's use only and may not be sold, redistributed or otherwise used for commercial purposes  All illustrations and images included in CareNotes® are the copyrighted property of A D A M , Inc  or Carlos Spivey  The above information is an  only  It is not intended as medical advice for individual conditions or treatments  Talk to your doctor, nurse or pharmacist before following any medical regimen to see if it is safe and effective for you

## 2021-09-14 NOTE — PROGRESS NOTES
Assessment:  1  Foot drop, right    2  Lumbar disc disease with radiculopathy    3  Thoracolumbar radiculopathy due to intervertebral disc disorder    4  Chronic neuropathic pain    5  Chronic pain syndrome    6  Continuous opioid dependence [facility])        Plan:  Ms Missy Caal is a pleasant 61-year-old female who presents for follow-up and re-evaluation regarding right-sided low back pain with radiating symptoms into the right leg  We previously performed a recent lumbar epidural steroid injection which has continued to provide greater than 60% relief in her pain, however, she reports a recent fall and right foot weakness that has impacted her quality of life and activities of daily living  Patient was last seen by Dr Tiago Ricks and advised to give the recent L4 surgery more time and if she did not improve to return for spinal cord stimulator  However in light of the recent symptom changes and lack of improvement I have advised the patient to return to the neuro surgical group and she will make a follow-up appointment with Dr Bam Winters  An AFO has been ordered for the right foot and we will follow up after her discussion with neuro surgery  My impressions and treatment recommendations were discussed in detail with the patient who verbalized understanding and had no further questions  Discharge instructions were provided  I personally saw and examined the patient and I agree with the above discussed plan of care  Orders Placed This Encounter   Procedures    AFO Ankle Foot Orthotic Spring Wire Dorsiflexion Assist Calf Band     No orders of the defined types were placed in this encounter  History of Present Illness:  Ashley Edinburgh is a 64 y o  female who presents for a follow up office visit in regards to Leg Pain (right side)     The patients current symptoms include right-sided low back pain with radiating symptoms into the right lower extremity currently described as a 6/10 pain that is intermittent cramping, pressure-like sensation worse in the evening  We previously performed a lumbar epidural steroid injection which provided greater than 60% relief in her pain and presents for follow-up  I have personally reviewed and/or updated the patient's past medical history, past surgical history, family history, social history, current medications, allergies, and vital signs today  Review of Systems   Respiratory: Negative for shortness of breath  Cardiovascular: Negative for chest pain  Gastrointestinal: Negative for constipation, diarrhea, nausea and vomiting  Musculoskeletal: Positive for back pain and gait problem  Negative for arthralgias, joint swelling and myalgias  Skin: Negative for rash  Neurological: Negative for dizziness, seizures and weakness  All other systems reviewed and are negative        Patient Active Problem List   Diagnosis    CHOCO on CPAP    COPD with chronic bronchitis (HCC)    Hypothyroidism    Insomnia    Lung nodule, multiple    Macular degeneration    Multiple sclerosis ([address] )    Rosacea    Seborrheic keratosis    Chronic neuropathic pain    Thoracolumbar radiculopathy due to intervertebral disc disorder    Other constipation    Intercostal neuralgia    Lumbar disc disease with radiculopathy    Intercostal muscle pain    Chronic pain syndrome    Anxiety    Smoker    COPD (chronic obstructive pulmonary disease) (HCC)    Status post lumbar discectomy    Continuous opioid dependence ([address] )    Neuropathic pain of lower extremity, right       Past Medical History:   Diagnosis Date    Anxiety     Cataract     last assessed 4/25/14    Chronic bronchitis ([address] )     Sees pulmonary specialist twice a year    Chronic pain disorder     COPD (chronic obstructive pulmonary disease) ([address] )     Hypothyroidism     Multiple sclerosis ([address] )     CHOCO (obstructive sleep apnea)     uses cpap, 3/19/21 -pt states will bring CPAP for DOS 3/23/21    Peroneal muscle atrophy     Shortness of breath     Smoker     Thoracolumbar radiculopathy due to intervertebral disc disorder        Past Surgical History:   Procedure Laterality Date    APPENDECTOMY      CATARACT EXTRACTION       SECTION      CHOLECYSTECTOMY      GALLBLADDER SURGERY      AZ COLONOSCOPY FLX DX W/COLLJ SPEC WHEN PFRMD N/A 2018    Procedure: COLONOSCOPY;  Surgeon: Juan Jose Winslow MD;  Location: [facility]; Service: Colorectal    AZ LAMNOTMY INCL W/DCMPRSN NRV ROOT 1 INTRSPC LUMBR Right 3/23/2021    Procedure: MINIMALLY INVASIVE LUMBAR FAR LATERAL DISCECTOMY L4-5, RIGHT;  Surgeon: Humberto Levin MD;  Location:  MAIN OR;  Service: Neurosurgery    THROAT SURGERY         Family History   Problem Relation Age of Onset    Skin cancer Mother     Hypertension Father         benign essential    Stroke Father     Lung cancer Brother        Social History     Occupational History    Occupation: retired     Comment: part time as per [employer]   Tobacco Use    Smoking status: Current Every Day Smoker     Packs/day: 1 00     Years: 60 00     Pack years: 60 00     Types: Cigarettes     Start date: 1958    Smokeless tobacco: Never Used   Vaping Use    Vaping Use: Former    Substances: Nicotine   Substance and Sexual Activity    Alcohol use:  Yes     Alcohol/week: 1 0 - 2 0 standard drinks     Types: 1 - 2 Glasses of wine per week     Comment: 1-2 drinks of wine daily     Drug use: Not Currently     Types: Morphine, Marijuana     Comment: medical-RSO  last dose 11 days ago, past hx o f medical marijuana use - NOT CURRENT    Sexual activity: Yes     Partners: Male       Current Outpatient Medications on File Prior to Visit   Medication Sig    cyclobenzaprine (FLEXERIL) 5 mg tablet Take 2 tablets (10 mg total) by mouth every 8 (eight) hours as needed for muscle spasms    dicyclomine (BENTYL) 10 mg capsule TAKE 1 CAPSULE (10 MG TOTAL) BY MOUTH 4 (FOUR) TIMES A DAY (BEFORE MEALS AND AT BEDTIME)    Methylnaltrexone Bromide (Relistor) 150 MG TABS Take by mouth as needed     morphine (MSIR) 15 mg tablet Take 1 tablet (15 mg total) by mouth every 8 (eight) hours as needed for severe painMax Daily Amount: 45 mg    Multiple Vitamins-Minerals (ICAPS AREDS 2 PO) Take by mouth daily     DULoxetine (CYMBALTA) 30 mg delayed release capsule TAKE 1 CAPSULE BY MOUTH DAILY TAKE DAILY AT BEDTIME BEFORE SLEEP (Patient not taking: Reported on 8/18/2021)    gabapentin (NEURONTIN) 300 mg capsule TAKE 1 CAPSULE (300 MG TOTAL) BY MOUTH DAILY IN THE AFTERNOON (Patient not taking: Reported on 8/18/2021)    levothyroxine 100 mcg tablet Take 1 tablet (100 mcg total) by mouth daily (Patient taking differently: Take 100 mcg by mouth daily Takes in the am)    naloxone (NARCAN) 4 mg/0 1 mL nasal spray Administer 1 spray into a nostril  If no response after 2-3 minutes, give another dose in the other nostril using a new spray  (Patient not taking: Reported on 4/15/2021)     No current facility-administered medications on file prior to visit  Allergies   Allergen Reactions    Adhesive [Medical Tape] Rash    Latex Rash       Physical Exam:    /63   Pulse 98   Wt 62 1 kg (137 lb)   BMI 25 06 kg/m²     Constitutional:normal, well developed, well nourished, alert, in no distress and non-toxic and no overt pain behavior    Eyes:anicteric  HEENT:grossly intact  Neck:supple, symmetric, trachea midline and no masses   Pulmonary:even and unlabored  Cardiovascular:No edema or pitting edema present  Skin:Normal without rashes or lesions and well hydrated  Psychiatric:Mood and affect appropriate  Neurologic:Cranial Nerves II-XII grossly intact  Musculoskeletal:antalgic    Imaging No pertinent past medical history

## 2021-09-16 NOTE — TELEPHONE ENCOUNTER
9/16/21 CALLED AND SPOKE TO PT, SCHEDULED APPT WITH PA AND DR SHAH 10/11/21 @ 215PM IN [facility]  PT HAS ADDRESS  ON CANCELLATION LIST  PT STATES SHE HAS HAD FOOT DROP FOR CLOSE TO A MONTH

## 2021-09-17 ENCOUNTER — APPOINTMENT (OUTPATIENT)
Dept: LAB | Facility: CLINIC | Age: 76
End: 2021-09-17
Payer: MEDICARE

## 2021-09-17 ENCOUNTER — OFFICE VISIT (OUTPATIENT)
Dept: INTERNAL MEDICINE CLINIC | Facility: CLINIC | Age: 76
End: 2021-09-17
Payer: MEDICARE

## 2021-09-17 VITALS
OXYGEN SATURATION: 99 % | BODY MASS INDEX: 25.58 KG/M2 | DIASTOLIC BLOOD PRESSURE: 60 MMHG | HEART RATE: 102 BPM | TEMPERATURE: 97.9 F | HEIGHT: 62 IN | SYSTOLIC BLOOD PRESSURE: 100 MMHG | WEIGHT: 139 LBS

## 2021-09-17 DIAGNOSIS — E03.9 ACQUIRED HYPOTHYROIDISM: Chronic | ICD-10-CM

## 2021-09-17 DIAGNOSIS — M21.371 RIGHT FOOT DROP: ICD-10-CM

## 2021-09-17 DIAGNOSIS — Z00.00 MEDICARE ANNUAL WELLNESS VISIT, SUBSEQUENT: ICD-10-CM

## 2021-09-17 DIAGNOSIS — M51.16 LUMBAR DISC DISEASE WITH RADICULOPATHY: Primary | ICD-10-CM

## 2021-09-17 DIAGNOSIS — M62.838 MUSCLE SPASM OF RIGHT LEG: ICD-10-CM

## 2021-09-17 DIAGNOSIS — F11.20 CONTINUOUS OPIOID DEPENDENCE (HCC): ICD-10-CM

## 2021-09-17 LAB
ANION GAP SERPL CALCULATED.3IONS-SCNC: 4 MMOL/L (ref 4–13)
BUN SERPL-MCNC: 17 MG/DL (ref 5–25)
CALCIUM SERPL-MCNC: 9.8 MG/DL (ref 8.3–10.1)
CHLORIDE SERPL-SCNC: 106 MMOL/L (ref 100–108)
CHOLEST SERPL-MCNC: 180 MG/DL (ref 50–200)
CO2 SERPL-SCNC: 27 MMOL/L (ref 21–32)
CREAT SERPL-MCNC: 0.67 MG/DL (ref 0.6–1.3)
GFR SERPL CREATININE-BSD FRML MDRD: 84 ML/MIN/1.73SQ M
GLUCOSE P FAST SERPL-MCNC: 92 MG/DL (ref 65–99)
HDLC SERPL-MCNC: 77 MG/DL
LDLC SERPL CALC-MCNC: 87 MG/DL (ref 0–100)
NONHDLC SERPL-MCNC: 103 MG/DL
POTASSIUM SERPL-SCNC: 4.3 MMOL/L (ref 3.5–5.3)
SODIUM SERPL-SCNC: 137 MMOL/L (ref 136–145)
TRIGL SERPL-MCNC: 82 MG/DL
TSH SERPL DL<=0.05 MIU/L-ACNC: 0.86 UIU/ML (ref 0.36–3.74)

## 2021-09-17 PROCEDURE — 1123F ACP DISCUSS/DSCN MKR DOCD: CPT | Performed by: INTERNAL MEDICINE

## 2021-09-17 PROCEDURE — 80061 LIPID PANEL: CPT

## 2021-09-17 PROCEDURE — 99214 OFFICE O/P EST MOD 30 MIN: CPT | Performed by: INTERNAL MEDICINE

## 2021-09-17 PROCEDURE — 36415 COLL VENOUS BLD VENIPUNCTURE: CPT

## 2021-09-17 PROCEDURE — G0439 PPPS, SUBSEQ VISIT: HCPCS | Performed by: INTERNAL MEDICINE

## 2021-09-17 PROCEDURE — 80048 BASIC METABOLIC PNL TOTAL CA: CPT

## 2021-09-17 PROCEDURE — 84443 ASSAY THYROID STIM HORMONE: CPT

## 2021-09-17 RX ORDER — MORPHINE SULFATE 15 MG/1
15 TABLET ORAL EVERY 8 HOURS PRN
Qty: 90 TABLET | Refills: 0 | Status: SHIPPED | OUTPATIENT
Start: 2021-09-17 | End: 2021-11-29 | Stop reason: SDUPTHER

## 2021-09-17 RX ORDER — CYCLOBENZAPRINE HCL 5 MG
10 TABLET ORAL EVERY 8 HOURS PRN
Qty: 60 TABLET | Refills: 3 | Status: SHIPPED | OUTPATIENT
Start: 2021-09-17 | End: 2021-11-29 | Stop reason: SDUPTHER

## 2021-09-17 NOTE — PROGRESS NOTES
Assessment and Plan:     1  Medicare annual wellness visit, subsequent    BMI Counseling: Body mass index is 25 42 kg/m²  The BMI is above normal  Nutrition recommendations include encouraging healthy choices of fruits and vegetables, moderation in carbohydrate intake and increasing intake of lean protein  Rationale for BMI follow-up plan is due to patient being overweight or obese  Depression Screening and Follow-up Plan:   Patient was screened for depression during today's encounter  They screened negative with a PHQ-2 score of 0  Falls Plan of Care: balance, strength, and gait training instructions were provided and referral to physical therapy  Preventive health issues were discussed with patient, and age appropriate screening tests were ordered as noted in patient's After Visit Summary  Personalized health advice and appropriate referrals for health education or preventive services given if needed, as noted in patient's After Visit Summary       History of Present Illness:     Patient presents for Medicare Annual Wellness visit    Patient Care Team:  Yissel Stauffer DO as PCP - General  MD Fadi Wolff MD Canary Melbourne, DO (Pain Medicine)     Problem List:     Patient Active Problem List   Diagnosis    CHOCO on CPAP    COPD [facility] bronchitis [facility]    Hypothyroidism    Insomnia    Lung nodule, multiple    Macular degeneration    Multiple sclerosis ([facility]    Rosacea    Seborrheic keratosis    Chronic neuropathic pain    Thoracolumbar radiculopathy due to intervertebral disc disorder    Other constipation    Intercostal neuralgia    Lumbar disc disease with radiculopathy    Intercostal muscle pain    Chronic pain syndrome    Anxiety    Smoker    COPD (chronic obstructive pulmonary disease) ([facility]    Status post lumbar discectomy    [facility]    Neuropathic pain of lower extremity, right      Past Medical and Surgical History: Past Medical History:   Diagnosis Date    Anxiety     Cataract     last assessed 14    [facility] bronchitis [facility]     Sees pulmonary specialist twice a year    Chronic pain disorder     COPD (chronic obstructive pulmonary disease) ([facility] )     Hypothyroidism     Multiple sclerosis ([facility] )     CHOCO (obstructive sleep apnea)     uses cpap, 3/19/21 -pt states will bring CPAP for DOS 3/23/21    Peroneal muscle atrophy     Shortness of breath     Smoker     Thoracolumbar radiculopathy due to intervertebral disc disorder      Past Surgical History:   Procedure Laterality Date    APPENDECTOMY      CATARACT EXTRACTION       SECTION      CHOLECYSTECTOMY      GALLBLADDER SURGERY      OH COLONOSCOPY FLX DX W/COLLJ SPEC WHEN PFRMD N/A 2018    Procedure: COLONOSCOPY;  Surgeon: Lorelei Larios MD;  Location: [facility]; Service: Colorectal    OH LAMNOTMY INCL W/DCMPRSN NRV ROOT 1 INTRSPC LUMBR Right 3/23/2021    Procedure: MINIMALLY INVASIVE LUMBAR FAR LATERAL DISCECTOMY L4-5, RIGHT;  Surgeon: Simba Pastrana MD;  Location:  MAIN OR;  Service: Neurosurgery    THROAT SURGERY        Family History:     Family History   Problem Relation Age of Onset    Skin cancer Mother     Hypertension Father         benign essential    Stroke Father     Lung cancer Brother       Social History:     Social History     Socioeconomic History    Marital status: /Civil Union     Spouse name: None    Number of children: 2    Years of education: None    Highest education level: None   Occupational History    Occupation: retired     Comment: part time as per [employer]   Tobacco Use    Smoking status: Current Every Day Smoker     Packs/day: 1 00     Years: 60 00     Pack years: 60 00     Types: Cigarettes     Start date: 1958    Smokeless tobacco: Never Used   Vaping Use    Vaping Use: Former    Substances: Nicotine   Substance and Sexual Activity    Alcohol use:  Yes     Alcohol/week: 1 0 - 2 0 standard drinks     Types: 1 - 2 Glasses of wine per week     Comment: 1-2 drinks of wine daily     Drug use: Not Currently     Types: Morphine, Marijuana     Comment: medical-RSO  last dose 11 days ago, past hx o f medical marijuana use - NOT CURRENT    Sexual activity: Yes     Partners: Male   Other Topics Concern    None   Social History Narrative    Active advance directive     Social Determinants of Health     Financial Resource Strain:     Difficulty of Paying Living Expenses:    Food Insecurity:     Worried About Running Out of Food in the Last Year:     Ran Out of Food in the Last Year:    Transportation Needs:     Lack of Transportation (Medical):  Lack of Transportation (Non-Medical):    Physical Activity: Inactive    Days of Exercise per Week: 0 days    Minutes of Exercise per Session: 0 min   Stress: Stress Concern Present    Feeling of Stress :  To some extent   Social Connections:     Frequency of Communication with Friends and Family:     Frequency of Social Gatherings with Friends and Family:     Attends Sabianist Services:     Active Member of Clubs or Organizations:     Attends Club or Organization Meetings:     Marital Status:    Intimate Partner Violence:     Fear of Current or Ex-Partner:     Emotionally Abused:     Physically Abused:     Sexually Abused:       Medications and Allergies:     Current Outpatient Medications   Medication Sig Dispense Refill    cyclobenzaprine (FLEXERIL) 5 mg tablet Take 2 tablets (10 mg total) by mouth every 8 (eight) hours as needed for muscle spasms 60 tablet 3    dicyclomine (BENTYL) 10 mg capsule TAKE 1 CAPSULE (10 MG TOTAL) BY MOUTH 4 (FOUR) TIMES A DAY (BEFORE MEALS AND AT BEDTIME) 360 capsule 1    levothyroxine 100 mcg tablet Take 1 tablet (100 mcg total) by mouth daily 90 tablet 3    Methylnaltrexone Bromide (Relistor) 150 MG TABS Take by mouth as needed       morphine (MSIR) 15 mg tablet Take 1 tablet (15 mg total) by mouth every 8 (eight) hours as needed for severe painMax Daily Amount: 45 mg 90 tablet 0    Multiple Vitamins-Minerals (ICAPS AREDS 2 PO) Take by mouth daily       naloxone (NARCAN) 4 mg/0 1 mL nasal spray Administer 1 spray into a nostril  If no response after 2-3 minutes, give another dose in the other nostril using a new spray  1 each 1    DULoxetine (CYMBALTA) 30 mg delayed release capsule TAKE 1 CAPSULE BY MOUTH DAILY TAKE DAILY AT BEDTIME BEFORE SLEEP (Patient not taking: Reported on 8/18/2021) 30 capsule 1    gabapentin (NEURONTIN) 300 mg capsule TAKE 1 CAPSULE (300 MG TOTAL) BY MOUTH DAILY IN THE AFTERNOON (Patient not taking: Reported on 8/18/2021) 30 capsule 0     No current facility-administered medications for this visit  Allergies   Allergen Reactions    Adhesive [Medical Tape] Rash    Latex Rash      Immunizations:     Immunization History   Administered Date(s) Administered    INFLUENZA 1945, 09/14/2016    Pneumococcal Polysaccharide PPV23 1945, 05/22/2008    SARS-CoV-2 / COVID-19 mRNA IM (Moderna) 01/13/2021, 02/11/2021    Tuberculin Skin Test-PPD Intradermal 01/13/2017      Health Maintenance:         Topic Date Due    Hepatitis C Screening  Never done    Lung Cancer Screening  09/07/2022    DXA SCAN  10/07/2022    Colorectal Cancer Screening  01/21/2023         Topic Date Due    DTaP,Tdap,and Td Vaccines (1 - Tdap) Never done    Influenza Vaccine (1) 09/01/2021      Medicare Health Risk Assessment:     Temp 97 9 °F (36 6 °C) (Tympanic)   Ht 5' 2" (1 575 m)   Wt 63 kg (139 lb)   BMI 25 42 kg/m²      Dylon Shields is here for her Subsequent Wellness visit  Last Medicare Wellness visit information reviewed, patient interviewed and updates made to the record today  Health Risk Assessment:   Patient rates overall health as fair  Patient feels that their physical health rating is same  Patient is satisfied with their life  Eyesight was rated as same  Hearing was rated as same  Patient feels that their emotional and mental health rating is same  Patients states they are never, rarely angry  Patient states they are sometimes unusually tired/fatigued  Pain experienced in the last 7 days has been some  Patient's pain rating has been 3/10  Patient states that she has experienced no weight loss or gain in last 6 months  Depression Screening:   PHQ-2 Score: 0      Fall Risk Screening: In the past year, patient has experienced: history of falling in past year    Number of falls: 2 or more  Injured during fall?: Yes    Feels unsteady when standing or walking?: Yes    Worried about falling?: Yes      Urinary Incontinence Screening:   Patient has not leaked urine accidently in the last six months  Home Safety:  Patient has trouble with stairs inside or outside of their home  Patient has working smoke alarms and has working carbon monoxide detector  Home safety hazards include: none  Nutrition:   Current diet is Regular  Medications:   Patient is currently taking over-the-counter supplements  OTC medications include: see medication list  Patient is able to manage medications  Activities of Daily Living (ADLs)/Instrumental Activities of Daily Living (IADLs):   Walk and transfer into and out of bed and chair?: Yes  Dress and groom yourself?: Yes    Bathe or shower yourself?: Yes    Feed yourself? Yes  Do your laundry/housekeeping?: Yes  Manage your money, pay your bills and track your expenses?: Yes  Make your own meals?: Yes    Do your own shopping?: Yes    Durable Medical Equipment Suppliers  none    Previous Hospitalizations:   Any hospitalizations or ED visits within the last 12 months?: No      Advance Care Planning:   Living will: Yes    Durable POA for healthcare:  Yes    Advanced directive: Yes    Five wishes given: No      Cognitive Screening:   Provider or family/friend/caregiver concerned regarding cognition?: No    PREVENTIVE SCREENINGS      Cardiovascular Screening: General: Screening Current      Diabetes Screening:     General: Screening Current      Colorectal Cancer Screening:     General: Screening Current      Breast Cancer Screening:     General: Screening Not Indicated      Cervical Cancer Screening:    General: Screening Not Indicated      Osteoporosis Screening:    General: Screening Current      Abdominal Aortic Aneurysm (AAA) Screening:        General: Screening Not Indicated      Lung Cancer Screening:     General: Screening Current      Hepatitis C Screening:    General: Screening Not Indicated    Screening, Brief Intervention, and Referral to Treatment (SBIRT)    Screening  Typical number of drinks in a day: 2  Typical number of drinks in a week: 12  Interpretation: Low risk drinking behavior  AUDIT-C Screenin) How often did you have a drink containing alcohol in the past year? 4 or more times a week  2) How many drinks did you have on a typical day when you were drinking in the past year? 1 to 2  3) How often did you have 6 or more drinks on one occasion in the past year? never    AUDIT-C Score: 4  Interpretation: Score 3-12 (female): POSITIVE screen for alcohol misuse    AUDIT Screenin) How often during the last year have you found that you were not able to stop drinking once you had started? 0 - never  5) How often during the last year have you failed to do what was normally expected from you because of drinking? 0 - never  6) How often during the last year have you needed a first drink in the morning to get yourself going after a heavy drinking session?  0 - never  7) How often during the last year have you had a feeling of guilt or remorse after drinking? 0 - never  8) How often during the last year have you been unable to remember what happened the night before because you had been drinking? 0 - never  9) Have you or someone else been injured as a result of your drinking? 0 - no  10) Has a relative or friend or a doctor or another health worker been concerned about your drinking or suggested you cut down? 0 - no    AUDIT Score: 4  Interpretation: Low risk alcohol consumption    Single Item Drug Screening:  How often have you used an illegal drug (including marijuana) or a prescription medication for non-medical reasons in the past year? never    Single Item Drug Screen Score: 0  Interpretation: Negative screen for possible drug use disorder    Brief Intervention  Alcohol & drug use screenings were reviewed  No concerns regarding substance use disorder identified  Review of Current Opioid Use  Opioid Risk Tool (ORT) Score: 0  Opioid Risk Tool (ORT) Interpretation: Score 0-3: Low risk for opioid misuse    [place] PDMP or [place]  reviewed  No red flags were identified    Other Counseling Topics:   Car/seat belt/driving safety, skin self-exam, sunscreen and regular weightbearing exercise       Chencho Butcher, DO

## 2021-09-17 NOTE — PATIENT INSTRUCTIONS
Medicare Preventive Visit Patient Instructions  Thank you for completing your Welcome to Medicare Visit or Medicare Annual Wellness Visit today  Your next wellness visit will be due in one year (9/18/2022)  The screening/preventive services that you may require over the next 5-10 years are detailed below  Some tests may not apply to you based off risk factors and/or age  Screening tests ordered at today's visit but not completed yet may show as past due  Also, please note that scanned in results may not display below  Preventive Screenings:  Service Recommendations Previous Testing/Comments   Colorectal Cancer Screening  * Colonoscopy    * Fecal Occult Blood Test (FOBT)/Fecal Immunochemical Test (FIT)  * Fecal DNA/Cologuard Test  * Flexible Sigmoidoscopy Age: 54-65 years old   Colonoscopy: every 10 years (may be performed more frequently if at higher risk)  OR  FOBT/FIT: every 1 year  OR  Cologuard: every 3 years  OR  Sigmoidoscopy: every 5 years  Screening may be recommended earlier than age 48 if at higher risk for colorectal cancer  Also, an individualized decision between you and your healthcare provider will decide whether screening between the ages of 74-80 would be appropriate  Colonoscopy: 01/21/2020  FOBT/FIT: Not on file  Cologuard: Not on file  Sigmoidoscopy: Not on file    Screening Current     Breast Cancer Screening Age: 36 years old  Frequency: every 1-2 years  Not required if history of left and right mastectomy Mammogram: 04/03/2019    Screening Not Indicated   Cervical Cancer Screening Between the ages of 21-29, pap smear recommended once every 3 years  Between the ages of 33-67, can perform pap smear with HPV co-testing every 5 years     Recommendations may differ for women with a history of total hysterectomy, cervical cancer, or abnormal pap smears in past  Pap Smear: Not on file    Screening Not Indicated   Hepatitis C Screening Once for adults born between 1945 and 1965  More frequently in patients at high risk for Hepatitis C Hep C Antibody: Not on file    Screening Not Indicated   Diabetes Screening 1-2 times per year if you're at risk for diabetes or have pre-diabetes Fasting glucose: 90 mg/dL   A1C: 5 6 %    Screening Current   Cholesterol Screening Once every 5 years if you don't have a lipid disorder  May order more often based on risk factors  Lipid panel: 08/25/2020    Screening Current     Other Preventive Screenings Covered by Medicare:  1  Abdominal Aortic Aneurysm (AAA) Screening: covered once if your at risk  You're considered to be at risk if you have a family history of AAA  2  Lung Cancer Screening: covers low dose CT scan once per year if you meet all of the following conditions: (1) Age 50-69; (2) No signs or symptoms of lung cancer; (3) Current smoker or have quit smoking within the last 15 years; (4) You have a tobacco smoking history of at least 30 pack years (packs per day multiplied by number of years you smoked); (5) You get a written order from a healthcare provider  3  Glaucoma Screening: covered annually if you're considered high risk: (1) You have diabetes OR (2) Family history of glaucoma OR (3)  aged 48 and older OR (3)  American aged 72 and older  3  Osteoporosis Screening: covered every 2 years if you meet one of the following conditions: (1) You're estrogen deficient and at risk for osteoporosis based off medical history and other findings; (2) Have a vertebral abnormality; (3) On glucocorticoid therapy for more than 3 months; (4) Have primary hyperparathyroidism; (5) On osteoporosis medications and need to assess response to drug therapy  · Last bone density test (DXA Scan): 10/07/2020   5  HIV Screening: covered annually if you're between the age of 15-65  Also covered annually if you are younger than 13 and older than 72 with risk factors for HIV infection   For pregnant patients, it is covered up to 3 times per pregnancy  Immunizations:  Immunization Recommendations   Influenza Vaccine Annual influenza vaccination during flu season is recommended for all persons aged >= 6 months who do not have contraindications   Pneumococcal Vaccine (Prevnar and Pneumovax)  * Prevnar = PCV13  * Pneumovax = PPSV23   Adults 25-60 years old: 1-3 doses may be recommended based on certain risk factors  Adults 72 years old: Prevnar (PCV13) vaccine recommended followed by Pneumovax (PPSV23) vaccine  If already received PPSV23 since turning 65, then PCV13 recommended at least one year after PPSV23 dose  Hepatitis B Vaccine 3 dose series if at intermediate or high risk (ex: diabetes, end stage renal disease, liver disease)   Tetanus (Td) Vaccine - COST NOT COVERED BY MEDICARE PART B Following completion of primary series, a booster dose should be given every 10 years to maintain immunity against tetanus  Td may also be given as tetanus wound prophylaxis  Tdap Vaccine - COST NOT COVERED BY MEDICARE PART B Recommended at least once for all adults  For pregnant patients, recommended with each pregnancy  Shingles Vaccine (Shingrix) - COST NOT COVERED BY MEDICARE PART B  2 shot series recommended in those aged 48 and above     Health Maintenance Due:      Topic Date Due    Hepatitis C Screening  Never done    Lung Cancer Screening  09/07/2022    DXA SCAN  10/07/2022    Colorectal Cancer Screening  01/21/2023     Immunizations Due:      Topic Date Due    DTaP,Tdap,and Td Vaccines (1 - Tdap) Never done    Influenza Vaccine (1) 09/01/2021     Advance Directives   What are advance directives? Advance directives are legal documents that state your wishes and plans for medical care  These plans are made ahead of time in case you lose your ability to make decisions for yourself  Advance directives can apply to any medical decision, such as the treatments you want, and if you want to donate organs  What are the types of advance directives? There are many types of advance directives, and each state has rules about how to use them  You may choose a combination of any of the following:  · Living will: This is a written record of the treatment you want  You can also choose which treatments you do not want, which to limit, and which to stop at a certain time  This includes surgery, medicine, IV fluid, and tube feedings  · Durable power of  for healthcare [facility]): This is a written record that states who you want to make healthcare choices for you when you are unable to make them for yourself  This person, called a proxy, is usually a family member or a friend  You may choose more than 1 proxy  · Do not resuscitate (DNR) order:  A DNR order is used in case your heart stops beating or you stop breathing  It is a request not to have certain forms of treatment, such as CPR  A DNR order may be included in other types of advance directives  · Medical directive: This covers the care that you want if you are in a coma, near death, or unable to make decisions for yourself  You can list the treatments you want for each condition  Treatment may include pain medicine, surgery, blood transfusions, dialysis, IV or tube feedings, and a ventilator (breathing machine)  · Values history: This document has questions about your views, beliefs, and how you feel and think about life  This information can help others choose the care that you would choose  Why are advance directives important? An advance directive helps you control your care  Although spoken wishes may be used, it is better to have your wishes written down  Spoken wishes can be misunderstood, or not followed  Treatments may be given even if you do not want them  An advance directive may make it easier for your family to make difficult choices about your care  Fall Prevention    Fall prevention  includes ways to make your home and other areas safer   It also includes ways you can move more carefully to prevent a fall  Health conditions that cause changes in your blood pressure, vision, or muscle strength and coordination may increase your risk for falls  Medicines may also increase your risk for falls if they make you dizzy, weak, or sleepy  Fall prevention tips:   · Stand or sit up slowly  · Use assistive devices as directed  · Wear shoes that fit well and have soles that   · Wear a personal alarm  · Stay active  · Manage your medical conditions  Home Safety Tips:  · Add items to prevent falls in the bathroom  · Keep paths clear  · Install bright lights in your home  · Keep items you use often on shelves within reach  · Paint or place reflective tape on the edges of your stairs  Urinary Incontinence   Urinary incontinence (UI)  is when you lose control of your bladder  UI develops because your bladder cannot store or empty urine properly  The 3 most common types of UI are stress incontinence, urge incontinence, or both  Medicines:   · May be given to help strengthen your bladder control  Report any side effects of medication to your healthcare provider  Do pelvic muscle exercises often:  Your pelvic muscles help you stop urinating  Squeeze these muscles tight for 5 seconds, then relax for 5 seconds  Gradually work up to squeezing for 10 seconds  Do 3 sets of 15 repetitions a day, or as directed  This will help strengthen your pelvic muscles and improve bladder control  Train your bladder:  Go to the bathroom at set times, such as every 2 hours, even if you do not feel the urge to go  You can also try to hold your urine when you feel the urge to go  For example, hold your urine for 5 minutes when you feel the urge to go  As that becomes easier, hold your urine for 10 minutes  Self-care:   · Keep a UI record  Write down how often you leak urine and how much you leak  Make a note of what you were doing when you leaked urine  · Drink liquids as directed   You may need to limit the amount of liquid you drink to help control your urine leakage  Do not drink any liquid right before you go to bed  Limit or do not have drinks that contain caffeine or alcohol  · Prevent constipation  Eat a variety of high-fiber foods  Good examples are high-fiber cereals, beans, vegetables, and whole-grain breads  Walking is the best way to trigger your intestines to have a bowel movement  · Exercise regularly and maintain a healthy weight  Weight loss and exercise will decrease pressure on your bladder and help you control your leakage  · Use a catheter as directed  to help empty your bladder  A catheter is a tiny, plastic tube that is put into your bladder to drain your urine  · Go to behavior therapy as directed  Behavior therapy may be used to help you learn to control your urge to urinate  Cigarette Smoking and Your Health   Risks to your health if you smoke:  Nicotine and other chemicals found in tobacco damage every cell in your body  Even if you are a light smoker, you have an increased risk for cancer, heart disease, and lung disease  If you are pregnant or have diabetes, smoking increases your risk for complications  Benefits to your health if you stop smoking:   · You decrease respiratory symptoms such as coughing, wheezing, and shortness of breath  · You reduce your risk for cancers of the lung, mouth, throat, kidney, bladder, pancreas, stomach, and cervix  If you already have cancer, you increase the benefits of chemotherapy  You also reduce your risk for cancer returning or a second cancer from developing  · You reduce your risk for heart disease, blood clots, heart attack, and stroke  · You reduce your risk for lung infections, and diseases such as pneumonia, asthma, chronic bronchitis, and emphysema  · Your circulation improves  More oxygen can be delivered to your body   If you have diabetes, you lower your risk for complications, such as kidney, artery, and eye diseases  You also lower your risk for nerve damage  Nerve damage can lead to amputations, poor vision, and blindness  · You improve your body's ability to heal and to fight infections  For more information and support to stop smoking:   · [place]  Phone: 6- 884 - [phone]  Web Address: [place]  Weight Management   Why it is important to manage your weight:  Being overweight increases your risk of health conditions such as heart disease, high blood pressure, type 2 diabetes, and certain types of cancer  It can also increase your risk for osteoarthritis, sleep apnea, and other respiratory problems  Aim for a slow, steady weight loss  Even a small amount of weight loss can lower your risk of health problems  How to lose weight safely:  A safe and healthy way to lose weight is to eat fewer calories and get regular exercise  You can lose up about 1 pound a week by decreasing the number of calories you eat by 500 calories each day  Healthy meal plan for weight management:  A healthy meal plan includes a variety of foods, contains fewer calories, and helps you stay healthy  A healthy meal plan includes the following:  · Eat whole-grain foods more often  A healthy meal plan should contain fiber  Fiber is the part of grains, fruits, and vegetables that is not broken down by your body  Whole-grain foods are healthy and provide extra fiber in your diet  Some examples of whole-grain foods are whole-wheat breads and pastas, oatmeal, brown rice, and bulgur  · Eat a variety of vegetables every day  Include dark, leafy greens such as spinach, kale, radha greens, and mustard greens  Eat yellow and orange vegetables such as carrots, sweet potatoes, and winter squash  · Eat a variety of fruits every day  Choose fresh or canned fruit (canned in its own juice or light syrup) instead of juice  Fruit juice has very little or no fiber  · Eat low-fat dairy foods  Drink fat-free (skim) milk or 1% milk  Eat fat-free yogurt and low-fat cottage cheese  Try low-fat cheeses such as mozzarella and other reduced-fat cheeses  · Choose meat and other protein foods that are low in fat  Choose beans or other legumes such as split peas or lentils  Choose fish, skinless poultry (chicken or turkey), or lean cuts of red meat (beef or pork)  Before you cook meat or poultry, cut off any visible fat  · Use less fat and oil  Try baking foods instead of frying them  Add less fat, such as margarine, sour cream, regular salad dressing and mayonnaise to foods  Eat fewer high-fat foods  Some examples of high-fat foods include french fries, doughnuts, ice cream, and cakes  · Eat fewer sweets  Limit foods and drinks that are high in sugar  This includes candy, cookies, regular soda, and sweetened drinks  Exercise:  Exercise at least 30 minutes per day on most days of the week  Some examples of exercise include walking, biking, dancing, and swimming  You can also fit in more physical activity by taking the stairs instead of the elevator or parking farther away from stores  Ask your healthcare provider about the best exercise plan for you  Alcohol Use and Your Health    Drinking too much can harm your health  Excessive alcohol use leads to about 88,000 death in the [place] each year, and shortens the life of those who diet by almost 30 years  Further, excessive drinking cost the economy $249 billion in 2010  Most excessive drinkers are not alcohol dependent  Excessive alcohol use has immediate effects that increase the risk of many harmful health conditions  These are most often the result of binge drinking  Over time, excessive alcohol use can lead to the development of chronic diseases and other series health problems  What is considered a "drink"? Excessive alcohol use includes:  · Binge Drinking: For women, 4 or more drinks consumed on one occasion   For men, 5 or more drinks consumed on one occasion  · Heavy Drinking: For women, 8 or more drinks per week  For men, 15 or more drinks per week  · Any alcohol used by pregnant women  · Any alcohol used by those under the age of 21 years    If you choose to drink, do so in moderation:  · Do not drink at all if you are under the age of 24, or if you are or may be pregnant, or have health problems that could be made worse by drinking    · For women, up to 1 drink per day  · For men, up to 2 drinks a day    No one should begin drinking or drink more frequently based on potential health benefits    Short-Term Health Risks:  · Injuries: motor vehicle crashes, falls, drownings, burns  · Violence: homicide, suicide, sexual assault, intimate partner violence  · Alcohol poisoning  · Reproductive health: risky sexual behaviors, unintended prengnacy, sexually transmitted diseases, miscarriage, stillbirth, fetal alcohol syndrome    Long-Term Health Risks:  · Chronic diseases: high blood pressure, heart disease, stroke, liver disease, digestive problems  · Cancers: breast, mouth and throat, liver, colon  · Learning and memory problems: dementia, poor school performance  · Mental health: depression, anxiety, insomnia  · Social problems: lost productivity, family problems, unemployment  · Alcohol dependence    For support and more information:  · Substance Abuse and [address] [address]  Web Address: https://[employer]/    · Alcoholics Anonymous        Web Address: http://www naaptol/    https://www cdc gov/alcohol/fact-sheets/alcohol-use htm  Narcotic (Opioid) Safety    Use narcotics safely:  · Take prescribed narcotics exactly as directed  · Do not give narcotics to others or take narcotics that belong to someone else  · Do not mix narcotics without medicines or alcohol  · Do not drive or operate heavy machinery after you take the narcotic  · Monitor for side effects and notify your healthcare provider if you experienced side effects such as nausea, sleepiness, itching, or trouble thinking clearly  Manage constipation:    Constipation is the most common side effect of narcotic medicine  Constipation is when you have hard, dry bowel movements, or you go longer than usual between bowel movements  Tell your healthcare provider about all changes in your bowel movements while you are taking narcotics  He or she may recommend laxative medicine to help you have a bowel movement  He or she may also change the kind of narcotic you are taking, or change when you take it  The following are more ways you can prevent or relieve constipation:    · Drink liquids as directed  You may need to drink extra liquids to help soften and move your bowels  Ask how much liquid to drink each day and which liquids are best for you  · Eat high-fiber foods  This may help decrease constipation by adding bulk to your bowel movements  High-fiber foods include fruits, vegetables, whole-grain breads and cereals, and beans  Your healthcare provider or dietitian can help you create a high-fiber meal plan  Your provider may also recommend a fiber supplement if you cannot get enough fiber from food  · Exercise regularly  Regular physical activity can help stimulate your intestines  Walking is a good exercise to prevent or relieve constipation  Ask which exercises are best for you  · Schedule a time each day to have a bowel movement  This may help train your body to have regular bowel movements  Bend forward while you are on the toilet to help move the bowel movement out  Sit on the toilet for at least 10 minutes, even if you do not have a bowel movement  Store narcotics safely:   · Store narcotics where others cannot easily get them  Keep them in a locked cabinet or secure area  Do not  keep them in a purse or other bag you carry with you  A person may be looking for something else and find the narcotics    · Make sure narcotics are stored out of the reach of children  A child can easily overdose on narcotics  Narcotics may look like candy to a small child  The best way to dispose of narcotics: The laws vary by country and area  In the [place], the best way is to return the narcotics through a take-back program  This program is offered by the [employer] ([employer])  The following are options for using the program:  · Take the narcotics to a THEA collection site  The site is often a law enforcement center  Call your local law enforcement center for scheduled take-back days in your area  You will be given information on where to go if the collection site is in a different location  · Take the narcotics to an approved pharmacy or hospital   A pharmacy or hospital may be set up as a collection site  You will need to ask if it is a THEA collection site if you were not directed there  A pharmacy or doctor's office may not be able to take back narcotics unless it is a THEA site  · Use a mail-back system  This means you are given containers to put the narcotics into  You will then mail them in the containers  · Use a take-back drop box  This is a place to leave the narcotics at any time  People and animals will not be able to get into the box  Your local law enforcement agency can tell you where to find a drop box in your area  Other ways to manage pain:   · Ask your healthcare provider about non-narcotic medicines to control pain  Nonprescription medicines include NSAIDs (such as ibuprofen) and acetaminophen  Prescription medicines include muscle relaxers, antidepressants, and steroids  · Pain may be managed without any medicines  Some ways to relieve pain include massage, aromatherapy, or meditation  Physical or occupational therapy may also help      For more information:   · Drug Enforcement Administration  [address] [place] [address]  [address]  Phone: 6- 564 - 929-7616  Web Address: [employer]  us[employer]j gov/drug_disposal/    · [address] and Drug Administration  [place] Shaila Michele , [address]  Phone: 1- 769 - 969-5555  Web Address: http://[employer]/     © Copyright [address] 2018 Information is for End User's use only and may not be sold, redistributed or otherwise used for commercial purposes   All illustrations and images included in CareNotes® are the copyrighted property of A D A M , Inc  or [address] [place] [address]

## 2021-10-11 ENCOUNTER — OFFICE VISIT (OUTPATIENT)
Dept: NEUROSURGERY | Facility: CLINIC | Age: 76
End: 2021-10-11
Payer: MEDICARE

## 2021-10-11 VITALS
TEMPERATURE: 97.2 F | RESPIRATION RATE: 16 BRPM | HEIGHT: 62 IN | SYSTOLIC BLOOD PRESSURE: 128 MMHG | HEART RATE: 89 BPM | DIASTOLIC BLOOD PRESSURE: 76 MMHG | BODY MASS INDEX: 25.58 KG/M2 | WEIGHT: 139 LBS

## 2021-10-11 DIAGNOSIS — M21.371 FOOT DROP, RIGHT: Primary | ICD-10-CM

## 2021-10-11 PROCEDURE — 99214 OFFICE O/P EST MOD 30 MIN: CPT | Performed by: NEUROLOGICAL SURGERY

## 2021-10-12 ENCOUNTER — TELEPHONE (OUTPATIENT)
Dept: PAIN MEDICINE | Facility: CLINIC | Age: 76
End: 2021-10-12

## 2021-10-12 ENCOUNTER — TELEPHONE (OUTPATIENT)
Dept: NEUROSURGERY | Facility: CLINIC | Age: 76
End: 2021-10-12

## 2021-10-20 ENCOUNTER — HOSPITAL ENCOUNTER (OUTPATIENT)
Dept: RADIOLOGY | Facility: CLINIC | Age: 76
Discharge: HOME | End: 2021-10-20
Attending: PHYSICAL MEDICINE & REHABILITATION
Payer: MEDICARE

## 2021-10-20 VITALS
DIASTOLIC BLOOD PRESSURE: 71 MMHG | RESPIRATION RATE: 18 BRPM | SYSTOLIC BLOOD PRESSURE: 126 MMHG | TEMPERATURE: 98 F | HEART RATE: 89 BPM | OXYGEN SATURATION: 98 %

## 2021-10-20 DIAGNOSIS — G57.01 PIRIFORMIS SYNDROME OF RIGHT SIDE: ICD-10-CM

## 2021-10-20 PROCEDURE — 77002 NEEDLE LOCALIZATION BY XRAY: CPT | Performed by: PHYSICAL MEDICINE & REHABILITATION

## 2021-10-20 PROCEDURE — 20552 NJX 1/MLT TRIGGER POINT 1/2: CPT | Performed by: PHYSICAL MEDICINE & REHABILITATION

## 2021-10-20 RX ORDER — METHYLPREDNISOLONE ACETATE 80 MG/ML
80 INJECTION, SUSPENSION INTRA-ARTICULAR; INTRALESIONAL; INTRAMUSCULAR; PARENTERAL; SOFT TISSUE ONCE
Status: COMPLETED | OUTPATIENT
Start: 2021-10-20 | End: 2021-10-20

## 2021-10-20 RX ORDER — 0.9 % SODIUM CHLORIDE 0.9 %
10 VIAL (ML) INJECTION ONCE
Status: COMPLETED | OUTPATIENT
Start: 2021-10-20 | End: 2021-10-20

## 2021-10-20 RX ORDER — BUPIVACAINE HCL/PF 2.5 MG/ML
10 VIAL (ML) INJECTION ONCE
Status: COMPLETED | OUTPATIENT
Start: 2021-10-20 | End: 2021-10-20

## 2021-10-20 RX ADMIN — SODIUM CHLORIDE 2 ML: 9 INJECTION, SOLUTION INTRAMUSCULAR; INTRAVENOUS; SUBCUTANEOUS at 10:31

## 2021-10-20 RX ADMIN — METHYLPREDNISOLONE ACETATE 80 MG: 80 INJECTION, SUSPENSION INTRA-ARTICULAR; INTRALESIONAL; INTRAMUSCULAR; PARENTERAL; SOFT TISSUE at 10:33

## 2021-10-20 RX ADMIN — Medication 2 ML: at 10:32

## 2021-10-20 RX ADMIN — IOHEXOL 1 ML: 300 INJECTION, SOLUTION INTRAVENOUS at 10:32

## 2021-10-20 RX ADMIN — BUPIVACAINE HYDROCHLORIDE 3 ML: 2.5 INJECTION, SOLUTION EPIDURAL; INFILTRATION; INTRACAUDAL at 10:33

## 2021-10-27 ENCOUNTER — TELEPHONE (OUTPATIENT)
Dept: PAIN MEDICINE | Facility: CLINIC | Age: 76
End: 2021-10-27

## 2021-11-01 ENCOUNTER — HOSPITAL ENCOUNTER (OUTPATIENT)
Dept: MRI IMAGING | Facility: CLINIC | Age: 76
Discharge: HOME | End: 2021-11-01
Payer: MEDICARE

## 2021-11-01 DIAGNOSIS — M21.371 FOOT DROP, RIGHT: ICD-10-CM

## 2021-11-01 PROCEDURE — G1004 CDSM NDSC: HCPCS

## 2021-11-01 PROCEDURE — 72158 MRI LUMBAR SPINE W/O & W/DYE: CPT

## 2021-11-01 PROCEDURE — A9585 GADOBUTROL INJECTION: HCPCS | Performed by: NURSE PRACTITIONER

## 2021-11-01 RX ADMIN — GADOBUTROL 6 ML: 604.72 INJECTION INTRAVENOUS at 09:48

## 2021-11-08 ENCOUNTER — OFFICE VISIT (OUTPATIENT)
Dept: NEUROSURGERY | Facility: CLINIC | Age: 76
End: 2021-11-08
Payer: MEDICARE

## 2021-11-08 VITALS
DIASTOLIC BLOOD PRESSURE: 72 MMHG | SYSTOLIC BLOOD PRESSURE: 118 MMHG | BODY MASS INDEX: 25.58 KG/M2 | TEMPERATURE: 97.3 F | RESPIRATION RATE: 18 BRPM | HEIGHT: 62 IN | HEART RATE: 86 BPM | WEIGHT: 139 LBS

## 2021-11-08 DIAGNOSIS — G57.01 PIRIFORMIS SYNDROME OF RIGHT SIDE: ICD-10-CM

## 2021-11-08 DIAGNOSIS — M51.16 LUMBAR DISC DISEASE WITH RADICULOPATHY: Primary | ICD-10-CM

## 2021-11-08 DIAGNOSIS — M21.371 RIGHT FOOT DROP: ICD-10-CM

## 2021-11-08 PROCEDURE — 99214 OFFICE O/P EST MOD 30 MIN: CPT | Performed by: NEUROLOGICAL SURGERY

## 2021-11-09 ENCOUNTER — TELEPHONE (OUTPATIENT)
Dept: PULMONOLOGY | Facility: CLINIC | Age: 76
End: 2021-11-09

## 2021-11-12 DIAGNOSIS — E03.9 ACQUIRED HYPOTHYROIDISM: ICD-10-CM

## 2021-11-12 RX ORDER — LEVOTHYROXINE SODIUM 0.1 MG/1
TABLET ORAL
Qty: 90 TABLET | Refills: 3 | Status: SHIPPED | OUTPATIENT
Start: 2021-11-12

## 2021-11-18 ENCOUNTER — OFFICE VISIT (OUTPATIENT)
Dept: DERMATOLOGY | Facility: CLINIC | Age: 76
End: 2021-11-18
Payer: MEDICARE

## 2021-11-18 DIAGNOSIS — L82.1 SEBORRHEIC KERATOSIS: Primary | ICD-10-CM

## 2021-11-18 DIAGNOSIS — Z13.89 SCREENING FOR SKIN CONDITION: ICD-10-CM

## 2021-11-18 PROCEDURE — 99213 OFFICE O/P EST LOW 20 MIN: CPT | Performed by: DERMATOLOGY

## 2021-11-23 ENCOUNTER — EVALUATION (OUTPATIENT)
Dept: PHYSICAL THERAPY | Facility: CLINIC | Age: 76
End: 2021-11-23
Payer: MEDICARE

## 2021-11-23 DIAGNOSIS — M51.16 LUMBAR DISC DISEASE WITH RADICULOPATHY: Primary | ICD-10-CM

## 2021-11-23 PROCEDURE — 97162 PT EVAL MOD COMPLEX 30 MIN: CPT | Performed by: PHYSICAL THERAPIST

## 2021-11-23 PROCEDURE — 97110 THERAPEUTIC EXERCISES: CPT | Performed by: PHYSICAL THERAPIST

## 2021-11-29 DIAGNOSIS — F11.20 CONTINUOUS OPIOID DEPENDENCE (HCC): ICD-10-CM

## 2021-11-29 DIAGNOSIS — M62.838 MUSCLE SPASM OF RIGHT LEG: ICD-10-CM

## 2021-11-29 RX ORDER — MORPHINE SULFATE 15 MG/1
15 TABLET ORAL EVERY 8 HOURS PRN
Qty: 90 TABLET | Refills: 0 | Status: SHIPPED | OUTPATIENT
Start: 2021-11-29 | End: 2022-02-11 | Stop reason: SDUPTHER

## 2021-11-29 RX ORDER — CYCLOBENZAPRINE HCL 5 MG
10 TABLET ORAL EVERY 8 HOURS PRN
Qty: 60 TABLET | Refills: 0 | Status: SHIPPED | OUTPATIENT
Start: 2021-11-29 | End: 2022-04-15 | Stop reason: SDUPTHER

## 2021-11-30 ENCOUNTER — OFFICE VISIT (OUTPATIENT)
Dept: PHYSICAL THERAPY | Facility: CLINIC | Age: 76
End: 2021-11-30
Payer: MEDICARE

## 2021-11-30 DIAGNOSIS — M51.16 LUMBAR DISC DISEASE WITH RADICULOPATHY: Primary | ICD-10-CM

## 2021-11-30 PROCEDURE — 97110 THERAPEUTIC EXERCISES: CPT | Performed by: PHYSICAL THERAPIST

## 2021-11-30 PROCEDURE — 97112 NEUROMUSCULAR REEDUCATION: CPT | Performed by: PHYSICAL THERAPIST

## 2021-12-02 ENCOUNTER — OFFICE VISIT (OUTPATIENT)
Dept: PHYSICAL THERAPY | Facility: CLINIC | Age: 76
End: 2021-12-02
Payer: MEDICARE

## 2021-12-02 DIAGNOSIS — M51.16 LUMBAR DISC DISEASE WITH RADICULOPATHY: Primary | ICD-10-CM

## 2021-12-02 PROCEDURE — 97110 THERAPEUTIC EXERCISES: CPT | Performed by: PHYSICAL THERAPIST

## 2021-12-07 ENCOUNTER — OFFICE VISIT (OUTPATIENT)
Dept: PHYSICAL THERAPY | Facility: CLINIC | Age: 76
End: 2021-12-07
Payer: MEDICARE

## 2021-12-07 DIAGNOSIS — M51.16 LUMBAR DISC DISEASE WITH RADICULOPATHY: Primary | ICD-10-CM

## 2021-12-07 PROCEDURE — 97110 THERAPEUTIC EXERCISES: CPT

## 2021-12-07 PROCEDURE — 97112 NEUROMUSCULAR REEDUCATION: CPT

## 2021-12-09 ENCOUNTER — OFFICE VISIT (OUTPATIENT)
Dept: PHYSICAL THERAPY | Facility: CLINIC | Age: 76
End: 2021-12-09
Payer: MEDICARE

## 2021-12-09 DIAGNOSIS — M51.16 LUMBAR DISC DISEASE WITH RADICULOPATHY: Primary | ICD-10-CM

## 2021-12-09 PROCEDURE — 97110 THERAPEUTIC EXERCISES: CPT | Performed by: PHYSICAL THERAPIST

## 2021-12-09 PROCEDURE — 97112 NEUROMUSCULAR REEDUCATION: CPT | Performed by: PHYSICAL THERAPIST

## 2021-12-14 ENCOUNTER — OFFICE VISIT (OUTPATIENT)
Dept: PHYSICAL THERAPY | Facility: CLINIC | Age: 76
End: 2021-12-14
Payer: MEDICARE

## 2021-12-14 DIAGNOSIS — M51.16 LUMBAR DISC DISEASE WITH RADICULOPATHY: Primary | ICD-10-CM

## 2021-12-14 PROCEDURE — 97110 THERAPEUTIC EXERCISES: CPT

## 2021-12-14 PROCEDURE — 97112 NEUROMUSCULAR REEDUCATION: CPT

## 2021-12-15 ENCOUNTER — IMMUNIZATIONS (OUTPATIENT)
Dept: FAMILY MEDICINE CLINIC | Facility: HOSPITAL | Age: 76
End: 2021-12-15

## 2021-12-15 DIAGNOSIS — Z23 ENCOUNTER FOR IMMUNIZATION: Primary | ICD-10-CM

## 2021-12-15 PROCEDURE — 91306 COVID-19 MODERNA VACC 0.25 ML BOOSTER: CPT

## 2021-12-15 PROCEDURE — 0064A COVID-19 MODERNA VACC 0.25 ML BOOSTER: CPT

## 2021-12-16 ENCOUNTER — OFFICE VISIT (OUTPATIENT)
Dept: PHYSICAL THERAPY | Facility: CLINIC | Age: 76
End: 2021-12-16
Payer: MEDICARE

## 2021-12-16 DIAGNOSIS — M51.16 LUMBAR DISC DISEASE WITH RADICULOPATHY: Primary | ICD-10-CM

## 2021-12-16 PROCEDURE — 97112 NEUROMUSCULAR REEDUCATION: CPT | Performed by: PHYSICAL THERAPIST

## 2021-12-16 PROCEDURE — 97110 THERAPEUTIC EXERCISES: CPT | Performed by: PHYSICAL THERAPIST

## 2021-12-21 ENCOUNTER — APPOINTMENT (OUTPATIENT)
Dept: PHYSICAL THERAPY | Facility: CLINIC | Age: 76
End: 2021-12-21
Payer: MEDICARE

## 2021-12-22 ENCOUNTER — OFFICE VISIT (OUTPATIENT)
Dept: PHYSICAL THERAPY | Facility: CLINIC | Age: 76
End: 2021-12-22
Payer: MEDICARE

## 2021-12-22 DIAGNOSIS — M51.16 LUMBAR DISC DISEASE WITH RADICULOPATHY: Primary | ICD-10-CM

## 2021-12-22 PROCEDURE — 97112 NEUROMUSCULAR REEDUCATION: CPT | Performed by: PHYSICAL THERAPIST

## 2021-12-22 PROCEDURE — 97110 THERAPEUTIC EXERCISES: CPT | Performed by: PHYSICAL THERAPIST

## 2021-12-23 ENCOUNTER — APPOINTMENT (OUTPATIENT)
Dept: PHYSICAL THERAPY | Facility: CLINIC | Age: 76
End: 2021-12-23
Payer: MEDICARE

## 2021-12-28 ENCOUNTER — OFFICE VISIT (OUTPATIENT)
Dept: PHYSICAL THERAPY | Facility: CLINIC | Age: 76
End: 2021-12-28
Payer: MEDICARE

## 2021-12-28 DIAGNOSIS — M51.16 LUMBAR DISC DISEASE WITH RADICULOPATHY: Primary | ICD-10-CM

## 2021-12-28 PROCEDURE — 97110 THERAPEUTIC EXERCISES: CPT

## 2021-12-28 PROCEDURE — 97112 NEUROMUSCULAR REEDUCATION: CPT

## 2021-12-30 ENCOUNTER — OFFICE VISIT (OUTPATIENT)
Dept: PHYSICAL THERAPY | Facility: CLINIC | Age: 76
End: 2021-12-30
Payer: MEDICARE

## 2021-12-30 DIAGNOSIS — M51.16 LUMBAR DISC DISEASE WITH RADICULOPATHY: Primary | ICD-10-CM

## 2021-12-30 PROCEDURE — 97110 THERAPEUTIC EXERCISES: CPT

## 2021-12-30 PROCEDURE — 97112 NEUROMUSCULAR REEDUCATION: CPT

## 2022-01-04 ENCOUNTER — OFFICE VISIT (OUTPATIENT)
Dept: PHYSICAL THERAPY | Facility: CLINIC | Age: 77
End: 2022-01-04
Payer: MEDICARE

## 2022-01-04 DIAGNOSIS — M51.16 LUMBAR DISC DISEASE WITH RADICULOPATHY: Primary | ICD-10-CM

## 2022-01-04 PROCEDURE — 97112 NEUROMUSCULAR REEDUCATION: CPT

## 2022-01-04 PROCEDURE — 97110 THERAPEUTIC EXERCISES: CPT

## 2022-01-04 NOTE — PROGRESS NOTES
Daily Note     Today's date: 2022  Patient name: Marquis Abreu  : 1945  MRN: [id number]  Referring provider: Ranjan Lora PA-C  Dx:   Encounter Diagnosis     ICD-10-CM    1  Lumbar disc disease with radiculopathy  M51 16                   Subjective: Patient reports she is able to drive better  Objective: See treatment diary below      Assessment: Patient continues to ambulate with improvement in the clinic, less hip flexion/steppage gait compensation  Proprioception is improving and patient was able to perform NRE without LOB  Pt would benefit from continued PT in order to improve R ankle DF and LE strength for improved function and safety during ADLs  Plan: Continue per plan of care  Precautions: fall risk, hx of lumbar discectomy (3/2021), MS    Access Code: [email or web address]  URL: [email or web address]      Manuals    R DF PROM 5' 8' 5' 8' 8' 8' 5' 5' 5' 5'                kinesiotape for DF Assist R AF       db AF AF                Neuro Re-Ed             Long sitting DF  np-no response           Biodex LOS L11 x3 Static 3x Static 3x Static x3 Static 3x L12 x3 L12 3x L12 3x  L12 x3 L12 x3   NBOS foam  10"x5           Seated rockerboard Stand x20  Ap/ml 20x ea Ap/ml x20 stand Ap/ml 20x ea Ap/ml x20 ea Ap/ml 20x ea Stand 20x ea  Stand x20 ea    Seated BAPS x20  20x ea x20 ea 10x ea x10 ea 20x ea AA 20x ea aa prn x20 ea aa prn x20   Supine ankle circles Seated EOT x20    20x ea x20 ea 20x ea AA prn x 20 AA prn x20 x20   Supine ankle ABC     1x x1 1x      Head turns on foam h/v x10 ea    10x ea x10 ea 10x ea 10x ea x10 ea x10 ea   Step ups on foam      x15  20x x20    Standing hip abd on foam YTB x20 ea     x15 20x ea 20x ea  x20 ea YTB x20   Alt marches on foam       20x ea 20x  x20    Ther Ex             Bike 5'  5' 5' 5' 5' 5' 5' 5' 5'   Seated TR  20x 20x    20x   EOT x10   Long sitting gastroc stretch             AA DF supine 2x10 mod A 2x10 10x 2x10  2x10 2x10 2x10 10 x 2 2x10 (mod A) 2x10 mod A   S/L inv & ev  2x10 ea 30x ea x30 ea         SLR flex 1# 2x10    20x x20 20x 20x  3x10    Supine inv/ev MRE 2x10    10x ea x10 ea 15x ea 10x 2  2x10 2x10   DF iso hold with tactile cues      2x3                    Ther Activity                                       Gait Training                                       Modalities

## 2022-01-06 ENCOUNTER — OFFICE VISIT (OUTPATIENT)
Dept: PHYSICAL THERAPY | Facility: CLINIC | Age: 77
End: 2022-01-06
Payer: MEDICARE

## 2022-01-06 DIAGNOSIS — M51.16 LUMBAR DISC DISEASE WITH RADICULOPATHY: Primary | ICD-10-CM

## 2022-01-06 PROCEDURE — 97112 NEUROMUSCULAR REEDUCATION: CPT | Performed by: PHYSICAL THERAPIST

## 2022-01-06 PROCEDURE — 97110 THERAPEUTIC EXERCISES: CPT | Performed by: PHYSICAL THERAPIST

## 2022-01-06 NOTE — PROGRESS NOTES
Daily Note     Today's date: 2022  Patient name: Afia Aguilar  : 1945  MRN: [id number]  Referring provider: Orlando Melendez PA-C  Dx:   Encounter Diagnosis     ICD-10-CM    1  Lumbar disc disease with radiculopathy  M51 16        Start Time:   Stop Time: 1700  Total time in clinic (min): 38 minutes    Subjective: Pt reports she is feeling like she is getting much better  Objective: See treatment diary below      Assessment: Pt has improved DF activation in supine, with only mod AA to achieve full range  Her inv/ev strength continues to be decreased with MRE  Her balance on uneven surfaces has improved, and is able to maintain balance independently  Plan: Progress as tolerated  Precautions: fall risk, hx of lumbar discectomy (3/2021), MS    Access Code: [email or web address]  URL: [email or web address]      Manuals    R DF PROM 5' 5' 5' 8' 8' 8' 5' 5' 5' 5'                kinesiotape for DF Assist R AF LN      db AF AF                Neuro Re-Ed             Long sitting DF             Biodex LOS L11 x3 L11 3x Static 3x Static x3 Static 3x L12 x3 L12 3x L12 3x  L12 x3 L12 x3   NBOS foam             Seated rockerboard Stand x20 stand Ap/ml 20x Ap/ml 20x ea Ap/ml x20 stand Ap/ml 20x ea Ap/ml x20 ea Ap/ml 20x ea Stand 20x ea  Stand x20 ea    Seated BAPS x20 20x 20x ea x20 ea 10x ea x10 ea 20x ea AA 20x ea aa prn x20 ea aa prn x20   Supine ankle circles Seated EOT x20 20x   20x ea x20 ea 20x ea AA prn x 20 AA prn x20 x20   Supine ankle ABC     1x x1 1x      Head turns on foam h/v x10 ea 10x ea   10x ea x10 ea 10x ea 10x ea x10 ea x10 ea   Step ups on foam      x15  20x x20    Standing hip abd on foam YTB x20 ea YTB 20x ea    x15 20x ea 20x ea  x20 ea YTB x20   Alt marches on foam       20x ea 20x  x20    Ther Ex             Bike 5' 5' 5' 5' 5' 5' 5' 5' 5' 5'   Seated TR   20x    20x   EOT x10   Long sitting gastroc stretch AA DF supine 2x10 mod A 2x10 AA 10x 2x10  2x10 2x10 2x10 10 x 2 2x10 (mod A) 2x10 mod A   S/L inv & ev   30x ea x30 ea         SLR flex 1# 2x10    20x x20 20x 20x  3x10    Supine inv/ev MRE 2x10 2x10   10x ea x10 ea 15x ea 10x 2  2x10 2x10   DF iso hold with tactile cues      2x3                    Ther Activity                                       Gait Training                                       Modalities

## 2022-01-11 ENCOUNTER — OFFICE VISIT (OUTPATIENT)
Dept: PHYSICAL THERAPY | Facility: CLINIC | Age: 77
End: 2022-01-11
Payer: MEDICARE

## 2022-01-11 DIAGNOSIS — M51.16 LUMBAR DISC DISEASE WITH RADICULOPATHY: Primary | ICD-10-CM

## 2022-01-11 PROCEDURE — 97112 NEUROMUSCULAR REEDUCATION: CPT

## 2022-01-11 PROCEDURE — 97110 THERAPEUTIC EXERCISES: CPT

## 2022-01-11 NOTE — PROGRESS NOTES
Daily Note     Today's date: 2022  Patient name: Mary Ann Gallegos  : 1945  MRN: [id number]  Referring provider: Kalyn Peter PA-C  Dx:   Encounter Diagnosis     ICD-10-CM    1  Lumbar disc disease with radiculopathy  M51 16                   Subjective: Patient states ambulation is continuing to improve  Objective: See treatment diary below      Assessment: Circumducts R hip to compensate for lack of DF during heel strike following stepping over obstacles  Therapist assist provided in order to achieve appropriate amount of DF  Desire remains slow secondary to patient aware of safety  Hand rail assist provided at times to remain balanced  Plan: Continue per plan of care  Precautions: fall risk, hx of lumbar discectomy (3/2021), MS    Access Code: [email or web address]  URL: [email or web address]      Manuals    R DF PROM 5' 5' 5' 8' 8' 8' 5' 5' 5' 5'                kinesiotape for DF Assist R AF LN AF     db AF AF                Neuro Re-Ed             Long sitting DF             Biodex LOS L11 x3 L11 3x L11 x3 Static x3 Static 3x L12 x3 L12 3x L12 3x  L12 x3 L12 x3   NBOS foam             Seated rockerboard Stand x20 stand Ap/ml 20x Stand ap/ml x30 Ap/ml x20 stand Ap/ml 20x ea Ap/ml x20 ea Ap/ml 20x ea Stand 20x ea  Stand x20 ea    Seated BAPS x20 20x  x20 ea 10x ea x10 ea 20x ea AA 20x ea aa prn x20 ea aa prn x20   Supine ankle circles Seated EOT x20 20x x20  20x ea x20 ea 20x ea AA prn x 20 AA prn x20 x20   Supine ankle ABC     1x x1 1x      Head turns on foam h/v x10 ea 10x ea   10x ea x10 ea 10x ea 10x ea x10 ea x10 ea   Step ups on foam   4" + foam x20   x15  20x x20    Standing hip abd on foam YTB x20 ea YTB 20x ea RTB x20 ea   x15 20x ea 20x ea  x20 ea YTB x20   Alt marches on foam       20x ea 20x  x20    hurdles   3 laps          Ther Ex             Bike 5' 5' 5' 5' 5' 5' 5' 5' 5' 5'   Seated TR       20x   EOT x10 Long sitting gastroc stretch             AA DF supine 2x10 mod A 2x10 AA 2x10 AA mod 2x10  2x10 2x10 2x10 10 x 2 2x10 (mod A) 2x10 mod A   S/L inv & ev    x30 ea         SLR flex 1# 2x10  1# 2x10 (x2)  20x x20 20x 20x  3x10    Supine inv/ev MRE 2x10 2x10 2x10  10x ea x10 ea 15x ea 10x 2  2x10 2x10   DF iso hold with tactile cues      2x3                    Ther Activity                                       Gait Training                                       Modalities

## 2022-01-13 ENCOUNTER — OFFICE VISIT (OUTPATIENT)
Dept: PHYSICAL THERAPY | Facility: CLINIC | Age: 77
End: 2022-01-13
Payer: MEDICARE

## 2022-01-13 DIAGNOSIS — M51.16 LUMBAR DISC DISEASE WITH RADICULOPATHY: Primary | ICD-10-CM

## 2022-01-13 PROCEDURE — 97110 THERAPEUTIC EXERCISES: CPT

## 2022-01-13 PROCEDURE — 97112 NEUROMUSCULAR REEDUCATION: CPT

## 2022-01-13 NOTE — PROGRESS NOTES
Daily Note     Today's date: 2022  Patient name: Lucia Morales  : 1945  MRN: [id number]  Referring provider: Marvetta Goodpasture, PA-C  Dx:   Encounter Diagnosis     ICD-10-CM    1  Lumbar disc disease with radiculopathy  M51 16                   Subjective: Patient feels she is walking better today  Objective: See treatment diary below      Assessment: Patient requires therapist assist in order to perform heel strike at this time  She is still unable to perform AROM DF through full ROM in gravity eliminated position  Decreased proprioception remains and she is challenged with EC on foam         Plan: Continue per plan of care  Precautions: fall risk, hx of lumbar discectomy (3/2021), MS    Access Code: [email or web address]  URL: [email or web address]      Manuals    R DF PROM 5' 5' 5' 5' 8' 8' 5' 5' 5' 5'                kinesiotape for DF Assist R AF LN AF     db AF AF                Neuro Re-Ed             Long sitting DF             Biodex LOS L11 x3 L11 3x L11 x3 L11 x3 Static 3x L12 x3 L12 3x L12 3x  L12 x3 L12 x3   NBOS foam             Seated rockerboard Stand x20 stand Ap/ml 20x Stand ap/ml x30  Ap/ml 20x ea Ap/ml x20 ea Ap/ml 20x ea Stand 20x ea  Stand x20 ea    Seated BAPS x20 20x   10x ea x10 ea 20x ea AA 20x ea aa prn x20 ea aa prn x20   Supine ankle circles Seated EOT x20 20x x20 x20 20x ea x20 ea 20x ea AA prn x 20 AA prn x20 x20   Supine ankle ABC     1x x1 1x      Head turns on foam h/v x10 ea 10x ea   10x ea x10 ea 10x ea 10x ea x10 ea x10 ea   Step ups on foam   4" + foam x20 4"+foam x20  x15  20x x20    Standing hip abd on foam YTB x20 ea YTB 20x ea RTB x20 ea RTB x20 ea  x15 20x ea 20x ea  x20 ea YTB x20   Alt marches on foam       20x ea 20x  x20    hurdles   3 laps 3 laps         EC NBOS on foam    15"x4         Ther Ex             Bike 5' 5' 5' 5' 5' 5' 5' 5' 5' 5'   Seated TR       20x   EOT x10   Long sitting gastroc stretch             AA DF supine 2x10 mod A 2x10 AA 2x10 AA mod 2x10 AA mod 2x10 2x10 2x10 10 x 2 2x10 (mod A) 2x10 mod A   S/L inv & ev             SLR flex 1# 2x10  1# 2x10 (x2) 1# 2x10 (x2) 20x x20 20x 20x  3x10    Supine inv/ev MRE 2x10 2x10 2x10 2x10 10x ea x10 ea 15x ea 10x 2  2x10 2x10   DF iso hold with tactile cues      2x3                    Ther Activity                                       Gait Training                                       Modalities

## 2022-01-18 ENCOUNTER — OFFICE VISIT (OUTPATIENT)
Dept: PHYSICAL THERAPY | Facility: CLINIC | Age: 77
End: 2022-01-18
Payer: MEDICARE

## 2022-01-18 DIAGNOSIS — M51.16 LUMBAR DISC DISEASE WITH RADICULOPATHY: Primary | ICD-10-CM

## 2022-01-18 PROCEDURE — 97110 THERAPEUTIC EXERCISES: CPT | Performed by: PHYSICAL THERAPIST

## 2022-01-18 NOTE — PROGRESS NOTES
PT Re-Evaluation     Today's date: 2022  Patient name: Erkia Olmedo  : 1945  MRN: [id number]  Referring provider: Larry Mosqueda PA-C  Dx:   Encounter Diagnosis     ICD-10-CM    1  Lumbar disc disease with radiculopathy  M51 16        Start Time: 930  Stop Time: 4804  Total time in clinic (min): 44 minutes    Assessment  Assessment details: Pt has made significant progress since starting physical therapy  Her total DF motion more than doubled from 5* of total motion to 13* of total motion  She continues to demonstrate steppage gait, but does not require as much hip flexion as at IE  Pt would benefit from continued physical therapy in order to improve DF AROM, gait quality and speed, and overall function  Impairments: abnormal gait, abnormal or restricted ROM, activity intolerance, impaired balance, impaired physical strength and pain with function  Functional limitations: walking, stairs  Symptom irritability: lowUnderstanding of Dx/Px/POC: good   Prognosis: good    Goals  STG: 3 weeks  1  Pt will demonstrate independence with HEP  met  2  Pt will improve R ankle AROM by at least 10%  met  3  Pt will improve R ankle strength by at least 1/2 grade  In progress    LT weeks  1  Pt will improve TUG time to 15s or less without AD  In progress  2  Pt will improve R ankle DF by 20% from IE to improve walking  In progress  3  Pt will report improved walking endurance   In progress      Plan  Patient would benefit from: skilled physical therapy  Planned modality interventions: cryotherapy and thermotherapy: hydrocollator packs  Planned therapy interventions: therapeutic exercise, therapeutic activities, stretching, strengthening, patient education, neuromuscular re-education, massage, manual therapy, balance, gait training and home exercise program  Frequency: 2x week  Duration in weeks: 6  Treatment plan discussed with: patient        Subjective Evaluation    History of Present Illness  Mechanism of injury: Pt reports she feels she has made a lot of progress since starting physical therapy  She reports she feels she is walking better, but the muscle in the front of her leg continues to get tired very easily  She had some soreness following last session, but as she kept practicing the exercises this improved  Recurrent probem    Quality of life: fair    Pain  At best pain ratin  At worst pain ratin  Quality: dull ache, discomfort and pressure  Relieving factors: medications  Aggravating factors: walking and stair climbing  Progression: improved    Treatments  Previous treatment: physical therapy and injection treatment  Current treatment: medication  Patient Goals  Patient goals for therapy: decreased pain, increased motion, improved balance, independence with ADLs/IADLs and increased strength          Objective     Neurological Testing     Reflexes   Left   Patellar (L4): normal (2+)  Achilles (S1): trace (1+)    Right   Patellar (L4): normal (2+)  Achilles (S1): trace (1+)    Active Range of Motion   Left Ankle/Foot   Inversion: 18 degrees   Eversion: 12 degrees     Right Ankle/Foot   Dorsiflexion (ke): -32 degrees   Inversion: 20 degrees   Eversion: 12 degrees     Additional Active Range of Motion Details  R DF in supine with knee ext, pt able to achieve 13* of total motion (-45* to -32*)    Strength/Myotome Testing     Left Ankle/Foot   Normal strength    Right Ankle/Foot   Dorsiflexion: 2-  Plantar flexion: 4+  Inversion: 4  Eversion: 4    Additional Strength Details  PF compensation noted with inversion and eversion MMT    General Comments: Ankle/Foot Comments   TU 2s no AD (significant foot drop resulting in steppage gait)    Precautions: fall risk, hx of lumbar discectomy (3/2021), MS    Access Code: [email or web address]  URL: [email or web address]      Manuals  New tx diary    R DF PROM 5' 5' 5' 5'  8' 5' 5' 5' 5' kinesiotape for DF Assist R AF LN AF     db AF AF                Neuro Re-Ed             Long sitting DF             Biodex LOS L11 x3 L11 3x L11 x3 L11 x3  L12 x3 L12 3x L12 3x  L12 x3 L12 x3   NBOS foam             Seated rockerboard Stand x20 stand Ap/ml 20x Stand ap/ml x30   Ap/ml x20 ea Ap/ml 20x ea Stand 20x ea  Stand x20 ea    Seated BAPS x20 20x    x10 ea 20x ea AA 20x ea aa prn x20 ea aa prn x20   Supine ankle circles Seated EOT x20 20x x20 x20  x20 ea 20x ea AA prn x 20 AA prn x20 x20   Supine ankle ABC      x1 1x      Head turns on foam h/v x10 ea 10x ea    x10 ea 10x ea 10x ea x10 ea x10 ea   Step ups on foam   4" + foam x20 4"+foam x20  x15  20x x20    Standing hip abd on foam YTB x20 ea YTB 20x ea RTB x20 ea RTB x20 ea  x15 20x ea 20x ea  x20 ea YTB x20   Alt marches on foam       20x ea 20x  x20    hurdles   3 laps 3 laps         EC NBOS on foam    15"x4         Ther Ex             Bike 5' 5' 5' 5'  5' 5' 5' 5' 5'   Seated TR       20x   EOT x10   Long sitting gastroc stretch             AA DF supine 2x10 mod A 2x10 AA 2x10 AA mod 2x10 AA mod  2x10 2x10 10 x 2 2x10 (mod A) 2x10 mod A   S/L inv & ev             SLR flex 1# 2x10  1# 2x10 (x2) 1# 2x10 (x2)  x20 20x 20x  3x10    Supine inv/ev MRE 2x10 2x10 2x10 2x10  x10 ea 15x ea 10x 2  2x10 2x10   DF iso hold with tactile cues      2x3                    Ther Activity                                       Gait Training                                       Modalities                                            Precautions: fall risk, hx of lumbar discectomy (3/2021), MS    Access Code: [email or web address]  URL: [email or web address]      Manuals 1/18            R DF PROM 8'            Ktape for DF 3'                                      Neuro Re-Ed             Biodex LOS  L11 3x            Heel tap cone stack 4x                                                                             Ther Ex             Bike 5'            AA DF in supine 2x10 Inv/ev MRE 2x10            Seated TR Pt def                                                                Ther Activity                                       Gait Training                                       Modalities

## 2022-01-20 ENCOUNTER — OFFICE VISIT (OUTPATIENT)
Dept: PHYSICAL THERAPY | Facility: CLINIC | Age: 77
End: 2022-01-20
Payer: MEDICARE

## 2022-01-20 DIAGNOSIS — M51.16 LUMBAR DISC DISEASE WITH RADICULOPATHY: Primary | ICD-10-CM

## 2022-01-20 PROCEDURE — 97110 THERAPEUTIC EXERCISES: CPT | Performed by: PHYSICAL THERAPIST

## 2022-01-20 PROCEDURE — 97112 NEUROMUSCULAR REEDUCATION: CPT | Performed by: PHYSICAL THERAPIST

## 2022-01-20 NOTE — PROGRESS NOTES
Daily Note     Today's date: 2022  Patient name: Pedro Potter  : 1945  MRN: [id number]  Referring provider: Rudy Young PA-C  Dx:   Encounter Diagnosis     ICD-10-CM    1  Lumbar disc disease with radiculopathy  M51 16        Start Time: 1013  Stop Time: 1043  Total time in clinic (min): 30 minutes    Subjective: Pt reports she had more soreness than usual after last session  Objective: See treatment diary below      Assessment: Pt able to perform seated TR with good activation today, whereas last visit the muscle was too fatigued to even get a trace contraction  Difficulty maintaining balance with heel tapping stack of cones due to increased posterior weight shift to compensate for lack of DF  Muscular fatigue of the anterior tib post session  Plan: Progress as tolerated  Precautions: fall risk, hx of lumbar discectomy (3/2021), MS    Access Code: [email or web address]  URL: [email or web address]      Manuals            R DF PROM 8' 8'           Ktape for DF 3'                                      Neuro Re-Ed             Biodex LOS  L11 3x L11 3x           Heel tap cone stack 4x 10x           NBOS foam c head turns  H/v 10x ea           Step up and over foam  10x                                                  Ther Ex             Bike 5'            AA DF in supine 2x10 2x10           Inv/ev MRE 2x10 2x10           Seated TR Pt def 2x10                                                               Ther Activity             Walking over hurdles  3 laps                        Gait Training                                       Modalities

## 2022-01-21 ENCOUNTER — OFFICE VISIT (OUTPATIENT)
Dept: INTERNAL MEDICINE CLINIC | Facility: CLINIC | Age: 77
End: 2022-01-21
Payer: MEDICARE

## 2022-01-21 VITALS
OXYGEN SATURATION: 95 % | RESPIRATION RATE: 18 BRPM | WEIGHT: 140 LBS | DIASTOLIC BLOOD PRESSURE: 64 MMHG | HEIGHT: 62 IN | TEMPERATURE: 97.4 F | SYSTOLIC BLOOD PRESSURE: 120 MMHG | BODY MASS INDEX: 25.76 KG/M2 | HEART RATE: 90 BPM

## 2022-01-21 DIAGNOSIS — M51.16 LUMBAR DISC DISEASE WITH RADICULOPATHY: Primary | ICD-10-CM

## 2022-01-21 DIAGNOSIS — E03.9 ACQUIRED HYPOTHYROIDISM: Chronic | ICD-10-CM

## 2022-01-21 DIAGNOSIS — G35 MULTIPLE SCLEROSIS (HCC): ICD-10-CM

## 2022-01-21 DIAGNOSIS — F11.20 CONTINUOUS OPIOID DEPENDENCE (HCC): ICD-10-CM

## 2022-01-21 DIAGNOSIS — J44.9 CHRONIC OBSTRUCTIVE PULMONARY DISEASE, UNSPECIFIED COPD TYPE (HCC): ICD-10-CM

## 2022-01-21 PROCEDURE — 99214 OFFICE O/P EST MOD 30 MIN: CPT | Performed by: INTERNAL MEDICINE

## 2022-01-21 NOTE — PROGRESS NOTES
[facility] - [facility]    NAME: Erika Olmedo  AGE: 76 y o  SEX: female  : 1945     DATE: 2022     Assessment and Plan:     1  Lumbar disc disease with radiculopathy    MRI L-spine on 2021 showed:  "1   Evolving treatment related changes with developing enhancing scar tissue in the right L4-5 facetectomy bed in the region of prior postoperative fluid  2   Resolving but persistent inferior endplate edema in the L3 vertebra without progressive bony destruction or vertebral collapse  3   Reactive, type I Modic endplate changes at the L4-5 level versus postsurgical changes  4   No new disc herniation "    Saw neurosurgery who did not recommend any surgery  Right foot drop likely permanent  Going to PT and noticing benefit with that  2  Continuous opioid dependence (HCC)    On morphine  [place] PDMP was [place]  Only uses it when she really needs it  Stable  3  Multiple sclerosis ([facility] [address] )    Follows with neurology  Not currently on any medications  - Basic metabolic panel; Future  - CBC; Future    4  Chronic obstructive pulmonary disease (HCC)    Stable  Does not require any inhalers  Not interested in quitting smoking  5  Acquired hypothyroidism    Monitor thyroid function before next appt  Continue levothyroxine     - TSH, 3rd generation; Future        Return in about 18 weeks (around 2022) for Follow-up  Chief Complaint:     Chief Complaint   Patient presents with    refill for meds        History of Present Illness:       Patient with underlying lumbar degenerative disc disease  MRI showed a lot of scar tissue  Has chronic right foot drop  Not a candidate for surgery  States she has been really encouraged by her progress with physical therapy  She has noticed some improvements in ROM with her right foot  Uses morphine only when she really needs it  Does not need a refill yet  Also feels her stomach has been better using bentyl       Review of Systems:     Review of Systems   Constitutional: Negative  Respiratory: Negative  Cardiovascular: Negative  Gastrointestinal: Negative  Musculoskeletal: Positive for back pain  Neurological: Positive for weakness (R foot drop)  Objective:     /64 (BP Location: Left arm, Patient Position: Sitting, Cuff Size: Standard)   Pulse 90   Temp (!) 97 4 °F (36 3 °C) (Tympanic)   Resp 18   Ht 5' 2" (1 575 m)   Wt 63 5 kg (140 lb)   SpO2 95%   BMI 25 61 kg/m²     Physical Exam  Constitutional:       General: She is not in acute distress  Appearance: She is not ill-appearing  Cardiovascular:      Rate and Rhythm: Normal rate and regular rhythm  Heart sounds: No murmur heard  Pulmonary:      Effort: Pulmonary effort is normal  No respiratory distress  Breath sounds: No wheezing  Abdominal:      General: Bowel sounds are normal  There is no distension  Tenderness: There is no abdominal tenderness  Musculoskeletal:      Right lower leg: No edema  Left lower leg: No edema  Neurological:      Mental Status: She is alert  Motor: Weakness (R foot drop) present           Jaida Villanueva DO  [facility]

## 2022-01-21 NOTE — PATIENT INSTRUCTIONS
Back Pain, Ambulatory Care   GENERAL INFORMATION:   Back pain  is common  You may feel sore or stiff on one or both sides of your back  The pain may spread to your buttocks or thighs  Back pain may be caused by an injury, lack of exercise, or obesity  Repeated bending, lifting, twisting, or lifting heavy items can also cause back pain  Seek immediate care for the following symptoms:   · Pain, numbness, or weakness in one or both legs    · Pain that is so severe, you cannot walk    · Unable to control your urine or bowel movements    · Severe back pain with chest pain    · Severe back pain, nausea, and vomiting    · Severe back pain that spreads to your side or genital area  Treatment for back pain  may include any of the following:  · NSAIDs  help decrease swelling and pain or fever  This medicine is available with or without a doctor's order  NSAIDs can cause stomach bleeding or kidney problems in certain people  If you take blood thinner medicine, always ask your healthcare provider if NSAIDs are safe for you  Always read the medicine label and follow directions  · Acetaminophen  decreases pain  It is available without a doctor's order  Ask how much to take and how often to take it  Follow directions  Acetaminophen can cause liver damage if not taken correctly  · Prescription pain medicine  may be given  Ask your healthcare provider how to take this medicine safely  Manage your back pain:   · Apply ice  on your back for 15 to 20 minutes every hour or as directed  Use an ice pack, or put crushed ice in a plastic bag  Cover it with a towel  Ice helps decrease swelling and pain  · Apply heat  on your back for 20 to 30 minutes every 2 hours for as many days as directed  Heat helps decrease pain and muscle spasms  You can alternate ice and heat  · Stay active  as much as you can without causing more pain  Bed rest could make your back pain worse  Avoid heavy lifting until your pain is gone    Follow up with your healthcare provider as directed:  Write down your questions so you remember to ask them during your visits  CARE AGREEMENT:   You have the right to help plan your care  Learn about your health condition and how it may be treated  Discuss treatment options with your caregivers to decide what care you want to receive  You always have the right to refuse treatment  The above information is an  only  It is not intended as medical advice for individual conditions or treatments  Talk to your doctor, nurse or pharmacist before following any medical regimen to see if it is safe and effective for you  © 2014 [address] is for End User's use only and may not be sold, redistributed or otherwise used for commercial purposes  All illustrations and images included in CareNotes® are the copyrighted property of [employer]  or [employer]

## 2022-01-25 ENCOUNTER — OFFICE VISIT (OUTPATIENT)
Dept: PHYSICAL THERAPY | Facility: CLINIC | Age: 77
End: 2022-01-25
Payer: MEDICARE

## 2022-01-25 DIAGNOSIS — M51.16 LUMBAR DISC DISEASE WITH RADICULOPATHY: Primary | ICD-10-CM

## 2022-01-25 PROCEDURE — 97110 THERAPEUTIC EXERCISES: CPT

## 2022-01-25 PROCEDURE — 97112 NEUROMUSCULAR REEDUCATION: CPT

## 2022-01-25 NOTE — PROGRESS NOTES
Daily Note     Today's date: 2022  Patient name: Efren Montana  : 1945  MRN: [id number]  Referring provider: Marilynn Meier PA-C  Dx:   Encounter Diagnosis     ICD-10-CM    1  Lumbar disc disease with radiculopathy  M51 16                   Subjective: Patient reports continued improvements in gait with PT       Objective: See treatment diary below      Assessment: Utilizes hand rail to recover from LOB during proprioceptive NRE  Patient is able to ambulate into clinic with less foot slap but this increases when fatigued  Minimal resistance was provided during toe extension isometrics  Pt would benefit from continued PT in order to improve R foot drop for improved function and safety during ADLs  Plan: Continue per plan of care  Precautions: fall risk, hx of lumbar discectomy (3/2021), MS    Access Code: [email or web address]  URL: [email or web address]      Manuals           R DF PROM 8' 8' 8'          Ktape for DF 3'                                      Neuro Re-Ed             Biodex LOS  L11 3x L11 3x L10 x3          Heel tap cone stack 4x 10x Bio 2x10          NBOS foam c head turns  H/v 10x ea H/v x10 ea          Step up and over foam  10x 2x10          Toe ext iso   3"x10                                    Ther Ex             Bike 5'  5'          AA DF in supine 2x10 2x10 2x10          Inv/ev MRE 2x10 2x10 2x10          Seated TR Pt def 2x10 x10                                                              Ther Activity             Walking over hurdles  3 laps 3 laps                       Gait Training                                       Modalities

## 2022-01-27 ENCOUNTER — OFFICE VISIT (OUTPATIENT)
Dept: PHYSICAL THERAPY | Facility: CLINIC | Age: 77
End: 2022-01-27
Payer: MEDICARE

## 2022-01-27 DIAGNOSIS — M51.16 LUMBAR DISC DISEASE WITH RADICULOPATHY: Primary | ICD-10-CM

## 2022-01-27 PROCEDURE — 97112 NEUROMUSCULAR REEDUCATION: CPT

## 2022-01-27 PROCEDURE — 97110 THERAPEUTIC EXERCISES: CPT

## 2022-01-27 NOTE — PROGRESS NOTES
Daily Note     Today's date: 2022  Patient name: Donna Cadena  : 1945  MRN: [id number]  Referring provider: Genia Nguyen PA-C  Dx:   Encounter Diagnosis     ICD-10-CM    1  Lumbar disc disease with radiculopathy  M51 16                   Subjective: Patient reports she is fatigued from not sleeping well last night  Objective: See treatment diary below      Assessment: Patient is able to utilize DF to recover from posterior LOB and did not experience any LOB throughout session today  Difficulty with DF to achieve heel strike when stepping over hurdles but this was performed at end of session and may be due to fatigue  Plan: Continue per plan of care  Precautions: fall risk, hx of lumbar discectomy (3/2021), MS    Access Code: [email or web address]  URL: [email or web address]      Manuals          R DF PROM 8' 8' 8' 8'         Ktape for DF 3'                                      Neuro Re-Ed             Biodex LOS  L11 3x L11 3x L10 x3 L10 x3         Heel tap cone stack 4x 10x Bio 2x10 Bio 2x10         NBOS foam c head turns  H/v 10x ea H/v x10 ea H/v x10 ea         Step up and over foam  10x 2x10 2x10         Toe ext iso   3"x10 3"x10                                   Ther Ex             Bike 5'  5' 5'         AA DF in supine 2x10 2x10 2x10 2x10         Inv/ev MRE 2x10 2x10 2x10 2x10         Seated TR Pt def 2x10 x10 x10                                                             Ther Activity             Walking over hurdles  3 laps 3 laps 3 laps HHA                      Gait Training                                       Modalities

## 2022-02-01 ENCOUNTER — OFFICE VISIT (OUTPATIENT)
Dept: PHYSICAL THERAPY | Facility: CLINIC | Age: 77
End: 2022-02-01
Payer: MEDICARE

## 2022-02-01 DIAGNOSIS — M51.16 LUMBAR DISC DISEASE WITH RADICULOPATHY: Primary | ICD-10-CM

## 2022-02-01 PROCEDURE — 97112 NEUROMUSCULAR REEDUCATION: CPT

## 2022-02-01 PROCEDURE — 97140 MANUAL THERAPY 1/> REGIONS: CPT

## 2022-02-01 PROCEDURE — 97110 THERAPEUTIC EXERCISES: CPT

## 2022-02-01 NOTE — PROGRESS NOTES
Daily Note     Today's date: 2022  Patient name: Rosanna Castellon  : 1945  MRN: [id number]  Referring provider: Andria Pettit PA-C  Dx:   Encounter Diagnosis     ICD-10-CM    1  Lumbar disc disease with radiculopathy  M51 16        Start Time: 1016  Stop Time: 1100  Total time in clinic (min): 44 minutes    Subjective: Patient reports she is fatigued from not sleeping well last night  Objective: See treatment diary below      Assessment: Patient demonstates some DF control this session during foot clearance activities IE hurtles  Mild LOB d/t decreased foot clearance during gait this session, but patient was able to self correct  Patient would benefit from continued PT  Plan: Continue per plan of care  Precautions: fall risk, hx of lumbar discectomy (3/2021), MS    Access Code: [email or web address]  URL: [email or web address]      Manuals         R DF PROM 8' 8' 8' 8' 8'        Ktape for DF 3'                                      Neuro Re-Ed             Biodex LOS  L11 3x L11 3x L10 x3 L10 x3 L10x3        Heel tap cone stack 4x 10x Bio 2x10 Bio 2x10 2x10        NBOS foam c head turns  H/v 10x ea H/v x10 ea H/v x10 ea H/v x10 ea        Step up and over foam  10x 2x10 2x10 2x10        Toe ext iso   3"x10 3"x10 3"x15                                  Ther Ex             Bike 5'  5' 5' 5'        AA DF in supine 2x10 2x10 2x10 2x10 2x10        Inv/ev MRE 2x10 2x10 2x10 2x10 2x10        Seated TR Pt def 2x10 x10 x10 x10                                                            Ther Activity             Walking over hurdles  3 laps 3 laps 3 laps HHA 3 laps                     Gait Training                                       Modalities

## 2022-02-03 ENCOUNTER — OFFICE VISIT (OUTPATIENT)
Dept: PHYSICAL THERAPY | Facility: CLINIC | Age: 77
End: 2022-02-03
Payer: MEDICARE

## 2022-02-03 DIAGNOSIS — M51.16 LUMBAR DISC DISEASE WITH RADICULOPATHY: Primary | ICD-10-CM

## 2022-02-03 PROCEDURE — 97110 THERAPEUTIC EXERCISES: CPT

## 2022-02-03 PROCEDURE — 97112 NEUROMUSCULAR REEDUCATION: CPT

## 2022-02-03 PROCEDURE — 97530 THERAPEUTIC ACTIVITIES: CPT

## 2022-02-03 NOTE — PROGRESS NOTES
Daily Note     Today's date: 2/3/2022  Patient name: Efren Montana  : 1945  MRN: [id number]  Referring provider: Marilynn Meier PA-C  Dx:   Encounter Diagnosis     ICD-10-CM    1  Lumbar disc disease with radiculopathy  M51 16                   Subjective: Pt reports she would like to be able to descend stairs with greater ease and also has difficulty walking on incline/decline  Objective: See treatment diary below      Assessment: Patient able to periodically  SLS on firm surface without finger tip assist on hand rail  She does present with lateral LOB  Decreased RLE motor control remains  She was able to descend 4" step while lowering with RLE with minimal difficulties  Compensated with toe out during descend possibly due to lack of DF mobility  Improvements in heel strike during gait and hurdles are present, however significant fatigue occurs  Some exercises not performed today secondary to fatigue  Plan: Continue per plan of care  Precautions: fall risk, hx of lumbar discectomy (3/2021), MS    Access Code: [email or web address]  URL: [email or web address]      Manuals 1/18 1/20 1/25 1/27 2/1 2/3       R DF PROM 8' 8' 8' 8' 8' 8'       Ktape for DF 3'                                      Neuro Re-Ed             Biodex LOS  L11 3x L11 3x L10 x3 L10 x3 L10x3 L10 x3       Heel tap cone stack 4x 10x Bio 2x10 Bio 2x10 2x10 Bio 2x10       NBOS foam c head turns  H/v 10x ea H/v x10 ea H/v x10 ea H/v x10 ea        Step up and over foam  10x 2x10 2x10 2x10        Toe ext iso   3"x10 3"x10 3"x15 3"x10       SLS on firm      FTA 10"x5                    Ther Ex             Bike 5'  5' 5' 5' 5'       AA DF in supine 2x10 2x10 2x10 2x10 2x10 3x10 no AA       Inv/ev MRE 2x10 2x10 2x10 2x10 2x10 2x10       Seated TR Pt def 2x10 x10 x10 x10 X10, x6                                                           Ther Activity             Walking over hurdles  3 laps 3 laps 3 laps HHA 3 laps 3 laps       Step down      4"x12       Gait Training                                       Modalities

## 2022-02-08 ENCOUNTER — OFFICE VISIT (OUTPATIENT)
Dept: PHYSICAL THERAPY | Facility: CLINIC | Age: 77
End: 2022-02-08
Payer: MEDICARE

## 2022-02-08 DIAGNOSIS — M51.16 LUMBAR DISC DISEASE WITH RADICULOPATHY: Primary | ICD-10-CM

## 2022-02-08 PROCEDURE — 97110 THERAPEUTIC EXERCISES: CPT | Performed by: PHYSICAL THERAPIST

## 2022-02-08 PROCEDURE — 97112 NEUROMUSCULAR REEDUCATION: CPT | Performed by: PHYSICAL THERAPIST

## 2022-02-08 NOTE — PROGRESS NOTES
Daily Note     Today's date: 2022  Patient name: Dillon Foster  : 1945  MRN: [id number]  Referring provider: Rich Arteaga PA-C  Dx:   Encounter Diagnosis     ICD-10-CM    1  Lumbar disc disease with radiculopathy  M51 16        Start Time: 1014          Subjective: Pt reports she feels like she is using her foot more, but that is also making her have some more pain  Objective: See treatment diary below      Assessment: Because pt's DF activation is largely improving, estim tried again today with multiple pad placements and settings but no activation noted  Point stimulator tried as well to the anterior tib muscle belly, which was unsuccessful as well  Pt continues to get muscular fatigue easily, which limits session  Plan: Progress as tolerated  Precautions: fall risk, hx of lumbar discectomy (3/2021), MS    Access Code: [email or web address]  URL: [email or web address]      Manuals 1/18 1/20 1/25 1/27 2/1 2/3 2/8      R DF PROM 8' 8' 8' 8' 8' 8' 5'      Ktape for DF 3'                                      Neuro Re-Ed             Biodex LOS  L11 3x L11 3x L10 x3 L10 x3 L10x3 L10 x3 L10 3x      Heel tap cone stack 4x 10x Bio 2x10 Bio 2x10 2x10 Bio 2x10 2x10      NBOS foam c head turns  H/v 10x ea H/v x10 ea H/v x10 ea H/v x10 ea        Step up and over foam  10x 2x10 2x10 2x10        Toe ext iso   3"x10 3"x10 3"x15 3"x10 10x      SLS on firm      FTA 10"x5                    Ther Ex             Bike 5'  5' 5' 5' 5' 5'      AA DF in supine 2x10 2x10 2x10 2x10 2x10 3x10 no AA 2x10 AA      Inv/ev MRE 2x10 2x10 2x10 2x10 2x10 2x10 2x10      Seated TR Pt def 2x10 x10 x10 x10 X10, x6 10x                                                          Ther Activity             Walking over hurdles  3 laps 3 laps 3 laps HHA 3 laps 3 laps       Step down      4"x12       Gait Training                                       Modalities

## 2022-02-10 ENCOUNTER — OFFICE VISIT (OUTPATIENT)
Dept: PHYSICAL THERAPY | Facility: CLINIC | Age: 77
End: 2022-02-10
Payer: MEDICARE

## 2022-02-10 DIAGNOSIS — M51.16 LUMBAR DISC DISEASE WITH RADICULOPATHY: Primary | ICD-10-CM

## 2022-02-10 PROCEDURE — 97112 NEUROMUSCULAR REEDUCATION: CPT

## 2022-02-10 PROCEDURE — 97110 THERAPEUTIC EXERCISES: CPT

## 2022-02-10 NOTE — PROGRESS NOTES
Daily Note     Today's date: 2/10/2022  Patient name: Dillon Foster  : 1945  MRN: [id number]  Referring provider: Rich Arteaga PA-C  Dx:   Encounter Diagnosis     ICD-10-CM    1  Lumbar disc disease with radiculopathy  M51 16                   Subjective: Pt states her calf becomes sore from consciously trying to walk with improved gait  Objective: See treatment diary below      Assessment: Patient is ambulating with less R foot slap today and increased RLE motor control  Patient demonstrates improved endurance while performing ankle DF in supine position  Pt still requires tactile cuing to facilitate ankle DF when stepping over obstacles  Plan: Continue per plan of care  Precautions: fall risk, hx of lumbar discectomy (3/2021), MS    Access Code: [email or web address]  URL: [email or web address]      Manuals 1/18 1/20 1/25 1/27 2/1 2/3 2/8 2/10     R DF PROM 8' 8' 8' 8' 8' 8' 5' 6'     Ktape for DF 3'                                      Neuro Re-Ed             Biodex LOS  L11 3x L11 3x L10 x3 L10 x3 L10x3 L10 x3 L10 3x L9 x3     Heel tap cone stack 4x 10x Bio 2x10 Bio 2x10 2x10 Bio 2x10 2x10      NBOS foam c head turns  H/v 10x ea H/v x10 ea H/v x10 ea H/v x10 ea        Step up and over foam  10x 2x10 2x10 2x10        Toe ext iso   3"x10 3"x10 3"x15 3"x10 10x x10     SLS on firm      FTA 10"x5  FTA 10"x5                  Ther Ex             Bike 5'  5' 5' 5' 5' 5' 5'     AA DF in supine 2x10 2x10 2x10 2x10 2x10 3x10 no AA 2x10 AA 2x10 no AA     Inv/ev MRE 2x10 2x10 2x10 2x10 2x10 2x10 2x10 2x10     Seated TR Pt def 2x10 x10 x10 x10 X10, x6 10x x15                                                         Ther Activity             Walking over hurdles  3 laps 3 laps 3 laps HHA 3 laps 3 laps  3 laps     Step down      4"x12       Gait Training                                       Modalities

## 2022-02-11 DIAGNOSIS — F11.20 CONTINUOUS OPIOID DEPENDENCE (HCC): ICD-10-CM

## 2022-02-11 DIAGNOSIS — R10.9 STOMACH PAIN: ICD-10-CM

## 2022-02-11 RX ORDER — DICYCLOMINE HYDROCHLORIDE 10 MG/1
10 CAPSULE ORAL
Qty: 360 CAPSULE | Refills: 0 | Status: SHIPPED | OUTPATIENT
Start: 2022-02-11 | End: 2022-05-11

## 2022-02-11 NOTE — TELEPHONE ENCOUNTER
Contacted [facility] [employer] at [phone], I spoke with Yvon Soto from their PA department that asked me a few questions and approved the patients prescription coverage for the medication  Case number for approval is: [id number]    They will also fax over a letter of approval for our record

## 2022-02-11 NOTE — TELEPHONE ENCOUNTER
Tried completing the patients Pa for dicyclomine (BENTYL) 10 mg capsules but an error message populated: [facility] is not able to process this request through [address] [place] [address] Avenue, please contact the plan at [phone] or fax in request to [phone]

## 2022-02-12 RX ORDER — MORPHINE SULFATE 15 MG/1
15 TABLET ORAL EVERY 8 HOURS PRN
Qty: 90 TABLET | Refills: 0 | Status: SHIPPED | OUTPATIENT
Start: 2022-02-12 | End: 2022-04-15 | Stop reason: SDUPTHER

## 2022-02-12 NOTE — TELEPHONE ENCOUNTER
Controlled Substance Review    [place] or [place]  reviewed: No red flags were identified; safe to proceed with prescription      PDMP Review       Value Time User    PDMP Reviewed  Yes 2/12/2022  2:23 PM Sivan Carpio DO

## 2022-02-15 ENCOUNTER — OFFICE VISIT (OUTPATIENT)
Dept: PHYSICAL THERAPY | Facility: CLINIC | Age: 77
End: 2022-02-15
Payer: MEDICARE

## 2022-02-15 DIAGNOSIS — M51.16 LUMBAR DISC DISEASE WITH RADICULOPATHY: Primary | ICD-10-CM

## 2022-02-15 PROCEDURE — 97530 THERAPEUTIC ACTIVITIES: CPT

## 2022-02-15 PROCEDURE — 97110 THERAPEUTIC EXERCISES: CPT

## 2022-02-15 NOTE — PROGRESS NOTES
Daily Note     Today's date: 2/15/2022  Patient name: Charlette Toney  : 1945  MRN: [id number]  Referring provider: Marybeth Hammans, PA-C  Dx:   Encounter Diagnosis     ICD-10-CM    1  Lumbar disc disease with radiculopathy  M51 16                   Subjective: Pt reports she still has difficulty descending stair to get out of house and relies heavily on handrail assistance  Objective: See treatment diary below      Assessment: Able to descend 4" step with good control  Will progress this nv  Heel strike is performed with improved control but still lacks appropriate DF during gait  Total arc of DF motion is 14* on R ankle  Plan: Continue per plan of care  Precautions: fall risk, hx of lumbar discectomy (3/2021), MS    Access Code: [email or web address]  URL: [email or web address]      Manuals 1/18 1/20 1/25 1/27 2/1 2/3 2/8 2/10 2/15    R DF PROM 8' 8' 8' 8' 8' 8' 5' 6' 6'    Ktape for DF 3'                                      Neuro Re-Ed             Biodex LOS  L11 3x L11 3x L10 x3 L10 x3 L10x3 L10 x3 L10 3x L9 x3 L9 x3    Heel tap cone stack 4x 10x Bio 2x10 Bio 2x10 2x10 Bio 2x10 2x10      NBOS foam c head turns  H/v 10x ea H/v x10 ea H/v x10 ea H/v x10 ea        Step up and over foam  10x 2x10 2x10 2x10        Toe ext iso   3"x10 3"x10 3"x15 3"x10 10x x10     SLS on firm      FTA 10"x5  FTA 10"x5 FTA 10"x5                 Ther Ex             Bike 5'  5' 5' 5' 5' 5' 5' 5;    AA DF in supine 2x10 2x10 2x10 2x10 2x10 3x10 no AA 2x10 AA 2x10 no AA 2x10 no AA    Inv/ev MRE 2x10 2x10 2x10 2x10 2x10 2x10 2x10 2x10 2x10    Seated TR Pt def 2x10 x10 x10 x10 X10, x6 10x x15 x20                                                        Ther Activity             Walking over hurdles  3 laps 3 laps 3 laps HHA 3 laps 3 laps  3 laps 4 laps    Step down      4"x12   4"x15    Gait Training                                       Modalities

## 2022-02-17 ENCOUNTER — OFFICE VISIT (OUTPATIENT)
Dept: PHYSICAL THERAPY | Facility: CLINIC | Age: 77
End: 2022-02-17
Payer: MEDICARE

## 2022-02-17 DIAGNOSIS — M51.16 LUMBAR DISC DISEASE WITH RADICULOPATHY: Primary | ICD-10-CM

## 2022-02-17 PROCEDURE — 97110 THERAPEUTIC EXERCISES: CPT

## 2022-02-17 PROCEDURE — 97112 NEUROMUSCULAR REEDUCATION: CPT

## 2022-02-17 NOTE — PROGRESS NOTES
Daily Note     Today's date: 2022  Patient name: Christianne Head  : 1945  MRN: [id number]  Referring provider: Ninoska Huang PA-C  Dx:   Encounter Diagnosis     ICD-10-CM    1  Lumbar disc disease with radiculopathy  M51 16                   Subjective: Pt states RLE was painful yesterday but is feeling better today  Objective: See treatment diary below      Assessment: Able to perform gravity eliminated DF with resistance band  Patient does fatigue quickly with continued repetition of DF  Able to descend increased step height with minimal reliance on hand rail for assistance  Patient still does lack RLE motor control and proprioception  Plan: Continue per plan of care  Precautions: fall risk, hx of lumbar discectomy (3/2021), MS    Access Code: [email or web address]  URL: [email or web address]      Manuals 1/18 1/20 1/25 1/27 2/1 2/3 2/8 2/10 2/15 2/17   R DF PROM 8' 8' 8' 8' 8' 8' 5' 6' 6' 6'   Ktape for DF 3'                                      Neuro Re-Ed             Biodex LOS  L11 3x L11 3x L10 x3 L10 x3 L10x3 L10 x3 L10 3x L9 x3 L9 x3 L9 x3   Heel tap cone stack 4x 10x Bio 2x10 Bio 2x10 2x10 Bio 2x10 2x10      NBOS foam c head turns  H/v 10x ea H/v x10 ea H/v x10 ea H/v x10 ea        Step up and over foam  10x 2x10 2x10 2x10        Toe ext iso   3"x10 3"x10 3"x15 3"x10 10x x10     SLS on firm      FTA 10"x5  FTA 10"x5 FTA 10"x5 FTA 15"x5                Ther Ex             Bike 5'  5' 5' 5' 5' 5' 5' 5; 5'   AA DF in supine 2x10 2x10 2x10 2x10 2x10 3x10 no AA 2x10 AA 2x10 no AA 2x10 no AA 2x10 no AA   Inv/ev MRE 2x10 2x10 2x10 2x10 2x10 2x10 2x10 2x10 2x10 2x10   Seated TR Pt def 2x10 x10 x10 x10 X10, x6 10x x15 x20 x20   S/l DF on board          YTB x20                                          Ther Activity             Walking over hurdles  3 laps 3 laps 3 laps HHA 3 laps 3 laps  3 laps 4 laps    Step down      4"x12   4"x15 6" 2x10   Step up          6" 2x10   Gait Training                                       Modalities

## 2022-02-22 ENCOUNTER — OFFICE VISIT (OUTPATIENT)
Dept: PHYSICAL THERAPY | Facility: CLINIC | Age: 77
End: 2022-02-22
Payer: MEDICARE

## 2022-02-22 DIAGNOSIS — M51.16 LUMBAR DISC DISEASE WITH RADICULOPATHY: Primary | ICD-10-CM

## 2022-02-22 PROCEDURE — 97110 THERAPEUTIC EXERCISES: CPT

## 2022-02-22 PROCEDURE — 97112 NEUROMUSCULAR REEDUCATION: CPT

## 2022-02-22 NOTE — PROGRESS NOTES
Daily Note     Today's date: 2022  Patient name: Tom Beltre  : 1945  MRN: [id number]  Referring provider: Anil Bullock PA-C  Dx:   Encounter Diagnosis     ICD-10-CM    1  Lumbar disc disease with radiculopathy  M51 16                   Subjective: Pt states she was sore through the weekend after last session  She does report she felt she was walking normally 2 days ago when her soreness subsided  Objective: See treatment diary below      Assessment: Improved motor control when descending stairs  Patient is able to complete charted exercises without c/o pain or discomfort  Pt not progressed due to soreness last visit  Plan: Continue per plan of care  Precautions: fall risk, hx of lumbar discectomy (3/2021), MS    Access Code: [email or web address]  URL: [email or web address]      Manuals 2/22 1/20 1/25 1/27 2/1 2/3 2/8 2/10 2/15 2/17   R DF PROM 6' 8' 8' 8' 8' 8' 5' 6' 6' 6'   Ktape for DF                                       Neuro Re-Ed             Biodex LOS  L9 x3 L11 3x L10 x3 L10 x3 L10x3 L10 x3 L10 3x L9 x3 L9 x3 L9 x3   Heel tap cone stack  10x Bio 2x10 Bio 2x10 2x10 Bio 2x10 2x10      NBOS foam c head turns  H/v 10x ea H/v x10 ea H/v x10 ea H/v x10 ea        Step up and over foam  10x 2x10 2x10 2x10        Toe ext iso   3"x10 3"x10 3"x15 3"x10 10x x10     SLS on firm FTA 15"x5     FTA 10"x5  FTA 10"x5 FTA 10"x5 FTA 15"x5                Ther Ex             Bike 5'  5' 5' 5' 5' 5' 5' 5; 5'   AA DF in supine 2x10 no AA 2x10 2x10 2x10 2x10 3x10 no AA 2x10 AA 2x10 no AA 2x10 no AA 2x10 no AA   Inv/ev MRE 2x10 2x10 2x10 2x10 2x10 2x10 2x10 2x10 2x10 2x10   Seated TR x20 2x10 x10 x10 x10 X10, x6 10x x15 x20 x20   S/l DF on board YTB x20         YTB x20                                          Ther Activity             Walking over hurdles  3 laps 3 laps 3 laps HHA 3 laps 3 laps  3 laps 4 laps    Step down 6" 2x10     4"x12   4"x15 6" 2x10   Step up 6" 2x10         6" 2x10 Gait Training                                       Modalities

## 2022-02-24 ENCOUNTER — OFFICE VISIT (OUTPATIENT)
Dept: PHYSICAL THERAPY | Facility: CLINIC | Age: 77
End: 2022-02-24
Payer: MEDICARE

## 2022-02-24 DIAGNOSIS — M51.16 LUMBAR DISC DISEASE WITH RADICULOPATHY: Primary | ICD-10-CM

## 2022-02-24 PROCEDURE — 97112 NEUROMUSCULAR REEDUCATION: CPT

## 2022-02-24 PROCEDURE — 97110 THERAPEUTIC EXERCISES: CPT

## 2022-02-24 NOTE — PROGRESS NOTES
Daily Note     Today's date: 2022  Patient name: Efren Montana  : 1945  MRN: [id number]  Referring provider: Marilynn Meier PA-C  Dx:   Encounter Diagnosis     ICD-10-CM    1  Lumbar disc disease with radiculopathy  M51 16                   Subjective: Pt states she has been experiencing R LE muscle cramping over the last day  Objective: See treatment diary below      Assessment: Pt did experience dorsal foot pain with DF PROM but this was abolished with continued repetition  Pt was able to perform reactive balance exercises without LOB  Close supervision provided for safety  Patient continues to demonstrate increased motor control when ascending and descending step  Plan: Continue per plan of care  Precautions: fall risk, hx of lumbar discectomy (3/2021), MS    Access Code: [email or web address]  URL: [email or web address]      Manuals 2/22 2/24 1/25 1/27 2/1 2/3 2/8 2/10 2/15 2/17   R DF PROM 6' 6' 8' 8' 8' 8' 5' 6' 6' 6'   Ktape for DF                                       Neuro Re-Ed             Biodex LOS  L9 x3 L9x3 L10 x3 L10 x3 L10x3 L10 x3 L10 3x L9 x3 L9 x3 L9 x3   Heel tap cone stack   Bio 2x10 Bio 2x10 2x10 Bio 2x10 2x10      NBOS foam c head turns   H/v x10 ea H/v x10 ea H/v x10 ea        Step up and over foam   2x10 2x10 2x10        Toe ext iso   3"x10 3"x10 3"x15 3"x10 10x x10     SLS on firm FTA 15"x5 FTA 15"x5    FTA 10"x5  FTA 10"x5 FTA 10"x5 FTA 15"x5   rebounder toss  x20           Ther Ex             Bike 5' 5' 5' 5' 5' 5' 5' 5' 5; 5'   AA DF in supine 2x10 no AA 2x10 2x10 2x10 2x10 3x10 no AA 2x10 AA 2x10 no AA 2x10 no AA 2x10 no AA   Inv/ev MRE 2x10 2x1 2x10 2x10 2x10 2x10 2x10 2x10 2x10 2x10   Seated TR x20 x20 x10 x10 x10 X10, x6 10x x15 x20 x20   S/l DF on board YTB x20 YTB x20        YTB x20                                          Ther Activity             Walking over hurdles   3 laps 3 laps HHA 3 laps 3 laps  3 laps 4 laps    Step down 6" 2x10 6" 2x10    4"x12   4"x15 6" 2x10   Step up 6" 2x10 6" 2x10        6" 2x10   Gait Training                                       Modalities

## 2022-02-28 ENCOUNTER — OFFICE VISIT (OUTPATIENT)
Dept: PHYSICAL THERAPY | Facility: CLINIC | Age: 77
End: 2022-02-28
Payer: MEDICARE

## 2022-02-28 DIAGNOSIS — M51.16 LUMBAR DISC DISEASE WITH RADICULOPATHY: Primary | ICD-10-CM

## 2022-02-28 PROCEDURE — 97112 NEUROMUSCULAR REEDUCATION: CPT

## 2022-02-28 PROCEDURE — 97110 THERAPEUTIC EXERCISES: CPT

## 2022-02-28 NOTE — PROGRESS NOTES
Daily Note     Today's date: 2022  Patient name: Brionna Arevalo  : 1945  MRN: [id number]  Referring provider: Ulysses Baldwin PA-C  Dx:   Encounter Diagnosis     ICD-10-CM    1  Lumbar disc disease with radiculopathy  M51 16                   Subjective: Pt states she was in pain over the weekend but today she feels she is able to walk better  Objective: See treatment diary below      Assessment: Patient is able to recover without fall when losing balance during dynamic activities  She is able to rely on hand rail less while navigating stairs now  Pt continues to progress well with PT sessions  She would benefit from continued PT in order to improve RLE strength for improved balance and safety during daily activities  Plan: Continue per plan of care  Precautions: fall risk, hx of lumbar discectomy (3/2021), MS    Access Code: [email or web address]  URL: [email or web address]      Manuals 2/22 2/24 2/28 1/27 2/1 2/3 2/8 2/10 2/15 2/17   R DF PROM 6' 6' 5' 8' 8' 8' 5' 6' 6' 6'   Ktape for DF                                       Neuro Re-Ed             Biodex LOS  L9 x3 L9x3 L9x3 L10 x3 L10x3 L10 x3 L10 3x L9 x3 L9 x3 L9 x3   Heel tap cone stack    Bio 2x10 2x10 Bio 2x10 2x10      NBOS foam c head turns    H/v x10 ea H/v x10 ea        Step up and over foam    2x10 2x10        Toe ext iso    3"x10 3"x15 3"x10 10x x10     SLS on firm FTA 15"x5 FTA 15"x5 FTA 10-15"x5   FTA 10"x5  FTA 10"x5 FTA 10"x5 FTA 15"x5   rebounder toss  x20 x20          Ther Ex             Bike 5' 5' 5' 5' 5' 5' 5' 5' 5; 5'   AA DF in supine 2x10 no AA 2x10 YTB 3x5 2x10 2x10 3x10 no AA 2x10 AA 2x10 no AA 2x10 no AA 2x10 no AA   Inv/ev MRE 2x10 2x1 2x10 2x10 2x10 2x10 2x10 2x10 2x10 2x10   Seated TR x20 x20 x20 x10 x10 X10, x6 10x x15 x20 x20   S/l DF on board YTB x20 YTB x20        YTB x20                                          Ther Activity             Walking over hurdles    3 laps HHA 3 laps 3 laps  3 laps 4 laps    Step down 6" 2x10 6" 2x10 6"x20   4"x12   4"x15 6" 2x10   Step up 6" 2x10 6" 2x10 6"x20       6" 2x10   Gait Training                                       Modalities

## 2022-03-03 ENCOUNTER — OFFICE VISIT (OUTPATIENT)
Dept: PULMONOLOGY | Facility: CLINIC | Age: 77
End: 2022-03-03
Payer: MEDICARE

## 2022-03-03 ENCOUNTER — OFFICE VISIT (OUTPATIENT)
Dept: PHYSICAL THERAPY | Facility: CLINIC | Age: 77
End: 2022-03-03
Payer: MEDICARE

## 2022-03-03 VITALS
TEMPERATURE: 97.9 F | DIASTOLIC BLOOD PRESSURE: 80 MMHG | SYSTOLIC BLOOD PRESSURE: 104 MMHG | BODY MASS INDEX: 25.58 KG/M2 | HEIGHT: 62 IN | OXYGEN SATURATION: 97 % | HEART RATE: 87 BPM | WEIGHT: 139 LBS

## 2022-03-03 DIAGNOSIS — F17.200 NICOTINE DEPENDENCE WITH CURRENT USE: ICD-10-CM

## 2022-03-03 DIAGNOSIS — Z72.0 TOBACCO ABUSE: ICD-10-CM

## 2022-03-03 DIAGNOSIS — J44.9 COPD WITH CHRONIC BRONCHITIS (HCC): ICD-10-CM

## 2022-03-03 DIAGNOSIS — G47.33 OSA ON CPAP: ICD-10-CM

## 2022-03-03 DIAGNOSIS — F17.200 CURRENT EVERY DAY SMOKER: ICD-10-CM

## 2022-03-03 DIAGNOSIS — M51.16 LUMBAR DISC DISEASE WITH RADICULOPATHY: Primary | ICD-10-CM

## 2022-03-03 DIAGNOSIS — Z99.89 OSA ON CPAP: ICD-10-CM

## 2022-03-03 DIAGNOSIS — R91.1 LUNG NODULE: Primary | ICD-10-CM

## 2022-03-03 PROCEDURE — 99214 OFFICE O/P EST MOD 30 MIN: CPT | Performed by: INTERNAL MEDICINE

## 2022-03-03 PROCEDURE — 97530 THERAPEUTIC ACTIVITIES: CPT | Performed by: PHYSICAL THERAPIST

## 2022-03-03 PROCEDURE — 97110 THERAPEUTIC EXERCISES: CPT | Performed by: PHYSICAL THERAPIST

## 2022-03-03 PROCEDURE — 97112 NEUROMUSCULAR REEDUCATION: CPT | Performed by: PHYSICAL THERAPIST

## 2022-03-03 NOTE — PROGRESS NOTES
PT Re-Evaluation     Today's date: 3/3/2022  Patient name: Carolann Cui  : 1945  MRN: [id number]  Referring provider: Jameson Elizondo PA-C  Dx:   Encounter Diagnosis     ICD-10-CM    1  Lumbar disc disease with radiculopathy  M51 16        Start Time: 2553  Stop Time: 1007  Total time in clinic (min): 38 minutes    Assessment  Assessment details: Since last re-evaluation about 6 weeks ago, pt has had a significant improvement in her quality of gait  She no longer demonstrates a steppage gait pattern and is able to DF during swing to clear her foot  She improved her OKC DF AROM by 3*, her TUG score improved, and her pain has gone down as well  Pt would benefit from continued physical therapy in order to continue improvement with DF and gait quality and speed  Impairments: abnormal gait, abnormal or restricted ROM, activity intolerance, impaired balance, impaired physical strength and pain with function  Functional limitations: walking, stairs  Symptom irritability: lowUnderstanding of Dx/Px/POC: good   Prognosis: good    Goals  STG: 3 weeks  1  Pt will demonstrate independence with HEP  met  2  Pt will improve R ankle AROM by at least 10%  met  3  Pt will improve R ankle strength by at least 1/2 grade  In progress    LT weeks  1  Pt will improve TUG time to 15s or less without AD  In progress  2  Pt will improve R ankle DF by 20% from IE to improve walking  In progress  3  Pt will report improved walking endurance   In progress      Plan  Patient would benefit from: skilled physical therapy  Planned modality interventions: cryotherapy and thermotherapy: hydrocollator packs  Planned therapy interventions: therapeutic exercise, therapeutic activities, stretching, strengthening, patient education, neuromuscular re-education, massage, manual therapy, balance, gait training and home exercise program  Frequency: 2x week  Duration in weeks: 6  Treatment plan discussed with: patient        Subjective Evaluation    History of Present Illness  Mechanism of injury: Pt reports she is better in so many ways  She reports she does not have fear with driving and she does not have to think about her movements with driving  She is able to go up and down stairs regularly now, and has to concentrate a lot less on her form while doing this  She still is unable to step down from her deck to her walkway, because it does not have a railing  Recurrent probem    Quality of life: fair    Pain  At best pain ratin  At worst pain ratin  Quality: dull ache, discomfort and pressure  Relieving factors: medications  Aggravating factors: walking and stair climbing  Progression: improved    Treatments  Previous treatment: physical therapy and injection treatment  Current treatment: medication  Patient Goals  Patient goals for therapy: decreased pain, increased motion, improved balance, independence with ADLs/IADLs and increased strength          Objective     Neurological Testing     Reflexes   Left   Patellar (L4): normal (2+)  Achilles (S1): trace (1+)    Right   Patellar (L4): normal (2+)  Achilles (S1): trace (1+)    Active Range of Motion     Right Ankle/Foot   Dorsiflexion (ke): -29 degrees     Additional Active Range of Motion Details  R DF in supine with knee ext, pt able to achieve 16* of total motion (-45* to -29*)    Strength/Myotome Testing     Left Ankle/Foot   Normal strength    Right Ankle/Foot   Dorsiflexion: 2-  Plantar flexion: 4+  Inversion: 4  Eversion: 4    Additional Strength Details  PF compensation noted with inversion and eversion MMT    General Comments: Ankle/Foot Comments   TU 3s no AD (minimal foot drop noted)    Precautions: fall risk, hx of lumbar discectomy (3/2021), MS    Access Code: [email or web address]  URL: [email or web address]      Manuals 2/22 2/24 2/28 3/3 2/1 2/3 2/8 2/10 2/15 2/17   R DF PROM 6' 6' 5' 5' 8' 8' 5' 6' 6' 6'   Ktape for DF Neuro Re-Ed             Biodex LOS  L9 x3 L9x3 L9x3 L9 3x L10x3 L10 x3 L10 3x L9 x3 L9 x3 L9 x3   Heel tap cone stack     2x10 Bio 2x10 2x10      NBOS foam c head turns     H/v x10 ea        Step up and over foam     2x10        Toe ext iso     3"x15 3"x10 10x x10     SLS on firm FTA 15"x5 FTA 15"x5 FTA 10-15"x5 FTA 15"x5  FTA 10"x5  FTA 10"x5 FTA 10"x5 FTA 15"x5   rebounder toss  x20 x20          Ther Ex             Bike 5' 5' 5' 5' 5' 5' 5' 5' 5; 5'   AA DF in supine 2x10 no AA 2x10 YTB 3x5 10x, YTB 2x5 2x10 3x10 no AA 2x10 AA 2x10 no AA 2x10 no AA 2x10 no AA   Inv/ev MRE 2x10 2x1 2x10 2x10 2x10 2x10 2x10 2x10 2x10 2x10   Seated TR x20 x20 x20 20x x10 X10, x6 10x x15 x20 x20   S/l DF on board YTB x20 YTB x20        YTB x20                                          Ther Activity             Walking over hurdles     3 laps 3 laps  3 laps 4 laps    Step down 6" 2x10 6" 2x10 6"x20 6"x20  4"x12   4"x15 6" 2x10   Step up 6" 2x10 6" 2x10 6"x20 6"x20      6" 2x10   Gait Training                                       Modalities

## 2022-03-03 NOTE — PROGRESS NOTES
Assessment/Plan:   Diagnoses and all orders for this visit:    Lung nodule  -     CT chest without contrast; Future    Nicotine dependence with current use    COPD with chronic bronchitis ([address] )    Current every day smoker    CHOCO on CPAP    Tobacco abuse        PFTs in 2017 showed mild obstructive air flow limitation, currently not on any bronchodilators   Smoking cessation discussed with the patient she states she is currently not interested in smoking cessation continues to smoke at least a pack a day she states  No recent episodes of any exacerbations of her COPD no recent ER visits  CT lung screening done September of 2021 with stable small lung nodules, largest being 7 x 3 right middle lobe lung nodule which has been stable from the prior CT of the chest   Repeat CT of the chest in 1 year from then given active smoking history  Currently not on any bronchodilators   CHOCO on CPAP continue with current settings  Cleaning of the supplies and change of CPAP supplies discussed   Vaccinations up-to-date   Follow-up in 1 year or p r n  earlier as needed  No follow-ups on file  All questions are answered to the patient's satisfaction and understanding  She verbalizes understanding  She is encouraged to call with any further questions or concerns  Portions of the record may have been created with voice recognition software  Occasional wrong word or "sound a like" substitutions may have occurred due to the inherent limitations of voice recognition software  Read the chart carefully and recognize, using context, where substitutions have occurred      Electronically Signed by Silvano Laguerre MD    ______________________________________________________________________    Chief Complaint:   Chief Complaint   Patient presents with    Sleep Apnea    Follow-up       Patient ID: Paula Ross is a 76 y o  y o  female has a past medical history of Anxiety, Cataract, Chronic bronchitis ([address] ), Chronic pain disorder, COPD (chronic obstructive pulmonary disease) ([facility] [address] ), Hypothyroidism, Multiple sclerosis ([facility] [address] ), CHOCO (obstructive sleep apnea), Peroneal muscle atrophy, Shortness of breath, Smoker, and Thoracolumbar radiculopathy due to intervertebral disc disorder  3/3/2022  Patient presents today for follow-up visit  Patient is a 76-year-old female with greater than 45 pack year smoking history still smoking few cigarettes per day with past medical history of COPD/chronic bronchitis, CHOCO on CPAP, MS, hypothyroid, radiculopathy  She is here today for surgical clearance for spinal surgery  She is doing well with her breathing, not requiring any bronchodilators  She does have a mild chronic cough at baseline without change  Denies any shortness of breath  She continues with her CPAP at night, she is sleeping well and feels well rested during the day  Review of Systems   Constitutional: Negative  HENT: Negative  Eyes: Negative  Respiratory: Negative  Cardiovascular: Negative  Gastrointestinal: Negative  Endocrine: Negative  Genitourinary: Negative  Musculoskeletal: Positive for back pain  Allergic/Immunologic: Negative  Neurological: Negative  Hematological: Negative  Psychiatric/Behavioral: Negative  Smoking history: She reports that she has been smoking cigarettes  She started smoking about 64 years ago  She has a 60 00 pack-year smoking history   She has never used smokeless tobacco     The following portions of the patient's history were reviewed and updated as appropriate: allergies, current medications, past family history, past medical history, past social history, past surgical history and problem list     Immunization History   Administered Date(s) Administered    COVID-19 MODERNA VACC 0 25 ML IM BOOSTER 12/15/2021    COVID-19 MODERNA VACC 0 5 ML IM 01/13/2021, 02/11/2021    INFLUENZA 1945, 09/14/2016    Pneumococcal Polysaccharide PPV23 1945, 05/22/2008  Tuberculin Skin Test-PPD Intradermal 01/13/2017     Current Outpatient Medications   Medication Sig Dispense Refill    cyclobenzaprine (FLEXERIL) 5 mg tablet Take 2 tablets (10 mg total) by mouth every 8 (eight) hours as needed for muscle spasms 60 tablet 0    dicyclomine (BENTYL) 10 mg capsule Take 1 capsule (10 mg total) by mouth 4 (four) times a day (before meals and at bedtime) 360 capsule 0    levothyroxine 100 mcg tablet TAKE 1 TABLET BY MOUTH EVERY DAY 90 tablet 3    morphine (MSIR) 15 mg tablet Take 1 tablet (15 mg total) by mouth every 8 (eight) hours as needed for severe pain Max Daily Amount: 45 mg 90 tablet 0    Multiple Vitamins-Minerals (ICAPS AREDS 2 PO) Take by mouth daily       naloxone (NARCAN) 4 mg/0 1 mL nasal spray Administer 1 spray into a nostril  If no response after 2-3 minutes, give another dose in the other nostril using a new spray  1 each 1     No current facility-administered medications for this visit  Allergies: Adhesive [medical tape] and Latex    Objective:  Vitals:    03/03/22 1123   BP: 104/80   Pulse: 87   Temp: 97 9 °F (36 6 °C)   SpO2: 97%   Weight: 63 kg (139 lb)   Height: 5' 2" (1 575 m)   Oxygen Therapy  SpO2: 97 %    Wt Readings from Last 3 Encounters:   03/03/22 63 kg (139 lb)   01/21/22 63 5 kg (140 lb)   11/08/21 63 kg (139 lb)     Body mass index is 25 42 kg/m²  Physical Exam  Vitals and nursing note reviewed  Constitutional:       Appearance: She is well-developed  HENT:      Head: Normocephalic and atraumatic  Eyes:      Conjunctiva/sclera: Conjunctivae normal       Pupils: Pupils are equal, round, and reactive to light  Neck:      Thyroid: No thyromegaly  Vascular: No JVD  Cardiovascular:      Rate and Rhythm: Normal rate and regular rhythm  Heart sounds: Normal heart sounds  No murmur heard  No friction rub  No gallop  Pulmonary:      Effort: Pulmonary effort is normal  No respiratory distress        Breath sounds: Normal breath sounds  No wheezing or rales  Chest:      Chest wall: No tenderness  Musculoskeletal:         General: No tenderness or deformity  Normal range of motion  Cervical back: Normal range of motion and neck supple  Lymphadenopathy:      Cervical: No cervical adenopathy  Skin:     General: Skin is warm and dry  Neurological:      Mental Status: She is alert and oriented to person, place, and time             Diagnostics:  I have personally reviewed pertinent films in PACS

## 2022-03-07 ENCOUNTER — OFFICE VISIT (OUTPATIENT)
Dept: PHYSICAL THERAPY | Facility: CLINIC | Age: 77
End: 2022-03-07
Payer: MEDICARE

## 2022-03-07 DIAGNOSIS — M51.16 LUMBAR DISC DISEASE WITH RADICULOPATHY: Primary | ICD-10-CM

## 2022-03-07 PROCEDURE — 97110 THERAPEUTIC EXERCISES: CPT

## 2022-03-07 PROCEDURE — 97530 THERAPEUTIC ACTIVITIES: CPT

## 2022-03-07 PROCEDURE — 97112 NEUROMUSCULAR REEDUCATION: CPT

## 2022-03-07 NOTE — PROGRESS NOTES
Daily Note     Today's date: 3/7/2022  Patient name: Nella Gaucher  : 1945  MRN: [id number]  Referring provider: Samantha Sheehan PA-C  Dx:   Encounter Diagnosis     ICD-10-CM    1  Lumbar disc disease with radiculopathy  M51 16                   Subjective: Pt states she was able to descend a curb twice without hand rail over the weekend  She also reports her daughter has notice dan improvement in her gait  Objective: See treatment diary below      Assessment: Demonstrates improved DF strength in gravity minimized position as she is able to perform resisted DF with increased reps and less fatigue  Able to perform step ups without hand rail assist but does experience LOB at times  Close supervision provided for safety, no falls occurred  She does have difficulty with maintaining balance  Plan: Continue per plan of care  Precautions: fall risk, hx of lumbar discectomy (3/2021), MS    Access Code: [email or web address]  URL: [email or web address]      Manuals 2/22 2/24 2/28 3/3 3/7 2/3 2/8 2/10 2/15 2/17   R DF PROM 6' 6' 5' 5' 5' 8' 5' 6' 6' 6'   Ktape for DF                                       Neuro Re-Ed             Biodex LOS  L9 x3 L9x3 L9x3 L9 3x L8 x3 L10 x3 L10 3x L9 x3 L9 x3 L9 x3   Heel tap cone stack      Bio 2x10 2x10      NBOS foam c head turns             Step up and over foam             Toe ext iso      3"x10 10x x10     SLS on firm FTA 15"x5 FTA 15"x5 FTA 10-15"x5 FTA 15"x5 FTA 15"x5 FTA 10"x5  FTA 10"x5 FTA 10"x5 FTA 15"x5   rebounder toss  x20 x20          Ther Ex             Bike 5' 5' 5' 5' 5' 5' 5' 5' 5; 5'   AA DF in supine 2x10 no AA 2x10 YTB 3x5 10x, YTB 2x5 YTB 2x10 3x10 no AA 2x10 AA 2x10 no AA 2x10 no AA 2x10 no AA   Inv/ev MRE 2x10 2x1 2x10 2x10 YTB 2x10 2x10 2x10 2x10 2x10 2x10   Seated TR x20 x20 x20 20x 2x5 on floor X10, x6 10x x15 x20 x20   S/l DF on board YTB x20 YTB x20        YTB x20                                          Ther Activity Walking over hurdles      3 laps  3 laps 4 laps    Step down 6" 2x10 6" 2x10 6"x20 6"x20 8" x20 4"x12   4"x15 6" 2x10   Step up 6" 2x10 6" 2x10 6"x20 6"x20 8" x20     6" 2x10   Lateral walking in agility ladder     2 laps        Gait Training                                       Modalities

## 2022-03-10 ENCOUNTER — OFFICE VISIT (OUTPATIENT)
Dept: PHYSICAL THERAPY | Facility: CLINIC | Age: 77
End: 2022-03-10
Payer: MEDICARE

## 2022-03-10 DIAGNOSIS — M51.16 LUMBAR DISC DISEASE WITH RADICULOPATHY: Primary | ICD-10-CM

## 2022-03-10 PROCEDURE — 97110 THERAPEUTIC EXERCISES: CPT

## 2022-03-10 NOTE — PROGRESS NOTES
Daily Note     Today's date: 3/10/2022  Patient name: Adwoa Vogel  : 1945  MRN: [id number]  Referring provider: Tayler Jackson PA-C  Dx:   Encounter Diagnosis     ICD-10-CM    1  Lumbar disc disease with radiculopathy  M51 16                   Subjective: Pt states she woke up in the middle of the night with pain down her LLE and lower back  She states she had to take morphine for it to go away  Today she reports she is feeling better but she would like to take it easier today to avoid complications  Objective: See treatment diary below      Assessment: Light exercise only performed today secondary to pain over night  She was able to perform charted exercises without pain or discomfort today  Did have some challenges with resisted DF in supine today  Will return to usual program nv as able  Plan: Continue per plan of care  Precautions: fall risk, hx of lumbar discectomy (3/2021), MS    Access Code: [email or web address]  URL: [email or web address]      Manuals 2/22 2/24 2/28 3/3 3/7 3/10 2/8 2/10 2/15 2/17   R DF PROM 6' 6' 5' 5' 5' 5' 5' 6' 6' 6'   Ktape for DF                                       Neuro Re-Ed             Biodex LOS  L9 x3 L9x3 L9x3 L9 3x L8 x3 L8 x3 L10 3x L9 x3 L9 x3 L9 x3   Heel tap cone stack       2x10      NBOS foam c head turns             Step up and over foam             Toe ext iso       10x x10     SLS on firm FTA 15"x5 FTA 15"x5 FTA 10-15"x5 FTA 15"x5 FTA 15"x5   FTA 10"x5 FTA 10"x5 FTA 15"x5   rebounder toss  x20 x20          Ther Ex             Bike 5' 5' 5' 5' 5'  5' 5' 5; 5'   AA DF in supine 2x10 no AA 2x10 YTB 3x5 10x, YTB 2x5 YTB 2x10 YTB 2x10 2x10 AA 2x10 no AA 2x10 no AA 2x10 no AA   Inv/ev MRE 2x10 2x1 2x10 2x10 YTB 2x10 YTB 2x10 2x10 2x10 2x10 2x10   Seated TR x20 x20 x20 20x 2x5 on floor 2x10 elevated 10x x15 x20 x20   S/l DF on board YTB x20 YTB x20        YTB x20                                          Ther Activity             Walking over hurdles        3 laps 4 laps    Step down 6" 2x10 6" 2x10 6"x20 6"x20 8" x20    4"x15 6" 2x10   Step up 6" 2x10 6" 2x10 6"x20 6"x20 8" x20     6" 2x10   Lateral walking in agility ladder     2 laps        Gait Training                                       Modalities

## 2022-03-15 ENCOUNTER — OFFICE VISIT (OUTPATIENT)
Dept: PHYSICAL THERAPY | Facility: CLINIC | Age: 77
End: 2022-03-15
Payer: MEDICARE

## 2022-03-15 DIAGNOSIS — M51.16 LUMBAR DISC DISEASE WITH RADICULOPATHY: Primary | ICD-10-CM

## 2022-03-15 PROCEDURE — 97112 NEUROMUSCULAR REEDUCATION: CPT

## 2022-03-15 PROCEDURE — 97110 THERAPEUTIC EXERCISES: CPT

## 2022-03-15 NOTE — PROGRESS NOTES
Daily Note     Today's date: 3/15/2022  Patient name: Mavis Mann  : 1945  MRN: [id number]  Referring provider: Jazmyn Hubbard PA-C  Dx:   Encounter Diagnosis     ICD-10-CM    1  Lumbar disc disease with radiculopathy  M51 16                   Subjective: Pt states her LLE/LBP is intermittent but has been feeling better  She is willing to resume normal program today  Objective: See treatment diary below      Assessment: Still lacks confidence and RLE motor control during SLS  Patient is able to perform dual task gait with minimal path deviations and without LOB  Pt did experience R LBP pain when returning to standing position from seated rest break  She will contact surgeon regarding recent LE and LBP episodes  Plan: Continue per plan of care  Precautions: fall risk, hx of lumbar discectomy (3/2021), MS    Access Code: [email or web address]  URL: [email or web address]      Manuals 2/22 2/24 2/28 3/3 3/7 3/10 3/15 2/10 2/15 2/17   R DF PROM 6' 6' 5' 5' 5' 5' 5' 6' 6' 6'   Ktape for DF                                       Neuro Re-Ed             Biodex LOS  L9 x3 L9x3 L9x3 L9 3x L8 x3 L8 x3 L8 x3 L9 x3 L9 x3 L9 x3   Heel tap cone stack             NBOS foam c head turns             Step up and over foam             Toe ext iso        x10     SLS on firm FTA 15"x5 FTA 15"x5 FTA 10-15"x5 FTA 15"x5 FTA 15"x5  FTA 15"x5 FTA 10"x5 FTA 10"x5 FTA 15"x5   rebounder toss  x20 x20          Walking c head turns       2 laps      Ther Ex             Bike 5' 5' 5' 5' 5'   5' 5; 5'   AA DF in supine 2x10 no AA 2x10 YTB 3x5 10x, YTB 2x5 YTB 2x10 YTB 2x10 YTB 2x10 2x10 no AA 2x10 no AA 2x10 no AA   Inv/ev MRE 2x10 2x1 2x10 2x10 YTB 2x10 YTB 2x10 YTB 2x10 2x10 2x10 2x10   Seated TR x20 x20 x20 20x 2x5 on floor 2x10 elevated 2x10 x15 x20 x20   S/l DF on board YTB x20 YTB x20        YTB x20                                          Ther Activity             Walking over hurdles        3 laps 4 laps Step down 6" 2x10 6" 2x10 6"x20 6"x20 8" x20    4"x15 6" 2x10   Step up 6" 2x10 6" 2x10 6"x20 6"x20 8" x20     6" 2x10   Lateral walking in agility ladder     2 laps 2 laps       Gait Training                                       Modalities

## 2022-03-22 ENCOUNTER — OFFICE VISIT (OUTPATIENT)
Dept: PHYSICAL THERAPY | Facility: CLINIC | Age: 77
End: 2022-03-22
Payer: MEDICARE

## 2022-03-22 DIAGNOSIS — M51.16 LUMBAR DISC DISEASE WITH RADICULOPATHY: Primary | ICD-10-CM

## 2022-03-22 PROCEDURE — 97110 THERAPEUTIC EXERCISES: CPT | Performed by: PHYSICAL THERAPIST

## 2022-03-22 NOTE — PROGRESS NOTES
Daily Note     Today's date: 3/22/2022  Patient name: Ajith Juárez  : 1945  MRN: [id number]  Referring provider: Wilmer Ortiz PA-C  Dx:   Encounter Diagnosis     ICD-10-CM    1  Lumbar disc disease with radiculopathy  M51 16        Start Time: 585  Stop Time: 1257  Total time in clinic (min): 28 minutes    Subjective: Pt reports while she was in [place], she felt she did well with walking  Objective: See treatment diary below      Assessment: Minimal DF AROM in both gravity eliminated and against gravity position, but it continues to get better each visit  She was able to progress to 1UE assist with balance exercise on Biodex, however fair accuracy  Plan: Progress as tolerated  Precautions: fall risk, hx of lumbar discectomy (3/2021), MS    Access Code: [email or web address]  URL: [email or web address]      Manuals 2/22 2/24 2/28 3/3 3/7 3/10 3/15 3/22 2/15 2/17   R DF PROM 6' 6' 5' 5' 5' 5' 5' 5' 6' 6'   [facility] for DF                                       Neuro Re-Ed             Biodex LOS  L9 x3 L9x3 L9x3 L9 3x L8 x3 L8 x3 L8 x3 L8 3x 1UE L9 x3 L9 x3   Heel tap cone stack             NBOS foam c head turns             Step up and over foam             Toe ext iso             SLS on firm FTA 15"x5 FTA 15"x5 FTA 10-15"x5 FTA 15"x5 FTA 15"x5  FTA 15"x5 FTA 15"x5 FTA 10"x5 FTA 15"x5   rebounder toss  x20 x20          Walking c head turns       2 laps      Ther Ex             Bike 5' 5' 5' 5' 5'    5; 5'   AA DF in supine 2x10 no AA 2x10 YTB 3x5 10x, YTB 2x5 YTB 2x10 YTB 2x10 YTB 2x10 YTB 2x10 2x10 no AA 2x10 no AA   Inv/ev MRE 2x10 2x1 2x10 2x10 YTB 2x10 YTB 2x10 YTB 2x10 YTB 2x10 2x10 2x10   Seated TR x20 x20 x20 20x 2x5 on floor 2x10 elevated 2x10 2x10 x20 x20   S/l DF on board YTB x20 YTB x20        YTB x20                                          Ther Activity             Walking over hurdles         4 laps    Step down 6" 2x10 6" 2x10 6"x20 6"x20 8" x20    4"x15 6" 2x10   Step up 6" 2x10 6" 2x10 6"x20 6"x20 8" x20     6" 2x10   Lateral walking in agility ladder     2 laps 2 laps       Gait Training                                       Modalities

## 2022-03-24 ENCOUNTER — APPOINTMENT (OUTPATIENT)
Dept: PHYSICAL THERAPY | Facility: CLINIC | Age: 77
End: 2022-03-24
Payer: MEDICARE

## 2022-03-25 ENCOUNTER — OFFICE VISIT (OUTPATIENT)
Dept: PHYSICAL THERAPY | Facility: CLINIC | Age: 77
End: 2022-03-25
Payer: MEDICARE

## 2022-03-25 DIAGNOSIS — M51.16 LUMBAR DISC DISEASE WITH RADICULOPATHY: Primary | ICD-10-CM

## 2022-03-25 PROCEDURE — 97110 THERAPEUTIC EXERCISES: CPT

## 2022-03-25 PROCEDURE — 97112 NEUROMUSCULAR REEDUCATION: CPT

## 2022-03-25 PROCEDURE — 97530 THERAPEUTIC ACTIVITIES: CPT

## 2022-03-25 NOTE — PROGRESS NOTES
Daily Note     Today's date: 3/25/2022  Patient name: Wanda Cee  : 1945  MRN: [id number]  Referring provider: Olya Laird PA-C  Dx:   Encounter Diagnosis     ICD-10-CM    1  Lumbar disc disease with radiculopathy  M51 16                   Subjective: Pt states she is feeling better rested today from her recent travels  She states her gait continues to improve  Objective: See treatment diary below      Assessment: Presents with significantly improved motor control with lateral hurdles and steps  She is able to recover on her own utilizing stepping strategy  Proprioception continues to improve  Moderate difficulty with dual task activities  Plan: Continue per plan of care  Precautions: fall risk, hx of lumbar discectomy (3/2021), MS    Access Code: [email or web address]  URL: [email or web address]      Manuals 2/22 2/24 2/28 3/3 3/7 3/10 3/15 3/22 3/25 2/17   R DF PROM 6' 6' 5' 5' 5' 5' 5' 5' 5' 6'   [facility] for DF                                       Neuro Re-Ed             Biodex LOS  L9 x3 L9x3 L9x3 L9 3x L8 x3 L8 x3 L8 x3 L8 3x 1UE L8 x3 1UE L9 x3   Heel tap cone stack             NBOS foam c head turns             Step up and over foam             Toe ext iso             SLS on firm FTA 15"x5 FTA 15"x5 FTA 10-15"x5 FTA 15"x5 FTA 15"x5  FTA 15"x5 FTA 15"x5 FTA 15"x5 FTA 15"x5   rebounder toss  x20 x20          Walking c head turns       2 laps  2 laps    Walking c Bball toss         2 laps    Ther Ex             Bike 5' 5' 5' 5' 5'     5'   AA DF in supine 2x10 no AA 2x10 YTB 3x5 10x, YTB 2x5 YTB 2x10 YTB 2x10 YTB 2x10 YTB 2x10 YTB 2x10 2x10 no AA   Inv/ev MRE 2x10 2x1 2x10 2x10 YTB 2x10 YTB 2x10 YTB 2x10 YTB 2x10 YTB 2x10 2x10   Seated TR x20 x20 x20 20x 2x5 on floor 2x10 elevated 2x10 2x10 2x10 x20   S/l DF on board YTB x20 YTB x20        YTB x20                                          Ther Activity             Walking over hurdles         Lateral x10    Step down 6" 2x10 6" 2x10 6"x20 6"x20 8" x20     6" 2x10   Step up 6" 2x10 6" 2x10 6"x20 6"x20 8" x20     6" 2x10   Lateral walking in agility ladder     2 laps 2 laps   2 laps    Gait Training                                       Modalities

## 2022-03-29 ENCOUNTER — OFFICE VISIT (OUTPATIENT)
Dept: PHYSICAL THERAPY | Facility: CLINIC | Age: 77
End: 2022-03-29
Payer: MEDICARE

## 2022-03-29 DIAGNOSIS — M51.16 LUMBAR DISC DISEASE WITH RADICULOPATHY: Primary | ICD-10-CM

## 2022-03-29 PROCEDURE — 97110 THERAPEUTIC EXERCISES: CPT

## 2022-03-29 PROCEDURE — 97112 NEUROMUSCULAR REEDUCATION: CPT

## 2022-03-29 NOTE — PROGRESS NOTES
Daily Note     Today's date: 3/29/2022  Patient name: Khris Yang  : 1945  MRN: [id number]  Referring provider: Lily Mg PA-C  Dx:   Encounter Diagnosis     ICD-10-CM    1  Lumbar disc disease with radiculopathy  M51 16                   Subjective: Pt states she felt great following last session  Objective: See treatment diary below      Assessment: Continues to demonstrate increased motor control laterally and when ambulating  Still has difficulty stabilizing on unstable surfaces  Pt did experience "jolting" pain into R hip and thigh today when stepping with RLE  This made it difficult to progress and complete all exercises  Pt would benefit from continued PT in order to improve dual task stability and motor control for improved safety during daily activities  Plan: Continue per plan of care  Precautions: fall risk, hx of lumbar discectomy (3/2021), MS    Access Code: [email or web address]  URL: [email or web address]      Manuals 2/22 2/24 2/28 3/3 3/7 3/10 3/15 3/22 3/25 3/29   R DF PROM 6' 6' 5' 5' 5' 5' 5' 5' 5' 6'   Ktape for DF                                       Neuro Re-Ed             Biodex LOS  L9 x3 L9x3 L9x3 L9 3x L8 x3 L8 x3 L8 x3 L8 3x 1UE L8 x3 1UE L8 x3 1UE   Heel tap cone stack             NBOS foam c head turns             Step up and over foam             Toe ext iso             SLS on firm FTA 15"x5 FTA 15"x5 FTA 10-15"x5 FTA 15"x5 FTA 15"x5  FTA 15"x5 FTA 15"x5 FTA 15"x5    rebounder toss  x20 x20          Walking c head turns       2 laps  2 laps 2 laps   Walking c Bball toss         2 laps 2 laps   Ther Ex             Bike 5' 5' 5' 5' 5'     5'   AA DF in supine 2x10 no AA 2x10 YTB 3x5 10x, YTB 2x5 YTB 2x10 YTB 2x10 YTB 2x10 YTB 2x10 YTB 2x10 YTB 2x10   Inv/ev MRE 2x10 2x1 2x10 2x10 YTB 2x10 YTB 2x10 YTB 2x10 YTB 2x10 YTB 2x10 YTB 2x10   Seated TR x20 x20 x20 20x 2x5 on floor 2x10 elevated 2x10 2x10 2x10 From floor 2x15   S/l DF on board YTB x20 YTB x20                                                  Ther Activity             Walking over hurdles         Lateral x10 Lateral x10   Step down 6" 2x10 6" 2x10 6"x20 6"x20 8" x20        Step up 6" 2x10 6" 2x10 6"x20 6"x20 8" x20        Lateral walking in agility ladder     2 laps 2 laps   2 laps 2 laps   Gait Training                                       Modalities

## 2022-03-31 ENCOUNTER — OFFICE VISIT (OUTPATIENT)
Dept: PHYSICAL THERAPY | Facility: CLINIC | Age: 77
End: 2022-03-31
Payer: MEDICARE

## 2022-03-31 DIAGNOSIS — M51.16 LUMBAR DISC DISEASE WITH RADICULOPATHY: Primary | ICD-10-CM

## 2022-03-31 PROCEDURE — 97110 THERAPEUTIC EXERCISES: CPT | Performed by: PHYSICAL THERAPIST

## 2022-03-31 PROCEDURE — 97112 NEUROMUSCULAR REEDUCATION: CPT | Performed by: PHYSICAL THERAPIST

## 2022-03-31 NOTE — PROGRESS NOTES
Daily Note     Today's date: 3/31/2022  Patient name: Nella Gaucher  : 1945  MRN: [id number]  Referring provider: Samantha Sheehan PA-C  Dx:   Encounter Diagnosis     ICD-10-CM    1  Lumbar disc disease with radiculopathy  M51 16        Start Time: 7247  Stop Time: 8790  Total time in clinic (min): 38 minutes    Subjective: Pt reports her foot is doing well today, she has been careful about stepping down with her R foot to avoid pain shooting up her leg  Objective: See treatment diary below      Assessment: CGA required for walking exercises with no LOB that required the PT assistance  She was able to progress to red band for ankle strengthening exercises, but her DF AROM was decreased due to weakness  Plan: Progress as tolerated  Precautions: fall risk, hx of lumbar discectomy (3/2021), MS    Access Code: [email or web address]  URL: [email or web address]      Manuals 3/31 2/24 2/28 3/3 3/7 3/10 3/15 3/22 3/25 3/29   R DF PROM 8' 6' 5' 5' 5' 5' 5' 5' 5' 6'   Ktape for DF                                       Neuro Re-Ed             Biodex LOS  L8 3x 1UE L9x3 L9x3 L9 3x L8 x3 L8 x3 L8 x3 L8 3x 1UE L8 x3 1UE L8 x3 1UE   Heel tap cone stack             NBOS foam c head turns             Step up and over foam             Toe ext iso             SLS on firm  FTA 15"x5 FTA 10-15"x5 FTA 15"x5 FTA 15"x5  FTA 15"x5 FTA 15"x5 FTA 15"x5    rebounder toss  x20 x20          Walking c head turns 2 laps      2 laps  2 laps 2 laps   Walking c Bball toss 2 laps        2 laps 2 laps   Ther Ex             Bike 5' 5' 5' 5' 5'     5'   AA DF in supine RTB 10x 2x10 YTB 3x5 10x, YTB 2x5 YTB 2x10 YTB 2x10 YTB 2x10 YTB 2x10 YTB 2x10 YTB 2x10   Inv/ev MRE RTB 2x10 2x1 2x10 2x10 YTB 2x10 YTB 2x10 YTB 2x10 YTB 2x10 YTB 2x10 YTB 2x10   Seated TR 2x15 x20 x20 20x 2x5 on floor 2x10 elevated 2x10 2x10 2x10 From floor 2x15   S/l DF on board  YTB x20                                                  Ther Activity Walking over hurdles         Lateral x10 Lateral x10   Step down  6" 2x10 6"x20 6"x20 8" x20        Step up  6" 2x10 6"x20 6"x20 8" x20        Lateral walking in agility ladder 2 laps    2 laps 2 laps   2 laps 2 laps   Gait Training                                       Modalities

## 2022-04-05 ENCOUNTER — OFFICE VISIT (OUTPATIENT)
Dept: PHYSICAL THERAPY | Facility: CLINIC | Age: 77
End: 2022-04-05
Payer: MEDICARE

## 2022-04-05 DIAGNOSIS — M51.16 LUMBAR DISC DISEASE WITH RADICULOPATHY: Primary | ICD-10-CM

## 2022-04-05 PROCEDURE — 97112 NEUROMUSCULAR REEDUCATION: CPT | Performed by: PHYSICAL THERAPIST

## 2022-04-05 PROCEDURE — 97110 THERAPEUTIC EXERCISES: CPT | Performed by: PHYSICAL THERAPIST

## 2022-04-05 NOTE — PROGRESS NOTES
Daily Note     Today's date: 2022  Patient name: Pricila Moon  : 1945  MRN: [id number]  Referring provider: Deion Lemus PA-C  Dx:   Encounter Diagnosis     ICD-10-CM    1  Lumbar disc disease with radiculopathy  M51 16        Start Time:   Stop Time: 1010  Total time in clinic (min): 39 minutes    Subjective: Pt reports her leg is feeling good, but her L hip/groin is still bothering her  Objective: See treatment diary below      Assessment: Improved DF AROM against gravity in a seated position, minimal knee ext noted as compensation  Pt was able to perform side steps on uneven surface without HHA, able to independently maintain balance with use of bar during 1-2x of LOB  She had a high score on Biodex, therefore progressed to no UE on static and had difficulty with accuracy  Plan: Progress as tolerated  Precautions: fall risk, hx of lumbar discectomy (3/2021), MS    Access Code: [email or web address]  URL: [email or web address]      Manuals 3/31 New tx diary 2/28 3/3 3/7 3/10 3/15 3/22 3/25 3/29   R DF PROM 8'  5' 5' 5' 5' 5' 5' 5' 6'   Ktape for DF                                       Neuro Re-Ed             Biodex LOS  L8 3x 1UE  L9x3 L9 3x L8 x3 L8 x3 L8 x3 L8 3x 1UE L8 x3 1UE L8 x3 1UE   Heel tap cone stack             NBOS foam c head turns             Step up and over foam             Toe ext iso             SLS on firm   FTA 10-15"x5 FTA 15"x5 FTA 15"x5  FTA 15"x5 FTA 15"x5 FTA 15"x5    rebounder toss   x20          Walking c head turns 2 laps      2 laps  2 laps 2 laps   Walking c Bball toss 2 laps        2 laps 2 laps   Ther Ex             Bike 5'  5' 5' 5'     5'   AA DF in supine RTB 10x  YTB 3x5 10x, YTB 2x5 YTB 2x10 YTB 2x10 YTB 2x10 YTB 2x10 YTB 2x10 YTB 2x10   Inv/ev MRE RTB 2x10  2x10 2x10 YTB 2x10 YTB 2x10 YTB 2x10 YTB 2x10 YTB 2x10 YTB 2x10   Seated TR 2x15  x20 20x 2x5 on floor 2x10 elevated 2x10 2x10 2x10 From floor 2x15   S/l DF on board Ther Activity             Walking over hurdles         Lateral x10 Lateral x10   Step down   6"x20 6"x20 8" x20        Step up   6"x20 6"x20 8" x20        Lateral walking in agility ladder 2 laps    2 laps 2 laps   2 laps 2 laps   Gait Training                                       Modalities                                               Precautions:  fall risk, hx of lumbar discectomy (3/2021), MS    Access Code: [email or web address]  URL: [email or web address]    Manuals 4/5            L DF PROM 8'                                                   Neuro Re-Ed             ABC 1x            Alt marches on foam 20x ea            Hip abd on foam RTB 20x ea            Side steps on foam beam 2 laps            Biodex LOS L8 3x 1UE, Static 1x no UE                                      Ther Ex             Bike 5'            Ankle DF supine RTB 10x            Ankle inv/ev supine RTB 20x            TR seated EOT 20x                                                                Ther Activity                                       Gait Training                                       Modalities

## 2022-04-07 ENCOUNTER — LAB (OUTPATIENT)
Dept: LAB | Facility: HOSPITAL | Age: 77
End: 2022-04-07
Payer: MEDICARE

## 2022-04-07 ENCOUNTER — OFFICE VISIT (OUTPATIENT)
Dept: PHYSICAL THERAPY | Facility: CLINIC | Age: 77
End: 2022-04-07
Payer: MEDICARE

## 2022-04-07 DIAGNOSIS — E03.9 ACQUIRED HYPOTHYROIDISM: Chronic | ICD-10-CM

## 2022-04-07 DIAGNOSIS — R10.84 ABDOMINAL PAIN, CHRONIC, GENERALIZED: ICD-10-CM

## 2022-04-07 DIAGNOSIS — G89.29 ABDOMINAL PAIN, CHRONIC, GENERALIZED: ICD-10-CM

## 2022-04-07 DIAGNOSIS — G35 MULTIPLE SCLEROSIS (HCC): ICD-10-CM

## 2022-04-07 DIAGNOSIS — M51.16 LUMBAR DISC DISEASE WITH RADICULOPATHY: Primary | ICD-10-CM

## 2022-04-07 LAB
ANION GAP SERPL CALCULATED.3IONS-SCNC: 6 MMOL/L (ref 4–13)
BUN SERPL-MCNC: 16 MG/DL (ref 5–25)
CALCIUM SERPL-MCNC: 9.2 MG/DL (ref 8.3–10.1)
CHLORIDE SERPL-SCNC: 104 MMOL/L (ref 100–108)
CO2 SERPL-SCNC: 28 MMOL/L (ref 21–32)
CREAT SERPL-MCNC: 0.72 MG/DL (ref 0.6–1.3)
ERYTHROCYTE [DISTWIDTH] IN BLOOD BY AUTOMATED COUNT: 13.9 % (ref 11.6–15.1)
GFR SERPL CREATININE-BSD FRML MDRD: 79 ML/MIN/1.73SQ M
GLUCOSE P FAST SERPL-MCNC: 88 MG/DL (ref 65–99)
HCT VFR BLD AUTO: 43.3 % (ref 34.8–46.1)
HGB BLD-MCNC: 14 G/DL (ref 11.5–15.4)
MCH RBC QN AUTO: 31.7 PG (ref 26.8–34.3)
MCHC RBC AUTO-ENTMCNC: 32.3 G/DL (ref 31.4–37.4)
MCV RBC AUTO: 98 FL (ref 82–98)
PLATELET # BLD AUTO: 280 THOUSANDS/UL (ref 149–390)
PMV BLD AUTO: 9.5 FL (ref 8.9–12.7)
POTASSIUM SERPL-SCNC: 4 MMOL/L (ref 3.5–5.3)
RBC # BLD AUTO: 4.42 MILLION/UL (ref 3.81–5.12)
SODIUM SERPL-SCNC: 138 MMOL/L (ref 136–145)
TSH SERPL DL<=0.05 MIU/L-ACNC: 1.65 UIU/ML (ref 0.45–4.5)
WBC # BLD AUTO: 7.61 THOUSAND/UL (ref 4.31–10.16)

## 2022-04-07 PROCEDURE — 84443 ASSAY THYROID STIM HORMONE: CPT

## 2022-04-07 PROCEDURE — 36415 COLL VENOUS BLD VENIPUNCTURE: CPT

## 2022-04-07 PROCEDURE — 80048 BASIC METABOLIC PNL TOTAL CA: CPT

## 2022-04-07 PROCEDURE — 97110 THERAPEUTIC EXERCISES: CPT

## 2022-04-07 PROCEDURE — 97112 NEUROMUSCULAR REEDUCATION: CPT

## 2022-04-07 PROCEDURE — 85027 COMPLETE CBC AUTOMATED: CPT

## 2022-04-07 NOTE — PROGRESS NOTES
Daily Note     Today's date: 2022  Patient name: Mary Ybarra  : 1945  MRN: [id number]  Referring provider: Omar Britt PA-C  Dx:   Encounter Diagnosis     ICD-10-CM    1  Lumbar disc disease with radiculopathy  M51 16                   Subjective: Pt states her R quad and groin were sore following last session  She states this has improved  Objective: See treatment diary below      Assessment: Decreased stability on unstable surfaces without UE assist   She utilizes stepping strategy and hand rail to recover from LOB  Decreased proprioception remains  Gait is improving with less hip flexion  Pt would benefit from continued PT in order to improve R foot drop, LE stability, and proprioception for reduced risk of falls  Plan: Continue per plan of care  Access Code: [email or web address]  URL: [email or web address]    Manuals            L DF PROM 8' 8'                                                  Neuro Re-Ed             ABC 1x x1           Alt marches on foam 20x ea x20 ea           Hip abd on foam RTB 20x ea RTB x20           Side steps on foam beam 2 laps 4 laps           Biodex LOS L8 3x 1UE, Static 1x no UE L8 x3 1UE, static x1 no UE                                     Ther Ex             Bike 5' 5'           Ankle DF supine RTB 10x RTB x20           Ankle inv/ev supine RTB 20x RTB x20           TR seated EOT 20x x20                                                               Ther Activity                                       Gait Training                                       Modalities

## 2022-04-12 ENCOUNTER — OFFICE VISIT (OUTPATIENT)
Dept: PHYSICAL THERAPY | Facility: CLINIC | Age: 77
End: 2022-04-12
Payer: MEDICARE

## 2022-04-12 DIAGNOSIS — M51.16 LUMBAR DISC DISEASE WITH RADICULOPATHY: Primary | ICD-10-CM

## 2022-04-12 PROCEDURE — 97112 NEUROMUSCULAR REEDUCATION: CPT

## 2022-04-12 PROCEDURE — 97110 THERAPEUTIC EXERCISES: CPT

## 2022-04-12 NOTE — PROGRESS NOTES
Daily Note     Today's date: 2022  Patient name: Roma Yen  : 1945  MRN: [id number]  Referring provider: Waleska Garzon PA-C  Dx:   Encounter Diagnosis     ICD-10-CM    1  Lumbar disc disease with radiculopathy  M51 16                   Subjective: Pt states she was able to walk a block down [address]  And step up/down a curb without assistance over the weekend  She denies complications today  Objective: See treatment diary below      Assessment: Pt is able to navigate obstacle course with good control  CGA was provided for safety due to new activity  Patient still has some difficulty on unstable surfaces  Pt would benefit from continued PT in order to improve balance and proprioception for increased safety during daily activities  Plan: Continue per plan of care  Access Code: [email or web address]  URL: [email or web address]    Manuals           L DF PROM 8' 8' 6'                                                 Neuro Re-Ed             ABC 1x x1 x1 sit          Alt marches on foam 20x ea x20 ea x20 ea          Hip abd on foam RTB 20x ea RTB x20 RTB x20          Side steps on foam beam 2 laps 4 laps 4 laps          Biodex LOS L8 3x 1UE, Static 1x no UE L8 x3 1UE, static x1 no UE L8 x3 1 UE          Obstacle course   4 laps CGA                       Ther Ex             Bike 5' 5' 5'          Ankle DF supine RTB 10x RTB x20 RTB x20          Ankle inv/ev supine RTB 20x RTB x20 RTB x20          TR seated EOT 20x x20 x20                                                              Ther Activity                                       Gait Training                                       Modalities

## 2022-04-13 ENCOUNTER — HOSPITAL ENCOUNTER (OUTPATIENT)
Dept: CT IMAGING | Facility: HOSPITAL | Age: 77
Discharge: HOME | End: 2022-04-13
Attending: COLON & RECTAL SURGERY
Payer: MEDICARE

## 2022-04-13 DIAGNOSIS — R10.84 GENERALIZED ABDOMINAL PAIN: ICD-10-CM

## 2022-04-13 PROCEDURE — G1004 CDSM NDSC: HCPCS

## 2022-04-13 PROCEDURE — 74177 CT ABD & PELVIS W/CONTRAST: CPT

## 2022-04-13 RX ADMIN — IOHEXOL 100 ML: 350 INJECTION, SOLUTION INTRAVENOUS at 15:12

## 2022-04-14 ENCOUNTER — EVALUATION (OUTPATIENT)
Dept: PHYSICAL THERAPY | Facility: CLINIC | Age: 77
End: 2022-04-14
Payer: MEDICARE

## 2022-04-14 DIAGNOSIS — M51.16 LUMBAR DISC DISEASE WITH RADICULOPATHY: Primary | ICD-10-CM

## 2022-04-14 PROCEDURE — 97110 THERAPEUTIC EXERCISES: CPT | Performed by: PHYSICAL THERAPIST

## 2022-04-14 PROCEDURE — 97112 NEUROMUSCULAR REEDUCATION: CPT | Performed by: PHYSICAL THERAPIST

## 2022-04-14 NOTE — PROGRESS NOTES
PT Re-Evaluation     Today's date: 2022  Patient name: Mercy Man  : 1945  MRN: [id number]  Referring provider: Marcos Garcia PA-C  Dx:   Encounter Diagnosis     ICD-10-CM    1  Lumbar disc disease with radiculopathy  M51 16        Start Time: 930  Stop Time: 101  Total time in clinic (min): 41 minutes    Assessment  Assessment details: Pt continues to make progress in her DF AROM, which has helped with her foot drop and TUG time  Functionally, she is able to do everything in her daily life without pain or assistance  Pt and pt discussed DC at the end of the month, as nerve healing takes a long time and she is at the point where she can be independent with her HEP  She was hesitant at first, but ultimately agreeable  Pt would benefit from 2 more weeks of PT in order to progress exercises and gain independence with HEP  Impairments: abnormal gait, abnormal or restricted ROM, activity intolerance, impaired balance, impaired physical strength and pain with function  Functional limitations: walking, stairs  Symptom irritability: lowUnderstanding of Dx/Px/POC: good   Prognosis: good    Goals  STG: 3 weeks  1  Pt will demonstrate independence with HEP  met  2  Pt will improve R ankle AROM by at least 10%  met  3  Pt will improve R ankle strength by at least 1/2 grade  In progress    LT weeks  1  Pt will improve TUG time to 15s or less without AD  met  2  Pt will improve R ankle DF by 20% from IE to improve walking  met  3  Pt will report improved walking endurance   met      Plan  Patient would benefit from: skilled physical therapy  Planned modality interventions: cryotherapy and thermotherapy: hydrocollator packs  Planned therapy interventions: therapeutic exercise, therapeutic activities, stretching, strengthening, patient education, neuromuscular re-education, massage, manual therapy, balance, gait training and home exercise program  Frequency: 2x week  Duration in weeks: 3  Treatment plan discussed with: patient        Subjective Evaluation    History of Present Illness  Mechanism of injury: Pt reports she is going up and down stairs normally with less concentration  She is also able to go up and down the steps on her deck, which she was unable to do before  She was able to walk farther than usual yesterday without pain or soreness  She reports she will feel stiff, but it loosens up the more she walks  She is taking "much less" pain medication than previously for this problem  Recurrent probem    Quality of life: fair    Pain  At best pain ratin  At worst pain ratin  Quality: discomfort  Relieving factors: medications  Aggravating factors: walking and stair climbing  Progression: improved    Treatments  Previous treatment: physical therapy and injection treatment  Current treatment: medication  Patient Goals  Patient goals for therapy: decreased pain, increased motion, improved balance, independence with ADLs/IADLs and increased strength          Objective     Neurological Testing     Reflexes   Left   Patellar (L4): normal (2+)  Achilles (S1): trace (1+)    Right   Patellar (L4): normal (2+)  Achilles (S1): trace (1+)    Active Range of Motion     Right Ankle/Foot   Dorsiflexion (ke): -20 degrees     Additional Active Range of Motion Details  R DF in supine with knee ext, pt able to achieve 25* of total motion (-45* to -20*)    Strength/Myotome Testing     Left Ankle/Foot   Normal strength    Right Ankle/Foot   Dorsiflexion: 2-  Plantar flexion: 4+  Inversion: 4+  Eversion: 4+    Additional Strength Details  PF compensation noted with inversion and eversion MMT    General Comments: Ankle/Foot Comments   TUG: 15 6s no AD (minimal foot drop noted)    Precautions: fall risk, hx of lumbar discectomy (3/2021), MS    Access Code: [email or web address]  URL: [email or web address]    Manuals          L DF PROM 8' 8' 6' 5' Neuro Re-Ed             ABC 1x x1 x1 sit 1x sit         Alt marches on foam 20x ea x20 ea x20 ea 20x ea         Hip abd on foam RTB 20x ea RTB x20 RTB x20 RTB 20x         Side steps on foam beam 2 laps 4 laps 4 laps          Biodex LOS L8 3x 1UE, Static 1x no UE L8 x3 1UE, static x1 no UE L8 x3 1 UE L8 3x 1UE         Obstacle course   4 laps CGA                       Ther Ex             Bike 5' 5' 5' 5'         Ankle DF supine RTB 10x RTB x20 RTB x20 RTB 20x         Ankle inv/ev supine RTB 20x RTB x20 RTB x20 RTB 20x         TR seated EOT 20x x20 x20 20x                                                             Ther Activity                                       Gait Training                                       Modalities

## 2022-04-14 NOTE — LETTER
2022    Everardo Charles, [address]    Patient: Michael Green   YOB: 1945   Date of Visit: 2022     Encounter Diagnosis     ICD-10-CM    1  Lumbar disc disease with radiculopathy  M51 16        Dear Dr Flor Zavaleta: Thank you for your recent referral of Michael Green  Please review the attached evaluation summary from Crissy's recent visit  Please verify that you agree with the plan of care by signing the attached order  If you have any questions or concerns, please do not hesitate to call  I sincerely appreciate the opportunity to share in the care of one of your patients and hope to have another opportunity to work with you in the near future  Sincerely,    Severo Butterfield, PT      Referring Provider:      I certify that I have read the below Plan of Care and certify the need for these services furnished under this plan of treatment while under my care  Everardo Charles PA-C  [address] [address]  Via Fax: [phone]          PT Re-Evaluation     Today's date: 2022  Patient name: Michael Green  : 1945  MRN: [id number]  Referring provider: Jemima Cooper PA-C  Dx:   Encounter Diagnosis     ICD-10-CM    1  Lumbar disc disease with radiculopathy  M51 16        Start Time: 0930  Stop Time: 1011  Total time in clinic (min): 41 minutes    Assessment  Assessment details: Pt continues to make progress in her DF AROM, which has helped with her foot drop and TUG time  Functionally, she is able to do everything in her daily life without pain or assistance  Pt and pt discussed DC at the end of the month, as nerve healing takes a long time and she is at the point where she can be independent with her HEP  She was hesitant at first, but ultimately agreeable  Pt would benefit from 2 more weeks of PT in order to progress exercises and gain independence with HEP  Impairments: abnormal gait, abnormal or restricted ROM, activity intolerance, impaired balance, impaired physical strength and pain with function  Functional limitations: walking, stairs  Symptom irritability: lowUnderstanding of Dx/Px/POC: good   Prognosis: good    Goals  STG: 3 weeks  1  Pt will demonstrate independence with HEP  met  2  Pt will improve R ankle AROM by at least 10%  met  3  Pt will improve R ankle strength by at least 1/2 grade  In progress    LT weeks  1  Pt will improve TUG time to 15s or less without AD  met  2  Pt will improve R ankle DF by 20% from IE to improve walking  met  3  Pt will report improved walking endurance  met      Plan  Patient would benefit from: skilled physical therapy  Planned modality interventions: cryotherapy and thermotherapy: hydrocollator packs  Planned therapy interventions: therapeutic exercise, therapeutic activities, stretching, strengthening, patient education, neuromuscular re-education, massage, manual therapy, balance, gait training and home exercise program  Frequency: 2x week  Duration in weeks: 3  Treatment plan discussed with: patient        Subjective Evaluation    History of Present Illness  Mechanism of injury: Pt reports she is going up and down stairs normally with less concentration  She is also able to go up and down the steps on her deck, which she was unable to do before  She was able to walk farther than usual yesterday without pain or soreness  She reports she will feel stiff, but it loosens up the more she walks  She is taking "much less" pain medication than previously for this problem             Recurrent probem    Quality of life: fair    Pain  At best pain ratin  At worst pain ratin  Quality: discomfort  Relieving factors: medications  Aggravating factors: walking and stair climbing  Progression: improved    Treatments  Previous treatment: physical therapy and injection treatment  Current treatment: medication  Patient Goals  Patient goals for therapy: decreased pain, increased motion, improved balance, independence with ADLs/IADLs and increased strength          Objective     Neurological Testing     Reflexes   Left   Patellar (L4): normal (2+)  Achilles (S1): trace (1+)    Right   Patellar (L4): normal (2+)  Achilles (S1): trace (1+)    Active Range of Motion     Right Ankle/Foot   Dorsiflexion (ke): -20 degrees     Additional Active Range of Motion Details  R DF in supine with knee ext, pt able to achieve 25* of total motion (-45* to -20*)    Strength/Myotome Testing     Left Ankle/Foot   Normal strength    Right Ankle/Foot   Dorsiflexion: 2-  Plantar flexion: 4+  Inversion: 4+  Eversion: 4+    Additional Strength Details  PF compensation noted with inversion and eversion MMT    General Comments: Ankle/Foot Comments   TUG: 15 6s no AD (minimal foot drop noted)    Precautions: fall risk, hx of lumbar discectomy (3/2021), MS    Access Code: [email or web address]  URL: [email or web address]    Manuals 4/5 4/7 4/12 4/14         L DF PROM 8' 8' 6' 5'                                                Neuro Re-Ed             ABC 1x x1 x1 sit 1x sit         Alt marches on foam 20x ea x20 ea x20 ea 20x ea         Hip abd on foam RTB 20x ea RTB x20 RTB x20 RTB 20x         Side steps on foam beam 2 laps 4 laps 4 laps          Biodex LOS L8 3x 1UE, Static 1x no UE L8 x3 1UE, static x1 no UE L8 x3 1 UE L8 3x 1UE         Obstacle course   4 laps CGA                       Ther Ex             Bike 5' 5' 5' 5'         Ankle DF supine RTB 10x RTB x20 RTB x20 RTB 20x         Ankle inv/ev supine RTB 20x RTB x20 RTB x20 RTB 20x         TR seated EOT 20x x20 x20 20x                                                             Ther Activity                                       Gait Training                                       Modalities

## 2022-04-15 DIAGNOSIS — M62.838 MUSCLE SPASM OF RIGHT LEG: ICD-10-CM

## 2022-04-15 DIAGNOSIS — F11.20 CONTINUOUS OPIOID DEPENDENCE (HCC): ICD-10-CM

## 2022-04-16 RX ORDER — MORPHINE SULFATE 15 MG/1
15 TABLET ORAL EVERY 8 HOURS PRN
Qty: 90 TABLET | Refills: 0 | Status: SHIPPED | OUTPATIENT
Start: 2022-04-16 | End: 2022-07-05 | Stop reason: SDUPTHER

## 2022-04-16 RX ORDER — CYCLOBENZAPRINE HCL 5 MG
10 TABLET ORAL EVERY 8 HOURS PRN
Qty: 60 TABLET | Refills: 1 | Status: SHIPPED | OUTPATIENT
Start: 2022-04-16 | End: 2022-06-15

## 2022-04-16 NOTE — TELEPHONE ENCOUNTER
Controlled Substance Review    [place] PDMP or [place]  reviewed: No red flags were identified; safe to proceed with prescription      PDMP Review       Value Time User    PDMP Reviewed  Yes 4/16/2022  7:03 AM Shayna Espinoza DO

## 2022-04-19 ENCOUNTER — OFFICE VISIT (OUTPATIENT)
Dept: PHYSICAL THERAPY | Facility: CLINIC | Age: 77
End: 2022-04-19
Payer: MEDICARE

## 2022-04-19 DIAGNOSIS — M51.16 LUMBAR DISC DISEASE WITH RADICULOPATHY: Primary | ICD-10-CM

## 2022-04-19 PROCEDURE — 97110 THERAPEUTIC EXERCISES: CPT

## 2022-04-19 PROCEDURE — 97112 NEUROMUSCULAR REEDUCATION: CPT

## 2022-04-19 NOTE — PROGRESS NOTES
Daily Note     Today's date: 2022  Patient name: Maryjo Bhardwaj  : 1945  MRN: [id number]  Referring provider: Cassi Richardson PA-C  Dx:   Encounter Diagnosis     ICD-10-CM    1  Lumbar disc disease with radiculopathy  M51 16                   Subjective: Pt offers no new complaints upon arrival   She reports she will be bringing her daughter next visit to learn how to stretch her ankle and help with exercises  Objective: See treatment diary below      Assessment: Hand rail assist provided for lateral walking on foam/hurdles today  Pt is able to demonstrate increased step length laterally  Confidence while ambulating continues to improve  Drop foot noticeable when fatigued post session  Plan: Continue per plan of care  Access Code: [email or web address]  URL: [email or web address]    Manuals          L DF PROM 8' 8' 6' 6'                                                Neuro Re-Ed             ABC 1x x1 x1 sit x1 sit         Alt marches on foam 20x ea x20 ea x20 ea x20 ea         Hip abd on foam RTB 20x ea RTB x20 RTB x20 GTB x15         Side steps on foam beam 2 laps 4 laps 4 laps 4 laps         Biodex LOS L8 3x 1UE, Static 1x no UE L8 x3 1UE, static x1 no UE L8 x3 1 UE L8 x3 1 UE         Obstacle course   4 laps CGA          Lateral hurdles on foam beam    5 laps         Ther Ex             Bike 5' 5' 5' 5'         Ankle DF supine RTB 10x RTB x20 RTB x20 RTB x20         Ankle inv/ev supine RTB 20x RTB x20 RTB x20 RTB x20         TR seated EOT 20x x20 x20 2x15                                                             Ther Activity                                       Gait Training                                       Modalities

## 2022-04-21 ENCOUNTER — OFFICE VISIT (OUTPATIENT)
Dept: PHYSICAL THERAPY | Facility: CLINIC | Age: 77
End: 2022-04-21
Payer: MEDICARE

## 2022-04-21 DIAGNOSIS — M51.16 LUMBAR DISC DISEASE WITH RADICULOPATHY: Primary | ICD-10-CM

## 2022-04-21 PROCEDURE — 97110 THERAPEUTIC EXERCISES: CPT | Performed by: PHYSICAL THERAPIST

## 2022-04-21 PROCEDURE — 97112 NEUROMUSCULAR REEDUCATION: CPT | Performed by: PHYSICAL THERAPIST

## 2022-04-26 ENCOUNTER — OFFICE VISIT (OUTPATIENT)
Dept: GASTROENTEROLOGY | Facility: CLINIC | Age: 77
End: 2022-04-26
Payer: MEDICARE

## 2022-04-26 ENCOUNTER — OFFICE VISIT (OUTPATIENT)
Dept: PHYSICAL THERAPY | Facility: CLINIC | Age: 77
End: 2022-04-26
Payer: MEDICARE

## 2022-04-26 ENCOUNTER — APPOINTMENT (OUTPATIENT)
Dept: LAB | Facility: CLINIC | Age: 77
End: 2022-04-26
Payer: MEDICARE

## 2022-04-26 VITALS
DIASTOLIC BLOOD PRESSURE: 80 MMHG | SYSTOLIC BLOOD PRESSURE: 122 MMHG | BODY MASS INDEX: 25.58 KG/M2 | WEIGHT: 139 LBS | HEIGHT: 62 IN

## 2022-04-26 DIAGNOSIS — R10.84 GENERALIZED ABDOMINAL PAIN: ICD-10-CM

## 2022-04-26 DIAGNOSIS — K83.8 DILATION OF BILIARY TRACT: ICD-10-CM

## 2022-04-26 DIAGNOSIS — R11.0 NAUSEA: ICD-10-CM

## 2022-04-26 DIAGNOSIS — R93.3 ABNORMAL CT SCAN, GASTROINTESTINAL TRACT: ICD-10-CM

## 2022-04-26 DIAGNOSIS — R93.3 ABNORMAL CT SCAN, GASTROINTESTINAL TRACT: Primary | ICD-10-CM

## 2022-04-26 DIAGNOSIS — M51.16 LUMBAR DISC DISEASE WITH RADICULOPATHY: Primary | ICD-10-CM

## 2022-04-26 LAB
ALBUMIN SERPL BCP-MCNC: 3.9 G/DL (ref 3.5–5)
ALP SERPL-CCNC: 152 U/L (ref 46–116)
ALT SERPL W P-5'-P-CCNC: 52 U/L (ref 12–78)
AST SERPL W P-5'-P-CCNC: 30 U/L (ref 5–45)
BILIRUB DIRECT SERPL-MCNC: 0.09 MG/DL (ref 0–0.2)
BILIRUB SERPL-MCNC: 0.4 MG/DL (ref 0.2–1)
PROT SERPL-MCNC: 8.1 G/DL (ref 6.4–8.2)

## 2022-04-26 PROCEDURE — 97112 NEUROMUSCULAR REEDUCATION: CPT | Performed by: PHYSICAL THERAPIST

## 2022-04-26 PROCEDURE — 36415 COLL VENOUS BLD VENIPUNCTURE: CPT

## 2022-04-26 PROCEDURE — 80076 HEPATIC FUNCTION PANEL: CPT

## 2022-04-26 PROCEDURE — 99214 OFFICE O/P EST MOD 30 MIN: CPT | Performed by: INTERNAL MEDICINE

## 2022-04-26 PROCEDURE — 97110 THERAPEUTIC EXERCISES: CPT | Performed by: PHYSICAL THERAPIST

## 2022-04-26 RX ORDER — SUCRALFATE ORAL 1 G/10ML
1 SUSPENSION ORAL
Qty: 280 ML | Refills: 5 | Status: SHIPPED | OUTPATIENT
Start: 2022-04-26 | End: 2022-04-26

## 2022-04-26 RX ORDER — SUCRALFATE ORAL 1 G/10ML
1 SUSPENSION ORAL
Qty: 280 ML | Refills: 5 | Status: SHIPPED | OUTPATIENT
Start: 2022-04-26

## 2022-04-26 NOTE — PROGRESS NOTES
[facility]      Chief Complaint:  Abdominal pain    HPI:  Julianna Ahmadi is a 76 y o   female who presents with significant abdominal pain that she says is basically ring her life  She describes generalized abdominal pain although sometimes it is upper abdominal pain  She said the only relief she ever got was when she went for a study where she had to drink some barium  Patient did undergo CT scan on April 3rd which showed both intrahepatic and extrahepatic ductal dilation  She had a series of treatments with morphine for severe back pain following back surgery and right leg weakness  She is still going for physical therapy for this  Patient's pain sometimes follows meals but not always  She has significant nocturnal symptomatology  No recent liver functions  The patient does continue to smoke despite advice to the contrary  Some moderate recent weight loss but she does not quantify this  Her appetite is somewhat poor  She had undergone both EGD and colonoscopy in the past   She has no shortness of breath  No chest pain  No other definitive exacerbating or remitting factors for her pain         Review of Systems:   Constitutional: No fever or chills, feels well, no tiredness, no recent weight gain or weight loss  HENT: No complaints of earache, no hearing loss, no nosebleeds, no nasal discharge, no sore throat, no hoarseness  Eyes: No complaints of eye pain, no red eyes, no discharge from eyes, no itchy eyes  Cardiovascular: No complaints of slow heart rate, no fast heart rate, no chest pain, no palpitations, no leg claudication, no lower extremity edema  Respiratory: No complaints of shortness of breath, no wheezing, no cough, no SOB on exertion, no orthopnea  Gastrointestinal: As noted in HPI  Genitourinary: No complaints of dysuria, no incontinence, no hesitancy, no nocturia     Musculoskeletal:  As per HPI  Neurological: No complaints of headache, no confusion, no convulsions, no numbness or tingling, no dizziness or fainting, no limb weakness, no difficulty walking  Skin: No complaints of skin rash or skin lesions, no itching, no skin wound, no dry skin  Hematological/Lymphatic: No complaints of swollen glands, does not bleed easy  Allergic/Immunologic: No immunocompromised state  Endocrine:  No complaints of polyuria, no polydipsia  Psychiatric/Behavioral: is not suicidal, no sleep disturbances, no anxiety or depression, no change in personality, no emotional problems  Historical Information   Past Medical History:   Diagnosis Date    Anxiety     Cataract     last assessed 14    [facility] bronchitis ([facility] )     Sees pulmonary specialist twice a year    Chronic pain disorder     COPD (chronic obstructive pulmonary disease) ([facility] )     Hypothyroidism     Multiple sclerosis ([place] [id number] )     CHOCO (obstructive sleep apnea)     uses cpap, 3/19/21 -pt states will bring CPAP for DOS 3/23/21    Peroneal muscle atrophy     Shortness of breath     Smoker     Thoracolumbar radiculopathy due to intervertebral disc disorder      Past Surgical History:   Procedure Laterality Date    APPENDECTOMY      CATARACT EXTRACTION       SECTION      CHOLECYSTECTOMY      GALLBLADDER SURGERY      [place] COLONOSCOPY FLX DX W/COLLJ SPEC WHEN PFRMD N/A 2018    Procedure: COLONOSCOPY;  Surgeon: Varghese Smith MD;  [facility];   Service: Colorectal    [place] LAMNOTMY INCL W/DCMPRSN NRV ROOT 1 INTRSPC LUMBR Right 3/23/2021    Procedure: MINIMALLY INVASIVE LUMBAR FAR LATERAL DISCECTOMY L4-5, RIGHT;  Surgeon: Rajesh Sanches MD;  Location:  MAIN OR;  Service: Neurosurgery    THROAT SURGERY       Social History   Social History     Substance and Sexual Activity   Alcohol Use Yes    Alcohol/week: 1 0 - 2 0 standard drink    Types: 1 - 2 Glasses of wine per week    Comment: 1-2 drinks of wine daily      Social History     Substance and Sexual Activity   Drug Use Not Currently    Types: Morphine, Marijuana    Comment: medical-RSO  last dose 11 days ago, past hx o f medical marijuana use - NOT CURRENT     Social History     Tobacco Use   Smoking Status Current Every Day Smoker    Packs/day: 1 00    Years: 60 00    Pack years: 60 00    Types: Cigarettes    Start date: 2/16/1958   Smokeless Tobacco Never Used     Family History   Problem Relation Age of Onset    Skin cancer Mother     Hypertension Father         benign essential    Stroke Father     Lung cancer Brother          Current Medications: has a current medication list which includes the following prescription(s): cyclobenzaprine, dicyclomine, levothyroxine, morphine, multiple vitamins-minerals, and naloxone  Vital Signs: /80   Ht 5' 2" (1 575 m)   Wt 63 kg (139 lb)   BMI 25 42 kg/m²       Physical Exam:   Constitutional  General Appearance: No acute distress, well appearing and well nourished  Head  Normocephalic  Eyes  Conjunctivae and lids: No swelling, erythema, or discharge  Pupils and irises: Equal, round and reactive to light  Ears, Nose, Mouth, and Throat  External inspection of ears and nose: Normal  Nasal mucosa, septum and turbinates: Normal without edema or erythema/   Oropharynx: Normal with no erythema, edema, exudate or lesions  Neck  Normal range of motion  Neck supple  Cardiovascular  Auscultation of the heart: Normal rate and rhythm, normal S1 and S2 without murmurs  Examination of the extremities for edema and/or varicosities: Normal  Pulmonary/Chest  Respiratory effort: No increased work of breathing or signs of respiratory distress  Auscultation of lungs: Clear to auscultation, equal breath sounds bilaterally, no wheezes, rales, no rhonchi  Abdomen  Abdomen: Non-tender, no masses  Liver and spleen: No hepatomegaly or splenomegaly  Musculoskeletal  Gait and station: normal   Digits and Nails: normal without clubbing or cyanosis    Inspection/palpation of joints, bones, and muscles: Normal  Neurological  No nystagmus or asterixis  Skin  Skin and subcutaneous tissue: Normal without rashes or lesions  Lymphatic  Palpation of the lymph nodes in neck: No lymphadenopathy  Psychiatric  Orientation to person, place and time: Normal   Mood and affect: Normal          Labs:  Lab Results   Component Value Date    ALT 46 02/26/2021    AST 32 02/26/2021    BUN 16 04/07/2022    CALCIUM 9 2 04/07/2022     04/07/2022    CHOL 191 11/02/2015    CO2 28 04/07/2022    CREATININE 0 72 04/07/2022    HDL 77 09/17/2021    HCT 43 3 04/07/2022    HGB 14 0 04/07/2022    HGBA1C 5 6 02/26/2021    MG 2 3 03/09/2017     04/07/2022    K 4 0 04/07/2022     11/02/2015    TRIG 82 09/17/2021    WBC 7 61 04/07/2022         X-Rays & Procedures:   VAS celiac and/or mesenteric duplex; complete study    (Results Pending)   MRI abdomen w wo contrast and mrcp    (Results Pending)           ______________________________________________________________________      Assessment & Plan:     Diagnoses and all orders for this visit:    Generalized abdominal pain  -     VAS celiac and/or mesenteric duplex; complete study; Future  -     MRI abdomen w wo contrast and mrcp; Future    Nausea    Dilation of biliary tract  -     VAS celiac and/or mesenteric duplex; complete study; Future  -     MRI abdomen w wo contrast and mrcp; Future    Abnormal CT scan, gastrointestinal tract  -     VAS celiac and/or mesenteric duplex; complete study; Future  -     MRI abdomen w wo contrast and mrcp; Future      Patient will undergo both Doppler and MRI/MRCP for abdominal pain, dilated bile ducts on CT scan  She will be started on Carafate  Further recommendations will depend on study results and progress

## 2022-04-26 NOTE — PROGRESS NOTES
Daily Note     Today's date: 2022  Patient name: Raisa Adorno  : 1945  MRN: [id number]  Referring provider: Isadora Bingham PA-C  Dx:   Encounter Diagnosis     ICD-10-CM    1  Lumbar disc disease with radiculopathy  M51 16        Start Time: 930  Stop Time: 1003  Total time in clinic (min): 33 minutes    Subjective: Pt offers no new complaints today  Objective: See treatment diary below      Assessment: Pt's daughter accompanied her to therapy today, PT instructed her on stretching and she was able to perform this on pt without adverse effects  She was instructed on how to assist pt with HEP  Pt was able to progress in Biodex balance exercise with good accuracy  Plan: DC next visit  Access Code: [email or web address]  URL: [email or web address]    Manuals        L DF PROM 8' 8' 6' 6' 5' 5'                                              Neuro Re-Ed             ABC 1x x1 x1 sit x1 sit 2x sit 2x sit       Alt marches on foam 20x ea x20 ea x20 ea x20 ea 20x ea 20x ea       Hip abd on foam RTB 20x ea RTB x20 RTB x20 GTB x15 GTB 15x ea GTB 15x ea       Side steps on foam beam 2 laps 4 laps 4 laps 4 laps 4 laps        Biodex LOS L8 3x 1UE, Static 1x no UE L8 x3 1UE, static x1 no UE L8 x3 1 UE L8 x3 1 UE L8 3x 1UE L7 3x 1UE       Obstacle course   4 laps CGA          Lateral hurdles on foam beam    5 laps         Ther Ex             Bike 5' 5' 5' 5' 5' 5'       Ankle DF supine RTB 10x RTB x20 RTB x20 RTB x20 RTB 20x RTB 20x       Ankle inv/ev supine RTB 20x RTB x20 RTB x20 RTB x20 RTB 20x RTB 30x ea       TR seated EOT 20x x20 x20 2x15 30x 30x                                                           Ther Activity                                       Gait Training                                       Modalities

## 2022-04-28 ENCOUNTER — OFFICE VISIT (OUTPATIENT)
Dept: PHYSICAL THERAPY | Facility: CLINIC | Age: 77
End: 2022-04-28
Payer: MEDICARE

## 2022-04-28 DIAGNOSIS — M51.16 LUMBAR DISC DISEASE WITH RADICULOPATHY: Primary | ICD-10-CM

## 2022-04-28 PROCEDURE — 97110 THERAPEUTIC EXERCISES: CPT | Performed by: PHYSICAL THERAPIST

## 2022-04-28 PROCEDURE — 97112 NEUROMUSCULAR REEDUCATION: CPT | Performed by: PHYSICAL THERAPIST

## 2022-04-28 NOTE — PROGRESS NOTES
PT Discharge    Today's date: 2022  Patient name: Bandar Rincon  : 1945  MRN: [id number]  Referring provider: Caroline Franklin PA-C  Dx:   Encounter Diagnosis     ICD-10-CM    1  Lumbar disc disease with radiculopathy  M51 16        Start Time: 930  Stop Time: 1009  Total time in clinic (min): 39 minutes    Assessment  Assessment details: Pt and PT discussed DC to HEP a couple of visits ago, as she is at the point where she is reaching a plateau and she is able to perform this on her own  She was agreeable to his  Her DF AROM was the same as re-eval last visit, and her TUG minimally improved  Impairments: abnormal gait, abnormal or restricted ROM, activity intolerance, impaired balance, impaired physical strength and pain with function  Functional limitations: walking, stairs  Symptom irritability: lowUnderstanding of Dx/Px/POC: good   Prognosis: good    Goals  STG: 3 weeks  1  Pt will demonstrate independence with HEP  met  2  Pt will improve R ankle AROM by at least 10%  met  3  Pt will improve R ankle strength by at least 1/2 grade  In progress    LT weeks  1  Pt will improve TUG time to 15s or less without AD  met  2  Pt will improve R ankle DF by 20% from IE to improve walking  met  3  Pt will report improved walking endurance  met      Plan  Patient would benefit from: skilled physical therapy  Planned modality interventions: cryotherapy and thermotherapy: hydrocollator packs  Planned therapy interventions: therapeutic exercise, therapeutic activities, stretching, strengthening, patient education, neuromuscular re-education, massage, manual therapy, balance, gait training and home exercise program  Treatment plan discussed with: patient        Subjective Evaluation    History of Present Illness  Mechanism of injury: Pt reports she has had less frequency of cramps and pain in the lower leg   She now does not have a problem going up and down stairs, and does not require the railing when going up  She requires less concentration with stairs and walking  Recurrent probem    Quality of life: fair    Pain  At best pain ratin  At worst pain ratin  Quality: discomfort  Relieving factors: medications  Aggravating factors: walking and stair climbing  Progression: improved    Treatments  Previous treatment: physical therapy and injection treatment  Current treatment: medication  Patient Goals  Patient goals for therapy: decreased pain, increased motion, improved balance, independence with ADLs/IADLs and increased strength          Objective     Neurological Testing     Reflexes   Left   Patellar (L4): normal (2+)  Achilles (S1): trace (1+)    Right   Patellar (L4): normal (2+)  Achilles (S1): trace (1+)    Active Range of Motion     Right Ankle/Foot   Dorsiflexion (ke): -20 degrees     Additional Active Range of Motion Details  R DF in supine with knee ext, pt able to achieve 25* of total motion (-45* to -20*)    Strength/Myotome Testing     Left Ankle/Foot   Normal strength    Right Ankle/Foot   Dorsiflexion: 2-  Plantar flexion: 4+  Inversion: 4+  Eversion: 4+    General Comments: Ankle/Foot Comments   TU 7s no AD (minimal foot drop noted)    Access Code: [email or web address]  URL: [email or web address]    Manuals       L DF PROM 8' 8' 6' 6' 5' 5' 5'                                             Neuro Re-Ed             ABC 1x x1 x1 sit x1 sit 2x sit 2x sit 2x sit      Alt marches on foam 20x ea x20 ea x20 ea x20 ea 20x ea 20x ea 20x ea      Hip abd on foam RTB 20x ea RTB x20 RTB x20 GTB x15 GTB 15x ea GTB 15x ea GTB 15x ea      Side steps on foam beam 2 laps 4 laps 4 laps 4 laps 4 laps        Biodex LOS L8 3x 1UE, Static 1x no UE L8 x3 1UE, static x1 no UE L8 x3 1 UE L8 x3 1 UE L8 3x 1UE L7 3x 1UE L7 3x 1UE      Obstacle course   4 laps CGA          Lateral hurdles on foam beam    5 laps         Ther Ex             Bike 5' 5' 5' 5' 5' 5' 5' Ankle DF supine RTB 10x RTB x20 RTB x20 RTB x20 RTB 20x RTB 20x RTB 20x      Ankle inv/ev supine RTB 20x RTB x20 RTB x20 RTB x20 RTB 20x RTB 30x ea RTB 20x ea      TR seated EOT 20x x20 x20 2x15 30x 30x 30x                                                          Ther Activity                                       Gait Training                                       Modalities

## 2022-04-29 ENCOUNTER — HOSPITAL ENCOUNTER (OUTPATIENT)
Dept: VASCULAR ULTRASOUND | Facility: HOSPITAL | Age: 77
Discharge: HOME | End: 2022-04-29
Attending: INTERNAL MEDICINE
Payer: MEDICARE

## 2022-04-29 DIAGNOSIS — R10.84 GENERALIZED ABDOMINAL PAIN: ICD-10-CM

## 2022-04-29 DIAGNOSIS — R93.3 ABNORMAL CT SCAN, GASTROINTESTINAL TRACT: ICD-10-CM

## 2022-04-29 DIAGNOSIS — K83.8 DILATION OF BILIARY TRACT: ICD-10-CM

## 2022-04-29 PROCEDURE — 93975 VASCULAR STUDY: CPT | Performed by: SURGERY

## 2022-04-29 PROCEDURE — 93975 VASCULAR STUDY: CPT

## 2022-05-05 ENCOUNTER — HOSPITAL ENCOUNTER (OUTPATIENT)
Dept: MRI IMAGING | Facility: HOSPITAL | Age: 77
Discharge: HOME | End: 2022-05-05
Payer: MEDICARE

## 2022-05-05 DIAGNOSIS — R10.84 GENERALIZED ABDOMINAL PAIN: ICD-10-CM

## 2022-05-05 DIAGNOSIS — R93.3 ABNORMAL CT SCAN, GASTROINTESTINAL TRACT: ICD-10-CM

## 2022-05-05 DIAGNOSIS — K83.8 DILATION OF BILIARY TRACT: ICD-10-CM

## 2022-05-05 PROCEDURE — A9585 GADOBUTROL INJECTION: HCPCS | Performed by: INTERNAL MEDICINE

## 2022-05-05 PROCEDURE — 74183 MRI ABD W/O CNTR FLWD CNTR: CPT

## 2022-05-05 RX ADMIN — GADOBUTROL 6 ML: 604.72 INJECTION INTRAVENOUS at 15:28

## 2022-05-09 ENCOUNTER — TELEPHONE (OUTPATIENT)
Dept: GASTROENTEROLOGY | Facility: CLINIC | Age: 77
End: 2022-05-09

## 2022-05-10 ENCOUNTER — TELEPHONE (OUTPATIENT)
Dept: GASTROENTEROLOGY | Facility: CLINIC | Age: 77
End: 2022-05-10

## 2022-05-10 DIAGNOSIS — R74.8 ELEVATED ALKALINE PHOSPHATASE LEVEL: ICD-10-CM

## 2022-05-10 DIAGNOSIS — R10.84 GENERALIZED ABDOMINAL PAIN: ICD-10-CM

## 2022-05-10 DIAGNOSIS — K83.8 DILATION OF BILIARY TRACT: Primary | ICD-10-CM

## 2022-05-10 NOTE — TELEPHONE ENCOUNTER
Results reviewed with patient    Please get a alkaline phosphatase isoenzymes, and refer her to Dr Preet Liu for possible ERCP for increasing dilated bile duct and elevated alkaline phosphatase and abdominal pain

## 2022-05-11 ENCOUNTER — TELEPHONE (OUTPATIENT)
Dept: PULMONOLOGY | Facility: CLINIC | Age: 77
End: 2022-05-11

## 2022-05-11 DIAGNOSIS — R10.9 STOMACH PAIN: ICD-10-CM

## 2022-05-11 RX ORDER — DICYCLOMINE HYDROCHLORIDE 10 MG/1
CAPSULE ORAL
Qty: 360 CAPSULE | Refills: 0 | Status: SHIPPED | OUTPATIENT
Start: 2022-05-11

## 2022-05-11 NOTE — TELEPHONE ENCOUNTER
Patient calling saying she needs new cpap supplies and Asael said she needs an order from the doctor  Please advise and call patient when order is sent

## 2022-05-17 ENCOUNTER — PREP FOR PROCEDURE (OUTPATIENT)
Dept: GASTROENTEROLOGY | Facility: CLINIC | Age: 77
End: 2022-05-17

## 2022-05-17 ENCOUNTER — TELEPHONE (OUTPATIENT)
Dept: GASTROENTEROLOGY | Facility: CLINIC | Age: 77
End: 2022-05-17

## 2022-05-17 DIAGNOSIS — R10.84 GENERALIZED ABDOMINAL PAIN: ICD-10-CM

## 2022-05-17 DIAGNOSIS — K83.8 DILATION OF BILIARY TRACT: Primary | ICD-10-CM

## 2022-05-17 DIAGNOSIS — R74.8 ELEVATED ALKALINE PHOSPHATASE LEVEL: ICD-10-CM

## 2022-05-17 NOTE — TELEPHONE ENCOUNTER
Dr Shawn Nichols - Patient is being referred by Dr Deanne Austin for an ERCP, (see encounter dated 5/10/22, Telephone) Can patient be a direct schedule, or would you like to see her in the office first? Thank you

## 2022-05-17 NOTE — TELEPHONE ENCOUNTER
Spoke with patient and advised her we scheduled the ERCP for 5/26  Prep instructions were discussed verbally

## 2022-05-19 ENCOUNTER — TELEPHONE (OUTPATIENT)
Dept: GASTROENTEROLOGY | Facility: CLINIC | Age: 77
End: 2022-05-19

## 2022-05-19 ENCOUNTER — HOSPITAL ENCOUNTER (INPATIENT)
Facility: HOSPITAL | Age: 77
LOS: 1 days | Discharge: HOME | DRG: 445 | End: 2022-05-21
Attending: EMERGENCY MEDICINE | Admitting: FAMILY MEDICINE
Payer: MEDICARE

## 2022-05-19 DIAGNOSIS — R93.5 ABNORMAL CT OF THE ABDOMEN: ICD-10-CM

## 2022-05-19 DIAGNOSIS — R10.84 GENERALIZED ABDOMINAL PAIN: ICD-10-CM

## 2022-05-19 DIAGNOSIS — R11.0 NAUSEA: ICD-10-CM

## 2022-05-19 DIAGNOSIS — K83.8 DILATION OF BILIARY TRACT: Primary | ICD-10-CM

## 2022-05-19 DIAGNOSIS — R10.9 ABDOMINAL PAIN: Primary | ICD-10-CM

## 2022-05-19 LAB
ALBUMIN SERPL BCP-MCNC: 3.5 G/DL (ref 3.5–5)
ALP SERPL-CCNC: 127 U/L (ref 46–116)
ALT SERPL W P-5'-P-CCNC: 39 U/L (ref 12–78)
ANION GAP SERPL CALCULATED.3IONS-SCNC: 9 MMOL/L (ref 4–13)
AST SERPL W P-5'-P-CCNC: 27 U/L (ref 5–45)
ATRIAL RATE: 80 BPM
BASOPHILS # BLD AUTO: 0.03 THOUSANDS/ΜL (ref 0–0.1)
BASOPHILS NFR BLD AUTO: 1 % (ref 0–1)
BILIRUB SERPL-MCNC: 0.38 MG/DL (ref 0.2–1)
BUN SERPL-MCNC: 13 MG/DL (ref 5–25)
CALCIUM SERPL-MCNC: 8.8 MG/DL (ref 8.3–10.1)
CHLORIDE SERPL-SCNC: 103 MMOL/L (ref 100–108)
CO2 SERPL-SCNC: 25 MMOL/L (ref 21–32)
CREAT SERPL-MCNC: 0.71 MG/DL (ref 0.6–1.3)
EOSINOPHIL # BLD AUTO: 0.13 THOUSAND/ΜL (ref 0–0.61)
EOSINOPHIL NFR BLD AUTO: 2 % (ref 0–6)
ERYTHROCYTE [DISTWIDTH] IN BLOOD BY AUTOMATED COUNT: 14.1 % (ref 11.6–15.1)
GFR SERPL CREATININE-BSD FRML MDRD: 81 ML/MIN/1.73SQ M
GLUCOSE SERPL-MCNC: 92 MG/DL (ref 65–140)
HCT VFR BLD AUTO: 42.4 % (ref 34.8–46.1)
HGB BLD-MCNC: 13.6 G/DL (ref 11.5–15.4)
IMM GRANULOCYTES # BLD AUTO: 0.01 THOUSAND/UL (ref 0–0.2)
IMM GRANULOCYTES NFR BLD AUTO: 0 % (ref 0–2)
LIPASE SERPL-CCNC: 105 U/L (ref 73–393)
LYMPHOCYTES # BLD AUTO: 1.17 THOUSANDS/ΜL (ref 0.6–4.47)
LYMPHOCYTES NFR BLD AUTO: 18 % (ref 14–44)
MCH RBC QN AUTO: 31.6 PG (ref 26.8–34.3)
MCHC RBC AUTO-ENTMCNC: 32.1 G/DL (ref 31.4–37.4)
MCV RBC AUTO: 99 FL (ref 82–98)
MONOCYTES # BLD AUTO: 1.15 THOUSAND/ΜL (ref 0.17–1.22)
MONOCYTES NFR BLD AUTO: 17 % (ref 4–12)
NEUTROPHILS # BLD AUTO: 4.15 THOUSANDS/ΜL (ref 1.85–7.62)
NEUTS SEG NFR BLD AUTO: 62 % (ref 43–75)
NRBC BLD AUTO-RTO: 0 /100 WBCS
P AXIS: 83 DEGREES
PLATELET # BLD AUTO: 226 THOUSANDS/UL (ref 149–390)
PMV BLD AUTO: 9.3 FL (ref 8.9–12.7)
POTASSIUM SERPL-SCNC: 3.6 MMOL/L (ref 3.5–5.3)
PR INTERVAL: 146 MS
PROT SERPL-MCNC: 7 G/DL (ref 6.4–8.2)
QRS AXIS: 82 DEGREES
QRSD INTERVAL: 82 MS
QT INTERVAL: 374 MS
QTC INTERVAL: 431 MS
RBC # BLD AUTO: 4.3 MILLION/UL (ref 3.81–5.12)
SODIUM SERPL-SCNC: 137 MMOL/L (ref 136–145)
T WAVE AXIS: 84 DEGREES
VENTRICULAR RATE: 80 BPM
WBC # BLD AUTO: 6.64 THOUSAND/UL (ref 4.31–10.16)

## 2022-05-19 PROCEDURE — 83690 ASSAY OF LIPASE: CPT | Performed by: EMERGENCY MEDICINE

## 2022-05-19 PROCEDURE — 99285 EMERGENCY DEPT VISIT HI MDM: CPT

## 2022-05-19 PROCEDURE — 93005 ELECTROCARDIOGRAM TRACING: CPT

## 2022-05-19 PROCEDURE — 36415 COLL VENOUS BLD VENIPUNCTURE: CPT | Performed by: EMERGENCY MEDICINE

## 2022-05-19 PROCEDURE — 93010 ELECTROCARDIOGRAM REPORT: CPT | Performed by: INTERNAL MEDICINE

## 2022-05-19 PROCEDURE — 96374 THER/PROPH/DIAG INJ IV PUSH: CPT

## 2022-05-19 PROCEDURE — 99220 PR INITIAL OBSERVATION CARE/DAY 70 MINUTES: CPT | Performed by: STUDENT IN AN ORGANIZED HEALTH CARE EDUCATION/TRAINING PROGRAM

## 2022-05-19 PROCEDURE — 99285 EMERGENCY DEPT VISIT HI MDM: CPT | Performed by: EMERGENCY MEDICINE

## 2022-05-19 PROCEDURE — 80053 COMPREHEN METABOLIC PANEL: CPT | Performed by: EMERGENCY MEDICINE

## 2022-05-19 PROCEDURE — 96375 TX/PRO/DX INJ NEW DRUG ADDON: CPT

## 2022-05-19 PROCEDURE — 85025 COMPLETE CBC W/AUTO DIFF WBC: CPT | Performed by: EMERGENCY MEDICINE

## 2022-05-19 RX ORDER — MORPHINE SULFATE 15 MG/1
15 TABLET ORAL EVERY 8 HOURS PRN
Status: DISCONTINUED | OUTPATIENT
Start: 2022-05-19 | End: 2022-05-21 | Stop reason: HOSPADM

## 2022-05-19 RX ORDER — HEPARIN SODIUM 5000 [USP'U]/ML
5000 INJECTION, SOLUTION INTRAVENOUS; SUBCUTANEOUS EVERY 8 HOURS SCHEDULED
Status: DISCONTINUED | OUTPATIENT
Start: 2022-05-19 | End: 2022-05-19

## 2022-05-19 RX ORDER — DOCUSATE SODIUM 100 MG/1
100 CAPSULE, LIQUID FILLED ORAL 2 TIMES DAILY
Status: DISCONTINUED | OUTPATIENT
Start: 2022-05-19 | End: 2022-05-21 | Stop reason: HOSPADM

## 2022-05-19 RX ORDER — MELATONIN
1000 DAILY
COMMUNITY

## 2022-05-19 RX ORDER — NICOTINE 21 MG/24HR
1 PATCH, TRANSDERMAL 24 HOURS TRANSDERMAL DAILY
Status: DISCONTINUED | OUTPATIENT
Start: 2022-05-20 | End: 2022-05-21 | Stop reason: HOSPADM

## 2022-05-19 RX ORDER — DICYCLOMINE HYDROCHLORIDE 10 MG/1
10 CAPSULE ORAL
Status: DISCONTINUED | OUTPATIENT
Start: 2022-05-19 | End: 2022-05-21 | Stop reason: HOSPADM

## 2022-05-19 RX ORDER — SUCRALFATE 1 G/1
1 TABLET ORAL
Status: DISCONTINUED | OUTPATIENT
Start: 2022-05-19 | End: 2022-05-21 | Stop reason: HOSPADM

## 2022-05-19 RX ORDER — LEVOTHYROXINE SODIUM 0.1 MG/1
100 TABLET ORAL DAILY
Status: DISCONTINUED | OUTPATIENT
Start: 2022-05-20 | End: 2022-05-21 | Stop reason: HOSPADM

## 2022-05-19 RX ORDER — ONDANSETRON 2 MG/ML
4 INJECTION INTRAMUSCULAR; INTRAVENOUS EVERY 6 HOURS PRN
Status: DISCONTINUED | OUTPATIENT
Start: 2022-05-19 | End: 2022-05-21 | Stop reason: HOSPADM

## 2022-05-19 RX ORDER — SENNOSIDES 8.6 MG
1 TABLET ORAL DAILY
Status: DISCONTINUED | OUTPATIENT
Start: 2022-05-20 | End: 2022-05-21 | Stop reason: HOSPADM

## 2022-05-19 RX ORDER — HYDROMORPHONE HCL/PF 1 MG/ML
0.5 SYRINGE (ML) INJECTION ONCE
Status: COMPLETED | OUTPATIENT
Start: 2022-05-19 | End: 2022-05-19

## 2022-05-19 RX ORDER — ACETAMINOPHEN 325 MG/1
650 TABLET ORAL EVERY 6 HOURS PRN
Status: DISCONTINUED | OUTPATIENT
Start: 2022-05-19 | End: 2022-05-21 | Stop reason: HOSPADM

## 2022-05-19 RX ORDER — CALCIUM CARBONATE 200(500)MG
1000 TABLET,CHEWABLE ORAL DAILY PRN
Status: DISCONTINUED | OUTPATIENT
Start: 2022-05-19 | End: 2022-05-21 | Stop reason: HOSPADM

## 2022-05-19 RX ORDER — ONDANSETRON 2 MG/ML
4 INJECTION INTRAMUSCULAR; INTRAVENOUS ONCE
Status: COMPLETED | OUTPATIENT
Start: 2022-05-19 | End: 2022-05-19

## 2022-05-19 RX ORDER — HYDROMORPHONE HCL/PF 1 MG/ML
0.5 SYRINGE (ML) INJECTION EVERY 4 HOURS PRN
Status: DISCONTINUED | OUTPATIENT
Start: 2022-05-19 | End: 2022-05-21 | Stop reason: HOSPADM

## 2022-05-19 RX ADMIN — ONDANSETRON 4 MG: 2 INJECTION INTRAMUSCULAR; INTRAVENOUS at 17:32

## 2022-05-19 RX ADMIN — MORPHINE SULFATE 15 MG: 15 TABLET ORAL at 23:01

## 2022-05-19 RX ADMIN — DOCUSATE SODIUM 100 MG: 100 CAPSULE ORAL at 21:15

## 2022-05-19 RX ADMIN — DICYCLOMINE HYDROCHLORIDE 10 MG: 10 CAPSULE ORAL at 21:15

## 2022-05-19 RX ADMIN — HYDROMORPHONE HYDROCHLORIDE 0.5 MG: 1 INJECTION, SOLUTION INTRAMUSCULAR; INTRAVENOUS; SUBCUTANEOUS at 17:32

## 2022-05-19 RX ADMIN — SUCRALFATE 1 G: 1 TABLET ORAL at 21:15

## 2022-05-19 RX ADMIN — HYDROMORPHONE HYDROCHLORIDE 0.5 MG: 1 INJECTION, SOLUTION INTRAMUSCULAR; INTRAVENOUS; SUBCUTANEOUS at 21:15

## 2022-05-19 NOTE — H&P
[address] [facility]  H&P- Nora Khan 1945, 76 y o  female MRN: [id number]  Unit/Bed#: OVR 18 Encounter: [id number]  Primary Care Provider: Jatin Rojas DO   Date and time admitted to hospital: 5/19/2022  5:06 PM    * Abdominal pain  Assessment & Plan  · Following with GI as an outpatient  · Had MRCP completed showing biliary ductal dilatation- distal stricture vs ampullary dysfunction  · Had ERCP scheduled as an outpatient, but unable to tolerate pain  · Now admitted to medicine  · Consult GI, keep NPO at midnight    Abnormal CT of the abdomen  Assessment & Plan  · Noted to have several pancreatic cysts on imaging  · Follow up with GI for outpatient surveillance     Continuous opioid dependence ([facility] )  Assessment & Plan  · Continue home dose morphine    Multiple sclerosis ([facility] )  Assessment & Plan  · Stable, continue outpatient follow up     Hypothyroidism  Assessment & Plan  · Continue home dose synthroid     VTE Pharmacologic Prophylaxis: VTE Score: 4 Moderate Risk (Score 3-4) - Pharmacological DVT Prophylaxis Contraindicated  Sequential Compression Devices Ordered  Code Status: Level 1 - Full Code   Anticipated Length of Stay: Patient will be admitted on an observation basis with an anticipated length of stay of less than 2 midnights secondary to ERCP in AM     Total Time for Visit, including Counseling / Coordination of Care: 30 minutes Greater than 50% of this total time spent on direct patient counseling and coordination of care  Chief Complaint: abdominal pain    History of Present Illness:  Nora Khan is a 76 y o  female with a PMH of multiple sclerosis with chronic pain, hypothyroidism who presents with acute onset worsening abdominal pain  Patient reports she has been following with GI as an outpatient  Had MRCP as an outpatient which showed biliary dilatation of unclear origin   There was a plan for ERCP as an outpatient but patient could not tolerate pain today so she came to the ED for further management  Now admitted to medicine for further management  Review of Systems:  Review of Systems   Constitutional: Negative for chills and fever  HENT: Negative for ear pain and sore throat  Eyes: Negative for pain and visual disturbance  Respiratory: Negative for cough and shortness of breath  Cardiovascular: Negative for chest pain and palpitations  Gastrointestinal: Positive for abdominal pain  Negative for vomiting  Genitourinary: Negative for dysuria and hematuria  Musculoskeletal: Negative for arthralgias and back pain  Skin: Negative for color change and rash  Neurological: Negative for seizures and syncope  All other systems reviewed and are negative  Past Medical and Surgical History:   Past Medical History:   Diagnosis Date    Anxiety     Cataract     last assessed 14    Chronic bronchitis ([facility] )     Sees pulmonary specialist twice a year    Chronic pain disorder     [facility]     Hypothyroidism     Multiple sclerosis ([facility] )     CHOCO (obstructive sleep apnea)     uses cpap, 3/19/21 -pt states will bring CPAP for DOS 3/23/21    Peroneal muscle atrophy     Shortness of breath     Smoker     Thoracolumbar radiculopathy due to intervertebral disc disorder        Past Surgical History:   Procedure Laterality Date    APPENDECTOMY      CATARACT EXTRACTION       SECTION      CHOLECYSTECTOMY      GALLBLADDER SURGERY      [place] COLONOSCOPY FLX DX W/COLLJ SPEC WHEN PFRMD N/A 2018    Procedure: COLONOSCOPY;  Surgeon: Esme Adams MD;  Location: [facility];   Service: Colorectal    [place] LAMNOTMY INCL W/DCMPRSN NRV ROOT 1 INTRSPC LUMBR Right 3/23/2021    Procedure: MINIMALLY INVASIVE LUMBAR FAR LATERAL DISCECTOMY L4-5, RIGHT;  Surgeon: Otoniel Godfrey MD;  Location:  MAIN OR;  Service: Neurosurgery    THROAT SURGERY         Meds/Allergies:  Prior to Admission medications    Medication Sig Start Date End Date Taking? Authorizing Provider   cyclobenzaprine (FLEXERIL) 5 mg tablet Take 2 tablets (10 mg total) by mouth every 8 (eight) hours as needed for muscle spasms 4/16/22 6/15/22  [facility]   dicyclomine (BENTYL) 10 mg capsule TAKE 1 CAPSULE BY MOUTH 4 TIMES A DAY (BEFORE MEALS AND AT BEDTIME) 5/11/22   [facility], DO   levothyroxine 100 mcg tablet TAKE 1 TABLET BY MOUTH EVERY DAY 11/12/21   [facility], DO   morphine (MSIR) 15 mg tablet Take 1 tablet (15 mg total) by mouth every 8 (eight) hours as needed for severe pain Max Daily Amount: 45 mg 4/16/22   [facility], DO   Multiple Vitamins-Minerals (ICAPS AREDS 2 PO) Take by mouth daily     Historical Provider, MD   naloxone (NARCAN) 4 mg/0 1 mL nasal spray Administer 1 spray into a nostril  If no response after 2-3 minutes, give another dose in the other nostril using a new spray  4/6/21   MACHO Gregory   sucralfate (CARAFATE) 1 g/10 mL suspension TAKE 10 ML (1 G TOTAL) BY MOUTH 4 (FOUR) TIMES A DAY (BEFORE MEALS AND AT BEDTIME) 4/26/22   Grecia Coulter MD     I have reviewed home medications with patient personally  Allergies:    Allergies   Allergen Reactions    Adhesive [Medical Tape] Rash    Latex Rash       Social History:  Marital Status: /Civil Union   Occupation: na  Patient Pre-hospital Living Situation: Home  Patient Pre-hospital Level of Mobility: walks  Patient Pre-hospital Diet Restrictions: na  Substance Use History:   Social History     Substance and Sexual Activity   Alcohol Use Yes    Alcohol/week: 1 0 - 2 0 standard drink    Types: 1 - 2 Glasses of wine per week    Comment: 1-2 drinks of wine daily      Social History     Tobacco Use   Smoking Status Current Every Day Smoker    Packs/day: 1 00    Years: 60 00    Pack years: 60 00    Types: Cigarettes    Start date: 2/16/1958   Smokeless Tobacco Never Used     Social History     Substance and Sexual Activity   Drug Use Not Currently  Types: Morphine, Marijuana    Comment: medical-RSO  last dose 11 days ago, past hx o f medical marijuana use - NOT CURRENT       Family History:  Family History   Problem Relation Age of Onset    Skin cancer Mother     Hypertension Father         benign essential    Stroke Father     Lung cancer Brother        Physical Exam:     Vitals:   Blood Pressure: 129/67 (05/19/22 1905)  Pulse: 77 (05/19/22 1905)  Temperature: 98 9 °F (37 2 °C) (05/19/22 1714)  Temp Source: Oral (05/19/22 1714)  Respirations: 18 (05/19/22 1905)  SpO2: 96 % (05/19/22 1905)    Physical Exam  Vitals and nursing note reviewed  Constitutional:       Appearance: Normal appearance  HENT:      Head: Normocephalic and atraumatic  Right Ear: External ear normal       Left Ear: External ear normal       Nose: No congestion or rhinorrhea  Mouth/Throat:      Mouth: Mucous membranes are dry  Pharynx: Oropharynx is clear  Eyes:      General:         Right eye: No discharge  Left eye: No discharge  Cardiovascular:      Rate and Rhythm: Normal rate and regular rhythm  Pulmonary:      Effort: Pulmonary effort is normal  No respiratory distress  Abdominal:      General: Abdomen is flat  Palpations: Abdomen is soft  Tenderness: There is abdominal tenderness  Musculoskeletal:      Cervical back: Normal range of motion and neck supple  Right lower leg: No edema  Left lower leg: No edema  Skin:     General: Skin is warm and dry  Neurological:      General: No focal deficit present  Mental Status: She is alert  Mental status is at baseline     Psychiatric:         Mood and Affect: Mood normal          Behavior: Behavior normal           Additional Data:     Lab Results:  Results from last 7 days   Lab Units 05/19/22  1732   WBC Thousand/uL 6 64   HEMOGLOBIN g/dL 13 6   HEMATOCRIT % 42 4   PLATELETS Thousands/uL 226   NEUTROS PCT % 62   LYMPHS PCT % 18   MONOS PCT % 17*   EOS PCT % 2     Results from last 7 days   Lab Units 05/19/22  1732   SODIUM mmol/L 137   POTASSIUM mmol/L 3 6   CHLORIDE mmol/L 103   CO2 mmol/L 25   BUN mg/dL 13   CREATININE mg/dL 0 71   ANION GAP mmol/L 9   CALCIUM mg/dL 8 8   ALBUMIN g/dL 3 5   TOTAL BILIRUBIN mg/dL 0 38   ALK PHOS U/L 127*   ALT U/L 39   AST U/L 27   GLUCOSE RANDOM mg/dL 92                       Imaging: Reviewed radiology reports from this admission including: MRCP  No orders to display       EKG and Other Studies Reviewed on Admission:   · EKG: No EKG obtained  ** Please Note: This note has been constructed using a voice recognition system   **

## 2022-05-19 NOTE — TELEPHONE ENCOUNTER
Patient will be establishing with Dr Joshua Guardado on 06/15  Mishel Yang ) she is a former patient of Dr Maricruz Rios     She L/M  Mishel Yang She is in terrible pain and is non functioning      She is unable to take care of her      Not sure what can be advised? ER maybe?   Please phone [phone]

## 2022-05-19 NOTE — ASSESSMENT & PLAN NOTE
· Noted to have several pancreatic cysts on imaging  · Follow up with GI for outpatient surveillance

## 2022-05-19 NOTE — ASSESSMENT & PLAN NOTE
· Following with GI as an outpatient  · Had MRCP completed showing biliary ductal dilatation- distal stricture vs ampullary dysfunction  · Had ERCP scheduled as an outpatient, but unable to tolerate pain  · Now admitted to medicine  · Consult GI, keep NPO at midnight

## 2022-05-19 NOTE — TELEPHONE ENCOUNTER
Patient's  initially kicked up the phone  You could hear patient moaning in pain in the background  I was able to speak with her  She is on morphine normally  For chronic back pain and has multiple sclerosis  She reports that she has actually been taking her morphine for worsening abdominal pain that began today  We were planning on doing an ERCP for dilated CBD on recent MRCP next week  I instructed her to come to the hospital for sooner evaluation to rule out choledocholithiasis or cholangitis  Patient reports she will be coming in the ambulance   ADT order placed into the system

## 2022-05-19 NOTE — ED PROVIDER NOTES
nPt Name: Mani Cardenas  MRN: [id number]  [place] 1945  Age/Sex: 76 y o  female  Date of evaluation: 2022  PCP: Keyonna Lopez DO    CHIEF COMPLAINT    Chief Complaint   Patient presents with    Abdominal Pain     Patient reports having "a blockage" in their biliary duct that will require surgical correction  Patient reports having this procedure scheduled next week but are experiencing increased bilateral lower quadrant pain today  HPI    76 y o  female presenting with abdominal pain  Patient states that she has a blockage in her biliary duct, had been having intermittent pain, began having severe pain last night which is been constant all day  The pain is sharp, severe , in the epigastric region , radiating throughout the entire abdomen including both lower quadrants, worse with movement or eating and better rest   She denies fever, vomiting, trauma, other symptoms  She is scheduled to have an ERCP on Thursday of next week  She spoke with her GI doctor's office and was told to go to the ER  HPI      Past Medical and Surgical History    Past Medical History:   Diagnosis Date    Anxiety     Cataract     last assessed 14    Chronic bronchitis ([facility] )     Sees pulmonary specialist twice a year    Chronic pain disorder     COPD (chronic obstructive pulmonary disease) ([facility]     Hypothyroidism     Multiple sclerosis ([facility] )     CHOCO (obstructive sleep apnea)     uses cpap, 3/19/21 -pt states will bring CPAP for DOS 3/23/21    Peroneal muscle atrophy     Shortness of breath     Smoker     Thoracolumbar radiculopathy due to intervertebral disc disorder        Past Surgical History:   Procedure Laterality Date    APPENDECTOMY      CATARACT EXTRACTION       SECTION      CHOLECYSTECTOMY      GALLBLADDER SURGERY      SC COLONOSCOPY FLX DX W/COLLJ SPEC WHEN PFRMD N/A 2018    Procedure: COLONOSCOPY;  Surgeon: Patt Hernandez MD;  Location: [facility];   Service: Colorectal    VA LAMNOTMY INCL W/DCMPRSN NRV ROOT 1 INTRSPC LUMBR Right 3/23/2021    Procedure: MINIMALLY INVASIVE LUMBAR FAR LATERAL DISCECTOMY L4-5, RIGHT;  Surgeon: Tiera Yin MD;  Location:  MAIN OR;  Service: Neurosurgery    THROAT SURGERY         Family History   Problem Relation Age of Onset    Skin cancer Mother     Hypertension Father         benign essential    Stroke Father     Lung cancer Brother        Social History     Tobacco Use    Smoking status: Current Every Day Smoker     Packs/day: 1 00     Years: 60 00     Pack years: 60 00     Types: Cigarettes     Start date: 2/16/1958    Smokeless tobacco: Never Used   Vaping Use    Vaping Use: Former    Substances: Nicotine   Substance Use Topics    Alcohol use: Yes     Alcohol/week: 1 0 - 2 0 standard drink     Types: 1 - 2 Glasses of wine per week     Comment: 1-2 drinks of wine daily     Drug use: Yes     Types: Morphine, Marijuana     Comment: medical-RSO  last dose 11 days ago, past hx o f medical marijuana use - NOT CURRENT           Allergies    Allergies   Allergen Reactions    Adhesive [Medical Tape] Rash    Latex Rash       Home Medications    Prior to Admission medications    Medication Sig Start Date End Date Taking?  Authorizing Provider   cyclobenzaprine (FLEXERIL) 5 mg tablet Take 2 tablets (10 mg total) by mouth every 8 (eight) hours as needed for muscle spasms 4/16/22 6/15/22  Garcia Watson, DO   dicyclomine (BENTYL) 10 mg capsule TAKE 1 CAPSULE BY MOUTH 4 TIMES A DAY (BEFORE MEALS AND AT BEDTIME) 5/11/22   Garcia Watson, DO   levothyroxine 100 mcg tablet TAKE 1 TABLET BY MOUTH EVERY DAY 11/12/21   Garcia Watson, DO   morphine (MSIR) 15 mg tablet Take 1 tablet (15 mg total) by mouth every 8 (eight) hours as needed for severe pain Max Daily Amount: 45 mg 4/16/22   Garcia Watson, DO   Multiple Vitamins-Minerals (ICAPS AREDS 2 PO) Take by mouth daily     Historical Provider, MD   naloxone (NARCAN) 4 mg/0 1 mL nasal spray Administer 1 spray into a nostril  If no response after 2-3 minutes, give another dose in the other nostril using a new spray  4/6/21   MACHO Gregory   sucralfate (CARAFATE) 1 g/10 mL suspension TAKE 10 ML (1 G TOTAL) BY MOUTH 4 (FOUR) TIMES A DAY (BEFORE MEALS AND AT BEDTIME) 4/26/22   Ericka Nevarez MD           Review of Systems    Review of Systems   Constitutional: Negative for activity change, chills and fever  HENT: Negative for drooling and facial swelling  Eyes: Negative for pain, discharge and visual disturbance  Respiratory: Negative for apnea, cough, chest tightness, shortness of breath and wheezing  Cardiovascular: Negative for chest pain and leg swelling  Gastrointestinal: Positive for abdominal pain  Negative for constipation, diarrhea, nausea and vomiting  Genitourinary: Negative for difficulty urinating, dysuria and urgency  Musculoskeletal: Negative for arthralgias, back pain and gait problem  Skin: Negative for color change and rash  Neurological: Negative for dizziness, speech difficulty, weakness and headaches  Psychiatric/Behavioral: Negative for agitation, behavioral problems and confusion  All other systems reviewed and negative  Physical Exam      ED Triage Vitals   Temperature Pulse Respirations Blood Pressure SpO2   05/19/22 1714 05/19/22 1714 05/19/22 1714 05/19/22 1714 05/19/22 1714   98 9 °F (37 2 °C) 80 19 146/92 99 %      Temp Source Heart Rate Source Patient Position - Orthostatic VS BP Location FiO2 (%)   05/19/22 1714 05/19/22 1714 05/19/22 1714 05/19/22 1714 --   Oral Monitor Sitting Right arm       Pain Score       05/19/22 1906       No Pain               Physical Exam  Vitals and nursing note reviewed  Constitutional:       Appearance: She is well-developed  HENT:      Head: Normocephalic and atraumatic        Right Ear: External ear normal       Left Ear: External ear normal    Eyes:      Conjunctiva/sclera: Conjunctivae normal       Pupils: Pupils are equal, round, and reactive to light  Cardiovascular:      Rate and Rhythm: Normal rate and regular rhythm  Heart sounds: Normal heart sounds  Pulmonary:      Effort: Pulmonary effort is normal  No respiratory distress  Breath sounds: Normal breath sounds  No wheezing or rales  Abdominal:      General: There is no distension  Palpations: Abdomen is soft  Tenderness: There is abdominal tenderness  There is no guarding or rebound  Comments: Diffusely tender to palpation throughout the entire abdomen, negative Benitez sign  No rebound or guarding  Musculoskeletal:         General: No deformity  Normal range of motion  Cervical back: Normal range of motion and neck supple  Skin:     General: Skin is warm and dry  Findings: No erythema or rash  Neurological:      Mental Status: She is alert and oriented to person, place, and time  Psychiatric:         Behavior: Behavior normal          Thought Content: Thought content normal          Judgment: Judgment normal               Diagnostic Results  EKG Interpretation    Rate:  80  BPM  Rhythm:  Normal Sinus Rhythm   Axis:  Normal   Intervals: Normal, no blocks, QTc  431 ms  Q waves:  No pathologic Q waves   T waves:  Normal   ST segments:  No significant elevations or depressions     Impression:  Normal sinus rhythm without evidence of acute ischemia or significant arrhythmia      EKG for comparison:  EKG dated 26 February 2021 similar in character no major changes    EKG interpreted by me         Labs:    Results Reviewed     Procedure Component Value Units Date/Time    Comprehensive metabolic panel [[id number]]  (Abnormal) Collected: 05/19/22 1732    Lab Status: Final result Specimen: Blood from Arm, Left Updated: 05/19/22 1800     Sodium 137 mmol/L      Potassium 3 6 mmol/L      Chloride 103 mmol/L      CO2 25 mmol/L      ANION GAP 9 mmol/L      BUN 13 mg/dL      Creatinine 0 71 mg/dL      Glucose 92 mg/dL      Calcium 8 8 mg/dL      AST 27 U/L      ALT 39 U/L      Alkaline Phosphatase 127 U/L      Total Protein 7 0 g/dL      Albumin 3 5 g/dL      Total Bilirubin 0 38 mg/dL      eGFR 81 ml/min/1 73sq m     Narrative:      [place] guidelines for Chronic Kidney Disease (CKD):     Stage 1 with normal or high GFR (GFR > 90 mL/min/1 73 square meters)    Stage 2 Mild CKD (GFR = 60-89 mL/min/1 73 square meters)    Stage 3A Moderate CKD (GFR = 45-59 mL/min/1 73 square meters)    Stage 3B Moderate CKD (GFR = 30-44 mL/min/1 73 square meters)    Stage 4 Severe CKD (GFR = 15-29 mL/min/1 73 square meters)    Stage 5 End Stage CKD (GFR <15 mL/min/1 73 square meters)  Note: GFR calculation is accurate only with a steady state creatinine    Lipase [[id number]]  (Normal) Collected: 05/19/22 1732    Lab Status: Final result Specimen: Blood from Arm, Left Updated: 05/19/22 1800     Lipase 105 u/L     Bilirubin, direct [[id number]]     Lab Status: No result Specimen: Blood     CBC and differential [[id number]]  (Abnormal) Collected: 05/19/22 1732    Lab Status: Final result Specimen: Blood from Arm, Left Updated: 05/19/22 1742     WBC 6 64 Thousand/uL      RBC 4 30 Million/uL      Hemoglobin 13 6 g/dL      Hematocrit 42 4 %      MCV 99 fL      MCH 31 6 pg      MCHC 32 1 g/dL      RDW 14 1 %      MPV 9 3 fL      Platelets 513 Thousands/uL      nRBC 0 /100 WBCs      Neutrophils Relative 62 %      Immat GRANS % 0 %      Lymphocytes Relative 18 %      Monocytes Relative 17 %      Eosinophils Relative 2 %      Basophils Relative 1 %      Neutrophils Absolute 4 15 Thousands/µL      Immature Grans Absolute 0 01 Thousand/uL      Lymphocytes Absolute 1 17 Thousands/µL      Monocytes Absolute 1 15 Thousand/µL      Eosinophils Absolute 0 13 Thousand/µL      Basophils Absolute 0 03 Thousands/µL           All labs reviewed and utilized in the medical decision making process    Radiology:    No orders to display All radiology studies independently viewed by me and interpreted by the radiologist     Procedure    Procedures        ED Course of Care and Re-Assessments      Pain improved with hydromorphone, given Zofran for nausea  Discussed case with Dr Jacqueline Bourgeois of gastroenterology, based on intractable pain and known blockage, he requested she be admitted Internal Medicine with his service consulting    Medications   dicyclomine (BENTYL) capsule 10 mg (10 mg Oral Given 5/19/22 2115)   levothyroxine tablet 100 mcg (has no administration in time range)   morphine (MSIR) IR tablet 15 mg (15 mg Oral Given 5/19/22 2301)   sucralfate (CARAFATE) tablet 1 g (1 g Oral Given 5/19/22 2115)   acetaminophen (TYLENOL) tablet 650 mg (has no administration in time range)   calcium carbonate (TUMS) chewable tablet 1,000 mg (has no administration in time range)   docusate sodium (COLACE) capsule 100 mg (100 mg Oral Given 5/19/22 2115)   senna (SENOKOT) tablet 8 6 mg (has no administration in time range)   ondansetron (ZOFRAN) injection 4 mg (has no administration in time range)   nicotine (NICODERM CQ) 14 mg/24hr TD 24 hr patch 1 patch (has no administration in time range)   HYDROmorphone (DILAUDID) injection 0 5 mg (0 5 mg Intravenous Given 5/19/22 2115)   ondansetron (ZOFRAN) injection 4 mg (4 mg Intravenous Given 5/19/22 1732)   HYDROmorphone (DILAUDID) injection 0 5 mg (0 5 mg Intravenous Given 5/19/22 1732)           FINAL IMPRESSION    Final diagnoses:   Abdominal pain         DISPOSITION/PLAN    Presentation as above with abdominal pain  Vital signs reassuring, examination remarkable for tenderness to palpation as above  Lab workup overall reassuring  After consultation with Gastroenterology, admitted Internal Medicine with their service consulting  Hemodynamically stable and comfortable at time of admit    Time reflects when diagnosis was documented in both MDM as applicable and the Disposition within this note     Time User Action Codes Description Comment    5/19/2022  6:42 PM De Alycia Marylou [R10 9] Abdominal pain       ED Disposition     ED Disposition   Admit    Condition   Stable    Date/Time   Thu May 19, 2022  6:42 PM    Comment   Case was discussed with CLEMENCIA and the patient's admission status was agreed to be Admission Status: observation status to the service of Dr Méndez Payment   Follow-up Information    None           PATIENT REFERRED TO:    No follow-up provider specified  DISCHARGE MEDICATIONS:    Current Discharge Medication List      CONTINUE these medications which have NOT CHANGED    Details   cholecalciferol (VITAMIN D3) 1,000 units tablet Take 1,000 Units by mouth in the morning  cyclobenzaprine (FLEXERIL) 5 mg tablet Take 2 tablets (10 mg total) by mouth every 8 (eight) hours as needed for muscle spasms  Qty: 60 tablet, Refills: 1    Associated Diagnoses: Muscle spasm of right leg      levothyroxine 100 mcg tablet TAKE 1 TABLET BY MOUTH EVERY DAY  Qty: 90 tablet, Refills: 3    Associated Diagnoses: Acquired hypothyroidism      morphine (MSIR) 15 mg tablet Take 1 tablet (15 mg total) by mouth every 8 (eight) hours as needed for severe pain Max Daily Amount: 45 mg  Qty: 90 tablet, Refills: 0    Associated Diagnoses: Continuous opioid dependence (HCC)      Multiple Vitamins-Minerals (ICAPS AREDS 2 PO) Take by mouth daily       naloxone (NARCAN) 4 mg/0 1 mL nasal spray Administer 1 spray into a nostril  If no response after 2-3 minutes, give another dose in the other nostril using a new spray    Qty: 1 each, Refills: 1    Associated Diagnoses: Opiate use; Postoperative pain after spinal surgery; Status post lumbar discectomy      sucralfate (CARAFATE) 1 g/10 mL suspension TAKE 10 ML (1 G TOTAL) BY MOUTH 4 (FOUR) TIMES A DAY (BEFORE MEALS AND AT BEDTIME)  Qty: 280 mL, Refills: 5    Associated Diagnoses: Generalized abdominal pain; Nausea      dicyclomine (BENTYL) 10 mg capsule TAKE 1 CAPSULE BY MOUTH 4 TIMES A DAY (BEFORE MEALS AND AT BEDTIME)  Qty: 360 capsule, Refills: 0    Comments: DX Code Needed    Associated Diagnoses: Stomach pain             No discharge procedures on file  Trevor Tovar MD    Portions of the record may have been created with voice recognition software  Occasional wrong word or "sound alike" substitutions may have occurred due to the inherent limitations of voice recognition software    Please read the chart carefully and recognize, using context, where substitutions have occurred     Trevor Tovar MD  05/20/22 1264

## 2022-05-20 ENCOUNTER — APPOINTMENT (INPATIENT)
Dept: RADIOLOGY | Facility: HOSPITAL | Age: 77
DRG: 445 | End: 2022-05-20
Payer: MEDICARE

## 2022-05-20 ENCOUNTER — ANESTHESIA (INPATIENT)
Dept: PERIOP | Facility: HOSPITAL | Age: 77
DRG: 445 | End: 2022-05-20
Payer: MEDICARE

## 2022-05-20 ENCOUNTER — ANESTHESIA EVENT (INPATIENT)
Dept: PERIOP | Facility: HOSPITAL | Age: 77
DRG: 445 | End: 2022-05-20
Payer: MEDICARE

## 2022-05-20 ENCOUNTER — APPOINTMENT (OUTPATIENT)
Dept: PERIOP | Facility: HOSPITAL | Age: 77
DRG: 445 | End: 2022-05-20
Payer: MEDICARE

## 2022-05-20 LAB
ANION GAP SERPL CALCULATED.3IONS-SCNC: 8 MMOL/L (ref 4–13)
BUN SERPL-MCNC: 12 MG/DL (ref 5–25)
CALCIUM SERPL-MCNC: 8.2 MG/DL (ref 8.3–10.1)
CHLORIDE SERPL-SCNC: 101 MMOL/L (ref 100–108)
CO2 SERPL-SCNC: 26 MMOL/L (ref 21–32)
CREAT SERPL-MCNC: 0.62 MG/DL (ref 0.6–1.3)
ERYTHROCYTE [DISTWIDTH] IN BLOOD BY AUTOMATED COUNT: 14.1 % (ref 11.6–15.1)
GFR SERPL CREATININE-BSD FRML MDRD: 86 ML/MIN/1.73SQ M
GLUCOSE P FAST SERPL-MCNC: 93 MG/DL (ref 65–99)
GLUCOSE SERPL-MCNC: 93 MG/DL (ref 65–140)
HCT VFR BLD AUTO: 39.6 % (ref 34.8–46.1)
HGB BLD-MCNC: 13.2 G/DL (ref 11.5–15.4)
MAGNESIUM SERPL-MCNC: 1.9 MG/DL (ref 1.6–2.6)
MCH RBC QN AUTO: 32.1 PG (ref 26.8–34.3)
MCHC RBC AUTO-ENTMCNC: 33.3 G/DL (ref 31.4–37.4)
MCV RBC AUTO: 96 FL (ref 82–98)
PLATELET # BLD AUTO: 190 THOUSANDS/UL (ref 149–390)
PMV BLD AUTO: 9.6 FL (ref 8.9–12.7)
POTASSIUM SERPL-SCNC: 3.6 MMOL/L (ref 3.5–5.3)
RBC # BLD AUTO: 4.11 MILLION/UL (ref 3.81–5.12)
SODIUM SERPL-SCNC: 135 MMOL/L (ref 136–145)
WBC # BLD AUTO: 5.55 THOUSAND/UL (ref 4.31–10.16)

## 2022-05-20 PROCEDURE — 43262 ENDO CHOLANGIOPANCREATOGRAPH: CPT | Performed by: INTERNAL MEDICINE

## 2022-05-20 PROCEDURE — 74328 X-RAY BILE DUCT ENDOSCOPY: CPT

## 2022-05-20 PROCEDURE — 0F7C8ZZ DILATION OF AMPULLA OF VATER, VIA NATURAL OR ARTIFICIAL OPENING ENDOSCOPIC: ICD-10-PCS | Performed by: INTERNAL MEDICINE

## 2022-05-20 PROCEDURE — 43264 ERCP REMOVE DUCT CALCULI: CPT | Performed by: INTERNAL MEDICINE

## 2022-05-20 PROCEDURE — 80048 BASIC METABOLIC PNL TOTAL CA: CPT | Performed by: STUDENT IN AN ORGANIZED HEALTH CARE EDUCATION/TRAINING PROGRAM

## 2022-05-20 PROCEDURE — 85027 COMPLETE CBC AUTOMATED: CPT | Performed by: STUDENT IN AN ORGANIZED HEALTH CARE EDUCATION/TRAINING PROGRAM

## 2022-05-20 PROCEDURE — C1726 CATH, BAL DIL, NON-VASCULAR: HCPCS

## 2022-05-20 PROCEDURE — 99232 SBSQ HOSP IP/OBS MODERATE 35: CPT | Performed by: FAMILY MEDICINE

## 2022-05-20 PROCEDURE — 99233 SBSQ HOSP IP/OBS HIGH 50: CPT | Performed by: INTERNAL MEDICINE

## 2022-05-20 PROCEDURE — BF101ZZ FLUOROSCOPY OF BILE DUCTS USING LOW OSMOLAR CONTRAST: ICD-10-PCS | Performed by: INTERNAL MEDICINE

## 2022-05-20 PROCEDURE — 83735 ASSAY OF MAGNESIUM: CPT | Performed by: STUDENT IN AN ORGANIZED HEALTH CARE EDUCATION/TRAINING PROGRAM

## 2022-05-20 PROCEDURE — C1769 GUIDE WIRE: HCPCS

## 2022-05-20 RX ORDER — FENTANYL CITRATE/PF 50 MCG/ML
25 SYRINGE (ML) INJECTION
Status: DISCONTINUED | OUTPATIENT
Start: 2022-05-20 | End: 2022-05-20 | Stop reason: HOSPADM

## 2022-05-20 RX ORDER — LIDOCAINE HYDROCHLORIDE 20 MG/ML
INJECTION, SOLUTION EPIDURAL; INFILTRATION; INTRACAUDAL; PERINEURAL AS NEEDED
Status: DISCONTINUED | OUTPATIENT
Start: 2022-05-20 | End: 2022-05-20

## 2022-05-20 RX ORDER — SUCCINYLCHOLINE/SOD CL,ISO/PF 100 MG/5ML
SYRINGE (ML) INTRAVENOUS AS NEEDED
Status: DISCONTINUED | OUTPATIENT
Start: 2022-05-20 | End: 2022-05-20

## 2022-05-20 RX ORDER — DEXAMETHASONE SODIUM PHOSPHATE 10 MG/ML
INJECTION, SOLUTION INTRAMUSCULAR; INTRAVENOUS AS NEEDED
Status: DISCONTINUED | OUTPATIENT
Start: 2022-05-20 | End: 2022-05-20

## 2022-05-20 RX ORDER — SODIUM CHLORIDE, SODIUM LACTATE, POTASSIUM CHLORIDE, CALCIUM CHLORIDE 600; 310; 30; 20 MG/100ML; MG/100ML; MG/100ML; MG/100ML
INJECTION, SOLUTION INTRAVENOUS CONTINUOUS PRN
Status: DISCONTINUED | OUTPATIENT
Start: 2022-05-20 | End: 2022-05-20

## 2022-05-20 RX ORDER — ONDANSETRON 2 MG/ML
4 INJECTION INTRAMUSCULAR; INTRAVENOUS ONCE AS NEEDED
Status: DISCONTINUED | OUTPATIENT
Start: 2022-05-20 | End: 2022-05-20 | Stop reason: HOSPADM

## 2022-05-20 RX ORDER — HYDROMORPHONE HCL/PF 1 MG/ML
0.2 SYRINGE (ML) INJECTION
Status: DISCONTINUED | OUTPATIENT
Start: 2022-05-20 | End: 2022-05-20 | Stop reason: HOSPADM

## 2022-05-20 RX ORDER — PROPOFOL 10 MG/ML
INJECTION, EMULSION INTRAVENOUS AS NEEDED
Status: DISCONTINUED | OUTPATIENT
Start: 2022-05-20 | End: 2022-05-20

## 2022-05-20 RX ORDER — FENTANYL CITRATE 50 UG/ML
INJECTION, SOLUTION INTRAMUSCULAR; INTRAVENOUS AS NEEDED
Status: DISCONTINUED | OUTPATIENT
Start: 2022-05-20 | End: 2022-05-20

## 2022-05-20 RX ADMIN — MORPHINE SULFATE 15 MG: 15 TABLET ORAL at 08:22

## 2022-05-20 RX ADMIN — HYDROMORPHONE HYDROCHLORIDE 0.5 MG: 1 INJECTION, SOLUTION INTRAMUSCULAR; INTRAVENOUS; SUBCUTANEOUS at 06:26

## 2022-05-20 RX ADMIN — NICOTINE 1 PATCH: 14 PATCH, EXTENDED RELEASE TRANSDERMAL at 08:22

## 2022-05-20 RX ADMIN — DICYCLOMINE HYDROCHLORIDE 10 MG: 10 CAPSULE ORAL at 21:25

## 2022-05-20 RX ADMIN — FENTANYL CITRATE 25 MCG: 50 INJECTION, SOLUTION INTRAMUSCULAR; INTRAVENOUS at 15:27

## 2022-05-20 RX ADMIN — FENTANYL CITRATE 25 MCG: 50 INJECTION, SOLUTION INTRAMUSCULAR; INTRAVENOUS at 15:30

## 2022-05-20 RX ADMIN — Medication 100 MG: at 15:12

## 2022-05-20 RX ADMIN — PROPOFOL 150 MG: 10 INJECTION, EMULSION INTRAVENOUS at 15:12

## 2022-05-20 RX ADMIN — SUCRALFATE 1 G: 1 TABLET ORAL at 17:32

## 2022-05-20 RX ADMIN — SUCRALFATE 1 G: 1 TABLET ORAL at 21:25

## 2022-05-20 RX ADMIN — PROPOFOL 10 MG: 10 INJECTION, EMULSION INTRAVENOUS at 15:17

## 2022-05-20 RX ADMIN — ONDANSETRON 4 MG: 2 INJECTION INTRAMUSCULAR; INTRAVENOUS at 15:20

## 2022-05-20 RX ADMIN — SODIUM CHLORIDE, SODIUM LACTATE, POTASSIUM CHLORIDE, AND CALCIUM CHLORIDE: .6; .31; .03; .02 INJECTION, SOLUTION INTRAVENOUS at 15:50

## 2022-05-20 RX ADMIN — LEVOTHYROXINE SODIUM 100 MCG: 100 TABLET ORAL at 08:22

## 2022-05-20 RX ADMIN — IOHEXOL 38 ML: 240 INJECTION, SOLUTION INTRATHECAL; INTRAVASCULAR; INTRAVENOUS; ORAL at 15:43

## 2022-05-20 RX ADMIN — DICYCLOMINE HYDROCHLORIDE 10 MG: 10 CAPSULE ORAL at 06:26

## 2022-05-20 RX ADMIN — HYDROMORPHONE HYDROCHLORIDE 0.5 MG: 1 INJECTION, SOLUTION INTRAMUSCULAR; INTRAVENOUS; SUBCUTANEOUS at 01:15

## 2022-05-20 RX ADMIN — PROPOFOL 40 MG: 10 INJECTION, EMULSION INTRAVENOUS at 15:21

## 2022-05-20 RX ADMIN — DEXAMETHASONE SODIUM PHOSPHATE 10 MG: 10 INJECTION, SOLUTION INTRAMUSCULAR; INTRAVENOUS at 15:15

## 2022-05-20 RX ADMIN — SODIUM CHLORIDE, SODIUM LACTATE, POTASSIUM CHLORIDE, AND CALCIUM CHLORIDE: .6; .31; .03; .02 INJECTION, SOLUTION INTRAVENOUS at 15:09

## 2022-05-20 RX ADMIN — DICYCLOMINE HYDROCHLORIDE 10 MG: 10 CAPSULE ORAL at 17:30

## 2022-05-20 RX ADMIN — SUCRALFATE 1 G: 1 TABLET ORAL at 06:26

## 2022-05-20 RX ADMIN — LIDOCAINE HYDROCHLORIDE 80 MG: 20 INJECTION, SOLUTION EPIDURAL; INFILTRATION; INTRACAUDAL at 15:12

## 2022-05-20 RX ADMIN — INDOMETHACIN 100 MG: 50 SUPPOSITORY RECTAL at 15:20

## 2022-05-20 RX ADMIN — FENTANYL CITRATE 25 MCG: 50 INJECTION, SOLUTION INTRAMUSCULAR; INTRAVENOUS at 15:12

## 2022-05-20 RX ADMIN — FENTANYL CITRATE 25 MCG: 50 INJECTION, SOLUTION INTRAMUSCULAR; INTRAVENOUS at 15:21

## 2022-05-20 NOTE — ANESTHESIA PREPROCEDURE EVALUATION
Procedure:  ERCP    Relevant Problems   ENDO   (+) Hypothyroidism      MUSCULOSKELETAL   (+) Piriformis syndrome of right side      NEURO/PSYCH   (+) Anxiety   (+) Chronic neuropathic pain   (+) Chronic pain syndrome   (+) Continuous opioid dependence (HCC)      PULMONARY   (+) CHOCO on CPAP      Respiratory   (+) COPD with chronic bronchitis (HCC)      Nervous and Auditory   (+) Multiple sclerosis (HCC)   (+) Thoracolumbar radiculopathy due to intervertebral disc disorder      Other   (+) Lung nodule, multiple   (+) Macular degeneration      MSIR 15 mg per day  Physical Exam    Airway    Mallampati score: II  TM Distance: >3 FB  Neck ROM: full     Dental   No notable dental hx     Cardiovascular  Cardiovascular exam normal    Pulmonary  Pulmonary exam normal     Other Findings        Anesthesia Plan  ASA Score- 3     Anesthesia Type- general with ASA Monitors  Additional Monitors:   Airway Plan: ETT  Plan Factors-Exercise tolerance (METS): >4 METS  Chart reviewed  EKG reviewed  Imaging results reviewed  Existing labs reviewed  Patient summary reviewed  Patient is not a current smoker  Obstructive sleep apnea risk education given perioperatively  Induction- intravenous  Postoperative Plan- Plan for postoperative opioid use  Planned trial extubation    Informed Consent- Anesthetic plan and risks discussed with patient  I personally reviewed this patient with the CRNA  Discussed and agreed on the Anesthesia Plan with the CRNA  [facility]

## 2022-05-20 NOTE — ASSESSMENT & PLAN NOTE
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

## 2022-05-20 NOTE — PROGRESS NOTES
[facility]  Progress Note - Garrett [place] 1945, 76 y o  female MRN: [id number]  Unit/Bed#: -01 Encounter: [id number]  Primary Care Provider: Jennifer Boyd DO   Date and time admitted to hospital: 5/19/2022  5:06 PM    * Abdominal pain  Assessment & Plan  + with abdominal pain (patient reports symptoms ongoing for almost 3 years however was over the last few days)  Presence of nausea but no vomiting  · MRCP (5/5) Biliary ductal distention has increased from 2018 and is greater than typical for simple postcholecystectomy reservoir effect  However, there is no obstructive stone or pancreatic mass demonstrated  Distal stricture or ampullary dysfunction are considerations  · Plan was originally ERCP on 5/26 however due to increased pain, made NPO with plan for ERCP today  · On prn dilaudid/zofran    Abnormal CT of the abdomen  Assessment & Plan  · MRCP : 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  Recommend surveillance MRI/MRCP in one year  · Follow up with GI outpatient for surveillance     Continuous opioid dependence ([address] )  Assessment & Plan  · Continue home dose morphine 15mg Q8hrs prn    Multiple sclerosis ([address] )  Assessment & Plan  · Stable, continue outpatient follow up     Hypothyroidism  Assessment & Plan  · Continue home dose synthroid       VTE Pharmacologic Prophylaxis:   Pharmacologic: ambulatory    Patient Centered Rounds: I have performed bedside rounds with nursing staff today  Discussions with Specialists or Other Care Team Provider: GI    Education and Discussions with Family / Patient: dw patient    Time Spent for Care: 30 minutes  More than 50% of total time spent on counseling and coordination of care as described above      Current Length of Stay: 0 day(s)    Current Patient Status: Observation   Certification Statement: The patient will continue to require additional inpatient hospital stay due to awaitig ERCP    Discharge Plan: likely 24 hrs    Code Status: Level 1 - Full Code      Subjective:   Abdominal pain slightly better controlled this am  nausea +/ no vomiting  No fevers  Awaiting ercp today    Objective:     Vitals:   Temp (24hrs), Av 7 °F (37 1 °C), Min:98 2 °F (36 8 °C), Max:99 1 °F (37 3 °C)    Temp:  [98 2 °F (36 8 °C)-99 1 °F (37 3 °C)] 98 2 °F (36 8 °C)  HR:  [75-84] 84  Resp:  [16-19] 18  BP: (105-146)/(62-92) 105/62  SpO2:  [88 %-99 %] 88 %  There is no height or weight on file to calculate BMI  Input and Output Summary (last 24 hours): Intake/Output Summary (Last 24 hours) at 2022 1311  Last data filed at 2022 0848  Gross per 24 hour   Intake 240 ml   Output --   Net 240 ml       Physical Exam:     Physical Exam  Constitutional:       General: She is not in acute distress  Appearance: She is normal weight  She is not toxic-appearing  HENT:      Mouth/Throat:      Mouth: Mucous membranes are moist       Pharynx: Oropharynx is clear  Cardiovascular:      Rate and Rhythm: Normal rate and regular rhythm  Pulses: Normal pulses  Pulmonary:      Effort: Pulmonary effort is normal       Breath sounds: Normal breath sounds  Abdominal:      General: Abdomen is flat  Bowel sounds are normal       Tenderness: There is abdominal tenderness  There is no guarding or rebound  Musculoskeletal:      Right lower leg: No edema  Left lower leg: No edema  Neurological:      General: No focal deficit present  Mental Status: She is alert and oriented to person, place, and time           Additional Data:     Labs:    Results from last 7 days   Lab Units 22  0507 22  1732   WBC Thousand/uL 5 55 6 64   HEMOGLOBIN g/dL 13 2 13 6   HEMATOCRIT % 39 6 42 4   PLATELETS Thousands/uL 190 226   NEUTROS PCT %  --  62   LYMPHS PCT %  --  18   MONOS PCT %  --  17*   EOS PCT %  --  2     Results from last 7 days   Lab Units 22  0507 22  1732   SODIUM mmol/L 135* 137   POTASSIUM mmol/L 3 6 3 6   CHLORIDE mmol/L 101 103   CO2 mmol/L 26 25   BUN mg/dL 12 13   CREATININE mg/dL 0 62 0 71   ANION GAP mmol/L 8 9   CALCIUM mg/dL 8 2* 8 8   ALBUMIN g/dL  --  3 5   TOTAL BILIRUBIN mg/dL  --  0 38   ALK PHOS U/L  --  127*   ALT U/L  --  39   AST U/L  --  27   GLUCOSE RANDOM mg/dL 93 92                       * I Have Reviewed All Lab Data Listed Above  * Additional Pertinent Lab Tests Reviewed: [address] Admission Reviewed      Recent Cultures (last 7 days):           Last 24 Hours Medication List:   Current Facility-Administered Medications   Medication Dose Route Frequency Provider Last Rate    acetaminophen  650 mg Oral Q6H PRN Kenzie Garcia MD      calcium carbonate  1,000 mg Oral Daily PRN Kenzie Garcia MD      dicyclomine  10 mg Oral 4x Daily [facility] Kenzie Garcia MD      docusate sodium  100 mg Oral BID Kenzie Garcia MD      HYDROmorphone  0 5 mg Intravenous Q4H PRN Kenzie Garcia MD      levothyroxine  100 mcg Oral Daily Kenzie Garcia MD      morphine  15 mg Oral Q8H PRN Kenzie Garcia MD      nicotine  1 patch Transdermal Daily Kenzie Garcia MD      ondansetron  4 mg Intravenous Q6H PRN Kenzie Garcia MD      senna  1 tablet Oral Daily Kenzie Garcia MD      sucralfate  1 g Oral 4x Daily [facility] Kenzie Garcia MD          Today, Patient Was Seen By: JACK Ramirez      ** Please Note: Dictation voice to text software may have been used in the creation of this document   **

## 2022-05-20 NOTE — ASSESSMENT & PLAN NOTE
+ with abdominal pain (patient reports symptoms ongoing for almost 3 years however was over the last few days)  Presence of nausea but no vomiting  · MRCP (5/5) Biliary ductal distention has increased from 2018 and is greater than typical for simple postcholecystectomy reservoir effect  However, there is no obstructive stone or pancreatic mass demonstrated  Distal stricture or ampullary dysfunction are considerations  · Plan was originally ERCP on 5/26 however due to increased pain, made NPO with plan for ERCP today    · On prn dilaudid/zofran

## 2022-05-20 NOTE — ANESTHESIA POSTPROCEDURE EVALUATION
Post-Op Assessment Note    CV Status:  Stable  Pain Score: 0    Pain management: adequate     Mental Status:  Alert and awake   Hydration Status:  Euvolemic   PONV Controlled:  Controlled   Airway Patency:  Patent and adequate      Post Op Vitals Reviewed: Yes      Staff: CRNA         No complications documented      BP   147/72   Temp   98   Pulse  67   Resp 16   SpO2 100

## 2022-05-20 NOTE — CONSULTS
Consultation - [address] [facility]  Mary Ybarra 76 y o  female MRN: [id number]  Unit/Bed#: -01 Encounter: [id number]      Inpatient consult to gastroenterology  Consult performed by: Umm Williamson PA-C  Consult ordered by: Rahul Porter MD          Reason for Consult / Principal Problem:  Abdominal pain    HPI: Mary Ybarra is a 76y o  year old female who presents with a past medical history significant for continuous opioid dependence, MS, hypothyroidism, back pain  Patient was actually scheduled next week for an outpatient ERCP  Patient did have an abnormal MRCP suggesting biliary ductal dilation distal stricture versus ampullary dysfunction  Patient reports that for 3 years she has been suffering with abdominal pain  She reports that it has worsened over the last several weeks  She denies any significant nausea or vomiting  She denies any diarrhea or constipation  She denies any melena or rectal bleeding  REVIEW OF SYSTEMS:     CONSTITUTIONAL: Denies any fever, chills, or rigors  Good appetite, and no recent weight loss  HEENT: No earache or tinnitus  Denies hearing loss or visual disturbances  CARDIOVASCULAR: No chest pain or palpitations  RESPIRATORY: Denies any cough, hemoptysis, shortness of breath or dyspnea on exertion  GASTROINTESTINAL: As noted in the History of Present Illness  GENITOURINARY: No problems with urination  Denies any hematuria or dysuria  NEUROLOGIC: No dizziness or vertigo, denies headaches  MUSCULOSKELETAL: Denies any muscle or joint pain  SKIN: Denies skin rashes or itching  ENDOCRINE: Denies excessive thirst  Denies intolerance to heat or cold  PSYCHOSOCIAL: Denies depression or anxiety  Denies any recent memory loss       Historical Information   Past Medical History:   Diagnosis Date    Anxiety     Cataract     last assessed 4/25/14    Chronic bronchitis ([facility] [address] )     Sees pulmonary specialist twice a year    Chronic pain disorder     COPD (chronic obstructive pulmonary disease) (HCC)     Hypothyroidism     Multiple sclerosis ([facility] [address] )     CHOCO (obstructive sleep apnea)     uses cpap, 3/19/21 -pt states will bring CPAP for DOS 3/23/21    Peroneal muscle atrophy     Shortness of breath     Smoker     Thoracolumbar radiculopathy due to intervertebral disc disorder      Past Surgical History:   Procedure Laterality Date    APPENDECTOMY      CATARACT EXTRACTION       SECTION      CHOLECYSTECTOMY      GALLBLADDER SURGERY      KS COLONOSCOPY FLX DX W/COLLJ SPEC WHEN PFRMD N/A 2018    Procedure: COLONOSCOPY;  Surgeon: Anne Patino MD;  Location: [facility];   Service: Colorectal    KS LAMNOTMY INCL W/DCMPRSN NRV ROOT 1 INTRSPC LUMBR Right 3/23/2021    Procedure: MINIMALLY INVASIVE LUMBAR FAR LATERAL DISCECTOMY L4-5, RIGHT;  Surgeon: Dominguez Lim MD;  Location:  MAIN OR;  Service: Neurosurgery    THROAT SURGERY       Social History   Social History     Substance and Sexual Activity   Alcohol Use Yes    Alcohol/week: 1 0 - 2 0 standard drink    Types: 1 - 2 Glasses of wine per week    Comment: 1-2 drinks of wine daily      Social History     Substance and Sexual Activity   Drug Use Yes    Types: Morphine, Marijuana    Comment: medical-RSO  last dose 11 days ago, past hx o f medical marijuana use - NOT CURRENT     Social History     Tobacco Use   Smoking Status Current Every Day Smoker    Packs/day: 1 00    Years: 60 00    Pack years: 60 00    Types: Cigarettes    Start date: 1958   Smokeless Tobacco Never Used     Family History   Problem Relation Age of Onset    Skin cancer Mother     Hypertension Father         benign essential    Stroke Father     Lung cancer Brother        Meds/Allergies     Medications Prior to Admission   Medication    cholecalciferol (VITAMIN D3) 1,000 units tablet    cyclobenzaprine (FLEXERIL) 5 mg tablet    levothyroxine 100 mcg tablet    morphine (MSIR) 15 mg tablet  Multiple Vitamins-Minerals (ICAPS AREDS 2 PO)    naloxone (NARCAN) 4 mg/0 1 mL nasal spray    sucralfate (CARAFATE) 1 g/10 mL suspension    dicyclomine (BENTYL) 10 mg capsule     Current Facility-Administered Medications   Medication Dose Route Frequency    acetaminophen (TYLENOL) tablet 650 mg  650 mg Oral Q6H PRN    calcium carbonate (TUMS) chewable tablet 1,000 mg  1,000 mg Oral Daily PRN    dicyclomine (BENTYL) capsule 10 mg  10 mg Oral 4x Daily (AC & HS)    docusate sodium (COLACE) capsule 100 mg  100 mg Oral BID    HYDROmorphone (DILAUDID) injection 0 5 mg  0 5 mg Intravenous Q4H PRN    levothyroxine tablet 100 mcg  100 mcg Oral Daily    morphine (MSIR) IR tablet 15 mg  15 mg Oral Q8H PRN    nicotine (NICODERM CQ) 14 mg/24hr TD 24 hr patch 1 patch  1 patch Transdermal Daily    ondansetron (ZOFRAN) injection 4 mg  4 mg Intravenous Q6H PRN    senna (SENOKOT) tablet 8 6 mg  1 tablet Oral Daily    sucralfate (CARAFATE) tablet 1 g  1 g Oral 4x Daily (AC & HS)       Allergies   Allergen Reactions    Adhesive [Medical Tape] Rash    Latex Rash       Objective   Blood pressure 105/62, pulse 84, temperature 98 2 °F (36 8 °C), resp  rate 18, SpO2 (!) 88 %  Intake/Output Summary (Last 24 hours) at 5/20/2022 1157  Last data filed at 5/20/2022 0848  Gross per 24 hour   Intake 240 ml   Output --   Net 240 ml         PHYSICAL EXAM:      General Appearance:   Alert, cooperative, no distress, appears stated age    HEENT:   Normocephalic, atraumatic, anicteric      Neck:  Supple, symmetrical, trachea midline, no adenopathy;    thyroid: no enlargement/tenderness/nodules; no carotid  bruit or JVD    Lungs:   Clear to auscultation bilaterally; no rales, rhonchi or wheezing; respirations unlabored    Heart[de-identified]   S1 and S2 normal; regular rate and rhythm; no murmur, rub, or gallop     Abdomen:   Soft, non-tender, non-distended; normal bowel sounds; no masses, no organomegaly    Genitalia:   Deferred    Rectal:   Deferred    Extremities:  No cyanosis, clubbing or edema    Pulses:  2+ and symmetric all extremities    Skin:  Skin color, texture, turgor normal, no rashes or lesions    Lymph nodes:  No palpable cervical, axillary or inguinal lymphadenopathy        Lab Results:   Admission on 05/19/2022   Component Date Value    WBC 05/19/2022 6 64     RBC 05/19/2022 4 30     Hemoglobin 05/19/2022 13 6     Hematocrit 05/19/2022 42 4     MCV 05/19/2022 99 (A)    MCH 05/19/2022 31 6     MCHC 05/19/2022 32 1     RDW 05/19/2022 14 1     MPV 05/19/2022 9 3     Platelets 07/68/7208 226     nRBC 05/19/2022 0     Neutrophils Relative 05/19/2022 62     Immat GRANS % 05/19/2022 0     Lymphocytes Relative 05/19/2022 18     Monocytes Relative 05/19/2022 17 (A)    Eosinophils Relative 05/19/2022 2     Basophils Relative 05/19/2022 1     Neutrophils Absolute 05/19/2022 4 15     Immature Grans Absolute 05/19/2022 0 01     Lymphocytes Absolute 05/19/2022 1 17     Monocytes Absolute 05/19/2022 1 15     Eosinophils Absolute 05/19/2022 0 13     Basophils Absolute 05/19/2022 0 03     Sodium 05/19/2022 137     Potassium 05/19/2022 3 6     Chloride 05/19/2022 103     CO2 05/19/2022 25     ANION GAP 05/19/2022 9     BUN 05/19/2022 13     Creatinine 05/19/2022 0 71     Glucose 05/19/2022 92     Calcium 05/19/2022 8 8     AST 05/19/2022 27     ALT 05/19/2022 39     Alkaline Phosphatase 05/19/2022 127 (A)    Total Protein 05/19/2022 7 0     Albumin 05/19/2022 3 5     Total Bilirubin 05/19/2022 0 38     eGFR 05/19/2022 81     Lipase 05/19/2022 105     Ventricular Rate 05/19/2022 80     Atrial Rate 05/19/2022 80     UT Interval 05/19/2022 146     QRSD Interval 05/19/2022 82     QT Interval 05/19/2022 374     QTC Interval 05/19/2022 431     P Axis 05/19/2022 83     QRS Axis 05/19/2022 82     T Wave Axis 05/19/2022 84     Sodium 05/20/2022 135 (A)    Potassium 05/20/2022 3 6     Chloride 05/20/2022 101     CO2 05/20/2022 26     ANION GAP 05/20/2022 8     BUN 05/20/2022 12     Creatinine 05/20/2022 0 62     Glucose 05/20/2022 93     Glucose, Fasting 05/20/2022 93     Calcium 05/20/2022 8 2 (A)    eGFR 05/20/2022 86     Magnesium 05/20/2022 1 9     WBC 05/20/2022 5 55     RBC 05/20/2022 4 11     Hemoglobin 05/20/2022 13 2     Hematocrit 05/20/2022 39 6     MCV 05/20/2022 96     MCH 05/20/2022 32 1     MCHC 05/20/2022 33 3     RDW 05/20/2022 14 1     Platelets 32/21/8141 190     MPV 05/20/2022 9 6        Imaging Studies: I have personally reviewed pertinent imaging studies  ASSESSMENT & PLAN:  Abdominal pain  Abnormal MRI suggesting a biliary ductal distention  Distal stricture or ampullary dysfunction are considerations  Simple appearing branch type IPMNs  -Will plan ERCP today   -Continue NPO  -Pain control and antiemetics  -Further recommendations will be made after ERCP is complete   -Continue to monitor LFTs  -Patient will need close outpatient GI follow-up  Patient will be seen examined by Omero Zhu PA-C  5/20/2022,11:57 AM

## 2022-05-20 NOTE — PLAN OF CARE
Problem: MOBILITY - ADULT  Goal: Maintain or return to baseline ADL function  Description: INTERVENTIONS:  -  Assess patient's ability to carry out ADLs; assess patient's baseline for ADL function and identify physical deficits which impact ability to perform ADLs (bathing, care of mouth/teeth, toileting, grooming, dressing, etc )  - Assess/evaluate cause of self-care deficits   - Assess range of motion  - Assess patient's mobility; develop plan if impaired  - Assess patient's need for assistive devices and provide as appropriate  - Encourage maximum independence but intervene and supervise when necessary  - Involve family in performance of ADLs  - Assess for home care needs following discharge   - Consider OT consult to assist with ADL evaluation and planning for discharge  - Provide patient education as appropriate  5/20/2022 0129 by Stella Sargent RN  Outcome: Progressing  5/20/2022 0129 by Stella Sargent RN  Outcome: Progressing  Goal: Maintains/Returns to pre admission functional level  Description: INTERVENTIONS:  - Perform BMAT or MOVE assessment daily    - Set and communicate daily mobility goal to care team and patient/family/caregiver  - Collaborate with rehabilitation services on mobility goals if consulted  - Perform Range of Motion 2 times a day  - Reposition patient every 2 hours    - Dangle patient 2 times a day  - Stand patient 2 times a day  - Ambulate patient 2 times a day  - Out of bed to chair 2 times a day   - Out of bed for meals 2 times a day  - Out of bed for toileting  - Record patient progress and toleration of activity level   5/20/2022 0129 by Stella Sargent RN  Outcome: Progressing  5/20/2022 0129 by Stella Sargent RN  Outcome: Progressing     Problem: Potential for Falls  Goal: Patient will remain free of falls  Description: INTERVENTIONS:  - Educate patient/family on patient safety including physical limitations  - Instruct patient to call for assistance with activity   - Consult OT/PT to assist with strengthening/mobility   - Keep Call bell within reach  - Keep bed low and locked with side rails adjusted as appropriate  - Keep care items and personal belongings within reach  - Initiate and maintain comfort rounds  - Make Fall Risk Sign visible to staff  - Offer Toileting every 2 Hours, in advance of need  - Initiate/Maintain bedalarm  - Obtain necessary fall risk management equipment:   - Apply yellow socks and bracelet for high fall risk patients  - Consider moving patient to room near nurses station  5/20/2022 0129 by Gage Ward RN  Outcome: Progressing  5/20/2022 0129 by Gage Ward RN  Outcome: Progressing     Problem: PAIN - ADULT  Goal: Verbalizes/displays adequate comfort level or baseline comfort level  Description: Interventions:  - Encourage patient to monitor pain and request assistance  - Assess pain using appropriate pain scale  - Administer analgesics based on type and severity of pain and evaluate response  - Implement non-pharmacological measures as appropriate and evaluate response  - Consider cultural and social influences on pain and pain management  - Notify physician/advanced practitioner if interventions unsuccessful or patient reports new pain  Outcome: Progressing     Problem: SAFETY ADULT  Goal: Patient will remain free of falls  Description: INTERVENTIONS:  - Educate patient/family on patient safety including physical limitations  - Instruct patient to call for assistance with activity   - Consult OT/PT to assist with strengthening/mobility   - Keep Call bell within reach  - Keep bed low and locked with side rails adjusted as appropriate  - Keep care items and personal belongings within reach  - Initiate and maintain comfort rounds  - Make Fall Risk Sign visible to staff  - Offer Toileting every 2 Hours, in advance of need  - Initiate/Maintain bed alarm  - Obtain necessary fall risk management equipment:   - Apply yellow socks and bracelet for high fall risk patients  - Consider moving patient to room near nurses station  5/20/2022 0129 by Prabhjot Bustos RN  Outcome: Progressing  5/20/2022 0129 by Prabhjot Bustos RN  Outcome: Progressing     Problem: DISCHARGE PLANNING  Goal: Discharge to home or other facility with appropriate resources  Description: INTERVENTIONS:  - Identify barriers to discharge w/patient and caregiver  - Arrange for needed discharge resources and transportation as appropriate  - Identify discharge learning needs (meds, wound care, etc )  - Arrange for interpretive services to assist at discharge as needed  - Refer to Case Management Department for coordinating discharge planning if the patient needs post-hospital services based on physician/advanced practitioner order or complex needs related to functional status, cognitive ability, or social support system  Outcome: Progressing     Problem: Knowledge Deficit  Goal: Patient/family/caregiver demonstrates understanding of disease process, treatment plan, medications, and discharge instructions  Description: Complete learning assessment and assess knowledge base    Interventions:  - Provide teaching at level of understanding  - Provide teaching via preferred learning methods  Outcome: Progressing

## 2022-05-21 VITALS
RESPIRATION RATE: 18 BRPM | SYSTOLIC BLOOD PRESSURE: 118 MMHG | WEIGHT: 138.89 LBS | HEIGHT: 62 IN | BODY MASS INDEX: 25.56 KG/M2 | TEMPERATURE: 97.7 F | OXYGEN SATURATION: 94 % | HEART RATE: 68 BPM | DIASTOLIC BLOOD PRESSURE: 66 MMHG

## 2022-05-21 LAB
ALBUMIN SERPL BCP-MCNC: 2.9 G/DL (ref 3.5–5)
ALP SERPL-CCNC: 257 U/L (ref 46–116)
ALT SERPL W P-5'-P-CCNC: 418 U/L (ref 12–78)
ANION GAP SERPL CALCULATED.3IONS-SCNC: 9 MMOL/L (ref 4–13)
AST SERPL W P-5'-P-CCNC: 241 U/L (ref 5–45)
BASOPHILS # BLD AUTO: 0 THOUSANDS/ΜL (ref 0–0.1)
BASOPHILS NFR BLD AUTO: 0 % (ref 0–1)
BILIRUB SERPL-MCNC: 0.28 MG/DL (ref 0.2–1)
BUN SERPL-MCNC: 13 MG/DL (ref 5–25)
CALCIUM ALBUM COR SERPL-MCNC: 9.4 MG/DL (ref 8.3–10.1)
CALCIUM SERPL-MCNC: 8.5 MG/DL (ref 8.3–10.1)
CHLORIDE SERPL-SCNC: 103 MMOL/L (ref 100–108)
CO2 SERPL-SCNC: 26 MMOL/L (ref 21–32)
CREAT SERPL-MCNC: 0.67 MG/DL (ref 0.6–1.3)
EOSINOPHIL # BLD AUTO: 0 THOUSAND/ΜL (ref 0–0.61)
EOSINOPHIL NFR BLD AUTO: 0 % (ref 0–6)
ERYTHROCYTE [DISTWIDTH] IN BLOOD BY AUTOMATED COUNT: 13.6 % (ref 11.6–15.1)
GFR SERPL CREATININE-BSD FRML MDRD: 83 ML/MIN/1.73SQ M
GLUCOSE SERPL-MCNC: 122 MG/DL (ref 65–140)
HCT VFR BLD AUTO: 42.1 % (ref 34.8–46.1)
HGB BLD-MCNC: 13.9 G/DL (ref 11.5–15.4)
IMM GRANULOCYTES # BLD AUTO: 0 THOUSAND/UL (ref 0–0.2)
IMM GRANULOCYTES NFR BLD AUTO: 0 % (ref 0–2)
LIPASE SERPL-CCNC: 141 U/L (ref 73–393)
LYMPHOCYTES # BLD AUTO: 0.8 THOUSANDS/ΜL (ref 0.6–4.47)
LYMPHOCYTES NFR BLD AUTO: 19 % (ref 14–44)
MCH RBC QN AUTO: 31.4 PG (ref 26.8–34.3)
MCHC RBC AUTO-ENTMCNC: 33 G/DL (ref 31.4–37.4)
MCV RBC AUTO: 95 FL (ref 82–98)
MONOCYTES # BLD AUTO: 0.42 THOUSAND/ΜL (ref 0.17–1.22)
MONOCYTES NFR BLD AUTO: 10 % (ref 4–12)
NEUTROPHILS # BLD AUTO: 3.03 THOUSANDS/ΜL (ref 1.85–7.62)
NEUTS SEG NFR BLD AUTO: 71 % (ref 43–75)
NRBC BLD AUTO-RTO: 0 /100 WBCS
PLATELET # BLD AUTO: 212 THOUSANDS/UL (ref 149–390)
PMV BLD AUTO: 9.4 FL (ref 8.9–12.7)
POTASSIUM SERPL-SCNC: 3.9 MMOL/L (ref 3.5–5.3)
PROT SERPL-MCNC: 6.5 G/DL (ref 6.4–8.2)
RBC # BLD AUTO: 4.42 MILLION/UL (ref 3.81–5.12)
SODIUM SERPL-SCNC: 138 MMOL/L (ref 136–145)
WBC # BLD AUTO: 4.25 THOUSAND/UL (ref 4.31–10.16)

## 2022-05-21 PROCEDURE — 80053 COMPREHEN METABOLIC PANEL: CPT | Performed by: STUDENT IN AN ORGANIZED HEALTH CARE EDUCATION/TRAINING PROGRAM

## 2022-05-21 PROCEDURE — 99232 SBSQ HOSP IP/OBS MODERATE 35: CPT | Performed by: INTERNAL MEDICINE

## 2022-05-21 PROCEDURE — 83690 ASSAY OF LIPASE: CPT | Performed by: FAMILY MEDICINE

## 2022-05-21 PROCEDURE — 99239 HOSP IP/OBS DSCHRG MGMT >30: CPT | Performed by: FAMILY MEDICINE

## 2022-05-21 PROCEDURE — NC001 PR NO CHARGE: Performed by: FAMILY MEDICINE

## 2022-05-21 PROCEDURE — 85025 COMPLETE CBC W/AUTO DIFF WBC: CPT | Performed by: STUDENT IN AN ORGANIZED HEALTH CARE EDUCATION/TRAINING PROGRAM

## 2022-05-21 RX ADMIN — NICOTINE 1 PATCH: 14 PATCH, EXTENDED RELEASE TRANSDERMAL at 08:46

## 2022-05-21 RX ADMIN — MORPHINE SULFATE 15 MG: 15 TABLET ORAL at 08:49

## 2022-05-21 RX ADMIN — SUCRALFATE 1 G: 1 TABLET ORAL at 05:28

## 2022-05-21 RX ADMIN — LEVOTHYROXINE SODIUM 100 MCG: 100 TABLET ORAL at 08:45

## 2022-05-21 RX ADMIN — DICYCLOMINE HYDROCHLORIDE 10 MG: 10 CAPSULE ORAL at 05:29

## 2022-05-21 NOTE — PLAN OF CARE
Problem: Potential for Falls  Goal: Patient will remain free of falls  Description: INTERVENTIONS:  - Educate patient/family on patient safety including physical limitations  - Instruct patient to call for assistance with activity   - Consult OT/PT to assist with strengthening/mobility   - Keep Call bell within reach  - Keep bed low and locked with side rails adjusted as appropriate  - Keep care items and personal belongings within reach  - Initiate and maintain comfort rounds  - Make Fall Risk Sign visible to staff  Problem: SAFETY ADULT  Goal: Patient will remain free of falls  Description: INTERVENTIONS:  - Educate patient/family on patient safety including physical limitations  - Instruct patient to call for assistance with activity   - Consult OT/PT to assist with strengthening/mobility   - Keep Call bell within reach  - Keep bed low and locked with side rails adjusted as appropriate  - Keep care items and personal belongings within reach  - Initiate and maintain comfort rounds  - Make Fall Risk Sign visible to staff  - Apply yellow socks and bracelet for high fall risk patients  - Consider moving patient to room near nurses station  Outcome: Progressing     - Apply yellow socks and bracelet for high fall risk patients  - Consider moving patient to room near nurses station  Outcome: Progressing

## 2022-05-21 NOTE — ASSESSMENT & PLAN NOTE
+ with abdominal pain (patient reports symptoms ongoing for almost 3 years however was over the last few days)  Presence of nausea but no vomiting  · MRCP (5/5) Biliary ductal distention has increased from 2018 and is greater than typical for simple postcholecystectomy reservoir effect  However, there is no obstructive stone or pancreatic mass demonstrated  Distal stricture or ampullary dysfunction are considerations  · ERCP performed on 5/20 showed marked dilation of the CBD; no debris/stones, strictures noted  Sphincterotomy was performed for treatment of 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  · LFTs did increase today to  , , and ; TB normal at 0 28  Lipase 141  · trend LFTs for improvement  · Diet as tolerated

## 2022-05-21 NOTE — PROGRESS NOTES
Progress Note  Tara Bingham 76 y o  female MRN: [id number]  Unit/Bed#: -01 Encounter: [id number]    Subjective:  Patient reports her abdominal pain is currently less than it was earlier  No nausea or vomiting  No fevers  She would like to eat  Objective:     Vitals:   Vitals:    05/21/22 0734   BP: 118/66   Pulse: 68   Resp: 18   Temp: 97 7 °F (36 5 °C)   SpO2: 94%   ,Body mass index is 25 4 kg/m²        Intake/Output Summary (Last 24 hours) at 5/21/2022 0958  Last data filed at 5/21/2022 0257  Gross per 24 hour   Intake 740 ml   Output 0 ml   Net 740 ml       Physical Exam:     General Appearance: Alert, appears stated age and cooperative  Lungs: Clear to auscultation bilaterally, no rales or rhonchi, no labored breathing/accessory muscle use  Heart: Regular rate and rhythm, S1, S2 normal, no murmur, click, rub or gallop  Abdomen: Soft, non-tender, non-distended; bowel sounds normal; no masses or no organomegaly  Extremities: No cyanosis, edema    Invasive Devices  Report    Peripheral Intravenous Line  Duration           Peripheral IV 05/19/22 Left Antecubital 1 day                Lab Results:  Admission on 05/19/2022   Component Date Value    WBC 05/19/2022 6 64     RBC 05/19/2022 4 30     Hemoglobin 05/19/2022 13 6     Hematocrit 05/19/2022 42 4     MCV 05/19/2022 99 (A)    MCH 05/19/2022 31 6     MCHC 05/19/2022 32 1     RDW 05/19/2022 14 1     MPV 05/19/2022 9 3     Platelets 83/55/9973 226     nRBC 05/19/2022 0     Neutrophils Relative 05/19/2022 62     Immat GRANS % 05/19/2022 0     Lymphocytes Relative 05/19/2022 18     Monocytes Relative 05/19/2022 17 (A)    Eosinophils Relative 05/19/2022 2     Basophils Relative 05/19/2022 1     Neutrophils Absolute 05/19/2022 4 15     Immature Grans Absolute 05/19/2022 0 01     Lymphocytes Absolute 05/19/2022 1 17     Monocytes Absolute 05/19/2022 1 15     Eosinophils Absolute 05/19/2022 0 13     Basophils Absolute 05/19/2022 0 03  Sodium 05/19/2022 137     Potassium 05/19/2022 3 6     Chloride 05/19/2022 103     CO2 05/19/2022 25     ANION GAP 05/19/2022 9     BUN 05/19/2022 13     Creatinine 05/19/2022 0 71     Glucose 05/19/2022 92     Calcium 05/19/2022 8 8     AST 05/19/2022 27     ALT 05/19/2022 39     Alkaline Phosphatase 05/19/2022 127 (A)    Total Protein 05/19/2022 7 0     Albumin 05/19/2022 3 5     Total Bilirubin 05/19/2022 0 38     eGFR 05/19/2022 81     Lipase 05/19/2022 105     Ventricular Rate 05/19/2022 80     Atrial Rate 05/19/2022 80     GA Interval 05/19/2022 146     QRSD Interval 05/19/2022 82     QT Interval 05/19/2022 374     QTC Interval 05/19/2022 431     P Axis 05/19/2022 83     QRS Axis 05/19/2022 82     T Wave Axis 05/19/2022 84     Sodium 05/20/2022 135 (A)    Potassium 05/20/2022 3 6     Chloride 05/20/2022 101     CO2 05/20/2022 26     ANION GAP 05/20/2022 8     BUN 05/20/2022 12     Creatinine 05/20/2022 0 62     Glucose 05/20/2022 93     Glucose, Fasting 05/20/2022 93     Calcium 05/20/2022 8 2 (A)    eGFR 05/20/2022 86     Magnesium 05/20/2022 1 9     WBC 05/20/2022 5 55     RBC 05/20/2022 4 11     Hemoglobin 05/20/2022 13 2     Hematocrit 05/20/2022 39 6     MCV 05/20/2022 96     MCH 05/20/2022 32 1     MCHC 05/20/2022 33 3     RDW 05/20/2022 14 1     Platelets 61/28/4819 190     MPV 05/20/2022 9 6     WBC 05/21/2022 4 25 (A)    RBC 05/21/2022 4 42     Hemoglobin 05/21/2022 13 9     Hematocrit 05/21/2022 42 1     MCV 05/21/2022 95     MCH 05/21/2022 31 4     MCHC 05/21/2022 33 0     RDW 05/21/2022 13 6     MPV 05/21/2022 9 4     Platelets 91/40/1025 212     nRBC 05/21/2022 0     Neutrophils Relative 05/21/2022 71     Immat GRANS % 05/21/2022 0     Lymphocytes Relative 05/21/2022 19     Monocytes Relative 05/21/2022 10     Eosinophils Relative 05/21/2022 0     Basophils Relative 05/21/2022 0     Neutrophils Absolute 05/21/2022 3 03     Immature Grans Absolute 05/21/2022 0 00     Lymphocytes Absolute 05/21/2022 0 80     Monocytes Absolute 05/21/2022 0 42     Eosinophils Absolute 05/21/2022 0 00     Basophils Absolute 05/21/2022 0 00     Sodium 05/21/2022 138     Potassium 05/21/2022 3 9     Chloride 05/21/2022 103     CO2 05/21/2022 26     ANION GAP 05/21/2022 9     BUN 05/21/2022 13     Creatinine 05/21/2022 0 67     Glucose 05/21/2022 122     Calcium 05/21/2022 8 5     Corrected Calcium 05/21/2022 9 4     AST 05/21/2022 241 (A)    ALT 05/21/2022 418 (A)    Alkaline Phosphatase 05/21/2022 257 (A)    Total Protein 05/21/2022 6 5     Albumin 05/21/2022 2 9 (A)    Total Bilirubin 05/21/2022 0 28     eGFR 05/21/2022 83        Imaging Studies:   I have personally reviewed pertinent imaging studies  ERCP    Result Date: 5/20/2022  Narrative: [facility] Operating Room [address] [place] [id number] [phone] DATE OF SERVICE: 5/20/22 PHYSICIAN(S): Attending: Kiera Zimmerman DO Fellow: No Staff Documented INDICATION: Abdominal pain, Abnormal CT of the abdomen POST-OP DIAGNOSIS: See the impression below  PREPROCEDURE: Informed consent was obtained for the procedure, including sedation  Risks of perforation, hemorrhage, adverse drug reaction and aspiration were discussed  The patient was placed in the left lateral decubitus position  Patient was explained about the risks and benefits of the procedure  Risks including but not limited to bleeding, infection, and perforation were explained in detail  Also explained about less than 100% sensitivity with the exam and other alternatives  DETAILS OF PROCEDURE: Patient was taken to the procedure room where a time out was performed to confirm correct patient and correct procedure   The patient underwent general anesthesia, which was administered by an anesthesia professional  The patient's blood pressure, heart rate, level of consciousness, respirations and oxygen were monitored throughout the procedure  Clinical intention was achieved  The patient experienced no blood loss  The procedure was not difficult  The patient tolerated the procedure well  There were no apparent complications  ANESTHESIA INFORMATION: ASA: III Anesthesia Type: Anesthesia type not filed in the log  MEDICATIONS: indomethacin (INDOCIN) rectal suppository 100 mg 100 mg iohexol (OMNIPAQUE) 240 MG/ML solution 38 mL (Totals for administrations occurring from 1509 to 1554 on 05/20/22) FINDINGS: Deeply cannulated to the distal common bile duct, mid common bile duct and proximal common bile duct after 3 attempts using a traction sphincterotome  Used 260 cm straight guidewire  Cannulation was not difficult  Injected contrast  The common bile duct was massively dilated to approximately 25 mm  No filling defect was seen  The distal bile duct was difficult to fill due to the size of the bile duct  Performed complete 10 mm major papilla sphincterotomy using a sphincterotome  No bleeding at the procedure site  Following sphincterotomy, the ampulla was dilated with an 8 mm X 4 cm hurricaine balloon  Removed material with 18 mm balloon in multiple sweeps in the common bile duct  No stones or debris was revealed  SPECIMENS: * No specimens in log *      Impression: Marked dilation of the common bile duct, mostly related to cholecystectomy 30 years ago  S/P sphincterotomy and hurricaine balloon dilation of the ampulla  Possible sphincter of oddi spasm RECOMMENDATION: Resume diet Liver panel in [facility] [place], [place]    MRI abdomen w wo contrast and mrcp    Result Date: 5/9/2022  Narrative: MRI OF THE ABDOMEN WITH AND WITHOUT CONTRAST WITH MRCP INDICATION: 61-year-old female with generalized abdominal pain  Recent CT showed increasing biliary ductal distention  COMPARISON: Abdominal CT dated 4/13/2022 and 12/12/2019  Chest CT dated 9/5/2020   TECHNIQUE:  The following pulse sequences were obtained:  Coronal and axial T2 with TE of 90 and 180 respectively, axial T2 with fat saturation, axial FIESTA fat-sat, axial T1-weighted in-and-out-of phase, axial DWI/ADC, pre-contrast axial T1 with fat saturation, post-contrast dynamic axial T1 with fat saturation at 20, 70, and 180 seconds, followed by coronal and 7 minute delayed axial T1 with fat saturation  3D MRCP images were obtained with radial thick slabs and projections  3D rendering was performed from the acquisition scanner  IV Contrast:  6 mL of Gadobutrol injection (SINGLE-DOSE) FINDINGS: LOWER CHEST:   Unremarkable  LIVER: Normal size and contour  No steatosis or evidence for iron overload  Background T2 signal is normal and homogeneous  4 mm punctate early hyperenhancing focus in segment III of the right hepatic lobe (12/46)  The area is hypoenhancing on all delayed phases without washout or capsule  No underlying T2 or T1 signal abnormality  No restricted diffusion  Finding attributable to small focus of arterioportal shunting  No suspicious areas of enhancement or concerning hepatic masses  The hepatic veins and portal veins are patent  BILE DUCTS:  Prominence of the central intrahepatic and extrahepatic bile ducts measuring up to 15 mm in caliber at the upper common duct  Common duct tapers distally to the ampulla without intraluminal filling defects  Degree of common duct distention  has increased since 2018  No enhancing masses or thickening of the ductal wall  GALLBLADDER:  Surgically absent  PANCREAS:  There are 3 simple cystic lesions in the neck and body of the pancreas measuring between 3 mm and 7 mm  These show clear thin connections to the main duct  Also simple internal architecture without septations, nodularity, or abnormal enhancement  No solid masses or dilatation of the main pancreatic duct    ADRENAL GLANDS:  Chronically thickened left gland  Known adrenal calcifications seen on prior CT are not well demonstrated by MRI    No suspicious adrenal masses on either side  SPLEEN:  Normal  KIDNEYS/PROXIMAL URETERS:  Benign left renal cysts  No suspicious renal masses  Renal collecting systems are decompressed  BOWEL:   Stomach appears normal for level of nondistention  No dilated or inflamed loops of small or large bowel  Appendix is not well demonstrated  PERITONEUM/RETROPERITONEUM:  No ascites  LYMPH NODES:  No abdominal lymphadenopathy  VASCULAR STRUCTURES:  No aneurysm  ABDOMINAL WALL:  Unremarkable  OSSEOUS STRUCTURES: Complex thoracolumbar scoliosis  Few, scattered benign Tarlov cysts demonstrated at the spine  No suspicious osseous lesion  Impression: 1  Biliary ductal distention has increased from 2018 and is greater than typical for simple postcholecystectomy reservoir effect  However, there is no obstructive stone or pancreatic mass demonstrated  Distal stricture or ampullary dysfunction are considerations  2   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  Recommend surveillance MRI/MRCP in one year  Workstation performed: [id number]     FL ERCP biliary only    Result Date: 5/20/2022  Narrative: ERCP INDICATION:  Biliary ductal dilatation COMPARISON:  MRI from 5/5/2022 IMAGES:  7 FLUOROSCOPY TIME:   94 6 seconds CONTRAST: 38 mL of iohexol (OMNIPAQUE) FINDINGS: Fluoroscopic guidance was provided for performance of ERCP  BILIARY: There is intra and extrahepatic biliary duct dilatation with severe dilatation of the common bile duct  No filling defect is identified  Impression: Severe biliary ductal dilatation without filling defect   Please see procedure note for further details Workstation performed: [id number]     VAS celiac and/or mesenteric duplex; complete study    Result Date: 4/29/2022  Narrative:  [facility] REPORT CLINICAL: Indications: Patient presents with generalized abdominal pain, sometimes feels more upper abdominal  Patient denies a recent weight loss and denies fear of food  Risk Factors The patient has history of smoking (current) 1-2 ppd  FINDINGS:  Unilateral             PSV  EDV  AP Diam  Sup-Herlinda Ao              84   19      2 3  Celiac                 116   25           SMA Ostial             118   19           Prox  SMA              175   19           Px Inf-Ahmet Ao           82   18      1 7  Mid  SMA               227   22           Dist  SMA              101                Ds Inf-Ahmet Ao           80           1 4  Splenic                 70   12      0 5  IKE                    170   13           Common Hepatic Artery  105   21      0 5   Segment     Rig       Left                    PSV  EDV  PSV  EDV  Prox Renal   72   18  127   33     CONCLUSION:  Impression  The aorta is patent and normal in caliber  The celiac, splenic and hepatic arteries are patent  The superior and inferior mesenteric arteries are normal and patent in a fasting state  The proximal renal arteries are patent bilaterally  SIGNATURE: Electronically Signed by: Greer Hanks on 2022-04-29 05:10:55 PM        Assessment & Plan    Abdominal pain  Biliary dilation    - MRI of the abdomen/MRCP as an outpatient showed increasing biliary ductal dilation has from 2018 questioning a distal stricture or ampullary dysfunction  Plan was for outpatient ERCP but due to worsening pain patient was admitted for more emergent ERCP   - ERCP performed yesterday showed marked dilation of the CBD; no debris/stones, strictures noted  Sphincterotomy was performed for treatment of 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   - LFTs did increase today to  , , and ; TB normal at 0 28   - Monitor patient and labs- trend LFTs for improvement  Serial abdominal exams, supportive care  - Diet as tolerated  - Note: she is on chronic Morphine for her chronic pain which is unfortunately associated with causing bile duct spasms and causes chronic biliary dilation      The patient will be seen by Dr Ihor Cline Alphonse Boeck, PA-C  5/21/2022,9:58 AM

## 2022-05-21 NOTE — ASSESSMENT & PLAN NOTE
+ with abdominal pain (patient reports symptoms ongoing for almost 3 years however was over the last few days)  Presence of nausea but no vomiting  · MRCP (5/5) Biliary ductal distention has increased from 2018 and is greater than typical for simple postcholecystectomy reservoir effect  However, there is no obstructive stone or pancreatic mass demonstrated  Distal stricture or ampullary dysfunction are considerations  · ERCP performed on 5/20 showed marked dilation of the CBD; no debris/stones, strictures noted  Sphincterotomy was performed for treatment of 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  · Tolerating Diet    DC home

## 2022-05-21 NOTE — NURSING NOTE
Discharge documents given to the patient  Patient demonstrated understanding of given instructions  IV removed

## 2022-05-21 NOTE — DISCHARGE SUMMARY
[facility]  Discharge- Meron Love 1945, 76 y o  female MRN: [id number]  Unit/Bed#: -01 Encounter: [id number]  Primary Care Provider: Libertad Lisa DO   Date and time admitted to hospital: 5/19/2022  5:06 PM    * Abdominal pain  Assessment & Plan  + with abdominal pain (patient reports symptoms ongoing for almost 3 years however was over the last few days)  Presence of nausea but no vomiting  · MRCP (5/5) Biliary ductal distention has increased from 2018 and is greater than typical for simple postcholecystectomy reservoir effect  However, there is no obstructive stone or pancreatic mass demonstrated  Distal stricture or ampullary dysfunction are considerations  · ERCP performed on 5/20 showed marked dilation of the CBD; no debris/stones, strictures noted  Sphincterotomy was performed for treatment of 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  · Tolerating Diet   DC home    Abnormal CT of the abdomen  Assessment & Plan  · MRCP : 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  Recommend surveillance MRI/MRCP in one year  · Follow up with GI outpatient for surveillance     Continuous opioid dependence ([address] )  Assessment & Plan  · Continue home dose morphine 15mg Q8hrs prn    Multiple sclerosis ([address] )  Assessment & Plan  · Stable, continue outpatient follow up     Hypothyroidism  Assessment & Plan  · Continue home dose synthroid         Discharging Physician / Practitioner: Adali Rausch MD  PCP: Libertad Lisa DO  Admission Date:   Admission Orders (From admission, onward)     Ordered        05/20/22 1426  Inpatient Admission  Once            05/19/22 1918  Place in Observation  Once                      Discharge Date: 05/21/22    Disposition:      · home    Reason for Admission: abdominal pain    Discharge Diagnoses:     Please see assessment and plan section above for further details regarding discharge diagnoses       Medical Problems Resolved Problems  Date Reviewed: 3/3/2022   None                 Consultations During Hospital Stay:  · GI    Procedures Performed:   · ERCP     Medication Adjustments and Discharge Medications:  · Summary of Medication Adjustments made as a result of this hospitalization: See med rec  · Medication Dosing Tapers - Please refer to Discharge Medication List for details on any medication dosing tapers (if applicable to patient)  · Medications being temporarily held (include recommended restart time): see med rec  · Discharge Medication List: See after visit summary for reconciled discharge medications  Diet Recommendations at Discharge:  Diet -        Diet Orders   (From admission, onward)             Start     Ordered    05/21/22 0936  Diet Regular; Regular House  Diet effective now        References:    Nutrtion Support Algorithm Enteral vs  Parenteral   Question Answer Comment   Diet Type Regular    Regular Regular House    RD to adjust diet per protocol? Yes        05/21/22 0935                Hospital Course:     Garrett Ballard is a 76 y o  female patient who originally presented to the hospital on 5/19/2022 with past medical history of multiple sclerosis/hypothyroidism/chronic abdominal pain who presented with worsening pain  Patient was scheduled for outpatient ERCP but due to increasing worsening pain presented to the ED for further management  · MRCP on 05/05/2022 noted biliary ductal distension which has increased from 2018 and is greater than typical for simple post cholecystectomy patient  There was however no obstructive stone or pancreatic mass demonstrated  Distal stricture or ampullary dysfunction were considerations  ERCP was done on 05/20/2022 which note marked dilatation of CBD with no debris/stones  Strictures were noted  Sphincterotomy was performed for treatment of possible sphincter of OD dysfunction  Patient is now tolerating diet  Discussed with GI    Being discharged home and outpatient follow-up  Patient can continue her home dose morphine 15 mg q 8 hours as needed  Above plan of care discussed with patient and she understands and is in agreement with it  Being discharged home  Condition at Discharge: fair     Discharge Day Visit / Exam:     Vitals: Blood Pressure: 118/66 (05/21/22 0734)  Pulse: 68 (05/21/22 0734)  Temperature: 97 7 °F (36 5 °C) (05/21/22 0734)  Temp Source: Oral (05/21/22 0734)  Respirations: 18 (05/21/22 0734)  Height: 5' 2" (157 5 cm) (05/21/22 0734)  Weight - Scale: 63 kg (138 lb 14 2 oz) (05/21/22 0734)  SpO2: 94 % (05/21/22 0734)  Exam:   Physical Exam  Constitutional:       General: She is not in acute distress  Appearance: She is normal weight  She is not toxic-appearing  HENT:      Mouth/Throat:      Mouth: Mucous membranes are moist       Pharynx: Oropharynx is clear  Cardiovascular:      Rate and Rhythm: Normal rate and regular rhythm  Pulses: Normal pulses  Pulmonary:      Effort: Pulmonary effort is normal       Breath sounds: Normal breath sounds  Abdominal:      General: Abdomen is flat  Bowel sounds are normal       Palpations: Abdomen is soft  Neurological:      General: No focal deficit present  Mental Status: She is alert and oriented to person, place, and time  Goals of Care Discussions:  · Code Status at Discharge: Level 1 - Full Code    Discharge instructions/Information to patient and family:   See after visit summary section titled Discharge Instructions for information provided to patient and family  Discharge Statement:  I spent 40 minutes discharging the patient  This time was spent on the day of discharge  I had direct contact with the patient on the day of discharge  Greater than 50% of the total time was spent examining patient, answering all patient questions, arranging and discussing plan of care with patient as well as directly providing post-discharge instructions    Additional time then spent on discharge activities      ** Please Note: This note has been constructed using a voice recognition system **

## 2022-05-21 NOTE — PROGRESS NOTES
[address] [facility]  Progress Note - Kurt Irizarry 1945, 76 y o  female MRN: [id number]  Unit/Bed#: -01 Encounter: [id number]  Primary Care Provider: Lincoln Troy DO   Date and time admitted to hospital: 5/19/2022  5:06 PM    * Abdominal pain  Assessment & Plan  + with abdominal pain (patient reports symptoms ongoing for almost 3 years however was over the last few days)  Presence of nausea but no vomiting  · MRCP (5/5) Biliary ductal distention has increased from 2018 and is greater than typical for simple postcholecystectomy reservoir effect  However, there is no obstructive stone or pancreatic mass demonstrated  Distal stricture or ampullary dysfunction are considerations  · ERCP performed on 5/20 showed marked dilation of the CBD; no debris/stones, strictures noted  Sphincterotomy was performed for treatment of 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  · LFTs did increase today to  , , and ; TB normal at 0 28  Lipase 141  · trend LFTs for improvement  · Diet as tolerated  Abnormal CT of the abdomen  Assessment & Plan  · MRCP : 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  Recommend surveillance MRI/MRCP in one year  · Follow up with GI outpatient for surveillance     Continuous opioid dependence ([address] )  Assessment & Plan  · Continue home dose morphine 15mg Q8hrs prn    Multiple sclerosis ([address] )  Assessment & Plan  · Stable, continue outpatient follow up     Hypothyroidism  Assessment & Plan  · Continue home dose synthroid       VTE Pharmacologic Prophylaxis:   Pharmacologic: ambulatory  Mechanical VTE Prophylaxis in Place: No    Discussions with Specialists or Other Care Team Provider: GI    Education and Discussions with Family / Patient: dw patient    Time Spent for Care: 30 minutes  More than 50% of total time spent on counseling and coordination of care as described above      Current Length of Stay: 1 day(s)    Current Patient Status: Inpatient   Certification Statement: The patient will continue to require additional inpatient hospital stay due to advancing diet    Discharge Plan: 24-48hrs    Code Status: Level 1 - Full Code      Subjective:   S/p ercp yday/ sphincterotomy/ampullary dilatation  Patient reports she did much better following the procedure overnight but at approximately 3:00 a m  This morning after a bowel movement her pain returned and continue to get intense  She is willing to try eating something this morning and advancing diet  Objective:     Vitals:   Temp (24hrs), Av 6 °F (36 4 °C), Min:96 7 °F (35 9 °C), Max:98 °F (36 7 °C)    Temp:  [96 7 °F (35 9 °C)-98 °F (36 7 °C)] 97 7 °F (36 5 °C)  HR:  [66-77] 68  Resp:  [15-22] 18  BP: (116-147)/(61-77) 118/66  SpO2:  [92 %-100 %] 94 %  Body mass index is 25 4 kg/m²  Input and Output Summary (last 24 hours): Intake/Output Summary (Last 24 hours) at 2022 1136  Last data filed at 2022 0257  Gross per 24 hour   Intake 740 ml   Output 0 ml   Net 740 ml       Physical Exam:     Physical Exam  Constitutional:       General: She is not in acute distress  Appearance: She is normal weight  She is not toxic-appearing  HENT:      Mouth/Throat:      Mouth: Mucous membranes are moist       Pharynx: Oropharynx is clear  Cardiovascular:      Rate and Rhythm: Normal rate and regular rhythm  Pulses: Normal pulses  Pulmonary:      Effort: Pulmonary effort is normal       Breath sounds: Normal breath sounds  Abdominal:      General: Abdomen is flat  Bowel sounds are normal       Palpations: Abdomen is soft  Tenderness: There is no abdominal tenderness  There is no guarding  Musculoskeletal:      Right lower leg: No edema  Left lower leg: No edema  Neurological:      General: No focal deficit present  Mental Status: She is alert and oriented to person, place, and time     Psychiatric:         Mood and Affect: Mood normal        Additional Data:     Labs:    Results from last 7 days   Lab Units 05/21/22  0400   WBC Thousand/uL 4 25*   HEMOGLOBIN g/dL 13 9   HEMATOCRIT % 42 1   PLATELETS Thousands/uL 212   NEUTROS PCT % 71   LYMPHS PCT % 19   MONOS PCT % 10   EOS PCT % 0     Results from last 7 days   Lab Units 05/21/22  0400   SODIUM mmol/L 138   POTASSIUM mmol/L 3 9   CHLORIDE mmol/L 103   CO2 mmol/L 26   BUN mg/dL 13   CREATININE mg/dL 0 67   ANION GAP mmol/L 9   CALCIUM mg/dL 8 5   ALBUMIN g/dL 2 9*   TOTAL BILIRUBIN mg/dL 0 28   ALK PHOS U/L 257*   ALT U/L 418*   AST U/L 241*   GLUCOSE RANDOM mg/dL 122                           * I Have Reviewed All Lab Data Listed Above  * Additional Pertinent Lab Tests Reviewed: All Labs Within Last 24 Hours Reviewed    Recent Cultures (last 7 days):           Last 24 Hours Medication List:   Current Facility-Administered Medications   Medication Dose Route Frequency Provider Last Rate    acetaminophen  650 mg Oral Q6H PRN Savi Kt, DO      calcium carbonate  1,000 mg Oral Daily PRN Savi Kt, DO      dicyclomine  10 mg Oral 4x Daily (AC & HS) Savi Kt, DO      docusate sodium  100 mg Oral BID Savi Kt, DO      HYDROmorphone  0 5 mg Intravenous Q4H PRN Savi Kt, DO      levothyroxine  100 mcg Oral Daily [facility]      morphine  15 mg Oral Q8H PRN Savi Eumanoj, DO      nicotine  1 patch Transdermal Daily Savi Kt, DO      ondansetron  4 mg Intravenous Q6H PRN Savi Kt, DO      senna  1 tablet Oral Daily Savi Euler, DO      sucralfate  1 g Oral 4x Daily (AC & HS) Saviivan Meraz, DO          Today, Patient Was Seen By: JACK Shepherd      ** Please Note: Dictation voice to text software may have been used in the creation of this document   **

## 2022-05-23 ENCOUNTER — TRANSITIONAL CARE MANAGEMENT (OUTPATIENT)
Dept: INTERNAL MEDICINE CLINIC | Facility: CLINIC | Age: 77
End: 2022-05-23

## 2022-05-23 ENCOUNTER — TELEPHONE (OUTPATIENT)
Dept: INTERNAL MEDICINE CLINIC | Facility: CLINIC | Age: 77
End: 2022-05-23

## 2022-05-25 ENCOUNTER — TELEPHONE (OUTPATIENT)
Dept: GASTROENTEROLOGY | Facility: CLINIC | Age: 77
End: 2022-05-25

## 2022-05-25 NOTE — TELEPHONE ENCOUNTER
Patient states she has tried the dicyclomine and it makes it worse so she had to stop it    it gave her the runs really bad  She said she can avoid dairy products but right not that is one of the things that is sitting well in her stomach

## 2022-05-25 NOTE — TELEPHONE ENCOUNTER
Yocasta Hanley pt  Patient called, left message, she would like to speak to Yocasta Hanley, she was discharged on Saturday and is having continuing difficulties    Please call

## 2022-05-25 NOTE — TELEPHONE ENCOUNTER
COVID has been known to cause GI upset  Dr Julianna Ahmadi had prescribed her dicylomine, which she should take two 10 mg tablets at the time before each of her meals (breakfast, lunch and dinner) for the next few days  Would also advise avoiding dairy products

## 2022-05-25 NOTE — TELEPHONE ENCOUNTER
Spoke with Canelo Ashby and she states that everything she eats becomes a bowel movement either immediately or very soon after  He stool has been very soft  She states that she can not eat much because she feels nauseous as if she is going to vomit, but doesn't actually vomit  She has been very gassy which is causing her a great deal of stomach pain  She states that after passing gas she feels a little better but then she gets a sharp pain under her chest cavity under the right breast  Due to all the discomfort she is not sleeping well  She is self medicating with Gas Ex which helps a little but then is following by a great deal of pain as indicated above  She has also tested covid positive yesterday so if feeling ill from that  No fevers      She wants to know if there is anything she can do for the discomfort, dietary changes, etc  Please advise

## 2022-05-25 NOTE — PHYSICIAN ADVISOR
Current patient class: Inpatient  The patient is currently on Hospital Day: 3 at [address]      This patient was originally admitted to the hospital under observation class  After admission the patient was reevaluated and determined to require further hospitalization  The patient was then documented to require at least a 2nd midnight in the hospital  As such the patient was then expected to satisfy the 2 midnight benchmark and was therefore eligible for inpatient admission  After review of the relevant documentation, labs, vital signs and test results, the patient is appropriate for INPATIENT ADMISSION  Admission to the hospital as an inpatient is a complex decision making process which requires the practitioner to consider the patients presenting complaint, history and physical examination and all relevant testing  With this in mind, in this case, the patient was deemed appropriate for INPATIENT ADMISSION  After review of the documentation and testing available at the time of the admission I concur with this clinical determination of medical necessity  Rationale is as follows: The patient is a 68-year-old female with multiple sclerosis who presented with acute onset worsening abdominal pain  Plan was to perform ERCP in outpatient setting given abnormal MRCP with biliary ductal dilation suggesting stricture or ampullary dysfunction, however the patient is pain was not tolerable  ERCP was performed while hospitalized and she underwent sphincterotomy  She had recurrence of abdominal pain and was changed to an inpatient admission after initially being observation  Management continued with antiemetics, intravenous pain medication, and oral pain medication  She was hospitalized for two midnights total and did improve over the course of her stay  Change to an inpatient admission was appropriate given the continued management and planned length of stay      The patients vitals on arrival were   ED Triage Vitals   Temperature Pulse Respirations Blood Pressure SpO2   22 1714 22 1714 22 1714 22 1714 22 1714   98 9 °F (37 2 °C) 80 19 146/92 99 %      Temp Source Heart Rate Source Patient Position - Orthostatic VS BP Location FiO2 (%)   22 1714 22 1714 22 1714 22 1714 --   Oral Monitor Sitting Right arm       Pain Score       22 1906       No Pain           Past Medical History:   Diagnosis Date    Anxiety     Cataract     last assessed 14    [facility] )     Sees pulmonary specialist twice a year    Chronic pain disorder     COPD (chronic obstructive pulmonary disease) ([facility]     Hypothyroidism     Multiple sclerosis ([facility] )     CHOCO (obstructive sleep apnea)     uses cpap, 3/19/21 -pt states will bring CPAP for DOS 3/23/21    Peroneal muscle atrophy     Shortness of breath     Smoker     Thoracolumbar radiculopathy due to intervertebral disc disorder      Past Surgical History:   Procedure Laterality Date    APPENDECTOMY      CATARACT EXTRACTION       SECTION      CHOLECYSTECTOMY      GALLBLADDER SURGERY      OK COLONOSCOPY FLX DX W/COLLJ SPEC WHEN PFRMD N/A 2018    Procedure: COLONOSCOPY;  Surgeon: Barbra Solano MD;  Location: [facility];   Service: Colorectal    OK LAMNOTMY INCL W/DCMPRSN NRV ROOT 1 INTRSPC LUMBR Right 3/23/2021    Procedure: MINIMALLY INVASIVE LUMBAR FAR LATERAL DISCECTOMY L4-5, RIGHT;  Surgeon: Meme Cruz MD;  Location:  MAIN OR;  Service: Neurosurgery    THROAT SURGERY         The patient was admitted to the hospital at  2:26 PM on 22 for the following diagnosis:  Abdominal pain [R10 9]    Consults have been placed to:   [address]:    22 16422 [id number]   BP: 132/68 126/71 116/62 118/66   BP Location:    Right arm   Pulse: 66 71 69 68   Resp: 16 18 18 18   Temp: 97 7 °F (36 5 °C) 97 8 °F (36 6 °C) 97 6 °F (36 4 °C) 97 7 °F (36 5 °C)   TempSrc:    Oral   SpO2: 95% 95% 92% 94%   Weight:    63 kg (138 lb 14 2 oz)   Height:    5' 2" (1 575 m)       Most recent labs:    No results for input(s): WBC, HGB, HCT, PLT, K, NA, CALCIUM, BUN, CREATININE, LIPASE, AMYLASE, INR, TROPONINI, CKTOTAL, AST, ALT, ALKPHOS, BILITOT in the last 72 hours  Scheduled Meds:  Continuous Infusions:No current facility-administered medications for this encounter  PRN Meds:      Surgical procedures (if appropriate):

## 2022-06-13 ENCOUNTER — HOSPITAL ENCOUNTER (OUTPATIENT)
Dept: ULTRASOUND IMAGING | Facility: HOSPITAL | Age: 77
Discharge: HOME | End: 2022-06-13
Payer: MEDICARE

## 2022-06-13 DIAGNOSIS — R10.2 PELVIC AND PERINEAL PAIN: ICD-10-CM

## 2022-06-13 PROCEDURE — 76856 US EXAM PELVIC COMPLETE: CPT

## 2022-06-15 ENCOUNTER — OFFICE VISIT (OUTPATIENT)
Dept: GASTROENTEROLOGY | Facility: CLINIC | Age: 77
End: 2022-06-15
Payer: MEDICARE

## 2022-06-15 ENCOUNTER — TELEPHONE (OUTPATIENT)
Dept: GASTROENTEROLOGY | Facility: CLINIC | Age: 77
End: 2022-06-15

## 2022-06-15 VITALS
WEIGHT: 135 LBS | DIASTOLIC BLOOD PRESSURE: 60 MMHG | SYSTOLIC BLOOD PRESSURE: 110 MMHG | BODY MASS INDEX: 24.84 KG/M2 | HEIGHT: 62 IN | HEART RATE: 80 BPM

## 2022-06-15 DIAGNOSIS — R11.0 NAUSEA: ICD-10-CM

## 2022-06-15 DIAGNOSIS — R74.8 ELEVATED ALKALINE PHOSPHATASE LEVEL: ICD-10-CM

## 2022-06-15 DIAGNOSIS — R10.84 GENERALIZED ABDOMINAL PAIN: ICD-10-CM

## 2022-06-15 DIAGNOSIS — K59.00 CONSTIPATION, UNSPECIFIED CONSTIPATION TYPE: Primary | ICD-10-CM

## 2022-06-15 DIAGNOSIS — K58.2 IRRITABLE BOWEL SYNDROME WITH BOTH CONSTIPATION AND DIARRHEA: ICD-10-CM

## 2022-06-15 DIAGNOSIS — K83.8 DILATION OF BILIARY TRACT: ICD-10-CM

## 2022-06-15 PROCEDURE — 99214 OFFICE O/P EST MOD 30 MIN: CPT | Performed by: INTERNAL MEDICINE

## 2022-06-15 RX ORDER — CHLORDIAZEPOXIDE HYDROCHLORIDE AND CLIDINIUM BROMIDE 5; 2.5 MG/1; MG/1
1 CAPSULE ORAL
Qty: 60 CAPSULE | Refills: 1 | Status: SHIPPED | OUTPATIENT
Start: 2022-06-15 | End: 2022-06-28

## 2022-06-15 RX ORDER — ONDANSETRON 4 MG/1
4 TABLET, FILM COATED ORAL EVERY 8 HOURS PRN
Qty: 40 TABLET | Refills: 1 | Status: SHIPPED | OUTPATIENT
Start: 2022-06-15

## 2022-06-15 NOTE — PROGRESS NOTES
[facility] - Outpatient Follow-up Note  Akin Tiwari 76 y o  female MRN: [id number]  Encounter: [id number]          ASSESSMENT AND PLAN:      1  Dilation of biliary tract  - Ambulatory Referral to Gastroenterology    2  Generalized abdominal pain  - Ambulatory Referral to Gastroenterology  - chlordiazepoxide-clidinium (LIBRAX) 5-2 5 mg per capsule; Take 1 capsule by mouth 4 (four) times a day (before meals and at bedtime)  Dispense: 60 capsule; Refill: 1    3  Elevated alkaline phosphatase level  - Ambulatory Referral to Gastroenterology    4  Constipation, unspecified constipation type    5  Nausea  - ondansetron (ZOFRAN) 4 mg tablet; Take 1 tablet (4 mg total) by mouth every 8 (eight) hours as needed for nausea or vomiting  Dispense: 40 tablet; Refill: 1    6  Irritable bowel syndrome with both constipation and diarrhea    If fiber, probiotic, librax, increased fiber supplementation   will consider movantik    Increased water intake    Stop dicyclomine    F/u one month    ______________________________________________________________________    SUBJECTIVE:  Adilene Scott comes the office today complaining of ongoing problems with abdominal pain, nausea, constipation with intermittent diarrhea  She states she has been having these problems over the past 4 years  She indicates that she sometimes wakes up in the morning with right upper quadrant abdominal pain that then becomes a generalized abdominal pain  This has been associated with pebble like stools  Following her ERCP that was performed 1 month ago she then developed COVID  This made her gastrointestinal symptoms even worse  She states that she drinks multiple water equivalents per day  She states she tries to eat fiber in the form of cereal and fruit on a daily basis  She denies any vomiting  There is no rectal bleeding  She takes morphine nearly on a daily basis for her back and leg    She states that the dicyclomine is making her feel worse  She also stated that she needed a prescription for Zofran  REVIEW OF SYSTEMS IS OTHERWISE NEGATIVE  Historical Information   Past Medical History:   Diagnosis Date    Anxiety     Cataract     last assessed 14    Chronic bronchitis ([facility] )     Sees pulmonary specialist twice a year    Chronic pain disorder     COPD (chronic obstructive pulmonary disease) ([facility] )     Hypothyroidism     Multiple sclerosis ([facility] )     CHOCO (obstructive sleep apnea)     uses cpap, 3/19/21 -pt states will bring CPAP for DOS 3/23/21    Peroneal muscle atrophy     Shortness of breath     Smoker     Thoracolumbar radiculopathy due to intervertebral disc disorder      Past Surgical History:   Procedure Laterality Date    APPENDECTOMY      CATARACT EXTRACTION       SECTION      CHOLECYSTECTOMY      GALLBLADDER SURGERY      NC COLONOSCOPY FLX DX W/COLLJ SPEC WHEN PFRMD N/A 2018    Procedure: COLONOSCOPY;  Surgeon: Kelsey Tucker MD;  Location: [facility];   Service: Colorectal    NC LAMNOTMY INCL W/DCMPRSN NRV ROOT 1 INTRSPC LUMBR Right 3/23/2021    Procedure: MINIMALLY INVASIVE LUMBAR FAR LATERAL DISCECTOMY L4-5, RIGHT;  Surgeon: Hermilo Tesfaye MD;  Location:  MAIN OR;  Service: Neurosurgery    THROAT SURGERY       Social History   Social History     Substance and Sexual Activity   Alcohol Use Yes    Alcohol/week: 1 0 - 2 0 standard drink    Types: 1 - 2 Glasses of wine per week    Comment: 1-2 drinks of wine daily      Social History     Substance and Sexual Activity   Drug Use Yes    Types: Morphine, Marijuana    Comment: medical-RSO  last dose 11 days ago, past hx o f medical marijuana use - NOT CURRENT     Social History     Tobacco Use   Smoking Status Current Every Day Smoker    Packs/day: 1 00    Years: 60 00    Pack years: 60 00    Types: Cigarettes    Start date: 1958   Smokeless Tobacco Never Used     Family History   Problem Relation Age of Onset    Skin cancer Mother     Hypertension Father         benign essential    Stroke Father     Lung cancer Brother        Meds/Allergies       Current Outpatient Medications:     chlordiazepoxide-clidinium (LIBRAX) 5-2 5 mg per capsule    cholecalciferol (VITAMIN D3) 1,000 units tablet    cyclobenzaprine (FLEXERIL) 5 mg tablet    dicyclomine (BENTYL) 10 mg capsule    levothyroxine 100 mcg tablet    morphine (MSIR) 15 mg tablet    Multiple Vitamins-Minerals (ICAPS AREDS 2 PO)    naloxone (NARCAN) 4 mg/0 1 mL nasal spray    ondansetron (ZOFRAN) 4 mg tablet    sucralfate (CARAFATE) 1 g/10 mL suspension    Allergies   Allergen Reactions    Adhesive [Medical Tape] Rash    Latex Rash           Objective     Blood pressure 110/60, pulse 80, height 5' 2" (1 575 m), weight 61 2 kg (135 lb)  Body mass index is 24 69 kg/m²  PHYSICAL EXAM:      General Appearance:   Alert, cooperative, no distress   HEENT:   Normocephalic, atraumatic, anicteric      Neck:  Supple, symmetrical, trachea midline   Lungs:   Clear to auscultation bilaterally; no rales, rhonchi or wheezing; respirations unlabored    Heart[de-identified]   Regular rate and rhythm; no murmur, rub, or gallop  Abdomen:   Soft, non-tender, non-distended; normal bowel sounds; no masses, no organomegaly    Genitalia:   Deferred    Rectal:   Deferred    Extremities:  No cyanosis, clubbing or edema    Pulses:  2+ and symmetric    Skin:  No jaundice, rashes, or lesions    Lymph nodes:  No palpable cervical lymphadenopathy        Lab Results:   No visits with results within 1 Day(s) from this visit     Latest known visit with results is:   Admission on 05/19/2022, Discharged on 05/21/2022   Component Date Value    WBC 05/19/2022 6 64     RBC 05/19/2022 4 30     Hemoglobin 05/19/2022 13 6     Hematocrit 05/19/2022 42 4     MCV 05/19/2022 99 (A)    MCH 05/19/2022 31 6     MCHC 05/19/2022 32 1     RDW 05/19/2022 14 1     MPV 05/19/2022 9 3     Platelets 25/70/6503 226  nRBC 05/19/2022 0     Neutrophils Relative 05/19/2022 62     Immat GRANS % 05/19/2022 0     Lymphocytes Relative 05/19/2022 18     Monocytes Relative 05/19/2022 17 (A)    Eosinophils Relative 05/19/2022 2     Basophils Relative 05/19/2022 1     Neutrophils Absolute 05/19/2022 4 15     Immature Grans Absolute 05/19/2022 0 01     Lymphocytes Absolute 05/19/2022 1 17     Monocytes Absolute 05/19/2022 1 15     Eosinophils Absolute 05/19/2022 0 13     Basophils Absolute 05/19/2022 0 03     Sodium 05/19/2022 137     Potassium 05/19/2022 3 6     Chloride 05/19/2022 103     CO2 05/19/2022 25     ANION GAP 05/19/2022 9     BUN 05/19/2022 13     Creatinine 05/19/2022 0 71     Glucose 05/19/2022 92     Calcium 05/19/2022 8 8     AST 05/19/2022 27     ALT 05/19/2022 39     Alkaline Phosphatase 05/19/2022 127 (A)    Total Protein 05/19/2022 7 0     Albumin 05/19/2022 3 5     Total Bilirubin 05/19/2022 0 38     eGFR 05/19/2022 81     Lipase 05/19/2022 105     Ventricular Rate 05/19/2022 80     Atrial Rate 05/19/2022 80     AZ Interval 05/19/2022 146     QRSD Interval 05/19/2022 82     QT Interval 05/19/2022 374     QTC Interval 05/19/2022 431     P Axis 05/19/2022 83     QRS Axis 05/19/2022 82     T Wave Axis 05/19/2022 84     Sodium 05/20/2022 135 (A)    Potassium 05/20/2022 3 6     Chloride 05/20/2022 101     CO2 05/20/2022 26     ANION GAP 05/20/2022 8     BUN 05/20/2022 12     Creatinine 05/20/2022 0 62     Glucose 05/20/2022 93     Glucose, Fasting 05/20/2022 93     Calcium 05/20/2022 8 2 (A)    eGFR 05/20/2022 86     Magnesium 05/20/2022 1 9     WBC 05/20/2022 5 55     RBC 05/20/2022 4 11     Hemoglobin 05/20/2022 13 2     Hematocrit 05/20/2022 39 6     MCV 05/20/2022 96     MCH 05/20/2022 32 1     MCHC 05/20/2022 33 3     RDW 05/20/2022 14 1     Platelets 53/95/2816 190     MPV 05/20/2022 9 6     WBC 05/21/2022 4 25 (A)    RBC 05/21/2022 4 42     Hemoglobin 05/21/2022 13 9     Hematocrit 05/21/2022 42 1     MCV 05/21/2022 95     MCH 05/21/2022 31 4     MCHC 05/21/2022 33 0     RDW 05/21/2022 13 6     MPV 05/21/2022 9 4     Platelets 62/17/9158 212     nRBC 05/21/2022 0     Neutrophils Relative 05/21/2022 71     Immat GRANS % 05/21/2022 0     Lymphocytes Relative 05/21/2022 19     Monocytes Relative 05/21/2022 10     Eosinophils Relative 05/21/2022 0     Basophils Relative 05/21/2022 0     Neutrophils Absolute 05/21/2022 3 03     Immature Grans Absolute 05/21/2022 0 00     Lymphocytes Absolute 05/21/2022 0 80     Monocytes Absolute 05/21/2022 0 42     Eosinophils Absolute 05/21/2022 0 00     Basophils Absolute 05/21/2022 0 00     Sodium 05/21/2022 138     Potassium 05/21/2022 3 9     Chloride 05/21/2022 103     CO2 05/21/2022 26     ANION GAP 05/21/2022 9     BUN 05/21/2022 13     Creatinine 05/21/2022 0 67     Glucose 05/21/2022 122     Calcium 05/21/2022 8 5     Corrected Calcium 05/21/2022 9 4     AST 05/21/2022 241 (A)    ALT 05/21/2022 418 (A)    Alkaline Phosphatase 05/21/2022 257 (A)    Total Protein 05/21/2022 6 5     Albumin 05/21/2022 2 9 (A)    Total Bilirubin 05/21/2022 0 28     eGFR 05/21/2022 83     Lipase 05/21/2022 141          Radiology Results:   ERCP    Result Date: 5/20/2022  Narrative: [id number] [facility] Operating Room [facility] [address] [phone] DATE OF SERVICE: 5/20/22 PHYSICIAN(S): Attending: Dallas Granado DO Fellow: No Staff Documented INDICATION: Abdominal pain, Abnormal CT of the abdomen POST-OP DIAGNOSIS: See the impression below  PREPROCEDURE: Informed consent was obtained for the procedure, including sedation  Risks of perforation, hemorrhage, adverse drug reaction and aspiration were discussed  The patient was placed in the left lateral decubitus position  Patient was explained about the risks and benefits of the procedure   Risks including but not limited to bleeding, infection, and perforation were explained in detail  Also explained about less than 100% sensitivity with the exam and other alternatives  DETAILS OF PROCEDURE: Patient was taken to the procedure room where a time out was performed to confirm correct patient and correct procedure  The patient underwent general anesthesia, which was administered by an anesthesia professional  The patient's blood pressure, heart rate, level of consciousness, respirations and oxygen were monitored throughout the procedure  Clinical intention was achieved  The patient experienced no blood loss  The procedure was not difficult  The patient tolerated the procedure well  There were no apparent complications  ANESTHESIA INFORMATION: ASA: III Anesthesia Type: Anesthesia type not filed in the log  MEDICATIONS: indomethacin (INDOCIN) rectal suppository 100 mg 100 mg iohexol (OMNIPAQUE) 240 MG/ML solution 38 mL (Totals for administrations occurring from 1509 to 1554 on 05/20/22) FINDINGS: Deeply cannulated to the distal common bile duct, mid common bile duct and proximal common bile duct after 3 attempts using a traction sphincterotome  Used 260 cm straight guidewire  Cannulation was not difficult  Injected contrast  The common bile duct was massively dilated to approximately 25 mm  No filling defect was seen  The distal bile duct was difficult to fill due to the size of the bile duct  Performed complete 10 mm major papilla sphincterotomy using a sphincterotome  No bleeding at the procedure site  Following sphincterotomy, the ampulla was dilated with an 8 mm X 4 cm hurricaine balloon  Removed material with 18 mm balloon in multiple sweeps in the common bile duct  No stones or debris was revealed  SPECIMENS: * No specimens in log *      Impression: Marked dilation of the common bile duct, mostly related to cholecystectomy 30 years ago  S/P sphincterotomy and hurricaine balloon dilation of the ampulla   Possible sphincter of oddi spasm RECOMMENDATION: Resume diet Liver panel in am  Eli Trimble DO [facility] ERCP biliary only    Result Date: 5/20/2022  Narrative: ERCP INDICATION:  Biliary ductal dilatation COMPARISON:  MRI from 5/5/2022 IMAGES:  7 FLUOROSCOPY TIME:   94 6 seconds CONTRAST: 38 mL of iohexol (OMNIPAQUE) FINDINGS: Fluoroscopic guidance was provided for performance of ERCP  BILIARY: There is intra and extrahepatic biliary duct dilatation with severe dilatation of the common bile duct  No filling defect is identified  Impression: Severe biliary ductal dilatation without filling defect   Please see procedure note for further details Workstation performed: [id number]

## 2022-06-15 NOTE — TELEPHONE ENCOUNTER
Dr Katarzyna Villanueva - patient called chlordiazepoxide-clidinium 5-2 5 mg per capsule is requiring [place]  Please call Donavon Rubinstein at [phone] with any questions

## 2022-06-17 NOTE — TELEPHONE ENCOUNTER
Called pharmacy for insurance information for medications  For 2022  BIN [id number]  PCN [id number]  [id number]  [id number]  ID [id number]  [phone]

## 2022-06-20 ENCOUNTER — TELEPHONE (OUTPATIENT)
Dept: GASTROENTEROLOGY | Facility: CLINIC | Age: 77
End: 2022-06-20

## 2022-06-20 NOTE — TELEPHONE ENCOUNTER
Spoke with patient  Her librax was approved  She has not gotten it from the pharmacy  She will call them today to pick it up

## 2022-06-20 NOTE — TELEPHONE ENCOUNTER
Dr Kaiden Bright - patient called she had a visit with her daughter this weekend and treated herself to big dinner  Now patient is experiencing pain, that she can not seem to come out of  Patient did have a bowel movement   Please call Dick Bruce at [phone] ty

## 2022-06-28 DIAGNOSIS — R10.84 GENERALIZED ABDOMINAL PAIN: ICD-10-CM

## 2022-06-28 RX ORDER — CHLORDIAZEPOXIDE HYDROCHLORIDE AND CLIDINIUM BROMIDE 5; 2.5 MG/1; MG/1
1 CAPSULE ORAL
Qty: 360 CAPSULE | Refills: 1 | Status: SHIPPED | OUTPATIENT
Start: 2022-06-28

## 2022-07-05 ENCOUNTER — OFFICE VISIT (OUTPATIENT)
Dept: INTERNAL MEDICINE CLINIC | Facility: CLINIC | Age: 77
End: 2022-07-05
Payer: MEDICARE

## 2022-07-05 VITALS
BODY MASS INDEX: 24.84 KG/M2 | RESPIRATION RATE: 20 BRPM | DIASTOLIC BLOOD PRESSURE: 70 MMHG | HEIGHT: 62 IN | WEIGHT: 135 LBS | TEMPERATURE: 97.9 F | OXYGEN SATURATION: 99 % | HEART RATE: 90 BPM | SYSTOLIC BLOOD PRESSURE: 118 MMHG

## 2022-07-05 DIAGNOSIS — E03.9 ACQUIRED HYPOTHYROIDISM: ICD-10-CM

## 2022-07-05 DIAGNOSIS — R10.9 ABDOMINAL PAIN, UNSPECIFIED ABDOMINAL LOCATION: ICD-10-CM

## 2022-07-05 DIAGNOSIS — F11.20 CONTINUOUS OPIOID DEPENDENCE (HCC): Primary | ICD-10-CM

## 2022-07-05 PROBLEM — R93.5 ABNORMAL CT OF THE ABDOMEN: Status: RESOLVED | Noted: 2022-05-19 | Resolved: 2022-07-05

## 2022-07-05 PROCEDURE — 99214 OFFICE O/P EST MOD 30 MIN: CPT | Performed by: INTERNAL MEDICINE

## 2022-07-05 RX ORDER — MORPHINE SULFATE 15 MG/1
15 TABLET ORAL EVERY 8 HOURS PRN
Qty: 90 TABLET | Refills: 0 | Status: SHIPPED | OUTPATIENT
Start: 2022-07-05 | End: 2022-10-03 | Stop reason: SDUPTHER

## 2022-07-05 NOTE — PROGRESS NOTES
[facility] [address] [place] [address]    NAME: Nella Gaucher  AGE: 76 y o  SEX: female  : 1945     DATE: 2022     Assessment and Plan:     1  Continuous opioid dependence ([facility] )    There are risks associated with opioid medications, including dependence, addiction and tolerance  The patient understands and agrees to use these medications only as prescribed  Potential side effects of the medications include, but are not limited to, constipation, drowsiness, addiction, impaired judgment and risk of fatal overdose if not taken as prescribed  The patient was warned against driving while taking sedation medications  Sharing medications is a felony  At this point in time, the patient is showing no signs of addiction, abuse, diversion or suicidal ideation  Opiate agreement is active and on file  Will do urine drug screening at next visit  [place] PDMP or [place]  reviewed: No red flags were identified; safe to proceed with prescription      - morphine (MSIR) 15 mg tablet; Take 1 tablet (15 mg total) by mouth every 8 (eight) hours as needed for severe pain Max Daily Amount: 45 mg  Dispense: 90 tablet; Refill: 0    2  Acquired hypothyroidism    Continue levothyroxine  Will monitor TSH  3  Abdominal pain    Hospitalized in May and had ERCP  LFTs were elevated at the time  Recommend repeat CMP to ensure they came back down to normal  F/u with GI     - Comprehensive metabolic panel; Future      Falls Plan of Care: balance, strength, and gait training instructions were provided  Return in 4 months (on 2022)  History of Present Illness:     Rachel Gutierrez presents for follow-up  Feeling pretty ok these days  Physical therapy really helped her with her back and drop foot  Taking morphine usually once a day  She is able to drive and do things around the house  She is feeling good about this  Hospitalized back in may due to abdominal pain  Had an abnormal MRI/MRCP   Went on to have an ERCP where sphincterotomy was performed for treatment of 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  She feels her abdominal issues are doing better now since working with Dr Angie Degroot  Review of Systems:     Review of Systems   Constitutional: Positive for fatigue  Negative for chills, diaphoresis and fever  Respiratory: Negative  Cardiovascular: Negative  Gastrointestinal: Negative  Musculoskeletal: Positive for back pain and gait problem  Neurological: Positive for weakness  Objective:     /70 (BP Location: Left arm, Patient Position: Sitting, Cuff Size: Standard)   Pulse 90   Temp 97 9 °F (36 6 °C) (Tympanic)   Resp 20   Ht 5' 2" (1 575 m)   Wt 61 2 kg (135 lb)   SpO2 99%   BMI 24 69 kg/m²     Physical Exam  Constitutional:       General: She is not in acute distress  Appearance: She is not ill-appearing  Cardiovascular:      Rate and Rhythm: Normal rate and regular rhythm  Heart sounds: No murmur heard  Pulmonary:      Effort: Pulmonary effort is normal  No respiratory distress  Breath sounds: No wheezing  Abdominal:      General: Bowel sounds are normal  There is no distension  Tenderness: There is no abdominal tenderness  Musculoskeletal:      Right lower leg: No edema  Left lower leg: No edema  Neurological:      Mental Status: She is alert           Cody Flores DO  [facility] [address] [place] [address]

## 2022-07-05 NOTE — PATIENT INSTRUCTIONS

## 2022-07-11 ENCOUNTER — TELEPHONE (OUTPATIENT)
Dept: INTERNAL MEDICINE CLINIC | Facility: CLINIC | Age: 77
End: 2022-07-11

## 2022-07-11 NOTE — TELEPHONE ENCOUNTER
Patient stated that [facility]  returned RX for morphine (MSIR) 15 mg  Because Dr Darrius Coker prescribed medication that has a reaction with the morphine   Sangeetha Edmonds Requesting that someone call [facility] to say it's okay to take both

## 2022-07-19 ENCOUNTER — HOSPITAL ENCOUNTER (OUTPATIENT)
Dept: MAMMOGRAPHY | Facility: CLINIC | Age: 77
Discharge: HOME | End: 2022-07-19
Payer: MEDICARE

## 2022-07-19 ENCOUNTER — HOSPITAL ENCOUNTER (OUTPATIENT)
Dept: BONE DENSITY | Facility: CLINIC | Age: 77
Discharge: HOME | End: 2022-07-19
Payer: MEDICARE

## 2022-07-19 ENCOUNTER — APPOINTMENT (OUTPATIENT)
Dept: LAB | Facility: CLINIC | Age: 77
End: 2022-07-19
Payer: MEDICARE

## 2022-07-19 VITALS — WEIGHT: 135 LBS | HEIGHT: 63 IN | BODY MASS INDEX: 23.92 KG/M2

## 2022-07-19 DIAGNOSIS — M81.0 AGE-RELATED OSTEOPOROSIS WITHOUT CURRENT PATHOLOGICAL FRACTURE: ICD-10-CM

## 2022-07-19 DIAGNOSIS — R10.9 ABDOMINAL PAIN, UNSPECIFIED ABDOMINAL LOCATION: ICD-10-CM

## 2022-07-19 DIAGNOSIS — Z12.31 OTHER SCREENING MAMMOGRAM: ICD-10-CM

## 2022-07-19 LAB
ALBUMIN SERPL BCP-MCNC: 3.5 G/DL (ref 3.5–5)
ALP SERPL-CCNC: 134 U/L (ref 46–116)
ALT SERPL W P-5'-P-CCNC: 35 U/L (ref 12–78)
ANION GAP SERPL CALCULATED.3IONS-SCNC: 7 MMOL/L (ref 4–13)
AST SERPL W P-5'-P-CCNC: 25 U/L (ref 5–45)
BILIRUB SERPL-MCNC: 0.27 MG/DL (ref 0.2–1)
BUN SERPL-MCNC: 16 MG/DL (ref 5–25)
CALCIUM SERPL-MCNC: 9.6 MG/DL (ref 8.3–10.1)
CHLORIDE SERPL-SCNC: 104 MMOL/L (ref 96–108)
CO2 SERPL-SCNC: 28 MMOL/L (ref 21–32)
CREAT SERPL-MCNC: 0.69 MG/DL (ref 0.6–1.3)
GFR SERPL CREATININE-BSD FRML MDRD: 83 ML/MIN/1.73SQ M
GLUCOSE P FAST SERPL-MCNC: 137 MG/DL (ref 65–99)
POTASSIUM SERPL-SCNC: 3.9 MMOL/L (ref 3.5–5.3)
PROT SERPL-MCNC: 7.9 G/DL (ref 6.4–8.4)
SODIUM SERPL-SCNC: 139 MMOL/L (ref 135–147)

## 2022-07-19 PROCEDURE — 77063 BREAST TOMOSYNTHESIS BI: CPT

## 2022-07-19 PROCEDURE — 80053 COMPREHEN METABOLIC PANEL: CPT

## 2022-07-19 PROCEDURE — 77067 SCR MAMMO BI INCL CAD: CPT

## 2022-07-19 PROCEDURE — 77080 DXA BONE DENSITY AXIAL: CPT

## 2022-07-19 PROCEDURE — 36415 COLL VENOUS BLD VENIPUNCTURE: CPT

## 2022-07-28 ENCOUNTER — APPOINTMENT (OUTPATIENT)
Dept: LAB | Facility: HOSPITAL | Age: 77
End: 2022-07-28
Payer: MEDICARE

## 2022-07-28 DIAGNOSIS — M80.00XA OSTEOPOROSIS WITH PATHOLOGICAL FRACTURE, INITIAL ENCOUNTER: ICD-10-CM

## 2022-07-28 LAB
25(OH)D3 SERPL-MCNC: 34.9 NG/ML (ref 30–100)
CALCIUM SERPL-MCNC: 9.3 MG/DL (ref 8.3–10.1)
CREAT SERPL-MCNC: 0.72 MG/DL (ref 0.6–1.3)
ERYTHROCYTE [SEDIMENTATION RATE] IN BLOOD: 50 MM/HOUR (ref 0–29)
GFR SERPL CREATININE-BSD FRML MDRD: 79 ML/MIN/1.73SQ M
PTH-INTACT SERPL-MCNC: 41.5 PG/ML (ref 18.4–80.1)

## 2022-07-28 PROCEDURE — 82565 ASSAY OF CREATININE: CPT

## 2022-07-28 PROCEDURE — 84165 PROTEIN E-PHORESIS SERUM: CPT | Performed by: PATHOLOGY

## 2022-07-28 PROCEDURE — 84165 PROTEIN E-PHORESIS SERUM: CPT

## 2022-07-28 PROCEDURE — 36415 COLL VENOUS BLD VENIPUNCTURE: CPT

## 2022-07-28 PROCEDURE — 83970 ASSAY OF PARATHORMONE: CPT

## 2022-07-28 PROCEDURE — 85652 RBC SED RATE AUTOMATED: CPT

## 2022-07-28 PROCEDURE — 82306 VITAMIN D 25 HYDROXY: CPT

## 2022-07-28 PROCEDURE — 82310 ASSAY OF CALCIUM: CPT

## 2022-07-29 LAB
ALBUMIN SERPL ELPH-MCNC: 4.32 G/DL (ref 3.5–5)
ALBUMIN SERPL ELPH-MCNC: 56.8 % (ref 52–65)
ALPHA1 GLOB SERPL ELPH-MCNC: 0.37 G/DL (ref 0.1–0.4)
ALPHA1 GLOB SERPL ELPH-MCNC: 4.9 % (ref 2.5–5)
ALPHA2 GLOB SERPL ELPH-MCNC: 0.83 G/DL (ref 0.4–1.2)
ALPHA2 GLOB SERPL ELPH-MCNC: 10.9 % (ref 7–13)
BETA GLOB ABNORMAL SERPL ELPH-MCNC: 0.48 G/DL (ref 0.4–0.8)
BETA1 GLOB SERPL ELPH-MCNC: 6.3 % (ref 5–13)
BETA2 GLOB SERPL ELPH-MCNC: 6.5 % (ref 2–8)
BETA2+GAMMA GLOB SERPL ELPH-MCNC: 0.49 G/DL (ref 0.2–0.5)
GAMMA GLOB ABNORMAL SERPL ELPH-MCNC: 1.11 G/DL (ref 0.5–1.6)
GAMMA GLOB SERPL ELPH-MCNC: 14.6 % (ref 12–22)
IGG/ALB SER: 1.31 {RATIO} (ref 1.1–1.8)
PROT PATTERN SERPL ELPH-IMP: NORMAL
PROT SERPL-MCNC: 7.6 G/DL (ref 6.4–8.2)

## 2022-08-03 ENCOUNTER — OFFICE VISIT (OUTPATIENT)
Dept: GASTROENTEROLOGY | Facility: CLINIC | Age: 77
End: 2022-08-03
Payer: MEDICARE

## 2022-08-03 VITALS
OXYGEN SATURATION: 98 % | WEIGHT: 136 LBS | DIASTOLIC BLOOD PRESSURE: 68 MMHG | HEART RATE: 70 BPM | SYSTOLIC BLOOD PRESSURE: 110 MMHG | BODY MASS INDEX: 25.03 KG/M2 | HEIGHT: 62 IN

## 2022-08-03 DIAGNOSIS — K58.2 IRRITABLE BOWEL SYNDROME WITH BOTH CONSTIPATION AND DIARRHEA: Primary | ICD-10-CM

## 2022-08-03 DIAGNOSIS — R11.0 NAUSEA: ICD-10-CM

## 2022-08-03 PROCEDURE — 99214 OFFICE O/P EST MOD 30 MIN: CPT | Performed by: INTERNAL MEDICINE

## 2022-08-03 NOTE — PROGRESS NOTES
[facility] - Outpatient Follow-up Note  Emily Figueroa 76 y o  female MRN: [id number]  Encounter: [id number]          ASSESSMENT AND PLAN:      1  Irritable bowel syndrome with both constipation and diarrhea    2  Nausea    Decrease Librax 3 times a day    Continue with the probiotic on a daily basis    And yuliana in some formulation on a daily basis  No indication for EGD or colonoscopy at this time    ______________________________________________________________________    SUBJECTIVE:  Odilia Orozco comes the office today for routine follow-up on her irritable bowel syndrome  She states that she is still taking morphine for chronic control of pain  She is eating fiber foods on a daily basis as well as taking a fiber supplement daily  She admits to nausea yesterday which was in the morning time and has since resolved  She denies any bloating  She states she [place] on [address]  She is also on a probiotic on a daily basis  This past but she had had a brief episode of constipation was really bad but this has since resolved  He is having terrible gas pains that radiate up the right side of the abdomen toward the breast   She stop taking dicyclomine altogether since it was working  She began taking the Librax and it has demonstrated moderate improvement in her overall symptoms  She denies any current bloating  REVIEW OF SYSTEMS IS OTHERWISE NEGATIVE        Historical Information   Past Medical History:   Diagnosis Date    Anxiety     Cataract     last assessed 4/25/14    Chronic bronchitis ([facility] [address] )     Sees pulmonary specialist twice a year    Chronic pain disorder     [facility] disease[facility])     Hypothyroidism     Multiple sclerosis ([facility] [address] )     CHOCO (obstructive sleep apnea)     uses cpap, 3/19/21 -pt states will bring CPAP for DOS 3/23/21    Osteopetrosis     Peroneal muscle atrophy     Shortness of breath     Smoker     Thoracolumbar radiculopathy due to intervertebral disc disorder      Past Surgical History:   Procedure Laterality Date    APPENDECTOMY      CATARACT EXTRACTION       SECTION      CHOLECYSTECTOMY      GALLBLADDER SURGERY      CO COLONOSCOPY FLX DX W/COLLJ SPEC WHEN PFRMD N/A 2018    Procedure: COLONOSCOPY;  Surgeon: Sujatha Allen MD;  Location: [facility];   Service: Colorectal    CO LAMNOTMY INCL W/DCMPRSN NRV ROOT 1 INTRSPC LUMBR Right 3/23/2021    Procedure: MINIMALLY INVASIVE LUMBAR FAR LATERAL DISCECTOMY L4-5, RIGHT;  Surgeon: Sangeetha Curtis MD;  Location:  MAIN OR;  Service: Neurosurgery    THROAT SURGERY       Social History   Social History     Substance and Sexual Activity   Alcohol Use Yes    Alcohol/week: 1 0 - 2 0 standard drink    Types: 1 - 2 Glasses of wine per week    Comment: 1-2 drinks of wine daily      Social History     Substance and Sexual Activity   Drug Use Yes    Types: Morphine, Marijuana    Comment: medical-RSO  last dose 11 days ago, past hx o f medical marijuana use - NOT CURRENT     Social History     Tobacco Use   Smoking Status Current Every Day Smoker    Packs/day: 1 00    Years: 60 00    Pack years: 60 00    Types: Cigarettes    Start date: 1958   Smokeless Tobacco Never Used     Family History   Problem Relation Age of Onset    Skin cancer Mother     Hypertension Father         benign essential    Stroke Father     Lung cancer Brother        Meds/Allergies       Current Outpatient Medications:     chlordiazepoxide-clidinium (LIBRAX) 5-2 5 mg per capsule    cholecalciferol (VITAMIN D3) 1,000 units tablet    dicyclomine (BENTYL) 10 mg capsule    levothyroxine 100 mcg tablet    morphine (MSIR) 15 mg tablet    Multiple Vitamins-Minerals (ICAPS AREDS 2 PO)    naloxone (NARCAN) 4 mg/0 1 mL nasal spray    cyclobenzaprine (FLEXERIL) 5 mg tablet    ondansetron (ZOFRAN) 4 mg tablet    sucralfate (CARAFATE) 1 g/10 mL suspension    Allergies   Allergen Reactions    Adhesive [Medical Tape] Rash    Latex Rash           Objective     Blood pressure 110/68, pulse 70, height 5' 2" (1 575 m), weight 61 7 kg (136 lb), SpO2 98 %  Body mass index is 24 87 kg/m²  PHYSICAL EXAM:      General Appearance:   Alert, cooperative, no distress   HEENT:   Normocephalic, atraumatic, anicteric      Neck:  Supple, symmetrical, trachea midline   Lungs:   Clear to auscultation bilaterally; no rales, rhonchi or wheezing; respirations unlabored    Heart[de-identified]   Regular rate and rhythm; no murmur, rub, or gallop  Abdomen:   Soft, non-tender, non-distended; normal bowel sounds; no masses, no organomegaly    Genitalia:   Deferred    Rectal:   Deferred    Extremities:  No cyanosis, clubbing or edema    Pulses:  2+ and symmetric    Skin:  No jaundice, rashes, or lesions    Lymph nodes:  No palpable cervical lymphadenopathy        Lab Results:   No visits with results within 1 Day(s) from this visit  Latest known visit with results is:   Appointment on 07/28/2022   Component Date Value    Vit D, 25-Hydroxy 07/28/2022 34 9     Sed Rate 07/28/2022 50 (A)    Calcium 07/28/2022 9 3     Creatinine 07/28/2022 0 72     eGFR 07/28/2022 79     PTH 07/28/2022 41 5     A/G Ratio 07/28/2022 1 31     Albumin Electrophoresis 07/28/2022 56 8     Albumin CONC 07/28/2022 4 32     Alpha 1 07/28/2022 4 9     ALPHA 1 CONC 07/28/2022 0 37     Alpha 2 07/28/2022 10 9     ALPHA 2 CONC 07/28/2022 0 83     Beta-1 07/28/2022 6 3     BETA 1 CONC 07/28/2022 0 48     Beta-2 07/28/2022 6 5     BETA 2 CONC 07/28/2022 0 49     Gamma Globulin 07/28/2022 14 6     GAMMA CONC 07/28/2022 1 11     Total Protein 07/28/2022 7 6     SPEP Interpretation 07/28/2022 See Comment          Radiology Results:   DXA bone density spine hip and pelvis    Result Date: 7/19/2022  Narrative: DXA SCAN CLINICAL HISTORY:  64-year-old postmenopausal female   OTHER RISK FACTORS:  Parental history of hip fracture after minor injury, smoking history, COPD, limited mobility  PHARMACOLOGIC THERAPY FOR OSTEOPOROSIS:  None  TECHNIQUE: Bone densitometry was performed using a Curio's W  bone densitometer  Regions of interest appear properly placed  COMPARISON: 10/7/2020  RESULTS: LUMBAR SPINE L1-L3  (L4 vertebra excluded from analysis due to local structural abnormalities or artifact) : BMD  0 843  gm/cm2 T-score -1 6 RIGHT  TOTAL HIP: BMD:  0 735  gm/cm2 T-score:  -1 7 RIGHT  FEMORAL NECK: BMD:  0 662  gm/cm2 T score: -1 7 LEFT  TOTAL HIP: BMD:  0 800  gm/cm2 T-score:  -1 2 LEFT  FEMORAL NECK: BMD:  0 656  gm/cm2 T score: -1 7     Impression: 1  Low bone mass (osteopenia)  2   Since a DXA study from 10/7/2020, there has been: A  STATISTICALLY SIGNIFICANT DECREASE in bone mineral density of  0 060 g/cm2 (6 9)% in the left total hip  3   The 10 year risk of hip fracture is 5 7% with the 10 year risk of major osteoporotic fracture being 14% as calculated by the [place] [employer] [place]/WHO fracture risk assessment tool (FRAX)  4   The current NOF guidelines recommend treating patients with a T-score of -2 5 or less in the lumbar spine or hips, or in post-menopausal women and men over the age of 48 with low bone mass (osteopenia) and a FRAX 10 year risk score of >3% for hip fracture and/or >20% for major osteoporotic fracture  5   The NOF recommends follow-up DXA in 1-2 years after initiating therapy for osteoporosis and every 2 years thereafter  More frequent evaluation is appropriate for patients with conditions associated with rapid bone loss, such as glucocorticoid therapy  The interval between DXA screenings may be longer for individuals without major risk factors and initial T-score in the normal or upper low bone mass range  The FRAX algorithm has certain limitations: -FRAX has not been validated in patients currently or previously treated with pharmacotherapy for osteoporosis    In such patients, clinical judgment must be exercised in interpreting FRAX scores  -Prior hip, vertebral and humeral fragility fractures appear to confer greater risk of subsequent fracture than fractures at other sites (this is especially true for individuals with severe vertebral fractures), but quantification of this incremental risk is not possible with FRAX  -FRAX underestimates fracture risk in patients with history of multiple fragility fractures  -FRAX may underestimate fracture risk in patients with history of frequent falls  -It is not appropriate to use FRAX to monitor treatment response  WHO CLASSIFICATION: Normal (a T-score of -1 0 or higher) Low bone mineral density (a T-score of less than -1 0 but higher than -2 5) Osteoporosis (a T-score of -2 5 or less) Severe osteoporosis (a T-score of -2 5 or less with a fragility fracture) LEAST SIGNIFICANT CHANGE (AT 95% C  I): Lumbar spine: 0 034 g/cm2; 3 4% Total hip: 0 041 g/cm2; 4 5% Forearm: 0 025 g/cm2; 4 4% Workstation performed: [id number]     Mammo screening bilateral w 3d & cad    Result Date: 7/21/2022  Narrative: DIAGNOSIS: Other screening mammogram TECHNIQUE: Digital screening mammography was performed  Computer Aided Detection (CAD) analyzed all applicable images  COMPARISONS: Prior breast imaging dated: 04/03/2019, 02/08/2018, 06/15/2016, 10/22/2009, and 10/01/2008 RELEVANT HISTORY: Family Breast Cancer History: No known family history of breast cancer  Family Medical History: No known relevant family medical history  Personal History: Hormone history includes birth control  No known relevant surgical history  No known relevant medical history  The patient is scheduled in a reminder system for screening mammography  8-10% of cancers will be missed on mammography  Management of a palpable abnormality must be based on clinical grounds  Patients will be notified of their results via letter from our facility  Accredited by [address] [place] [address] St of Radiology and FDA   RISK ASSESSMENT: 5 Year Tyrer-Cuzick: 1 76 % 10 Year Tyrer-Cuzick: No Score Lifetime Tyrer-Cuzick: 2 14 % TISSUE DENSITY: The breasts are heterogeneously dense, which may obscure small masses  INDICATION: Toya Martinez is a 76 y o  female presenting for screening mammography  FINDINGS: There are no suspicious masses, grouped microcalcifications or areas of architectural distortion  The skin and nipple areolar complex are unremarkable  Impression: No mammographic evidence of malignancy  ASSESSMENT/BI-RADS CATEGORY: Left: 1 - Negative Right: 1 - Negative Overall: 1 - Negative RECOMMENDATION:      - Routine screening mammogram in 1 year for both breasts   Workstation ID: [id number]

## 2022-08-15 ENCOUNTER — TELEPHONE (OUTPATIENT)
Dept: INTERNAL MEDICINE CLINIC | Facility: CLINIC | Age: 77
End: 2022-08-15

## 2022-08-15 NOTE — TELEPHONE ENCOUNTER
Dr Omar Velasco office;  April; [phone]    Dr Gaby Castellon office wants pt to start prolia shots, is sending us ov notes and labs    Please advise

## 2022-08-15 NOTE — TELEPHONE ENCOUNTER
April called back and was notified stated she sent over notes and labs from dr Allegra Frost and stated you can use them for prior auth for prolia

## 2022-08-16 ENCOUNTER — TELEPHONE (OUTPATIENT)
Dept: INTERNAL MEDICINE CLINIC | Facility: CLINIC | Age: 77
End: 2022-08-16

## 2022-08-16 NOTE — TELEPHONE ENCOUNTER
PROLIA-New/Info>[employer]  Pt [facility] insurance-[facility]  Does not anticipate any insurance changes at this time

## 2022-08-18 ENCOUNTER — TELEPHONE (OUTPATIENT)
Dept: INTERNAL MEDICINE CLINIC | Facility: CLINIC | Age: 77
End: 2022-08-18

## 2022-08-18 DIAGNOSIS — M81.0 OSTEOPOROSIS WITHOUT CURRENT PATHOLOGICAL FRACTURE, UNSPECIFIED OSTEOPOROSIS TYPE: Primary | ICD-10-CM

## 2022-08-18 NOTE — TELEPHONE ENCOUNTER
PROLIA-Received SOB/Appt  scheduled    Spoke w/patient    Last dose-NEW    *Appt   Scheduled- 9/7/22  *Med ordered- 4794-Del 9/1  *Order>PCP  *Log and [employer] updated

## 2022-09-07 ENCOUNTER — TELEPHONE (OUTPATIENT)
Dept: INTERNAL MEDICINE CLINIC | Facility: CLINIC | Age: 77
End: 2022-09-07

## 2022-09-07 ENCOUNTER — CLINICAL SUPPORT (OUTPATIENT)
Dept: INTERNAL MEDICINE CLINIC | Facility: CLINIC | Age: 77
End: 2022-09-07
Payer: MEDICARE

## 2022-09-07 DIAGNOSIS — Q78.2 OSTEOPETROSIS: ICD-10-CM

## 2022-09-07 PROCEDURE — 96372 THER/PROPH/DIAG INJ SC/IM: CPT

## 2022-09-12 ENCOUNTER — HOSPITAL ENCOUNTER (OUTPATIENT)
Dept: CT IMAGING | Facility: HOSPITAL | Age: 77
Discharge: HOME | End: 2022-09-12
Attending: INTERNAL MEDICINE
Payer: MEDICARE

## 2022-09-12 DIAGNOSIS — R91.1 LUNG NODULE: ICD-10-CM

## 2022-09-12 PROCEDURE — G1004 CDSM NDSC: HCPCS

## 2022-09-12 PROCEDURE — 71250 CT THORAX DX C-: CPT

## 2022-09-13 ENCOUNTER — NURSE TRIAGE (OUTPATIENT)
Dept: OTHER | Facility: OTHER | Age: 77
End: 2022-09-13

## 2022-09-13 NOTE — TELEPHONE ENCOUNTER
Patient reports she had some improvement post OV 8/3/22 where provider and patient changed medication regimen for chlordiazepoxide-clidinium (LIBRAX) 5-2 5 mg per capsule and added yuliana root  Pain has gradually worsened to severe  Patient reports loose bowel movements had improved to almost regular, but today she is having diarrhea  Protocol requires patient to go to ED, but patient will be home after 1 PM; has to take spouse to medical appointment  Patient requests office follow up after 1 PM       Reason for Disposition   Constant abdominal pain lasting > 2 hours    Answer Assessment - Initial Assessment Questions  1  LOCATION: "Where does it hurt?"       Bilateral lower abdomen    2  RADIATION: "Does the pain shoot anywhere else?" (e g , chest, back)     Back ache but pain is all through lower abdomen    3  ONSET: "When did the pain begin?" (e g , minutes, hours or days ago)       Months     4  SUDDEN: "Gradual or sudden onset?"      Worsening to daily pain    5  PATTERN "Does the pain come and go, or is it constant?"     - If constant: "Is it getting better, staying the same, or worsening?"       (Note: Constant means the pain never goes away completely; most serious pain is constant and it progresses)      - If intermittent: "How long does it last?" "Do you have pain now?"      (Note: Intermittent means the pain goes away completely between bouts)     Constant    6  SEVERITY: "How bad is the pain?"  (e g , Scale 1-10; mild, moderate, or severe)    - MILD (1-3): doesn't interfere with normal activities, abdomen soft and not tender to touch     - MODERATE (4-7): interferes with normal activities or awakens from sleep, tender to touch     - SEVERE (8-10): excruciating pain, doubled over, unable to do any normal activities       Severe    7   RECURRENT SYMPTOM: "Have you ever had this type of stomach pain before?" If Yes, ask: "When was the last time?" and "What happened that time?"       Yes    8  CAUSE: "What do you think is causing the stomach pain?"      IBS    9  RELIEVING/AGGRAVATING FACTORS: "What makes it better or worse?" (e g , movement, antacids, bowel movement)      Nothing provides relief or aggravates it    10  OTHER SYMPTOMS: "Has there been any vomiting, diarrhea, constipation, or urine problems?"        Loose stools but regular; today it is turning into diarrhea    11   PREGNANCY: "Is there any chance you are pregnant?" "When was your last menstrual period?"        Post menopausal    Protocols used: ABDOMINAL PAIN - [facility] - GILMA [place]FIELD

## 2022-09-13 NOTE — TELEPHONE ENCOUNTER
Spoke with patient  History of IBS mixed, nausea    Patient c/o passing small soft BM 5-6 in the morning, +bloating, +nausea, +gas, bilateral pain mid to lower abdomen 8/10  Taking librax TID, probiotic daily, fiber daily, gas-x QID, yuliana root daily  She is able to eat and drink fluids  She would like to try a different probiotic  She will try digestive enzyme  Denies vomiting, fever, chills, SOB, weakness, black or bloody stools  Patient will not go to ED for evaluation  Any suggestions?

## 2022-09-13 NOTE — TELEPHONE ENCOUNTER
Regarding: abdominal pain   ----- Message from [facility] sent at 9/13/2022  9:53 AM EDT -----  "I have been experiencing loose stools for awhile  I have been waking up with abdominal pain  Once I wake up I have to go to the bathroom    The pain continues after I make a bowel movement "

## 2022-09-23 ENCOUNTER — OFFICE VISIT (OUTPATIENT)
Dept: INTERNAL MEDICINE CLINIC | Facility: CLINIC | Age: 77
End: 2022-09-23
Payer: MEDICARE

## 2022-09-23 ENCOUNTER — APPOINTMENT (OUTPATIENT)
Dept: LAB | Facility: HOSPITAL | Age: 77
End: 2022-09-23
Payer: MEDICARE

## 2022-09-23 VITALS
HEIGHT: 62 IN | RESPIRATION RATE: 20 BRPM | HEART RATE: 80 BPM | BODY MASS INDEX: 25.21 KG/M2 | OXYGEN SATURATION: 98 % | TEMPERATURE: 96.9 F | SYSTOLIC BLOOD PRESSURE: 112 MMHG | DIASTOLIC BLOOD PRESSURE: 60 MMHG | WEIGHT: 137 LBS

## 2022-09-23 DIAGNOSIS — F11.20 CONTINUOUS OPIOID DEPENDENCE (HCC): Primary | ICD-10-CM

## 2022-09-23 DIAGNOSIS — R10.9 ABDOMINAL PAIN, UNSPECIFIED ABDOMINAL LOCATION: ICD-10-CM

## 2022-09-23 DIAGNOSIS — Z00.00 MEDICARE ANNUAL WELLNESS VISIT, SUBSEQUENT: ICD-10-CM

## 2022-09-23 DIAGNOSIS — E03.9 ACQUIRED HYPOTHYROIDISM: ICD-10-CM

## 2022-09-23 DIAGNOSIS — E03.9 ACQUIRED HYPOTHYROIDISM: Primary | ICD-10-CM

## 2022-09-23 LAB
ALBUMIN SERPL BCP-MCNC: 3.6 G/DL (ref 3.5–5)
ALP SERPL-CCNC: 144 U/L (ref 46–116)
ALT SERPL W P-5'-P-CCNC: 40 U/L (ref 12–78)
ANION GAP SERPL CALCULATED.3IONS-SCNC: 3 MMOL/L (ref 4–13)
AST SERPL W P-5'-P-CCNC: 22 U/L (ref 5–45)
BILIRUB SERPL-MCNC: 0.21 MG/DL (ref 0.2–1)
BUN SERPL-MCNC: 17 MG/DL (ref 5–25)
CALCIUM SERPL-MCNC: 9.3 MG/DL (ref 8.3–10.1)
CHLORIDE SERPL-SCNC: 108 MMOL/L (ref 96–108)
CO2 SERPL-SCNC: 26 MMOL/L (ref 21–32)
CREAT SERPL-MCNC: 0.77 MG/DL (ref 0.6–1.3)
ERYTHROCYTE [DISTWIDTH] IN BLOOD BY AUTOMATED COUNT: 13.9 % (ref 11.6–15.1)
GFR SERPL CREATININE-BSD FRML MDRD: 73 ML/MIN/1.73SQ M
GLUCOSE SERPL-MCNC: 108 MG/DL (ref 65–140)
HCT VFR BLD AUTO: 43.5 % (ref 34.8–46.1)
HGB BLD-MCNC: 13.9 G/DL (ref 11.5–15.4)
MCH RBC QN AUTO: 31.4 PG (ref 26.8–34.3)
MCHC RBC AUTO-ENTMCNC: 32 G/DL (ref 31.4–37.4)
MCV RBC AUTO: 98 FL (ref 82–98)
PLATELET # BLD AUTO: 276 THOUSANDS/UL (ref 149–390)
PMV BLD AUTO: 9.2 FL (ref 8.9–12.7)
POTASSIUM SERPL-SCNC: 4 MMOL/L (ref 3.5–5.3)
PROT SERPL-MCNC: 7.6 G/DL (ref 6.4–8.4)
RBC # BLD AUTO: 4.43 MILLION/UL (ref 3.81–5.12)
SODIUM SERPL-SCNC: 137 MMOL/L (ref 135–147)
TSH SERPL DL<=0.05 MIU/L-ACNC: 1.83 UIU/ML (ref 0.45–4.5)
WBC # BLD AUTO: 7.15 THOUSAND/UL (ref 4.31–10.16)

## 2022-09-23 PROCEDURE — G0439 PPPS, SUBSEQ VISIT: HCPCS | Performed by: INTERNAL MEDICINE

## 2022-09-23 PROCEDURE — 84443 ASSAY THYROID STIM HORMONE: CPT

## 2022-09-23 PROCEDURE — 99214 OFFICE O/P EST MOD 30 MIN: CPT | Performed by: INTERNAL MEDICINE

## 2022-09-23 PROCEDURE — 85027 COMPLETE CBC AUTOMATED: CPT

## 2022-09-23 PROCEDURE — 36415 COLL VENOUS BLD VENIPUNCTURE: CPT

## 2022-09-23 PROCEDURE — G0444 DEPRESSION SCREEN ANNUAL: HCPCS | Performed by: INTERNAL MEDICINE

## 2022-09-23 PROCEDURE — 80053 COMPREHEN METABOLIC PANEL: CPT

## 2022-09-23 NOTE — PROGRESS NOTES
Assessment/Plan     Problem List Items Addressed This Visit        Other    Continuous opioid dependence ([facility] [address] ) - Primary    Relevant Orders    [facility] All Prescribed Meds and Special Instructions    Amphetamines, Methamphetamines    Butalbital    Phenobarbital    Secobarbital    Temazepam    Alprazolam    Clonazepam    Diazepam    Lorazepam    Oxazepam    Gabapentin    Pregabalin    Cocaine    Heroin    Buprenorphine    Levorphanol    Meperidine    Naltrexone    Fentanyl    Methadone    Oxycodone    Oxymorphone    Tapentadol    THC    Tramadol    Codeine, Hydrocodone, Hydropmorphone, Morphine    Bath Salts    Ethyl Glucuronide/Ethyl Sulfate    Kratom    Spice    Methylphenidate    Phentermine    Validity Oxidant    Validity Creatinine    Validity pH    Validity Specific         Treatment Plan: Continue morphine as needed for severe pain    Treatment Goals: Pain control and quality of life    Opiate risks  There are risks associated with opioid medications, including dependence, addiction and tolerance  The patient understands and agrees to use these medications only as prescribed  Potential side effects of the medications include, but are not limited to, constipation, drowsiness, addiction, impaired judgment and risk of fatal overdose if not taken as prescribed  The patient was warned against driving while taking sedation medications  Sharing medications is a felony  At this point in time, the patient is showing no signs of addiction, abuse, diversion or suicidal ideation  Pateint is taking concurrent benzodiazepines  Has been counseled on the risks of taking opioids and benzodiazepines including sedation, increased fall risk, dizziness, addictive potential and death  Patient is taking concurrent muscle relaxers  Has been counseled on the risks of taking opioids and muscle relaxers including sedation, increased fall risk, dizziness, addictive potential and death        Patient has a high risk condition (age > 72, CHOCO, renal or hepatic impairment, mental health condition, use of alcohol or other substances)  Has been counseled on the specific risks of taking opioids with these conditions and the increased risks including including sedation, increased fall risk, dizziness, addictive potential and death  Opioid agreement  Pain management agreement was reviewed with patient and signed/updated during visit      Drug screen  Drug screen collected during today's visit      PDMP review  [place] or [place]  reviewed  No red flags were identified; safe to proceed with prescription      2  Acquired hypothyroidism    Continue levothyroxine  Stable  3  Abdominal pain    Following with Dr Yenifer Whatley  Continue medications and low FODMAP diet     BMI Counseling: Body mass index is 25 06 kg/m²  The BMI is above normal  Nutrition recommendations include moderation in carbohydrate intake and increasing intake of lean protein  Rationale for BMI follow-up plan is due to patient being overweight or obese  Depression Screening and Follow-up Plan: Patient was screened for depression during today's encounter  They screened negative with a PHQ-2 score of 0  Subjective     Opioid Management:   Type of visit: Follow-up    Pain related diagnoses: chronic neuropathic pain, continuous opioid dependence, thoracolumbar radiculopathy, multiple sclerosis, leg pain    Interval history: She feels better than she has in awhile  Pain is 80% better  Feels as good as she can be at this point  Aberrant behavior?: No      Adverse effects from medication?: No      Screening Tools/Assessments:    PHQ-2/9:  PHQ-2 score: 0    Brief Pain Inventory (BPI):  1) Throughout our lives, most of us have had pain from time to time (such as minor headaches, sprains, and toothaches)  Have you had pain other than these everyday kinds of pain today? No  2) Where is your pain located?   3) Rate your pain at its worst in the last 24 hours: 2  4) Rate your pain at its least in the last 24 hours: 3  5) Rate your average level of pain: 6  6) Rate your pain right now: 2  7) What treatments or medications are you receiving for your pain? 8) In the past 24 hours, how much relief have pain treatments or medication provided? 80%  9) During the past 24 hours, pain has interfered with your:   A) General activity: 4     B) Mood: 2     C) Walking ability: 3     D) Normal work (work outside the home & housework): 4     E) Relations with other people: 3     F) Sleep: 2     G) Enjoyment of life: 3    COMM:  Current COMM Score: 0 (negative, low risk patient)    Drug Screen:  Last drug screen was performed more than 365 days ago    Opioid agreement:  Active Opioid agreement on file?: No    Opioid agreement signed date: 9/17/2021  Opioid agreement expiration date: 9/17/2022    Naloxone:  Currently prescribed Naloxone (Narcan): Yes      High risk medications  High risk meds taken in last 72 hours: morphine, librax    Still dealing with abdominal issues  Working with Dr Gissel Worthington  Trying to work on the low fodmap diet      Pain Medications             morphine (MSIR) 15 mg tablet Take 1 tablet (15 mg total) by mouth every 8 (eight) hours as needed for severe pain Max Daily Amount: 45 mg    cyclobenzaprine (FLEXERIL) 5 mg tablet Take 2 tablets (10 mg total) by mouth every 8 (eight) hours as needed for muscle spasms         Outpatient Morphine Milligram Equivalents Per Day     9/23/22 and after 39 MME/Day    Order Name Dose Route Frequency Maximum MME/Day     morphine (MSIR) 15 mg tablet 15 mg Oral Every 8 hours PRN 45 MME/Day    Total Potential Morphine Milligram Equivalents Per Day 45 MME/Day    Calculation Information        morphine (MSIR) 15 mg tablet    morphine 15 mg Tabs: maximum daily dose of 45 mg * morphine equivalence factor of 1 = 45 MME/Day                         PDMP Review       Value Time User    PDMP Reviewed  Yes 9/23/2022  9:42 AM Sara Brannon DO         Review of Systems Constitutional: Negative  Respiratory: Negative  Cardiovascular: Negative  Gastrointestinal: Positive for abdominal pain  Negative for diarrhea, nausea and vomiting  Musculoskeletal: Positive for arthralgias  Objective     /60 (BP Location: Left arm, Patient Position: Sitting, Cuff Size: Standard)   Pulse 80   Temp (!) 96 9 °F (36 1 °C) (Tympanic)   Resp 20   Ht 5' 2" (1 575 m)   Wt 62 1 kg (137 lb)   SpO2 98%   BMI 25 06 kg/m²     Physical Exam  Constitutional:       General: She is not in acute distress  Appearance: She is not ill-appearing  Cardiovascular:      Rate and Rhythm: Normal rate and regular rhythm  Heart sounds: No murmur heard  Pulmonary:      Effort: Pulmonary effort is normal  No respiratory distress  Breath sounds: No wheezing  Abdominal:      General: Bowel sounds are normal  There is no distension  Tenderness: There is no abdominal tenderness  Musculoskeletal:      Right lower leg: No edema  Left lower leg: No edema  Neurological:      Mental Status: She is alert         Prince Doris, DO

## 2022-09-23 NOTE — PATIENT INSTRUCTIONS

## 2022-09-23 NOTE — PROGRESS NOTES
Assessment and Plan:     1  Medicare annual wellness visit, subsequent    Preventive health issues were discussed with patient, and age appropriate screening tests were ordered as noted in patient's After Visit Summary  Personalized health advice and appropriate referrals for health education or preventive services given if needed, as noted in patient's After Visit Summary  History of Present Illness:     Patient presents for a Medicare Wellness Visit    Patient Care Team:  Eufemia Smith DO as PCP - General  Darryle Breaker, MD Starleen Client, MD Doylene Harrison, DO (Pain Medicine)     Medicare Screening Tests and Risk Assessments:     /60 (BP Location: Left arm, Patient Position: Sitting, Cuff Size: Standard)   Pulse 80   Temp (!) 96 9 °F (36 1 °C) (Tympanic)   Resp 20   Ht 5' 2" (1 575 m)   Wt 62 1 kg (137 lb)   SpO2 98%   BMI 25 06 kg/m²      Derral Memory is here for her Subsequent Wellness visit  Last Medicare Wellness visit information reviewed, patient interviewed and updates made to the record today  Health Risk Assessment:   Patient rates overall health as fair  Patient feels that their physical health rating is slightly better  Patient is satisfied with their life  Eyesight was rated as same  Hearing was rated as same  Patient feels that their emotional and mental health rating is same  Patients states they are never, rarely angry  Patient states they are sometimes unusually tired/fatigued  Pain experienced in the last 7 days has been some  Patient's pain rating has been 2/10  Patient states that she has experienced no weight loss or gain in last 6 months  Depression Screening:   PHQ-2 Score: 0      Fall Risk Screening: In the past year, patient has experienced: no history of falling in past year      Urinary Incontinence Screening:   Patient has not leaked urine accidently in the last six months       Home Safety:  Patient has trouble with stairs inside or outside of their home  Patient has working smoke alarms and has working carbon monoxide detector  Home safety hazards include: none  Nutrition:   Current diet is Regular  Medications:   Patient is currently taking over-the-counter supplements  OTC medications include: see medication list  Patient is able to manage medications  Activities of Daily Living (ADLs)/Instrumental Activities of Daily Living (IADLs):   Walk and transfer into and out of bed and chair?: Yes  Dress and groom yourself?: Yes    Bathe or shower yourself?: Yes    Feed yourself? Yes  Do your laundry/housekeeping?: Yes  Manage your money, pay your bills and track your expenses?: Yes  Make your own meals?: Yes    Do your own shopping?: Yes    Durable Medical Equipment Suppliers  none    Previous Hospitalizations:   Any hospitalizations or ED visits within the last 12 months?: No      Advance Care Planning:   Living will: Yes    Durable POA for healthcare: Yes    Advanced directive: Yes    Five wishes given: No      Cognitive Screening:   Provider or family/friend/caregiver concerned regarding cognition?: No    PREVENTIVE SCREENINGS      Cardiovascular Screening:    General: Screening Current      Diabetes Screening:     General: Screening Current      Colorectal Cancer Screening:     General: Screening Current      Breast Cancer Screening:     General: Screening Current      Cervical Cancer Screening:    General: Screening Not Indicated      Osteoporosis Screening:    General: Screening Not Indicated and History Osteoporosis      Abdominal Aortic Aneurysm (AAA) Screening:        General: Screening Not Indicated      Lung Cancer Screening:     General: Screening Not Indicated      Hepatitis C Screening:    General: Screening Not Indicated    Screening, Brief Intervention, and Referral to Treatment (SBIRT)    Screening  Typical number of drinks in a day: 2  Typical number of drinks in a week: 12  Interpretation: Low risk drinking behavior      AUDIT-C Screenin) How often did you have a drink containing alcohol in the past year? 4 or more times a week  2) How many drinks did you have on a typical day when you were drinking in the past year? 1 to 2  3) How often did you have 6 or more drinks on one occasion in the past year? never    AUDIT-C Score: 4  Interpretation: Score 3-12 (female): POSITIVE screen for alcohol misuse    AUDIT Screenin) How often during the last year have you found that you were not able to stop drinking once you had started? 0 - never  5) How often during the last year have you failed to do what was normally expected from you because of drinking? 0 - never  6) How often during the last year have you needed a first drink in the morning to get yourself going after a heavy drinking session? 0 - never  7) How often during the last year have you had a feeling of guilt or remorse after drinking? 0 - never  8) How often during the last year have you been unable to remember what happened the night before because you had been drinking? 0 - never  9) Have you or someone else been injured as a result of your drinking? 0 - no  10) Has a relative or friend or a doctor or another health worker been concerned about your drinking or suggested you cut down? 0 - no    AUDIT Score: 4  Interpretation: Low risk alcohol consumption    Single Item Drug Screening:  How often have you used an illegal drug (including marijuana) or a prescription medication for non-medical reasons in the past year? never    Single Item Drug Screen Score: 0  Interpretation: Negative screen for possible drug use disorder    Brief Intervention  Alcohol & drug use screenings were reviewed  No concerns regarding substance use disorder identified  Review of Current Opioid Use    Opioid Risk Tool (ORT) Interpretation: Score 0-3: Low risk for opioid misuse    [place] PDMP or [place]  reviewed   No red flags were identified    Other Counseling Topics:   Car/seat belt/driving safety, skin self-exam, sunscreen and regular weightbearing exercise  Social Determinants of Health     Tobacco Use: High Risk    Smoking Tobacco Use: Current Every Day Smoker    Smokeless Tobacco Use: Never Used   Alcohol Use: Not At Risk    Frequency of Alcohol Consumption: 4 or more times a week    Average Number of Drinks: 1 or 2    Frequency of Binge Drinking: Never   Financial Resource Strain: Low Risk     Difficulty of Paying Living Expenses: Not hard at all   Food Insecurity: Not on file   Transportation Needs: No Transportation Needs    Lack of Transportation (Medical): No    Lack of Transportation (Non-Medical):  No   Physical Activity: Not on file   Stress: Not on file   Social Connections: Not on file   Intimate Partner Violence: Not on file   Depression: Not at risk    PHQ-2 Score: 0   Housing Stability: Not on file     Woodson Shone, DO

## 2022-09-27 LAB
6MAM UR QL CFM: NEGATIVE NG/ML
7AMINOCLONAZEPAM UR QL CFM: NEGATIVE NG/ML
A-OH ALPRAZ UR QL CFM: NEGATIVE NG/ML
ACCEPTABLE CREAT UR QL: NORMAL MG/DL
ACCEPTIBLE SP GR UR QL: NORMAL
AMPHET UR QL CFM: NEGATIVE NG/ML
BUPRENORPHINE UR QL CFM: NEGATIVE NG/ML
BUTALBITAL UR QL CFM: NEGATIVE NG/ML
BZE UR QL CFM: NEGATIVE NG/ML
CODEINE UR QL CFM: NEGATIVE NG/ML
EDDP UR QL CFM: NEGATIVE NG/ML
ETHYL GLUCURONIDE UR QL CFM: NEGATIVE NG/ML
ETHYL SULFATE UR QL SCN: ABNORMAL NG/ML
EUTYLONE UR QL: NEGATIVE NG/ML
FENTANYL UR QL CFM: NEGATIVE NG/ML
GLIADIN IGG SER IA-ACNC: NEGATIVE NG/ML
HYDROCODONE UR QL CFM: NEGATIVE NG/ML
HYDROMORPHONE UR QL CFM: NORMAL NG/ML
LORAZEPAM UR QL CFM: NEGATIVE NG/ML
ME-PHENIDATE UR QL CFM: NEGATIVE NG/ML
MEPERIDINE UR QL CFM: NEGATIVE NG/ML
METHADONE UR QL CFM: NEGATIVE NG/ML
METHAMPHET UR QL CFM: NEGATIVE NG/ML
MORPHINE UR QL CFM: NORMAL NG/ML
NALTREXONE UR QL CFM: NEGATIVE NG/ML
NITRITE UR QL: NORMAL UG/ML
NORBUPRENORPHINE UR QL CFM: NEGATIVE NG/ML
NORDIAZEPAM UR QL CFM: NORMAL NG/ML
NORFENTANYL UR QL CFM: NEGATIVE NG/ML
NORHYDROCODONE UR QL CFM: NEGATIVE NG/ML
NORMEPERIDINE UR QL CFM: NEGATIVE NG/ML
NOROXYCODONE UR QL CFM: NEGATIVE NG/ML
OXAZEPAM UR QL CFM: NORMAL NG/ML
OXYCODONE UR QL CFM: NEGATIVE NG/ML
OXYMORPHONE UR QL CFM: NEGATIVE NG/ML
OXYMORPHONE UR QL CFM: NEGATIVE NG/ML
PARA-FLUOROFENTANYL QUANTIFICATION: NORMAL NG/ML
PHENOBARB UR QL CFM: NEGATIVE NG/ML
RESULT ALL_PRESCRIBED MEDS AND SPECIAL INSTRUCTIONS: NORMAL
SECOBARBITAL UR QL CFM: NEGATIVE NG/ML
SL AMB 4-ANPP QUANTIFICATION: NORMAL NG/ML
SL AMB 5F-ADB-M7 METABOLITE QUANTIFICATION: NEGATIVE NG/ML
SL AMB 7-OH-MITRAGYNINE (KRATOM ALKALOID) QUANTIFICATION: NEGATIVE NG/ML
SL AMB AB-FUBINACA-M3 METABOLITE QUANTIFICATION: NEGATIVE NG/ML
SL AMB ACETYL FENTANYL QUANTIFICATION: NORMAL NG/ML
SL AMB ACETYL NORFENTANYL QUANTIFICATION: NORMAL NG/ML
SL AMB ACRYL FENTANYL QUANTIFICATION: NORMAL NG/ML
SL AMB CARFENTANIL QUANTIFICATION: NORMAL NG/ML
SL AMB CTHC (MARIJUANA METABOLITE) QUANTIFICATION: NEGATIVE NG/ML
SL AMB DEXTRORPHAN (DEXTROMETHORPHAN METABOLITE) QUANT: NEGATIVE NG/ML
SL AMB GABAPENTIN QUANTIFICATION: NEGATIVE
SL AMB JWH018 METABOLITE QUANTIFICATION: NEGATIVE NG/ML
SL AMB JWH073 METABOLITE QUANTIFICATION: NEGATIVE NG/ML
SL AMB MDMB-FUBINACA-M1 METABOLITE QUANTIFICATION: NEGATIVE NG/ML
SL AMB METHYLONE QUANTIFICATION: NEGATIVE NG/ML
SL AMB N-DESMETHYL-TRAMADOL QUANTIFICATION: NEGATIVE NG/ML
SL AMB PHENTERMINE QUANTIFICATION: NEGATIVE NG/ML
SL AMB PREGABALIN QUANTIFICATION: NEGATIVE
SL AMB RCS4 METABOLITE QUANTIFICATION: NEGATIVE NG/ML
SL AMB RITALINIC ACID QUANTIFICATION: NEGATIVE NG/ML
SMOOTH MUSCLE AB TITR SER IF: NEGATIVE NG/ML
SPECIMEN DRAWN SERPL: NEGATIVE NG/ML
SPECIMEN PH ACCEPTABLE UR: NORMAL
TAPENTADOL UR QL CFM: NEGATIVE NG/ML
TEMAZEPAM UR QL CFM: NEGATIVE NG/ML
TEMAZEPAM UR QL CFM: NEGATIVE NG/ML
TRAMADOL UR QL CFM: NEGATIVE NG/ML
URATE/CREAT 24H UR: NEGATIVE NG/ML

## 2022-10-03 DIAGNOSIS — F11.20 CONTINUOUS OPIOID DEPENDENCE (HCC): ICD-10-CM

## 2022-10-03 NOTE — TELEPHONE ENCOUNTER
Medication Refill Request     Name morphine (MSIR)   Dose/Frequency 15MG/ Take 1 tablet (15 mg total) by mouth every 8 (eight) hours as needed for severe pain  Quantity 90 tablet  Verified pharmacy   [x]  Verified ordering Provider   [x]  Does patient have enough for the next 3 days?  Yes [x] No []

## 2022-10-04 RX ORDER — MORPHINE SULFATE 15 MG/1
15 TABLET ORAL EVERY 8 HOURS PRN
Qty: 90 TABLET | Refills: 0 | Status: SHIPPED | OUTPATIENT
Start: 2022-10-04

## 2022-10-12 ENCOUNTER — OFFICE VISIT (OUTPATIENT)
Dept: GASTROENTEROLOGY | Facility: CLINIC | Age: 77
End: 2022-10-12
Payer: MEDICARE

## 2022-10-12 VITALS
SYSTOLIC BLOOD PRESSURE: 120 MMHG | OXYGEN SATURATION: 98 % | HEART RATE: 94 BPM | DIASTOLIC BLOOD PRESSURE: 80 MMHG | BODY MASS INDEX: 25.36 KG/M2 | HEIGHT: 62 IN | WEIGHT: 137.8 LBS

## 2022-10-12 DIAGNOSIS — R10.84 GENERALIZED ABDOMINAL PAIN: ICD-10-CM

## 2022-10-12 DIAGNOSIS — K58.2 IRRITABLE BOWEL SYNDROME WITH BOTH CONSTIPATION AND DIARRHEA: Primary | ICD-10-CM

## 2022-10-12 DIAGNOSIS — R11.0 NAUSEA: ICD-10-CM

## 2022-10-12 PROCEDURE — 99214 OFFICE O/P EST MOD 30 MIN: CPT | Performed by: INTERNAL MEDICINE

## 2022-10-12 RX ORDER — HYOSCYAMINE SULFATE 0.125 MG
0.12 TABLET ORAL EVERY 4 HOURS PRN
Qty: 30 TABLET | Refills: 0 | Status: SHIPPED | OUTPATIENT
Start: 2022-10-12

## 2022-10-12 RX ORDER — AMITRIPTYLINE HYDROCHLORIDE 10 MG/1
25 TABLET, FILM COATED ORAL
Qty: 31 TABLET | Refills: 6 | Status: SHIPPED | OUTPATIENT
Start: 2022-10-12

## 2022-10-12 NOTE — PROGRESS NOTES
[facility] - Outpatient Follow-up Note  Owen Henriquez 76 y o  female MRN: [id number]  Encounter: [id number]          ASSESSMENT AND PLAN:      1  Irritable bowel syndrome with both constipation and diarrhea  - low fodmap diet, maintain this  - librax prn  - hyosciamine prescribed as a cheaper alternative  - elavil 25 mg h s , she is very happy with this recommendation    2  Nausea  - yuliana root is helping    ______________________________________________________________________    SUBJECTIVE:  Jono Barrientos comes the office today still complaining of problems with ongoing abdominal pain  Sometimes this abdominal pain persists throughout the entire day  She is having the pain at the present time  She stated that she started a low FODMAP diet and this has improved her symptoms somewhat  She recalls in the past she used to take Elavil for her Evelene Mina she felt that this is helping her at that time  She is also told me that the Librax work somewhat but it is also very expensive a 600 dollars per prescription  She did not respond to dicyclomine  When I told her about the possibility of restarting Elavil, she was very excited about this possibility  REVIEW OF SYSTEMS IS OTHERWISE NEGATIVE        Historical Information   Past Medical History:   Diagnosis Date   • Anxiety    • Cataract     last assessed 14   • Chronic bronchitis ([facility] )     Sees pulmonary specialist twice a year   • Chronic pain disorder    • [facility]    • Hypothyroidism    • Multiple sclerosis ([facility] )    • CHOCO (obstructive sleep apnea)     uses cpap, 3/19/21 -pt states will bring CPAP for DOS 3/23/21   • Osteopetrosis    • Peroneal muscle atrophy    • Shortness of breath    • Smoker    • Thoracolumbar radiculopathy due to intervertebral disc disorder      Past Surgical History:   Procedure Laterality Date   • APPENDECTOMY     • CATARACT EXTRACTION     •  SECTION     • CHOLECYSTECTOMY     • GALLBLADDER SURGERY     • SC COLONOSCOPY FLX DX W/COLLJ SPEC WHEN PFRMD N/A 2/23/2018    Procedure: COLONOSCOPY;  Surgeon: Shelli Ovalles MD;  Location: [facility];   Service: Colorectal   • SC LAMNOTMY INCL W/DCMPRSN NRV ROOT 1 INTRSPC LUMBR Right 3/23/2021    Procedure: MINIMALLY INVASIVE LUMBAR FAR LATERAL DISCECTOMY L4-5, RIGHT;  Surgeon: Jessica Washburn MD;  Location:  MAIN OR;  Service: Neurosurgery   • THROAT SURGERY       Social History   Social History     Substance and Sexual Activity   Alcohol Use Yes   • Alcohol/week: 1 0 - 2 0 standard drink   • Types: 1 - 2 Glasses of wine per week    Comment: 1-2 drinks of wine daily      Social History     Substance and Sexual Activity   Drug Use Yes   • Types: Morphine, Marijuana    Comment: medical-RSO  last dose 11 days ago, past hx o f medical marijuana use - NOT CURRENT     Social History     Tobacco Use   Smoking Status Current Every Day Smoker   • Packs/day: 1 00   • Years: 60 00   • Pack years: 60 00   • Types: Cigarettes   • Start date: 2/16/1958   Smokeless Tobacco Never Used     Family History   Problem Relation Age of Onset   • Skin cancer Mother    • Hypertension Father         benign essential   • Stroke Father    • Lung cancer Brother        Meds/Allergies       Current Outpatient Medications:   •  chlordiazepoxide-clidinium (LIBRAX) 5-2 5 mg per capsule  •  cholecalciferol (VITAMIN D3) 1,000 units tablet  •  levothyroxine 100 mcg tablet  •  morphine (MSIR) 15 mg tablet  •  Multiple Vitamins-Minerals (ICAPS AREDS 2 PO)  •  ondansetron (ZOFRAN) 4 mg tablet  •  cyclobenzaprine (FLEXERIL) 5 mg tablet  •  dicyclomine (BENTYL) 10 mg capsule  •  naloxone (NARCAN) 4 mg/0 1 mL nasal spray  •  sucralfate (CARAFATE) 1 g/10 mL suspension    Current Facility-Administered Medications:   •  denosumab (PROLIA) subcutaneous injection 60 mg, 60 mg, Subcutaneous, Q6 Months, 60 mg at 09/07/22 1003    Allergies   Allergen Reactions   • Adhesive [Medical Tape] Rash   • Latex Rash           Objective     Blood pressure 120/80, pulse 94, height 5' 2" (1 575 m), weight 62 5 kg (137 lb 12 8 oz), SpO2 98 %  Body mass index is 25 2 kg/m²  PHYSICAL EXAM:      General Appearance:   Alert, cooperative, no distress   HEENT:   Normocephalic, atraumatic, anicteric      Neck:  Supple, symmetrical, trachea midline   Lungs:   Clear to auscultation bilaterally; no rales, rhonchi or wheezing; respirations unlabored    Heart[de-identified]   Regular rate and rhythm; no murmur, rub, or gallop  Abdomen:   Soft, non-tender, non-distended; normal bowel sounds; no masses, no organomegaly    Genitalia:   Deferred    Rectal:   Deferred    Extremities:  No cyanosis, clubbing or edema    Pulses:  2+ and symmetric    Skin:  No jaundice, rashes, or lesions    Lymph nodes:  No palpable cervical lymphadenopathy        Lab Results:   No visits with results within 1 Day(s) from this visit     Latest known visit with results is:   Appointment on 09/23/2022   Component Date Value   • TSH 3RD GENERATON 09/23/2022 1 830    • WBC 09/23/2022 7 15    • RBC 09/23/2022 4 43    • Hemoglobin 09/23/2022 13 9    • Hematocrit 09/23/2022 43 5    • MCV 09/23/2022 98    • MCH 09/23/2022 31 4    • MCHC 09/23/2022 32 0    • RDW 09/23/2022 13 9    • Platelets 31/19/8171 276    • MPV 09/23/2022 9 2    • Sodium 09/23/2022 137    • Potassium 09/23/2022 4 0    • Chloride 09/23/2022 108    • CO2 09/23/2022 26    • ANION GAP 09/23/2022 3 (A)   • BUN 09/23/2022 17    • Creatinine 09/23/2022 0 77    • Glucose 09/23/2022 108    • Calcium 09/23/2022 9 3    • AST 09/23/2022 22    • ALT 09/23/2022 40    • Alkaline Phosphatase 09/23/2022 144 (A)   • Total Protein 09/23/2022 7 6    • Albumin 09/23/2022 3 6    • Total Bilirubin 09/23/2022 0 21    • eGFR 09/23/2022 73          Radiology Results:   CT chest without contrast    Result Date: 9/15/2022  Narrative: CT CHEST WITHOUT IV CONTRAST INDICATION:   R91 1: Solitary pulmonary nodule  COMPARISON:  CT chest 9/7/2021  TECHNIQUE: CT examination of the chest was performed without intravenous contrast  Axial, sagittal, and coronal 2D reformatted images were created from the source data and submitted for interpretation  Radiation dose length product (DLP) for this visit:  96 mGy-cm   This examination, like all CT scans performed in the [facility], was performed utilizing techniques to minimize radiation dose exposure, including the use of iterative reconstruction and automated exposure control  FINDINGS: LUNGS:  Biapical pleural parenchymal changes with calcification  Stable 7 mm ovoid density along the right minor fissure anteriorly, compatible with benign finding likely perifissural lymph node  Unchanged 3 mm right middle lobe nodule anteriorly image 79 series 3  Unchanged 4 mm left lower lobe nodule laterally image 94 series 3  Stable calcified granuloma  No new suspicious nodules  Mild to moderate emphysematous changes and blebs  PLEURA:  Unremarkable  HEART/GREAT VESSELS: Heart is normal size  Mild to moderate coronary artery calcifications  No thoracic aortic aneurysm  MEDIASTINUM AND SAHIL:  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

## 2022-10-18 DIAGNOSIS — K58.2 IRRITABLE BOWEL SYNDROME WITH BOTH CONSTIPATION AND DIARRHEA: ICD-10-CM

## 2022-10-18 DIAGNOSIS — R10.84 GENERALIZED ABDOMINAL PAIN: ICD-10-CM

## 2022-10-18 RX ORDER — AMITRIPTYLINE HYDROCHLORIDE 25 MG/1
25 TABLET, FILM COATED ORAL
Qty: 30 TABLET | Refills: 6 | Status: SHIPPED | OUTPATIENT
Start: 2022-10-18

## 2022-10-18 RX ORDER — AMITRIPTYLINE HYDROCHLORIDE 10 MG/1
25 TABLET, FILM COATED ORAL
Qty: 31 TABLET | Refills: 0 | Status: CANCELLED | OUTPATIENT
Start: 2022-10-18

## 2022-10-18 NOTE — TELEPHONE ENCOUNTER
Patient called and is asking for a refill of her medication amitrityline (ELAVIL) 10 mg, patient stated that the doctor had wanted her to take a dose of 25 mg but when she cut the medication in half in end up in dust, patient is asking if she can get the medication in the 25 mg instead of 10 mg, patient did stated that the medication is working well for her

## 2022-11-03 NOTE — PROGRESS NOTES
Patient returning your call.      Patient also has questions regarding Metamucil, what kind should he buy and dosage.  Please advise.    [facility]    NAME: Bessie Canas  AGE: 64 y o  SEX: female  : 1945     DATE: 2021     Assessment and Plan:     1  Lumbar disc disease with radiculopathy  2  Right foot drop    Has had surgery in the past along with multiple epidural steroid injections  Follow-up with neurosurgery as scheduled  Likely may benefit from some PT  3  Continuous opioid dependence ([facility] )    Opioid agreement was updated today in the office and signed by patient  Continue morphine IR as needed for moderate to severe pain  We reviewed risks of opiates when used together with muscle relaxers  [place] PDMP [place]  - morphine (MSIR) 15 mg tablet; Take 1 tablet (15 mg total) by mouth every 8 (eight) hours as needed for severe painMax Daily Amount: 45 mg  Dispense: 90 tablet; Refill: 0    4  Acquired hypothyroidism    Continue levothyroxine  Check TSH     - Basic metabolic panel; Future  - TSH, 3rd generation with Free T4 reflex; Future  - Lipid panel; Future    5  Muscle spasm of right leg  - cyclobenzaprine (FLEXERIL) 5 mg tablet; Take 2 tablets (10 mg total) by mouth every 8 (eight) hours as needed for muscle spasms  Dispense: 60 tablet; Refill: 3        Return in about 4 months (around 2022) for Follow-up  Chief Complaint:     Chief Complaint   Patient presents with    fu      History of Present Illness:     Patient presents for follow-up  She continues to deal with back problems though pain has been slightly decreased lately  Had epidural steroid injection and prior neurosurgery  Has right foot drop which is affecting her gait  She has appt with Dr Myla Craig coming up  Muscle relaxer helps her at times  Not using as much morphine as she has needed in the past      Review of Systems:     Review of Systems   Constitutional: Positive for fatigue  Negative for chills and fever  Respiratory: Negative  Cardiovascular: Negative  Gastrointestinal: Negative  Musculoskeletal: Positive for back pain  Neurological: Positive for weakness (right foot drop)  Objective:     /60 (BP Location: Left arm, Patient Position: Sitting)   Pulse 102   Temp 97 9 °F (36 6 °C) (Tympanic)   Ht 5' 2" (1 575 m)   Wt 63 kg (139 lb)   SpO2 99%   BMI 25 42 kg/m²     Physical Exam  Constitutional:       General: She is not in acute distress  Appearance: She is not ill-appearing  Cardiovascular:      Rate and Rhythm: Normal rate and regular rhythm  Heart sounds: No murmur heard  Pulmonary:      Effort: Pulmonary effort is normal  No respiratory distress  Breath sounds: No wheezing  Abdominal:      General: Bowel sounds are normal  There is no distension  Tenderness: There is no abdominal tenderness  Musculoskeletal:         General: Deformity (lumbar paraspinal hypertonicity) present  Right lower leg: No edema  Left lower leg: No edema  Neurological:      Mental Status: She is alert  Motor: Weakness (right foot drop) present         Vanesa Knott DO  [facility]

## 2022-11-07 ENCOUNTER — NURSE TRIAGE (OUTPATIENT)
Dept: OTHER | Facility: OTHER | Age: 77
End: 2022-11-07

## 2022-11-07 NOTE — TELEPHONE ENCOUNTER
Patient called in stating that she had a horrible weekend of right sided abdominal pain and constipation  Stated that the morphine she takes routinely didn't even touch it  Patient has had recent med changes; added Elavil, levsin, but patient took relistor this weekend twice to help move her bowels  Patient doesn't want to go to the ED but wanted to follow up to see if there were any med changes or recommendations  She had BM this morning, but still has severe pain and gas pains along with nausea

## 2022-11-07 NOTE — TELEPHONE ENCOUNTER
Reason for Disposition  • Constant abdominal pain lasting > 2 hours    Answer Assessment - Initial Assessment Questions  1  LOCATION: "Where does it hurt?"       Right side of abdomen under breast bone down to hip  2  RADIATION: "Does the pain shoot anywhere else?" (e g , chest, back)      Patient stated that she believes her neurological pain is tied in with her GI/IBS pain  3  ONSET: "When did the pain begin?" (e g , minutes, hours or days ago)       Worsened over the weekend  4  SUDDEN: "Gradual or sudden onset?"      gradual  5  PATTERN "Does the pain come and go, or is it constant?"     - If constant: "Is it getting better, staying the same, or worsening?"       (Note: Constant means the pain never goes away completely; most serious pain is constant and it progresses)      - If intermittent: "How long does it last?" "Do you have pain now?"      (Note: Intermittent means the pain goes away completely between bouts)      Constant  6  SEVERITY: "How bad is the pain?"  (e g , Scale 1-10; mild, moderate, or severe)    - MILD (1-3): doesn't interfere with normal activities, abdomen soft and not tender to touch     - MODERATE (4-7): interferes with normal activities or awakens from sleep, tender to touch     - SEVERE (8-10): excruciating pain, doubled over, unable to do any normal activities       Patient is on 15mg morphine for neurosurgery and hasn't helped with the pain  7  RECURRENT SYMPTOM: "Have you ever had this type of stomach pain before?" If Yes, ask: "When was the last time?" and "What happened that time?"       Yes, has hx of IBS  8  CAUSE: "What do you think is causing the stomach pain?"      Was recently started on elavil; stated for about 5 days she was doing great, but then it stopped  9  RELIEVING/AGGRAVATING FACTORS: "What makes it better or worse?" (e g , movement, antacids, bowel movement)      Took relistor    10  OTHER SYMPTOMS: "Has there been any vomiting, diarrhea, constipation, or urine problems?"        Constipation and severe gas pain; Did take miralax and had BM today  Nausea, no vomiting  Patient stated she took relistor yesterday and this morning; was last ordered by Dr Tasha Mcneil a few years ago; another GI doctor  Helped with the constipation  Patient stated that she was also on librax and unsure if she needs to be back on it or needs a substitute  Patient doesn't want to go to the ED  Denies any fever or any blood in stool      Protocols used: ABDOMINAL PAIN - [facility] - [facility]IL[facility] [place]

## 2022-11-08 ENCOUNTER — TELEPHONE (OUTPATIENT)
Dept: GASTROENTEROLOGY | Facility: CLINIC | Age: 77
End: 2022-11-08

## 2022-11-08 NOTE — TELEPHONE ENCOUNTER
Spoke with patient  History of IBS mixed, nausea    Patient c/o right sided abdominal pain "terrible gas pains at night"  Pain is mild during the day  Patient took 2 does of relistor for constipation, and successfully passed a large amount of  loose BM yesterday  Taking relistor 150mg prn, amitriptyline 25mg at HS, and levsin PRN (with relief), miralax BID,  and gas-x 2 tablets QID  Reassured patient to take relistor 150mg daily as prescribed, and continue current regimen  Advised patient let us know if diarrhea starts or abdominal pain worsens  Any other suggestions?

## 2022-11-08 NOTE — TELEPHONE ENCOUNTER
Patients GI provider:  Dr Jose Romo    Number to return call: [phone]     Reason for call: Pt calling stating she is in severe pain and states she called 3 times prior regarding not being able to sleep due to her pain  Please reach out to pt regarding this matter      Scheduled procedure/appointment date if applicable: Appt: 04/87/0910

## 2022-11-12 DIAGNOSIS — R10.84 GENERALIZED ABDOMINAL PAIN: ICD-10-CM

## 2022-11-12 RX ORDER — AMITRIPTYLINE HYDROCHLORIDE 25 MG/1
TABLET, FILM COATED ORAL
Qty: 90 TABLET | Refills: 3 | Status: SHIPPED | OUTPATIENT
Start: 2022-11-12

## 2022-11-14 ENCOUNTER — TELEPHONE (OUTPATIENT)
Dept: GASTROENTEROLOGY | Facility: CLINIC | Age: 77
End: 2022-11-14

## 2022-11-14 NOTE — TELEPHONE ENCOUNTER
Spoke with patient, labored breathing over the pheon  History of ibs mixed, nausea    Patient states she passed a large BM Friday, and then diarrhea  This morning patient c/o a bloated hard belly which is now resolving after taking gas-x PRN + Passing gas  She feels the gas in her abdomen is causing her to have labored breathing  She states "I cannot go to the emergency room, I can't leave my "  She states she will take levsin PRN, and let us know if symptoms persist  Reiterated patient should go to ED d/t labored breathing, offered to call patient daughter  Patient refused  Denies n/v, fever, chills, weakness, black or bloody stools  Any other suggestions?

## 2022-11-14 NOTE — TELEPHONE ENCOUNTER
Spoke with patient  Patient states she is feeling better  She states she is no longer having labored breathing  Patient will continue with Levsin, ambulating, and miralax BID

## 2022-11-14 NOTE — TELEPHONE ENCOUNTER
Patient is in a great deal of pain  She was told to follow up with the office on Monday  She has been following the instructions given, but needs to speak with Dr Barbie Cabot nurse

## 2022-11-15 ENCOUNTER — TELEPHONE (OUTPATIENT)
Dept: GASTROENTEROLOGY | Facility: CLINIC | Age: 77
End: 2022-11-15

## 2022-11-15 NOTE — TELEPHONE ENCOUNTER
Submitted prior auth for Amitriptyline 25 MG thru covermymeds  [employer]        ID# [id number]  [employer]  [employer] Part  Tel: [phone]

## 2022-11-17 DIAGNOSIS — E03.9 ACQUIRED HYPOTHYROIDISM: ICD-10-CM

## 2022-11-17 RX ORDER — LEVOTHYROXINE SODIUM 0.1 MG/1
TABLET ORAL
Qty: 90 TABLET | Refills: 3 | Status: SHIPPED | OUTPATIENT
Start: 2022-11-17

## 2022-12-08 ENCOUNTER — NURSE TRIAGE (OUTPATIENT)
Dept: OTHER | Facility: OTHER | Age: 77
End: 2022-12-08

## 2022-12-08 NOTE — TELEPHONE ENCOUNTER
Patient calling in stating that she is still having ongoing gas pain up under her right ribs which has not been getting better over the past few weeks  She has been taking gas X, beano, amitriptyline with little relief  She states she has stopped taking her relistore due to now having diarrhea  Patient states she has loose/watery BMs and is passing gas very infrequently but does feel better after she passes gas and when she is moving around during the day  She states her pain worsens at night when she lays down and and she is having difficulty eating much food due to the pain  Patient has an appt on 12/14 but would like to know what she can do additionally in the meantime to help her or if she can get in for an appt sooner  Please follow up

## 2022-12-08 NOTE — TELEPHONE ENCOUNTER
Regarding: Poss gas under rib cage / excruciating pain  ----- Message from Lore Vargas sent at 12/8/2022 11:28 AM EST -----  "I am a patient of Dr Karan Osullivan and I have an appt  With him next week but I can't wait that long  I haven't slept in 2 days because I believe I have a gas pocket under my rib cage and I am in excruciating pain  I really can't take this pain anymore  I need help to get rid of it  I tried different things to help break it up but, nothing worked  I have been forcing myself  to eat and I feel worse after I eat    I'm very weak and tired "

## 2022-12-08 NOTE — TELEPHONE ENCOUNTER
Reason for Disposition  • MODERATE pain (e g , interferes with normal activities that comes and goes (cramps) lasts > 24 hours (Exception: pain with Vomiting or Diarrhea - see that Protocol)    Answer Assessment - Initial Assessment Questions  1  LOCATION: "Where does it hurt?"       Right upper abdomen     2  RADIATION: "Does the pain shoot anywhere else?" (e g , chest, back)      Around back     3  ONSET: "When did the pain begin?" (e g , minutes, hours or days ago)       Ongoing for several weeks, worsening in the past 2 days     4  SUDDEN: "Gradual or sudden onset?"      Gradual will get worse after eating    5  PATTERN "Does the pain come and go, or is it constant?"     - If constant: "Is it getting better, staying the same, or worsening?"       (Note: Constant means the pain never goes away completely; most serious pain is constant and it progresses)      - If intermittent: "How long does it last?" "Do you have pain now?"      (Note: Intermittent means the pain goes away completely between bouts)      Comes and goes in intensity     6  SEVERITY: "How bad is the pain?"  (e g , Scale 1-10; mild, moderate, or severe)    - MILD (1-3): doesn't interfere with normal activities, abdomen soft and not tender to touch     - MODERATE (4-7): interferes with normal activities or awakens from sleep, tender to touch     - SEVERE (8-10): excruciating pain, doubled over, unable to do any normal activities      7/10; at night when laying flat seems to get worse    7  RECURRENT SYMPTOM: "Have you ever had this type of stomach pain before?" If Yes, ask: "When was the last time?" and "What happened that time?"       No has not had episodes like this in the past      8  CAUSE: "What do you think is causing the stomach pain?"      Believes it might be a gas pocket     9   RELIEVING/AGGRAVATING FACTORS: "What makes it better or worse?" (e g , movement, antacids, bowel movement)     Movement and passing gas seems to get better, after eating and laying down gets worse     10   OTHER SYMPTOMS: "Has there been any vomiting, diarrhea, constipation, or urine problems?"       Diarrhea about 3x/day; occasional nausea with pain    Protocols used: ABDOMINAL PAIN - [facility] - [facility]

## 2022-12-09 ENCOUNTER — OFFICE VISIT (OUTPATIENT)
Dept: GASTROENTEROLOGY | Facility: CLINIC | Age: 77
End: 2022-12-09

## 2022-12-09 VITALS
WEIGHT: 127.4 LBS | BODY MASS INDEX: 23.45 KG/M2 | HEART RATE: 96 BPM | OXYGEN SATURATION: 98 % | SYSTOLIC BLOOD PRESSURE: 122 MMHG | HEIGHT: 62 IN | DIASTOLIC BLOOD PRESSURE: 76 MMHG

## 2022-12-09 DIAGNOSIS — K58.2 IRRITABLE BOWEL SYNDROME WITH BOTH CONSTIPATION AND DIARRHEA: Primary | ICD-10-CM

## 2022-12-09 DIAGNOSIS — R14.0 BLOATING: ICD-10-CM

## 2022-12-09 RX ORDER — AMITRIPTYLINE HYDROCHLORIDE 50 MG/1
50 TABLET, FILM COATED ORAL
Qty: 31 TABLET | Refills: 6 | Status: SHIPPED | OUTPATIENT
Start: 2022-12-09

## 2022-12-09 NOTE — PROGRESS NOTES
[facility] - Outpatient Follow-up Note  Joy Hair 76 y o  female MRN: [id number]  Encounter: [id number]          ASSESSMENT AND PLAN:      1  Irritable bowel syndrome with both constipation and diarrhea  - amitriptyline (ELAVIL) 50 mg tablet; Take 1 tablet (50 mg total) by mouth daily at bedtime  Dispense: 31 tablet; Refill: 6  -Begin taking  Again on a daily basis  -Elavil has been increased to 50 mg at bedtim-charcoal tabs to be taken 1 to 2 tablets up to 4 times a day    2  Bloating      ______________________________________________________________________    SUBJECTIVE: Tessy Liriano comes the office today with multiple complaints  She states that she was prescribed Relistor which she tried however it was really rough in her system and she started to experience diarrhea rather than constipation  For this reason she stopped taking it 2 weeks ago  Despite stopping it however, she still having ongoing diarrhea episodes on a daily basis  Sometimes she is having multiple episodes of diarrhea  She denies any current abdominal pain but she feels like there is a gassy sensation in the right side of the abdomen  She has not tried her dicyclomine because she states that it really was not helping her  She is not taking sucralfate at the present time  She is not taking hyoscyamine  She stopped taking her align because she really did not think it was providing any significant benefit  She does state that the Elavil taken at nighttime is helping her sleep so she has been continuing that  She was previously experiencing horrible abdominal pain but those pains have resolved and now it is just the gaseousness sensation  She tried taking Gas-X without any significant benefit  She tried taking Beano which initially provided some benefit but then no longer seem to work  REVIEW OF SYSTEMS IS OTHERWISE NEGATIVE        Historical Information   Past Medical History:   Diagnosis Date   • Anxiety    • Cataract     last assessed 14   • Chronic bronchitis ([facility] )     Sees pulmonary specialist twice a year   • Chronic pain disorder    • COPD (chronic obstructive pulmonary disease) (HCC)    • Hypothyroidism    • [facility] sclerosis ([facility] )    • CHOCO (obstructive sleep apnea)     uses cpap, 3/19/21 -pt states will bring CPAP for DOS 3/23/21   • Osteopetrosis    • Peroneal muscle atrophy    • Shortness of breath    • Smoker    • Thoracolumbar radiculopathy due to intervertebral disc disorder      Past Surgical History:   Procedure Laterality Date   • APPENDECTOMY     • CATARACT EXTRACTION     •  SECTION     • CHOLECYSTECTOMY     • GALLBLADDER SURGERY     • LA COLONOSCOPY FLX DX W/COLLJ SPEC WHEN PFRMD N/A 2018    Procedure: COLONOSCOPY;  Surgeon: Wicho Fuentes MD;  Location: [facility];   Service: Colorectal   • LA LAMNOTMY INCL W/DCMPRSN NRV ROOT 1 INTRSPC LUMBR Right 3/23/2021    Procedure: MINIMALLY INVASIVE LUMBAR FAR LATERAL DISCECTOMY L4-5, RIGHT;  Surgeon: Kandi Enriquez MD;  Location:  MAIN OR;  Service: Neurosurgery   • THROAT SURGERY       Social History   Social History     Substance and Sexual Activity   Alcohol Use Yes   • Alcohol/week: 1 0 - 2 0 standard drink   • Types: 1 - 2 Glasses of wine per week    Comment: 1-2 drinks of wine daily      Social History     Substance and Sexual Activity   Drug Use Yes   • Types: Morphine, Marijuana    Comment: medical-RSO  last dose 11 days ago, past hx o f medical marijuana use - NOT CURRENT     Social History     Tobacco Use   Smoking Status Every Day   • Packs/day: 1 00   • Years: 60 00   • Pack years: 60 00   • Types: Cigarettes   • Start date: 1958   Smokeless Tobacco Never     Family History   Problem Relation Age of Onset   • Skin cancer Mother    • Hypertension Father         benign essential   • Stroke Father    • Lung cancer Brother        Meds/Allergies       Current Outpatient Medications:   •  amitriptyline (ELAVIL) 10 mg tablet  •  amitriptyline (ELAVIL) 25 mg tablet  •  amitriptyline (ELAVIL) 50 mg tablet  •  chlordiazepoxide-clidinium (LIBRAX) 5-2 5 mg per capsule  •  cholecalciferol (VITAMIN D3) 1,000 units tablet  •  dicyclomine (BENTYL) 10 mg capsule  •  hyoscyamine (ANASPAZ,LEVSIN) 0 125 MG tablet  •  levothyroxine 100 mcg tablet  •  morphine (MSIR) 15 mg tablet  •  Multiple Vitamins-Minerals (ICAPS AREDS 2 PO)  •  ondansetron (ZOFRAN) 4 mg tablet  •  sucralfate (CARAFATE) 1 g/10 mL suspension  •  cyclobenzaprine (FLEXERIL) 5 mg tablet  •  naloxone (NARCAN) 4 mg/0 1 mL nasal spray    Current Facility-Administered Medications:   •  denosumab (PROLIA) subcutaneous injection 60 mg, 60 mg, Subcutaneous, Q6 Months, 60 mg at 09/07/22 1003    Allergies   Allergen Reactions   • Adhesive [Medical Tape] Rash   • Latex Rash           Objective     Blood pressure 122/76, pulse 96, height 5' 2" (1 575 m), weight 57 8 kg (127 lb 6 4 oz), SpO2 98 %  Body mass index is 23 3 kg/m²  PHYSICAL EXAM:      General Appearance:   Alert, cooperative, no distress   HEENT:   Normocephalic, atraumatic, anicteric      Neck:  Supple, symmetrical, trachea midline   Lungs:   Clear to auscultation bilaterally; no rales, rhonchi or wheezing; respirations unlabored    Heart[de-identified]   Regular rate and rhythm; no murmur, rub, or gallop  Abdomen:   Soft, non-tender, non-distended; normal bowel sounds; no masses, no organomegaly    Genitalia:   Deferred    Rectal:   Deferred    Extremities:  No cyanosis, clubbing or edema    Pulses:  2+ and symmetric    Skin:  No jaundice, rashes, or lesions    Lymph nodes:  No palpable cervical lymphadenopathy        Lab Results:   No visits with results within 1 Day(s) from this visit     Latest known visit with results is:   Appointment on 09/23/2022   Component Date Value   • TSH 3RD GENERATON 09/23/2022 1 830    • WBC 09/23/2022 7 15    • RBC 09/23/2022 4 43    • Hemoglobin 09/23/2022 13 9    • Hematocrit 09/23/2022 43 5    • MCV 09/23/2022 98    • MCH 09/23/2022 31 4    • MCHC 09/23/2022 32 0    • RDW 09/23/2022 13 9    • Platelets 60/40/7670 276    • MPV 09/23/2022 9 2    • Sodium 09/23/2022 137    • Potassium 09/23/2022 4 0    • Chloride 09/23/2022 108    • CO2 09/23/2022 26    • ANION GAP 09/23/2022 3 (L)    • BUN 09/23/2022 17    • Creatinine 09/23/2022 0 77    • Glucose 09/23/2022 108    • Calcium 09/23/2022 9 3    • AST 09/23/2022 22    • ALT 09/23/2022 40    • Alkaline Phosphatase 09/23/2022 144 (H)    • Total Protein 09/23/2022 7 6    • Albumin 09/23/2022 3 6    • Total Bilirubin 09/23/2022 0 21    • eGFR 09/23/2022 73          Radiology Results:   No results found

## 2022-12-19 DIAGNOSIS — F11.20 CONTINUOUS OPIOID DEPENDENCE (HCC): ICD-10-CM

## 2022-12-20 RX ORDER — MORPHINE SULFATE 15 MG/1
15 TABLET ORAL EVERY 8 HOURS PRN
Qty: 90 TABLET | Refills: 0 | Status: SHIPPED | OUTPATIENT
Start: 2022-12-20

## 2023-01-17 ENCOUNTER — OFFICE VISIT (OUTPATIENT)
Dept: DERMATOLOGY | Facility: CLINIC | Age: 78
End: 2023-01-17

## 2023-01-17 VITALS — WEIGHT: 127 LBS | HEIGHT: 62 IN | BODY MASS INDEX: 23.37 KG/M2

## 2023-01-17 DIAGNOSIS — L82.1 SEBORRHEIC KERATOSIS: ICD-10-CM

## 2023-01-17 DIAGNOSIS — L57.0 ACTINIC KERATOSIS: Primary | ICD-10-CM

## 2023-01-17 DIAGNOSIS — Z13.89 SCREENING FOR SKIN CONDITION: ICD-10-CM

## 2023-01-17 NOTE — PATIENT INSTRUCTIONS
Actinic Keratosis:  Patient advised lesions are precancers  These should resolve with cryosurgery if not to let us know sun block recommended  Seborrheic Keratosis  Patient reasurred these are normal growths we acquire with age no treatment needed  Screening for Dermatologic Disorders: Nothing else of concern noted on complete exam follow up in 1 year   Treatment with Cryotherapy    The doctor has treated your skin with nitrogen, which is 320 degrees Fahrenheit below zero  He has given the treated area "frostbite "    Stinging should subside within a few hours  You can take Tylenol for pain, if needed  Over the next few days, it is normal if the area becomes reddened, a blood blister, or swollen with fluid  If the lesion treated was near the eye - you could get a swollen eye over the next few days  Do not panic! This is all temporary, and will resolve with time  There is no special treatment - just keep the area clean  Makeup and BandAids can be used, if preferred  When the area starts to dry up and peel off, using Vaseline can help healing  It usually takes up to a month for it to heal   Some lesions are recurrent and may require repeat treatments  If a lesion has not healed in one month, please don't hesitate to contact us  If you have any further questions that are not answered here, please call us  [phone]    Thank you for allowing us to care for you

## 2023-01-17 NOTE — PROGRESS NOTES
[address] [id number]  [phone]  [phone]     MRN: [id number] : 1945  Encounter: [id number]  Patient Information: Carry Mitchell  Chief complaint: yearly check up    History of present illness: 49-year-old female presents because of concerns regarding nonhealing lesion on the right hand no other concerns noted previous history of skin cancer  Past Medical History:   Diagnosis Date   • Anxiety    • Cataract     last assessed 14   • Chronic bronchitis ([facility] )     Sees pulmonary specialist twice a year   • Chronic pain disorder    • COPD (chronic obstructive pulmonary disease) ([facility] )    • Hypothyroidism    • Multiple sclerosis ([facility] )    • CHOCO (obstructive sleep apnea)     uses cpap, 3/19/21 -pt states will bring CPAP for DOS 3/23/21   • Osteopetrosis    • Peroneal muscle atrophy    • Shortness of breath    • Smoker    • Thoracolumbar radiculopathy due to intervertebral disc disorder      Past Surgical History:   Procedure Laterality Date   • APPENDECTOMY     • CATARACT EXTRACTION     •  SECTION     • CHOLECYSTECTOMY     • GALLBLADDER SURGERY     • IL COLONOSCOPY FLX DX W/COLLJ SPEC WHEN PFRMD N/A 2018    Procedure: COLONOSCOPY;  Surgeon: James Garcia MD;  Location: [facility];   Service: Colorectal   • IL LAMNOTMY INCL W/DCMPRSN NRV ROOT 1 INTRSPC LUMBR Right 3/23/2021    Procedure: MINIMALLY INVASIVE LUMBAR FAR LATERAL DISCECTOMY L4-5, RIGHT;  Surgeon: Farzana Morrissey MD;  Location:  MAIN OR;  Service: Neurosurgery   • THROAT SURGERY       Social History   Social History     Substance and Sexual Activity   Alcohol Use Yes   • Alcohol/week: 1 0 - 2 0 standard drink   • Types: 1 - 2 Glasses of wine per week    Comment: 1-2 drinks of wine daily      Social History     Substance and Sexual Activity   Drug Use Yes   • Types: Morphine, Marijuana    Comment: medical-RSO  last dose 11 days ago, past hx o f medical marijuana use - NOT CURRENT     Social History     Tobacco Use   Smoking Status Every Day   • Packs/day: 1 00   • Years: 60 00   • Pack years: 60 00   • Types: Cigarettes   • Start date: 2/16/1958   Smokeless Tobacco Never     Family History   Problem Relation Age of Onset   • Skin cancer Mother    • Hypertension Father         benign essential   • Stroke Father    • Lung cancer Brother      Meds/Allergies   Allergies   Allergen Reactions   • Adhesive [Medical Tape] Rash   • Latex Rash       Meds:  Prior to Admission medications    Medication Sig Start Date End Date Taking? Authorizing Provider   amitriptyline (ELAVIL) 50 mg tablet TAKE 1 TABLET BY MOUTH DAILY AT BEDTIME 1/5/23  Yes Mitra Santana,    cholecalciferol (VITAMIN D3) 1,000 units tablet Take 1,000 Units by mouth in the morning     Yes Historical Provider, MD   levothyroxine 100 mcg tablet TAKE 1 TABLET BY MOUTH EVERY DAY 11/17/22  Yes Karthikeyan Bustamanteblake,    morphine (MSIR) 15 mg tablet Take 1 tablet (15 mg total) by mouth every 8 (eight) hours as needed for severe pain Max Daily Amount: 45 mg 12/20/22  Yes Garcia Watson,    Multiple Vitamins-Minerals (ICAPS AREDS 2 PO) Take by mouth daily    Yes Historical Provider, MD   amitriptyline (ELAVIL) 10 mg tablet Take 2 5 tablets (25 mg total) by mouth daily at bedtime  Patient not taking: Reported on 1/17/2023 10/12/22   Mitra Santana DO   amitriptyline (ELAVIL) 25 mg tablet TAKE 1 TABLET BY MOUTH DAILY AT BEDTIME  Patient not taking: Reported on 1/17/2023 11/12/22   Riddhi Garcia PA-C   chlordiazepoxide-clidinium (LIBRAX) 5-2 5 mg per capsule TAKE 1 CAPSULE BY MOUTH 4 (FOUR) TIMES A DAY (BEFORE MEALS AND AT BEDTIME)  Patient not taking: Reported on 1/17/2023 6/28/22   Mitra Santana DO   cyclobenzaprine (FLEXERIL) 5 mg tablet Take 2 tablets (10 mg total) by mouth every 8 (eight) hours as needed for muscle spasms  Patient taking differently: Take 10 mg by mouth every 8 (eight) hours as needed for muscle spasms 4/16/22 6/15/22  Garcia Watson DO   dicyclomine (BENTYL) 10 mg capsule TAKE 1 CAPSULE BY MOUTH 4 TIMES A DAY (BEFORE MEALS AND AT BEDTIME)  Patient not taking: Reported on 1/17/2023 5/11/22   Garcia Watson DO   hyoscyamine (ANASPAZ,LEVSIN) 0 125 MG tablet Take 1 tablet (0 125 mg total) by mouth every 4 (four) hours as needed for cramping  Patient not taking: Reported on 1/17/2023 10/12/22   Brian Alas DO   naloxone [facility]) 4 mg/0 1 mL nasal spray Administer 1 spray into a nostril  If no response after 2-3 minutes, give another dose in the other nostril using a new spray    Patient not taking: Reported on 10/12/2022 4/6/21   MACHO Gregory   ondansetron (ZOFRAN) 4 mg tablet Take 1 tablet (4 mg total) by mouth every 8 (eight) hours as needed for nausea or vomiting  Patient not taking: Reported on 1/17/2023 6/15/22   Brian Alas DO   sucralfate (CARAFATE) 1 g/10 mL suspension TAKE 10 ML (1 G TOTAL) BY MOUTH 4 (FOUR) TIMES A DAY (BEFORE MEALS AND AT BEDTIME)  Patient not taking: Reported on 1/17/2023 4/26/22   Vince Denver, MD       Subjective:     Review of Systems:    General: negative for - chills, fatigue, fever,  weight gain or weight loss  Psychological: negative for - anxiety, behavioral disorder, concentration difficulties, decreased libido, depression, irritability, memory difficulties, mood swings, sleep disturbances or suicidal ideation  ENT: negative for - hearing difficulties , nasal congestion, nasal discharge, oral lesions, sinus pain, sneezing, sore throat  Allergy and Immunology: negative for - hives, insect bite sensitivity,  Hematological and Lymphatic: negative for - bleeding problems, blood clots,bruising, swollen lymph nodes  Endocrine: negative for - hair pattern changes, hot flashes, malaise/lethargy, mood swings, palpitations, polydipsia/polyuria, skin changes, temperature intolerance or unexpected weight change  Respiratory: negative for - cough, hemoptysis, orthopnea, shortness of breath, or wheezing  Cardiovascular: negative for - chest pain, dyspnea on exertion, edema,  Gastrointestinal: negative for - abdominal pain, nausea/vomiting  Genito-Urinary: negative for - dysuria, incontinence, irregular/heavy menses or urinary frequency/urgency  Musculoskeletal: negative for - gait disturbance, joint pain, joint stiffness, joint swelling, muscle pain, muscular weakness  Dermatological:  As in HPI  Neurological: negative for confusion, dizziness, headaches, impaired coordination/balance, memory loss, numbness/tingling, seizures, speech problems, tremors or weakness       Objective:   Ht 5' 2" (1 575 m)   Wt 57 6 kg (127 lb)   BMI 23 23 kg/m²     Physical Exam:    General Appearance:    Alert, cooperative, no distress   Head:    Normocephalic, without obvious abnormality, atraumatic           Skin:   A full skin exam was performed including scalp, head scalp, eyes, ears, nose, lips, neck, chest, axilla, abdomen, back, buttocks, bilateral upper extremities, bilateral lower extremities, hands, feet, fingers, toes, fingernails, and toenails daily erythematous area noted below normal keratotic papules with greasy stuck on appearance nothing else remarkable noted on complete exam  Physical Exam  Constitutional:             Cryotherapy Procedure Note    Pre-operative Diagnosis: actinic keratosis    Plan:  1  Instructed to keep the area dry and clean thereafter  Apply petrolatum if area gets crusty  2  Recommended that the patient use acetaminophen  as needed for pain  Locations: Right dorsum of the hand    Indications: Destruction of actinic keratosis x1    Patient informed of risks (permanent scarring, infection, light or dark discoloration, bleeding, infection, weakness, numbness and recurrence of the lesion) and benefits of the procedure and verbal informed consent obtained      The areas are treated with liquid nitrogen therapy, frozen until ice ball extended 2 mm beyond lesion, allowed to thaw, and treated again  The patient tolerated procedure well  The patient was instructed on post-op care, warned that there may be blister formation, redness and pain  Recommend OTC analgesia as needed for pain  Condition:  Stable    Complications:  none  Assessment:     1  Actinic keratosis        2  Seborrheic keratosis        3  Screening for skin condition              Plan:   Actinic Keratosis:  Patient advised lesions are precancers  These should resolve with cryosurgery if not to let us know sun block recommended  Seborrheic Keratosis  Patient reasurred these are normal growths we acquire with age no treatment needed  Screening for Dermatologic Disorders: Nothing else of concern noted on complete exam follow up in 1 year       Yessenia Rehman MD  1/17/2023,9:25 AM    Portions of the record may have been created with voice recognition software   Occasional wrong word or "sound a like" substitutions may have occurred due to the inherent limitations of voice recognition software   Read the chart carefully and recognize, using context, where substitutions have occurred

## 2023-01-17 NOTE — PROGRESS NOTES
[facility]     Patient Name: Asia Pascual  Encounter Date: Janaury 17, 2023     Have you been cared for by a [place] [address] Dermatologist in the last 3 years and, if so, which description applies to you? Yes  I have been here within the last 3 years, and my medical history has NOT changed since that time  I am FEMALE/of child-bearing potential     REVIEW OF SYSTEMS:  Have you recently had or currently have any of the following? · No changes in my recent health  PAST MEDICAL HISTORY:  Have you personally ever had or currently have any of the following? If "YES," then please provide more detail  · No changes in my medical history  FAMILY HISTORY:  Any "first degree relatives" (parent, brother, sister, or child) with the following? • No changes in my family's known health  PATIENT EXPERIENCE:    • Do you want the Dermatologist to perform a COMPLETE skin exam today including a clinical examination under the "bra and underwear" areas? Yes  • If necessary, do we have your permission to call and leave a detailed message on your Preferred Phone number that includes your specific medical information?   Yes      Allergies   Allergen Reactions   • Adhesive [Medical Tape] Rash   • Latex Rash      Current Outpatient Medications:   •  amitriptyline (ELAVIL) 50 mg tablet, TAKE 1 TABLET BY MOUTH DAILY AT BEDTIME, Disp: 93 tablet, Rfl: 1  •  cholecalciferol (VITAMIN D3) 1,000 units tablet, Take 1,000 Units by mouth in the morning , Disp: , Rfl:   •  levothyroxine 100 mcg tablet, TAKE 1 TABLET BY MOUTH EVERY DAY, Disp: 90 tablet, Rfl: 3  •  morphine (MSIR) 15 mg tablet, Take 1 tablet (15 mg total) by mouth every 8 (eight) hours as needed for severe pain Max Daily Amount: 45 mg, Disp: 90 tablet, Rfl: 0  •  Multiple Vitamins-Minerals (ICAPS AREDS 2 PO), Take by mouth daily , Disp: , Rfl:   •  amitriptyline (ELAVIL) 10 mg tablet, Take 2 5 tablets (25 mg total) by mouth daily at bedtime (Patient not taking: Reported on 1/17/2023), Disp: 31 tablet, Rfl: 6  •  amitriptyline (ELAVIL) 25 mg tablet, TAKE 1 TABLET BY MOUTH DAILY AT BEDTIME (Patient not taking: Reported on 1/17/2023), Disp: 90 tablet, Rfl: 3  •  chlordiazepoxide-clidinium (LIBRAX) 5-2 5 mg per capsule, TAKE 1 CAPSULE BY MOUTH 4 (FOUR) TIMES A DAY (BEFORE MEALS AND AT BEDTIME) (Patient not taking: Reported on 1/17/2023), Disp: 360 capsule, Rfl: 1  •  cyclobenzaprine (FLEXERIL) 5 mg tablet, Take 2 tablets (10 mg total) by mouth every 8 (eight) hours as needed for muscle spasms (Patient taking differently: Take 10 mg by mouth every 8 (eight) hours as needed for muscle spasms), Disp: 60 tablet, Rfl: 1  •  dicyclomine (BENTYL) 10 mg capsule, TAKE 1 CAPSULE BY MOUTH 4 TIMES A DAY (BEFORE MEALS AND AT BEDTIME) (Patient not taking: Reported on 1/17/2023), Disp: 360 capsule, Rfl: 0  •  hyoscyamine (ANASPAZ,LEVSIN) 0 125 MG tablet, Take 1 tablet (0 125 mg total) by mouth every 4 (four) hours as needed for cramping (Patient not taking: Reported on 1/17/2023), Disp: 30 tablet, Rfl: 0  •  naloxone (NARCAN) 4 mg/0 1 mL nasal spray, Administer 1 spray into a nostril  If no response after 2-3 minutes, give another dose in the other nostril using a new spray   (Patient not taking: Reported on 10/12/2022), Disp: 1 each, Rfl: 1  •  ondansetron (ZOFRAN) 4 mg tablet, Take 1 tablet (4 mg total) by mouth every 8 (eight) hours as needed for nausea or vomiting (Patient not taking: Reported on 1/17/2023), Disp: 40 tablet, Rfl: 1  •  sucralfate (CARAFATE) 1 g/10 mL suspension, TAKE 10 ML (1 G TOTAL) BY MOUTH 4 (FOUR) TIMES A DAY (BEFORE MEALS AND AT BEDTIME) (Patient not taking: Reported on 1/17/2023), Disp: 280 mL, Rfl: 5    Current Facility-Administered Medications:   •  denosumab (PROLIA) subcutaneous injection 60 mg, 60 mg, Subcutaneous, Q6 Months, Garcia Watson DO, 60 mg at 09/07/22 1003          • Whom besides the patient is providing clinical information about today's encounter?   o NO ADDITIONAL HISTORIAN (patient alone provided history)    Physical Exam and Assessment/Plan by Diagnosis:

## 2023-01-20 NOTE — ASSESSMENT & PLAN NOTE
Gout urinary tract infection, urine bland  No fevers, no leukocytosis  Discontinue ceftriaxone and continue to monitor      Results from last 7 days   Lab Units 12/12/19  1407   COLOR UA  Yellow   CLARITY UA  Clear   SPEC GRAV UA  <=1 005   PH UA  7 5   LEUKOCYTES UA  Moderate*   NITRITE UA  Negative   GLUCOSE UA mg/dl Negative   KETONES UA mg/dl Negative   BILIRUBIN UA  Negative   BLOOD UA  Trace-Intact*      Results from last 7 days   Lab Units 12/12/19  1407   RBC UA /hpf 0-1*   WBC UA /hpf 4-10*   EPITHELIAL CELLS WET PREP /hpf Occasional   BACTERIA UA /hpf Occasional No

## 2023-02-01 ENCOUNTER — OFFICE VISIT (OUTPATIENT)
Dept: GASTROENTEROLOGY | Facility: CLINIC | Age: 78
End: 2023-02-01

## 2023-02-01 VITALS
DIASTOLIC BLOOD PRESSURE: 60 MMHG | HEIGHT: 62 IN | OXYGEN SATURATION: 99 % | SYSTOLIC BLOOD PRESSURE: 92 MMHG | BODY MASS INDEX: 23 KG/M2 | HEART RATE: 77 BPM | WEIGHT: 125 LBS

## 2023-02-01 DIAGNOSIS — K58.2 IRRITABLE BOWEL SYNDROME WITH BOTH CONSTIPATION AND DIARRHEA: Primary | ICD-10-CM

## 2023-02-01 DIAGNOSIS — R14.0 BLOATING: ICD-10-CM

## 2023-02-02 NOTE — PROGRESS NOTES
[facility] - Outpatient Follow-up Note  Eduardo Mathur [de-identified] y o  female MRN: [id number]  Encounter: [id number]          ASSESSMENT AND PLAN:      1  Irritable bowel syndrome with both constipation and diarrhea    2  Bloating    Continue using charcoal as much as 1 to 2 tablets/day for the treatment of the gaseousness and bloating    Continue with the amitriptyline at a dose of 50 mg/day to be taken at bedtime no additional diagnostic testing is required at this time    No additional diagnostic testing or endoscopic procedures are required at this time  ______________________________________________________________________    SUBJECTIVE: Crissy comes the office today for routine follow-up  She states that she is generally feeling better since the amitriptyline was increased to 50 mg at bedtime  She is sleeping better and her abdominal pain is less significant  She states that the gaseousness and bloating had been improved on charcoal, however she has not taken much of this medication over the past couple of weeks  She still however does complain of gaseousness  When she goes to the bathroom she experiences a sharp stabbing pain when pressure which eventually goes away  This pain however is reduced compared to  1 to 2 months ago  REVIEW OF SYSTEMS IS OTHERWISE NEGATIVE        Historical Information   Past Medical History:   Diagnosis Date   • Anxiety    • Cataract     last assessed 4/25/14   • [facility] bronchitis ([email or web address] )     Sees pulmonary specialist twice a year   • Chronic pain disorder    • [facility]    • Hypothyroidism    • Multiple sclerosis ([email or web address] )    • CHOCO (obstructive sleep apnea)     uses cpap, 3/19/21 -pt states will bring CPAP for DOS 3/23/21   • Osteopetrosis    • Peroneal muscle atrophy    • Shortness of breath    • Smoker    • Thoracolumbar radiculopathy due to intervertebral disc disorder      Past Surgical History:   Procedure Laterality Date   • APPENDECTOMY     • CATARACT EXTRACTION     •  SECTION     • CHOLECYSTECTOMY     • GALLBLADDER SURGERY     • NE COLONOSCOPY FLX DX W/COLLJ SPEC WHEN PFRMD N/A 2018    Procedure: COLONOSCOPY;  Surgeon: Scot Khan MD;  Location: [facility];   Service: Colorectal   • NE LAMNOTMY INCL W/DCMPRSN NRV ROOT 1 INTRSPC LUMBR Right 3/23/2021    Procedure: MINIMALLY INVASIVE LUMBAR FAR LATERAL DISCECTOMY L4-5, RIGHT;  Surgeon: Prince Damon MD;  Location:  MAIN OR;  Service: Neurosurgery   • THROAT SURGERY       Social History   Social History     Substance and Sexual Activity   Alcohol Use Yes   • Alcohol/week: 1 0 - 2 0 standard drink   • Types: 1 - 2 Glasses of wine per week    Comment: 1-2 drinks of wine daily      Social History     Substance and Sexual Activity   Drug Use Yes   • Types: Morphine, Marijuana    Comment: medical-RSO  last dose 11 days ago, past hx o f medical marijuana use - NOT CURRENT     Social History     Tobacco Use   Smoking Status Every Day   • Packs/day: 1 00   • Years: 60 00   • Pack years: 60 00   • Types: Cigarettes   • Start date: 1958   Smokeless Tobacco Never     Family History   Problem Relation Age of Onset   • Skin cancer Mother    • Hypertension Father         benign essential   • Stroke Father    • Lung cancer Brother        Meds/Allergies       Current Outpatient Medications:   •  amitriptyline (ELAVIL) 50 mg tablet  •  cyclobenzaprine (FLEXERIL) 5 mg tablet  •  levothyroxine 100 mcg tablet  •  morphine (MSIR) 15 mg tablet  •  Multiple Vitamins-Minerals (ICAPS AREDS 2 PO)  •  amitriptyline (ELAVIL) 10 mg tablet  •  amitriptyline (ELAVIL) 25 mg tablet  •  chlordiazepoxide-clidinium (LIBRAX) 5-2 5 mg per capsule  •  cholecalciferol (VITAMIN D3) 1,000 units tablet  •  dicyclomine (BENTYL) 10 mg capsule  •  hyoscyamine (ANASPAZ,LEVSIN) 0 125 MG tablet  •  naloxone (NARCAN) 4 mg/0 1 mL nasal spray  •  ondansetron (ZOFRAN) 4 mg tablet  •  sucralfate (CARAFATE) 1 g/10 mL suspension    Current Facility-Administered Medications:   •  denosumab (PROLIA) subcutaneous injection 60 mg, 60 mg, Subcutaneous, Q6 Months, 60 mg at 09/07/22 1003    Allergies   Allergen Reactions   • Adhesive [Medical Tape] Rash   • Latex Rash           Objective     Blood pressure 92/60, pulse 77, height 5' 2" (1 575 m), weight 56 7 kg (125 lb), SpO2 99 %  Body mass index is 22 86 kg/m²  PHYSICAL EXAM:      General Appearance:   Alert, cooperative, no distress   HEENT:   Normocephalic, atraumatic, anicteric      Neck:  Supple, symmetrical, trachea midline   Lungs:   Clear to auscultation bilaterally; no rales, rhonchi or wheezing; respirations unlabored    Heart[de-identified]   Regular rate and rhythm; no murmur, rub, or gallop  Abdomen:   Soft, non-tender, non-distended; normal bowel sounds; no masses, no organomegaly    Genitalia:   Deferred    Rectal:   Deferred    Extremities:  No cyanosis, clubbing or edema    Pulses:  2+ and symmetric    Skin:  No jaundice, rashes, or lesions    Lymph nodes:  No palpable cervical lymphadenopathy        Lab Results:   No visits with results within 1 Day(s) from this visit     Latest known visit with results is:   Appointment on 09/23/2022   Component Date Value   • TSH 3RD GENERATON 09/23/2022 1 830    • WBC 09/23/2022 7 15    • RBC 09/23/2022 4 43    • Hemoglobin 09/23/2022 13 9    • Hematocrit 09/23/2022 43 5    • MCV 09/23/2022 98    • MCH 09/23/2022 31 4    • MCHC 09/23/2022 32 0    • RDW 09/23/2022 13 9    • Platelets 55/66/3235 276    • MPV 09/23/2022 9 2    • Sodium 09/23/2022 137    • Potassium 09/23/2022 4 0    • Chloride 09/23/2022 108    • CO2 09/23/2022 26    • ANION GAP 09/23/2022 3 (L)    • BUN 09/23/2022 17    • Creatinine 09/23/2022 0 77    • Glucose 09/23/2022 108    • Calcium 09/23/2022 9 3    • AST 09/23/2022 22    • ALT 09/23/2022 40    • Alkaline Phosphatase 09/23/2022 144 (H)    • Total Protein 09/23/2022 7 6    • Albumin 09/23/2022 3 6 • Total Bilirubin 09/23/2022 0 21    • eGFR 09/23/2022 73          Radiology Results:   No results found

## 2023-02-23 ENCOUNTER — OFFICE VISIT (OUTPATIENT)
Dept: INTERNAL MEDICINE CLINIC | Facility: CLINIC | Age: 78
End: 2023-02-23

## 2023-02-23 VITALS
HEART RATE: 90 BPM | BODY MASS INDEX: 23 KG/M2 | WEIGHT: 125 LBS | DIASTOLIC BLOOD PRESSURE: 60 MMHG | HEIGHT: 62 IN | RESPIRATION RATE: 20 BRPM | OXYGEN SATURATION: 99 % | SYSTOLIC BLOOD PRESSURE: 100 MMHG | TEMPERATURE: 98.1 F

## 2023-02-23 DIAGNOSIS — E03.9 ACQUIRED HYPOTHYROIDISM: ICD-10-CM

## 2023-02-23 DIAGNOSIS — G35 MULTIPLE SCLEROSIS (HCC): ICD-10-CM

## 2023-02-23 DIAGNOSIS — J44.9 COPD WITH CHRONIC BRONCHITIS (HCC): ICD-10-CM

## 2023-02-23 DIAGNOSIS — F11.20 CONTINUOUS OPIOID DEPENDENCE (HCC): Primary | ICD-10-CM

## 2023-02-23 NOTE — PROGRESS NOTES
Assessment/Plan     1  Continuous opioid dependence (HCC)    Stable  Updated opioid agreement signed today  Will attempt UDS at next visit  No changes to morphine  She is doing well only using it for severe pain  Last filled in December 2  COPD with chronic bronchitis (HCC)    Stable and follows with Dr Whitney Astorga  No big motivation to quit smoking unfortunately  - CBC; Future  - Basic metabolic panel; Future    3  Multiple sclerosis ([address] )    Stable at this time  Follow-up with neurology  4  Acquired hypothyroidism    Check TSH before next appointment  Continue levothyroxine     - TSH, 3rd generation; Future         Subjective     Opioid Management:   Type of visit: Follow-up    Pain related diagnoses: chronic neuropathic pain, continuous opioid dependence, thoracolumbar radiculopathy, multiple sclerosis, leg pain    Interval history: Things have been pretty good for her lately  Some increased stressors at home with  getting dementia, but overall she is stretching out her pain medication to last longer  IBS is doing better on charcoal and elavil  No new problems with the MS  Aberrant behavior?: No      Adverse effects from medication?: No      Screening Tools/Assessments:    PHQ-2/9:  Last PHQ-2 score: 0 (Last PHQ-2 date: 9/23/2022)    Brief Pain Inventory (BPI):  1) Throughout our lives, most of us have had pain from time to time (such as minor headaches, sprains, and toothaches)  Have you had pain other than these everyday kinds of pain today? No  2) Where is your pain located? back pain  3) Rate your pain at its worst in the last 24 hours: 5  4) Rate your pain at its least in the last 24 hours: 3  5) Rate your average level of pain: 5  6) Rate your pain right now: 4  7) What treatments or medications are you receiving for your pain? 8) In the past 24 hours, how much relief have pain treatments or medication provided?  80%  9) During the past 24 hours, pain has interfered with your:    A) General activity: 4     B) Mood: 1     C) Walking ability: 5     D) Normal work (work outside the home & housework): 5     E) Relations with other people: 0     F) Sleep: 6     G) Enjoyment of life: 5    Drug Screen:  Date of last drug screen: 9/27/2022  Drug screen result based off current prescriptions: consistent    Opioid agreement:  Active Opioid agreement on file?: Yes    Opioid agreement signed date: 9/23/2022  Opioid agreement expiration date: 9/23/2023    Naloxone:  Currently prescribed Naloxone (Narcan): Yes      High risk medications  High risk meds taken in last 72 hours: morphine    Outpatient Morphine Milligram Equivalents Per Day     2/23/23 and after 39 MME/Day    Order Name Dose Route Frequency Maximum MME/Day     morphine (MSIR) 15 mg tablet 15 mg Oral Every 8 hours PRN 45 MME/Day    Total Potential Morphine Milligram Equivalents Per Day 45 MME/Day    Calculation Information        morphine (MSIR) 15 mg tablet    morphine 15 mg Tabs: maximum daily dose of 45 mg * morphine equivalence factor of 1 = 45 MME/Day                         PDMP Review       Value Time User    PDMP Reviewed  Yes 2/23/2023  1:56 PM Garcia Watson DO         Review of Systems   Constitutional: Positive for fatigue  Negative for diaphoresis and fever  Respiratory: Negative  Cardiovascular: Negative  Gastrointestinal: Positive for abdominal pain, constipation and diarrhea  Musculoskeletal: Positive for back pain  Psychiatric/Behavioral: Positive for sleep disturbance  Objective     /60 (BP Location: Left arm, Patient Position: Sitting, Cuff Size: Standard)   Pulse 90   Temp 98 1 °F (36 7 °C) (Temporal)   Resp 20   Ht 5' 2" (1 575 m)   Wt 56 7 kg (125 lb)   SpO2 99%   BMI 22 86 kg/m²     Physical Exam  Constitutional:       General: She is not in acute distress  Appearance: She is not ill-appearing  Cardiovascular:      Rate and Rhythm: Normal rate and regular rhythm        Heart sounds: No murmur heard   Pulmonary:      Effort: Pulmonary effort is normal  No respiratory distress  Breath sounds: No wheezing  Abdominal:      General: Bowel sounds are normal  There is no distension  Tenderness: There is no abdominal tenderness  Musculoskeletal:      Right lower leg: No edema  Left lower leg: No edema  Neurological:      Mental Status: She is alert           Elisa Lowry, DO

## 2023-03-02 ENCOUNTER — TELEPHONE (OUTPATIENT)
Dept: INTERNAL MEDICINE CLINIC | Facility: CLINIC | Age: 78
End: 2023-03-02

## 2023-03-02 NOTE — TELEPHONE ENCOUNTER
PROLIA-Insurance verified    Spoke with patient  Insurance verified, /[employer]  does not anticipate any changes in the near future      Insurance info>[employer] for reverification and Summary of Benefits

## 2023-03-15 ENCOUNTER — TELEPHONE (OUTPATIENT)
Dept: INTERNAL MEDICINE CLINIC | Facility: CLINIC | Age: 78
End: 2023-03-15

## 2023-03-15 NOTE — TELEPHONE ENCOUNTER
PROLIA-Received SOB/Appt  scheduled    Spoke w/patient    Last dose-9/7/22    *Appt   Scheduled-3/21/23  *Med ordered- 3453  Del 3/16  *Order>PCP  *Log and [employer] updated

## 2023-03-21 ENCOUNTER — CLINICAL SUPPORT (OUTPATIENT)
Dept: INTERNAL MEDICINE CLINIC | Facility: CLINIC | Age: 78
End: 2023-03-21

## 2023-03-21 ENCOUNTER — TELEPHONE (OUTPATIENT)
Dept: INTERNAL MEDICINE CLINIC | Facility: CLINIC | Age: 78
End: 2023-03-21

## 2023-03-21 DIAGNOSIS — M81.0 OSTEOPOROSIS WITHOUT CURRENT PATHOLOGICAL FRACTURE, UNSPECIFIED OSTEOPOROSIS TYPE: Primary | ICD-10-CM

## 2023-03-21 DIAGNOSIS — F11.20 CONTINUOUS OPIOID DEPENDENCE (HCC): ICD-10-CM

## 2023-03-21 RX ORDER — MORPHINE SULFATE 15 MG/1
15 TABLET ORAL EVERY 8 HOURS PRN
Qty: 90 TABLET | Refills: 0 | Status: SHIPPED | OUTPATIENT
Start: 2023-03-21

## 2023-04-20 ENCOUNTER — TELEPHONE (OUTPATIENT)
Age: 78
End: 2023-04-20

## 2023-05-05 LAB

## 2023-05-11 LAB

## 2023-05-16 DIAGNOSIS — K58.2 IRRITABLE BOWEL SYNDROME WITH BOTH CONSTIPATION AND DIARRHEA: ICD-10-CM

## 2023-05-16 RX ORDER — AMITRIPTYLINE HYDROCHLORIDE 50 MG/1
TABLET, FILM COATED ORAL
Qty: 90 TABLET | Refills: 2 | Status: SHIPPED | OUTPATIENT
Start: 2023-05-16

## 2023-06-02 ENCOUNTER — APPOINTMENT (OUTPATIENT)
Dept: LAB | Facility: CLINIC | Age: 78
End: 2023-06-02
Payer: MEDICARE

## 2023-06-02 DIAGNOSIS — E03.9 ACQUIRED HYPOTHYROIDISM: ICD-10-CM

## 2023-06-02 DIAGNOSIS — J44.9 COPD WITH CHRONIC BRONCHITIS (HCC): ICD-10-CM

## 2023-06-02 LAB
ANION GAP SERPL CALCULATED.3IONS-SCNC: 5 MMOL/L (ref 4–13)
BUN SERPL-MCNC: 14 MG/DL (ref 5–25)
CALCIUM SERPL-MCNC: 9.3 MG/DL (ref 8.3–10.1)
CHLORIDE SERPL-SCNC: 104 MMOL/L (ref 96–108)
CO2 SERPL-SCNC: 27 MMOL/L (ref 21–32)
CREAT SERPL-MCNC: 0.84 MG/DL (ref 0.6–1.3)
ERYTHROCYTE [DISTWIDTH] IN BLOOD BY AUTOMATED COUNT: 14.4 % (ref 11.6–15.1)
GFR SERPL CREATININE-BSD FRML MDRD: 65 ML/MIN/1.73SQ M
GLUCOSE SERPL-MCNC: 107 MG/DL (ref 65–140)
HCT VFR BLD AUTO: 42.5 % (ref 34.8–46.1)
HGB BLD-MCNC: 13.8 G/DL (ref 11.5–15.4)
MCH RBC QN AUTO: 31.9 PG (ref 26.8–34.3)
MCHC RBC AUTO-ENTMCNC: 32.5 G/DL (ref 31.4–37.4)
MCV RBC AUTO: 98 FL (ref 82–98)
PLATELET # BLD AUTO: 302 THOUSANDS/UL (ref 149–390)
PMV BLD AUTO: 9.8 FL (ref 8.9–12.7)
POTASSIUM SERPL-SCNC: 3.9 MMOL/L (ref 3.5–5.3)
RBC # BLD AUTO: 4.33 MILLION/UL (ref 3.81–5.12)
SODIUM SERPL-SCNC: 136 MMOL/L (ref 135–147)
TSH SERPL DL<=0.05 MIU/L-ACNC: 4.17 UIU/ML (ref 0.45–4.5)
WBC # BLD AUTO: 10.14 THOUSAND/UL (ref 4.31–10.16)

## 2023-06-02 PROCEDURE — 84443 ASSAY THYROID STIM HORMONE: CPT

## 2023-06-02 PROCEDURE — 80048 BASIC METABOLIC PNL TOTAL CA: CPT

## 2023-06-02 PROCEDURE — 85027 COMPLETE CBC AUTOMATED: CPT

## 2023-06-02 PROCEDURE — 36415 COLL VENOUS BLD VENIPUNCTURE: CPT

## 2023-06-09 RX ORDER — OFLOXACIN 3 MG/ML
SOLUTION/ DROPS OPHTHALMIC
COMMUNITY
Start: 2023-05-19

## 2023-06-14 ENCOUNTER — OFFICE VISIT (OUTPATIENT)
Dept: GASTROENTEROLOGY | Facility: CLINIC | Age: 78
End: 2023-06-14
Payer: MEDICARE

## 2023-06-14 VITALS
OXYGEN SATURATION: 96 % | BODY MASS INDEX: 23.37 KG/M2 | SYSTOLIC BLOOD PRESSURE: 112 MMHG | HEART RATE: 91 BPM | DIASTOLIC BLOOD PRESSURE: 71 MMHG | WEIGHT: 127 LBS | HEIGHT: 62 IN

## 2023-06-14 DIAGNOSIS — R14.0 BLOATING: ICD-10-CM

## 2023-06-14 DIAGNOSIS — K58.9 IRRITABLE BOWEL SYNDROME, UNSPECIFIED TYPE: Primary | ICD-10-CM

## 2023-06-14 PROCEDURE — 99214 OFFICE O/P EST MOD 30 MIN: CPT | Performed by: INTERNAL MEDICINE

## 2023-06-14 NOTE — PROGRESS NOTES
[facility] - Outpatient Follow-up Note  Niru Escobar [de-identified] y o  female MRN: [id number]  Encounter: [id number]          ASSESSMENT AND PLAN:      1  Irritable bowel syndrome, unspecified type  -Continue with amitriptyline 50 mg p o  daily    2  Bloating  -Continue with charcoal tabs as needed    No additional diagnostic testing warranted at this time    No additional medications required at this time    ______________________________________________________________________    SUBJECTIVE: Jacinto Paniagua comes the office today for 4-month follow-up  She states that she is no longer requiring her charcoal tablets to be taken frequently  She has had some difficulty finding them recently and has to go to another pharmacy to see if she can  some additional ones  She stated that she initially wanted to decrease her amitriptyline dosage from 50 mg down to 25 mg  Now, however, she feels that this medication is really helping her a great deal and she wants to stay at the same doses  She is having a lot of difficulty at home with her  who currently has advanced Parkinson disease  Overall the patient states that she is feeling better  She is having regular bowel movements on a daily basis  She denies abdominal pain, nausea, vomiting, rectal bleeding, hematemesis, melena  She is not using a ondansetron at all  She denies any constipation  REVIEW OF SYSTEMS IS OTHERWISE NEGATIVE        Historical Information   Past Medical History:   Diagnosis Date   • Anxiety    • Cataract     last assessed 4/25/14   • [facility]     Sees pulmonary specialist twice a year   • Chronic pain disorder    • COPD (chronic obstructive [facility] disease) [facility] )    • Hypothyroidism    • [facility] sclerosis ([facility] )    • CHOCO (obstructive sleep apnea)     uses cpap, 3/19/21 -pt states will bring CPAP for DOS 3/23/21   • Osteopetrosis    • Peroneal muscle atrophy    • Shortness of breath    • Smoker • Thoracolumbar radiculopathy due to intervertebral disc disorder      Past Surgical History:   Procedure Laterality Date   • APPENDECTOMY     • CATARACT EXTRACTION     •  SECTION     • CHOLECYSTECTOMY     • GALLBLADDER SURGERY     • MI COLONOSCOPY FLX DX W/COLLJ SPEC WHEN PFRMD N/A 2018    Procedure: COLONOSCOPY;  Surgeon: Landon Russo MD;  Location: [facility];   Service: Colorectal   • MI LAMNOTMY INCL W/DCMPRSN NRV ROOT 1 INTRSPC LUMBR Right 3/23/2021    Procedure: MINIMALLY INVASIVE LUMBAR FAR LATERAL DISCECTOMY L4-5, RIGHT;  Surgeon: Florinda Maynard MD;  Location:  MAIN OR;  Service: Neurosurgery   • THROAT SURGERY       Social History   Social History     Substance and Sexual Activity   Alcohol Use Yes   • Alcohol/week: 1 0 - 2 0 standard drink of alcohol   • Types: 1 - 2 Glasses of wine per week    Comment: 1-2 drinks of wine daily      Social History     Substance and Sexual Activity   Drug Use Yes   • Types: Morphine, Marijuana    Comment: medical-RSO  last dose 11 days ago, past hx o f medical marijuana use - NOT CURRENT     Social History     Tobacco Use   Smoking Status Every Day   • Packs/day: 1 00   • Years: 60 00   • Total pack years: 60 00   • Types: Cigarettes   • Start date: 1958   Smokeless Tobacco Never     Family History   Problem Relation Age of Onset   • Skin cancer Mother    • Hypertension Father         benign essential   • Stroke Father    • Lung cancer Brother        Meds/Allergies       Current Outpatient Medications:   •  amitriptyline (ELAVIL) 10 mg tablet  •  amitriptyline (ELAVIL) 25 mg tablet  •  amitriptyline (ELAVIL) 50 mg tablet  •  chlordiazepoxide-clidinium (LIBRAX) 5-2 5 mg per capsule  •  cholecalciferol (VITAMIN D3) 1,000 units tablet  •  cyclobenzaprine (FLEXERIL) 5 mg tablet  •  hyoscyamine (ANASPAZ,LEVSIN) 0 125 MG tablet  •  levothyroxine 100 mcg tablet  •  morphine (MSIR) 15 mg tablet  •  Multiple Vitamins-Minerals (ICAPS AREDS 2 PO)  •  naloxone "(NARCAN) 4 mg/0 1 mL nasal spray  •  ofloxacin (OCUFLOX) 0 3 % ophthalmic solution  •  ondansetron (ZOFRAN) 4 mg tablet  •  sucralfate (CARAFATE) 1 g/10 mL suspension  •  dicyclomine (BENTYL) 10 mg capsule    Current Facility-Administered Medications:   •  denosumab (PROLIA) subcutaneous injection 60 mg, 60 mg, Subcutaneous, Q6 Months, 60 mg at 03/21/23 1023    Allergies   Allergen Reactions   • Adhesive [Medical Tape] Rash   • Latex Rash           Objective     Blood pressure 112/71, pulse 91, height 5' 2\" (1 575 m), weight 57 6 kg (127 lb), SpO2 96 %  Body mass index is 23 23 kg/m²  PHYSICAL EXAM:      General Appearance:   Alert, cooperative, no distress   HEENT:   Normocephalic, atraumatic, anicteric      Neck:  Supple, symmetrical, trachea midline   Lungs:   Clear to auscultation bilaterally; no rales, rhonchi or wheezing; respirations unlabored    Heart[de-identified]   Regular rate and rhythm; no murmur, rub, or gallop  Abdomen:   Soft, non-tender, non-distended; normal bowel sounds; no masses, no organomegaly    Genitalia:   Deferred    Rectal:   Deferred    Extremities:  No cyanosis, clubbing or edema    Pulses:  2+ and symmetric    Skin:  No jaundice, rashes, or lesions    Lymph nodes:  No palpable cervical lymphadenopathy        Lab Results:   No visits with results within 1 Day(s) from this visit     Latest known visit with results is:   Appointment on 06/02/2023   Component Date Value   • WBC 06/02/2023 10 14    • RBC 06/02/2023 4 33    • Hemoglobin 06/02/2023 13 8    • Hematocrit 06/02/2023 42 5    • MCV 06/02/2023 98    • MCH 06/02/2023 31 9    • MCHC 06/02/2023 32 5    • RDW 06/02/2023 14 4    • Platelets 58/67/5643 302    • MPV 06/02/2023 9 8    • Sodium 06/02/2023 136    • Potassium 06/02/2023 3 9    • Chloride 06/02/2023 104    • CO2 06/02/2023 27    • ANION GAP 06/02/2023 5    • BUN 06/02/2023 14    • Creatinine 06/02/2023 0 84    • Glucose 06/02/2023 107    • Calcium 06/02/2023 9 3    • eGFR 06/02/2023 " 65    • TSH [address] [place] [address] 06/02/2023 4  173          Radiology Results:   No results found

## 2023-06-26 ENCOUNTER — OFFICE VISIT (OUTPATIENT)
Age: 78
End: 2023-06-26
Payer: MEDICARE

## 2023-06-26 VITALS
WEIGHT: 126 LBS | BODY MASS INDEX: 23.19 KG/M2 | SYSTOLIC BLOOD PRESSURE: 114 MMHG | TEMPERATURE: 97.2 F | OXYGEN SATURATION: 98 % | HEART RATE: 88 BPM | HEIGHT: 62 IN | RESPIRATION RATE: 20 BRPM | DIASTOLIC BLOOD PRESSURE: 60 MMHG

## 2023-06-26 DIAGNOSIS — E03.9 ACQUIRED HYPOTHYROIDISM: ICD-10-CM

## 2023-06-26 DIAGNOSIS — F11.20 CONTINUOUS OPIOID DEPENDENCE (HCC): Primary | ICD-10-CM

## 2023-06-26 DIAGNOSIS — J44.9 COPD WITH CHRONIC BRONCHITIS (HCC): Chronic | ICD-10-CM

## 2023-06-26 DIAGNOSIS — M62.838 MUSCLE SPASM OF RIGHT LEG: ICD-10-CM

## 2023-06-26 PROCEDURE — 99214 OFFICE O/P EST MOD 30 MIN: CPT | Performed by: INTERNAL MEDICINE

## 2023-06-26 RX ORDER — MORPHINE SULFATE 15 MG/1
15 TABLET ORAL EVERY 8 HOURS PRN
Qty: 90 TABLET | Refills: 0 | Status: SHIPPED | OUTPATIENT
Start: 2023-06-26

## 2023-06-26 RX ORDER — CYCLOBENZAPRINE HCL 5 MG
10 TABLET ORAL EVERY 8 HOURS PRN
Qty: 60 TABLET | Refills: 3 | Status: SHIPPED | OUTPATIENT
Start: 2023-06-26 | End: 2023-08-25

## 2023-06-26 NOTE — PROGRESS NOTES
Assessment/Plan     1  Continuous opioid dependence ([facility] )    Current pain regimen is working well for him  She is stable and does not require medication at the same frequency every day  She infrequently uses muscle relaxers and denies any significant side effects  No recent falls  No aberrant behavior or signs of abuse     - morphine (MSIR) 15 mg tablet; Take 1 tablet (15 mg total) by mouth every 8 (eight) hours as needed for severe pain Max Daily Amount: 45 mg  Dispense: 90 tablet; Refill: 0    2  COPD with chronic bronchitis ([facility] )    Continues to smoke unfortunately  Follows with [facility]  Stable at this time and has CT scan scheduled in future  3  Acquired hypothyroidism    Stable and TSH is normal  Continue levothyroxine  4  Muscle spasm of right leg  - cyclobenzaprine (FLEXERIL) 5 mg tablet; Take 2 tablets (10 mg total) by mouth every 8 (eight) hours as needed for muscle spasms  Dispense: 60 tablet; Refill: 3       Subjective     Opioid Management:   Type of visit: Follow-up    Pain related diagnoses: chronic neuropathic pain, continuous opioid dependence, thoracolumbar radiculopathy, multiple sclerosis, leg pain    Interval history: Pain has been stable since I last saw patient  She does not take morphine all the time  She usually can stretch out a prescription over 2-3 months  Has had nerve injections in the past which have helped  No recent issues related to her MS  Saw GI and feels she is on a stable regimen which includes elavil in regards to her IBS  Some worry over her  who had a bad fall and will need to go to rehab      Aberrant behavior?: No      Adverse effects from medication?: No      Screening Tools/Assessments:    PHQ-2/9:  Last PHQ-2 score: 0 (Last PHQ-2 date: 2/23/2023)      Drug Screen:  Date of last drug screen: 9/27/2022  Drug screen result based off current prescriptions: consistent    Opioid agreement:  Active Opioid agreement on file?: Yes    Opioid agreement "signed date: 2/23/2023  Opioid agreement expiration date: 2/23/2024    Naloxone:  Currently prescribed Naloxone (Narcan): Yes      PDMP Review       Value Time User    PDMP Reviewed  Yes 6/26/2023 11:31 AM Jeff Loaiza DO         Review of Systems   Constitutional: Negative  Cardiovascular: Negative  Gastrointestinal: Positive for abdominal pain (intermittent)  Negative for blood in stool, diarrhea, nausea and vomiting  Intermittent bloating     Objective     /60 (BP Location: Left arm, Patient Position: Sitting, Cuff Size: Standard)   Pulse 88   Temp (!) 97 2 °F (36 2 °C) (Tympanic)   Resp 20   Ht 5' 2\" (1 575 m)   Wt 57 2 kg (126 lb)   SpO2 98%   BMI 23 05 kg/m²     Physical Exam  Constitutional:       General: She is not in acute distress  Appearance: She is not ill-appearing  Cardiovascular:      Rate and Rhythm: Normal rate and regular rhythm  Heart sounds: No murmur heard  Pulmonary:      Effort: Pulmonary effort is normal  No respiratory distress  Breath sounds: No wheezing  Abdominal:      General: Bowel sounds are normal  There is no distension  Tenderness: There is no abdominal tenderness  Musculoskeletal:      Right lower leg: No edema  Left lower leg: No edema  Neurological:      Mental Status: She is alert         Garcia Watsno DO        "

## 2023-07-26 ENCOUNTER — TELEPHONE (OUTPATIENT)
Dept: GASTROENTEROLOGY | Facility: CLINIC | Age: 78
End: 2023-07-26

## 2023-07-31 ENCOUNTER — TELEPHONE (OUTPATIENT)
Age: 78
End: 2023-07-31

## 2023-07-31 ENCOUNTER — OFFICE VISIT (OUTPATIENT)
Age: 78
End: 2023-07-31
Payer: MEDICARE

## 2023-07-31 VITALS
HEART RATE: 95 BPM | RESPIRATION RATE: 18 BRPM | HEIGHT: 63 IN | BODY MASS INDEX: 22.5 KG/M2 | SYSTOLIC BLOOD PRESSURE: 111 MMHG | DIASTOLIC BLOOD PRESSURE: 72 MMHG | OXYGEN SATURATION: 97 % | WEIGHT: 127 LBS

## 2023-07-31 DIAGNOSIS — J44.9 COPD WITH CHRONIC BRONCHITIS (HCC): ICD-10-CM

## 2023-07-31 DIAGNOSIS — Z99.89 OSA ON CPAP: Primary | ICD-10-CM

## 2023-07-31 DIAGNOSIS — R91.1 LUNG NODULE: ICD-10-CM

## 2023-07-31 DIAGNOSIS — F17.200 NICOTINE DEPENDENCE WITH CURRENT USE: ICD-10-CM

## 2023-07-31 DIAGNOSIS — G47.33 OSA ON CPAP: Primary | ICD-10-CM

## 2023-07-31 PROCEDURE — 99214 OFFICE O/P EST MOD 30 MIN: CPT

## 2023-07-31 NOTE — ASSESSMENT & PLAN NOTE
Prior CT in September 2022 with stable subcentimeter bilateral lung nodules and a stable 7 mm ovoid density in the right minor fissure. She is not eligible for yearly CT lung screening, however given her extensive smoking history and still actively smoking, she has a repeat CT chest scheduled for September for follow-up.

## 2023-07-31 NOTE — ASSESSMENT & PLAN NOTE
Patient wearing nightly and deriving benefit. Compliance report shows 100% compliance, however no other data available for review. She will continue wearing CPAP nightly at current settings.

## 2023-07-31 NOTE — ASSESSMENT & PLAN NOTE
Mild obstruction on PFTs in 2017. Patient is not on any inhaler therapy and does not complain of any shortness of breath or activity limitations. Will continue to monitor. Patient may benefit from repeat PFTs to evaluate diffusion capacity and need for echo to rule out pulmonary hypertension. Prior PFTs with disproportionate decrease in the DLCO of 37%. No prior ECHO on file. Can discuss at her next office visit as unable to today since patient cut visit short due to time constraints.

## 2023-07-31 NOTE — PROGRESS NOTES
Pulmonary Follow Up Note  Bandar Junior 78 y.o. female MRN: [id number]  7/31/2023    Assessment:    CHOCO on CPAP  Patient wearing nightly and deriving benefit. Compliance report shows 100% compliance, however no other data available for review. She will continue wearing CPAP nightly at current settings. COPD with chronic bronchitis ([address])  Mild obstruction on PFTs in 2017. Patient is not on any inhaler therapy and does not complain of any shortness of breath or activity limitations. Will continue to monitor. Patient may benefit from repeat PFTs to evaluate diffusion capacity and need for echo to rule out pulmonary hypertension. Prior PFTs with disproportionate decrease in the DLCO of 37%. No prior ECHO on file. Can discuss at her next office visit as unable to today since patient cut visit short due to time constraints. Lung nodule  Prior CT in September 2022 with stable subcentimeter bilateral lung nodules and a stable 7 mm ovoid density in the right minor fissure. She is not eligible for yearly CT lung screening, however given her extensive smoking history and still actively smoking, she has a repeat CT chest scheduled for September for follow-up. Nicotine dependence with current use  Patient continues to smoke and is not interested in quitting at this time. Plan:    Diagnoses and all orders for this visit:    CHOCO on CPAP    Nicotine dependence with current use    Lung nodule    COPD with chronic bronchitis ([address])          Return in about 3 months (around 10/31/2023).       History of Present Illness     Chief Complaint:   Chief Complaint   Patient presents with   • Follow-up       Patient ID: Pepe Cabrera is a 78 y.o. y.o. female has a past medical history of Anxiety, Cataract, Chronic bronchitis ([address]), Chronic pain disorder, COPD (chronic obstructive pulmonary disease) ([address]), Hypothyroidism, Multiple sclerosis ([address]), CHOCO (obstructive sleep apnea), Osteopetrosis, Peroneal muscle atrophy, Shortness of breath, Smoker, and Thoracolumbar radiculopathy due to intervertebral disc disorder. 2023  HPI: Lawrence Chavez is a 78 y.o. female with a history significant for COPD, CHOCO on CPAP, MS, hypothyroidism, and radiculopathy. She is here today for a follow-up visit for CPAP compliance review. She was previously seen in the office 3 months ago. She was ordered a new CPAP machine, due to hers being more than 11years old and broken. Patient states she is 100% compliant wearing nightly, and still deriving benefit. She has no concerns or complaints. Denies any shortness of breath. Not requiring any inhaler therapy. She is upset and anxious during the brief visit stating she is on a very tight time constraint and needs to leave. Review of Systems   Constitutional: Negative for activity change, chills, diaphoresis, fever and unexpected weight change. HENT: Negative for congestion, postnasal drip, rhinorrhea, sore throat, trouble swallowing and voice change. Respiratory: Negative for cough, chest tightness, shortness of breath and wheezing. Cardiovascular: Negative for chest pain, palpitations and leg swelling. Psychiatric/Behavioral: The patient is nervous/anxious.         Historical Information   Past Medical History:   Diagnosis Date   • Anxiety    • Cataract     last assessed 14   • Chronic bronchitis ([address])     Sees pulmonary specialist twice a year   • Chronic pain disorder    • COPD (chronic obstructive pulmonary disease) ([address])    • Hypothyroidism    • Multiple sclerosis ([address])    • CHOCO (obstructive sleep apnea)     uses cpap, 3/19/21 -pt states will bring CPAP for DOS 3/23/21   • Osteopetrosis    • Peroneal muscle atrophy    • Shortness of breath    • Smoker    • Thoracolumbar radiculopathy due to intervertebral disc disorder      Past Surgical History:   Procedure Laterality Date   • APPENDECTOMY     • CATARACT EXTRACTION     •  SECTION     • CHOLECYSTECTOMY • GALLBLADDER SURGERY     • FL COLONOSCOPY FLX DX W/COLLJ SPEC WHEN PFRMD N/A 2/23/2018    Procedure: COLONOSCOPY;  Surgeon: Audrey Alaniz MD;  Location: [facility]; Service: Colorectal   • FL LAMNOTMY INCL W/DCMPRSN NRV ROOT 1 INTRSPC LUMBR Right 3/23/2021    Procedure: MINIMALLY INVASIVE LUMBAR FAR LATERAL DISCECTOMY L4-5, RIGHT;  Surgeon: Raquel Mathew MD;  Location:  MAIN OR;  Service: Neurosurgery   • THROAT SURGERY       Family History   Problem Relation Age of Onset   • Skin cancer Mother    • Hypertension Father         benign essential   • Stroke Father    • Lung cancer Brother        Smoking history: She reports that she has been smoking cigarettes. She started smoking about 65 years ago. She has a 60.00 pack-year smoking history.  She has never used smokeless tobacco.    Occupational History:     Immunization History   Administered Date(s) Administered   • COVID-19 MODERNA VACC 0.25 ML IM BOOSTER 12/15/2021   • COVID-19 MODERNA VACC 0.5 ML IM 01/13/2021, 02/11/2021   • INFLUENZA 1945, 09/14/2016   • Pneumococcal Polysaccharide PPV23 1945, 05/22/2008   • Tuberculin Skin Test-PPD Intradermal 01/13/2017       Meds/Allergies     Current Outpatient Medications:   •  amitriptyline (ELAVIL) 10 mg tablet, Take 2.5 tablets (25 mg total) by mouth daily at bedtime, Disp: 31 tablet, Rfl: 6  •  amitriptyline (ELAVIL) 25 mg tablet, TAKE 1 TABLET BY MOUTH DAILY AT BEDTIME, Disp: 90 tablet, Rfl: 3  •  amitriptyline (ELAVIL) 50 mg tablet, TAKE 1 TABLET BY MOUTH EVERYDAY AT BEDTIME, Disp: 90 tablet, Rfl: 2  •  chlordiazepoxide-clidinium (LIBRAX) 5-2.5 mg per capsule, TAKE 1 CAPSULE BY MOUTH 4 (FOUR) TIMES A DAY (BEFORE MEALS AND AT BEDTIME), Disp: 360 capsule, Rfl: 1  •  cholecalciferol (VITAMIN D3) 1,000 units tablet, Take 1,000 Units by mouth in the morning., Disp: , Rfl:   •  cyclobenzaprine (FLEXERIL) 5 mg tablet, Take 2 tablets (10 mg total) by mouth every 8 (eight) hours as needed for muscle spasms, Disp: 60 tablet, Rfl: 3  •  dicyclomine (BENTYL) 10 mg capsule, TAKE 1 CAPSULE BY MOUTH 4 TIMES A DAY (BEFORE MEALS AND AT BEDTIME), Disp: 360 capsule, Rfl: 0  •  hyoscyamine (ANASPAZ,LEVSIN) 0.125 MG tablet, Take 1 tablet (0.125 mg total) by mouth every 4 (four) hours as needed for cramping, Disp: 30 tablet, Rfl: 0  •  levothyroxine 100 mcg tablet, TAKE 1 TABLET BY MOUTH EVERY DAY, Disp: 90 tablet, Rfl: 3  •  morphine (MSIR) 15 mg tablet, Take 1 tablet (15 mg total) by mouth every 8 (eight) hours as needed for severe pain Max Daily Amount: 45 mg, Disp: 90 tablet, Rfl: 0  •  Multiple Vitamins-Minerals (ICAPS AREDS 2 PO), Take by mouth daily , Disp: , Rfl:   •  naloxone (NARCAN) 4 mg/0.1 mL nasal spray, Administer 1 spray into a nostril. If no response after 2-3 minutes, give another dose in the other nostril using a new spray., Disp: 1 each, Rfl: 1  •  ofloxacin (OCUFLOX) 0.3 % ophthalmic solution, PLACE 1 DROP IN BOTH EYES THREE TIMES DAILY FOR 3 DAYS, Disp: , Rfl:   •  ondansetron (ZOFRAN) 4 mg tablet, Take 1 tablet (4 mg total) by mouth every 8 (eight) hours as needed for nausea or vomiting, Disp: 40 tablet, Rfl: 1  •  sucralfate (CARAFATE) 1 g/10 mL suspension, TAKE 10 ML (1 G TOTAL) BY MOUTH 4 (FOUR) TIMES A DAY (BEFORE MEALS AND AT BEDTIME), Disp: 280 mL, Rfl: 5    Current Facility-Administered Medications:   •  denosumab (PROLIA) subcutaneous injection 60 mg, 60 mg, Subcutaneous, Q6 Months, Garcia Watson DO, 60 mg at 03/21/23 1023  Allergies: Allergies   Allergen Reactions   • Adhesive [Medical Tape] Rash   • Latex Rash         Vitals:  Vitals:    07/31/23 1045   BP: 111/72   BP Location: Left arm   Patient Position: Sitting   Cuff Size: Large   Pulse: 95   Resp: 18   SpO2: 97%   Weight: 57.6 kg (127 lb)   Height: 5' 3" (1.6 m)   Oxygen Therapy  SpO2: 97 %  Oxygen Therapy: None (Room air)  .   Wt Readings from Last 3 Encounters:   07/31/23 57.6 kg (127 lb)   06/26/23 57.2 kg (126 lb)   06/14/23 57.6 kg (127 lb)     Body mass index is 22.5 kg/m². Physical Exam  Vitals and nursing note reviewed. Constitutional:       General: She is not in acute distress. Appearance: Normal appearance. She is well-developed. Cardiovascular:      Rate and Rhythm: Normal rate and regular rhythm. Heart sounds: Normal heart sounds, S1 normal and S2 normal. No murmur heard. Pulmonary:      Effort: Pulmonary effort is normal.      Breath sounds: Normal breath sounds. No decreased breath sounds, wheezing, rhonchi or rales. Musculoskeletal:         General: No swelling. Right lower leg: No edema. Left lower leg: No edema. Neurological:      Mental Status: She is alert. Psychiatric:         Mood and Affect: Mood is anxious. Labs: I have personally reviewed pertinent lab results. Lab Results   Component Value Date    WBC 10.14 06/02/2023    HGB 13.8 06/02/2023    HCT 42.5 06/02/2023    MCV 98 06/02/2023     06/02/2023     Lab Results   Component Value Date    GLUCOSE 85 11/02/2015    CALCIUM 9.3 06/02/2023     11/02/2015    K 3.9 06/02/2023    CO2 27 06/02/2023     06/02/2023    BUN 14 06/02/2023    CREATININE 0.84 06/02/2023     No results found for: "IGE"  Lab Results   Component Value Date    ALT 40 09/23/2022    AST 22 09/23/2022    ALKPHOS 144 (H) 09/23/2022    BILITOT 0.40 11/02/2015       Imaging and other studies: I have personally reviewed pertinent reports and I have personally reviewed pertinent films in PACS     CT chest 9/12/2022  Stable subcentimeter bilateral pulmonary nodules. No new suspicious nodules. Continue with CT lung screening in 12 months.     Pulmonary function testing:     Pulmonary Functions Testing Results: 12/7/2017     FEV1/FVC ratio 62%    FEV1 78% predicted  FVC 97% predicted  (-) response to bronchodilators   % predicted   % predicted  DLCO corrected for hemoglobin 37 % predicted    Impression: Spirometry demonstrates mild obstructive ventilatory limitation with no appreciable response to bronchodilators. Lung volumes are normal.  The residual volume is increased consistent with hyperinflation and air trapping. DLCO is severely decreased. Findings are consistent with COPD. Disproportionate decrease in the DLCO consider possible pulmonary hypertension.

## 2023-08-16 ENCOUNTER — TELEPHONE (OUTPATIENT)
Age: 78
End: 2023-08-16

## 2023-08-16 NOTE — TELEPHONE ENCOUNTER
PROLIA-Insurance verified    Spoke with patient  Insurance verified, [facility]  does not anticipate any changes in the near future.     I

## 2023-08-31 ENCOUNTER — TELEPHONE (OUTPATIENT)
Age: 78
End: 2023-08-31

## 2023-08-31 NOTE — TELEPHONE ENCOUNTER
PROLIA-Received SOB/Schedule appt  Left voice mail asking pt to return call to schedule Prolia administration    [address] [facility]

## 2023-09-05 ENCOUNTER — TELEPHONE (OUTPATIENT)
Age: 78
End: 2023-09-05

## 2023-09-05 NOTE — TELEPHONE ENCOUNTER
PROLIA-Received SOB/Appt. scheduled  Spoke w/patient    Last dose-3/21/23    *Appt.  Scheduled-9/27/23  *Med BEATRICE-[id number]  Delivery-9/20/23

## 2023-09-20 DIAGNOSIS — F11.20 CONTINUOUS OPIOID DEPENDENCE (HCC): ICD-10-CM

## 2023-09-20 RX ORDER — MORPHINE SULFATE 15 MG/1
15 TABLET ORAL EVERY 8 HOURS PRN
Qty: 90 TABLET | Refills: 0 | Status: SHIPPED | OUTPATIENT
Start: 2023-09-20

## 2023-09-27 ENCOUNTER — TELEPHONE (OUTPATIENT)
Age: 78
End: 2023-09-27

## 2023-09-27 ENCOUNTER — CLINICAL SUPPORT (OUTPATIENT)
Age: 78
End: 2023-09-27
Payer: MEDICARE

## 2023-09-27 DIAGNOSIS — M81.0 OSTEOPOROSIS WITHOUT CURRENT PATHOLOGICAL FRACTURE, UNSPECIFIED OSTEOPOROSIS TYPE: Primary | ICD-10-CM

## 2023-09-27 PROCEDURE — 96372 THER/PROPH/DIAG INJ SC/IM: CPT

## 2023-10-01 ENCOUNTER — HOSPITAL ENCOUNTER (OUTPATIENT)
Dept: RADIOLOGY | Facility: HOSPITAL | Age: 78
End: 2023-10-01
Payer: MEDICARE

## 2023-10-01 ENCOUNTER — HOSPITAL ENCOUNTER (OUTPATIENT)
Dept: CT IMAGING | Facility: HOSPITAL | Age: 78
Discharge: HOME | End: 2023-10-01
Attending: INTERNAL MEDICINE
Payer: MEDICARE

## 2023-10-01 DIAGNOSIS — R93.89 ABNORMAL CT OF THE CHEST: ICD-10-CM

## 2023-10-01 PROCEDURE — G1004 CDSM NDSC: HCPCS

## 2023-10-01 PROCEDURE — 71250 CT THORAX DX C-: CPT

## 2023-10-23 ENCOUNTER — OFFICE VISIT (OUTPATIENT)
Age: 78
End: 2023-10-23
Payer: MEDICARE

## 2023-10-23 VITALS
TEMPERATURE: 97.3 F | HEIGHT: 63 IN | DIASTOLIC BLOOD PRESSURE: 60 MMHG | HEART RATE: 102 BPM | WEIGHT: 131.4 LBS | OXYGEN SATURATION: 100 % | BODY MASS INDEX: 23.28 KG/M2 | SYSTOLIC BLOOD PRESSURE: 108 MMHG

## 2023-10-23 DIAGNOSIS — J44.89 COPD WITH CHRONIC BRONCHITIS: Primary | Chronic | ICD-10-CM

## 2023-10-23 DIAGNOSIS — G35 MULTIPLE SCLEROSIS (HCC): ICD-10-CM

## 2023-10-23 DIAGNOSIS — Z00.00 MEDICARE ANNUAL WELLNESS VISIT, SUBSEQUENT: ICD-10-CM

## 2023-10-23 DIAGNOSIS — F11.20 CONTINUOUS OPIOID DEPENDENCE (HCC): ICD-10-CM

## 2023-10-23 DIAGNOSIS — E03.9 ACQUIRED HYPOTHYROIDISM: ICD-10-CM

## 2023-10-23 PROCEDURE — G0439 PPPS, SUBSEQ VISIT: HCPCS | Performed by: INTERNAL MEDICINE

## 2023-10-23 PROCEDURE — 99214 OFFICE O/P EST MOD 30 MIN: CPT | Performed by: INTERNAL MEDICINE

## 2023-10-23 NOTE — PROGRESS NOTES
Assessment and Plan:     1. COPD with chronic bronchitis    Stable. Continues to smoke unfortunately. Does not require any inhalers at this time. - Comprehensive metabolic panel; Future  - CBC; Future    2. Acquired hypothyroidism    Continue levothyroxine. We will check thyroid function before next visit. - TSH, 3rd generation with Free T4 reflex; Future    3. Multiple sclerosis ([address])    Stable at this time. Follow-up with neurology. 4. Continuous opioid dependence (HCC)    Stable. Continue use of morphine as needed for severe pain. No aberrant or abusive behavior. 5. Medicare annual wellness visit, subsequent      Depression Screening and Follow-up Plan: Patient was screened for depression during today's encounter. They screened negative with a PHQ-2 score of 0. Preventive health issues were discussed with patient, and age appropriate screening tests were ordered as noted in patient's After Visit Summary. Personalized health advice and appropriate referrals for health education or preventive services given if needed, as noted in patient's After Visit Summary. History of Present Illness:     Patient presents for a Medicare Wellness Visit    Overall health has been stable. Chronic pain is about the same and only uses morphine when she really needs it. Nerve issues are about the same. No worsening of her multiple sclerosis. A lot of stress with her  since he has gotten sick so she continues to deal with the mental toll that has taken on her. But she is getting through it. Patient Care Team:  Shira Garduno DO as PCP - General  MD Radha Earl MD Sydell Sprout, DO (Pain Medicine)     Review of Systems:     Review of Systems   Constitutional:  Negative for activity change, appetite change and fatigue. Respiratory:  Negative for apnea, cough, chest tightness, shortness of breath and wheezing.     Cardiovascular:  Negative for chest pain, palpitations and leg swelling. Gastrointestinal:  Negative for abdominal distention, abdominal pain, blood in stool, constipation, diarrhea, nausea and vomiting. Musculoskeletal:  Positive for arthralgias. Neurological:  Negative for dizziness, weakness, light-headedness and headaches. Psychiatric/Behavioral:  Negative for behavioral problems, confusion, hallucinations, sleep disturbance and suicidal ideas. The patient is not nervous/anxious.        Problem List:     Patient Active Problem List   Diagnosis    CHOCO on CPAP    COPD with chronic bronchitis    Hypothyroidism    Insomnia    Lung nodule    Macular degeneration    Multiple sclerosis (HCC)    Rosacea    Seborrheic keratosis    Chronic neuropathic pain    Thoracolumbar radiculopathy due to intervertebral disc disorder    Other constipation    Lumbar disc disease with radiculopathy    Chronic pain syndrome    Anxiety    Nicotine dependence with current use    Status post lumbar discectomy    Continuous opioid dependence (HCC)    Neuropathic pain of lower extremity, right    Right foot drop    Piriformis syndrome of right side    Abdominal pain      Past Medical and Surgical History:     Past Medical History:   Diagnosis Date    Anxiety     Cataract     last assessed 14    Chronic bronchitis ([address])     Sees pulmonary specialist twice a year    Chronic pain disorder     COPD (chronic obstructive pulmonary disease) (HCC)     Hypothyroidism     Multiple sclerosis (HCC)     CHOCO (obstructive sleep apnea)     uses cpap, 3/19/21 -pt states will bring CPAP for DOS 3/23/21    Osteopetrosis     Peroneal muscle atrophy     Shortness of breath     Smoker     Thoracolumbar radiculopathy due to intervertebral disc disorder      Past Surgical History:   Procedure Laterality Date    APPENDECTOMY      CATARACT EXTRACTION       SECTION      CHOLECYSTECTOMY      GALLBLADDER SURGERY      SC COLONOSCOPY FLX DX W/COLLJ SPEC WHEN PFRMD N/A 2018    Procedure: COLONOSCOPY; Surgeon: Alex Canela MD;  Location: [facility]; Service: Colorectal    NH LAMNOTMY INCL W/DCMPRSN NRV ROOT 1 INTRSPC LUMBR Right 3/23/2021    Procedure: MINIMALLY INVASIVE LUMBAR FAR LATERAL DISCECTOMY L4-5, RIGHT;  Surgeon: John Rendon MD;  Location:  MAIN OR;  Service: Neurosurgery    THROAT SURGERY        Family History:     Family History   Problem Relation Age of Onset    Skin cancer Mother     Hypertension Father         benign essential    Stroke Father     Lung cancer Brother       Social History:     Social History     Socioeconomic History    Marital status: /Civil Union     Spouse name: None    Number of children: 2    Years of education: None    Highest education level: None   Occupational History    Occupation: retired     Comment: part time as per [employer]   Tobacco Use    Smoking status: Every Day     Packs/day: 1.00     Years: 60.00     Total pack years: 60.00     Types: Cigarettes     Start date: 2/16/1958    Smokeless tobacco: Never   Vaping Use    Vaping Use: Former    Substances: Nicotine   Substance and Sexual Activity    Alcohol use: Yes     Alcohol/week: 1.0 - 2.0 standard drink of alcohol     Types: 1 - 2 Glasses of wine per week     Comment: 1-2 drinks of wine daily     Drug use: Yes     Types: Morphine, Marijuana     Comment: medical-RSO  last dose 11 days ago, past hx o f medical marijuana use - NOT CURRENT    Sexual activity: Yes     Partners: Male   Other Topics Concern    None   Social History Narrative    Active advance directive     Social Determinants of Health     Financial Resource Strain: Low Risk  (10/23/2023)    Overall Financial Resource Strain (CARDIA)     Difficulty of Paying Living Expenses: Not hard at all   Food Insecurity: Not on file   Transportation Needs: No Transportation Needs (10/23/2023)    PRAPARE - Transportation     Lack of Transportation (Medical): No     Lack of Transportation (Non-Medical):  No   Physical Activity: Inactive (3/11/2021) Exercise Vital Sign     Days of Exercise per Week: 0 days     Minutes of Exercise per Session: 0 min   Stress: Stress Concern Present (3/11/2021)    [address] [facility]     Feeling of Stress : To some extent   Social Connections: Not on file   Intimate Partner Violence: Not on file   Housing Stability: Not on file      Medications and Allergies:     Current Outpatient Medications   Medication Sig Dispense Refill    amitriptyline (ELAVIL) 50 mg tablet TAKE 1 TABLET BY MOUTH EVERYDAY AT BEDTIME 90 tablet 2    cholecalciferol (VITAMIN D3) 1,000 units tablet Take 1,000 Units by mouth in the morning. levothyroxine 100 mcg tablet TAKE 1 TABLET BY MOUTH EVERY DAY 90 tablet 3    morphine (MSIR) 15 mg tablet Take 1 tablet (15 mg total) by mouth every 8 (eight) hours as needed for severe pain Max Daily Amount: 45 mg 90 tablet 0    Multiple Vitamins-Minerals (ICAPS AREDS 2 PO) Take by mouth daily       chlordiazepoxide-clidinium (LIBRAX) 5-2.5 mg per capsule TAKE 1 CAPSULE BY MOUTH 4 (FOUR) TIMES A DAY (BEFORE MEALS AND AT BEDTIME) (Patient not taking: Reported on 10/23/2023) 360 capsule 1    cyclobenzaprine (FLEXERIL) 5 mg tablet Take 2 tablets (10 mg total) by mouth every 8 (eight) hours as needed for muscle spasms 60 tablet 3    dicyclomine (BENTYL) 10 mg capsule TAKE 1 CAPSULE BY MOUTH 4 TIMES A DAY (BEFORE MEALS AND AT BEDTIME) (Patient not taking: Reported on 10/23/2023) 360 capsule 0    hyoscyamine (ANASPAZ,LEVSIN) 0.125 MG tablet Take 1 tablet (0.125 mg total) by mouth every 4 (four) hours as needed for cramping (Patient not taking: Reported on 10/23/2023) 30 tablet 0    naloxone (NARCAN) 4 mg/0.1 mL nasal spray Administer 1 spray into a nostril. If no response after 2-3 minutes, give another dose in the other nostril using a new spray.  (Patient not taking: Reported on 10/23/2023) 1 each 1    ofloxacin (OCUFLOX) 0.3 % ophthalmic solution PLACE 1 DROP IN BOTH EYES THREE TIMES DAILY FOR 3 DAYS (Patient not taking: Reported on 10/23/2023)      ondansetron (ZOFRAN) 4 mg tablet Take 1 tablet (4 mg total) by mouth every 8 (eight) hours as needed for nausea or vomiting (Patient not taking: Reported on 10/23/2023) 40 tablet 1    sucralfate (CARAFATE) 1 g/10 mL suspension TAKE 10 ML (1 G TOTAL) BY MOUTH 4 (FOUR) TIMES A DAY (BEFORE MEALS AND AT BEDTIME) (Patient not taking: Reported on 10/23/2023) 280 mL 5     Current Facility-Administered Medications   Medication Dose Route Frequency Provider Last Rate Last Admin    denosumab (PROLIA) subcutaneous injection 60 mg  60 mg Subcutaneous Q6 Months Garcia Watson,    60 mg at 09/27/23 1005     Allergies   Allergen Reactions    Adhesive [Medical Tape] Rash    Latex Rash      Immunizations:     Immunization History   Administered Date(s) Administered    COVID-19 MODERNA VACC 0.25 ML IM BOOSTER 12/15/2021    COVID-19 MODERNA VACC 0.5 ML IM 01/13/2021, 01/13/2021, 02/11/2021, 02/11/2021, 12/15/2021    INFLUENZA 1945, 09/14/2016    Pneumococcal Polysaccharide PPV23 1945, 05/22/2008    Tuberculin Skin Test-PPD Intradermal 01/13/2017      Health Maintenance:         Topic Date Due    Colorectal Cancer Screening  01/21/2023    DXA SCAN  07/19/2024    Lung Cancer Screening  10/01/2024         Topic Date Due    Pneumococcal Vaccine: 65+ Years (2 - PCV) 05/22/2009    COVID-19 Vaccine (6 - Moderna series) 02/09/2022    Influenza Vaccine (1) 09/01/2023      Medicare Screening Tests and Risk Assessments:     Bryan Golden is here for her Subsequent Wellness visit. Last Medicare Wellness visit information reviewed, patient interviewed and updates made to the record today. Health Risk Assessment:   Patient rates overall health as very good. Patient feels that their physical health rating is same. Patient is dissatisfied with their life. Eyesight was rated as slightly worse. Hearing was rated as slightly worse.  Patient feels that their emotional and mental health rating is same. Patients states they are never, rarely angry. Patient states they are often unusually tired/fatigued. Pain experienced in the last 7 days has been some. Patient's pain rating has been 6/10. Patient states that she has experienced no weight loss or gain in last 6 months. Depression Screening:   PHQ-2 Score: 0      Fall Risk Screening: In the past year, patient has experienced: no history of falling in past year      Urinary Incontinence Screening:   Patient has not leaked urine accidently in the last six months. Home Safety:  Patient does not have trouble with stairs inside or outside of their home. Patient has working smoke alarms and has no working carbon monoxide detector. Home safety hazards include: none. Nutrition:   Current diet is Regular. Medications:   Patient is currently taking over-the-counter supplements. OTC medications include: see medication list. Patient is able to manage medications. Activities of Daily Living (ADLs)/Instrumental Activities of Daily Living (IADLs):   Walk and transfer into and out of bed and chair?: Yes  Dress and groom yourself?: Yes    Bathe or shower yourself?: Yes    Feed yourself?  Yes  Do your laundry/housekeeping?: Yes  Manage your money, pay your bills and track your expenses?: Yes  Make your own meals?: Yes    Do your own shopping?: Yes    Previous Hospitalizations:   Any hospitalizations or ED visits within the last 12 months?: No      Advance Care Planning:   Living will: No    Durable POA for healthcare: No    Advanced directive: No    Five wishes given: No      Cognitive Screening:   Provider or family/friend/caregiver concerned regarding cognition?: No    PREVENTIVE SCREENINGS      Cardiovascular Screening:    General: Screening Current      Diabetes Screening:     General: Screening Current      Colorectal Cancer Screening:     General: Screening Current      Breast Cancer Screening:     General: Screening Current      Cervical Cancer Screening:    General: Screening Not Indicated      Osteoporosis Screening:    General: Screening Not Indicated and History Osteoporosis      Abdominal Aortic Aneurysm (AAA) Screening:        General: Screening Not Indicated      Lung Cancer Screening:     General: Screening Not Indicated      Hepatitis C Screening:    General: Screening Not Indicated    Screening, Brief Intervention, and Referral to Treatment (SBIRT)    Screening  Typical number of drinks in a day: 2  Typical number of drinks in a week: 14  Interpretation: Low risk drinking behavior. AUDIT-C Screenin) How often did you have a drink containing alcohol in the past year? 2 to 3 times a week  2) How many drinks did you have on a typical day when you were drinking in the past year? 1 to 2  3) How often did you have 6 or more drinks on one occasion in the past year? never    AUDIT-C Score: 3  Interpretation: Score 3-12 (female): POSITIVE screen for alcohol misuse    AUDIT Screenin) How often during the last year have you found that you were not able to stop drinking once you had started? 0 - never  5) How often during the last year have you failed to do what was normally expected from you because of drinking? 0 - never  6) How often during the last year have you needed a first drink in the morning to get yourself going after a heavy drinking session?  0 - never  7) How often during the last year have you had a feeling of guilt or remorse after drinking? 0 - never  8) How often during the last year have you been unable to remember what happened the night before because you had been drinking? 0 - never  9) Have you or someone else been injured as a result of your drinking? 0 - no  10) Has a relative or friend or a doctor or another health worker been concerned about your drinking or suggested you cut down? 0 - no    AUDIT Score: 3  Interpretation: Low risk alcohol consumption    Single Item Drug Screening:  How often have you used an illegal drug (including marijuana) or a prescription medication for non-medical reasons in the past year? never    Single Item Drug Screen Score: 0  Interpretation: Negative screen for possible drug use disorder    Brief Intervention  Alcohol & drug use screenings were reviewed. No concerns regarding substance use disorder identified. Review of Current Opioid Use  Opioid Risk Tool (ORT) Score: 1  Opioid Risk Tool (ORT) Interpretation: Score 0-3: Low risk for opioid misuse    [place] PDMP or [place]  reviewed. No red flags were identified    Other Counseling Topics:   Car/seat belt/driving safety, skin self-exam, sunscreen and regular weightbearing exercise and calcium and vitamin D intake. Physical Exam:     /60   Pulse 102   Temp (!) 97.3 °F (36.3 °C)   Ht 5' 3" (1.6 m)   Wt 59.6 kg (131 lb 6.4 oz)   SpO2 100%   BMI 23.28 kg/m²     Physical Exam  Constitutional:       General: She is not in acute distress. Appearance: She is not ill-appearing. Cardiovascular:      Rate and Rhythm: Normal rate and regular rhythm. Heart sounds: No murmur heard. Pulmonary:      Effort: Pulmonary effort is normal. No respiratory distress. Breath sounds: No wheezing. Abdominal:      General: Bowel sounds are normal. There is no distension. Tenderness: There is no abdominal tenderness. Musculoskeletal:      Right lower leg: No edema. Left lower leg: No edema. Neurological:      Mental Status: She is alert.         Genoveva Huggins DO

## 2023-10-23 NOTE — PATIENT INSTRUCTIONS
Medicare Preventive Visit Patient Instructions  Thank you for completing your Welcome to Medicare Visit or Medicare Annual Wellness Visit today. Your next wellness visit will be due in one year (10/23/2024). The screening/preventive services that you may require over the next 5-10 years are detailed below. Some tests may not apply to you based off risk factors and/or age. Screening tests ordered at today's visit but not completed yet may show as past due. Also, please note that scanned in results may not display below. Preventive Screenings:  Service Recommendations Previous Testing/Comments   Colorectal Cancer Screening  * Colonoscopy    * Fecal Occult Blood Test (FOBT)/Fecal Immunochemical Test (FIT)  * Fecal DNA/Cologuard Test  * Flexible Sigmoidoscopy Age: 43-73 years old   Colonoscopy: every 10 years (may be performed more frequently if at higher risk)  OR  FOBT/FIT: every 1 year  OR  Cologuard: every 3 years  OR  Sigmoidoscopy: every 5 years  Screening may be recommended earlier than age 39 if at higher risk for colorectal cancer. Also, an individualized decision between you and your healthcare provider will decide whether screening between the ages of 77-80 would be appropriate. Colonoscopy: 01/21/2020  FOBT/FIT: Not on file  Cologuard: Not on file  Sigmoidoscopy: Not on file          Breast Cancer Screening Age: 36 years old  Frequency: every 1-2 years  Not required if history of left and right mastectomy Mammogram: 07/19/2022    Screening Current   Cervical Cancer Screening Between the ages of 21-29, pap smear recommended once every 3 years. Between the ages of 32-69, can perform pap smear with HPV co-testing every 5 years.    Recommendations may differ for women with a history of total hysterectomy, cervical cancer, or abnormal pap smears in past. Pap Smear: Not on file    Screening Not Indicated   Hepatitis C Screening Once for adults born between 1945 and 1965  More frequently in patients at high risk for Hepatitis C Hep C Antibody: Not on file        Diabetes Screening 1-2 times per year if you're at risk for diabetes or have pre-diabetes Fasting glucose: 137 mg/dL (7/19/2022)  A1C: 5.6 % (2/26/2021)  Screening Current   Cholesterol Screening Once every 5 years if you don't have a lipid disorder. May order more often based on risk factors. Lipid panel: 09/17/2021    Screening Current     Other Preventive Screenings Covered by Medicare:  Abdominal Aortic Aneurysm (AAA) Screening: covered once if your at risk. You're considered to be at risk if you have a family history of AAA. Lung Cancer Screening: covers low dose CT scan once per year if you meet all of the following conditions: (1) Age 48-67; (2) No signs or symptoms of lung cancer; (3) Current smoker or have quit smoking within the last 15 years; (4) You have a tobacco smoking history of at least 20 pack years (packs per day multiplied by number of years you smoked); (5) You get a written order from a healthcare provider. Glaucoma Screening: covered annually if you're considered high risk: (1) You have diabetes OR (2) Family history of glaucoma OR (3)  aged 48 and older OR (3)  American aged 72 and older  Osteoporosis Screening: covered every 2 years if you meet one of the following conditions: (1) You're estrogen deficient and at risk for osteoporosis based off medical history and other findings; (2) Have a vertebral abnormality; (3) On glucocorticoid therapy for more than 3 months; (4) Have primary hyperparathyroidism; (5) On osteoporosis medications and need to assess response to drug therapy. Last bone density test (DXA Scan): 07/19/2022. HIV Screening: covered annually if you're between the age of 14-79. Also covered annually if you are younger than 13 and older than 72 with risk factors for HIV infection. For pregnant patients, it is covered up to 3 times per pregnancy.     Immunizations:  Immunization Recommendations Influenza Vaccine Annual influenza vaccination during flu season is recommended for all persons aged >= 6 months who do not have contraindications   Pneumococcal Vaccine   * Pneumococcal conjugate vaccine = PCV13 (Prevnar 13), PCV15 (Vaxneuvance), PCV20 (Prevnar 20)  * Pneumococcal polysaccharide vaccine = PPSV23 (Pneumovax) Adults 85-86 yo with certain risk factors or if 69+ yo  If never received any pneumonia vaccine: recommend Prevnar 20 (PCV20)  Give PCV20 if previously received 1 dose of PCV13 or PPSV23   Hepatitis B Vaccine 3 dose series if at intermediate or high risk (ex: diabetes, end stage renal disease, liver disease)   Respiratory syncytial virus (RSV) Vaccine - COVERED BY MEDICARE PART D  * RSVPreF3 (Arexvy) CDC recommends that adults 61years of age and older may receive a single dose of RSV vaccine using shared clinical decision-making (SCDM)   Tetanus (Td) Vaccine - COST NOT COVERED BY MEDICARE PART B Following completion of primary series, a booster dose should be given every 10 years to maintain immunity against tetanus. Td may also be given as tetanus wound prophylaxis. Tdap Vaccine - COST NOT COVERED BY MEDICARE PART B Recommended at least once for all adults. For pregnant patients, recommended with each pregnancy. Shingles Vaccine (Shingrix) - COST NOT COVERED BY MEDICARE PART B  2 shot series recommended in those 19 years and older who have or will have weakened immune systems or those 50 years and older     Health Maintenance Due:      Topic Date Due    Colorectal Cancer Screening  01/21/2023    DXA SCAN  07/19/2024    Lung Cancer Screening  10/01/2024     Immunizations Due:      Topic Date Due    Pneumococcal Vaccine: 65+ Years (2 - PCV) 05/22/2009    COVID-19 Vaccine (6 - Moderna series) 02/09/2022    Influenza Vaccine (1) 09/01/2023     Advance Directives   What are advance directives? Advance directives are legal documents that state your wishes and plans for medical care.  These plans are made ahead of time in case you lose your ability to make decisions for yourself. Advance directives can apply to any medical decision, such as the treatments you want, and if you want to donate organs. What are the types of advance directives? There are many types of advance directives, and each state has rules about how to use them. You may choose a combination of any of the following:  Living will: This is a written record of the treatment you want. You can also choose which treatments you do not want, which to limit, and which to stop at a certain time. This includes surgery, medicine, IV fluid, and tube feedings. Durable power of  for healthcare [facility]): This is a written record that states who you want to make healthcare choices for you when you are unable to make them for yourself. This person, called a proxy, is usually a family member or a friend. You may choose more than 1 proxy. Do not resuscitate (DNR) order:  A DNR order is used in case your heart stops beating or you stop breathing. It is a request not to have certain forms of treatment, such as CPR. A DNR order may be included in other types of advance directives. Medical directive: This covers the care that you want if you are in a coma, near death, or unable to make decisions for yourself. You can list the treatments you want for each condition. Treatment may include pain medicine, surgery, blood transfusions, dialysis, IV or tube feedings, and a ventilator (breathing machine). Values history: This document has questions about your views, beliefs, and how you feel and think about life. This information can help others choose the care that you would choose. Why are advance directives important? An advance directive helps you control your care. Although spoken wishes may be used, it is better to have your wishes written down. Spoken wishes can be misunderstood, or not followed.  Treatments may be given even if you do not want them. An advance directive may make it easier for your family to make difficult choices about your care. Cigarette Smoking and Your Health   Risks to your health if you smoke:  Nicotine and other chemicals found in tobacco damage every cell in your body. Even if you are a light smoker, you have an increased risk for cancer, heart disease, and lung disease. If you are pregnant or have diabetes, smoking increases your risk for complications. Benefits to your health if you stop smoking: You decrease respiratory symptoms such as coughing, wheezing, and shortness of breath. You reduce your risk for cancers of the lung, mouth, throat, kidney, bladder, pancreas, stomach, and cervix. If you already have cancer, you increase the benefits of chemotherapy. You also reduce your risk for cancer returning or a second cancer from developing. You reduce your risk for heart disease, blood clots, heart attack, and stroke. You reduce your risk for lung infections, and diseases such as pneumonia, asthma, chronic bronchitis, and emphysema. Your circulation improves. More oxygen can be delivered to your body. If you have diabetes, you lower your risk for complications, such as kidney, artery, and eye diseases. You also lower your risk for nerve damage. Nerve damage can lead to amputations, poor vision, and blindness. You improve your body's ability to heal and to fight infections. For more information and support to stop smoking:   [employer]  Phone: 9- 163 - [phone]  Web Address: www.[employer]  Alcohol Use and Your Health    Drinking too much can harm your health. Excessive alcohol use leads to about 88,000 death in the [facility] each year, and shortens the life of those who diet by almost 30 years. Further, excessive drinking cost the economy $249 billion in 2010. Most excessive drinkers are not alcohol dependent.     Excessive alcohol use has immediate effects that increase the risk of many harmful health conditions. These are most often the result of binge drinking. Over time, excessive alcohol use can lead to the development of chronic diseases and other series health problems. What is considered a "drink"? Excessive alcohol use includes:  Binge Drinking: For women, 4 or more drinks consumed on one occasion. For men, 5 or more drinks consumed on one occasion. Heavy Drinking: For women, 8 or more drinks per week. For men, 15 or more drinks per week  Any alcohol used by pregnant women  Any alcohol used by those under the age of 21 years    If you choose to drink, do so in moderation:  Do not drink at all if you are under the age of 24, or if you are or may be pregnant, or have health problems that could be made worse by drinking.   For women, up to 1 drink per day  For men, up to 2 drinks a day    No one should begin drinking or drink more frequently based on potential health benefits    Short-Term Health Risks:  Injuries: motor vehicle crashes, falls, drownings, burns  Violence: homicide, suicide, sexual assault, intimate partner violence  Alcohol poisoning  Reproductive health: risky sexual behaviors, unintended prengnacy, sexually transmitted diseases, miscarriage, stillbirth, fetal alcohol syndrome    Long-Term Health Risks:  Chronic diseases: high blood pressure, heart disease, stroke, liver disease, digestive problems  Cancers: breast, mouth and throat, liver, colon  Learning and memory problems: dementia, poor school performance  Mental health: depression, anxiety, insomnia  Social problems: lost productivity, family problems, unemployment  Alcohol dependence    For support and more information:  Substance Abuse and [address] [place], [place] [address]  Web Address: https://[employer]ian[employer]/    Alcoholics Anonymous        Web Address: http://www.pressley.info/    https://www.cdc.gov/alcohol/fact-sheets/alcohol-use.htm  Narcotic (Opioid) Safety    Use narcotics safely:  Take prescribed narcotics exactly as directed  Do not give narcotics to others or take narcotics that belong to someone else  Do not mix narcotics without medicines or alcohol  Do not drive or operate heavy machinery after you take the narcotic  Monitor for side effects and notify your healthcare provider if you experienced side effects such as nausea, sleepiness, itching, or trouble thinking clearly. Manage constipation:    Constipation is the most common side effect of narcotic medicine. Constipation is when you have hard, dry bowel movements, or you go longer than usual between bowel movements. Tell your healthcare provider about all changes in your bowel movements while you are taking narcotics. He or she may recommend laxative medicine to help you have a bowel movement. He or she may also change the kind of narcotic you are taking, or change when you take it. The following are more ways you can prevent or relieve constipation:    Drink liquids as directed. You may need to drink extra liquids to help soften and move your bowels. Ask how much liquid to drink each day and which liquids are best for you. Eat high-fiber foods. This may help decrease constipation by adding bulk to your bowel movements. High-fiber foods include fruits, vegetables, whole-grain breads and cereals, and beans. Your healthcare provider or dietitian can help you create a high-fiber meal plan. Your provider may also recommend a fiber supplement if you cannot get enough fiber from food. Exercise regularly. Regular physical activity can help stimulate your intestines. Walking is a good exercise to prevent or relieve constipation. Ask which exercises are best for you. Schedule a time each day to have a bowel movement. This may help train your body to have regular bowel movements. Bend forward while you are on the toilet to help move the bowel movement out.  Sit on the toilet for at least 10 minutes, even if you do not have a bowel movement. Store narcotics safely:   Store narcotics where others cannot easily get them. Keep them in a locked cabinet or secure area. Do not  keep them in a purse or other bag you carry with you. A person may be looking for something else and find the narcotics. Make sure narcotics are stored out of the reach of children. A child can easily overdose on narcotics. Narcotics may look like candy to a small child. The best way to dispose of narcotics: The laws vary by country and area. In the [facility], the best way is to return the narcotics through a take-back program. This program is offered by the [employer] The following are options for using the program:  Take the narcotics to a THEA collection site. The site is often a law enforcement center. Call your local law enforcement center for scheduled take-back days in your area. You will be given information on where to go if the collection site is in a different location. Take the narcotics to an approved pharmacy or hospital.  A pharmacy or hospital may be set up as a collection site. You will need to ask if it is a THEA collection site if you were not directed there. A pharmacy or doctor's office may not be able to take back narcotics unless it is a THEA site. Use a mail-back system. This means you are given containers to put the narcotics into. You will then mail them in the containers. Use a take-back drop box. This is a place to leave the narcotics at any time. People and animals will not be able to get into the box. Your local law enforcement agency can tell you where to find a drop box in your area. Other ways to manage pain:   Ask your healthcare provider about non-narcotic medicines to control pain. Nonprescription medicines include NSAIDs (such as ibuprofen) and acetaminophen. Prescription medicines include muscle relaxers, antidepressants, and steroids. Pain may be managed without any medicines.   Some ways to relieve pain include massage, aromatherapy, or meditation. Physical or occupational therapy may also help. For more information:   Drug Enforcement Administration  [address] [facility] , [address]  Phone: [phone]  Web Address: [employer]/drug_disposal/    [address] [place] [address]  Phone: 0- 374 - [phone]  Web Address: http://[employer]/     © Copyright [employer] 2018 Information is for End User's use only and may not be sold, redistributed or otherwise used for commercial purposes.  All illustrations and images included in CareNotes® are the copyrighted property of [employer] or [address] [place] [address] Instructions: This plan will send the code FBSD to the PM system.  DO NOT or CHANGE the price. Detail Level: Generalized Price (Do Not Change): 0.00

## 2023-12-08 DIAGNOSIS — E03.9 ACQUIRED HYPOTHYROIDISM: ICD-10-CM

## 2023-12-08 RX ORDER — LEVOTHYROXINE SODIUM 0.1 MG/1
TABLET ORAL
Qty: 90 TABLET | Refills: 3 | Status: SHIPPED | OUTPATIENT
Start: 2023-12-08

## 2023-12-20 DIAGNOSIS — F11.20 CONTINUOUS OPIOID DEPENDENCE (HCC): ICD-10-CM

## 2023-12-20 RX ORDER — MORPHINE SULFATE 15 MG/1
15 TABLET ORAL EVERY 8 HOURS PRN
Qty: 90 TABLET | Refills: 0 | Status: SHIPPED | OUTPATIENT
Start: 2023-12-20

## 2024-01-24 ENCOUNTER — OFFICE VISIT (OUTPATIENT)
Age: 79
End: 2024-01-24
Payer: MEDICARE

## 2024-01-24 VITALS — WEIGHT: 130 LBS | TEMPERATURE: 97.6 F | HEIGHT: 63 IN | BODY MASS INDEX: 23.04 KG/M2

## 2024-01-24 DIAGNOSIS — D22.9 MULTIPLE NEVI: ICD-10-CM

## 2024-01-24 DIAGNOSIS — D18.01 CHERRY ANGIOMA: ICD-10-CM

## 2024-01-24 DIAGNOSIS — L82.1 SEBORRHEIC KERATOSIS: ICD-10-CM

## 2024-01-24 DIAGNOSIS — D48.9 NEOPLASM OF UNCERTAIN BEHAVIOR: ICD-10-CM

## 2024-01-24 DIAGNOSIS — Z13.89 SCREENING FOR SKIN CONDITION: Primary | ICD-10-CM

## 2024-01-24 PROCEDURE — 88305 TISSUE EXAM BY PATHOLOGIST: CPT | Performed by: STUDENT IN AN ORGANIZED HEALTH CARE EDUCATION/TRAINING PROGRAM

## 2024-01-24 PROCEDURE — 99214 OFFICE O/P EST MOD 30 MIN: CPT

## 2024-01-24 PROCEDURE — 88341 IMHCHEM/IMCYTCHM EA ADD ANTB: CPT | Performed by: STUDENT IN AN ORGANIZED HEALTH CARE EDUCATION/TRAINING PROGRAM

## 2024-01-24 PROCEDURE — 88342 IMHCHEM/IMCYTCHM 1ST ANTB: CPT | Performed by: STUDENT IN AN ORGANIZED HEALTH CARE EDUCATION/TRAINING PROGRAM

## 2024-01-24 PROCEDURE — 11102 TANGNTL BX SKIN SINGLE LES: CPT

## 2024-01-24 NOTE — PROGRESS NOTES
"[facility] Note     Patient Name: Crissy Arboleda  Encounter Date: January 24, 2024     Have you been cared for by a [facility] Dermatologist in the last 3 years and, if so, which description applies to you?    Yes.  I have been here within the last 3 years, and my medical history has NOT changed since that time.  I am FEMALE/of child-bearing potential.    REVIEW OF SYSTEMS:  Have you recently had or currently have any of the following? No changes in my recent health.   PAST MEDICAL HISTORY:  Have you personally ever had or currently have any of the following?  If \"YES,\" then please provide more detail. No changes in my medical history.   HISTORY OF IMMUNOSUPPRESSION: Do you have a history of any of the following:  Systemic Immunosuppression such as Diabetes, Biologic or Immunotherapy, Chemotherapy, Organ Transplantation, Bone Marrow Transplantation?  No     Answering \"YES\" requires the addition of the dotphrase \"IMMUNOSUPPRESSED\" as the first diagnosis of the patient's visit.   FAMILY HISTORY:  Any \"first degree relatives\" (parent, brother, sister, or child) with the following?    No changes in my family's known health.   PATIENT EXPERIENCE:    Do you want the Dermatologist to perform a COMPLETE skin exam today including a clinical examination under the \"bra and underwear\" areas?  Yes  If necessary, do we have your permission to call and leave a detailed message on your Preferred Phone number that includes your specific medical information?  Yes      Allergies   Allergen Reactions    Adhesive [Medical Tape] Rash    Latex Rash      Current Outpatient Medications:     amitriptyline (ELAVIL) 50 mg tablet, TAKE 1 TABLET BY MOUTH EVERYDAY AT BEDTIME, Disp: 90 tablet, Rfl: 2    cholecalciferol (VITAMIN D3) 1,000 units tablet, Take 1,000 Units by mouth in the morning., Disp: , Rfl:     levothyroxine 100 mcg tablet, TAKE 1 TABLET BY MOUTH EVERY DAY, Disp: 90 tablet, Rfl: 3    morphine (MSIR) 15 mg " "tablet, Take 1 tablet (15 mg total) by mouth every 8 (eight) hours as needed for severe pain Max Daily Amount: 45 mg, Disp: 90 tablet, Rfl: 0    Multiple Vitamins-Minerals (ICAPS AREDS 2 PO), Take by mouth daily , Disp: , Rfl:     cyclobenzaprine (FLEXERIL) 5 mg tablet, Take 2 tablets (10 mg total) by mouth every 8 (eight) hours as needed for muscle spasms, Disp: 60 tablet, Rfl: 3    Current Facility-Administered Medications:     denosumab (PROLIA) subcutaneous injection 60 mg, 60 mg, Subcutaneous, Q6 Months, [facility], 60 mg at 09/27/23 1005          Whom besides the patient is providing clinical information about today's encounter?   NO ADDITIONAL HISTORIAN (patient alone provided history)    Physical Exam and Assessment/Plan by Diagnosis:    Chief complaint: Patient is a 78 y/o female present for a routine skin exam without history of skin cancer and has a spot of concern on the right dorsum hand that was treatedwith cryotheraspy in the past but is still rough.    NEOPLASM OF UNCERTAIN BEHAVIOR OF SKIN    Physical Exam:  (Anatomic Location); (Size and Morphological Description); (Differential Diagnosis):  Specimen A; Right Dorsum Hand; 0.8cm scaly papule; Diff. Dx.: R/O SCC vs BCC  Pertinent Positives:  Pertinent Negatives:              Additional History of Present Condition:  previously treatedwith Cryotherapy 1 yr ago and never resolved.     Assessment and Plan:  I have discussed with the patient that a sample of skin via a \"skin biopsy” would be potentially helpful to further make a specific diagnosis under the microscope.  Based on a thorough discussion of this condition and the management approach to it (including a comprehensive discussion of the known risks, side effects and potential benefits of treatment), the patient (family) agrees to implement the following specific plan:    Procedure:  Skin Biopsy.  After a thorough discussion of treatment options and risk/benefits/alternatives (including " "but not limited to local pain, scarring, dyspigmentation, blistering, possible superinfection, and inability to confirm a diagnosis via histopathology), verbal and written consent were obtained and portion of the rash was biopsied for tissue sample.  See below for consent that was obtained from patient and subsequent Procedure Note.  Phone follow up with biopsy results      PROCEDURE TANGENTIAL (SHAVE) BIOPSY NOTE:    Performing Physician:  and Roz Lott PA-C  Anatomic Location; Clinical Description with size (cm); Pre-Op Diagnosis:   Specimen A; Right Dorsum Hand; 0.8cm scaly papule; Diff. Dx.: R/O SCC vs BCC  Post-op diagnosis: Same     Local anesthesia: 1% xylocaine with epi      Topical anesthesia: None    Hemostasis: Aluminum chloride     After obtaining informed consent  at which time there was a discussion about the purpose of biopsy  and low risks of infection and bleeding.  The area was prepped and draped in the usual fashion. Anesthesia was obtained with 1% lidocaine with epinephrine. A shave biopsy to an appropriate sampling depth was obtained by Shave (Dermablade or 15 blade) The resulting wound was covered with surgical ointment and bandaged appropriately.     The patient tolerated the procedure well without complications and was without signs of functional compromise.      Specimen has been sent for review by Dermatopathology.    Standard post-procedure care has been explained and has been included in written form within the patient's copy of Informed Consent.    MELANOCYTIC NEVI (\"Moles\")    Physical Exam:  Anatomic Location Affected: Mostly on sun-exposed areas of the trunk and extremities  Morphological Description:  Scattered, 1-4mm round to ovoid, symmetrical-appearing, even bordered, skin colored to dark brown macules/papules, mostly in sun-exposed areas  Pertinent Positives:  Pertinent Negatives:    Additional History of Present Condition:  present on exam    Assessment and " "Plan:  Based on a thorough discussion of this condition and the management approach to it (including a comprehensive discussion of the known risks, side effects and potential benefits of treatment), the patient (family) agrees to implement the following specific plan:  Provided handout with information regarding the ABCDE's of moles   Recommend routine skin exams every year   Sun avoidance, protective clothing (known as UPF clothing), and the use of at least SPF 30 sunscreens is advised. Sunscreen should be reapplied every two hours when outside.     SEBORRHEIC KERATOSIS; NON-INFLAMED    Physical Exam:  Anatomic Location Affected:  scattered across trunk, extremities, face  Morphological Description:  Flat and raised, waxy, smooth to warty textured, yellow to brownish-grey to dark brown to blackish, discrete, \"stuck-on\" appearing papules.  Pertinent Positives:  Pertinent Negatives:    Additional History of Present Condition:  Patient reports new bumps on the skin.  Denies itch, burn, pain, bleeding or ulceration.  Present constantly; nothing seems to make it worse or better.  No prior treatment.      Assessment and Plan:  Based on a thorough discussion of this condition and the management approach to it (including a comprehensive discussion of the known risks, side effects and potential benefits of treatment), the patient (family) agrees to implement the following specific plan:  Reassured benign    ANGIOMA (\"CHERRY ANGIOMA\")    Physical Exam:  Anatomic Location: scattered across sun exposed areas of the trunk and extremities   Morphologic Description: Firm red to reddish-blue discrete papules  Pertinent Positives:  Pertinent Negatives:    Additional History of Present Condition:  Present on exam.     Assessment and Plan:  Reassured benign    Scribe Attestation      I,:  Vanessa Shipman am acting as a scribe while in the presence of the attending physician.:       I,:  Roz Lott PA-C personally performed the " services described in this documentation    as scribed in my presence.:

## 2024-01-24 NOTE — PATIENT INSTRUCTIONS
Dr. Cobb Shave/Punch Biopsy After Care Instructions      Remove bandage the next day. Keep bandage dry.    Shower or Bathe as usual the next day.    Cleanse the area once daily with saline or hydrogen peroxide.    Apply Vaseline.  WE ADVISE YOU NOT TO USE NEOSPORIN OR ANY TOPICAL ANTIBIOTIC UNLESS INSTRUCTED BY THE DOCTOR.    Cover area with a dressing or Band-Aid if possible.      Continue treatment until completely healed. (Skin appears in pink).    Try to avoid scab formation.      Slight bleeding may occur after the Band-Aid/Dressing is removed or the first few days after the procedure was done.      Don't panic!!  Apply continuous, direct pressure on the dressing over the wound for 15-20 minutes. DO NOT remove dressing.    HINT:  Set a timer for 15-20 minutes to make sure you press on the wound long enough  SOAKING: the dressing before you remove it should decrease chances of bleeding.  If the wound is on the legs or arms, swelling may occur. Elevating the arm or leg above the level of the heart as much as possible will also decrease swelling, promote healing and decrease chances of bleeding.        ANY QUESTION PLEASE CALL OUR OFFICE AT [phone]).  IF AFTER HOURS, THE ANSWERING SERVICE WILL GET A HOLD OF THE DOCTOR.    PLEASE BE ADVISED THAT BIOPSY RESULTS CAN TAKE UP TO 1 TO 2 WEEKS. YOU WILL RECEIVE THE RESULTS IN [facility] FIRST, BUT PLEASE WAIT FOR THE DOCTOR OR STAFF TO NOTIFY YOU.      THANK YOU!!      ACTINIC KERATOSIS    Actinic keratoses are very common on sites repeatedly exposed to the sun, especially the backs of the hands and the face, most often affecting the ears, nose, cheeks, upper lip, vermilion of the lower lip, temples, forehead and balding scalp. In severely chronically sun-damaged individuals, they may also be found on the upper trunk, upper and lower limbs, and dorsum of feet.    We discussed the theoretical premalignant (“pre-cancerous”) nature and etiology of these  growths.  We discussed the prevailing notion that actinic keratoses are a reflection of abnormal skin cell development due to DNA damage by short wavelength UVB.  They are more likely to appear if the immune function is poor, due to aging, recent sun exposure, predisposing disease or certain drugs.    We discussed that the main concern is that actinic keratoses may predispose to squamous cell carcinoma. It is rare for a solitary actinic keratosis to evolve to squamous cell carcinoma (SCC), but the risk of SCC occurring at some stage in a patient with more than 10 actinic keratoses is thought to be about 10 to 15%. A tender, thickened, ulcerated or enlarging actinic keratosis is suspicious of SCC.    Actinic keratoses may be prevented by strict sun protection. If already present, keratoses may improve with a very high sun protection factor (50+) broad-spectrum sunscreen applied at least daily to affected areas, year-round.  We recommend that UPF-rated clothing and hats and sunglasses be worn whenever possible and that a sunscreen-moisturizer combination product such as Neutrogena Daily Defense be applied at least three times a day.    We performed a thorough discussion of treatment options and specific risk/benefits/alternatives including but not limited to medical “field” treatment with medications such as the following:    Topical “field area” medications such as 5-fluorouracil or Aldara (specifically, the trouble with long-term compliance, blistering and local skin reaction versus the convenience of at-home therapy and that field therapy “gets what is not yet seen”).    Cryotherapy (specifically, local pain, scarring, dyspigmentation, blistering, possible superinfection, and treats “only what we see” versus directed treatment today).    Photodynamic therapy (specifically, local pain, scarring, dyspigmentation, blistering, possible superinfection, need to schedule for a later date, and time spent in the office  versus field therapy that “gets what is not yet seen”).      BASAL CELL CARCINOMA    What is basal cell carcinoma?  Basal cell carcinoma (BCC) is a common, locally invasive, keratinocytic, or non-melanoma, skin cancer. It is also known as rodent ulcer and basalioma. Patients with BCC often develop multiple primary tumours over time.    Who gets basal cell carcinoma?  Risk factors for BCC include:  Age and sex: BCCs are particularly prevalent in elderly males. However, they also affect females and younger adults   Previous BCC or other form of skin cancer (squamous cell carcinoma, melanoma)   Sun damage (photoaging, actinic keratoses)   Repeated prior episodes of sunburn   Fair skin, blue eyes and blond or red hair--note; BCC can also affect darker skin types   Previous cutaneous injury, thermal burn, disease (eg cutaneous lupus, sebaceous naevus)   Inherited syndromes: BCC is a particular problem for families with basal cell naevus syndrome (Gorlin syndrome), Swvnz-Kuplé-Umlowhzr syndrome, Rombo syndrome, Oley syndrome and xeroderma pigmentosum   Other risk factors include ionising radiation, exposure to arsenic, and immune suppression due to disease or medicines    What causes basal cell carcinoma?  The cause of BCC is multifactorial.  Most often, there are DNA mutations in the patched (PTCH) tumour suppressor gene, part of hedgehog signaling pathway   These may be triggered by exposure to ultraviolet radiation   Various spontaneous and inherited gene defects predispose to BCC    What are the clinical features of basal cell carcinoma?  BCC is a locally invasive skin tumour. The main characteristics are:  Slowly growing plaque or nodule   Skin coloured, pink or pigmented   Varies in size from a few millimetres to several centimetres in diameter   Spontaneous bleeding or ulceration  BCC is very rarely a threat to life. A tiny proportion of BCCs grow rapidly, invade deeply, and/or metastasise to local lymph  nodes.    Types of basal cell carcinoma  There are several distinct clinical types of BCC, and over 20 histological growth patterns of BCC.  Nodular BCC  Most common type of facial BCC   Shiny or pearly nodule with a smooth surface   May have central depression or ulceration, so its edges appear rolled   Blood vessels cross its surface   Cystic variant is soft, with jelly-like contents   Micronodular, microcystic and infiltrative types are potentially aggressive subtypes   Also known as nodulocystic carcinoma  Superficial BCC  Most common type in younger adults   Most common type on upper trunk and shoulders   Slightly scaly, irregular plaque   Thin, translucent rolled border   Multiple microerosions  Morphoeaform BCC  Usually found in mid-facial sites   Waxy, scar-like plaque with indistinct borders   Wide and deep subclinical extension   May infiltrate cutaneous nerves (perineural spread)   Also known as morpheic, morphoeiform or sclerosing BCC  Basosquamous carcinoma  Mixed basal cell carcinoma (BCC) and squamous cell carcinoma (SCC)   Infiltrative growth pattern   Potentially more aggressive than other forms of BCC   Also known as basisquamous carcinoma and mixed basal-squamous cell carcinoma       Complications of basal cell carcinoma    Recurrent BCC  Recurrence of BCC after initial treatment is not uncommon. Characteristics of recurrent BCC often include:  Incomplete excision or narrow margins at primary excision   Morphoeic, micronodular, and infiltrative subtypes   Location on head and neck    Advanced BCC  Advanced BCCs are large, often neglected tumours.  They may be several centimetres in diameter   They may be deeply infiltrating into tissues below the skin   They are difficult or impossible to treat surgically    Metastatic BCC  Very rare   Primary tumour is often large, neglected or recurrent, located on head and neck, with aggressive subtype   May have had multiple prior treatments   May arise in  site exposed to ionising radiation   Can be fatal    How is basal cell carcinoma diagnosed?  BCC is diagnosed clinically by the presence of a slowly enlarging skin lesion with typical appearance. The diagnosis and  by a diagnostic biopsy or following excision.  Some typical superficial BCCs on trunk and limbs are clinically diagnosed and have non-surgical treatment without histology.    What is the treatment for primary basal cell carcinoma?  The treatment for a BCC depends on its type, size and location, the number to be treated, patient factors, and the preference or expertise of the doctor. Most BCCs are treated surgically. Long-term follow-up is recommended to check for new lesions and recurrence; the latter may be unnecessary if histology has reported wide clear margins.    Excision biopsy  Excision means the lesion is cut out and the skin stitched up.  Most appropriate treatment for nodular, infiltrative and morphoeic BCCs   Should include 3 to 5 mm margin of normal skin around the tumour   Very large lesions may require flap or skin graft to repair the defect   Pathologist will report deep and lateral margins   Further surgery is recommended for lesions that are incompletely excised    Mohs micrographically controlled excision  Mohs micrographically controlled surgery involves examining carefully marked excised tissue under the microscope, layer by layer, to ensure complete excision.  Very high cure rates achieved by trained Mohs surgeons   Used in high-risk areas of the face around eyes, lips and nose   Suitable for ill-defined, morphoeic, infiltrative and recurrent subtypes   Large defects are repaired by flap or skin graft    Superficial skin surgery  Superficial skin surgery comprises shave, curettage, and electrocautery. It is a rapid technique using local anaesthesia and does not require sutures.  Suitable for small, well-defined nodular or superficial BCCs   Lesions are usually located on trunk or  limbs   Wound is left open to heal by secondary intention   Moist wound dressings lead to healing within a few weeks   Eventual scar quality variable    Cryotherapy  Cryotherapy is the treatment of a superficial skin lesion by freezing it, usually with liquid nitrogen.  Suitable for small superficial BCCs on covered areas of trunk and limbs   Best avoided for BCCs on head and neck, and distal to knees   Double freeze-thaw technique   Results in a blister that crusts over and heals within several weeks.   Leaves permanent white esther    Photodynamic therapy  Photodynamic therapy (PDT) refers to a technique in which BCC is treated with a photosensitising chemical, and exposed to light several hours later.  Topical photosensitisers include aminolevulinic acid lotion and methyl aminolevulinate cream   Suitable for low-risk small, superficial BCCs   Best avoided if tumour in site at high risk of recurrence   Results in inflammatory reaction, maximal 3-4 days after procedure   Treatment repeated 7 days after initial treatment   Excellent cosmetic results    Imiquimod cream  Imiquimod is an immune response modifier.  Best used for superficial BCCs less than 2 cm diameter   Applied three to five times each week, for 6-16 weeks   Results in a variable inflammatory reaction, maximal at three weeks   Minimal scarring is usual    Fluorouracil cream  5-Fluorouracil cream is a topical cytotoxic agent.  Used to treat small superficial basal cell carcinomas   Requires prolonged course, eg twice daily for 6-12 weeks   Causes inflammatory reaction   Has high recurrence rates    Radiotherapy  Radiotherapy or X-ray treatment can be used to treat primary BCCs or as adjunctive treatment if margins are incomplete.  Mainly used if surgery is not suitable   Best avoided in young patients and in genetic conditions predisposing to skin cancer   Best cosmetic results achieved using multiple fractions   Typically, patient attends once-weekly for  several weeks   Causes inflammatory reaction followed by scar   Risk of radiodermatitis, late recurrence, and new tumours    What is the treatment for advanced or metastatic basal cell carcinoma?  Locally advanced primary, recurrent or metastatic BCC requires multidisciplinary consultation. Often a combination of treatments is used.  Surgery   Radiotherapy   Targeted therapy  Targeted therapy refers to the hedgehog signalling pathway inhibitors, vismodegib and sonidegib. These drugs have some important risks and side effects.    How can basal cell carcinoma be prevented?  The most important way to prevent BCC is to avoid sunburn. This is especially important in childhood and early life. Fair skinned individuals and those with a personal or family history of BCC should protect their skin from sun exposure daily, year-round and lifelong.  Stay indoors or under the shade in the middle of the day   Wear covering clothing   Apply high protection factor SPF50+ broad-spectrum sunscreens generously to exposed skin if outdoors   Avoid indoor tanning (sun beds, solaria)  Oral nicotinamide (vitamin B3) in a dose of 500 mg twice daily may reduce the number and severity of BCCs.    What is the outlook for basal cell carcinoma?  Most BCCs are cured by treatment. Cure is most likely if treatment is undertaken when the lesion is small.  About 50% of people with BCC develop a second one within 3 years of the first. They are also at increased risk of other skin cancers, especially melanoma. Regular self-skin examinations and long-term annual skin checks by an experienced health professional are recommended.        SQUAMOUS CELL CARCINOMA    What is cutaneous squamous cell carcinoma?  Cutaneous squamous cell carcinoma (SCC) is a common type of keratinocyte or non-melanoma skin cancer. It is derived from cells within the epidermis that make keratin -- the horny protein that makes up skin, hair and nails.  Cutaneous SCC is an invasive  disease, referring to cancer cells that have grown beyond the epidermis. SCC can sometimes metastasise and may prove fatal.  Intraepidermal carcinoma (cutaneous SCC in situ) and mucosal SCC are considered elsewhere.    Who gets cutaneous squamous cell carcinoma?  Risk factors for cutaneous SCC include:  Age and sex: SCCs are particularly prevalent in elderly males. However, they also affect females and younger adults.   Previous SCC or another form of skin cancer (basal cell carcinoma, melanoma) are a strong predictor for further skin cancers.   Actinic keratoses   Outdoor occupation or recreation   Smoking   Fair skin, blue eyes and blond or red hair   Previous cutaneous injury, thermal burn, disease (eg cutaneous lupus, epidermolysis bullosa, leg ulcer)   Inherited syndromes: SCC is a particular problem for families with xeroderma pigmentosum and albinism   Other risk factors include ionising radiation, exposure to arsenic, and immune suppression due to disease (eg chronic lymphocytic leukaemia) or medicines. Organ transplant recipients have a massively increased risk of developing SCC.    What causes cutaneous squamous cell carcinoma?  More than 90% of cases of SCC are associated with numerous DNA mutations in multiple somatic genes. Mutations in the p53 tumour suppressor gene are caused by exposure to ultraviolet radiation (UV), especially UVB (known as signature 7). Other signature mutations relate to cigarette smoking, ageing and immune suppression (eg, to drugs such as azathioprine). Mutations in signalling pathways affect the epidermal growth factor receptor, DAMARIS, Fyn, and [id number] signalling.   Beta-genus human papillomaviruses (wart virus) are thought to play a role in SCC arising in immune-suppressed populations. ?-HPV and HPV subtypes 5, 8, 17, 20, 24, and 38 have also been associated with an increased risk of cutaneous SCC in immunocompetent individuals.     What are the clinical features of cutaneous  squamous cell carcinoma?  Cutaneous SCCs present as enlarging scaly or crusted lumps. They usually arise within pre-existing actinic keratosis or intraepidermal carcinoma.  They grow over weeks to months   They may ulcerate   They are often tender or painful   Located on sun-exposed sites, particularly the face, lips, ears, hands, forearms and lower legs   Size varies from a few millimetres to several centimetres in diameter.    Types of cutaneous squamous cell carcinoma  Distinct clinical types of invasive cutaneous SCC include:  Cutaneous horn -- the horn is due to excessive production of keratin   Keratoacanthoma (KA) -- a rapidly growing keratinising nodule that may resolve without treatment   Carcinoma cuniculatum (‘verrucous carcinoma’), a slow-growing, warty tumour on the sole of the foot.   Multiple eruptive SCC/KA-like lesions arising in syndromes, such as multiple self-healing squamous epitheliomas of Desai-Smith and Grzybowski syndrome  The pathologist may classify a tumour as well differentiated, moderately well differentiated, poorly differentiated or anaplastic cutaneous SCC. There are other variants.    Classification of squamous cell carcinoma by risk  Cutaneous SCC is classified as low-risk or high-risk, depending on the chance of tumour recurrence and metastasis. Characteristics of high-risk SCC include:  High-risk cutaneous squamous cell carcinoma has the following characteristics:  Diameter greater than or equal to 2 cm   Location on the ear, vermilion of the lip, central face, hands, feet, genitalia   Arising in elderly or immune suppressed patient   Histological thickness greater than 2 mm, poorly differentiated histology, or with the invasion of the subcutaneous tissue, nerves and blood vessels  Metastatic SCC is found in regional lymph nodes (80%), lungs, liver, brain, bones and skin.    Staging cutaneous squamous cell carcinoma  In 2011, the American Joint Committee on Cancer (AJCC)  published a new staging systemic for cutaneous SCC for the 7th Edition of the AJCC manual. This evaluates the dimensions of the original primary tumour (T) and its metastases to lymph nodes (N).    Tumour staging for cutaneous SCC  TX: Th Primary tumour cannot be assessed  T0: No evidence of a primary tumour  Tis: Carcinoma in situ  T1: Tumour ? 2cm without high-risk features  T2: Tumour ? 2cm; or; Tumour ? 2 cm with high-risk features  T3: Tumour with the invasion of maxilla, mandible, orbit or temporal bone  T4: Tumour with the invasion of axial or appendicular skeleton or perineural invasion of skull base    Bill staging for cutaneous SCC  NX: Regional lymph nodes cannot be assessed  N0: No regional lymph node metastasis  N1: Metastasis in one local lymph node ? 3cm  N2: Metastasis in one local lymph node ? 3cm; or; Metastasis in >1 local lymph node ? 6cm  N3: Metastasis in lymph node ? 6cm    How is squamous cell carcinoma diagnosed?  Diagnosis of cutaneous SCC is based on clinical features. The diagnosis and histological subtype are confirmed pathologically by diagnostic biopsy or following excision. See squamous cell carcinoma - pathology.  Patients with high-risk SCC may also undergo staging investigations to determine whether it has spread to lymph nodes or elsewhere. These may include:  Imaging using ultrasound scan, X-rays, CT scans, MRI scans   Lymph node or other tissue biopsies    What is the treatment for cutaneous squamous cell carcinoma?  Cutaneous SCC is nearly always treated surgically. Most cases are excised with a 3-10 mm margin of normal tissue around a visible tumour. A flap or skin graft may be needed to repair the defect.  Other methods of removal include:  Shave, curettage, and electrocautery for low-risk tumours on trunk and limbs   Aggressive cryotherapy for very small, thin, low-risk tumours   Mohs micrographic surgery for large facial lesions with indistinct margins or recurrent tumours    Radiotherapy for an inoperable tumour, patients unsuitable for surgery, or as adjuvant    What is the treatment for advanced or metastatic squamous cell carcinoma?  Locally advanced primary, recurrent or metastatic SCC requires multidisciplinary consultation. Often a combination of treatments is used.  Surgery   Radiotherapy   Cemiplimab   Experimental targeted therapy using epidermal growth factor receptor inhibitors    How can cutaneous squamous cell carcinoma be prevented?  There is a great deal of evidence to show that very careful sun protection at any time of life reduces the number of SCCs. This is particularly important in ageing, sun-damaged, fair skin; in patients that are immune suppressed; and in those who already have actinic keratoses or previous SCC.  Stay indoors or under the shade in the middle of the day   Wear covering clothing   Apply high protection factor SPF50+ broad-spectrum sunscreens generously to exposed skin if outdoors   Avoid indoor tanning (sun beds, solaria)    Oral nicotinamide (vitamin B3) in a dose of 500 mg twice daily may reduce the number and severity of SCCs in people at high risk.  Patients with multiple squamous cell carcinomas may be prescribed an oral retinoid (acitretin or isotretinoin). These reduce the number of tumours but have some nuisance side effects.    What is the outlook for cutaneous squamous cell carcinoma?  Most SCCs are cured by treatment. A cure is most likely if treatment is undertaken when the lesion is small. The risk of recurrence or disease-associated death is greater for tumours that are > 20 mm in diameter and/or > 2 mm in thickness at the time of surgical excision.  About 50% of people at high risk of SCC develop a second one within 5 years of the first. They are also at increased risk of other skin cancers, especially melanoma. Regular self-skin examinations and long-term annual skin checks by an experienced health professional are  recommended.

## 2024-01-29 PROCEDURE — 88342 IMHCHEM/IMCYTCHM 1ST ANTB: CPT | Performed by: STUDENT IN AN ORGANIZED HEALTH CARE EDUCATION/TRAINING PROGRAM

## 2024-01-29 PROCEDURE — 88305 TISSUE EXAM BY PATHOLOGIST: CPT | Performed by: STUDENT IN AN ORGANIZED HEALTH CARE EDUCATION/TRAINING PROGRAM

## 2024-01-29 PROCEDURE — 88341 IMHCHEM/IMCYTCHM EA ADD ANTB: CPT | Performed by: STUDENT IN AN ORGANIZED HEALTH CARE EDUCATION/TRAINING PROGRAM

## 2024-01-30 NOTE — RESULT ENCOUNTER NOTE
DERMATOPATHOLOGY RESULT NOTE    Results reviewed by ordering provider.  Results reviewed which demonstrated LICHEN SIMPLEX CHRONICUS/PRURIGO NODULE. This is benign. No further intervention required. Called patient and personally discussed results. Advised if the area becomes pruritic can prescribe moderate topical steroid or Kenalog injections. Patient expressed understanding, all questions were answered.       Instructions for Clinical Derm Team:   (remember to route Result Note to appropriate staff):    None    Result & Plan by Specimen:    Specimen A: benign  Plan: reassured benign, no further intervention required. Advised if the area becomes pruritic can prescribe moderate topical steroid or Kenalog injections.     [id number]  Order: [id number]  Status: Edited Result - FINAL      Visible to patient: No (inaccessible in [facility])      Dx: Neoplasm of uncertain behavior    0 Result Notes     Component   Case Report  Surgical Pathology Report                         Case: [id number]                                  Authorizing Provider:  Octaivo Cobb MD          Collected:           01/24/2024 0958              Ordering Location:     [facility]      Received:            01/24/2024 [address] [place] [address]                                                                      Pathologist:           Luisa Gillis MD                                                          Specimen:    Skin, Other, Specimen A) Right Dorsum HAnd                                              Addendum  SOX10, CK AE1/AE3, desmin, and CD31 immunostains were reviewed. The final diagnosis remains unchanged.      Addendum electronically signed by Luisa Gillis MD on 1/30/2024 at 11:55 AM  Final Diagnosis  A. Skin, right dorsum hand, shave biopsy:    Consistent with LICHEN SIMPLEX CHRONICUS/PRURIGO NODULE (see note).    Note: The results of immunohistochemistry will be reported as an  "addendum/amendment.      Electronically signed by Luisa Gillis MD on 1/29/2024 at  2:50 PM  Additional Information   All reported additional testing was performed with appropriately reactive controls.  These tests were developed and their performance characteristics determined by [facility] or appropriate performing facility, though some tests may be performed on tissues which have not been validated for performance characteristics (such as staining performed on alcohol exposed cell blocks and decalcified tissues).  Results should be interpreted with caution and in the context of the patients' clinical condition. These tests may not be cleared or approved by the U.S. Food and Drug Administration, though the FDA has determined that such clearance or approval is not necessary. These tests are used for clinical purposes and they should not be regarded as investigational or for research. This laboratory has been approved by CLIA 88, designated as a high-complexity laboratory and is qualified to perform these tests.  .  Gross Description   A. The specimen is received in formalin, labeled with the patient's name and hospital number, and is designated \" right dorsum hand\".  The specimen consists of a 0.9 x 0.5 x 0.2 cm shave biopsy of tan-white skin.  The presumed epithelial surface appears keratotic and is inked red and the margin of resection is inked green.  The specimen is bisected and is entirely submitted between sponges, 1 cassette.    Note: The estimated total formalin fixation time based upon information provided by the submitting clinician and the standard processing schedule is under 72 hours.  -Hayley Reddy  Clinical Information   Specimen A; Right Dorsum Hand; Skin; Shave Biopsy; 78 y/o female with a 0.8cm scaly papule; Diff. Dx.: R/O SCC vs BCC    Attn: Derm Path  Resulting Agency [id number]          Specimen Collected: 01/24/24  9:58 AM Last Resulted: 01/30/24 11:55 " AM

## 2024-02-22 ENCOUNTER — APPOINTMENT (OUTPATIENT)
Dept: LAB | Facility: CLINIC | Age: 79
End: 2024-02-22
Payer: MEDICARE

## 2024-02-22 DIAGNOSIS — E03.9 ACQUIRED HYPOTHYROIDISM: ICD-10-CM

## 2024-02-22 DIAGNOSIS — J44.89 COPD WITH CHRONIC BRONCHITIS: Chronic | ICD-10-CM

## 2024-02-22 LAB
ALBUMIN SERPL BCP-MCNC: 4.3 G/DL (ref 3.5–5)
ALP SERPL-CCNC: 71 U/L (ref 34–104)
ALT SERPL W P-5'-P-CCNC: 22 U/L (ref 7–52)
ANION GAP SERPL CALCULATED.3IONS-SCNC: 7 MMOL/L
AST SERPL W P-5'-P-CCNC: 21 U/L (ref 13–39)
BILIRUB SERPL-MCNC: 0.56 MG/DL (ref 0.2–1)
BUN SERPL-MCNC: 12 MG/DL (ref 5–25)
CALCIUM SERPL-MCNC: 9.6 MG/DL (ref 8.4–10.2)
CHLORIDE SERPL-SCNC: 100 MMOL/L (ref 96–108)
CO2 SERPL-SCNC: 30 MMOL/L (ref 21–32)
CREAT SERPL-MCNC: 0.66 MG/DL (ref 0.6–1.3)
ERYTHROCYTE [DISTWIDTH] IN BLOOD BY AUTOMATED COUNT: 13.8 % (ref 11.6–15.1)
GFR SERPL CREATININE-BSD FRML MDRD: 83 ML/MIN/1.73SQ M
GLUCOSE P FAST SERPL-MCNC: 90 MG/DL (ref 65–99)
HCT VFR BLD AUTO: 42.6 % (ref 34.8–46.1)
HGB BLD-MCNC: 13.9 G/DL (ref 11.5–15.4)
MCH RBC QN AUTO: 32.3 PG (ref 26.8–34.3)
MCHC RBC AUTO-ENTMCNC: 32.6 G/DL (ref 31.4–37.4)
MCV RBC AUTO: 99 FL (ref 82–98)
PLATELET # BLD AUTO: 298 THOUSANDS/UL (ref 149–390)
PMV BLD AUTO: 9.8 FL (ref 8.9–12.7)
POTASSIUM SERPL-SCNC: 4.1 MMOL/L (ref 3.5–5.3)
PROT SERPL-MCNC: 7.2 G/DL (ref 6.4–8.4)
RBC # BLD AUTO: 4.3 MILLION/UL (ref 3.81–5.12)
SODIUM SERPL-SCNC: 137 MMOL/L (ref 135–147)
TSH SERPL DL<=0.05 MIU/L-ACNC: 1.59 UIU/ML (ref 0.45–4.5)
WBC # BLD AUTO: 7.51 THOUSAND/UL (ref 4.31–10.16)

## 2024-02-22 PROCEDURE — 84443 ASSAY THYROID STIM HORMONE: CPT

## 2024-02-22 PROCEDURE — 80053 COMPREHEN METABOLIC PANEL: CPT

## 2024-02-22 PROCEDURE — 85027 COMPLETE CBC AUTOMATED: CPT

## 2024-02-22 PROCEDURE — 36415 COLL VENOUS BLD VENIPUNCTURE: CPT

## 2024-02-28 DIAGNOSIS — F11.20 CONTINUOUS OPIOID DEPENDENCE (HCC): ICD-10-CM

## 2024-02-28 RX ORDER — MORPHINE SULFATE 15 MG/1
15 TABLET ORAL EVERY 8 HOURS PRN
Qty: 90 TABLET | Refills: 0 | Status: SHIPPED | OUTPATIENT
Start: 2024-02-28

## 2024-03-05 ENCOUNTER — OFFICE VISIT (OUTPATIENT)
Age: 79
End: 2024-03-05
Payer: MEDICARE

## 2024-03-05 VITALS
HEART RATE: 94 BPM | HEIGHT: 63 IN | RESPIRATION RATE: 18 BRPM | WEIGHT: 133.6 LBS | OXYGEN SATURATION: 99 % | TEMPERATURE: 97.2 F | BODY MASS INDEX: 23.67 KG/M2 | SYSTOLIC BLOOD PRESSURE: 116 MMHG | DIASTOLIC BLOOD PRESSURE: 70 MMHG

## 2024-03-05 DIAGNOSIS — G35 MULTIPLE SCLEROSIS (HCC): ICD-10-CM

## 2024-03-05 DIAGNOSIS — J44.9 CHRONIC OBSTRUCTIVE PULMONARY DISEASE, UNSPECIFIED COPD TYPE (HCC): ICD-10-CM

## 2024-03-05 DIAGNOSIS — E03.9 ACQUIRED HYPOTHYROIDISM: ICD-10-CM

## 2024-03-05 DIAGNOSIS — F11.20 OPIOID DEPENDENCE, CONTINUOUS (HCC): Primary | ICD-10-CM

## 2024-03-05 PROCEDURE — G2211 COMPLEX E/M VISIT ADD ON: HCPCS | Performed by: INTERNAL MEDICINE

## 2024-03-05 PROCEDURE — 99214 OFFICE O/P EST MOD 30 MIN: CPT | Performed by: INTERNAL MEDICINE

## 2024-03-05 NOTE — PROGRESS NOTES
Assessment/Plan     1. Opioid dependence, continuous (HCC)    Stable on current regimen. She does not fill medication every month. Only uses when pain is moderate to severe. Check UDS at next visit. PDMP was reviewed.    2. Chronic obstructive pulmonary disease, unspecified COPD type (HCC)    COPD with chronic bronchitis that is stable. She does not wish to quit smoking.    3. Multiple sclerosis (HCC)    Stable and no recent flares.    - CBC; Future  - Basic metabolic panel; Future    4. Acquired hypothyroidism    Lab work looks good. Continue current dose of levothyroxine.    - TSH, 3rd generation with Free T4 reflex; Future       Depression Screening and Follow-up Plan: Patient was screened for depression during today's encounter. They screened negative with a PHQ-2 score of 0.    Tobacco Cessation Counseling: Tobacco cessation counseling was provided. The patient is sincerely urged to quit consumption of tobacco. She is not ready to quit tobacco.        Subjective     Opioid Management:   Type of visit: Follow-up    Pain related diagnoses: chronic neuropathic pain, continuous opioid dependence, thoracolumbar radiculopathy, multiple sclerosis, leg pain    Interval history: No big changes with patients health. Pain is controlled with medication when it's severe. MS hasn't been acting up lately.     Aberrant behavior?: No      Adverse effects from medication?: No      Screening Tools/Assessments:    PHQ-2/9:  PHQ-2 score: 0    Brief Pain Inventory (BPI):  1) Throughout our lives, most of us have had pain from time to time (such as minor headaches, sprains, and toothaches). Have you had pain other than these everyday kinds of pain today? No  2) Where is your pain located?  3) Rate your pain at its worst in the last 24 hours: 7  4) Rate your pain at its least in the last 24 hours: 3  5) Rate your average level of pain: 5  6) Rate your pain right now: 3  7) What treatments or medications are you receiving for your  "pain?  8) In the past 24 hours, how much relief have pain treatments or medication provided? 90%  9) During the past 24 hours, pain has interfered with your:     A) General activity: 5     B) Mood: 5     C) Walking ability: 7     D) Normal work (work outside the home & housework): 8     E) Relations with other people: 5     F) Sleep: 3     G) Enjoyment of life: 5    COMM:  Current COMM Score: 5 (negative, low risk patient)    Opioid agreement:  Active Opioid agreement on file?: No    Opioid agreement signed date: 2/23/2023  Opioid agreement expiration date: 2/23/2024    Naloxone:  Currently prescribed Naloxone (Narcan): No    Reason not prescribing Naloxone: not clinically warranted    High risk medications  High risk meds taken in last 72 hours: morphine, cyclobenzaprine      PDMP Review         Value Time User    PDMP Reviewed  Yes 3/5/2024 11:13 AM Garcia Watson DO           Review of Systems   Respiratory: Negative.     Cardiovascular: Negative.    Gastrointestinal: Negative.    Musculoskeletal:  Positive for arthralgias.     Objective     /70 (BP Location: Left arm, Patient Position: Sitting, Cuff Size: Standard)   Pulse 94   Temp (!) 97.2 °F (36.2 °C) (Tympanic)   Resp 18   Ht 5' 3\" (1.6 m)   Wt 60.6 kg (133 lb 9.6 oz)   SpO2 99%   BMI 23.67 kg/m²     Physical Exam  Constitutional:       General: She is not in acute distress.     Appearance: She is not ill-appearing.   Cardiovascular:      Rate and Rhythm: Normal rate and regular rhythm.      Heart sounds: No murmur heard.  Pulmonary:      Effort: Pulmonary effort is normal. No respiratory distress.      Breath sounds: No wheezing.   Abdominal:      General: Bowel sounds are normal. There is no distension.      Tenderness: There is no abdominal tenderness.   Musculoskeletal:      Right lower leg: No edema.      Left lower leg: No edema.   Neurological:      Mental Status: She is alert.       Garcia Watson DO  "

## 2024-03-20 ENCOUNTER — TELEPHONE (OUTPATIENT)
Age: 79
End: 2024-03-20

## 2024-03-20 NOTE — TELEPHONE ENCOUNTER
"PROLIA-Left voice mail asking pt to return call to VERIFY/UPDATE insurance prior to ordering/scheduling injection    Current insurance on file-primary-[employer] / secondary-[employer]    PLEASE ASK PT IF \"[employer]\" IS THE SAME AS [employer].  If they are different ask which one is her current insurance.    Thank you      "

## 2024-03-22 DIAGNOSIS — M62.838 MUSCLE SPASM OF RIGHT LEG: ICD-10-CM

## 2024-03-22 RX ORDER — CYCLOBENZAPRINE HCL 5 MG
10 TABLET ORAL EVERY 8 HOURS PRN
Qty: 60 TABLET | Refills: 3 | Status: SHIPPED | OUTPATIENT
Start: 2024-03-22 | End: 2024-05-21

## 2024-03-22 NOTE — TELEPHONE ENCOUNTER
Voice mail, This message is for Mariaelena.  She called me to have me verify the name of my secondary medical insurance. My secondary insurance is [employer]. Medicare is the primary. I don't know where the other names have come from, but [employer] is my secondary insurance. If there are questions, you can call me at [phone]. Thank you.

## 2024-03-27 ENCOUNTER — TELEPHONE (OUTPATIENT)
Age: 79
End: 2024-03-27

## 2024-03-27 NOTE — TELEPHONE ENCOUNTER
PROLIA-Received SOB/Appt. scheduled  Spoke w/patient    Last dose-9/27/23    *Appt. Scheduled-4/18/24  *Med order-[id number]  *Delivery-3/28/24

## 2024-04-03 DIAGNOSIS — K58.2 IRRITABLE BOWEL SYNDROME WITH BOTH CONSTIPATION AND DIARRHEA: ICD-10-CM

## 2024-04-03 RX ORDER — AMITRIPTYLINE HYDROCHLORIDE 50 MG/1
TABLET, FILM COATED ORAL
Qty: 90 TABLET | Refills: 1 | Status: SHIPPED | OUTPATIENT
Start: 2024-04-03

## 2024-04-18 ENCOUNTER — TELEPHONE (OUTPATIENT)
Age: 79
End: 2024-04-18

## 2024-04-18 ENCOUNTER — OFFICE VISIT (OUTPATIENT)
Age: 79
End: 2024-04-18
Payer: MEDICARE

## 2024-04-18 ENCOUNTER — CLINICAL SUPPORT (OUTPATIENT)
Age: 79
End: 2024-04-18
Payer: MEDICARE

## 2024-04-18 VITALS
WEIGHT: 133 LBS | TEMPERATURE: 97.6 F | HEART RATE: 88 BPM | BODY MASS INDEX: 23.57 KG/M2 | HEIGHT: 63 IN | SYSTOLIC BLOOD PRESSURE: 104 MMHG | DIASTOLIC BLOOD PRESSURE: 64 MMHG | OXYGEN SATURATION: 99 %

## 2024-04-18 DIAGNOSIS — F17.200 NICOTINE DEPENDENCE WITH CURRENT USE: ICD-10-CM

## 2024-04-18 DIAGNOSIS — J44.9 CHRONIC OBSTRUCTIVE PULMONARY DISEASE, UNSPECIFIED COPD TYPE (HCC): Primary | Chronic | ICD-10-CM

## 2024-04-18 DIAGNOSIS — Q78.2 OSTEOPETROSIS: Primary | ICD-10-CM

## 2024-04-18 DIAGNOSIS — R91.1 LUNG NODULE: ICD-10-CM

## 2024-04-18 DIAGNOSIS — G47.33 OSA ON CPAP: Chronic | ICD-10-CM

## 2024-04-18 PROCEDURE — 99214 OFFICE O/P EST MOD 30 MIN: CPT | Performed by: INTERNAL MEDICINE

## 2024-04-18 PROCEDURE — 96372 THER/PROPH/DIAG INJ SC/IM: CPT

## 2024-04-18 NOTE — PROGRESS NOTES
"Assessment/Plan:   Diagnoses and all orders for this visit:    Chronic obstructive pulmonary disease, unspecified COPD type (HCC)    Lung nodule    CHOCO on CPAP  -     PAP DME Resupply/Reorder    Nicotine dependence with current use        PFTs with mild obstructive airflow limitation no shortness of breath or coughing, not on any inhalers  Will continue to monitor  CT of the chest from October 2023 reviewed with subcentimeter lung nodules stable from 2017 not a candidate for lung cancer screening  We can hold off on following up on these lung nodules repeat CT of the chest as needed for any other indication.  Smoking cessation discussed but she states she is not interested in quitting  CHOCO on CPAP continue with current settings  Cleaning of the supplies and change of PAP supplies discussed  Cautioned against driving when sleepy.  Follow-up in 1 year or earlier as needed.  Return in about 1 year (around 4/18/2025).  All questions are answered to the patient's satisfaction and understanding.  She verbalizes understanding.  She is encouraged to call with any further questions or concerns.    Portions of the record may have been created with voice recognition software.  Occasional wrong word or \"sound a like\" substitutions may have occurred due to the inherent limitations of voice recognition software.  Read the chart carefully and recognize, using context, where substitutions have occurred.    Electronically Signed by Mary Lafleur MD    ______________________________________________________________________    Chief Complaint:   Chief Complaint   Patient presents with    Follow-up    Sleep Apnea       Patient ID: Crissy is a 79 y.o. y.o. female has a past medical history of Anxiety, Cataract, Chronic bronchitis (HCC), Chronic pain disorder, COPD (chronic obstructive pulmonary disease) (HCC), Hypothyroidism, Multiple sclerosis (HCC), CHOCO (obstructive sleep apnea), Osteopetrosis, Peroneal muscle atrophy, Shortness of " breath, Smoker, and Thoracolumbar radiculopathy due to intervertebral disc disorder.    4/18/2024  Patient presents today for follow-up visit.  Crissy Arboleda is a 79 y.o. female with a history significant for COPD, CHOCO on CPAP, MS, hypothyroidism, and radiculopathy  Has significant smoking history still actively smoking a few cigarettes and is not interested in.  Quitting.  Also currently CHOCO on CPAP consistently using the CPAP and she states she has been benefiting decreased nocturnal awakenings and feels well rested when she is up in the morning.          Review of Systems   Constitutional:  Positive for fatigue.   HENT: Negative.     Eyes: Negative.    Respiratory: Negative.     Cardiovascular: Negative.    Gastrointestinal: Negative.    Endocrine: Negative.    Genitourinary: Negative.    Musculoskeletal: Negative.    Allergic/Immunologic: Negative.    Neurological: Negative.    Psychiatric/Behavioral: Negative.         Smoking history: She reports that she has been smoking cigarettes. She started smoking about 66 years ago. She has a 66.2 pack-year smoking history. She has never used smokeless tobacco.    The following portions of the patient's history were reviewed and updated as appropriate: allergies, current medications, past family history, past medical history, past social history, past surgical history, and problem list.    Immunization History   Administered Date(s) Administered    COVID-19 MODERNA VACC 0.25 ML IM BOOSTER 12/15/2021    COVID-19 MODERNA VACC 0.5 ML IM 01/13/2021, 01/13/2021, 02/11/2021, 02/11/2021, 12/15/2021    INFLUENZA 1945, 09/14/2016    Pneumococcal Polysaccharide PPV23 1945, 05/22/2008    Tuberculin Skin Test-PPD Intradermal 01/13/2017     Current Outpatient Medications   Medication Sig Dispense Refill    amitriptyline (ELAVIL) 50 mg tablet TAKE 1 TABLET BY MOUTH EVERYDAY AT BEDTIME 90 tablet 1    cholecalciferol (VITAMIN D3) 1,000 units tablet Take 1,000 Units by  "mouth in the morning.      cyclobenzaprine (FLEXERIL) 5 mg tablet Take 2 tablets (10 mg total) by mouth every 8 (eight) hours as needed for muscle spasms 60 tablet 3    levothyroxine 100 mcg tablet TAKE 1 TABLET BY MOUTH EVERY DAY 90 tablet 3    morphine (MSIR) 15 mg tablet Take 1 tablet (15 mg total) by mouth every 8 (eight) hours as needed for severe pain Max Daily Amount: 45 mg 90 tablet 0    Multiple Vitamins-Minerals (ICAPS AREDS 2 PO) Take by mouth daily        Current Facility-Administered Medications   Medication Dose Route Frequency Provider Last Rate Last Admin    denosumab (PROLIA) subcutaneous injection 60 mg  60 mg Subcutaneous Q6 Months Garciayao Watson, DO   60 mg at 04/18/24 0937     Allergies: Adhesive [medical tape] and Latex    Objective:  Vitals:    04/18/24 0959   BP: 104/64   Pulse: 88   Temp: 97.6 °F (36.4 °C)   SpO2: 99%   Weight: 60.3 kg (133 lb)   Height: 5' 3\" (1.6 m)   Oxygen Therapy  SpO2: 99 %  .  Wt Readings from Last 3 Encounters:   04/18/24 60.3 kg (133 lb)   03/05/24 60.6 kg (133 lb 9.6 oz)   01/24/24 59 kg (130 lb)     Body mass index is 23.56 kg/m².    Physical Exam  Vitals and nursing note reviewed.   Constitutional:       Appearance: She is well-developed.   HENT:      Head: Normocephalic and atraumatic.   Eyes:      Conjunctiva/sclera: Conjunctivae normal.      Pupils: Pupils are equal, round, and reactive to light.   Neck:      Thyroid: No thyromegaly.      Vascular: No JVD.   Cardiovascular:      Rate and Rhythm: Normal rate and regular rhythm.      Heart sounds: Normal heart sounds. No murmur heard.     No friction rub. No gallop.   Pulmonary:      Effort: Pulmonary effort is normal. No respiratory distress.      Breath sounds: Normal breath sounds. No wheezing or rales.   Chest:      Chest wall: No tenderness.   Musculoskeletal:         General: No tenderness or deformity. Normal range of motion.      Cervical back: Normal range of motion and neck supple.   Lymphadenopathy: "      Cervical: No cervical adenopathy.   Skin:     General: Skin is warm and dry.   Neurological:      Mental Status: She is alert and oriented to person, place, and time.         Lab Review:   Appointment on 02/22/2024   Component Date Value    TSH 3RD GENERATON 02/22/2024 1.594     Sodium 02/22/2024 137     Potassium 02/22/2024 4.1     Chloride 02/22/2024 100     CO2 02/22/2024 30     ANION GAP 02/22/2024 7     BUN 02/22/2024 12     Creatinine 02/22/2024 0.66     Glucose, Fasting 02/22/2024 90     Calcium 02/22/2024 9.6     AST 02/22/2024 21     ALT 02/22/2024 22     Alkaline Phosphatase 02/22/2024 71     Total Protein 02/22/2024 7.2     Albumin 02/22/2024 4.3     Total Bilirubin 02/22/2024 0.56     eGFR 02/22/2024 83     WBC 02/22/2024 7.51     RBC 02/22/2024 4.30     Hemoglobin 02/22/2024 13.9     Hematocrit 02/22/2024 42.6     MCV 02/22/2024 99 (H)     MCH 02/22/2024 32.3     MCHC 02/22/2024 32.6     RDW 02/22/2024 13.8     Platelets 02/22/2024 298     MPV 02/22/2024 9.8    Office Visit on 01/24/2024   Component Date Value    Case Report 01/24/2024                      Value:Surgical Pathology Report                         Case: [id number]                                  Authorizing Provider:  Octavio Cobb MD          Collected:           01/24/2024 0958              Ordering Location:     [facility]      Received:            01/24/2024 0958                                     [place]                                                                       Pathologist:           Luisa Gillis MD                                                           Specimen:    Skin, Other, Specimen A) Right Dorsum HAnd                                                 Addendum 01/24/2024                      Value:This result contains rich text formatting which cannot be displayed here.    Final Diagnosis 01/24/2024                      Value:This result contains rich text formatting which cannot be displayed  here.    Additional Information 01/24/2024                      Value:This result contains rich text formatting which cannot be displayed here.    Gross Description 01/24/2024                      Value:This result contains rich text formatting which cannot be displayed here.    Clinical Information 01/24/2024                      Value:Specimen A; Right Dorsum Hand; Skin; Shave Biopsy; 78 y/o female with a 0.8cm scaly papule; Diff. Dx.: R/O SCC vs BCC    Attn: Derm Path       Diagnostics:  I have personally reviewed pertinent films in PACS  CT of chest performed on 10/1/2023 without contrast revealed able subcentimeter lung nodules from 2017 and benign calcified granuloma moderate emphysematous change  ESS: Total score: 0

## 2024-04-25 ENCOUNTER — APPOINTMENT (EMERGENCY)
Dept: CT IMAGING | Facility: HOSPITAL | Age: 79
End: 2024-04-25
Payer: MEDICARE

## 2024-04-25 ENCOUNTER — HOSPITAL ENCOUNTER (EMERGENCY)
Facility: HOSPITAL | Age: 79
Discharge: HOME | End: 2024-04-25
Attending: STUDENT IN AN ORGANIZED HEALTH CARE EDUCATION/TRAINING PROGRAM
Payer: MEDICARE

## 2024-04-25 VITALS
DIASTOLIC BLOOD PRESSURE: 68 MMHG | SYSTOLIC BLOOD PRESSURE: 127 MMHG | TEMPERATURE: 98 F | OXYGEN SATURATION: 95 % | RESPIRATION RATE: 18 BRPM | HEART RATE: 71 BPM

## 2024-04-25 DIAGNOSIS — R10.9 NONSPECIFIC ABDOMINAL PAIN: Primary | ICD-10-CM

## 2024-04-25 LAB
ALBUMIN SERPL BCP-MCNC: 3.8 G/DL (ref 3.5–5)
ALP SERPL-CCNC: 93 U/L (ref 34–104)
ALT SERPL W P-5'-P-CCNC: 13 U/L (ref 7–52)
ANION GAP SERPL CALCULATED.3IONS-SCNC: 4 MMOL/L (ref 4–13)
AST SERPL W P-5'-P-CCNC: 15 U/L (ref 13–39)
BASOPHILS # BLD AUTO: 0.04 THOUSANDS/ÂΜL (ref 0–0.1)
BASOPHILS NFR BLD AUTO: 1 % (ref 0–1)
BILIRUB SERPL-MCNC: 0.34 MG/DL (ref 0.2–1)
BILIRUB UR QL STRIP: NEGATIVE
BUN SERPL-MCNC: 11 MG/DL (ref 5–25)
CALCIUM SERPL-MCNC: 8.6 MG/DL (ref 8.4–10.2)
CHLORIDE SERPL-SCNC: 104 MMOL/L (ref 96–108)
CLARITY UR: CLEAR
CO2 SERPL-SCNC: 28 MMOL/L (ref 21–32)
COLOR UR: COLORLESS
CREAT SERPL-MCNC: 0.55 MG/DL (ref 0.6–1.3)
EOSINOPHIL # BLD AUTO: 0.26 THOUSAND/ÂΜL (ref 0–0.61)
EOSINOPHIL NFR BLD AUTO: 3 % (ref 0–6)
ERYTHROCYTE [DISTWIDTH] IN BLOOD BY AUTOMATED COUNT: 13.7 % (ref 11.6–15.1)
GFR SERPL CREATININE-BSD FRML MDRD: 88 ML/MIN/1.73SQ M
GLUCOSE SERPL-MCNC: 86 MG/DL (ref 65–140)
GLUCOSE UR STRIP-MCNC: NEGATIVE MG/DL
HCT VFR BLD AUTO: 39.1 % (ref 34.8–46.1)
HGB BLD-MCNC: 12.9 G/DL (ref 11.5–15.4)
HGB UR QL STRIP.AUTO: NEGATIVE
IMM GRANULOCYTES # BLD AUTO: 0.02 THOUSAND/UL (ref 0–0.2)
IMM GRANULOCYTES NFR BLD AUTO: 0 % (ref 0–2)
KETONES UR STRIP-MCNC: NEGATIVE MG/DL
LEUKOCYTE ESTERASE UR QL STRIP: NEGATIVE
LIPASE SERPL-CCNC: 36 U/L (ref 11–82)
LYMPHOCYTES # BLD AUTO: 2.19 THOUSANDS/ÂΜL (ref 0.6–4.47)
LYMPHOCYTES NFR BLD AUTO: 28 % (ref 14–44)
MCH RBC QN AUTO: 31.8 PG (ref 26.8–34.3)
MCHC RBC AUTO-ENTMCNC: 33 G/DL (ref 31.4–37.4)
MCV RBC AUTO: 96 FL (ref 82–98)
MONOCYTES # BLD AUTO: 0.7 THOUSAND/ÂΜL (ref 0.17–1.22)
MONOCYTES NFR BLD AUTO: 9 % (ref 4–12)
NEUTROPHILS # BLD AUTO: 4.73 THOUSANDS/ÂΜL (ref 1.85–7.62)
NEUTS SEG NFR BLD AUTO: 59 % (ref 43–75)
NITRITE UR QL STRIP: NEGATIVE
NRBC BLD AUTO-RTO: 0 /100 WBCS
PH UR STRIP.AUTO: 6 [PH]
PLATELET # BLD AUTO: 250 THOUSANDS/UL (ref 149–390)
PMV BLD AUTO: 8.8 FL (ref 8.9–12.7)
POTASSIUM SERPL-SCNC: 3.9 MMOL/L (ref 3.5–5.3)
PROT SERPL-MCNC: 6.8 G/DL (ref 6.4–8.4)
PROT UR STRIP-MCNC: NEGATIVE MG/DL
RBC # BLD AUTO: 4.06 MILLION/UL (ref 3.81–5.12)
SODIUM SERPL-SCNC: 136 MMOL/L (ref 135–147)
SP GR UR STRIP.AUTO: 1.01 (ref 1–1.03)
UROBILINOGEN UR STRIP-ACNC: <2 MG/DL
WBC # BLD AUTO: 7.94 THOUSAND/UL (ref 4.31–10.16)

## 2024-04-25 PROCEDURE — 96374 THER/PROPH/DIAG INJ IV PUSH: CPT

## 2024-04-25 PROCEDURE — 80053 COMPREHEN METABOLIC PANEL: CPT | Performed by: STUDENT IN AN ORGANIZED HEALTH CARE EDUCATION/TRAINING PROGRAM

## 2024-04-25 PROCEDURE — 96375 TX/PRO/DX INJ NEW DRUG ADDON: CPT

## 2024-04-25 PROCEDURE — 36415 COLL VENOUS BLD VENIPUNCTURE: CPT | Performed by: STUDENT IN AN ORGANIZED HEALTH CARE EDUCATION/TRAINING PROGRAM

## 2024-04-25 PROCEDURE — 99284 EMERGENCY DEPT VISIT MOD MDM: CPT

## 2024-04-25 PROCEDURE — C9113 INJ PANTOPRAZOLE SODIUM, VIA: HCPCS | Performed by: PHYSICIAN ASSISTANT

## 2024-04-25 PROCEDURE — 74177 CT ABD & PELVIS W/CONTRAST: CPT

## 2024-04-25 PROCEDURE — 83690 ASSAY OF LIPASE: CPT | Performed by: STUDENT IN AN ORGANIZED HEALTH CARE EDUCATION/TRAINING PROGRAM

## 2024-04-25 PROCEDURE — 81003 URINALYSIS AUTO W/O SCOPE: CPT | Performed by: STUDENT IN AN ORGANIZED HEALTH CARE EDUCATION/TRAINING PROGRAM

## 2024-04-25 PROCEDURE — 85025 COMPLETE CBC W/AUTO DIFF WBC: CPT | Performed by: STUDENT IN AN ORGANIZED HEALTH CARE EDUCATION/TRAINING PROGRAM

## 2024-04-25 PROCEDURE — 99285 EMERGENCY DEPT VISIT HI MDM: CPT | Performed by: PHYSICIAN ASSISTANT

## 2024-04-25 RX ORDER — HYDROMORPHONE HCL/PF 1 MG/ML
0.5 SYRINGE (ML) INJECTION ONCE
Status: COMPLETED | OUTPATIENT
Start: 2024-04-25 | End: 2024-04-25

## 2024-04-25 RX ORDER — DICYCLOMINE HCL 20 MG
20 TABLET ORAL 2 TIMES DAILY
Qty: 20 TABLET | Refills: 0 | Status: SHIPPED | OUTPATIENT
Start: 2024-04-25

## 2024-04-25 RX ORDER — PANTOPRAZOLE SODIUM 40 MG/10ML
40 INJECTION, POWDER, LYOPHILIZED, FOR SOLUTION INTRAVENOUS ONCE
Status: COMPLETED | OUTPATIENT
Start: 2024-04-25 | End: 2024-04-25

## 2024-04-25 RX ORDER — PANTOPRAZOLE SODIUM 20 MG/1
20 TABLET, DELAYED RELEASE ORAL DAILY
Qty: 20 TABLET | Refills: 0 | Status: SHIPPED | OUTPATIENT
Start: 2024-04-25

## 2024-04-25 RX ADMIN — PANTOPRAZOLE SODIUM 40 MG: 40 INJECTION, POWDER, FOR SOLUTION INTRAVENOUS at 16:06

## 2024-04-25 RX ADMIN — HYDROMORPHONE HYDROCHLORIDE 0.5 MG: 1 INJECTION, SOLUTION INTRAMUSCULAR; INTRAVENOUS; SUBCUTANEOUS at 12:53

## 2024-04-25 RX ADMIN — IOHEXOL 100 ML: 350 INJECTION, SOLUTION INTRAVENOUS at 14:36

## 2024-04-25 NOTE — DISCHARGE INSTRUCTIONS
"Your CT reports the following:   \"STOMACH AND BOWEL: Mild gastric submucosal thickening may indicate gastritis.     Moderate colonic stool burden without bowel obstruction. Colonic diverticulosis without findings of acute diverticulitis.    IMPRESSION:     No acute findings.     Potential gastritis, likely chronic.\"    Continue reflux precautions, and taking protonix as directed. Recommend outpatient GI follow up as your CT today reveals findings of possible gastritis.  Recommend PCP follow up regarding continued narcotics if warranted.  Return immediately to the ED with any new or worsening symptoms as discussed/  "

## 2024-04-29 NOTE — ED PROVIDER NOTES
History  Chief Complaint   Patient presents with    Abdominal Pain     Patient c/o lower abdominal pain that radiates down right hip and down right knee that started on Monday. Patient was seen today at [facility] and sent to ER for further evaluation.     This is an 79-year-old female with past medical history including multiple sclerosis, COPD, tobacco use, arriving ambulatory to the emergency department for evaluation with complaint of abdominal pain.  Patient reports that her pain is primarily involving the right side of the lower abdomen, radiating towards the hip, back, and toward the right thigh.  There was no distinct inciting event to her knowledge.  Patient relates that the past 2 nights that she has had severe abdominal pain that had awoken her from sleep noting her pain was alleviated by her prescribed morphine.  Denies recent travel, recent antibiotic use, suspicious food intake or ingestion of spoiled foods. Patient denies any injury or trauma. She denies any nausea, vomiting, diarrhea or constipation.  She denies any dysuria, urinary urgency or frequency, hematuria, malodorous or cloudy urine.  Denies fevers, chills, sweats or other systemic symptoms.  She does admit that she has been dealing with these symptoms for some time, though she has not received a definitive explanation by gastroenterology.  She denies any lower extremity paresthesias or weakness, saddle anesthesia, bladder/bowel continence or retention.  PSHx includes cholecystectomy, appendectomy,  section, right lateral discectomy at L4-L5.  She offers no other complaints or concerns at this time.        Prior to Admission Medications   Prescriptions Last Dose Informant Patient Reported? Taking?   Multiple Vitamins-Minerals (ICAPS AREDS 2 PO)  Self Yes No   Sig: Take by mouth daily    amitriptyline (ELAVIL) 50 mg tablet   No No   Sig: TAKE 1 TABLET BY MOUTH EVERYDAY AT BEDTIME   cholecalciferol (VITAMIN D3) 1,000 units tablet   Self Yes No   Sig: Take 1,000 Units by mouth in the morning.   cyclobenzaprine (FLEXERIL) 5 mg tablet   No No   Sig: Take 2 tablets (10 mg total) by mouth every 8 (eight) hours as needed for muscle spasms   levothyroxine 100 mcg tablet  Self No No   Sig: TAKE 1 TABLET BY MOUTH EVERY DAY   morphine (MSIR) 15 mg tablet  Self No No   Sig: Take 1 tablet (15 mg total) by mouth every 8 (eight) hours as needed for severe pain Max Daily Amount: 45 mg      Facility-Administered Medications Last Administration Doses Remaining   denosumab (PROLIA) subcutaneous injection 60 mg 2024  9:37 AM           Past Medical History:   Diagnosis Date    Anxiety     Cataract     last assessed 14    Chronic bronchitis (Formerly [facility])     Sees pulmonary specialist twice a year    Chronic pain disorder     COPD (chronic obstructive pulmonary disease) (Formerly [facility])     Hypothyroidism     Multiple sclerosis (Formerly [facility])     CHOCO (obstructive sleep apnea)     uses cpap, 3/19/21 -pt states will bring CPAP for DOS 3/23/21    Osteopetrosis     Peroneal muscle atrophy     Shortness of breath     Smoker     Thoracolumbar radiculopathy due to intervertebral disc disorder        Past Surgical History:   Procedure Laterality Date    APPENDECTOMY      CATARACT EXTRACTION       SECTION      CHOLECYSTECTOMY      GALLBLADDER SURGERY      IA COLONOSCOPY FLX DX W/COLLJ SPEC WHEN PFRMD N/A 2018    Procedure: COLONOSCOPY;  Surgeon: Padilla Gonzales MD;  Location: [facility];  Service: Colorectal    IA LAMNOTMY INCL W/DCMPRSN NRV ROOT 1 INTRSPC LUMBR Right 3/23/2021    Procedure: MINIMALLY INVASIVE LUMBAR FAR LATERAL DISCECTOMY L4-5, RIGHT;  Surgeon: Estuardo Dwyer MD;  Location:  MAIN OR;  Service: Neurosurgery    THROAT SURGERY         Family History   Problem Relation Age of Onset    Skin cancer Mother     Hypertension Father         benign essential    Stroke Father     Lung cancer Brother      I have reviewed and agree with the history as  documented.    E-Cigarette/Vaping    E-Cigarette Use Former User     Comments 3/19/21 Pt only tried for 4 years - NOT CURRENT      E-Cigarette/Vaping Substances    Nicotine Yes     THC No     CBD No     Flavoring No     Other No     Unknown No      Social History     Tobacco Use    Smoking status: Every Day     Current packs/day: 1.00     Average packs/day: 1 pack/day for 66.2 years (66.2 ttl pk-yrs)     Types: Cigarettes     Start date: 2/16/1958    Smokeless tobacco: Never   Vaping Use    Vaping status: Former    Substances: Nicotine   Substance Use Topics    Alcohol use: Yes     Alcohol/week: 1.0 - 2.0 standard drink of alcohol     Types: 1 - 2 Glasses of wine per week     Comment: once daily beer    Drug use: Yes     Types: Morphine     Comment: prescribed morphine       Review of Systems   Constitutional:  Negative for chills, diaphoresis, fatigue and fever.   Gastrointestinal:  Positive for abdominal pain. Negative for constipation, diarrhea, nausea and vomiting.   Musculoskeletal:  Positive for arthralgias, back pain and myalgias.   Skin:  Negative for rash.   Neurological:  Negative for weakness and numbness.   All other systems reviewed and are negative.      Physical Exam  Physical Exam  Vitals and nursing note reviewed.   Constitutional:       General: She is not in acute distress.     Appearance: Normal appearance. She is well-developed. She is not ill-appearing, toxic-appearing or diaphoretic.   HENT:      Head: Normocephalic and atraumatic.      Right Ear: External ear normal.      Left Ear: External ear normal.   Eyes:      Conjunctiva/sclera: Conjunctivae normal.   Cardiovascular:      Rate and Rhythm: Normal rate and regular rhythm.      Pulses: Normal pulses.   Pulmonary:      Effort: Pulmonary effort is normal. No respiratory distress.      Breath sounds: Normal breath sounds. No wheezing, rhonchi or rales.   Chest:      Chest wall: No tenderness.   Abdominal:      General: There is no distension.       Palpations: Abdomen is soft.      Tenderness: There is abdominal tenderness (right-sided).      Comments: No peritoneal signs   Musculoskeletal:         General: Normal range of motion.      Cervical back: Normal range of motion and neck supple.      Comments: There is no spinous process tenderness upon palpation of the cervical, thoracic, or lumbar spine.  No point tenderness, bony deformity or step-offs.  The patient has tenderness upon palpation of the right lumbosacral region and right hip joint.  Distal sensation and pulses intact.  Patient ambulating in the department without significant difficulty.   Skin:     General: Skin is warm and dry.      Capillary Refill: Capillary refill takes less than 2 seconds.   Neurological:      Mental Status: She is alert.      Sensory: Sensation is intact. No sensory deficit.      Motor: Motor function is intact. No weakness.      Gait: Gait is intact.   Psychiatric:         Mood and Affect: Mood normal.         Vital Signs  ED Triage Vitals   Temperature Pulse Respirations Blood Pressure SpO2   04/25/24 1111 04/25/24 1111 04/25/24 1111 04/25/24 1111 04/25/24 1111   98 °F (36.7 °C) 92 18 123/69 99 %      Temp Source Heart Rate Source Patient Position - Orthostatic VS BP Location FiO2 (%)   04/25/24 1111 04/25/24 1111 04/25/24 1341 04/25/24 1111 --   Temporal Monitor Lying Left arm       Pain Score       --                  Vitals:    04/25/24 1111 04/25/24 1341   BP: 123/69 127/68   Pulse: 92 71   Patient Position - Orthostatic VS:  Lying         Visual Acuity  Visual Acuity      Flowsheet Row Most Recent Value   L Pupil Size (mm) 3   R Pupil Size (mm) 3            ED Medications  Medications   HYDROmorphone (DILAUDID) injection 0.5 mg (0.5 mg Intravenous Given 4/25/24 1253)   iohexol (OMNIPAQUE) 350 MG/ML injection (MULTI-DOSE) 100 mL (100 mL Intravenous Given 4/25/24 1436)   pantoprazole (PROTONIX) injection 40 mg (40 mg Intravenous Given 4/25/24 1606)       Diagnostic  Studies  Results Reviewed       Procedure Component Value Units Date/Time    UA w Reflex to Microscopic w Reflex to Culture [[id number]] Collected: 04/25/24 1447    Lab Status: Final result Specimen: Urine, Other Updated: 04/25/24 1458     Color, UA Colorless     Clarity, UA Clear     Specific Gravity, UA 1.007     pH, UA 6.0     Leukocytes, UA Negative     Nitrite, UA Negative     Protein, UA Negative mg/dl      Glucose, UA Negative mg/dl      Ketones, UA Negative mg/dl      Urobilinogen, UA <2.0 mg/dl      Bilirubin, UA Negative     Occult Blood, UA Negative    Comprehensive metabolic panel [[id number]]  (Abnormal) Collected: 04/25/24 1250    Lab Status: Final result Specimen: Blood from Arm, Right Updated: 04/25/24 1323     Sodium 136 mmol/L      Potassium 3.9 mmol/L      Chloride 104 mmol/L      CO2 28 mmol/L      ANION GAP 4 mmol/L      BUN 11 mg/dL      Creatinine 0.55 mg/dL      Glucose 86 mg/dL      Calcium 8.6 mg/dL      AST 15 U/L      ALT 13 U/L      Alkaline Phosphatase 93 U/L      Total Protein 6.8 g/dL      Albumin 3.8 g/dL      Total Bilirubin 0.34 mg/dL      eGFR 88 ml/min/1.73sq m     Narrative:      National Kidney Disease Foundation guidelines for Chronic Kidney Disease (CKD):     Stage 1 with normal or high GFR (GFR > 90 mL/min/1.73 square meters)    Stage 2 Mild CKD (GFR = 60-89 mL/min/1.73 square meters)    Stage 3A Moderate CKD (GFR = 45-59 mL/min/1.73 square meters)    Stage 3B Moderate CKD (GFR = 30-44 mL/min/1.73 square meters)    Stage 4 Severe CKD (GFR = 15-29 mL/min/1.73 square meters)    Stage 5 End Stage CKD (GFR <15 mL/min/1.73 square meters)  Note: GFR calculation is accurate only with a steady state creatinine    Lipase [[id number]]  (Normal) Collected: 04/25/24 1250    Lab Status: Final result Specimen: Blood from Arm, Right Updated: 04/25/24 1323     Lipase 36 u/L     CBC and differential [[id number]]  (Abnormal) Collected: 04/25/24 1250    Lab Status: Final result Specimen: Blood  from Arm, Right Updated: 04/25/24 1259     WBC 7.94 Thousand/uL      RBC 4.06 Million/uL      Hemoglobin 12.9 g/dL      Hematocrit 39.1 %      MCV 96 fL      MCH 31.8 pg      MCHC 33.0 g/dL      RDW 13.7 %      MPV 8.8 fL      Platelets 250 Thousands/uL      nRBC 0 /100 WBCs      Segmented % 59 %      Immature Grans % 0 %      Lymphocytes % 28 %      Monocytes % 9 %      Eosinophils Relative 3 %      Basophils Relative 1 %      Absolute Neutrophils 4.73 Thousands/µL      Absolute Immature Grans 0.02 Thousand/uL      Absolute Lymphocytes 2.19 Thousands/µL      Absolute Monocytes 0.70 Thousand/µL      Eosinophils Absolute 0.26 Thousand/µL      Basophils Absolute 0.04 Thousands/µL                    CT abdomen pelvis with contrast   Final Result by Rodger Taylor MD (04/25 9633)      No acute findings.      Potential gastritis, likely chronic.      The study was marked in EPIC for immediate notification.         Workstation performed: [id number]         CT recon only lumbar spine   Final Result by Ned Genao MD (04/25 3444)      No fracture or traumatic subluxation.      Multilevel degenerative changes as above, most pronounced at L4-L5 with moderate right neuroforaminal narrowing. Far right extraforaminal osteophytosis approaches the exiting right L4 nerve root.            Resident: Jt Kaplan I, the attending radiologist, have reviewed the images and agree with the final report above.      Workstation performed: [id number]                    Procedures  Procedures         ED Course                                             Medical Decision Making  MDM: 79-year-old female presenting for evaluation with right-sided abdominal and back pain.  Reports atraumatic symptom onset a few days before presentation with no inciting event to her knowledge.  Reports the last 2 nights she had severe abdominal pain that had awoken her from sleep noting that the pain was not controlled by her prescribed  morphine.  Denies fever, chills, sweats, UTI symptoms, nausea/vomiting/diarrhea.  No back pain red flags.  Vital signs reviewed upon presentation with examination as above.  The patient has a benign abdominal exam.  Differential diagnosis includes but is not limited to gastritis, enteritis, colitis, choledocholithiasis, pancreatitis, kidney stone, pyelonephritis, constipation, osteoarthritis, degenerative disc disease, herniated disc, spinal stenosis, radiculopathy, tendinitis, bursitis. There are no exam findings that raise clinical suspicion for septic joint or other limb threat, cauda equina or other neurosurgical emergency.  Lab work reviewed without significant findings.  There is no leukocytosis, electrolyte derangement or renal impairment.  Urinalysis bland without evidence of urinary tract infection.  CT abdomen/pelvis reports no acute findings, with mention of gastritis that is likely chronic.  CT lumbar spine reports multilevel degenerative changes as above and moderate right neuroforaminal narrowing.  Reviewed test results with patient at bedside.  Stable vital signs, unremarkable examination and negative workup today are reassuring.  Will treat supportively with prescriptions for Bentyl and Protonix, with recommendation for outpatient follow-up for continued care and further management.  ED return parameters given.  Patient verbalized understanding of plan as above which she is comfortable and agreeable with and she was discharged in stable condition, ambulating from the department today without issue.    Amount and/or Complexity of Data Reviewed  Labs: ordered.  Radiology: ordered.    Risk  Prescription drug management.             Disposition  Final diagnoses:   Nonspecific abdominal pain     Time reflects when diagnosis was documented in both MDM as applicable and the Disposition within this note       Time User Action Codes Description Comment    4/25/2024  3:55 PM Viktoria Healy Add [R10.9]  Nonspecific abdominal pain           ED Disposition       ED Disposition   Discharge    Condition   Stable    Date/Time   Thu Apr 25, 2024  3:55 PM    Comment   Crissy Arboleda discharge to home/self care.                   Follow-up Information       Follow up With Specialties Details Why Contact Info Additional Information    Garcia Watson DO Internal Medicine   [address]  [address]nd [address]  [facility] [address]  [phone]       [facility]   [address]  [facility] [address] [phone] [facility] [place], [address], [place], [place], [address] [phone]    [facility] Emergency Medicine  If symptoms worsen [address] [id number] [phone] [facility], [address], [place], [address]            Discharge Medication List as of 4/25/2024  4:02 PM        START taking these medications    Details   dicyclomine (BENTYL) 20 mg tablet Take 1 tablet (20 mg total) by mouth 2 (two) times a day, Starting u 4/25/2024, Normal      pantoprazole (PROTONIX) 20 mg tablet Take 1 tablet (20 mg total) by mouth daily, Starting u 4/25/2024, Normal           CONTINUE these medications which have NOT CHANGED    Details   amitriptyline (ELAVIL) 50 mg tablet TAKE 1 TABLET BY MOUTH EVERYDAY AT BEDTIME, Normal      cholecalciferol (VITAMIN D3) 1,000 units tablet Take 1,000 Units by mouth in the morning., Historical Med      cyclobenzaprine (FLEXERIL) 5 mg tablet Take 2 tablets (10 mg total) by mouth every 8 (eight) hours as needed for muscle spasms, Starting Fri 3/22/2024, Until Tue 5/21/2024 at 2359, Normal      levothyroxine 100 mcg tablet TAKE 1 TABLET BY MOUTH EVERY DAY, Normal      morphine (MSIR) 15 mg tablet Take 1 tablet (15 mg total) by mouth every  8 (eight) hours as needed for severe pain Max Daily Amount: 45 mg, Starting Wed 2/28/2024, Normal      Multiple Vitamins-Minerals (ICAPS AREDS 2 PO) Take by mouth daily , Historical Med             No discharge procedures on file.    PDMP Review         Value Time User    PDMP Reviewed  Yes 4/25/2024  3:55 PM Viktoria Healy PA-C            ED Provider  Electronically Signed by             Viktoria Healy PA-C  04/29/24 9266

## 2024-05-02 ENCOUNTER — TELEPHONE (OUTPATIENT)
Age: 79
End: 2024-05-02

## 2024-05-02 NOTE — TELEPHONE ENCOUNTER
Caller: Patient     Doctor:      Reason for call: Patient last seen in 2021 for low back injection and it has worked up until now. She would like to know if another repeat injection can be done.     I did schedule her for an ov on 5/13 since it has been 2 years since she was last in.     Please advise     Call back#: [phone]

## 2024-05-02 NOTE — TELEPHONE ENCOUNTER
S/w Pt, Pt agreeable to wait until OV with AS on 05/13 to discuss current symptoms. Pt verbalized understanding.

## 2024-05-06 ENCOUNTER — NURSE TRIAGE (OUTPATIENT)
Age: 79
End: 2024-05-06

## 2024-05-06 DIAGNOSIS — F11.20 CONTINUOUS OPIOID DEPENDENCE (HCC): ICD-10-CM

## 2024-05-06 RX ORDER — MORPHINE SULFATE 15 MG/1
15 TABLET ORAL EVERY 8 HOURS PRN
Qty: 90 TABLET | Refills: 0 | Status: SHIPPED | OUTPATIENT
Start: 2024-05-06

## 2024-05-06 NOTE — TELEPHONE ENCOUNTER
"Last OV: 6/14/23   Hx: IBS and bloating    Pt calling in, reports for the last 2 weeks she has been experiencing a new type of pain in her whole abdomen- it is constant (could not describe the pain). She was in the ED on 4/25 for this and reports has gastritis- she is on pantoprazole 20 mg daily. Last night abd was rock hard but this morning was able to pass gas and it is soft now and some pain reduced. Gasx does not work for her- she is using charcoal and amitriptyline which was helping until recently.   Last Bm was today- very loose stool but is usually more constipated. She has a lot of stress with her  at [employer] so she knows that is a trigger for her as well.     I scheduled pt for OV on 5/8 to discuss and she is also seeing spine and pain for her lumbar issues.       Reason for Disposition   Abdominal pain is a chronic symptom (recurrent or ongoing AND lasting > 4 weeks)    Answer Assessment - Initial Assessment Questions  1. LOCATION: \"Where does it hurt?\"       Generalized   2. RADIATION: \"Does the pain shoot anywhere else?\" (e.g., chest, back)      Back   3. ONSET: \"When did the pain begin?\" (e.g., minutes, hours or days ago)       2 weeks ago   4. SUDDEN: \"Gradual or sudden onset?\"      Grdual   5. PATTERN \"Does the pain come and go, or is it constant?\"     - If constant: \"Is it getting better, staying the same, or worsening?\"       (Note: Constant means the pain never goes away completely; most serious pain is constant and it progresses)      - If intermittent: \"How long does it last?\" \"Do you have pain now?\"      (Note: Intermittent means the pain goes away completely between bouts)      Constant   6. SEVERITY: \"How bad is the pain?\"  (e.g., Scale 1-10; mild, moderate, or severe)    - MILD (1-3): doesn't interfere with normal activities, abdomen soft and not tender to touch     - MODERATE (4-7): interferes with normal activities or awakens from sleep, tender to touch     - SEVERE (8-10): " "excruciating pain, doubled over, unable to do any normal activities       Severe   7. RECURRENT SYMPTOM: \"Have you ever had this type of stomach pain before?\" If Yes, ask: \"When was the last time?\" and \"What happened that time?\"       Yes   8. CAUSE: \"What do you think is causing the stomach pain?\"      Gastritis   9. RELIEVING/AGGRAVATING FACTORS: \"What makes it better or worse?\" (e.g., movement, antacids, bowel movement)      No   10. OTHER SYMPTOMS: \"Has there been any vomiting, diarrhea, constipation, or urine problems?\"        Constipation    Protocols used: Abdominal Pain - Female-ADULT-[place]    "

## 2024-05-08 ENCOUNTER — OFFICE VISIT (OUTPATIENT)
Dept: GASTROENTEROLOGY | Facility: CLINIC | Age: 79
End: 2024-05-08
Payer: MEDICARE

## 2024-05-08 ENCOUNTER — TELEPHONE (OUTPATIENT)
Dept: NEPHROLOGY | Facility: CLINIC | Age: 79
End: 2024-05-08

## 2024-05-08 ENCOUNTER — TELEPHONE (OUTPATIENT)
Age: 79
End: 2024-05-08

## 2024-05-08 VITALS
TEMPERATURE: 97.5 F | WEIGHT: 132 LBS | BODY MASS INDEX: 23.39 KG/M2 | HEART RATE: 82 BPM | DIASTOLIC BLOOD PRESSURE: 64 MMHG | OXYGEN SATURATION: 99 % | HEIGHT: 63 IN | SYSTOLIC BLOOD PRESSURE: 120 MMHG

## 2024-05-08 DIAGNOSIS — R10.30 LOWER ABDOMINAL PAIN: Primary | ICD-10-CM

## 2024-05-08 PROCEDURE — 99214 OFFICE O/P EST MOD 30 MIN: CPT | Performed by: INTERNAL MEDICINE

## 2024-05-08 RX ORDER — DICYCLOMINE HCL 20 MG
20 TABLET ORAL EVERY 6 HOURS
Qty: 62 TABLET | Refills: 3 | Status: SHIPPED | OUTPATIENT
Start: 2024-05-08 | End: 2024-05-16

## 2024-05-08 NOTE — TELEPHONE ENCOUNTER
[facility] @ [phone] to verify eligibility for the patient spoke with Tiffany LOGAN and as per Tiffany patient is active since 01/01/2015 and is still currently active. Reference number for this call is [id number]

## 2024-05-08 NOTE — TELEPHONE ENCOUNTER
PA for morphine (MSIR) 15 mg tablet     Submitted via    []CMM-KEY   [x][employer]-Case ID # [id number]   []Faxed to plan   []Other website   []Phone call Case ID #     Office notes sent, clinical questions answered. Awaiting determination    Turnaround time for your insurance to make a decision on your Prior Authorization can take 7-21 business days.

## 2024-05-09 NOTE — PROGRESS NOTES
[facility] - Outpatient Follow-up Note  Crissy Arboleda 79 y.o. female MRN: [id number]  Encounter: [id number]          ASSESSMENT AND PLAN:      1. Lower abdominal pain  - dicyclomine (BENTYL) 20 mg tablet; Take 1 tablet (20 mg total) by mouth every 6 (six) hours  Dispense: 62 tablet; Refill: 3  -MiraLAX 1 capful to be taken daily.  This dose can be reduced if the patient is experiencing diarrhea.  -No indication for EGD or colonoscopy    ______________________________________________________________________    SUBJECTIVE: Crissy comes the office today stating that she has been having problems with ongoing constipation.  She is having episodes of severe abdominal pain which were described as 10 out of 10.  They were so severe that she went to the emergency room for further evaluation.  I reviewed the note of her recent visits to the emergency room 7 May.  The patient was experiencing an abdominal pain that was radiating down the right hip toward the right knee.  There was no traumatic event.  Apparently the abdominal pain was severe and it woke her from sleep.  She tried a morphine tablet but this did not significantly relieve the pain.  She denies any recent travel or antibiotic usage.  She did admit to the fact that she is dealing with a lot of stress related to her 's illness.  She feels that the stress is the most significant underlying condition that might be related to this pain.  She had both EGD and colonoscopy performed in 2020.  Since that episode, she has had no further severe pains.  She was very concerned about the severe pain.  Her bowel movements have been irregular and unfortunately she does take morphine 1-2 times daily.      REVIEW OF SYSTEMS IS OTHERWISE NEGATIVE.      Historical Information   Past Medical History:   Diagnosis Date    Anxiety     Cataract     last assessed 4/25/14    Chronic bronchitis (HCC)     Sees pulmonary specialist twice a year    Chronic  pain disorder     COPD (chronic obstructive pulmonary disease) (HCC)     Hypothyroidism     Multiple sclerosis (HCC)     CHOCO (obstructive sleep apnea)     uses cpap, 3/19/21 -pt states will bring CPAP for DOS 3/23/21    Osteopetrosis     Peroneal muscle atrophy     Shortness of breath     Smoker     Thoracolumbar radiculopathy due to intervertebral disc disorder      Past Surgical History:   Procedure Laterality Date    APPENDECTOMY      CATARACT EXTRACTION       SECTION      CHOLECYSTECTOMY      GALLBLADDER SURGERY      AL COLONOSCOPY FLX DX W/COLLJ SPEC WHEN PFRMD N/A 2018    Procedure: COLONOSCOPY;  Surgeon: Padilla Gonzales MD;  Location: [facility];  Service: Colorectal    AL LAMNOTMY INCL W/DCMPRSN NRV ROOT 1 INTRSPC LUMBR Right 3/23/2021    Procedure: MINIMALLY INVASIVE LUMBAR FAR LATERAL DISCECTOMY L4-5, RIGHT;  Surgeon: Estuardo Dwyer MD;  Location:  MAIN OR;  Service: Neurosurgery    THROAT SURGERY       Social History   Social History     Substance and Sexual Activity   Alcohol Use Yes    Alcohol/week: 1.0 - 2.0 standard drink of alcohol    Types: 1 - 2 Glasses of wine per week    Comment: once daily beer     Social History     Substance and Sexual Activity   Drug Use Yes    Types: Morphine    Comment: prescribed morphine     Social History     Tobacco Use   Smoking Status Every Day    Current packs/day: 1.00    Average packs/day: 1 pack/day for 66.2 years (66.2 ttl pk-yrs)    Types: Cigarettes    Start date: 1958   Smokeless Tobacco Never     Family History   Problem Relation Age of Onset    Skin cancer Mother     Hypertension Father         benign essential    Stroke Father     Lung cancer Brother        Meds/Allergies       Current Outpatient Medications:     amitriptyline (ELAVIL) 50 mg tablet    cholecalciferol (VITAMIN D3) 1,000 units tablet    cyclobenzaprine (FLEXERIL) 5 mg tablet    dicyclomine (BENTYL) 20 mg tablet    dicyclomine (BENTYL) 20 mg tablet    levothyroxine 100 mcg  "tablet    morphine (MSIR) 15 mg tablet    Multiple Vitamins-Minerals (ICAPS AREDS 2 PO)    pantoprazole (PROTONIX) 20 mg tablet    Current Facility-Administered Medications:     denosumab (PROLIA) subcutaneous injection 60 mg, 60 mg, Subcutaneous, Q6 Months, 60 mg at 04/18/24 0937    Allergies   Allergen Reactions    Adhesive [Medical Tape] Rash    Latex Rash           Objective     Blood pressure 120/64, pulse 82, temperature 97.5 °F (36.4 °C), temperature source Temporal, height 5' 3\" (1.6 m), weight 59.9 kg (132 lb), SpO2 99%. Body mass index is 23.38 kg/m².      PHYSICAL EXAM:      General Appearance:   Alert, cooperative, no distress   HEENT:   Normocephalic, atraumatic, anicteric.     Neck:  Supple, symmetrical, trachea midline   Lungs:   Clear to auscultation bilaterally; no rales, rhonchi or wheezing; respirations unlabored    Heart::   Regular rate and rhythm; no murmur, rub, or gallop.   Abdomen:   Soft, non-tender, non-distended; normal bowel sounds; no masses, no organomegaly    Genitalia:   Deferred    Rectal:   Deferred    Extremities:  No cyanosis, clubbing or edema    Pulses:  2+ and symmetric    Skin:  No jaundice, rashes, or lesions    Lymph nodes:  No palpable cervical lymphadenopathy        Lab Results:   No visits with results within 1 Day(s) from this visit.   Latest known visit with results is:   Admission on 04/25/2024, Discharged on 04/25/2024   Component Date Value    WBC 04/25/2024 7.94     RBC 04/25/2024 4.06     Hemoglobin 04/25/2024 12.9     Hematocrit 04/25/2024 39.1     MCV 04/25/2024 96     MCH 04/25/2024 31.8     MCHC 04/25/2024 33.0     RDW 04/25/2024 13.7     MPV 04/25/2024 8.8 (L)     Platelets 04/25/2024 250     nRBC 04/25/2024 0     Segmented % 04/25/2024 59     Immature Grans % 04/25/2024 0     Lymphocytes % 04/25/2024 28     Monocytes % 04/25/2024 9     Eosinophils Relative 04/25/2024 3     Basophils Relative 04/25/2024 1     Absolute Neutrophils 04/25/2024 4.73     Absolute " "Immature Grans 04/25/2024 0.02     Absolute Lymphocytes 04/25/2024 2.19     Absolute Monocytes 04/25/2024 0.70     Eosinophils Absolute 04/25/2024 0.26     Basophils Absolute 04/25/2024 0.04     Sodium 04/25/2024 136     Potassium 04/25/2024 3.9     Chloride 04/25/2024 104     CO2 04/25/2024 28     ANION GAP 04/25/2024 4     BUN 04/25/2024 11     Creatinine 04/25/2024 0.55 (L)     Glucose 04/25/2024 86     Calcium 04/25/2024 8.6     AST 04/25/2024 15     ALT 04/25/2024 13     Alkaline Phosphatase 04/25/2024 93     Total Protein 04/25/2024 6.8     Albumin 04/25/2024 3.8     Total Bilirubin 04/25/2024 0.34     eGFR 04/25/2024 88     Lipase 04/25/2024 36     Color, UA 04/25/2024 Colorless     Clarity, UA 04/25/2024 Clear     Specific Gravity, UA 04/25/2024 1.007     pH, UA 04/25/2024 6.0     Leukocytes, UA 04/25/2024 Negative     Nitrite, UA 04/25/2024 Negative     Protein, UA 04/25/2024 Negative     Glucose, UA 04/25/2024 Negative     Ketones, UA 04/25/2024 Negative     Urobilinogen, UA 04/25/2024 <2.0     Bilirubin, UA 04/25/2024 Negative     Occult Blood, UA 04/25/2024 Negative          Radiology Results:   CT abdomen pelvis with contrast    Result Date: 4/25/2024  Narrative: CT ABDOMEN AND PELVIS WITH IV CONTRAST INDICATION: lower abdominal pain, constipation. \"Patient c/o lower abdominal pain that radiates down right hip and down right knee that started on Monday. [facility] for further evaluation\" COMPARISON: CT abdomen pelvis 4/13/2022. TECHNIQUE: CT examination of the abdomen and pelvis was performed. Multiplanar 2D reformatted images were created from the source data. This examination, like all CT scans performed in the [facility], was performed utilizing techniques to minimize radiation dose exposure, including the use of iterative reconstruction and automated exposure control. Radiation dose length product (DLP) for this visit: 672 mGy-cm IV " Contrast: 100 mL of iohexol (OMNIPAQUE) Enteric Contrast: Not administered. FINDINGS: ABDOMEN LOWER CHEST: Mild pulmonary emphysematous changes. LIVER/BILIARY TREE: Unchanged intra and extrahepatic biliary dilation possibly on a reservoir basis status post cholecystectomy. Unchanged maximum diameter of 14 mm when also remeasured with similar technique on the prior study. GALLBLADDER: Cholecystectomy. SPLEEN: Scattered granuloma. PANCREAS: Atrophic. Mildly prominent nondilated pancreatic duct. ADRENAL GLANDS: No new findings. Reidentified bilateral calcifications, the sequelae of prior insult. KIDNEYS/URETERS: Simple renal cyst(s). Otherwise unremarkable kidneys. No hydronephrosis. STOMACH AND BOWEL: Mild gastric submucosal thickening may indicate gastritis. Moderate colonic stool burden without bowel obstruction. Colonic diverticulosis without findings of acute diverticulitis. APPENDIX: Appendectomy. ABDOMINOPELVIC CAVITY: No ascites. No pneumoperitoneum. No lymphadenopathy. VESSELS: Unremarkable for patient's age. PELVIS REPRODUCTIVE ORGANS: Unremarkable uterus. The previously seen area of low-attenuation in the fundus is not apparent on this study. URINARY BLADDER: Distended without wall thickening. ABDOMINAL WALL/INGUINAL REGIONS: Unremarkable. BONES: No acute fracture or suspicious osseous lesion. Spinal degenerative changes.     Impression: No acute findings. Potential gastritis, likely chronic. The study was marked in EPIC for immediate notification. Workstation performed: [id number]     CT recon only lumbar spine    Result Date: 4/25/2024  Narrative: CT RECON ONLY LUMBAR SPINE (NO CHARGE) INDICATION:   Acute low back pain with radiculopathy. COMPARISON: CT abdomen pelvis 4/13/2022, MRI lumbar spine 11/1/2021 TECHNIQUE: Axial CT examination of the lumbar spine was obtained utilizing reconstructed images from CT of the chest, abdomen and pelvis performed the same day.  Images were reformatted in the sagittal  and coronal planes. This examination, like all CT scans performed in the [facility], was performed utilizing techniques to minimize radiation dose exposure, including the use of iterative reconstruction and automated exposure control. FINDINGS: ALIGNMENT: Levoscoliosis with apex at L3. No spondylolisthesis. VERTEBRAE: No fracture. No acute osseous abnormality. DEGENERATIVE CHANGES: L1-L2: There is a left paracentral calcified protrusion. There is no significant canal stenosis or foraminal narrowing. L2-L3: No significant canal stenosis or foraminal narrowing. L3-L4: There is a bulging annulus. There is facet arthrosis. There is mild canal stenosis. There is marginal osteophytosis. There is mild foraminal encroachment. L4-L5: There is disc space narrowing. There is vacuum disc phenomenon. There is a bulging annulus. There is facet arthrosis. There is mild canal stenosis. There is marginal osteophytosis. There is mild left foraminal narrowing. There is a far right extraforaminal osteophyte which approaches the exiting right L4 nerve root. There is moderate right foraminal narrowing. L5-S1: There is facet arthrosis. There is no significant canal stenosis or foraminal narrowing. PREVERTEBRAL AND PARASPINAL SOFT TISSUES: Normal. OTHER: Partially imaged left renal interpolar simple cyst. Atherosclerotic vascular changes.     Impression: No fracture or traumatic subluxation. Multilevel degenerative changes as above, most pronounced at L4-L5 with moderate right neuroforaminal narrowing. Far right extraforaminal osteophytosis approaches the exiting right L4 nerve root. Resident: Jt Kaplan I, the attending radiologist, have reviewed the images and agree with the final report above. Workstation performed: [id number]

## 2024-05-10 NOTE — TELEPHONE ENCOUNTER
PA for morphine (MSIR) 15 mg tablet  Approved   Date(s) approved   Case #    Patient advised by [] Vindiciahart Message                      [x] Phone call       Pharmacy advised by [x]Fax                                     []Phone call    Approval letter scanned into Media Yes

## 2024-05-13 ENCOUNTER — OFFICE VISIT (OUTPATIENT)
Dept: PAIN MEDICINE | Facility: CLINIC | Age: 79
End: 2024-05-13
Payer: MEDICARE

## 2024-05-13 VITALS
SYSTOLIC BLOOD PRESSURE: 122 MMHG | WEIGHT: 132 LBS | RESPIRATION RATE: 16 BRPM | DIASTOLIC BLOOD PRESSURE: 66 MMHG | HEART RATE: 72 BPM | HEIGHT: 63 IN | BODY MASS INDEX: 23.39 KG/M2

## 2024-05-13 DIAGNOSIS — G89.4 CHRONIC PAIN SYNDROME: ICD-10-CM

## 2024-05-13 DIAGNOSIS — M51.16 INTERVERTEBRAL DISC DISORDER WITH RADICULOPATHY OF LUMBAR REGION: Primary | ICD-10-CM

## 2024-05-13 DIAGNOSIS — M51.16 LUMBAR DISC DISEASE WITH RADICULOPATHY: ICD-10-CM

## 2024-05-13 PROCEDURE — 99214 OFFICE O/P EST MOD 30 MIN: CPT

## 2024-05-13 NOTE — PROGRESS NOTES
Assessment:  1. Intervertebral disc disorder with radiculopathy of lumbar region    2. Chronic pain syndrome    3. Lumbar disc disease with radiculopathy        Plan:  The patient is an 79-year-old female with a history of chronic pain secondary to low back pain, lumbar intervertebral disc disorder with radiculopathy, lumbar degenerative disc disease, mid back pain and myofascial pain who presents to the office with worsening right-sided low back pain and pain that radiates into the right lower extremity stopping at the right knee.    She was recently seen in the emergency department last month and had an updated CT scan of her lumbar spine which shows a bulging annulus at L4-L5 and an extraforaminal osteophyte hitting the exiting right L4 nerve root resulting in moderate right foraminal narrowing.  Did instruct patient her pain is likely coming from this area and would benefit from a repeat lumbar epidural steroid injection to help decrease inflammation and provide her relief.  Patient would like to proceed.  I instructed our  will schedule her.  Complete risks and benefits including bleeding, infection, tissue reaction, nerve injury and allergic reaction were discussed.  The approach was demonstrated using models and literature was provided.  Verbal and written consent was obtained.    My impressions and treatment recommendations were discussed in detail with the patient who verbalized understanding and had no further questions.  Discharge instructions were provided. I personally saw and examined the patient and I agree with the above discussed plan of care.    Orders Placed This Encounter   Procedures    FL spine and pain procedure     Dr Chyna Anderson Status:   Future     Standing Expiration Date:   5/13/2028     Order Specific Question:   Reason for Exam:     Answer:   L4-L5 LESI     Order Specific Question:   Anticoagulant hold needed?     Answer:   No     No orders of the defined types were  placed in this encounter.      History of Present Illness:  Crissy Arboleda is a 79 y.o. female with a history of chronic pain secondary to low back pain, lumbar intervertebral disc disorder with radiculopathy, lumbar degenerative disc disease, mid back pain and myofascial pain.  She was last seen on 7/7/2021 where she underwent an L4-L5 LESI which provided her greater than 50% relief of her pain.  She presents to the office with worsening right-sided low back pain and pain that radiates into the right lower extremity stopping at the right knee  .  She states her pain is worse since the last office visit and constant.  She rates quality of her pain as sharp, pressure-like and is currently rating her pain a 4-6/10 on a numeric scale.    Current pain medications include morphine IR as prescribed by her PCP and cyclobenzaprine 5 mg as needed for muscle spasms.  She states this medication is helpful when she takes it and is denying any side effects at this time.    I have personally reviewed and/or updated the patient's past medical history, past surgical history, family history, social history, current medications, allergies, and vital signs today.     Review of Systems   Respiratory:  Negative for shortness of breath.    Cardiovascular:  Negative for chest pain.   Gastrointestinal:  Positive for constipation and nausea. Negative for diarrhea and vomiting.   Musculoskeletal:  Positive for back pain and gait problem. Negative for arthralgias, joint swelling and myalgias.   Skin:  Negative for rash.   Neurological:  Positive for dizziness. Negative for seizures and weakness.   All other systems reviewed and are negative.      Patient Active Problem List   Diagnosis    CHOCO on CPAP    Hypothyroidism    Insomnia    Lung nodule    Macular degeneration    Multiple sclerosis (HCC)    Rosacea    Seborrheic keratosis    Chronic neuropathic pain    Thoracolumbar radiculopathy due to intervertebral disc disorder    Other  constipation    Lumbar disc disease with radiculopathy    Chronic pain syndrome    Anxiety    Nicotine dependence with current use    Status post lumbar discectomy    Continuous opioid dependence (HCC)    Neuropathic pain of lower extremity, right    Right foot drop    Piriformis syndrome of right side    Abdominal pain    Chronic obstructive pulmonary disease, unspecified COPD type (HCC)       Past Medical History:   Diagnosis Date    Anxiety     Cataract     last assessed 14    Chronic bronchitis (HCC)     Sees pulmonary specialist twice a year    Chronic pain disorder     COPD (chronic obstructive pulmonary disease) (HCC)     Hypothyroidism     Multiple sclerosis (HCC)     CHOCO (obstructive sleep apnea)     uses cpap, 3/19/21 -pt states will bring CPAP for DOS 3/23/21    Osteopetrosis     Peroneal muscle atrophy     Shortness of breath     Smoker     Thoracolumbar radiculopathy due to intervertebral disc disorder        Past Surgical History:   Procedure Laterality Date    APPENDECTOMY      CATARACT EXTRACTION       SECTION      CHOLECYSTECTOMY      GALLBLADDER SURGERY      NH COLONOSCOPY FLX DX W/COLLJ SPEC WHEN PFRMD N/A 2018    Procedure: COLONOSCOPY;  Surgeon: Padilla Gonzales MD;  Location: [facility];  Service: Colorectal    NH LAMNOTMY INCL W/DCMPRSN NRV ROOT 1 INTRSPC LUMBR Right 3/23/2021    Procedure: MINIMALLY INVASIVE LUMBAR FAR LATERAL DISCECTOMY L4-5, RIGHT;  Surgeon: Estuardo Dwyer MD;  Location:  MAIN OR;  Service: Neurosurgery    THROAT SURGERY         Family History   Problem Relation Age of Onset    Skin cancer Mother     Hypertension Father         benign essential    Stroke Father     Lung cancer Brother        Social History     Occupational History    Occupation: retired     Comment: part time as per [employer]   Tobacco Use    Smoking status: Every Day     Current packs/day: 1.00     Average packs/day: 1 pack/day for 66.2 years (66.2 ttl pk-yrs)     Types: Cigarettes      "Start date: 2/16/1960    Smokeless tobacco: Never   Vaping Use    Vaping status: Former    Substances: Nicotine   Substance and Sexual Activity    Alcohol use: Yes     Alcohol/week: 1.0 - 2.0 standard drink of alcohol     Types: 1 - 2 Glasses of wine per week     Comment: once daily beer    Drug use: Yes     Types: Morphine     Comment: prescribed morphine    Sexual activity: Yes     Partners: Male       Current Outpatient Medications on File Prior to Visit   Medication Sig    amitriptyline (ELAVIL) 50 mg tablet TAKE 1 TABLET BY MOUTH EVERYDAY AT BEDTIME    cholecalciferol (VITAMIN D3) 1,000 units tablet Take 1,000 Units by mouth in the morning.    cyclobenzaprine (FLEXERIL) 5 mg tablet Take 2 tablets (10 mg total) by mouth every 8 (eight) hours as needed for muscle spasms    dicyclomine (BENTYL) 20 mg tablet Take 1 tablet (20 mg total) by mouth 2 (two) times a day    dicyclomine (BENTYL) 20 mg tablet Take 1 tablet (20 mg total) by mouth every 6 (six) hours    levothyroxine 100 mcg tablet TAKE 1 TABLET BY MOUTH EVERY DAY    morphine (MSIR) 15 mg tablet Take 1 tablet (15 mg total) by mouth every 8 (eight) hours as needed for severe pain Max Daily Amount: 45 mg    Multiple Vitamins-Minerals (ICAPS AREDS 2 PO) Take by mouth daily     pantoprazole (PROTONIX) 20 mg tablet Take 1 tablet (20 mg total) by mouth daily     Current Facility-Administered Medications on File Prior to Visit   Medication    denosumab (PROLIA) subcutaneous injection 60 mg       Allergies   Allergen Reactions    Adhesive [Medical Tape] Rash    Latex Rash       Physical Exam:    /66   Pulse 72   Resp 16   Ht 5' 3\" (1.6 m)   Wt 59.9 kg (132 lb)   BMI 23.38 kg/m²     Constitutional:normal, well developed, well nourished, alert, in no distress and non-toxic and no overt pain behavior.  Eyes:anicteric  HEENT:grossly intact  Neck:supple, symmetric, trachea midline and no masses   Pulmonary:even and unlabored  Cardiovascular:No edema or pitting " edema present  Skin:Normal without rashes or lesions and well hydrated  Psychiatric:Mood and affect appropriate  Neurologic:Cranial Nerves II-XII grossly intact  Musculoskeletal:antalgic    Lumbar Spine Exam    Appearance:  Normal lordosis  Palpation/Tenderness:  left lumbar paraspinal tenderness  right lumbar paraspinal tenderness  Sensory:  no sensory deficits noted  Range of Motion:  Flexion:  Minimally limited  with pain  Extension:  Minimally limited  with pain  Motor Strength:  Left hip flexion:  5/5  Right hip flexion:  5/5  Left knee flexion:  5/5  Left knee extension:  5/5  Right knee flexion:  5/5  Right knee extension:  5/5  Left foot dorsiflexion:  5/5  Left foot plantar flexion:  5/5  Right foot dorsiflexion:  5/5  Right foot plantar flexion:  5/5        Imaging

## 2024-05-16 ENCOUNTER — TELEPHONE (OUTPATIENT)
Age: 79
End: 2024-05-16

## 2024-05-16 DIAGNOSIS — R10.30 LOWER ABDOMINAL PAIN: ICD-10-CM

## 2024-05-16 RX ORDER — DICYCLOMINE HCL 20 MG
20 TABLET ORAL EVERY 6 HOURS
Qty: 360 TABLET | Refills: 1 | Status: SHIPPED | OUTPATIENT
Start: 2024-05-16

## 2024-05-16 NOTE — TELEPHONE ENCOUNTER
Reason for call: prior auth dicyclomine   [] Refill   [x] Prior Auth  [] Other:     Office:   [] PCP/Provider -   [x] Specialty/Provider - gastro/ rene     Medication: dicyclomine     Dose/Frequency: 20 mg/ 1 tab every 6 hours     Quantity: 90 day supply     Pharmacy: [facility]

## 2024-05-16 NOTE — TELEPHONE ENCOUNTER
PA for dicyclomine    Submitted via    []CMM-KEY   [x]Debbie-Case ID # Case ID:[id number]:[phone]   []Faxed to plan   []Other website   []Phone call Case ID #    Office notes sent, clinical questions answered. Awaiting determination    Turnaround time for your insurance to make a decision on your Prior Authorization can take 7-21 business days.

## 2024-05-18 NOTE — TELEPHONE ENCOUNTER
PA for dicyclomine Denied    Reason:(Screenshot if applicable)        Message sent to office clinical pool Yes    Denial letter scanned into Media Yes    Appeal started No ( Provider will need to decide if appeal is warranted and send clinical documentation to PA team for initiation.)    **Please follow up with your patient regarding denial and next steps**

## 2024-05-21 ENCOUNTER — TREATMENT (OUTPATIENT)
Dept: GASTROENTEROLOGY | Facility: CLINIC | Age: 79
End: 2024-05-21

## 2024-05-21 DIAGNOSIS — R10.84 GENERALIZED ABDOMINAL PAIN: Primary | ICD-10-CM

## 2024-05-21 RX ORDER — HYOSCYAMINE SULFATE 0.125 MG
0.12 TABLET ORAL EVERY 4 HOURS PRN
Qty: 30 TABLET | Refills: 0 | Status: SHIPPED | OUTPATIENT
Start: 2024-05-21

## 2024-05-22 NOTE — TELEPHONE ENCOUNTER
Called pt LMOM advising dicyclomine was denied by insurance. Alternative medication called hyoscyamine was sent to pharmacy. If any questions to call the office.

## 2024-06-03 ENCOUNTER — HOSPITAL ENCOUNTER (OUTPATIENT)
Dept: RADIOLOGY | Facility: CLINIC | Age: 79
Discharge: HOME | End: 2024-06-03
Payer: MEDICARE

## 2024-06-03 VITALS
RESPIRATION RATE: 20 BRPM | TEMPERATURE: 98.4 F | HEART RATE: 88 BPM | DIASTOLIC BLOOD PRESSURE: 71 MMHG | SYSTOLIC BLOOD PRESSURE: 119 MMHG | OXYGEN SATURATION: 100 %

## 2024-06-03 DIAGNOSIS — M51.16 INTERVERTEBRAL DISC DISORDER WITH RADICULOPATHY OF LUMBAR REGION: ICD-10-CM

## 2024-06-03 PROCEDURE — 62323 NJX INTERLAMINAR LMBR/SAC: CPT | Performed by: PHYSICAL MEDICINE & REHABILITATION

## 2024-06-03 RX ORDER — METHYLPREDNISOLONE ACETATE 80 MG/ML
80 INJECTION, SUSPENSION INTRA-ARTICULAR; INTRALESIONAL; INTRAMUSCULAR; PARENTERAL; SOFT TISSUE ONCE
Status: COMPLETED | OUTPATIENT
Start: 2024-06-03 | End: 2024-06-03

## 2024-06-03 RX ADMIN — METHYLPREDNISOLONE ACETATE 80 MG: 80 INJECTION, SUSPENSION INTRA-ARTICULAR; INTRALESIONAL; INTRAMUSCULAR; PARENTERAL; SOFT TISSUE at 10:19

## 2024-06-03 RX ADMIN — IOHEXOL 1 ML: 300 INJECTION, SOLUTION INTRAVENOUS at 10:19

## 2024-06-03 NOTE — DISCHARGE INSTR - LAB
Epidural Steroid Injection   WHAT YOU NEED TO KNOW:   An epidural steroid injection (GEOVANNA) is a procedure to inject steroid medicine into the epidural space. The epidural space is between your spinal cord and vertebrae. Steroids reduce inflammation and fluid buildup in your spine that may be causing pain. You may be given pain medicine along with the steroids.          ACTIVITY  Do not drive or operate machinery today.  No strenuous activity today - bending, lifting, etc.  You may resume normal activites starting tomorrow - start slowly and as tolerated.  You may shower today, but no tub baths or hot tubs.  You may have numbness for several hours from the local anesthetic. Please use caution and common sense, especially with weight-bearing activities.    CARE OF THE INJECTION SITE  If you have soreness or pain, apply ice to the area today (20 minutes on/20 minutes off).  Starting tomorrow, you may use warm, moist heat or ice if needed.  You may have an increase or change in your discomfort for 36-48 hours after your treatment.  Apply ice and continue with any pain medication you have been prescribed.  Notify the [facility] if you have any of the following: redness, drainage, swelling, headache, stiff neck or fever above 100°F.    SPECIAL INSTRUCTIONS  Our office will contact you in approximately 14 days for a progress report.    MEDICATIONS  Continue to take all routine medications.  Our office may have instructed you to hold some medications.    As no general anesthesia was used in today's procedure, you should not experience any side effects related to anesthesia.     If you are diabetic, the steroids used in today's injection may temporarily increase your blood sugar levels after the first few days after your injection. Please keep a close eye on your sugars and alert the doctor who manages your diabetes if your sugars are significantly high from your baseline or you are symptomatic.     If you have a  problem specifically related to your procedure, please call our office at [phone].  Problems not related to your procedure should be directed to your primary care physician.

## 2024-06-03 NOTE — H&P
History of Present Illness: The patient is a 79 y.o. female who presents with complaints of low back pain    Past Medical History:   Diagnosis Date    Anxiety     Cataract     last assessed 14    Chronic bronchitis (HCC)     Sees pulmonary specialist twice a year    Chronic pain disorder     COPD (chronic obstructive pulmonary disease) (HCC)     Hypothyroidism     Multiple sclerosis (HCC)     CHOCO (obstructive sleep apnea)     uses cpap, 3/19/21 -pt states will bring CPAP for DOS 3/23/21    Osteopetrosis     Peroneal muscle atrophy     Shortness of breath     Smoker     Thoracolumbar radiculopathy due to intervertebral disc disorder        Past Surgical History:   Procedure Laterality Date    APPENDECTOMY      CATARACT EXTRACTION       SECTION      CHOLECYSTECTOMY      GALLBLADDER SURGERY      NH COLONOSCOPY FLX DX W/COLLJ SPEC WHEN PFRMD N/A 2018    Procedure: COLONOSCOPY;  Surgeon: Padilla Gonzales MD;  Location: [facility];  Service: Colorectal    NH LAMNOTMY INCL W/DCMPRSN NRV ROOT 1 INTRSPC LUMBR Right 3/23/2021    Procedure: MINIMALLY INVASIVE LUMBAR FAR LATERAL DISCECTOMY L4-5, RIGHT;  Surgeon: Estuardo Dwyer MD;  Location:  MAIN OR;  Service: Neurosurgery    THROAT SURGERY           Current Outpatient Medications:     amitriptyline (ELAVIL) 50 mg tablet, TAKE 1 TABLET BY MOUTH EVERYDAY AT BEDTIME, Disp: 90 tablet, Rfl: 1    cholecalciferol (VITAMIN D3) 1,000 units tablet, Take 1,000 Units by mouth in the morning., Disp: , Rfl:     cyclobenzaprine (FLEXERIL) 5 mg tablet, Take 2 tablets (10 mg total) by mouth every 8 (eight) hours as needed for muscle spasms, Disp: 60 tablet, Rfl: 3    dicyclomine (BENTYL) 20 mg tablet, Take 1 tablet (20 mg total) by mouth 2 (two) times a day, Disp: 20 tablet, Rfl: 0    dicyclomine (BENTYL) 20 mg tablet, TAKE 1 TABLET BY MOUTH EVERY 6 HOURS., Disp: 360 tablet, Rfl: 1    hyoscyamine (ANASPAZ,LEVSIN) 0.125 MG tablet, Take 1 tablet (0.125 mg total) by mouth every  4 (four) hours as needed for cramping, Disp: 30 tablet, Rfl: 0    levothyroxine 100 mcg tablet, TAKE 1 TABLET BY MOUTH EVERY DAY, Disp: 90 tablet, Rfl: 3    morphine (MSIR) 15 mg tablet, Take 1 tablet (15 mg total) by mouth every 8 (eight) hours as needed for severe pain Max Daily Amount: 45 mg, Disp: 90 tablet, Rfl: 0    Multiple Vitamins-Minerals (ICAPS AREDS 2 PO), Take by mouth daily , Disp: , Rfl:     pantoprazole (PROTONIX) 20 mg tablet, Take 1 tablet (20 mg total) by mouth daily, Disp: 20 tablet, Rfl: 0    Current Facility-Administered Medications:     denosumab (PROLIA) subcutaneous injection 60 mg, 60 mg, Subcutaneous, Q6 Months, Garcia Watson DO, 60 mg at 04/18/24 0937    iohexol (OMNIPAQUE) 300 mg/mL injection 1 mL, 1 mL, Epidural, Once, Shayne Clemente DO    methylPREDNISolone acetate (DEPO-MEDROL) injection 80 mg, 80 mg, Epidural, Once, Shayne Clemente DO    Allergies   Allergen Reactions    Adhesive [Medical Tape] Rash    Latex Rash       Physical Exam: There were no vitals filed for this visit.  General: Awake, Alert, Oriented x 3, Mood and affect appropriate  Respiratory: Respirations even and unlabored  Cardiovascular: Peripheral pulses intact; no edema  Musculoskeletal Exam: Tenderness palpation bilateral lumbar paraspinals    ASA Score: 2         Assessment:   1. Intervertebral disc disorder with radiculopathy of lumbar region        Plan: L4-L5 LESI

## 2024-06-05 DIAGNOSIS — F11.20 CONTINUOUS OPIOID DEPENDENCE (HCC): ICD-10-CM

## 2024-06-05 RX ORDER — MORPHINE SULFATE 15 MG/1
15 TABLET ORAL EVERY 8 HOURS PRN
Qty: 90 TABLET | Refills: 0 | Status: SHIPPED | OUTPATIENT
Start: 2024-06-05

## 2024-06-17 ENCOUNTER — TELEPHONE (OUTPATIENT)
Dept: RADIOLOGY | Facility: MEDICAL CENTER | Age: 79
End: 2024-06-17

## 2024-06-17 NOTE — TELEPHONE ENCOUNTER
Patient Reports  25       %     improvement post injection    Pain Level     /10   Top right  thigh is still very painful    Patient would like a call back to discuss what else can be done to help with the pain

## 2024-06-20 ENCOUNTER — TELEPHONE (OUTPATIENT)
Dept: PAIN MEDICINE | Facility: CLINIC | Age: 79
End: 2024-06-20

## 2024-06-25 NOTE — PATIENT INSTRUCTIONS
Lumbar Radiculopathy   WHAT YOU NEED TO KNOW:   Lumbar radiculopathy is a painful condition that happens when a nerve in your lumbar spine (lower back) is pinched or irritated  Nerves control feeling and movement in your body  You may have numbness or pain that shoots down from your lower back towards your foot  DISCHARGE INSTRUCTIONS:   Medicines:   · Medicines:     ? NSAIDs , such as ibuprofen, help decrease swelling, pain, and fever  This medicine is available with or without a doctor's order  NSAIDs can cause stomach bleeding or kidney problems in certain people  If you take blood thinner medicine, always ask your healthcare provider if NSAIDs are safe for you  Always read the medicine label and follow directions  ? Muscle relaxers  help decrease pain and muscle spasms  ? Opioids: This is a strong medicine given to reduce severe pain  It is also called narcotic pain medicine  Take this medicine exactly as directed by your healthcare provider  ? Oral steroids: Steroids may also be given to reduce pain and swelling  ? Take your medicine as directed  Contact your healthcare provider if you think your medicine is not helping or if you have side effects  Tell him of her if you are allergic to any medicine  Keep a list of the medicines, vitamins, and herbs you take  Include the amounts, and when and why you take them  Bring the list or the pill bottles to follow-up visits  Carry your medicine list with you in case of an emergency  Follow up with your healthcare provider or spine specialist within 1 to 3 weeks:  After your first follow-up appointment, return to your healthcare provider or spine specialist every 2 weeks until you have healed  Ask for information about physical therapy for your condition  Write down your questions so you remember to ask them during your visits  Physical therapy:  You may need physical therapy to improve your condition   Your physical therapist may teach you certain Patient was a no show with Jodie Saleh  today in Sleep Medicine.    No show #1 this year.  Please send info/warning letter.   exercises to improve posture (the way you stand and sit), flexibility, and strength in your lower back  Self care:   · Stay active: It is best to be active when you have lumbar radiculopathy  Your physical therapist or healthcare provider may tell you to take walks to ease yourself back into your daily routine  Avoid long periods of bed rest  Bed rest could worsen your symptoms  Do not move in ways that increase your pain  Ask for more information about the best ways to stay active  · Use ice or heat packs:  Use ice or heat packs as directed on the sore area of your body to decrease the pain and swelling  Put ice in a plastic bag covered with a towel on your low back  Cover heated items with a towel to avoid burns  Use ice and heat as directed  · Avoid heavy lifting: Your condition may worsen if you lift heavy things  Avoid lifting if possible  · Maintain a healthy weight:  Excess body weight may strain your back  Talk with your healthcare provider about ways to lose excess weight if you are overweight  Contact your healthcare provider or spine specialist if:   · Your pain does not improve within 1 to 3 weeks after treatment  · Your pain and weakness keep you from your normal activities at work, home, or school  · You lose more than 10 pounds in 6 months without trying  · You become depressed or sad because of the pain  · You have questions or concerns about your condition or care  Return to the emergency department if:   · You have a fever greater than 100 4°F for longer than 2 days  · You have new, severe back or leg pain, or your pain spreads to both legs  · You have any new signs of numbness or weakness, especially in your lower back, legs, arms, or genital area  · You have new trouble controlling your urine and bowel movements  · You do not feel like your bladder empties when you urinate      © Copyright [employer] 2020 Information is for End User's use only and may not be sold, redistributed or otherwise used for commercial purposes  All illustrations and images included in CareNotes® are the copyrighted property of A D A M , Inc  or Carlos Spivey  The above information is an  only  It is not intended as medical advice for individual conditions or treatments  Talk to your doctor, nurse or pharmacist before following any medical regimen to see if it is safe and effective for you

## 2024-07-17 DIAGNOSIS — F11.20 CONTINUOUS OPIOID DEPENDENCE (HCC): ICD-10-CM

## 2024-07-17 RX ORDER — MORPHINE SULFATE 15 MG/1
15 TABLET ORAL EVERY 8 HOURS PRN
Qty: 90 TABLET | Refills: 0 | Status: SHIPPED | OUTPATIENT
Start: 2024-07-17

## 2024-07-17 NOTE — TELEPHONE ENCOUNTER
Reason for call:   [x] Refill   [] Prior Auth  [] Other:     Office:   [x] PCP/Provider - Walter  [] Specialty/Provider -         Does the patient have enough for 3 days?   [x] Yes   [] No - Send as HP to POD

## 2024-08-05 ENCOUNTER — APPOINTMENT (OUTPATIENT)
Age: 79
End: 2024-08-05
Payer: MEDICARE

## 2024-08-05 ENCOUNTER — OFFICE VISIT (OUTPATIENT)
Age: 79
End: 2024-08-05
Payer: MEDICARE

## 2024-08-05 VITALS
BODY MASS INDEX: 22.43 KG/M2 | WEIGHT: 126.6 LBS | HEART RATE: 82 BPM | DIASTOLIC BLOOD PRESSURE: 74 MMHG | TEMPERATURE: 97.3 F | SYSTOLIC BLOOD PRESSURE: 120 MMHG | RESPIRATION RATE: 18 BRPM | OXYGEN SATURATION: 99 % | HEIGHT: 63 IN

## 2024-08-05 DIAGNOSIS — E03.9 ACQUIRED HYPOTHYROIDISM: ICD-10-CM

## 2024-08-05 DIAGNOSIS — G35 MULTIPLE SCLEROSIS (HCC): ICD-10-CM

## 2024-08-05 DIAGNOSIS — J44.9 CHRONIC OBSTRUCTIVE PULMONARY DISEASE, UNSPECIFIED COPD TYPE (HCC): ICD-10-CM

## 2024-08-05 DIAGNOSIS — F11.20 CONTINUOUS OPIOID DEPENDENCE (HCC): Primary | ICD-10-CM

## 2024-08-05 LAB
ANION GAP SERPL CALCULATED.3IONS-SCNC: 9 MMOL/L (ref 4–13)
BUN SERPL-MCNC: 16 MG/DL (ref 5–25)
CALCIUM SERPL-MCNC: 9 MG/DL (ref 8.4–10.2)
CHLORIDE SERPL-SCNC: 103 MMOL/L (ref 96–108)
CO2 SERPL-SCNC: 27 MMOL/L (ref 21–32)
CREAT SERPL-MCNC: 0.62 MG/DL (ref 0.6–1.3)
ERYTHROCYTE [DISTWIDTH] IN BLOOD BY AUTOMATED COUNT: 14.6 % (ref 11.6–15.1)
GFR SERPL CREATININE-BSD FRML MDRD: 84 ML/MIN/1.73SQ M
GLUCOSE P FAST SERPL-MCNC: 81 MG/DL (ref 65–99)
HCT VFR BLD AUTO: 41.3 % (ref 34.8–46.1)
HGB BLD-MCNC: 13.2 G/DL (ref 11.5–15.4)
MCH RBC QN AUTO: 32 PG (ref 26.8–34.3)
MCHC RBC AUTO-ENTMCNC: 32 G/DL (ref 31.4–37.4)
MCV RBC AUTO: 100 FL (ref 82–98)
PLATELET # BLD AUTO: 282 THOUSANDS/UL (ref 149–390)
PMV BLD AUTO: 10.5 FL (ref 8.9–12.7)
POTASSIUM SERPL-SCNC: 3.9 MMOL/L (ref 3.5–5.3)
RBC # BLD AUTO: 4.13 MILLION/UL (ref 3.81–5.12)
SODIUM SERPL-SCNC: 139 MMOL/L (ref 135–147)
WBC # BLD AUTO: 9.04 THOUSAND/UL (ref 4.31–10.16)

## 2024-08-05 PROCEDURE — 85027 COMPLETE CBC AUTOMATED: CPT

## 2024-08-05 PROCEDURE — 99214 OFFICE O/P EST MOD 30 MIN: CPT | Performed by: INTERNAL MEDICINE

## 2024-08-05 PROCEDURE — 80048 BASIC METABOLIC PNL TOTAL CA: CPT

## 2024-08-05 PROCEDURE — G2211 COMPLEX E/M VISIT ADD ON: HCPCS | Performed by: INTERNAL MEDICINE

## 2024-08-05 PROCEDURE — 84443 ASSAY THYROID STIM HORMONE: CPT

## 2024-08-05 PROCEDURE — 36415 COLL VENOUS BLD VENIPUNCTURE: CPT

## 2024-08-05 RX ORDER — DORZOLAMIDE HYDROCHLORIDE AND TIMOLOL MALEATE 20; 5 MG/ML; MG/ML
SOLUTION/ DROPS OPHTHALMIC
COMMUNITY
Start: 2024-07-18

## 2024-08-05 NOTE — ASSESSMENT & PLAN NOTE
Monitor thyroid function. Continue levothyroxine.    Orders:    TSH, 3rd generation with Free T4 reflex; Future

## 2024-08-05 NOTE — PROGRESS NOTES
"  Assessment & Plan  Continuous opioid dependence (HCC)    Continue following with pain management. Recently got an epidural injection. Continue opiates as needed for severe pain. No abusive or aberrant behavior.     Orders:    Hydrocodone and Metabolites, Urine; Future    TRAMADOL, URINE; Future    Oxycodone/Oxymorphone urine; Future    Drug Screen Routine w /Conf and Adulteration, urine.; Future    Chronic obstructive pulmonary disease, unspecified COPD type (HCC)    Stable but unfortunately continues to smoke. Monitor.    Orders:    CBC and differential; Future    Comprehensive metabolic panel; Future    Acquired hypothyroidism    Monitor thyroid function. Continue levothyroxine.    Orders:    TSH, 3rd generation with Free T4 reflex; Future      Tobacco Cessation Counseling: Tobacco cessation counseling was provided. The patient is sincerely urged to quit consumption of tobacco. She is not ready to quit tobacco.       History of Present Illness     History of Present Illness  The patient presents for routine follow-up.    She reports no chest pain, shortness of breath, or palpitations. Her heart and stomach conditions have shown slight improvement.    Since her last visit, she has experienced another pinched nerve at L4. She is under the care of Dr. Clemente, a pain management specialist, and is considering another epidural injection. She has a history of L4 and L5 surgery. She also suffers from arthritis.     Review of Systems   Constitutional: Negative.    Respiratory: Negative.     Cardiovascular: Negative.    Gastrointestinal: Negative.    Musculoskeletal:  Positive for back pain.     Objective     /74 (BP Location: Left arm, Patient Position: Sitting, Cuff Size: Standard)   Pulse 82   Temp (!) 97.3 °F (36.3 °C) (Tympanic)   Resp 18   Ht 5' 3\" (1.6 m)   Wt 57.4 kg (126 lb 9.6 oz)   SpO2 99%   BMI 22.43 kg/m²     Physical Exam  Constitutional:       General: She is not in acute distress.     " Appearance: She is not ill-appearing.   Cardiovascular:      Rate and Rhythm: Normal rate and regular rhythm.      Heart sounds: No murmur heard.  Pulmonary:      Effort: Pulmonary effort is normal. No respiratory distress.      Breath sounds: No wheezing.   Abdominal:      General: Bowel sounds are normal. There is no distension.      Tenderness: There is no abdominal tenderness.   Musculoskeletal:      Right lower leg: No edema.      Left lower leg: No edema.   Neurological:      Mental Status: She is alert.       Garcia Bustamanteblake, DO

## 2024-08-05 NOTE — ASSESSMENT & PLAN NOTE
Stable but unfortunately continues to smoke. Monitor.    Orders:    CBC and differential; Future    Comprehensive metabolic panel; Future

## 2024-08-05 NOTE — ASSESSMENT & PLAN NOTE
Continue following with pain management. Recently got an epidural injection. Continue opiates as needed for severe pain. No abusive or aberrant behavior.     Orders:    Hydrocodone and Metabolites, Urine; Future    TRAMADOL, URINE; Future    Oxycodone/Oxymorphone urine; Future    Drug Screen Routine w /Conf and Adulteration, urine.; Future

## 2024-08-06 ENCOUNTER — TELEPHONE (OUTPATIENT)
Age: 79
End: 2024-08-06

## 2024-08-06 LAB — TSH SERPL DL<=0.05 MIU/L-ACNC: 1.84 UIU/ML (ref 0.45–4.5)

## 2024-08-06 NOTE — TELEPHONE ENCOUNTER
----- Message from Garcia [facility], DO sent at 8/6/2024  6:50 AM EDT -----  Labs are normal

## 2024-08-22 ENCOUNTER — OFFICE VISIT (OUTPATIENT)
Dept: PAIN MEDICINE | Facility: CLINIC | Age: 79
End: 2024-08-22
Payer: MEDICARE

## 2024-08-22 VITALS
HEART RATE: 80 BPM | BODY MASS INDEX: 22.5 KG/M2 | DIASTOLIC BLOOD PRESSURE: 66 MMHG | WEIGHT: 127 LBS | RESPIRATION RATE: 16 BRPM | SYSTOLIC BLOOD PRESSURE: 106 MMHG | HEIGHT: 63 IN

## 2024-08-22 DIAGNOSIS — M54.15 THORACOLUMBAR RADICULOPATHY DUE TO INTERVERTEBRAL DISC DISORDER: ICD-10-CM

## 2024-08-22 DIAGNOSIS — G35 MULTIPLE SCLEROSIS (HCC): ICD-10-CM

## 2024-08-22 DIAGNOSIS — M51.16 INTERVERTEBRAL DISC DISORDER WITH RADICULOPATHY OF LUMBAR REGION: Primary | ICD-10-CM

## 2024-08-22 PROCEDURE — 99214 OFFICE O/P EST MOD 30 MIN: CPT | Performed by: PHYSICAL MEDICINE & REHABILITATION

## 2024-08-22 NOTE — PROGRESS NOTES
Assessment:  1. Intervertebral disc disorder with radiculopathy of lumbar region    2. Thoracolumbar radiculopathy due to intervertebral disc disorder    3. Multiple sclerosis (HCC)        Plan:  Ms. Arboleda presents to [facility] for follow-up and reevaluation regarding low back pain with radiating symptoms into the right lower extremity.  We recently performed an L4-L5 LESI June 2024 which did provide significant relief with a gradual return of symptoms into the anterolateral right thigh corresponding to the L4 dermatomal distribution.  On further review of the MRI she does have scar tissue, granulation tissue at L4-L5 greatly impacting the exiting L4 nerve root.  At this time we will plan for repeat injection from an alternative approach and plan for a right-sided L4-L5 and L5-S1 TFESI under fluoroscopy guidance.  All questions answered, patient is agreeable with plan.    Complete risks and benefits including bleeding, infection, tissue reaction, nerve injury and allergic reaction were discussed. The approach was demonstrated using models and literature was provided. Verbal and written consent was obtained.    My impressions and treatment recommendations were discussed in detail with the patient who verbalized understanding and had no further questions.  Discharge instructions were provided. I personally saw and examined the patient and I agree with the above discussed plan of care.    Orders Placed This Encounter   Procedures    FL spine and pain procedure     Standing Status:   Future     Standing Expiration Date:   8/22/2028     Order Specific Question:   Reason for Exam:     Answer:   right sided L4-L5 and L5-S1 TFESI     Order Specific Question:   Anticoagulant hold needed?     Answer:   No     No orders of the defined types were placed in this encounter.      History of Present Illness:  Crissy Arboleda is a 79 y.o. female who presents for a follow up office visit in regards to Back  Pain and Leg Pain (RLE).   The patient’s current symptoms include low back pain with radiating symptoms into the right leg currently rated 6-8 out of 10 and interfere with activities.  Pain is described as a constant throbbing, shooting, aching sensation.  Presents today for follow-up and reevaluation.    I have personally reviewed and/or updated the patient's past medical history, past surgical history, family history, social history, current medications, allergies, and vital signs today.     Review of Systems   Respiratory:  Negative for shortness of breath.    Cardiovascular:  Negative for chest pain.   Gastrointestinal:  Positive for constipation and nausea. Negative for diarrhea and vomiting.   Musculoskeletal:  Positive for back pain, gait problem and joint swelling. Negative for arthralgias and myalgias.        RLE Pain   Skin:  Negative for rash.   Neurological:  Negative for dizziness, seizures and weakness.   All other systems reviewed and are negative.      Patient Active Problem List   Diagnosis    CHOCO on CPAP    Hypothyroidism    Insomnia    Lung nodule    Macular degeneration    Multiple sclerosis ([facility])    Rosacea    Seborrheic keratosis    Chronic neuropathic pain    Thoracolumbar radiculopathy due to intervertebral disc disorder    Other constipation    Intervertebral disc disorder with radiculopathy of lumbar region    Chronic pain syndrome    Anxiety    Nicotine dependence with current use    Status post lumbar discectomy    Continuous opioid dependence ([facility])    Neuropathic pain of lower extremity, right    Right foot drop    Piriformis syndrome of right side    Abdominal pain    Chronic obstructive pulmonary disease, unspecified COPD type ([facility])       Past Medical History:   Diagnosis Date    Anxiety     Cataract     last assessed 4/25/14    Chronic bronchitis ([facility])     Sees pulmonary specialist twice a year    Chronic pain disorder     COPD (chronic obstructive pulmonary disease) ([facility])      Hypothyroidism     Multiple sclerosis (HCC)     CHOCO (obstructive sleep apnea)     uses cpap, 3/19/21 -pt states will bring CPAP for DOS 3/23/21    Osteopetrosis     Peroneal muscle atrophy     Shortness of breath     Smoker     Thoracolumbar radiculopathy due to intervertebral disc disorder        Past Surgical History:   Procedure Laterality Date    APPENDECTOMY      CATARACT EXTRACTION       SECTION      CHOLECYSTECTOMY      GALLBLADDER SURGERY      NH COLONOSCOPY FLX DX W/COLLJ SPEC WHEN PFRMD N/A 2018    Procedure: COLONOSCOPY;  Surgeon: Padilla Gonzales MD;  Location: [facility];  Service: Colorectal    NH LAMNOTMY INCL W/DCMPRSN NRV ROOT 1 INTRSPC LUMBR Right 3/23/2021    Procedure: MINIMALLY INVASIVE LUMBAR FAR LATERAL DISCECTOMY L4-5, RIGHT;  Surgeon: Estuardo Dwyer MD;  Location:  MAIN OR;  Service: Neurosurgery    THROAT SURGERY         Family History   Problem Relation Age of Onset    Skin cancer Mother     Hypertension Father         benign essential    Stroke Father     Lung cancer Brother        Social History     Occupational History    Occupation: retired     Comment: part time as per [employer]   Tobacco Use    Smoking status: Every Day     Current packs/day: 1.00     Average packs/day: 1 pack/day for 66.5 years (66.5 ttl pk-yrs)     Types: Cigarettes     Start date: 1958    Smokeless tobacco: Never   Vaping Use    Vaping status: Former    Substances: Nicotine   Substance and Sexual Activity    Alcohol use: Yes     Alcohol/week: 1.0 - 2.0 standard drink of alcohol     Types: 1 - 2 Glasses of wine per week     Comment: once daily beer    Drug use: Yes     Types: Morphine     Comment: prescribed morphine    Sexual activity: Yes     Partners: Male       Current Outpatient Medications on File Prior to Visit   Medication Sig    amitriptyline (ELAVIL) 50 mg tablet TAKE 1 TABLET BY MOUTH EVERYDAY AT BEDTIME    cholecalciferol (VITAMIN D3) 1,000 units tablet Take 1,000 Units by mouth in  "the morning.    dicyclomine (BENTYL) 20 mg tablet Take 1 tablet (20 mg total) by mouth 2 (two) times a day (Patient taking differently: Take 20 mg by mouth if needed)    dicyclomine (BENTYL) 20 mg tablet TAKE 1 TABLET BY MOUTH EVERY 6 HOURS.    dorzolamide-timolol (COSOPT) 2-0.5 % ophthalmic solution INSTILL 1 DROP IN LEFT EYE TWICE DAILY    hyoscyamine (ANASPAZ,LEVSIN) 0.125 MG tablet Take 1 tablet (0.125 mg total) by mouth every 4 (four) hours as needed for cramping    levothyroxine 100 mcg tablet TAKE 1 TABLET BY MOUTH EVERY DAY    morphine (MSIR) 15 mg tablet Take 1 tablet (15 mg total) by mouth every 8 (eight) hours as needed for severe pain Max Daily Amount: 45 mg    Multiple Vitamins-Minerals (ICAPS AREDS 2 PO) Take by mouth daily     cyclobenzaprine (FLEXERIL) 5 mg tablet Take 2 tablets (10 mg total) by mouth every 8 (eight) hours as needed for muscle spasms     Current Facility-Administered Medications on File Prior to Visit   Medication    denosumab (PROLIA) subcutaneous injection 60 mg       Allergies   Allergen Reactions    Adhesive [Medical Tape] Rash    Latex Rash       Physical Exam:    /66   Pulse 80   Resp 16   Ht 5' 3\" (1.6 m)   Wt 57.6 kg (127 lb)   BMI 22.50 kg/m²     Constitutional:normal, well developed, well nourished, alert, in no distress and non-toxic and no overt pain behavior.  Eyes:anicteric  HEENT:grossly intact  Neck:supple, symmetric, trachea midline and no masses   Pulmonary:even and unlabored  Cardiovascular:No edema or pitting edema present  Skin:Normal without rashes or lesions and well hydrated  Psychiatric:Mood and affect appropriate  Neurologic:Cranial Nerves II-XII grossly intact  Musculoskeletal:antalgic    Imaging    "

## 2024-09-03 DIAGNOSIS — F11.20 CONTINUOUS OPIOID DEPENDENCE (HCC): ICD-10-CM

## 2024-09-03 NOTE — TELEPHONE ENCOUNTER
Reason for call:   [x] Refill   [] Prior Auth  [] Other:     Office:   [x] PCP/Provider - : Garcia Watson DO   [] Specialty/Provider -     Medication: morphine (MSIR) 15 mg tablet     Dose/Frequency: Take 1 tablet (15 mg total) by mouth every 8 (eight) hours as needed for severe pain     Quantity: 90    Pharmacy: [facility]/pharmacy #[id number]  [place] - [address] .      Does the patient have enough for 3 days?   [x] Yes   [] No - Send as HP to POD

## 2024-09-04 RX ORDER — MORPHINE SULFATE 15 MG/1
15 TABLET ORAL EVERY 8 HOURS PRN
Qty: 90 TABLET | Refills: 0 | Status: SHIPPED | OUTPATIENT
Start: 2024-09-04

## 2024-09-16 ENCOUNTER — HOSPITAL ENCOUNTER (OUTPATIENT)
Dept: RADIOLOGY | Facility: CLINIC | Age: 79
Discharge: HOME | End: 2024-09-16
Payer: MEDICARE

## 2024-09-16 VITALS
TEMPERATURE: 97.7 F | HEART RATE: 80 BPM | OXYGEN SATURATION: 96 % | RESPIRATION RATE: 18 BRPM | DIASTOLIC BLOOD PRESSURE: 77 MMHG | SYSTOLIC BLOOD PRESSURE: 128 MMHG

## 2024-09-16 DIAGNOSIS — M54.15 THORACOLUMBAR RADICULOPATHY DUE TO INTERVERTEBRAL DISC DISORDER: ICD-10-CM

## 2024-09-16 DIAGNOSIS — G35 MULTIPLE SCLEROSIS (HCC): ICD-10-CM

## 2024-09-16 DIAGNOSIS — M51.16 INTERVERTEBRAL DISC DISORDER WITH RADICULOPATHY OF LUMBAR REGION: ICD-10-CM

## 2024-09-16 PROCEDURE — 64483 NJX AA&/STRD TFRM EPI L/S 1: CPT | Performed by: PHYSICAL MEDICINE & REHABILITATION

## 2024-09-16 PROCEDURE — 64484 NJX AA&/STRD TFRM EPI L/S EA: CPT | Performed by: PHYSICAL MEDICINE & REHABILITATION

## 2024-09-16 RX ORDER — BUPIVACAINE HCL/PF 2.5 MG/ML
2 VIAL (ML) INJECTION ONCE
Status: COMPLETED | OUTPATIENT
Start: 2024-09-16 | End: 2024-09-16

## 2024-09-16 RX ORDER — LIDOCAINE HYDROCHLORIDE 10 MG/ML
4 INJECTION, SOLUTION EPIDURAL; INFILTRATION; INTRACAUDAL; PERINEURAL ONCE
Status: COMPLETED | OUTPATIENT
Start: 2024-09-16 | End: 2024-09-16

## 2024-09-16 RX ORDER — METHYLPREDNISOLONE ACETATE 40 MG/ML
40 INJECTION, SUSPENSION INTRA-ARTICULAR; INTRALESIONAL; INTRAMUSCULAR; PARENTERAL; SOFT TISSUE ONCE
Status: COMPLETED | OUTPATIENT
Start: 2024-09-16 | End: 2024-09-16

## 2024-09-16 RX ADMIN — IOHEXOL 2 ML: 300 INJECTION, SOLUTION INTRAVENOUS at 16:23

## 2024-09-16 RX ADMIN — BUPIVACAINE HYDROCHLORIDE 2 ML: 2.5 INJECTION, SOLUTION EPIDURAL; INFILTRATION; INTRACAUDAL at 16:23

## 2024-09-16 RX ADMIN — LIDOCAINE HYDROCHLORIDE 4 ML: 10 INJECTION, SOLUTION EPIDURAL; INFILTRATION; INTRACAUDAL; PERINEURAL at 16:21

## 2024-09-16 RX ADMIN — METHYLPREDNISOLONE ACETATE 40 MG: 40 INJECTION, SUSPENSION INTRA-ARTICULAR; INTRALESIONAL; INTRAMUSCULAR; PARENTERAL; SOFT TISSUE at 16:23

## 2024-09-16 NOTE — H&P
History of Present Illness: The patient is a 79 y.o. female who presents with complaints of right sided low back pain    Past Medical History:   Diagnosis Date    Anxiety     Cataract     last assessed 14    Chronic bronchitis (HCC)     Sees pulmonary specialist twice a year    Chronic pain disorder     COPD (chronic obstructive pulmonary disease) (HCC)     Hypothyroidism     Multiple sclerosis (HCC)     CHOCO (obstructive sleep apnea)     uses cpap, 3/19/21 -pt states will bring CPAP for DOS 3/23/21    Osteopetrosis     Peroneal muscle atrophy     Shortness of breath     Smoker     Thoracolumbar radiculopathy due to intervertebral disc disorder        Past Surgical History:   Procedure Laterality Date    APPENDECTOMY      CATARACT EXTRACTION       SECTION      CHOLECYSTECTOMY      GALLBLADDER SURGERY      NV COLONOSCOPY FLX DX W/COLLJ SPEC WHEN PFRMD N/A 2018    Procedure: COLONOSCOPY;  Surgeon: Padilla Gonzales MD;  Location: [facility];  Service: Colorectal    NV LAMNOTMY INCL W/DCMPRSN NRV ROOT 1 INTRSPC LUMBR Right 3/23/2021    Procedure: MINIMALLY INVASIVE LUMBAR FAR LATERAL DISCECTOMY L4-5, RIGHT;  Surgeon: Estuardo Dwyer MD;  Location:  MAIN OR;  Service: Neurosurgery    THROAT SURGERY           Current Outpatient Medications:     amitriptyline (ELAVIL) 50 mg tablet, TAKE 1 TABLET BY MOUTH EVERYDAY AT BEDTIME, Disp: 90 tablet, Rfl: 1    cholecalciferol (VITAMIN D3) 1,000 units tablet, Take 1,000 Units by mouth in the morning., Disp: , Rfl:     cyclobenzaprine (FLEXERIL) 5 mg tablet, Take 2 tablets (10 mg total) by mouth every 8 (eight) hours as needed for muscle spasms, Disp: 60 tablet, Rfl: 3    dicyclomine (BENTYL) 20 mg tablet, Take 1 tablet (20 mg total) by mouth 2 (two) times a day (Patient taking differently: Take 20 mg by mouth if needed), Disp: 20 tablet, Rfl: 0    dicyclomine (BENTYL) 20 mg tablet, TAKE 1 TABLET BY MOUTH EVERY 6 HOURS., Disp: 360 tablet, Rfl: 1    dorzolamide-timolol  (COSOPT) 2-0.5 % ophthalmic solution, INSTILL 1 DROP IN LEFT EYE TWICE DAILY, Disp: , Rfl:     hyoscyamine (ANASPAZ,LEVSIN) 0.125 MG tablet, Take 1 tablet (0.125 mg total) by mouth every 4 (four) hours as needed for cramping, Disp: 30 tablet, Rfl: 0    levothyroxine 100 mcg tablet, TAKE 1 TABLET BY MOUTH EVERY DAY, Disp: 90 tablet, Rfl: 3    morphine (MSIR) 15 mg tablet, Take 1 tablet (15 mg total) by mouth every 8 (eight) hours as needed for severe pain Max Daily Amount: 45 mg, Disp: 90 tablet, Rfl: 0    Multiple Vitamins-Minerals (ICAPS AREDS 2 PO), Take by mouth daily , Disp: , Rfl:     Current Facility-Administered Medications:     bupivacaine (PF) (MARCAINE) 0.25 % injection 2 mL, 2 mL, Epidural, Once, Shayne Clemente DO    denosumab (PROLIA) subcutaneous injection 60 mg, 60 mg, Subcutaneous, Q6 Months, Garcia Watson DO, 60 mg at 04/18/24 0937    iohexol (OMNIPAQUE) 300 mg/mL injection 50 mL, 50 mL, Epidural, Once, Shayne Clemente DO    lidocaine (PF) (XYLOCAINE-MPF) 1 % injection 4 mL, 4 mL, Infiltration, Once, Shayne Clemente DO    methylPREDNISolone acetate (DEPO-MEDROL) injection 40 mg, 40 mg, Perineural, Once, Shayne Clemente DO    Allergies   Allergen Reactions    Adhesive [Medical Tape] Rash    Latex Rash       Physical Exam:   Vitals:    09/16/24 1550   BP: 122/80   Pulse: 83   Resp: 18   Temp: 97.7 °F (36.5 °C)   SpO2: 100%     General: Awake, Alert, Oriented x 3, Mood and affect appropriate  Respiratory: Respirations even and unlabored  Cardiovascular: Peripheral pulses intact; no edema  Musculoskeletal Exam: Tenderness palpation right lateral hip and lumbar paraspinals    ASA Score: 2    Patient/Chart Verification  Patient ID Verified: Verbal  ID Band Applied: No  Consents Confirmed: Procedural, To be obtained in the Pre-Procedure area  H&P( within 30 days) Verified: To be obtained in the Procedural area  Allergies Reviewed: Yes  Anticoag/NSAID held?: No  Currently on antibiotics?:  No    Assessment:   1. Thoracolumbar radiculopathy due to intervertebral disc disorder    2. Multiple sclerosis (HCC)    3. Intervertebral disc disorder with radiculopathy of lumbar region        Plan: right sided L4-L5 and L5-S1 TFESI

## 2024-09-16 NOTE — DISCHARGE INSTR - LAB
Epidural Steroid Injection   WHAT YOU NEED TO KNOW:   An epidural steroid injection (GEOVANNA) is a procedure to inject steroid medicine into the epidural space. The epidural space is between your spinal cord and vertebrae. Steroids reduce inflammation and fluid buildup in your spine that may be causing pain. You may be given pain medicine along with the steroids.          ACTIVITY  Do not drive or operate machinery today.  No strenuous activity today - bending, lifting, etc.  You may resume normal activites starting tomorrow - start slowly and as tolerated.  You may shower today, but no tub baths or hot tubs.  You may have numbness for several hours from the local anesthetic. Please use caution and common sense, especially with weight-bearing activities.    CARE OF THE INJECTION SITE  If you have soreness or pain, apply ice to the area today (20 minutes on/20 minutes off).  Starting tomorrow, you may use warm, moist heat or ice if needed.  You may have an increase or change in your discomfort for 36-48 hours after your treatment.  Apply ice and continue with any pain medication you have been prescribed.  Notify the [facility] if you have any of the following: redness, drainage, swelling, headache, stiff neck or fever above 100°F.    SPECIAL INSTRUCTIONS  Our office will contact you in approximately 14 days for a progress report.    MEDICATIONS  Continue to take all routine medications.  Our office may have instructed you to hold some medications.    As no general anesthesia was used in today's procedure, you should not experience any side effects related to anesthesia.     If you are diabetic, the steroids used in today's injection may temporarily increase your blood sugar levels after the first few days after your injection. Please keep a close eye on your sugars and alert the doctor who manages your diabetes if your sugars are significantly high from your baseline or you are symptomatic.     If you have a  problem specifically related to your procedure, please call our office at [phone].  Problems not related to your procedure should be directed to your primary care physician.

## 2024-09-17 DIAGNOSIS — K58.2 IRRITABLE BOWEL SYNDROME WITH BOTH CONSTIPATION AND DIARRHEA: ICD-10-CM

## 2024-09-17 DIAGNOSIS — E03.9 ACQUIRED HYPOTHYROIDISM: ICD-10-CM

## 2024-09-17 RX ORDER — LEVOTHYROXINE SODIUM 100 UG/1
TABLET ORAL
Qty: 90 TABLET | Refills: 3 | Status: SHIPPED | OUTPATIENT
Start: 2024-09-17

## 2024-09-18 ENCOUNTER — TELEPHONE (OUTPATIENT)
Age: 79
End: 2024-09-18

## 2024-09-18 RX ORDER — AMITRIPTYLINE HYDROCHLORIDE 50 MG/1
TABLET ORAL
Qty: 90 TABLET | Refills: 1 | Status: SHIPPED | OUTPATIENT
Start: 2024-09-18

## 2024-09-18 NOTE — TELEPHONE ENCOUNTER
Caller: Crissy     Doctor: Chyna     Reason for call: Patient calling wanted to express her gratitude to Dr Clemente patient states feels so much better and is able to walk with out pain.Patient states Thank you to Dr Clemente.    Call back#: [phone]

## 2024-09-25 ENCOUNTER — TELEPHONE (OUTPATIENT)
Age: 79
End: 2024-09-25

## 2024-09-30 ENCOUNTER — TELEPHONE (OUTPATIENT)
Dept: PAIN MEDICINE | Facility: CLINIC | Age: 79
End: 2024-09-30

## 2024-09-30 NOTE — TELEPHONE ENCOUNTER
Caller: Patient     Doctor:      Reason for call: Pt reports  50% improvement post injection  Pain level 5/10       Does not wake up with pain in the AM but as she is up and moving the pain accumulates rapidly. She can again properly use the muscles in right leg and can walk almost normal.     Does expect to have continued improvement but states her body reacts slowly to these injections.     Call back#: [phone]

## 2024-10-24 DIAGNOSIS — F11.20 CONTINUOUS OPIOID DEPENDENCE (HCC): ICD-10-CM

## 2024-10-24 RX ORDER — MORPHINE SULFATE 15 MG/1
15 TABLET ORAL EVERY 8 HOURS PRN
Qty: 90 TABLET | Refills: 0 | Status: SHIPPED | OUTPATIENT
Start: 2024-10-24

## 2024-10-24 NOTE — TELEPHONE ENCOUNTER
Reason for call:   [x] Refill   [] Prior Auth  [] Other:     Office:   [x] PCP/Provider -   [] Specialty/Provider -     Medication: morphine (MSIR) 15 mg tablet     Dose/Frequency: Sig: Take 1 tablet (15 mg total) by mouth every 8 (eight) hours as needed for severe pain Max Daily Amount: 45 mg    Quantity: 90    Pharmacy:   [facility]/pharmacy #[id number] - [place] - [address] [address]  Phone: [phone]  Fax: [phone]    Does the patient have enough for 3 days?   [x] Yes   [] No - Send as HP to POD

## 2024-10-30 ENCOUNTER — TELEPHONE (OUTPATIENT)
Age: 79
End: 2024-10-30

## 2024-10-30 ENCOUNTER — CLINICAL SUPPORT (OUTPATIENT)
Age: 79
End: 2024-10-30
Payer: MEDICARE

## 2024-10-30 DIAGNOSIS — M81.0 OSTEOPOROSIS, UNSPECIFIED OSTEOPOROSIS TYPE, UNSPECIFIED PATHOLOGICAL FRACTURE PRESENCE: Primary | ICD-10-CM

## 2024-10-30 PROCEDURE — 96372 THER/PROPH/DIAG INJ SC/IM: CPT | Performed by: INTERNAL MEDICINE

## 2024-12-06 NOTE — DISCHARGE SUMMARY
Breast Pump order was received from [facility].  Order form was placed in Dr. Nena Salas's bin for signature.     Discharge- Anh Meade 1945, 66 y o  female MRN: [id number]    Unit/Bed#: -01 Encounter: [id number]    Primary Care Provider: Ritesh Stevenson DO   Date and time admitted to hospital: 12/12/2019 12:05 PM        * Thoracolumbar radiculopathy due to intervertebral disc disorder  Assessment & Plan  Thoracolumbar radiculopathy - was seen for second opinion at [facility] recently which symptoms includes right-sided breast pain as well as leg pain  She has been having urinary and fecal incontinence, which is known  Is scheduled for epidural as well as corticosteroid injection of hip, the 1st of which will be on 12/17  Recently prescribed methylprednisolone, completed course 12/13  Patient reports generalized weakness, does not appear to be acutely new, but worsening over time  She has no support at home, her  is not a caretaker and freely admitted that himself at the bedside  The patient really is not looking for rehab, but is looking for something to get a through to the injection" in hopes that that will help her feel better  At present time, she is agreeable to trial rehab and her daughter is planning to transport to rehab as well as to injection on Tuesday  Patient reports pain is worse today, she feels that is because she has not had her medical marijuana since she has been in the hospital   Her main question is if she can have marijuana while she is at the rehab facility  Multiple sclerosis ([facility] [address] )  Assessment & Plan  Relapsing remitting MS currently without exacerbation  Hypothyroidism  Assessment & Plan  Hypothyroidism continue levothyroxine    Insomnia  Assessment & Plan  Insomnia  Continue diazepam q h s  Reviewed PDMP  CHOCO on CPAP  Assessment & Plan  CPAP at night, patient was congested yesterday morning and did better overnight with her home CPAP machine  Continue Claritin, nasal saline, eye drops for symptomatic relief as needed            Discharging Physician / Practitioner: Holly Hong PA-C  PCP: Delano Catalan DO  Admission Date:   Admission Orders (From admission, onward)     Ordered        12/12/19 1527  Inpatient Admission  Once                   Discharge Date: 12/15/19    Resolved Problems  Date Reviewed: 12/15/2019          Resolved    UTI (urinary tract infection) 12/13/2019     Resolved by  Holly Hong PA-C          Consultations During Hospital Stay:  · PT/OT     Procedures Performed:   · CT a/p - no acute abnormalities     Significant Findings / Test Results:   · Labs unremarkable     Incidental Findings:   ·      Test Results Pending at Discharge (will require follow up):   ·      Outpatient Tests Requested:  · PCP  · Neurosurgery  · Pain management     Complications:      Reason for Admission: acute on chronic pain     Hospital Course:     Viktoria Torres is a 66 y o  female patient who originally presented to the hospital on 12/12/2019 due to generalized weakness and pain  Patient has right-sided radicular symptoms such as been going on for quite a long time, she has even had a 2nd opinion at [facility]  Patient is scheduled for injections upcoming with the 1st on Tuesday  She presented to the hospital with acute on chronic symptoms, and decrease caregiver support at home  Patient initially was looking for some sort of respite until her injection on Tuesday, but after evaluation with physical therapy and occupational therapy was agreeable to rehab  No changes made to her medical regimen during hospitalization  Patient is concerned that she will be able to use her medical marijuana at the nursing facility  She freely admitted if that is the case, she will just go home  There was initial concern that the patient had urinary tract infection at time of presentation, urinalysis bland and urine culture showed no growth  Please see above list of diagnoses and related plan for additional information       Condition at Discharge: stable     Discharge Day Visit / Exam:     Subjective:  Patient seen this morning, no acute issues  She does report her pain is little bit worse because she is not able to use the marijuana while she is in the hospital   No shortness of breath or chest pain  No palpitations  Patient reports she is up and ambulating to the bathroom  Vitals: Blood Pressure: 117/69 (12/15/19 0647)  Pulse: 75 (12/15/19 0647)  Temperature: 97 6 °F (36 4 °C) (12/15/19 0647)  Temp Source: Oral (12/15/19 0647)  Respirations: 18 (12/15/19 0647)  Height: 5' 3" (160 cm) (12/12/19 1156)  Weight - Scale: 60 3 kg (133 lb) (12/12/19 1156)  SpO2: 96 % (12/15/19 0647)  Exam:   Physical Exam   Constitutional: She is oriented to person, place, and time  She appears well-developed and well-nourished  No distress  HENT:   Mouth/Throat: Oropharynx is clear and moist    Cardiovascular: Normal rate, regular rhythm, S1 normal, S2 normal, normal heart sounds and intact distal pulses  Pulmonary/Chest: Effort normal and breath sounds normal  No respiratory distress  She has no wheezes  She has no rales  Abdominal: Soft  Bowel sounds are normal  She exhibits no distension  There is no tenderness  Musculoskeletal: She exhibits no edema  Neurological: She is alert and oriented to person, place, and time  Nursing note and vitals reviewed  Discussion with Family:  None present    Discharge instructions/Information to patient and family:   See after visit summary for information provided to patient and family  Provisions for Follow-Up Care:  See after visit summary for information related to follow-up care and any pertinent home health orders  Disposition:     Diana Brock at [facility] for rehab    Planned Readmission:  None     Discharge Statement:  I spent approximately 20 minutes discharging the patient  This time was spent on the day of discharge   I had direct contact with the patient on the day of discharge  Greater than 50% of the total time was spent examining patient, answering all patient questions, arranging and discussing plan of care with patient as well as directly providing post-discharge instructions  Additional time then spent on discharge activities  Discharge Medications:  See after visit summary for reconciled discharge medications provided to patient and family        ** Please Note: This note has been constructed using a voice recognition system **

## 2024-12-09 DIAGNOSIS — F11.20 CONTINUOUS OPIOID DEPENDENCE (HCC): ICD-10-CM

## 2024-12-09 RX ORDER — MORPHINE SULFATE 15 MG/1
15 TABLET ORAL EVERY 8 HOURS PRN
Qty: 90 TABLET | Refills: 0 | Status: SHIPPED | OUTPATIENT
Start: 2024-12-09

## 2024-12-09 NOTE — TELEPHONE ENCOUNTER
Reason for call:   [x] Refill   [] Prior Auth  [] Other:     Office:   [x] PCP/Provider - Garcia Watson   [] Specialty/Provider -     Medication: morphine (MSIR) 15 mg tablet     Dose/Frequency: Take 1 tablet (15 mg total) by mouth every 8 (eight) hours as needed for severe pain     Quantity: 90    Pharmacy: [facility]    Does the patient have enough for 3 days?   [x] Yes   [] No - Send as HP to POD

## 2024-12-23 ENCOUNTER — TELEPHONE (OUTPATIENT)
Age: 79
End: 2024-12-23

## 2024-12-23 NOTE — TELEPHONE ENCOUNTER
Left message for pt to schedule recommended fu for sleep med with dr estrada for April    please schedule pt

## 2025-01-27 DIAGNOSIS — F11.20 CONTINUOUS OPIOID DEPENDENCE (HCC): ICD-10-CM

## 2025-01-27 RX ORDER — MORPHINE SULFATE 15 MG/1
15 TABLET ORAL EVERY 8 HOURS PRN
Qty: 90 TABLET | Refills: 0 | Status: SHIPPED | OUTPATIENT
Start: 2025-01-27

## 2025-01-27 NOTE — TELEPHONE ENCOUNTER
Reason for call:   [x] Refill   [] Prior Auth  [] Other:     Office:   [x] PCP/Provider - Garcia Watson DO   [] Specialty/Provider -     Medication: morphine (MSIR) 15 mg tablet    Dose/Frequency: Take 1 tablet (15 mg total) by mouth every 8 (eight) hours as needed for severe pain Max Daily Amount: 45 mg     Quantity: 90    Pharmacy: [facility]/pharmacy #[id number]  [place] - [address]     Does the patient have enough for 3 days?   [] Yes   [x] No - Send as HP to POD

## 2025-02-03 ENCOUNTER — TELEPHONE (OUTPATIENT)
Age: 80
End: 2025-02-03

## 2025-02-03 NOTE — TELEPHONE ENCOUNTER
Caller: Patient    Doctor: Dr. ROSALES    Reason for call: Having right hip butt pain that runs down her leg    Would like to schedule a procedure    Call back#:

## 2025-02-03 NOTE — TELEPHONE ENCOUNTER
S/W pt.  Pt requested repeat injection of right sided L4-L5 and L5-S1 TFESI.     Last injection date:  9/16/24  Last office visit:  8/22/24   Where is pain located: right buttocks and right leg  Is the pain the same area as before: Yes  Current pain level:  5-8/10  How much % of relief did the last injection provide:  100%  Duration of relief from last injection:  at least 3 months  When did pain return:  pt thinks in December  Is the pain effecting your ADLs: yes    Blood thinners/NSAIDS? no  Diabetes? No                      Okay to schedule?  Please advise.

## 2025-02-03 NOTE — TELEPHONE ENCOUNTER
Caller: lauren Woodward    Doctor: Dr. Clemente    Reason for call: pt returning the nurse's call    Call back#: [phone]

## 2025-02-04 NOTE — TELEPHONE ENCOUNTER
Left message for patient to contact the scheduling office at [phone] to schedule their procedure.

## 2025-02-06 NOTE — TELEPHONE ENCOUNTER
Caller: Crissy    Doctor: Dr. Clemente    Reason for call: pt returning the 's call    Call back#: [phone]

## 2025-02-06 NOTE — TELEPHONE ENCOUNTER
Patient has been scheduled for their procedure, please see Vinglet message for additional details.

## 2025-02-14 ENCOUNTER — OFFICE VISIT (OUTPATIENT)
Age: 80
End: 2025-02-14
Payer: MEDICARE

## 2025-02-14 VITALS
DIASTOLIC BLOOD PRESSURE: 72 MMHG | OXYGEN SATURATION: 97 % | TEMPERATURE: 96.8 F | SYSTOLIC BLOOD PRESSURE: 114 MMHG | HEART RATE: 80 BPM | BODY MASS INDEX: 22.96 KG/M2 | HEIGHT: 63 IN | WEIGHT: 129.6 LBS | RESPIRATION RATE: 18 BRPM

## 2025-02-14 DIAGNOSIS — Z00.00 MEDICARE ANNUAL WELLNESS VISIT, SUBSEQUENT: ICD-10-CM

## 2025-02-14 DIAGNOSIS — F11.20 CONTINUOUS OPIOID DEPENDENCE (HCC): ICD-10-CM

## 2025-02-14 DIAGNOSIS — G35 MULTIPLE SCLEROSIS (HCC): Primary | ICD-10-CM

## 2025-02-14 DIAGNOSIS — J44.9 CHRONIC OBSTRUCTIVE PULMONARY DISEASE, UNSPECIFIED COPD TYPE (HCC): ICD-10-CM

## 2025-02-14 DIAGNOSIS — E03.9 ACQUIRED HYPOTHYROIDISM: ICD-10-CM

## 2025-02-14 DIAGNOSIS — M81.0 AGE-RELATED OSTEOPOROSIS WITHOUT CURRENT PATHOLOGICAL FRACTURE: ICD-10-CM

## 2025-02-14 DIAGNOSIS — M62.838 MUSCLE SPASM OF RIGHT LEG: ICD-10-CM

## 2025-02-14 PROCEDURE — 99214 OFFICE O/P EST MOD 30 MIN: CPT | Performed by: INTERNAL MEDICINE

## 2025-02-14 PROCEDURE — G0439 PPPS, SUBSEQ VISIT: HCPCS | Performed by: INTERNAL MEDICINE

## 2025-02-14 PROCEDURE — G2211 COMPLEX E/M VISIT ADD ON: HCPCS | Performed by: INTERNAL MEDICINE

## 2025-02-14 RX ORDER — BIMATOPROST 0.1 MG/ML
SOLUTION/ DROPS OPHTHALMIC
COMMUNITY
Start: 2025-01-14

## 2025-02-14 RX ORDER — CYCLOBENZAPRINE HCL 5 MG
10 TABLET ORAL EVERY 8 HOURS PRN
Qty: 60 TABLET | Refills: 3 | Status: SHIPPED | OUTPATIENT
Start: 2025-02-14 | End: 2025-04-15

## 2025-02-14 NOTE — ASSESSMENT & PLAN NOTE
Stable pain and she is slated to have an epidural soon. Continue morphine as needed for severe pain. Check UDS.    Orders:  •  Hydrocodone and Metabolites, Urine; Future  •  TRAMADOL, URINE; Future  •  Oxycodone/Oxymorphone urine; Future  •  Drug Screen Routine w /Conf and Adulteration, urine.; Future

## 2025-02-14 NOTE — PROGRESS NOTES
Name: Crissy Arboleda      : 1945      MRN: [id number]  Encounter Provider: Garcia Watson DO  Encounter Date: 2025   Encounter department: [facility] [place]    Assessment & Plan  Multiple sclerosis (HCC)    Stable at this time. Will continue to monitor. She no longer follows with neurology.    Orders:  •  Comprehensive metabolic panel; Future  •  CBC and differential; Future    Continuous opioid dependence (HCC)    Stable pain and she is slated to have an epidural soon. Continue morphine as needed for severe pain. Check UDS.    Orders:  •  Hydrocodone and Metabolites, Urine; Future  •  TRAMADOL, URINE; Future  •  Oxycodone/Oxymorphone urine; Future  •  Drug Screen Routine w /Conf and Adulteration, urine.; Future    Chronic obstructive pulmonary disease, unspecified COPD type (HCC)    Stable but continues to smoke unfortunately. Will monitor.  Age-related osteoporosis without current pathological fracture    She has been on Prolia and will check updated DXA scan    Orders:  •  DXA bone density spine hip and pelvis; Future    Acquired hypothyroidism    Continue levothyroxine and check updated thyroid function.    Orders:  •  TSH, 3rd generation with Free T4 reflex; Future    Muscle spasm of right leg    Orders:  •  cyclobenzaprine (FLEXERIL) 5 mg tablet; Take 2 tablets (10 mg total) by mouth every 8 (eight) hours as needed for muscle spasms    Medicare annual wellness visit, subsequent            Preventive health issues were discussed with patient, and age appropriate screening tests were ordered as noted in patient's After Visit Summary. Personalized health advice and appropriate referrals for health education or preventive services given if needed, as noted in patient's After Visit Summary.    History of Present Illness     Crissy presents for follow-up and medicare wellness visit. Her  lost his [place] with chronic medical conditions on . While she is dealing with a  flood of emotions, she is happy that he is no longer suffering.       Patient Care Team:  Garcia Watson DO as PCP - General  MD Octavio Giang MD Kyle Matthew Weiss, DO (Pain Medicine)    Review of Systems   Constitutional:  Negative for activity change, appetite change and fatigue.   Respiratory:  Negative for apnea, cough, chest tightness, shortness of breath and wheezing.    Cardiovascular:  Negative for chest pain, palpitations and leg swelling.   Gastrointestinal:  Negative for abdominal distention, abdominal pain, blood in stool, constipation, diarrhea, nausea and vomiting.   Musculoskeletal:  Positive for back pain and gait problem.   Neurological:  Negative for dizziness, weakness, light-headedness, numbness and headaches.   Psychiatric/Behavioral:  Negative for behavioral problems, confusion, hallucinations, sleep disturbance and suicidal ideas. The patient is not nervous/anxious.      Medical History Reviewed by provider this encounter:  Tobacco  Allergies  Meds  Problems  Med Hx  Surg Hx  Fam Hx  Soc   Hx      Annual Wellness Visit Questionnaire   Crissy is here for her Subsequent Wellness visit. Last Medicare Wellness visit information reviewed, patient interviewed and updates made to the record today.      Health Risk Assessment:   Patient rates overall health as fair. Patient feels that their physical health rating is same. Patient is satisfied with their life. Eyesight was rated as same. Hearing was rated as slightly worse. Patient feels that their emotional and mental health rating is same. Patients states they are sometimes angry. Patient states they are sometimes unusually tired/fatigued. Pain experienced in the last 7 days has been a lot. Patient's pain rating has been 8/10. Patient states that she has experienced no weight loss or gain in last 6 months.     Depression Screening:   PHQ-2 Score: 1      Fall Risk Screening:   In the past year, patient has experienced:  history of falling in past year    Number of falls: 1  Injured during fall?: Yes    Feels unsteady when standing or walking?: Yes    Worried about falling?: Yes      Urinary Incontinence Screening:   Patient has leaked urine accidently in the last six months.     Home Safety:  Patient has trouble with stairs inside or outside of their home. Patient has working smoke alarms and has working carbon monoxide detector. Home safety hazards include: none.     Nutrition:   Current diet is Regular.     Medications:   Patient is currently taking over-the-counter supplements. OTC medications include: see medication list. Patient is able to manage medications.     Activities of Daily Living (ADLs)/Instrumental Activities of Daily Living (IADLs):   Walk and transfer into and out of bed and chair?: Yes  Dress and groom yourself?: Yes    Bathe or shower yourself?: Yes    Feed yourself? Yes  Do your laundry/housekeeping?: Yes  Manage your money, pay your bills and track your expenses?: No  Make your own meals?: Yes    Do your own shopping?: Yes    Previous Hospitalizations:   Any hospitalizations or ED visits within the last 12 months?: Yes    How many hospitalizations have you had in the last year?: 1-2    Advance Care Planning:   Living will: Yes    Durable POA for healthcare: Yes    Advanced directive: Yes    Five wishes given: No      Cognitive Screening:   Provider or family/friend/caregiver concerned regarding cognition?: No    PREVENTIVE SCREENINGS      Cardiovascular Screening:    General: Screening Current      Diabetes Screening:     General: Screening Current      Colorectal Cancer Screening:     General: Screening Current      Breast Cancer Screening:     General: Screening Not Indicated      Cervical Cancer Screening:    General: Screening Not Indicated      Osteoporosis Screening:    General: Screening Not Indicated and History Osteoporosis      Abdominal Aortic Aneurysm (AAA) Screening:        General: Screening Not  Indicated      Lung Cancer Screening:     General: Screening Not Indicated      Hepatitis C Screening:    General: Screening Not Indicated    Screening, Brief Intervention, and Referral to Treatment (SBIRT)     Screening  Typical number of drinks in a day: 0  Typical number of drinks in a week: 0  Interpretation: Low risk drinking behavior.    AUDIT-C Screenin) How often did you have a drink containing alcohol in the past year? never  2) How many drinks did you have on a typical day when you were drinking in the past year? 0  3) How often did you have 6 or more drinks on one occasion in the past year? never    AUDIT-C Score: 0  Interpretation: Score 0-2 (female): Negative screen for alcohol misuse    Single Item Drug Screening:  How often have you used an illegal drug (including marijuana) or a prescription medication for non-medical reasons in the past year? never    Single Item Drug Screen Score: 0  Interpretation: Negative screen for possible drug use disorder    Brief Intervention  Alcohol & drug use screenings were reviewed. No concerns regarding substance use disorder identified.     Review of Current Opioid Use  Opioid Risk Tool (ORT) Score: 0  Opioid Risk Tool (ORT) Interpretation: Score 0-3: Low risk for opioid misuse    [place] PDMP or [place]  reviewed. No red flags were identified    Other Counseling Topics:   Car/seat belt/driving safety, skin self-exam, sunscreen and regular weightbearing exercise and calcium and vitamin D intake.     Social Drivers of Health     Financial Resource Strain: Low Risk  (10/23/2023)    Overall Financial Resource Strain (CARDIA)    • Difficulty of Paying Living Expenses: Not hard at all   Food Insecurity: No Food Insecurity (2025)    Hunger Vital Sign    • Worried About Running Out of Food in the Last Year: Never true    • Ran Out of Food in the Last Year: Never true   Transportation Needs: No Transportation Needs (2025)    PRAPARE - Transportation    • Lack of  "Transportation (Medical): No    • Lack of Transportation (Non-Medical): No   Housing Stability: Low Risk  (2/14/2025)    Housing Stability Vital Sign    • Unable to Pay for Housing in the Last Year: No    • Number of Times Moved in the Last Year: 0    • Homeless in the Last Year: No   Utilities: Not At Risk (2/14/2025)    [facility] Utilities    • Threatened with loss of utilities: No     Objective   /72 (BP Location: Left arm, Patient Position: Sitting, Cuff Size: Standard)   Pulse 80   Temp (!) 96.8 °F (36 °C) (Tympanic)   Resp 18   Ht 5' 3\" (1.6 m)   Wt 58.8 kg (129 lb 9.6 oz)   SpO2 97%   BMI 22.96 kg/m²     Physical Exam  Constitutional:       General: She is not in acute distress.     Appearance: She is not ill-appearing.   Cardiovascular:      Rate and Rhythm: Normal rate and regular rhythm.      Heart sounds: No murmur heard.  Pulmonary:      Effort: Pulmonary effort is normal. No respiratory distress.      Breath sounds: No wheezing.   Abdominal:      General: Bowel sounds are normal. There is no distension.      Tenderness: There is no abdominal tenderness.   Musculoskeletal:      Right lower leg: No edema.      Left lower leg: No edema.   Neurological:      Mental Status: She is alert.       Garcia Watson,    "

## 2025-02-14 NOTE — ASSESSMENT & PLAN NOTE
Stable at this time. Will continue to monitor. She no longer follows with neurology.    Orders:  •  Comprehensive metabolic panel; Future  •  CBC and differential; Future

## 2025-02-14 NOTE — ASSESSMENT & PLAN NOTE
Continue levothyroxine and check updated thyroid function.    Orders:  •  TSH, 3rd generation with Free T4 reflex; Future

## 2025-02-14 NOTE — PATIENT INSTRUCTIONS
Medicare Preventive Visit Patient Instructions  Thank you for completing your Welcome to Medicare Visit or Medicare Annual Wellness Visit today. Your next wellness visit will be due in one year (2/15/2026).  The screening/preventive services that you may require over the next 5-10 years are detailed below. Some tests may not apply to you based off risk factors and/or age. Screening tests ordered at today's visit but not completed yet may show as past due. Also, please note that scanned in results may not display below.  Preventive Screenings:  Service Recommendations Previous Testing/Comments   Colorectal Cancer Screening  * Colonoscopy    * Fecal Occult Blood Test (FOBT)/Fecal Immunochemical Test (FIT)  * Fecal DNA/Cologuard Test  * Flexible Sigmoidoscopy Age: 45-75 years old   Colonoscopy: every 10 years (may be performed more frequently if at higher risk)  OR  FOBT/FIT: every 1 year  OR  Cologuard: every 3 years  OR  Sigmoidoscopy: every 5 years  Screening may be recommended earlier than age 45 if at higher risk for colorectal cancer. Also, an individualized decision between you and your healthcare provider will decide whether screening between the ages of 76-85 would be appropriate. Colonoscopy: 01/21/2020  FOBT/FIT: Not on file  Cologuard: Not on file  Sigmoidoscopy: Not on file          Breast Cancer Screening Age: 40+ years old  Frequency: every 1-2 years  Not required if history of left and right mastectomy Mammogram: 07/19/2022        Cervical Cancer Screening Between the ages of 21-29, pap smear recommended once every 3 years.   Between the ages of 30-65, can perform pap smear with HPV co-testing every 5 years.   Recommendations may differ for women with a history of total hysterectomy, cervical cancer, or abnormal pap smears in past. Pap Smear: Not on file    Screening Not Indicated   Hepatitis C Screening Once for adults born between 1945 and 1965  More frequently in patients at high risk for Hepatitis C  Hep C Antibody: Not on file        Diabetes Screening 1-2 times per year if you're at risk for diabetes or have pre-diabetes Fasting glucose: 81 mg/dL (8/5/2024)  A1C: 5.6 % (2/26/2021)  Screening Current   Cholesterol Screening Once every 5 years if you don't have a lipid disorder. May order more often based on risk factors. Lipid panel: 09/17/2021    Screening Current     Other Preventive Screenings Covered by Medicare:  Abdominal Aortic Aneurysm (AAA) Screening: covered once if your at risk. You're considered to be at risk if you have a family history of AAA.  Lung Cancer Screening: covers low dose CT scan once per year if you meet all of the following conditions: (1) Age 55-77; (2) No signs or symptoms of lung cancer; (3) Current smoker or have quit smoking within the last 15 years; (4) You have a tobacco smoking history of at least 20 pack years (packs per day multiplied by number of years you smoked); (5) You get a written order from a healthcare provider.  Glaucoma Screening: covered annually if you're considered high risk: (1) You have diabetes OR (2) Family history of glaucoma OR (3)  aged 50 and older OR (4)  American aged 65 and older  Osteoporosis Screening: covered every 2 years if you meet one of the following conditions: (1) You're estrogen deficient and at risk for osteoporosis based off medical history and other findings; (2) Have a vertebral abnormality; (3) On glucocorticoid therapy for more than 3 months; (4) Have primary hyperparathyroidism; (5) On osteoporosis medications and need to assess response to drug therapy.   Last bone density test (DXA Scan): 07/19/2022.  HIV Screening: covered annually if you're between the age of 15-65. Also covered annually if you are younger than 15 and older than 65 with risk factors for HIV infection. For pregnant patients, it is covered up to 3 times per pregnancy.    Immunizations:  Immunization Recommendations   Influenza Vaccine  Annual influenza vaccination during flu season is recommended for all persons aged >= 6 months who do not have contraindications   Pneumococcal Vaccine   * Pneumococcal conjugate vaccine = PCV13 (Prevnar 13), PCV15 (Vaxneuvance), PCV20 (Prevnar 20)  * Pneumococcal polysaccharide vaccine = PPSV23 (Pneumovax) Adults 19-65 yo with certain risk factors or if 65+ yo  If never received any pneumonia vaccine: recommend Prevnar 20 (PCV20)  Give PCV20 if previously received 1 dose of PCV13 or PPSV23   Hepatitis B Vaccine 3 dose series if at intermediate or high risk (ex: diabetes, end stage renal disease, liver disease)   Respiratory syncytial virus (RSV) Vaccine - COVERED BY MEDICARE PART D  * RSVPreF3 (Arexvy) CDC recommends that adults 60 years of age and older may receive a single dose of RSV vaccine using shared clinical decision-making (SCDM)   Tetanus (Td) Vaccine - COST NOT COVERED BY MEDICARE PART B Following completion of primary series, a booster dose should be given every 10 years to maintain immunity against tetanus. Td may also be given as tetanus wound prophylaxis.   Tdap Vaccine - COST NOT COVERED BY MEDICARE PART B Recommended at least once for all adults. For pregnant patients, recommended with each pregnancy.   Shingles Vaccine (Shingrix) - COST NOT COVERED BY MEDICARE PART B  2 shot series recommended in those 19 years and older who have or will have weakened immune systems or those 50 years and older     Health Maintenance Due:      Topic Date Due    Colorectal Cancer Screening  01/21/2023    DXA SCAN  07/19/2024    Lung Cancer Screening  Discontinued     Immunizations Due:      Topic Date Due    Hepatitis A Vaccine (1 of 2 - Risk 2-dose series) Never done    Pneumococcal Vaccine: 65+ Years (2 of 2 - PCV) 05/22/2009    Influenza Vaccine (1) 09/01/2024    COVID-19 Vaccine (7 - 2024-25 season) 09/01/2024     Advance Directives   What are advance directives?  Advance directives are legal documents that  state your wishes and plans for medical care. These plans are made ahead of time in case you lose your ability to make decisions for yourself. Advance directives can apply to any medical decision, such as the treatments you want, and if you want to donate organs.   What are the types of advance directives?  There are many types of advance directives, and each state has rules about how to use them. You may choose a combination of any of the following:  Living will:  This is a written record of the treatment you want. You can also choose which treatments you do not want, which to limit, and which to stop at a certain time. This includes surgery, medicine, IV fluid, and tube feedings.   Durable power of  for healthcare (DPAHC):  This is a written record that states who you want to make healthcare choices for you when you are unable to make them for yourself. This person, called a proxy, is usually a family member or a friend. You may choose more than 1 proxy.  Do not resuscitate (DNR) order:  A DNR order is used in case your heart stops beating or you stop breathing. It is a request not to have certain forms of treatment, such as CPR. A DNR order may be included in other types of advance directives.  Medical directive:  This covers the care that you want if you are in a coma, near death, or unable to make decisions for yourself. You can list the treatments you want for each condition. Treatment may include pain medicine, surgery, blood transfusions, dialysis, IV or tube feedings, and a ventilator (breathing machine).  Values history:  This document has questions about your views, beliefs, and how you feel and think about life. This information can help others choose the care that you would choose.  Why are advance directives important?  An advance directive helps you control your care. Although spoken wishes may be used, it is better to have your wishes written down. Spoken wishes can be misunderstood, or not  followed. Treatments may be given even if you do not want them. An advance directive may make it easier for your family to make difficult choices about your care.   Fall Prevention    Fall prevention  includes ways to make your home and other areas safer. It also includes ways you can move more carefully to prevent a fall. Health conditions that cause changes in your blood pressure, vision, or muscle strength and coordination may increase your risk for falls. Medicines may also increase your risk for falls if they make you dizzy, weak, or sleepy.   Fall prevention tips:   Stand or sit up slowly.    Use assistive devices as directed.    Wear shoes that fit well and have soles that .    Wear a personal alarm.    Stay active.    Manage your medical conditions.    Home Safety Tips:  Add items to prevent falls in the bathroom.    Keep paths clear.    Install bright lights in your home.    Keep items you use often on shelves within reach.    Paint or place reflective tape on the edges of your stairs.    Urinary Incontinence   Urinary incontinence (UI)  is when you lose control of your bladder. UI develops because your bladder cannot store or empty urine properly. The 3 most common types of UI are stress incontinence, urge incontinence, or both.  Medicines:   May be given to help strengthen your bladder control. Report any side effects of medication to your healthcare provider.  Do pelvic muscle exercises often:  Your pelvic muscles help you stop urinating. Squeeze these muscles tight for 5 seconds, then relax for 5 seconds. Gradually work up to squeezing for 10 seconds. Do 3 sets of 15 repetitions a day, or as directed. This will help strengthen your pelvic muscles and improve bladder control.  Train your bladder:  Go to the bathroom at set times, such as every 2 hours, even if you do not feel the urge to go. You can also try to hold your urine when you feel the urge to go. For example, hold your urine for 5 minutes  when you feel the urge to go. As that becomes easier, hold your urine for 10 minutes.   Self-care:   Keep a UI record.  Write down how often you leak urine and how much you leak. Make a note of what you were doing when you leaked urine.  Drink liquids as directed. You may need to limit the amount of liquid you drink to help control your urine leakage. Do not drink any liquid right before you go to bed. Limit or do not have drinks that contain caffeine or alcohol.   Prevent constipation.  Eat a variety of high-fiber foods. Good examples are high-fiber cereals, beans, vegetables, and whole-grain breads. Walking is the best way to trigger your intestines to have a bowel movement.  Exercise regularly and maintain a healthy weight.  Weight loss and exercise will decrease pressure on your bladder and help you control your leakage.   Use a catheter as directed  to help empty your bladder. A catheter is a tiny, plastic tube that is put into your bladder to drain your urine.   Go to behavior therapy as directed.  Behavior therapy may be used to help you learn to control your urge to urinate.    Cigarette Smoking and Your Health   Risks to your health if you smoke:  Nicotine and other chemicals found in tobacco damage every cell in your body. Even if you are a light smoker, you have an increased risk for cancer, heart disease, and lung disease. If you are pregnant or have diabetes, smoking increases your risk for complications.   Benefits to your health if you stop smoking:   You decrease respiratory symptoms such as coughing, wheezing, and shortness of breath.   You reduce your risk for cancers of the lung, mouth, throat, kidney, bladder, pancreas, stomach, and cervix. If you already have cancer, you increase the benefits of chemotherapy. You also reduce your risk for cancer returning or a second cancer from developing.   You reduce your risk for heart disease, blood clots, heart attack, and stroke.   You reduce your risk  for lung infections, and diseases such as pneumonia, asthma, chronic bronchitis, and emphysema.  Your circulation improves. More oxygen can be delivered to your body. If you have diabetes, you lower your risk for complications, such as kidney, artery, and eye diseases. You also lower your risk for nerve damage. Nerve damage can lead to amputations, poor vision, and blindness.  You improve your body's ability to heal and to fight infections.  For more information and support to stop smoking:   [email or web address]  Phone: 2- 654 - 830-4099  Web Address: www.[employer]  Narcotic (Opioid) Safety    Use narcotics safely:  Take prescribed narcotics exactly as directed  Do not give narcotics to others or take narcotics that belong to someone else  Do not mix narcotics without medicines or alcohol  Do not drive or operate heavy machinery after you take the narcotic  Monitor for side effects and notify your healthcare provider if you experienced side effects such as nausea, sleepiness, itching, or trouble thinking clearly.    Manage constipation:    Constipation is the most common side effect of narcotic medicine. Constipation is when you have hard, dry bowel movements, or you go longer than usual between bowel movements. Tell your healthcare provider about all changes in your bowel movements while you are taking narcotics. He or she may recommend laxative medicine to help you have a bowel movement. He or she may also change the kind of narcotic you are taking, or change when you take it. The following are more ways you can prevent or relieve constipation:    Drink liquids as directed.  You may need to drink extra liquids to help soften and move your bowels. Ask how much liquid to drink each day and which liquids are best for you.  Eat high-fiber foods.  This may help decrease constipation by adding bulk to your bowel movements. High-fiber foods include fruits, vegetables, whole-grain breads and cereals, and beans. Your  healthcare provider or dietitian can help you create a high-fiber meal plan. Your provider may also recommend a fiber supplement if you cannot get enough fiber from food.  Exercise regularly.  Regular physical activity can help stimulate your intestines. Walking is a good exercise to prevent or relieve constipation. Ask which exercises are best for you.  Schedule a time each day to have a bowel movement.  This may help train your body to have regular bowel movements. Bend forward while you are on the toilet to help move the bowel movement out. Sit on the toilet for at least 10 minutes, even if you do not have a bowel movement.    Store narcotics safely:   Store narcotics where others cannot easily get them.  Keep them in a locked cabinet or secure area. Do not  keep them in a purse or other bag you carry with you. A person may be looking for something else and find the narcotics.  Make sure narcotics are stored out of the reach of children.  A child can easily overdose on narcotics. Narcotics may look like candy to a small child.    The best way to dispose of narcotics:      The laws vary by country and area. In the [place], the best way is to return the narcotics through a take-back program. This program is offered by the [place] Drug Enforcement Agency (THEA). The following are options for using the program:  Take the narcotics to a THEA collection site.  The site is often a law enforcement center. Call your local law enforcement center for scheduled take-back days in your area. You will be given information on where to go if the collection site is in a different location.  Take the narcotics to an approved pharmacy or hospital.  A pharmacy or hospital may be set up as a collection site. You will need to ask if it is a THEA collection site if you were not directed there. A pharmacy or doctor's office may not be able to take back narcotics unless it is a THEA site.  Use a mail-back system.  This means you are given  containers to put the narcotics into. You will then mail them in the containers.  Use a take-back drop box.  This is a place to leave the narcotics at any time. People and animals will not be able to get into the box. Your local law enforcement agency can tell you where to find a drop box in your area.    Other ways to manage pain:   Ask your healthcare provider about non-narcotic medicines to control pain.  Nonprescription medicines include NSAIDs (such as ibuprofen) and acetaminophen. Prescription medicines include muscle relaxers, antidepressants, and steroids.  Pain may be managed without any medicines.  Some ways to relieve pain include massage, aromatherapy, or meditation. Physical or occupational therapy may also help.    For more information:   Drug Enforcement Administration  [address] [place] [address]  Phone: 0- 629 - 524-3847  Web Address: https://www.deadiversion.[facility] [employer]gov/drug_disposal/    [place] Food and Drug Administration  [address] [place] [address]  Phone: 6- 673 - 123-5487  Web Address: http://www.fda.gov     © Copyright [employer] 2018 Information is for End User's use only and may not be sold, redistributed or otherwise used for commercial purposes. All illustrations and images included in CareNotes® are the copyrighted property of A.D.A[employer]M., Inc. or [employer]

## 2025-02-20 ENCOUNTER — APPOINTMENT (OUTPATIENT)
Age: 80
End: 2025-02-20
Payer: MEDICARE

## 2025-02-20 ENCOUNTER — HOSPITAL ENCOUNTER (OUTPATIENT)
Age: 80
Discharge: HOME | End: 2025-02-20
Payer: MEDICARE

## 2025-02-20 VITALS — BODY MASS INDEX: 23.7 KG/M2 | HEIGHT: 62 IN

## 2025-02-20 DIAGNOSIS — M81.0 AGE-RELATED OSTEOPOROSIS WITHOUT CURRENT PATHOLOGICAL FRACTURE: ICD-10-CM

## 2025-02-20 DIAGNOSIS — F11.20 CONTINUOUS OPIOID DEPENDENCE (HCC): ICD-10-CM

## 2025-02-20 DIAGNOSIS — E03.9 ACQUIRED HYPOTHYROIDISM: ICD-10-CM

## 2025-02-20 DIAGNOSIS — G35 MULTIPLE SCLEROSIS (HCC): ICD-10-CM

## 2025-02-20 LAB
ALBUMIN SERPL BCG-MCNC: 4 G/DL (ref 3.5–5)
ALP SERPL-CCNC: 94 U/L (ref 34–104)
ALT SERPL W P-5'-P-CCNC: 22 U/L (ref 7–52)
ANION GAP SERPL CALCULATED.3IONS-SCNC: 7 MMOL/L (ref 4–13)
AST SERPL W P-5'-P-CCNC: 21 U/L (ref 13–39)
BASOPHILS # BLD AUTO: 0.05 THOUSANDS/ΜL (ref 0–0.1)
BASOPHILS NFR BLD AUTO: 1 % (ref 0–1)
BILIRUB SERPL-MCNC: 0.35 MG/DL (ref 0.2–1)
BUN SERPL-MCNC: 16 MG/DL (ref 5–25)
CALCIUM SERPL-MCNC: 9.3 MG/DL (ref 8.4–10.2)
CHLORIDE SERPL-SCNC: 103 MMOL/L (ref 96–108)
CO2 SERPL-SCNC: 28 MMOL/L (ref 21–32)
CREAT SERPL-MCNC: 0.6 MG/DL (ref 0.6–1.3)
EOSINOPHIL # BLD AUTO: 0.39 THOUSAND/ΜL (ref 0–0.61)
EOSINOPHIL NFR BLD AUTO: 5 % (ref 0–6)
ERYTHROCYTE [DISTWIDTH] IN BLOOD BY AUTOMATED COUNT: 14 % (ref 11.6–15.1)
GFR SERPL CREATININE-BSD FRML MDRD: 85 ML/MIN/1.73SQ M
GLUCOSE SERPL-MCNC: 65 MG/DL (ref 65–140)
HCT VFR BLD AUTO: 40.4 % (ref 34.8–46.1)
HGB BLD-MCNC: 12.9 G/DL (ref 11.5–15.4)
IMM GRANULOCYTES # BLD AUTO: 0.02 THOUSAND/UL (ref 0–0.2)
IMM GRANULOCYTES NFR BLD AUTO: 0 % (ref 0–2)
LYMPHOCYTES # BLD AUTO: 2.03 THOUSANDS/ΜL (ref 0.6–4.47)
LYMPHOCYTES NFR BLD AUTO: 28 % (ref 14–44)
MCH RBC QN AUTO: 31.6 PG (ref 26.8–34.3)
MCHC RBC AUTO-ENTMCNC: 31.9 G/DL (ref 31.4–37.4)
MCV RBC AUTO: 99 FL (ref 82–98)
MONOCYTES # BLD AUTO: 0.8 THOUSAND/ΜL (ref 0.17–1.22)
MONOCYTES NFR BLD AUTO: 11 % (ref 4–12)
NEUTROPHILS # BLD AUTO: 3.94 THOUSANDS/ΜL (ref 1.85–7.62)
NEUTS SEG NFR BLD AUTO: 55 % (ref 43–75)
NRBC BLD AUTO-RTO: 0 /100 WBCS
PLATELET # BLD AUTO: 260 THOUSANDS/UL (ref 149–390)
PMV BLD AUTO: 10.7 FL (ref 8.9–12.7)
POTASSIUM SERPL-SCNC: 4.3 MMOL/L (ref 3.5–5.3)
PROT SERPL-MCNC: 7.2 G/DL (ref 6.4–8.4)
RBC # BLD AUTO: 4.08 MILLION/UL (ref 3.81–5.12)
SODIUM SERPL-SCNC: 138 MMOL/L (ref 135–147)
TSH SERPL DL<=0.05 MIU/L-ACNC: 1.96 UIU/ML (ref 0.45–4.5)
WBC # BLD AUTO: 7.23 THOUSAND/UL (ref 4.31–10.16)

## 2025-02-20 PROCEDURE — 80361 OPIATES 1 OR MORE: CPT

## 2025-02-20 PROCEDURE — 80307 DRUG TEST PRSMV CHEM ANLYZR: CPT

## 2025-02-20 PROCEDURE — 85025 COMPLETE CBC W/AUTO DIFF WBC: CPT

## 2025-02-20 PROCEDURE — 80053 COMPREHEN METABOLIC PANEL: CPT

## 2025-02-20 PROCEDURE — 84443 ASSAY THYROID STIM HORMONE: CPT

## 2025-02-20 PROCEDURE — 36415 COLL VENOUS BLD VENIPUNCTURE: CPT

## 2025-02-20 PROCEDURE — 77080 DXA BONE DENSITY AXIAL: CPT

## 2025-02-21 ENCOUNTER — RESULTS FOLLOW-UP (OUTPATIENT)
Age: 80
End: 2025-02-21

## 2025-02-21 NOTE — TELEPHONE ENCOUNTER
----- Message from Garcia [facility], DO sent at 2/21/2025  6:54 AM EST -----  Labs look good. No concerns noted

## 2025-02-24 LAB
6MAM UR QL SCN: NEGATIVE NG/ML
ACCEPTABLE CREAT UR QL: 45 MG/DL
AMPHET UR QL SCN: NEGATIVE NG/ML
BARBITURATES UR QL SCN: NEGATIVE NG/ML
BENZODIAZ UR QL SCN: NEGATIVE NG/ML
BUPRENORPHINE UR QL CFM: NEGATIVE NG/ML
CANNABINOIDS UR QL SCN: NEGATIVE NG/ML
CARISOPRODOL UR QL: NEGATIVE NG/ML
COCAINE+BZE UR QL SCN: NEGATIVE NG/ML
CODEINE UR QL CFM: NOT DETECTED NG/MG CREAT
DHC UR CFM-MCNC: NOT DETECTED NG/MG CREAT
ETHYL GLUCURONIDE UR QL SCN: NEGATIVE NG/ML
FENTANYL UR QL SCN: NEGATIVE NG/ML
GABAPENTIN SERPLBLD QL SCN: NEGATIVE UG/ML
HYDROCODONE UR QL CFM: NOT DETECTED NG/MG CREAT
HYDROMORPHONE UR QL CFM: 251 NG/MG CREAT
METHADONE UR QL SCN: NEGATIVE NG/ML
MORPHINE UR QL CFM: 9929 NG/MG CREAT
NITRITE UR QL STRIP: NEGATIVE UG/ML
NORCODEINE/CREAT UR CFM: NOT DETECTED NG/MG CREAT
NORHYDROCODONE UR CFM-MCNC: NOT DETECTED NG/MG CREAT
NORMORPHINE: 853 NG/MG CREAT
OPIATE CLASS: ABNORMAL
OPIATES UR QL SCN: NORMAL NG/ML
OXYCODONE+OXYMORPHONE UR QL SCN: NEGATIVE NG/ML
PCP UR QL SCN: NEGATIVE NG/ML
PROPOXYPH UR QL SCN: NEGATIVE NG/ML
SPECIMEN PH ACCEPTABLE UR: 5.6 (ref 4.5–8.9)
TAPENTADOL UR QL SCN: NEGATIVE NG/ML
TRAMADOL UR QL SCN: NEGATIVE NG/ML

## 2025-02-25 ENCOUNTER — OFFICE VISIT (OUTPATIENT)
Age: 80
End: 2025-02-25
Payer: MEDICARE

## 2025-02-25 VITALS
DIASTOLIC BLOOD PRESSURE: 68 MMHG | TEMPERATURE: 96 F | WEIGHT: 125 LBS | BODY MASS INDEX: 22.15 KG/M2 | SYSTOLIC BLOOD PRESSURE: 122 MMHG | OXYGEN SATURATION: 94 % | HEART RATE: 84 BPM | HEIGHT: 63 IN

## 2025-02-25 DIAGNOSIS — D18.01 CHERRY ANGIOMA: ICD-10-CM

## 2025-02-25 DIAGNOSIS — D22.9 MULTIPLE MELANOCYTIC NEVI: ICD-10-CM

## 2025-02-25 DIAGNOSIS — Z13.89 SCREENING FOR SKIN CONDITION: Primary | ICD-10-CM

## 2025-02-25 DIAGNOSIS — L82.1 SEBORRHEIC KERATOSIS: ICD-10-CM

## 2025-02-25 PROCEDURE — 99213 OFFICE O/P EST LOW 20 MIN: CPT | Performed by: STUDENT IN AN ORGANIZED HEALTH CARE EDUCATION/TRAINING PROGRAM

## 2025-02-25 NOTE — PROGRESS NOTES
"[facility] Note     Patient Name: Crissy Arboleda  Encounter Date: 2/25/25     Have you been cared for by a [facility] Dermatologist in the last 3 years and, if so, which description applies to you?    Yes.  I have been here within the last 3 years, and my medical history has NOT changed since that time.  I am FEMALE/of child-bearing potential.    REVIEW OF SYSTEMS:  Have you recently had or currently have any of the following? No changes in my recent health.   PAST MEDICAL HISTORY:  Have you personally ever had or currently have any of the following?  If \"YES,\" then please provide more detail. No changes in my medical history.   HISTORY OF IMMUNOSUPPRESSION: Do you have a history of any of the following:  Systemic Immunosuppression such as Diabetes, Biologic or Immunotherapy, Chemotherapy, Organ Transplantation, Bone Marrow Transplantation or Prednisone?  No     Answering \"YES\" requires the addition of the dotphrase \"IMMUNOSUPPRESSED\" as the first diagnosis of the patient's visit.   FAMILY HISTORY:  Any \"first degree relatives\" (parent, brother, sister, or child) with the following?    No changes in my family's known health.   PATIENT EXPERIENCE:    Do you want the Dermatologist to perform a COMPLETE skin exam today including a clinical examination under the \"bra and underwear\" areas?  Yes  If necessary, do we have your permission to call and leave a detailed message on your Preferred Phone number that includes your specific medical information?  Yes      Allergies   Allergen Reactions   • Adhesive [Medical Tape] Rash   • Latex Rash      Current Outpatient Medications:   •  amitriptyline (ELAVIL) 50 mg tablet, TAKE 1 TABLET BY MOUTH EVERYDAY AT BEDTIME, Disp: 90 tablet, Rfl: 1  •  cholecalciferol (VITAMIN D3) 1,000 units tablet, Take 1,000 Units by mouth in the morning., Disp: , Rfl:   •  cyclobenzaprine (FLEXERIL) 5 mg tablet, Take 2 tablets (10 mg total) by mouth every 8 (eight) hours as needed " "for muscle spasms, Disp: 60 tablet, Rfl: 3  •  dorzolamide-timolol (COSOPT) 2-0.5 % ophthalmic solution, INSTILL 1 DROP IN LEFT EYE TWICE DAILY, Disp: , Rfl:   •  levothyroxine 100 mcg tablet, TAKE 1 TABLET BY MOUTH EVERY DAY, Disp: 90 tablet, Rfl: 3  •  Lumigan 0.01 % ophthalmic drops, INSTILL ONE DROP IN LEFT EYE AT BEDTIME, Disp: , Rfl:   •  morphine (MSIR) 15 mg tablet, Take 1 tablet (15 mg total) by mouth every 8 (eight) hours as needed for severe pain Max Daily Amount: 45 mg, Disp: 90 tablet, Rfl: 0  •  Multiple Vitamins-Minerals (ICAPS AREDS 2 PO), Take by mouth daily , Disp: , Rfl:     Current Facility-Administered Medications:   •  denosumab (PROLIA) subcutaneous injection 60 mg, 60 mg, Subcutaneous, Q6 Months, Garcia Watson DO, 60 mg at 10/30/24 1101          Whom besides the patient is providing clinical information about today's encounter?   NO ADDITIONAL HISTORIAN (patient alone provided history)    Physical Exam and Assessment/Plan by Diagnosis:    Chief complaint: patient is an 80 year old male present for a routine skin exam. Patient has no personal history of skin cancer, and has no concerns.     MELANOCYTIC NEVI (\"Moles\")    Physical Exam:  Anatomic Location Affected: Mostly on sun-exposed areas of the trunk and extremities.   Morphological Description:  Scattered, 1-4mm round to ovoid, symmetrical-appearing, even bordered, skin colored to dark brown macules/papules, mostly in sun-exposed areas.  Pertinent Positives:  Pertinent Negatives:    Additional History of Present Condition:  presen ton exam.     Assessment and Plan:  Based on a thorough discussion of this condition and the management approach to it (including a comprehensive discussion of the known risks, side effects and potential benefits of treatment), the patient (family) agrees to implement the following specific plan:  Provided handout with information regarding the ABCDE's of moles   Recommend routine skin exams every year.    Sun " "avoidance, protective clothing (known as UPF clothing), and the use of at least SPF 30 sunscreens is advised. Sunscreen should be reapplied every two hours when outside.     SEBORRHEIC KERATOSIS; NON-INFLAMED    Physical Exam:  Anatomic Location Affected:  scattered across trunk, extremities,  face  Morphological Description:  Flat and raised, waxy, smooth to warty textured, yellow to brownish-grey to dark brown to blackish, discrete, \"stuck-on\" appearing papules.  Pertinent Positives:  Pertinent Negatives:    Additional History of Present Condition:  Patient reports new bumps on the skin.  Denies itch, burn, pain, bleeding or ulceration.  Present constantly; nothing seems to make it worse or better.  No prior treatment.      Assessment and Plan:  Based on a thorough discussion of this condition and the management approach to it (including a comprehensive discussion of the known risks, side effects and potential benefits of treatment), the patient (family) agrees to implement the following specific plan:  Reassured benign.      ANGIOMA (\"CHERRY ANGIOMA\")    Physical Exam:  Anatomic Location: scattered across sun exposed areas of the trunk and extremities.   Morphologic Description: Firm red to reddish-blue discrete papules.  Pertinent Positives:  Pertinent Negatives:    Additional History of Present Condition:  Present on exam.     Assessment and Plan:  Reassured benign.      Scribe Attestation    I,:  Olivia Abel am acting as a scribe while in the presence of the attending physician.:       I,:  Dionicio Barahona, DO personally performed the services described in this documentation    as scribed in my presence.:           "

## 2025-02-25 NOTE — PATIENT INSTRUCTIONS
"MELANOCYTIC NEVI (\"Moles\")    Paste patient specific assessment and plan here     Melanocytic nevi (\"moles\") are tan or brown, raised or flat areas of the skin which have an increased number of melanocytes. Melanocytes are the cells in our body which make pigment and account for skin color.    Some moles are present at birth (I.e., \"congenital nevi\"), while others come up later in life (i.e., \"acquired nevi\").  The sun can stimulate the body to make more moles.  Sunburns are not the only thing that triggers more moles.  Chronic sun exposure can do it too.     Clinically distinguishing a healthy mole from melanoma may be difficult, even for experienced dermatologists. The \"ABCDE's\" of moles have been suggested as a means of helping to alert a person to a suspicious mole and the possible increased risk of melanoma.  The suggestions for raising alert are as follows:    Asymmetry: Healthy moles tend to be symmetric, while melanomas are often asymmetric.  Asymmetry means if you draw a line through the mole, the two halves do not match in color, size, shape, or surface texture. Asymmetry can be a result of rapid enlargement of a mole, the development of a raised area on a previously flat lesion, scaling, ulceration, bleeding or scabbing within the mole.  Any mole that starts to demonstrate \"asymmetry\" should be examined promptly by a board certified dermatologist.     Border: Healthy moles tend to have discrete, even borders.  The border of a melanoma often blends into the normal skin and does not sharply delineate the mole from normal skin.  Any mole that starts to demonstrate \"uneven borders\" should be examined promptly by a board certified dermatologist.     Color: Healthy moles tend to be one color throughout.  Melanomas tend to be made up of different colors ranging from dark black, blue, white, or red.  Any mole that demonstrates a color change should be examined promptly by a board certified dermatologist. " "    Diameter: Healthy moles tend to be smaller than 0.6 cm in size; an exception are \"congenital nevi\" that can be larger.  Melanomas tend to grow and can often be greater than 0.6 cm (1/4 of an inch, or the size of a pencil eraser). This is only a guideline, and many normal moles may be larger than 0.6 cm without being unhealthy.  Any mole that starts to change in size (small to bigger or bigger to smaller) should be examined promptly by a board certified dermatologist.     Evolving: Healthy moles tend to \"stay the same.\"  Melanomas may often show signs of change or evolution such as a change in size, shape, color, or elevation.  Any mole that starts to itch, bleed, crust, burn, hurt, or ulcerate or demonstrate a change or evolution should be examined promptly by a board certified dermatologist.      Dysplastic Nevi  Dysplastic moles are moles that fit the ABCDE rules of melanoma but are not identified as melanomas when examined under the microscope.  They may indicate an increased risk of melanoma in that person. If there is a family history of melanoma, most experts agree that the person may be at an increased risk for developing a melanoma.  Experts still do not agree on what dysplastic moles mean in patients without a personal or family history of melanoma.  Dysplastic moles are usually larger than common moles and have different colors within it with irregular borders. The appearance can be very similar to a melanoma. Biopsies of dysplastic moles may show abnormalities which are different from a regular mole.      Melanoma  Malignant melanoma is a type of skin cancer that can be deadly if it spreads throughout the body. The incidence of melanoma in the [place] is growing faster than any other cancer. Melanoma usually grows near the surface of the skin for a period of time, and then begins to grow deeper into the skin. Once it grows deeper into the skin, the risk of spread to other organs greatly " "increases. Therefore, early detection and removal of a malignant melanoma may result in a better chance at a complete cure; removal after the tumor has spread may not be as effective, leading to worse clinical outcomes such as death.    The true rate of nevus transformation into a melanoma is unknown. It has been estimated that the lifetime risk for any acquired melanocytic nevus on any 20-year-old individual transforming into melanoma by age 80 is 0.03% (1 in 3,164) for men and 0.009% (1 in 10,800) for women.     The appearance of a \"new mole\" remains one of the most reliable methods for identifying a malignant melanoma.  Occasionally, melanomas appear as rapidly growing, blue-black, dome-shaped bumps within a previous mole or previous area of normal skin.  Other times, melanomas are suspected when a mole suddenly appears or changes. Itching, burning, or pain in a pigmented lesion should increase suspicion, but most patients with early melanoma have no skin discomfort whatsoever.  Melanoma can occur anywhere on the skin, including areas that are difficult for self-examination. Many melanomas are first noticed by other family members.  Suspicious-looking moles may be removed for microscopic examination.       You may be able to prevent death from melanoma by doing two simple things:    Try to avoid unnecessary sun exposure and protect your skin when it is exposed to the sun.  People who live near the equator, people who have intermittent exposures to large amounts of sun, and people who have had sunburns in childhood or adolescence have an increased risk for melanoma. Sun sense and vigilant sun protection may be keys to helping to prevent melanoma.  We recommend wearing UPF-rated sun protective clothing and sunglasses whenever possible and applying a moisturizer-sunscreen combination product (SPF 50+) such as Neutrogena Daily Defense to sun exposed areas of skin at least three times a day.    Have your moles " "regularly examined by a board certified dermatologist AND by yourself or a family member/friend at home.  We recommend that you have your moles examined at least once a year by a board certified dermatologist.  Use your birthday as an annual reminder to have your \"Birthday Suit\" (I.e., your skin) examined; it is a nice birthday gift to yourself to know that your skin is healthy appearing!  Additionally, at-home self examinations may be helpful for detecting a possible melanoma.  Use the ABCDEs we discussed and check your moles once a month at home.        SEBORRHEIC KERATOSIS  A seborrheic keratosis is a harmless warty spot that appears during adult life as a common sign of skin aging.  Seborrheic keratoses can arise on any area of skin, covered or uncovered, with the usual exception of the palms and soles. They do not arise from mucous membranes. Seborrheic keratoses can have highly variable appearance.      Seborrheic keratoses are extremely common. It has been estimated that over 90% of adults over the age of 60 years have one or more of them. They occur in males and females of all races, typically beginning to erupt in the 30s or 40s. They are uncommon under the age of 20 years.  The precise cause of seborrhoeic keratoses is not known.  Seborrhoeic keratoses are considered degenerative in nature. As time goes by, seborrheic keratoses tend to become more numerous. Some people inherit a tendency to develop a very large number of them; some people may have hundreds of them.    The name \"seborrheic keratosis\" is misleading, because these lesions are not limited to a seborrhoeic distribution (scalp, mid-face, chest, upper back), nor are they formed from sebaceous glands, nor are they associated with sebum -- which is greasy.  Seborrheic keratosis may also be called \"SK,\" \"Seb K,\" \"basal cell papilloma,\" \"senile wart,\" or \"barnacle.\"      There is no easy way to remove multiple lesions on a single occasion.  Unless a " "specific lesion is \"inflamed\" and is causing pain or stinging/burning or is bleeding, most insurance companies do not authorize treatment.      ANGIOMA (\"CHERRY ANGIOMA\")  Garcia angiomas markedly increase in number from about the age of 40, so it has been estimated that 75% of people over 75 years of age have them. Although they also called \"senile angiomas,\" they can occur in young people too - 5% of adolescents have been found to have them.     Cherry angiomas are very common in males and females of any age or race, with no difference in sexes or races affected. They are however more noticeable in white skin than in skin of colour.  There may be a family history of similar lesions. Eruptive (very large number appearing in a short period of time) cherry angiomas have been rarely reported to be associated with internal malignancy and pregnancy.   "

## 2025-02-26 LAB
HYDROCODONE UR QL: NEGATIVE NG/ML
HYDROMORPHONE UR QL: 142 NG/ML

## 2025-03-10 ENCOUNTER — TELEPHONE (OUTPATIENT)
Dept: RADIOLOGY | Facility: CLINIC | Age: 80
End: 2025-03-10

## 2025-03-10 ENCOUNTER — HOSPITAL ENCOUNTER (OUTPATIENT)
Dept: RADIOLOGY | Facility: CLINIC | Age: 80
Discharge: HOME | End: 2025-03-10
Payer: MEDICARE

## 2025-03-10 VITALS
OXYGEN SATURATION: 97 % | RESPIRATION RATE: 18 BRPM | TEMPERATURE: 97.3 F | HEART RATE: 74 BPM | SYSTOLIC BLOOD PRESSURE: 128 MMHG | DIASTOLIC BLOOD PRESSURE: 78 MMHG

## 2025-03-10 DIAGNOSIS — M51.16 INTERVERTEBRAL DISC DISORDER WITH RADICULOPATHY OF LUMBAR REGION: ICD-10-CM

## 2025-03-10 PROCEDURE — 64484 NJX AA&/STRD TFRM EPI L/S EA: CPT | Performed by: PHYSICAL MEDICINE & REHABILITATION

## 2025-03-10 PROCEDURE — 64483 NJX AA&/STRD TFRM EPI L/S 1: CPT | Performed by: PHYSICAL MEDICINE & REHABILITATION

## 2025-03-10 RX ORDER — LIDOCAINE HYDROCHLORIDE 10 MG/ML
4 INJECTION, SOLUTION EPIDURAL; INFILTRATION; INTRACAUDAL; PERINEURAL ONCE
Status: COMPLETED | OUTPATIENT
Start: 2025-03-10 | End: 2025-03-10

## 2025-03-10 RX ORDER — METHYLPREDNISOLONE ACETATE 40 MG/ML
40 INJECTION, SUSPENSION INTRA-ARTICULAR; INTRALESIONAL; INTRAMUSCULAR; PARENTERAL; SOFT TISSUE ONCE
Status: COMPLETED | OUTPATIENT
Start: 2025-03-10 | End: 2025-03-10

## 2025-03-10 RX ORDER — BUPIVACAINE HCL/PF 2.5 MG/ML
2 VIAL (ML) INJECTION ONCE
Status: COMPLETED | OUTPATIENT
Start: 2025-03-10 | End: 2025-03-10

## 2025-03-10 RX ADMIN — METHYLPREDNISOLONE ACETATE 40 MG: 40 INJECTION, SUSPENSION INTRA-ARTICULAR; INTRALESIONAL; INTRAMUSCULAR; SOFT TISSUE at 11:47

## 2025-03-10 RX ADMIN — IOHEXOL 1 ML: 300 INJECTION, SOLUTION INTRAVENOUS at 11:43

## 2025-03-10 RX ADMIN — BUPIVACAINE HYDROCHLORIDE 2 ML: 2.5 INJECTION, SOLUTION EPIDURAL; INFILTRATION; INTRACAUDAL at 11:47

## 2025-03-10 RX ADMIN — LIDOCAINE HYDROCHLORIDE 4 ML: 10 INJECTION, SOLUTION EPIDURAL; INFILTRATION; INTRACAUDAL; PERINEURAL at 11:35

## 2025-03-10 NOTE — H&P
History of Present Illness: The patient is a 80 y.o. female who presents with complaints of low back pain    Past Medical History:   Diagnosis Date    Anxiety     Cataract     last assessed 14    Chronic bronchitis (HCC)     Sees pulmonary specialist twice a year    Chronic pain disorder     COPD (chronic obstructive pulmonary disease) (HCC)     Hypothyroidism     Multiple sclerosis (HCC)     CHOCO (obstructive sleep apnea)     uses cpap, 3/19/21 -pt states will bring CPAP for DOS 3/23/21    Osteopetrosis     Peroneal muscle atrophy     Shortness of breath     Smoker     Thoracolumbar radiculopathy due to intervertebral disc disorder        Past Surgical History:   Procedure Laterality Date    APPENDECTOMY      CATARACT EXTRACTION       SECTION      CHOLECYSTECTOMY      GALLBLADDER SURGERY      MO COLONOSCOPY FLX DX W/COLLJ SPEC WHEN PFRMD N/A 2018    Procedure: COLONOSCOPY;  Surgeon: Padilla Gonzales MD;  Location: [facility];  Service: Colorectal    MO LAMNOTMY INCL W/DCMPRSN NRV ROOT 1 INTRSPC LUMBR Right 3/23/2021    Procedure: MINIMALLY INVASIVE LUMBAR FAR LATERAL DISCECTOMY L4-5, RIGHT;  Surgeon: Estuardo Dwyer MD;  Location:  MAIN OR;  Service: Neurosurgery    THROAT SURGERY           Current Outpatient Medications:     amitriptyline (ELAVIL) 50 mg tablet, TAKE 1 TABLET BY MOUTH EVERYDAY AT BEDTIME, Disp: 90 tablet, Rfl: 1    cholecalciferol (VITAMIN D3) 1,000 units tablet, Take 1,000 Units by mouth in the morning., Disp: , Rfl:     cyclobenzaprine (FLEXERIL) 5 mg tablet, Take 2 tablets (10 mg total) by mouth every 8 (eight) hours as needed for muscle spasms, Disp: 60 tablet, Rfl: 3    dorzolamide-timolol (COSOPT) 2-0.5 % ophthalmic solution, INSTILL 1 DROP IN LEFT EYE TWICE DAILY, Disp: , Rfl:     levothyroxine 100 mcg tablet, TAKE 1 TABLET BY MOUTH EVERY DAY, Disp: 90 tablet, Rfl: 3    Lumigan 0.01 % ophthalmic drops, INSTILL ONE DROP IN LEFT EYE AT BEDTIME, Disp: , Rfl:     morphine (MSIR)  15 mg tablet, Take 1 tablet (15 mg total) by mouth every 8 (eight) hours as needed for severe pain Max Daily Amount: 45 mg, Disp: 90 tablet, Rfl: 0    Multiple Vitamins-Minerals (ICAPS AREDS 2 PO), Take by mouth daily , Disp: , Rfl:     Current Facility-Administered Medications:     bupivacaine (PF) (MARCAINE) 0.25 % injection 2 mL, 2 mL, Epidural, Once, Shayne Clemente DO    denosumab (PROLIA) subcutaneous injection 60 mg, 60 mg, Subcutaneous, Q6 Months, Garcia Watson DO, 60 mg at 10/30/24 1101    iohexol (OMNIPAQUE) 300 mg/mL injection 50 mL, 50 mL, Epidural, Once, Shayne Clemente DO    lidocaine (PF) (XYLOCAINE-MPF) 1 % injection 4 mL, 4 mL, Infiltration, Once, Shayne Clemente DO    methylPREDNISolone acetate (DEPO-MEDROL) injection 40 mg, 40 mg, Perineural, Once, Shayne Clemente DO    Allergies   Allergen Reactions    Adhesive [Medical Tape] Rash    Latex Rash       Physical Exam: There were no vitals filed for this visit.  General: Awake, Alert, Oriented x 3, Mood and affect appropriate  Respiratory: Respirations even and unlabored  Cardiovascular: Peripheral pulses intact; no edema  Musculoskeletal Exam: tenderness to palpation right sided lumbar paraspinals     ASA Score: 2         Assessment: No diagnosis found.    Plan: right L4-L5,L5-S1 TFESI.

## 2025-03-10 NOTE — DISCHARGE INSTR - LAB
Epidural Steroid Injection   WHAT YOU NEED TO KNOW:   An epidural steroid injection (GEOVANNA) is a procedure to inject steroid medicine into the epidural space. The epidural space is between your spinal cord and vertebrae. Steroids reduce inflammation and fluid buildup in your spine that may be causing pain. You may be given pain medicine along with the steroids.          ACTIVITY  Do not drive or operate machinery today.  No strenuous activity today - bending, lifting, etc.  You may resume normal activites starting tomorrow - start slowly and as tolerated.  You may shower today, but no tub baths or hot tubs.  You may have numbness for several hours from the local anesthetic. Please use caution and common sense, especially with weight-bearing activities.    CARE OF THE INJECTION SITE  If you have soreness or pain, apply ice to the area today (20 minutes on/20 minutes off).  Starting tomorrow, you may use warm, moist heat or ice if needed.  You may have an increase or change in your discomfort for 36-48 hours after your treatment.  Apply ice and continue with any pain medication you have been prescribed.  Notify the [facility] if you have any of the following: redness, drainage, swelling, headache, stiff neck or fever above 100°F.    SPECIAL INSTRUCTIONS  Our office will contact you in approximately 14 days for a progress report.    MEDICATIONS  Continue to take all routine medications.  Our office may have instructed you to hold some medications.    As no general anesthesia was used in today's procedure, you should not experience any side effects related to anesthesia.     If you are diabetic, the steroids used in today's injection may temporarily increase your blood sugar levels after the first few days after your injection. Please keep a close eye on your sugars and alert the doctor who manages your diabetes if your sugars are significantly high from your baseline or you are symptomatic.     If you have a  problem specifically related to your procedure, please call our office at [phone].  Problems not related to your procedure should be directed to your primary care physician.

## 2025-03-18 DIAGNOSIS — F11.20 CONTINUOUS OPIOID DEPENDENCE (HCC): ICD-10-CM

## 2025-03-18 RX ORDER — MORPHINE SULFATE 15 MG/1
15 TABLET ORAL EVERY 8 HOURS PRN
Qty: 90 TABLET | Refills: 0 | Status: SHIPPED | OUTPATIENT
Start: 2025-03-18

## 2025-03-18 NOTE — TELEPHONE ENCOUNTER
Reason for call:   [] Refill   [] Prior Auth  [] Other:     Office:   [x] PCP/Provider -  [facility] [place]  Authorized By: Garcia Watson DO    [] Specialty/Provider -     Medication: morphine (MSIR) 15 mg tablet    Dose/Frequency: Take 1 tablet (15 mg total) by mouth every 8 (eight) hours as needed for severe pain     Quantity: 90 tablet    Pharmacy: [id number] - [place] - [address] [facility]   Does the patient have enough for 3 days?   [x] Yes   [] No - Send as HP to POD    Mail Away Pharmacy   Does the patient have enough for 10 days?   [] Yes   [] No - Send as HP to POD

## 2025-03-24 ENCOUNTER — TELEPHONE (OUTPATIENT)
Dept: PAIN MEDICINE | Facility: CLINIC | Age: 80
End: 2025-03-24

## 2025-03-30 DIAGNOSIS — K58.2 IRRITABLE BOWEL SYNDROME WITH BOTH CONSTIPATION AND DIARRHEA: ICD-10-CM

## 2025-03-31 RX ORDER — AMITRIPTYLINE HYDROCHLORIDE 50 MG/1
50 TABLET ORAL
Qty: 90 TABLET | Refills: 1 | Status: SHIPPED | OUTPATIENT
Start: 2025-03-31

## 2025-04-07 ENCOUNTER — OFFICE VISIT (OUTPATIENT)
Dept: GASTROENTEROLOGY | Facility: CLINIC | Age: 80
End: 2025-04-07
Payer: MEDICARE

## 2025-04-07 VITALS
SYSTOLIC BLOOD PRESSURE: 137 MMHG | DIASTOLIC BLOOD PRESSURE: 83 MMHG | HEIGHT: 63 IN | WEIGHT: 129 LBS | BODY MASS INDEX: 22.86 KG/M2 | TEMPERATURE: 97.2 F | HEART RATE: 76 BPM | OXYGEN SATURATION: 98 %

## 2025-04-07 DIAGNOSIS — R14.0 BLOATING: ICD-10-CM

## 2025-04-07 DIAGNOSIS — K58.2 IRRITABLE BOWEL SYNDROME WITH BOTH CONSTIPATION AND DIARRHEA: Primary | ICD-10-CM

## 2025-04-07 PROCEDURE — 99214 OFFICE O/P EST MOD 30 MIN: CPT | Performed by: INTERNAL MEDICINE

## 2025-04-08 ENCOUNTER — OFFICE VISIT (OUTPATIENT)
Dept: PAIN MEDICINE | Facility: CLINIC | Age: 80
End: 2025-04-08
Payer: MEDICARE

## 2025-04-08 DIAGNOSIS — G89.4 CHRONIC PAIN SYNDROME: ICD-10-CM

## 2025-04-08 DIAGNOSIS — M51.16 INTERVERTEBRAL DISC DISORDER WITH RADICULOPATHY OF LUMBAR REGION: Primary | ICD-10-CM

## 2025-04-08 DIAGNOSIS — F11.20 CONTINUOUS OPIOID DEPENDENCE (HCC): ICD-10-CM

## 2025-04-08 PROCEDURE — 99213 OFFICE O/P EST LOW 20 MIN: CPT | Performed by: NURSE PRACTITIONER

## 2025-04-08 PROCEDURE — G2211 COMPLEX E/M VISIT ADD ON: HCPCS | Performed by: NURSE PRACTITIONER

## 2025-04-08 NOTE — PROGRESS NOTES
Assessment:  1. Intervertebral disc disorder with radiculopathy of lumbar region    2. Continuous opioid dependence (HCC)    3. Chronic pain syndrome        Plan:  While the patient was in the office today, I did have a thorough conversation regarding their chronic pain syndrome, medication management, and treatment plan options.    80 y.o. female who presents to [facility] for interval re-evaluation in regards to pain in the Low back. The patients last office visit was 8/22/2024. Patient presents today with pain in low back pain that radiates to right leg. Pain is described as Intermittent, Sharp, and Shooting. Symptoms are worse with bending and twisting. Alleviating factors identifiable by the patient are rest. On the numeric pain scale of 1-10, the pain typically increases to max of 2 out of 10, which is currently impacting their quality of life.    Already scheduled for next injection 7/14/2025, right L4-L5, L5-S1 TFESI with Dr. Clemente.  She wants to follow-up as needed after her injection.    Complete risks and benefits including bleeding, infection, tissue reaction, nerve injury and allergic reaction were discussed. The approach was demonstrated using models and literature was provided. Verbal and written consent was obtained.    Follow-up is planned as needed or sooner as warranted. The patient was advised to contact the office should their symptoms worsen in the interim.     My impressions and treatment recommendations were discussed in detail with the patient who verbalized understanding and had no further questions.  Discharge instructions were provided. I personally saw and examined the patient and I agree with the above discussed plan of care.    No orders of the defined types were placed in this encounter.    No orders of the defined types were placed in this encounter.      History of Present Illness:  Crissy Arboleda is a 80 y.o. female who presents to [facility] for interval re-evaluation in regards to pain in the Low back. The patients last office visit was 8/22/2024. Patient presents today with pain in low back pain that radiates to right leg . Pain is described as Intermittent, Sharp, and Shooting. Symptoms are worse with bending and twisting. Alleviating factors identifiable by the patient are rest. On the numeric pain scale of 1-10, the pain typically increases to max of 2 out of 10, which is currently impacting their quality of life.    Current pain meds include: Cyclobenzaprine and MS IR    Previous treatments: right L4-L5, L5-S1 TFESI  Date:3/10/2025;  80% relief for 3 weeks       I have personally reviewed and/or updated the patient's past medical history, past surgical history, family history, social history, current medications, allergies, and vital signs today.     Review of Systems   Respiratory:  Negative for shortness of breath.    Cardiovascular:  Negative for chest pain.   Gastrointestinal:  Negative for constipation, diarrhea, nausea and vomiting.   Musculoskeletal:  Positive for back pain, gait problem and joint swelling. Negative for arthralgias and myalgias.        Right leg pain  Decreased range of motion   Skin:  Negative for rash.   Neurological:  Positive for weakness (Muscle). Negative for dizziness and seizures.   All other systems reviewed and are negative.      Patient Active Problem List   Diagnosis    CHOCO on CPAP    Hypothyroidism    Insomnia    Lung nodule    Macular degeneration    Multiple sclerosis (HCC)    Rosacea    Seborrheic keratosis    Chronic neuropathic pain    Thoracolumbar radiculopathy due to intervertebral disc disorder    Other constipation    Intervertebral disc disorder with radiculopathy of lumbar region    Chronic pain syndrome    Anxiety    Nicotine dependence with current use    Status post lumbar discectomy    Continuous opioid dependence (HCC)    Neuropathic pain of lower extremity, right    Right foot  drop    Piriformis syndrome of right side    Abdominal pain    Chronic obstructive pulmonary disease, unspecified COPD type (HCC)       Past Medical History:   Diagnosis Date    Anxiety     Cataract     last assessed 14    Chronic bronchitis (HCC)     Sees pulmonary specialist twice a year    Chronic pain disorder     COPD (chronic obstructive pulmonary disease) (HCC)     Hypothyroidism     Multiple sclerosis (HCC)     CHOCO (obstructive sleep apnea)     uses cpap, 3/19/21 -pt states will bring CPAP for DOS 3/23/21    Osteopetrosis     Peroneal muscle atrophy     Shortness of breath     Smoker     Thoracolumbar radiculopathy due to intervertebral disc disorder        Past Surgical History:   Procedure Laterality Date    APPENDECTOMY      CATARACT EXTRACTION       SECTION      CHOLECYSTECTOMY      GALLBLADDER SURGERY      MI COLONOSCOPY FLX DX W/COLLJ SPEC WHEN PFRMD N/A 2018    Procedure: COLONOSCOPY;  Surgeon: Padilla Gonzales MD;  Location: [facility];  Service: Colorectal    MI LAMNOTMY INCL W/DCMPRSN NRV ROOT 1 INTRSPC LUMBR Right 3/23/2021    Procedure: MINIMALLY INVASIVE LUMBAR FAR LATERAL DISCECTOMY L4-5, RIGHT;  Surgeon: Estuardo Dwyer MD;  Location:  MAIN OR;  Service: Neurosurgery    THROAT SURGERY         Family History   Problem Relation Age of Onset    Skin cancer Mother     Hypertension Father         benign essential    Stroke Father     Lung cancer Brother        Social History     Occupational History    Occupation: retired     Comment: part time as per [employer]   Tobacco Use    Smoking status: Every Day     Current packs/day: 1.00     Average packs/day: 1 pack/day for 67.1 years (67.1 ttl pk-yrs)     Types: Cigarettes     Start date: 1958    Smokeless tobacco: Never   Vaping Use    Vaping status: Former    Substances: Nicotine   Substance and Sexual Activity    Alcohol use: Yes     Alcohol/week: 1.0 - 2.0 standard drink of alcohol     Types: 1 - 2 Glasses of wine per week      Comment: once daily beer    Drug use: Yes     Types: Morphine     Comment: prescribed morphine    Sexual activity: Yes     Partners: Male       Current Outpatient Medications on File Prior to Visit   Medication Sig    amitriptyline (ELAVIL) 50 mg tablet TAKE 1 TABLET BY MOUTH EVERYDAY AT BEDTIME    cholecalciferol (VITAMIN D3) 1,000 units tablet Take 1,000 Units by mouth in the morning.    cyclobenzaprine (FLEXERIL) 5 mg tablet Take 2 tablets (10 mg total) by mouth every 8 (eight) hours as needed for muscle spasms    dorzolamide-timolol (COSOPT) 2-0.5 % ophthalmic solution INSTILL 1 DROP IN LEFT EYE TWICE DAILY    levothyroxine 100 mcg tablet TAKE 1 TABLET BY MOUTH EVERY DAY    Lumigan 0.01 % ophthalmic drops INSTILL ONE DROP IN LEFT EYE AT BEDTIME    morphine (MSIR) 15 mg tablet Take 1 tablet (15 mg total) by mouth every 8 (eight) hours as needed for severe pain Max Daily Amount: 45 mg    Multiple Vitamins-Minerals (ICAPS AREDS 2 PO) Take by mouth daily      Current Facility-Administered Medications on File Prior to Visit   Medication    denosumab (PROLIA) subcutaneous injection 60 mg       Allergies   Allergen Reactions    Adhesive [Medical Tape] Rash    Latex Rash       Physical Exam:    There were no vitals taken for this visit.    Constitutional:normal, well developed, well nourished, alert, in no distress and non-toxic and no overt pain behavior.  Eyes:anicteric  HEENT:grossly intact  Neck:supple, symmetric, trachea midline and no masses   Pulmonary:even and unlabored  Cardiovascular:No edema or pitting edema present  Skin:Normal without rashes or lesions and well hydrated  Psychiatric:Mood and affect appropriate  Neurologic:Cranial Nerves II-XII grossly intact  Musculoskeletal:ambulates with cane    Imaging

## 2025-04-08 NOTE — PROGRESS NOTES
"Name: Crissy Arboleda      : 1945      MRN: [id number]  Encounter Provider: Ricki Mars DO  Encounter Date: 2025   Encounter department: [facility] [place]  :  Assessment & Plan  Irritable bowel syndrome with both constipation and diarrhea  *    Increase water intake to 8 glasses of 8 fluid ounces daily or the equivalent  Increase fiber intake daily  Continue Gummies twice daily  No additional diagnostic testing warranted  Bloating  *    Continue charcoal tabs as needed  Recommending aloe vera juice as well as peppermint capsules twice daily    History of Present Illness     Crissy Arboleda is a 80 y.o. female who presents to the office today stating that she is just generally been feeling improved.  She has a bowel movement on a daily basis and then she can go multiple days without a bowel movement.  She can go 4 to 5 days without a bowel movement.  But she denies abdominal pain.  She is most relieved with the fact that her abdominal pain has improved.  She denies any nausea or vomiting.  There has been no bloating, gaseousness, rectal bleeding, melena.  She denies heartburn or dysphagia.  She states that she does not drink enough water.    Her last colonoscopy performed 2020 demonstrated a polyp that was 1 cm in size in the cecum that was removed by piecemeal polypectomy.  Since she is 80 years old at the present time, I told her that no additional diagnostic surveillance or screening colonoscopy should be performed.       Objective   /83 (BP Location: Left arm, Patient Position: Sitting, Cuff Size: Standard)   Pulse 76   Temp (!) 97.2 °F (36.2 °C) (Temporal)   Ht 5' 3\" (1.6 m)   Wt 58.5 kg (129 lb)   SpO2 98%   BMI 22.85 kg/m²      Physical Exam  Vitals reviewed.   Constitutional:       General: She is not in acute distress.     Appearance: Normal appearance. She is not ill-appearing.   Eyes:      General: No scleral icterus.     Extraocular " Movements: Extraocular movements intact.      Pupils: Pupils are equal, round, and reactive to light.   Cardiovascular:      Rate and Rhythm: Normal rate and regular rhythm.      Pulses: Normal pulses.      Heart sounds: Normal heart sounds. No murmur heard.  Pulmonary:      Effort: Pulmonary effort is normal.      Breath sounds: Normal breath sounds. No wheezing, rhonchi or rales.   Abdominal:      Palpations: Abdomen is soft. There is no mass.      Tenderness: There is no abdominal tenderness. There is no guarding or rebound.   Musculoskeletal:      Right lower leg: No edema.      Left lower leg: No edema.   Skin:     General: Skin is warm and dry.      Coloration: Skin is not jaundiced.      Findings: No rash.   Neurological:      General: No focal deficit present.      Mental Status: She is alert and oriented to person, place, and time.   Psychiatric:         Mood and Affect: Mood normal.         Behavior: Behavior normal.

## 2025-04-15 ENCOUNTER — OFFICE VISIT (OUTPATIENT)
Age: 80
End: 2025-04-15
Payer: MEDICARE

## 2025-04-15 VITALS
SYSTOLIC BLOOD PRESSURE: 128 MMHG | BODY MASS INDEX: 23.04 KG/M2 | OXYGEN SATURATION: 97 % | TEMPERATURE: 97.7 F | DIASTOLIC BLOOD PRESSURE: 82 MMHG | HEART RATE: 89 BPM | RESPIRATION RATE: 16 BRPM | WEIGHT: 130 LBS | HEIGHT: 63 IN

## 2025-04-15 DIAGNOSIS — F17.200 NICOTINE DEPENDENCE WITH CURRENT USE: ICD-10-CM

## 2025-04-15 DIAGNOSIS — G47.33 OSA ON CPAP: Primary | Chronic | ICD-10-CM

## 2025-04-15 DIAGNOSIS — J44.9 CHRONIC OBSTRUCTIVE PULMONARY DISEASE, UNSPECIFIED COPD TYPE (HCC): Chronic | ICD-10-CM

## 2025-04-15 PROCEDURE — 99213 OFFICE O/P EST LOW 20 MIN: CPT | Performed by: PHYSICIAN ASSISTANT

## 2025-04-15 PROCEDURE — G2211 COMPLEX E/M VISIT ADD ON: HCPCS | Performed by: PHYSICIAN ASSISTANT

## 2025-04-15 NOTE — ASSESSMENT & PLAN NOTE
She continues with her CPAP on a nightly basis, reviewed compliance and her AHI is 0.2.  There does appear to be some mask leak although patient does not note any significant leak.  She can follow-up with the [employer] company for mask fitting and if she finds that she is having trouble with the mask.  She is benefiting from the use of the CPAP and will continue at the current settings, obtain new supplies every 3 to 6 months

## 2025-04-15 NOTE — PROGRESS NOTES
Follow-up  Visit - Pulmonary Medicine   Name: Crissy Arboleda      : 1945      MRN: [id number]  Encounter Provider: Trae Christine PA-C  Encounter Date: 4/15/2025   Encounter department: [facility] PULMONARY [facility]  :  Assessment & Plan  CHOCO on CPAP  She continues with her CPAP on a nightly basis, reviewed compliance and her AHI is 0.2.  There does appear to be some mask leak although patient does not note any significant leak.  She can follow-up with the [employer] company for mask fitting and if she finds that she is having trouble with the mask.  She is benefiting from the use of the CPAP and will continue at the current settings, obtain new supplies every 3 to 6 months       Chronic obstructive pulmonary disease, unspecified COPD type (HCC)  Patient has been stable off any bronchodilators, mild obstruction on previous PFT       Nicotine dependence with current use  Patient continues to smoke, not interested in quitting.         Return in about 1 year (around 4/15/2026).    History of Present Illness   Crissy Arboleda is a 80 y.o. female current smoker with past medical history of COPD, CHOCO on CPAP, MS, hypothyroidism who presents for routine follow-up.  She continues with her CPAP on a nightly basis, does have a new mask that she does not like as much as her previous one but overall still tolerating the CPAP well.  Not having any shortness of breath, no need for any bronchodilators.    Review of Systems   Constitutional: Negative.    HENT: Negative.     Respiratory: Negative.     Cardiovascular: Negative.    Gastrointestinal: Negative.    Genitourinary: Negative.    Musculoskeletal: Negative.    Skin: Negative.    Allergic/Immunologic: Negative.    Neurological: Negative.    Psychiatric/Behavioral: Negative.         Aside from what is mentioned in the HPI, ROS is otherwise negative    Current Outpatient Medications on File Prior to Visit   Medication Sig Dispense Refill    amitriptyline  "(ELAVIL) 50 mg tablet TAKE 1 TABLET BY MOUTH EVERYDAY AT BEDTIME 90 tablet 1    cholecalciferol (VITAMIN D3) 1,000 units tablet Take 1,000 Units by mouth in the morning.      cyclobenzaprine (FLEXERIL) 5 mg tablet Take 2 tablets (10 mg total) by mouth every 8 (eight) hours as needed for muscle spasms 60 tablet 3    dorzolamide-timolol (COSOPT) 2-0.5 % ophthalmic solution INSTILL 1 DROP IN LEFT EYE TWICE DAILY      levothyroxine 100 mcg tablet TAKE 1 TABLET BY MOUTH EVERY DAY 90 tablet 3    Lumigan 0.01 % ophthalmic drops INSTILL ONE DROP IN LEFT EYE AT BEDTIME      morphine (MSIR) 15 mg tablet Take 1 tablet (15 mg total) by mouth every 8 (eight) hours as needed for severe pain Max Daily Amount: 45 mg 90 tablet 0    Multiple Vitamins-Minerals (ICAPS AREDS 2 PO) Take by mouth daily        Current Facility-Administered Medications on File Prior to Visit   Medication Dose Route Frequency Provider Last Rate Last Admin    denosumab (PROLIA) subcutaneous injection 60 mg  60 mg Subcutaneous Q6 Months Garcia Bustamanteblake, DO   60 mg at 10/30/24 1101         Medical History Reviewed by provider this encounter:     .    Objective   /82 (BP Location: Left arm, Patient Position: Sitting, Cuff Size: Large)   Pulse 89   Temp 97.7 °F (36.5 °C) (Oral)   Resp 16   Ht 5' 3\" (1.6 m)   Wt 59 kg (130 lb)   SpO2 97%   BMI 23.03 kg/m²     Physical Exam  Vitals reviewed.   Constitutional:       General: She is not in acute distress.     Appearance: Normal appearance. She is well-developed. She is not ill-appearing.   HENT:      Head: Normocephalic and atraumatic.      Mouth/Throat:      Pharynx: Oropharynx is clear.   Eyes:      Pupils: Pupils are equal, round, and reactive to light.   Cardiovascular:      Rate and Rhythm: Normal rate and regular rhythm.   Pulmonary:      Effort: Pulmonary effort is normal. No respiratory distress.      Breath sounds: Normal breath sounds. No decreased breath sounds, wheezing, rhonchi or rales. " "  Abdominal:      General: Abdomen is flat. There is no distension.   Musculoskeletal:         General: Normal range of motion.      Cervical back: Normal range of motion.      Right lower leg: No edema.      Left lower leg: No edema.   Skin:     General: Skin is warm and dry.      Findings: No rash.   Neurological:      Mental Status: She is alert and oriented to person, place, and time.   Psychiatric:         Mood and Affect: Mood normal.         Behavior: Behavior normal.           Diagnostic Data:  Labs: I personally reviewed the most recent laboratory data pertinent to today's visit.      Radiology results:  Radiology Results Review: I have reviewed radiology reports from 2023 including: CT chest.  No suspicious lung nodules at that time    PFT/spirometry results:  No results found for: \"FEV1\", \"FVC\", \"LFK9GDH\", \"TLC\", \"DLCO\"   PFT in 2017 showed mild obstruction    Oximetry testing:      Other studies:      Trae Christine PA-C      "

## 2025-04-24 ENCOUNTER — TELEPHONE (OUTPATIENT)
Age: 80
End: 2025-04-24

## 2025-05-01 ENCOUNTER — TELEPHONE (OUTPATIENT)
Age: 80
End: 2025-05-01

## 2025-05-01 NOTE — TELEPHONE ENCOUNTER
Caller: pt    Doctor: Dr. guzman    Reason for call: pain level 5. Pt is in a lot of low back please advise.    Call back#: [phone]

## 2025-05-01 NOTE — TELEPHONE ENCOUNTER
S/w pt, s/p right L4-L5,L5-S1 TFESI 03/10, pt reports she feels relief from inj is wearing off, reports pain and weakness in right lower back and RLE. Pt is scheduled for next inj in July. Pt is not taking any medication for symptoms yet, plans to take flexeril and heat. Inquired if any further recommendations at this time.

## 2025-05-01 NOTE — TELEPHONE ENCOUNTER
MACHO Conway  You2 minutes ago (10:45 AM)       I think she can start with the flexeril and heat. If she is not improving after a week of starting to reach out and I can try her on a course of oral steroids.     S/w pt, advised of the same. Pt verbalized understanding and agreeable to plan

## 2025-05-02 DIAGNOSIS — F11.20 CONTINUOUS OPIOID DEPENDENCE (HCC): ICD-10-CM

## 2025-05-02 RX ORDER — MORPHINE SULFATE 15 MG/1
15 TABLET ORAL EVERY 8 HOURS PRN
Qty: 90 TABLET | Refills: 0 | Status: SHIPPED | OUTPATIENT
Start: 2025-05-02

## 2025-05-02 NOTE — TELEPHONE ENCOUNTER
Reason for call:   [x] Refill   [] Prior Auth  [] Other:     Office:   [x] PCP/Provider - [facility] [place]   [] Specialty/Provider -     Medication:   ~ morphine (MSIR) 15 mg tablet - Take 1 tablet (15 mg total) by mouth every 8 (eight) hours as needed for severe pain Max Daily Amount: 45 mg     Pharmacy:   [facility]/pharmacy #[id number] - [place] - [address] [facility]   Does the patient have enough for 3 days?   [x] Yes   [] No - Send as HP to POD

## 2025-05-05 ENCOUNTER — CLINICAL SUPPORT (OUTPATIENT)
Age: 80
End: 2025-05-05
Payer: MEDICARE

## 2025-05-05 DIAGNOSIS — M51.16 INTERVERTEBRAL DISC DISORDER WITH RADICULOPATHY OF LUMBAR REGION: Primary | ICD-10-CM

## 2025-05-05 PROCEDURE — 96372 THER/PROPH/DIAG INJ SC/IM: CPT

## 2025-05-06 ENCOUNTER — TELEPHONE (OUTPATIENT)
Age: 80
End: 2025-05-06

## 2025-05-07 DIAGNOSIS — M51.16 INTERVERTEBRAL DISC DISORDER WITH RADICULOPATHY OF LUMBAR REGION: Primary | ICD-10-CM

## 2025-05-07 RX ORDER — PREDNISONE 10 MG/1
TABLET ORAL
Qty: 40 TABLET | Refills: 0 | Status: SHIPPED | OUTPATIENT
Start: 2025-05-07

## 2025-05-07 NOTE — TELEPHONE ENCOUNTER
Caller: Crissy     Doctor: Dr. Clemente     Reason for call: Patient is calling stating medication not helping or heat patient states pain getting worse please advise     Call back#: [phone]

## 2025-06-19 DIAGNOSIS — F11.20 CONTINUOUS OPIOID DEPENDENCE (HCC): ICD-10-CM

## 2025-06-19 RX ORDER — MORPHINE SULFATE 15 MG/1
15 TABLET ORAL EVERY 8 HOURS PRN
Qty: 90 TABLET | Refills: 0 | Status: SHIPPED | OUTPATIENT
Start: 2025-06-19 | End: 2025-06-23

## 2025-06-19 NOTE — TELEPHONE ENCOUNTER
Reason for call:   [x] Refill   [] Prior Auth  [] Other:     Office:   [x] PCP/Provider -   [] Specialty/Provider -     Medication: morphine (MSIR) 15 mg     Dose/Frequency:  Take 1 tablet (15 mg total) by mouth every 8 (eight) hours as needed     Quantity: 90    Pharmacy: [facility]/pharmacy #[id number] - [place] - [address] [facility]   Does the patient have enough for 3 days?   [x] Yes   [] No - Send as HP to POD    Mail Away Pharmacy   Does the patient have enough for 10 days?   [] Yes   [] No - Send as HP to POD

## 2025-06-23 RX ORDER — MORPHINE SULFATE 15 MG/1
15 TABLET ORAL EVERY 8 HOURS PRN
Qty: 90 TABLET | Refills: 0 | Status: SHIPPED | OUTPATIENT
Start: 2025-06-23

## 2025-06-23 NOTE — TELEPHONE ENCOUNTER
Patient called to check the status of her refill request for morphine. She stated that she has not received notification from the pharmacy that the prescription is ready and she is out of medication. Patient made aware prescription was sent to [facility] on 06/19/25 for 90 tablets. Patient instructed to contact the pharmacy and speak with someone directly to obtain refill of medication. Patient advised to call back for refill if their pharmacy is unable to assist them. Patient verbalized understanding.

## 2025-06-23 NOTE — TELEPHONE ENCOUNTER
Patient called the RX Refill Line. Message is being forwarded to the office.     Patient called and states that her daughter went to the pharmacy and they state that they did not get the script and they could give her a few tablets, but if they get the script they would not be able to give her the rest. Patient would like this resent over. Frustrated with the pharmacy and states that she is okay now, but in a few hours she will not be able to handle the pain.     Please contact patient at

## 2025-07-02 ENCOUNTER — TELEPHONE (OUTPATIENT)
Age: 80
End: 2025-07-02

## 2025-07-02 DIAGNOSIS — F11.20 CONTINUOUS OPIOID DEPENDENCE (HCC): ICD-10-CM

## 2025-07-02 RX ORDER — MORPHINE SULFATE 15 MG/1
15 TABLET ORAL EVERY 8 HOURS PRN
Qty: 90 TABLET | Refills: 0 | Status: SHIPPED | OUTPATIENT
Start: 2025-07-02 | End: 2025-07-07 | Stop reason: SDUPTHER

## 2025-07-02 NOTE — TELEPHONE ENCOUNTER
Caller: lauren Woodward     Doctor: Dr. Clemente     Reason for call: pt returning office call from nurse. I advised pt per chart notes Pt does not have to schedule a f/u a this time, if it is difficult for her.She will be called for a 2 week f/u after her 7/14 TFESI. Pt understood. Pt stated if she has any problems she will give us a call.     Call back#: [phone]

## 2025-07-02 NOTE — TELEPHONE ENCOUNTER
Caller: Patient    Doctor: Dr. ROSALES    Reason for call: Patient would like to know if an OV is needed after procedure, would like to know if a phone call would be best.    Due to patient living in [place]    Call back#:

## 2025-07-02 NOTE — TELEPHONE ENCOUNTER
Lm for pt to cb    Pt does not have to schedule a f/u a this time, if it is difficult for her.  She will be called for a 2 week f/u after her 7/14 TFESI

## 2025-07-02 NOTE — TELEPHONE ENCOUNTER
Reason for call:   [x] Refill   [] Prior Auth  [x] Other: Morphine medication is on backordered at [facility] pharmacy. Contacted [facility]/pharmacy #[id number] - [place] - [address]  and staff member stated they do not have a release date when morphine will be available. Patient asked if pharmacy can be changed [facility]. Contacted [facility] pharmacy and staff stated medication currently not in stock but medication will just need to be ordered.  Pharmacy Change to [facility] [address] [facility] [place]  [address]     Office:   [x] PCP/Provider - [facility] [place]   [] Specialty/Provider -     Medication: morphine (MSIR) 15 mg tablet Take 1 tablet (15 mg total) by mouth every 8 (eight) hours as needed for severe pain.     Quantity: 90 tablet     Pharmacy:  [facility] [address] [facility] [place]  [address]     Local Pharmacy   Does the patient have enough for 3 days?   [] Yes   [x] No - Send as HP to POD Patient only has 3/4 to get her until weekend.     Mail Away Pharmacy   Does the patient have enough for 10 days?   [] Yes   [] No - Send as HP to POD

## 2025-07-07 DIAGNOSIS — F11.20 CONTINUOUS OPIOID DEPENDENCE (HCC): ICD-10-CM

## 2025-07-07 RX ORDER — MORPHINE SULFATE 15 MG/1
15 TABLET ORAL EVERY 8 HOURS PRN
Qty: 90 TABLET | Refills: 0 | Status: SHIPPED | OUTPATIENT
Start: 2025-07-07

## 2025-07-07 NOTE — TELEPHONE ENCOUNTER
Reason for call:   [x] Refill   [] Prior Auth  [] Other: pharmacy change, as [facility] told pt they can not get medication     Office:   [x] PCP/Provider - Dr Watson   [] Specialty/Provider -     Medication: morphine     Dose/Frequency: 15 mg take 1 tablet every 8 hours as needed     Quantity: 90    Pharmacy: [facility]   Does the patient have enough for 3 days?   [] Yes   [x] No - Send as HP to POD- she's will run out today and is very worried about not having it, hopes refill can be sent asap as [facility] is holding what they have so they can fill it today for her once they get the script     Mail Away Pharmacy   Does the patient have enough for 10 days?   [] Yes   [] No - Send as HP to POD  \

## 2025-07-14 ENCOUNTER — HOSPITAL ENCOUNTER (OUTPATIENT)
Dept: RADIOLOGY | Facility: CLINIC | Age: 80
Discharge: HOME | End: 2025-07-14
Payer: MEDICARE

## 2025-07-14 VITALS
DIASTOLIC BLOOD PRESSURE: 67 MMHG | OXYGEN SATURATION: 97 % | RESPIRATION RATE: 18 BRPM | SYSTOLIC BLOOD PRESSURE: 126 MMHG | HEART RATE: 83 BPM | TEMPERATURE: 96.5 F

## 2025-07-14 DIAGNOSIS — M51.16 INTERVERTEBRAL DISC DISORDER WITH RADICULOPATHY OF LUMBAR REGION: ICD-10-CM

## 2025-07-14 PROCEDURE — 64483 NJX AA&/STRD TFRM EPI L/S 1: CPT | Performed by: PHYSICAL MEDICINE & REHABILITATION

## 2025-07-14 PROCEDURE — 64484 NJX AA&/STRD TFRM EPI L/S EA: CPT | Performed by: PHYSICAL MEDICINE & REHABILITATION

## 2025-07-14 RX ORDER — BUPIVACAINE HCL/PF 2.5 MG/ML
2 VIAL (ML) INJECTION ONCE
Status: COMPLETED | OUTPATIENT
Start: 2025-07-14 | End: 2025-07-14

## 2025-07-14 RX ORDER — LIDOCAINE HYDROCHLORIDE 10 MG/ML
4 INJECTION, SOLUTION EPIDURAL; INFILTRATION; INTRACAUDAL; PERINEURAL ONCE
Status: COMPLETED | OUTPATIENT
Start: 2025-07-14 | End: 2025-07-14

## 2025-07-14 RX ORDER — METHYLPREDNISOLONE ACETATE 40 MG/ML
40 INJECTION, SUSPENSION INTRA-ARTICULAR; INTRALESIONAL; INTRAMUSCULAR; PARENTERAL; SOFT TISSUE ONCE
Status: COMPLETED | OUTPATIENT
Start: 2025-07-14 | End: 2025-07-14

## 2025-07-14 RX ADMIN — BUPIVACAINE HYDROCHLORIDE 2 ML: 2.5 INJECTION, SOLUTION EPIDURAL; INFILTRATION; INTRACAUDAL at 11:07

## 2025-07-14 RX ADMIN — LIDOCAINE HYDROCHLORIDE 4 ML: 10 INJECTION, SOLUTION EPIDURAL; INFILTRATION; INTRACAUDAL; PERINEURAL at 11:03

## 2025-07-14 RX ADMIN — METHYLPREDNISOLONE ACETATE 40 MG: 40 INJECTION, SUSPENSION INTRA-ARTICULAR; INTRALESIONAL; INTRAMUSCULAR; SOFT TISSUE at 11:07

## 2025-07-14 RX ADMIN — IOHEXOL 1 ML: 300 INJECTION, SOLUTION INTRAVENOUS at 11:05

## 2025-07-14 NOTE — H&P
History of Present Illness: The patient is a 80 y.o. female who presents with complaints of right sided low back pain  Past Medical History[1]    Past Surgical History[2]    Current Medications[3]    Allergies[4]    Physical Exam:   Vitals:    25 1029   BP: 127/82   Pulse: 84   Resp: 18   Temp: (!) 96.5 °F (35.8 °C)   SpO2: 94%     General: Awake, Alert, Oriented x 3, Mood and affect appropriate  Respiratory: Respirations even and unlabored  Cardiovascular: Peripheral pulses intact; no edema  Musculoskeletal Exam: Tenderness palpation right side lumbar paraspinals    ASA Score: 2    Patient/Chart Verification  Patient ID Verified: Verbal  ID Band Applied: No  H&P( within 30 days) Verified: To be obtained in the Procedural area  Allergies Reviewed: Yes  Anticoag/NSAID held?: No  Currently on antibiotics?: No    Assessment: No diagnosis found.    Plan: R L4-5 L5-S1 TFESI        [1]   Past Medical History:  Diagnosis Date    Anxiety     Cataract     last assessed 14    Chronic bronchitis ([facility])     Sees pulmonary specialist twice a year    Chronic pain disorder     COPD (chronic obstructive pulmonary disease) ([facility])     Hypothyroidism     Multiple sclerosis (HCC)     CHOCO (obstructive sleep apnea)     uses cpap, 3/19/21 -pt states will bring CPAP for DOS 3/23/21    Osteopetrosis     Peroneal muscle atrophy     Shortness of breath     Smoker     Thoracolumbar radiculopathy due to intervertebral disc disorder    [2]   Past Surgical History:  Procedure Laterality Date    APPENDECTOMY      CATARACT EXTRACTION       SECTION      CHOLECYSTECTOMY      GALLBLADDER SURGERY      MN COLONOSCOPY FLX DX W/COLLJ SPEC WHEN PFRMD N/A 2018    Procedure: COLONOSCOPY;  Surgeon: Padilla Gonzales MD;  Location: [facility];  Service: Colorectal    MN LAMNOTMY INCL W/DCMPRSN NRV ROOT 1 INTRSPC LUMBR Right 3/23/2021    Procedure: MINIMALLY INVASIVE LUMBAR FAR LATERAL DISCECTOMY L4-5, RIGHT;  Surgeon: Estuardo Dwyer MD;   Location:  MAIN OR;  Service: Neurosurgery    THROAT SURGERY     [3]   Current Outpatient Medications:     amitriptyline (ELAVIL) 50 mg tablet, TAKE 1 TABLET BY MOUTH EVERYDAY AT BEDTIME, Disp: 90 tablet, Rfl: 1    cholecalciferol (VITAMIN D3) 1,000 units tablet, Take 1,000 Units by mouth in the morning., Disp: , Rfl:     cyclobenzaprine (FLEXERIL) 5 mg tablet, Take 2 tablets (10 mg total) by mouth every 8 (eight) hours as needed for muscle spasms, Disp: 60 tablet, Rfl: 3    dorzolamide-timolol (COSOPT) 2-0.5 % ophthalmic solution, INSTILL 1 DROP IN LEFT EYE TWICE DAILY, Disp: , Rfl:     levothyroxine 100 mcg tablet, TAKE 1 TABLET BY MOUTH EVERY DAY, Disp: 90 tablet, Rfl: 3    Lumigan 0.01 % ophthalmic drops, INSTILL ONE DROP IN LEFT EYE AT BEDTIME, Disp: , Rfl:     morphine (MSIR) 15 mg tablet, Take 1 tablet (15 mg total) by mouth every 8 (eight) hours as needed for severe pain Max Daily Amount: 45 mg, Disp: 90 tablet, Rfl: 0    Multiple Vitamins-Minerals (ICAPS AREDS 2 PO), Take by mouth daily , Disp: , Rfl:     predniSONE 10 mg tablet, Take 4 tablets a day for 4 days then 3 tablets a day for 4 days then 2 tablets a day for 4 days then 1 tablets a day for 4 days, Disp: 40 tablet, Rfl: 0    Current Facility-Administered Medications:     bupivacaine (PF) (MARCAINE) 0.25 % injection 2 mL, 2 mL, Epidural, Once, Shayne Clemente DO    denosumab (PROLIA) subcutaneous injection 60 mg, 60 mg, Subcutaneous, Q6 Months, [facility], 60 mg at 05/05/25 1035    iohexol (OMNIPAQUE) 300 mg/mL injection 50 mL, 50 mL, Epidural, Once, Shayne Clemente DO    lidocaine (PF) (XYLOCAINE-MPF) 1 % injection 4 mL, 4 mL, Infiltration, Once, Shayne Clemente DO    methylPREDNISolone acetate (DEPO-MEDROL) injection 40 mg, 40 mg, Perineural, Once, Shayne Clemente DO  [4]   Allergies  Allergen Reactions    Adhesive [Medical Tape] Rash    Latex Rash

## 2025-07-14 NOTE — DISCHARGE INSTR - LAB
Epidural Steroid Injection   WHAT YOU NEED TO KNOW:   An epidural steroid injection (GEOVANNA) is a procedure to inject steroid medicine into the epidural space. The epidural space is between your spinal cord and vertebrae. Steroids reduce inflammation and fluid buildup in your spine that may be causing pain. You may be given pain medicine along with the steroids.          ACTIVITY  Do not drive or operate machinery today.  No strenuous activity today - bending, lifting, etc.  You may resume normal activites starting tomorrow - start slowly and as tolerated.  You may shower today, but no tub baths or hot tubs.  You may have numbness for several hours from the local anesthetic. Please use caution and common sense, especially with weight-bearing activities.    CARE OF THE INJECTION SITE  If you have soreness or pain, apply ice to the area today (20 minutes on/20 minutes off).  Starting tomorrow, you may use warm, moist heat or ice if needed.  You may have an increase or change in your discomfort for 36-48 hours after your treatment.  Apply ice and continue with any pain medication you have been prescribed.  Notify the [facility] if you have any of the following: redness, drainage, swelling, headache, stiff neck or fever above 100°F.    SPECIAL INSTRUCTIONS  Our office will contact you in approximately 14 days for a progress report.    MEDICATIONS  Continue to take all routine medications.  Our office may have instructed you to hold some medications.    As no general anesthesia was used in today's procedure, you should not experience any side effects related to anesthesia.     If you are diabetic, the steroids used in today's injection may temporarily increase your blood sugar levels after the first few days after your injection. Please keep a close eye on your sugars and alert the doctor who manages your diabetes if your sugars are significantly high from your baseline or you are symptomatic.     If you have a  problem specifically related to your procedure, please call our office at [phone].  Problems not related to your procedure should be directed to your primary care physician.

## 2025-07-28 ENCOUNTER — TELEPHONE (OUTPATIENT)
Dept: PAIN MEDICINE | Facility: CLINIC | Age: 80
End: 2025-07-28

## 2025-08-11 ENCOUNTER — OFFICE VISIT (OUTPATIENT)
Dept: PAIN MEDICINE | Facility: CLINIC | Age: 80
End: 2025-08-11
Payer: MEDICARE

## 2025-08-15 ENCOUNTER — OFFICE VISIT (OUTPATIENT)
Age: 80
End: 2025-08-15
Payer: MEDICARE

## (undated) DEVICE — CHLORAPREP HI-LITE 26ML ORANGE

## (undated) DEVICE — SPONGE SCRUB 4 PCT CHLORHEXIDINE

## (undated) DEVICE — DRAPE ADOLESCENT LAPAROTOMY

## (undated) DEVICE — NEEDLE 18 G X 1 1/2

## (undated) DEVICE — SYRINGE 10ML LL

## (undated) DEVICE — INTENDED FOR TISSUE SEPARATION, AND OTHER PROCEDURES THAT REQUIRE A SHARP SURGICAL BLADE TO PUNCTURE OR CUT.: Brand: BARD-PARKER SAFETY BLADES SIZE 15, STERILE

## (undated) DEVICE — TOOL T12MH25 LEGEND 12CM 2.5MM MH: Brand: MIDAS REX ™

## (undated) DEVICE — SUT MONOCRYL PLUS 3-0 PS-2 27 IN MCP427H

## (undated) DEVICE — PENCIL ELECTROSURG E-Z CLEAN -0035H

## (undated) DEVICE — Device

## (undated) DEVICE — ADHESIVE SKIN HIGH VISCOSITY EXOFIN 1ML

## (undated) DEVICE — ANTIBACTERIAL VIOLET BRAIDED (POLYGLACTIN 910), SYNTHETIC ABSORBABLE SUTURE: Brand: COATED VICRYL

## (undated) DEVICE — DRAPE MICROSCOPE OPMI PENTERO

## (undated) DEVICE — NEEDLE 22 G X 1 1/2 SAFETY

## (undated) DEVICE — HEMOSTATIC MATRIX SURGIFLO 8ML W/THROMBIN

## (undated) DEVICE — SNAP KOVER: Brand: UNBRANDED

## (undated) DEVICE — ELECTRODE BLADE MOD E-Z CLEAN  2.75IN 7CM -0012AM

## (undated) DEVICE — INTENDED FOR TISSUE SEPARATION, AND OTHER PROCEDURES THAT REQUIRE A SHARP SURGICAL BLADE TO PUNCTURE OR CUT.: Brand: BARD-PARKER ® CARBON RIB-BACK BLADES

## (undated) DEVICE — ELECTRODE BLADE MOD  E-Z CLEAN 6.5IN -0014M

## (undated) DEVICE — SYRINGE 1ML TB 25G X 5/8 NON SAFETY

## (undated) DEVICE — GLOVE INDICATOR PI UNDERGLOVE SZ 7.5 BLUE

## (undated) DEVICE — BETHLEHEM UNIVERSAL SPINE, KIT: Brand: CARDINAL HEALTH

## (undated) DEVICE — DRAPE C-ARMOUR

## (undated) DEVICE — MINOR PROCEDURE DRAPE: Brand: CONVERTORS